# Patient Record
Sex: FEMALE | Race: BLACK OR AFRICAN AMERICAN | NOT HISPANIC OR LATINO | Employment: OTHER | URBAN - METROPOLITAN AREA
[De-identification: names, ages, dates, MRNs, and addresses within clinical notes are randomized per-mention and may not be internally consistent; named-entity substitution may affect disease eponyms.]

---

## 2017-01-03 RX ORDER — CHLORTHALIDONE 25 MG/1
TABLET ORAL
Qty: 27 TABLET | Refills: 0 | Status: SHIPPED | OUTPATIENT
Start: 2017-01-03 | End: 2017-09-12

## 2017-01-10 ENCOUNTER — TELEPHONE (OUTPATIENT)
Dept: OPHTHALMOLOGY | Facility: CLINIC | Age: 77
End: 2017-01-10

## 2017-01-10 NOTE — TELEPHONE ENCOUNTER
Spoke with pt informed Dr. Canchola will be out of the office on 1/18/17, R/S appt to 1/19/17 @ 9:00.

## 2017-02-13 RX ORDER — FLUOCINONIDE 0.5 MG/G
OINTMENT TOPICAL 2 TIMES DAILY
Qty: 30 G | Refills: 1 | Status: ON HOLD | OUTPATIENT
Start: 2017-02-13 | End: 2018-04-11 | Stop reason: HOSPADM

## 2017-02-13 RX ORDER — FLUOCINONIDE 0.5 MG/G
OINTMENT TOPICAL
Refills: 0 | Status: CANCELLED | OUTPATIENT
Start: 2017-02-13

## 2017-02-13 NOTE — TELEPHONE ENCOUNTER
----- Message from Rice Emma sent at 2017 11:36 AM CST -----  Contact: [facility]/ [phone] home  Type: Rx    Name of medication(s): fluocinonide (LIDEX) 0.05 % ointment ()    Is this a refill? New rx? Refill    Who prescribed medication? Dr. Zambrano    Pharmacy Name, Phone, & Location: [facility] on file    Comments: Pt would like to request a refill on the medications above.  Please call and advise.    Thank you

## 2017-04-18 ENCOUNTER — TELEPHONE (OUTPATIENT)
Dept: INTERNAL MEDICINE | Facility: CLINIC | Age: 77
End: 2017-04-18

## 2017-04-18 NOTE — TELEPHONE ENCOUNTER
----- Message from Norah Newell PharmD sent at 4/18/2017 11:40 AM CDT -----  Good Morning Dr. Zambrano,     I just wanted to inform you that we have been trying to contact Ms. Suero to re-schedule a PT/INR.  We have been unable to reach the patient by phone after multiple attempts and are now mailing out a letter.  If the patient does not respond or schedule an INR within 7 days, the patient will be discharged from the Coumadin Clinic.  I just wanted to make sure you were aware of the current situation.    Thanks!

## 2017-04-18 NOTE — TELEPHONE ENCOUNTER
Please call--coumadin clinic has been trying to reach her-- all her recent appointments have been canceled-- If she is not going to get iNR check, she is going to have to come off the coumadin.  Let me know if you can reach her

## 2017-04-25 ENCOUNTER — TELEPHONE (OUTPATIENT)
Dept: INTERNAL MEDICINE | Facility: CLINIC | Age: 77
End: 2017-04-25

## 2017-04-25 ENCOUNTER — ANTI-COAG VISIT (OUTPATIENT)
Dept: CARDIOLOGY | Facility: CLINIC | Age: 77
End: 2017-04-25

## 2017-04-25 NOTE — LETTER
April 25, 2017    Temitope Suero  [address] [facility] [address]             [facility]  [address]  [facility] [address]  Phone: [phone]  Fax: [phone] Dear Ms. Suero:     The Coumadin Clinic has been unsuccessful in getting in touch with you regarding your missed appointment for a PT/INR check.  Your appointment is overdue.  Despite phone calls and a previous letter sent to your address on file, you have not called our clinic to reschedule your appointment.  It is very important that you are monitored regularly while on Coumadin (warfarin).     You have been discharged from the Coumadin Clinic.  Your physician has been notified of your discharge.  If you still require monitoring for your Coumadin therapy please call your physician immediately so that you can resume regular monitoring.  If the physician recommends that our clinic continue to monitor your Coumadin therapy, a new enrollment form will be required before we can resume care.  If you are no longer on Coumadin or no longer require monitoring by our clinic, please contact our clinic so we may update our records.     It has been a pleasure providing your care.      Sincerely,  [facility] Staff

## 2017-04-25 NOTE — TELEPHONE ENCOUNTER
----- Message from Norah Newell PharmD sent at 4/25/2017  8:49 AM CDT -----  Good Morning Dr. Zambrano,    After multiple attempts to contact Ms. Suero via telephone and a mailed letter mailed, the patient still has not contacted us to re-schedule a PT/INR.  Therefore, per our previous message to you, the patient is now being discharged from the Coumadin Clinic.  In the future, if the patient remains on Coumadin and is interested in having us monitor levels, a new enrollment form will be needed.      Thanks

## 2017-04-25 NOTE — PROGRESS NOTES
The Coumadin Clinic staff has tried to reach the patient by telephone on 2/2/17 and 4/5/17.  A missed letter was sent to the patient's address on file on 4/18/17, in addition to notifying the enrolling physician.  The patient has not responded to any of the previous correspondence.  Therefore, I am now discharging the patient from the Coumadin Clinic, per our missed protocol.  This discharge information will be sent to the patients enrolling physician and a letter with this information will be sent to the patient.

## 2017-06-29 DIAGNOSIS — Z79.01 LONG-TERM (CURRENT) USE OF ANTICOAGULANTS: ICD-10-CM

## 2017-06-29 NOTE — TELEPHONE ENCOUNTER
----- Message from Samara Salas sent at 6/29/2017  2:58 PM CDT -----  Contact: Patient, phone [phone]  Refill    warfarin (COUMADIN) 5 MG tablet 30 tablet 0 2/1/2016  No  Sig: Take 1 tablet (5mg) by mouth everyday, except 1/2 tablet  (2.5mg) on Tuesday and Thursday,  Or as directed by Coumadin Clinic    [place] [address] [address] [facility] [phone] (phone) [phone] (fax)    Please call patient and let her know that it is done.     Thanks!

## 2017-07-01 RX ORDER — WARFARIN SODIUM 5 MG/1
TABLET ORAL
Qty: 30 TABLET | Refills: 4 | Status: ON HOLD | OUTPATIENT
Start: 2017-07-01 | End: 2018-04-11 | Stop reason: HOSPADM

## 2017-07-05 RX ORDER — ATORVASTATIN CALCIUM 40 MG/1
TABLET, FILM COATED ORAL
Qty: 30 TABLET | Refills: 9 | Status: SHIPPED | OUTPATIENT
Start: 2017-07-05 | End: 2017-09-12

## 2017-09-02 RX ORDER — METFORMIN HYDROCHLORIDE 1000 MG/1
TABLET ORAL
Qty: 180 TABLET | Refills: 4 | Status: SHIPPED | OUTPATIENT
Start: 2017-09-02 | End: 2018-04-17

## 2017-09-02 RX ORDER — HYDROXYZINE HYDROCHLORIDE 25 MG/1
TABLET, FILM COATED ORAL
Qty: 90 TABLET | Refills: 2 | Status: SHIPPED | OUTPATIENT
Start: 2017-09-02 | End: 2018-04-17

## 2017-09-02 RX ORDER — SPIRONOLACTONE 25 MG/1
TABLET ORAL
Qty: 90 TABLET | Refills: 3 | Status: SHIPPED | OUTPATIENT
Start: 2017-09-02 | End: 2018-04-06 | Stop reason: SDUPTHER

## 2017-09-12 RX ORDER — ATORVASTATIN CALCIUM 40 MG/1
TABLET, FILM COATED ORAL
Qty: 30 TABLET | Refills: 3 | Status: SHIPPED | OUTPATIENT
Start: 2017-09-12 | End: 2018-04-06 | Stop reason: SDUPTHER

## 2017-09-12 RX ORDER — CHLORTHALIDONE 25 MG/1
TABLET ORAL
Qty: 30 TABLET | Refills: 3 | Status: SHIPPED | OUTPATIENT
Start: 2017-09-12 | End: 2018-04-06 | Stop reason: SDUPTHER

## 2018-04-06 RX ORDER — ATORVASTATIN CALCIUM 40 MG/1
40 TABLET, FILM COATED ORAL DAILY
Qty: 30 TABLET | Refills: 0 | Status: SHIPPED | OUTPATIENT
Start: 2018-04-06 | End: 2018-04-17 | Stop reason: SDUPTHER

## 2018-04-06 RX ORDER — METHIMAZOLE 10 MG/1
10 TABLET ORAL DAILY
Qty: 30 TABLET | Refills: 0 | Status: SHIPPED | OUTPATIENT
Start: 2018-04-06 | End: 2018-04-17

## 2018-04-06 RX ORDER — CHLORTHALIDONE 25 MG/1
25 TABLET ORAL DAILY
Qty: 30 TABLET | Refills: 0 | Status: SHIPPED | OUTPATIENT
Start: 2018-04-06 | End: 2018-04-17 | Stop reason: SDUPTHER

## 2018-04-06 RX ORDER — SPIRONOLACTONE 25 MG/1
25 TABLET ORAL DAILY
Qty: 90 TABLET | Refills: 0 | Status: SHIPPED | OUTPATIENT
Start: 2018-04-06 | End: 2018-04-17

## 2018-04-06 NOTE — TELEPHONE ENCOUNTER
----- Message from Ange Preciado sent at 4/6/2018 11:28 AM CDT -----  Contact: Ms Stern   [phone]  Ms Ackermanard states need to speak with nurse.   Patient losing weight , no appetite , forgetting things.   Patient need appointment for Monday 4/9/2018 at 10am if possible.      MsJarred  States patient need refill on medication atorvastatin (LIPITOR) 40 MG tablet,     spironolactone (ALDACTONE) 25 MG tablet,  methimazole (TAPAZOLE) 10 MG Tab,     chlorthalidone (HYGROTEN) 25 MG Tab called into [facility]. Ms Stern states patient out of medication     Please call Ms Ackermanard [phone] for more info

## 2018-04-10 ENCOUNTER — OFFICE VISIT (OUTPATIENT)
Dept: INTERNAL MEDICINE | Facility: CLINIC | Age: 78
DRG: 689 | End: 2018-04-10
Payer: COMMERCIAL

## 2018-04-10 ENCOUNTER — HOSPITAL ENCOUNTER (INPATIENT)
Facility: HOSPITAL | Age: 78
LOS: 1 days | Discharge: HOME | DRG: 689 | End: 2018-04-11
Attending: EMERGENCY MEDICINE | Admitting: EMERGENCY MEDICINE
Payer: COMMERCIAL

## 2018-04-10 VITALS
OXYGEN SATURATION: 98 % | BODY MASS INDEX: 23.56 KG/M2 | SYSTOLIC BLOOD PRESSURE: 182 MMHG | HEIGHT: 67 IN | WEIGHT: 150.13 LBS | TEMPERATURE: 98 F | HEART RATE: 59 BPM | DIASTOLIC BLOOD PRESSURE: 102 MMHG

## 2018-04-10 DIAGNOSIS — I48.91 RAPID ATRIAL FIBRILLATION: Primary | ICD-10-CM

## 2018-04-10 DIAGNOSIS — R41.82 ALTERED MENTAL STATUS: ICD-10-CM

## 2018-04-10 DIAGNOSIS — I16.0 HYPERTENSIVE URGENCY: ICD-10-CM

## 2018-04-10 DIAGNOSIS — I10 ESSENTIAL HYPERTENSION: ICD-10-CM

## 2018-04-10 DIAGNOSIS — I48.0 PAROXYSMAL ATRIAL FIBRILLATION: ICD-10-CM

## 2018-04-10 DIAGNOSIS — R63.4 WEIGHT LOSS: ICD-10-CM

## 2018-04-10 DIAGNOSIS — I48.92 ATRIAL FLUTTER, UNSPECIFIED TYPE: ICD-10-CM

## 2018-04-10 DIAGNOSIS — R41.3 MEMORY LOSS: ICD-10-CM

## 2018-04-10 DIAGNOSIS — R41.82 ALTERED MENTAL STATUS, UNSPECIFIED ALTERED MENTAL STATUS TYPE: Primary | ICD-10-CM

## 2018-04-10 DIAGNOSIS — I63.431 EMBOLIC STROKE INVOLVING RIGHT POSTERIOR CEREBRAL ARTERY: ICD-10-CM

## 2018-04-10 DIAGNOSIS — Z91.148 NONCOMPLIANCE WITH MEDICATION REGIMEN: ICD-10-CM

## 2018-04-10 DIAGNOSIS — I48.91 A-FIB: ICD-10-CM

## 2018-04-10 DIAGNOSIS — I49.8 ATRIAL ARRHYTHMIA: ICD-10-CM

## 2018-04-10 PROBLEM — N30.00 ACUTE CYSTITIS WITHOUT HEMATURIA: Status: ACTIVE | Noted: 2018-04-10

## 2018-04-10 LAB
ALBUMIN SERPL BCP-MCNC: 3.4 G/DL
ALP SERPL-CCNC: 137 U/L
ALT SERPL W/O P-5'-P-CCNC: 18 U/L
ANION GAP SERPL CALC-SCNC: 10 MMOL/L
APTT BLDCRRT: 24.4 SEC
AST SERPL-CCNC: 22 U/L
BASOPHILS # BLD AUTO: 0.04 K/UL
BASOPHILS NFR BLD: 0.9 %
BILIRUB SERPL-MCNC: 0.6 MG/DL
BILIRUB UR QL STRIP: NEGATIVE
BUN SERPL-MCNC: 18 MG/DL
CALCIUM SERPL-MCNC: 9.2 MG/DL
CHLORIDE SERPL-SCNC: 107 MMOL/L
CLARITY UR REFRACT.AUTO: ABNORMAL
CO2 SERPL-SCNC: 25 MMOL/L
COLOR UR AUTO: YELLOW
CREAT SERPL-MCNC: 1.3 MG/DL
DIFFERENTIAL METHOD: ABNORMAL
EOSINOPHIL # BLD AUTO: 0.1 K/UL
EOSINOPHIL NFR BLD: 2.3 %
ERYTHROCYTE [DISTWIDTH] IN BLOOD BY AUTOMATED COUNT: 15.2 %
EST. GFR  (AFRICAN AMERICAN): 45.7 ML/MIN/1.73 M^2
EST. GFR  (NON AFRICAN AMERICAN): 39.7 ML/MIN/1.73 M^2
GLUCOSE SERPL-MCNC: 88 MG/DL
GLUCOSE UR QL STRIP: NEGATIVE
HCT VFR BLD AUTO: 39.2 %
HGB BLD-MCNC: 12 G/DL
HGB UR QL STRIP: NEGATIVE
IMM GRANULOCYTES # BLD AUTO: 0.01 K/UL
IMM GRANULOCYTES NFR BLD AUTO: 0.2 %
INR PPP: 1.1
KETONES UR QL STRIP: NEGATIVE
LACTATE SERPL-SCNC: 1.2 MMOL/L
LEUKOCYTE ESTERASE UR QL STRIP: ABNORMAL
LYMPHOCYTES # BLD AUTO: 1.4 K/UL
LYMPHOCYTES NFR BLD: 31.1 %
MAGNESIUM SERPL-MCNC: 1.7 MG/DL
MCH RBC QN AUTO: 24.9 PG
MCHC RBC AUTO-ENTMCNC: 30.6 G/DL
MCV RBC AUTO: 81 FL
MICROSCOPIC COMMENT: ABNORMAL
MONOCYTES # BLD AUTO: 0.4 K/UL
MONOCYTES NFR BLD: 8.9 %
NEUTROPHILS # BLD AUTO: 2.5 K/UL
NEUTROPHILS NFR BLD: 56.6 %
NITRITE UR QL STRIP: NEGATIVE
NRBC BLD-RTO: 0 /100 WBC
PH UR STRIP: 7 [PH] (ref 5–8)
PLATELET # BLD AUTO: 199 K/UL
PMV BLD AUTO: 12 FL
POCT GLUCOSE: 91 MG/DL (ref 70–110)
POTASSIUM SERPL-SCNC: 3.5 MMOL/L
PROT SERPL-MCNC: 6.3 G/DL
PROT UR QL STRIP: NEGATIVE
PROTHROMBIN TIME: 11.1 SEC
RBC # BLD AUTO: 4.82 M/UL
RBC #/AREA URNS AUTO: 6 /HPF (ref 0–4)
SODIUM SERPL-SCNC: 142 MMOL/L
SP GR UR STRIP: 1.01 (ref 1–1.03)
SQUAMOUS #/AREA URNS AUTO: 5 /HPF
TROPONIN I SERPL DL<=0.01 NG/ML-MCNC: 0.02 NG/ML
URN SPEC COLLECT METH UR: ABNORMAL
UROBILINOGEN UR STRIP-ACNC: NEGATIVE EU/DL
WBC # BLD AUTO: 4.37 K/UL
WBC #/AREA URNS AUTO: 56 /HPF (ref 0–5)

## 2018-04-10 PROCEDURE — 86140 C-REACTIVE PROTEIN: CPT

## 2018-04-10 PROCEDURE — 96375 TX/PRO/DX INJ NEW DRUG ADDON: CPT

## 2018-04-10 PROCEDURE — 85651 RBC SED RATE NONAUTOMATED: CPT

## 2018-04-10 PROCEDURE — 83735 ASSAY OF MAGNESIUM: CPT

## 2018-04-10 PROCEDURE — 3080F DIAST BP >= 90 MM HG: CPT | Mod: CPTII,S$GLB,, | Performed by: INTERNAL MEDICINE

## 2018-04-10 PROCEDURE — 96372 THER/PROPH/DIAG INJ SC/IM: CPT | Mod: 59

## 2018-04-10 PROCEDURE — 11000001 HC ACUTE MED/SURG PRIVATE ROOM

## 2018-04-10 PROCEDURE — 25000003 PHARM REV CODE 250: Performed by: STUDENT IN AN ORGANIZED HEALTH CARE EDUCATION/TRAINING PROGRAM

## 2018-04-10 PROCEDURE — 82962 GLUCOSE BLOOD TEST: CPT

## 2018-04-10 PROCEDURE — 99999 PR PBB SHADOW E&M-EST. PATIENT-LVL III: CPT | Mod: PBBFAC,,, | Performed by: INTERNAL MEDICINE

## 2018-04-10 PROCEDURE — 85730 THROMBOPLASTIN TIME PARTIAL: CPT

## 2018-04-10 PROCEDURE — 80053 COMPREHEN METABOLIC PANEL: CPT

## 2018-04-10 PROCEDURE — 63600175 PHARM REV CODE 636 W HCPCS: Performed by: EMERGENCY MEDICINE

## 2018-04-10 PROCEDURE — 93010 ELECTROCARDIOGRAM REPORT: CPT | Mod: S$GLB,,, | Performed by: INTERNAL MEDICINE

## 2018-04-10 PROCEDURE — 84425 ASSAY OF VITAMIN B-1: CPT

## 2018-04-10 PROCEDURE — 81001 URINALYSIS AUTO W/SCOPE: CPT

## 2018-04-10 PROCEDURE — 85025 COMPLETE CBC W/AUTO DIFF WBC: CPT

## 2018-04-10 PROCEDURE — 25000003 PHARM REV CODE 250: Performed by: EMERGENCY MEDICINE

## 2018-04-10 PROCEDURE — 85610 PROTHROMBIN TIME: CPT

## 2018-04-10 PROCEDURE — 87205 SMEAR GRAM STAIN: CPT

## 2018-04-10 PROCEDURE — 96376 TX/PRO/DX INJ SAME DRUG ADON: CPT

## 2018-04-10 PROCEDURE — 96374 THER/PROPH/DIAG INJ IV PUSH: CPT

## 2018-04-10 PROCEDURE — 3074F SYST BP LT 130 MM HG: CPT | Mod: CPTII,S$GLB,, | Performed by: INTERNAL MEDICINE

## 2018-04-10 PROCEDURE — 99285 EMERGENCY DEPT VISIT HI MDM: CPT | Mod: ,,, | Performed by: EMERGENCY MEDICINE

## 2018-04-10 PROCEDURE — 83605 ASSAY OF LACTIC ACID: CPT

## 2018-04-10 PROCEDURE — 87086 URINE CULTURE/COLONY COUNT: CPT

## 2018-04-10 PROCEDURE — 36415 COLL VENOUS BLD VENIPUNCTURE: CPT

## 2018-04-10 PROCEDURE — 84484 ASSAY OF TROPONIN QUANT: CPT

## 2018-04-10 PROCEDURE — 99214 OFFICE O/P EST MOD 30 MIN: CPT | Mod: S$GLB,,, | Performed by: INTERNAL MEDICINE

## 2018-04-10 PROCEDURE — 82652 VIT D 1 25-DIHYDROXY: CPT

## 2018-04-10 PROCEDURE — 86592 SYPHILIS TEST NON-TREP QUAL: CPT

## 2018-04-10 PROCEDURE — 93005 ELECTROCARDIOGRAM TRACING: CPT | Mod: S$GLB,,, | Performed by: INTERNAL MEDICINE

## 2018-04-10 PROCEDURE — 99285 EMERGENCY DEPT VISIT HI MDM: CPT | Mod: 25

## 2018-04-10 RX ORDER — IBUPROFEN 200 MG
24 TABLET ORAL
Status: DISCONTINUED | OUTPATIENT
Start: 2018-04-10 | End: 2018-04-11 | Stop reason: HOSPADM

## 2018-04-10 RX ORDER — ENOXAPARIN SODIUM 150 MG/ML
1 INJECTION SUBCUTANEOUS
Status: DISCONTINUED | OUTPATIENT
Start: 2018-04-11 | End: 2018-04-11 | Stop reason: ALTCHOICE

## 2018-04-10 RX ORDER — GLUCAGON 1 MG
1 KIT INJECTION
Status: DISCONTINUED | OUTPATIENT
Start: 2018-04-10 | End: 2018-04-11 | Stop reason: HOSPADM

## 2018-04-10 RX ORDER — ENOXAPARIN SODIUM 150 MG/ML
1 INJECTION SUBCUTANEOUS
Status: DISCONTINUED | OUTPATIENT
Start: 2018-04-11 | End: 2018-04-10

## 2018-04-10 RX ORDER — IBUPROFEN 200 MG
16 TABLET ORAL
Status: DISCONTINUED | OUTPATIENT
Start: 2018-04-10 | End: 2018-04-11 | Stop reason: HOSPADM

## 2018-04-10 RX ORDER — WARFARIN 2.5 MG/1
2.5 TABLET ORAL DAILY
Status: DISCONTINUED | OUTPATIENT
Start: 2018-04-10 | End: 2018-04-11

## 2018-04-10 RX ORDER — ATORVASTATIN CALCIUM 20 MG/1
40 TABLET, FILM COATED ORAL DAILY
Status: DISCONTINUED | OUTPATIENT
Start: 2018-04-11 | End: 2018-04-11 | Stop reason: HOSPADM

## 2018-04-10 RX ORDER — METOPROLOL TARTRATE 25 MG/1
25 TABLET, FILM COATED ORAL 2 TIMES DAILY
Status: DISCONTINUED | OUTPATIENT
Start: 2018-04-10 | End: 2018-04-11 | Stop reason: HOSPADM

## 2018-04-10 RX ORDER — AMOXICILLIN AND CLAVULANATE POTASSIUM 500; 125 MG/1; MG/1
1 TABLET, FILM COATED ORAL 2 TIMES DAILY
Status: DISCONTINUED | OUTPATIENT
Start: 2018-04-10 | End: 2018-04-11

## 2018-04-10 RX ORDER — BENAZEPRIL HYDROCHLORIDE 20 MG/1
40 TABLET ORAL DAILY
Status: DISCONTINUED | OUTPATIENT
Start: 2018-04-10 | End: 2018-04-11 | Stop reason: HOSPADM

## 2018-04-10 RX ORDER — WARFARIN 2.5 MG/1
2.5 TABLET ORAL DAILY
Status: DISCONTINUED | OUTPATIENT
Start: 2018-04-11 | End: 2018-04-10

## 2018-04-10 RX ORDER — HYDRALAZINE HYDROCHLORIDE 20 MG/ML
10 INJECTION INTRAMUSCULAR; INTRAVENOUS
Status: COMPLETED | OUTPATIENT
Start: 2018-04-10 | End: 2018-04-10

## 2018-04-10 RX ORDER — AMLODIPINE BESYLATE 10 MG/1
10 TABLET ORAL
Status: COMPLETED | OUTPATIENT
Start: 2018-04-10 | End: 2018-04-10

## 2018-04-10 RX ORDER — ASPIRIN 81 MG/1
81 TABLET ORAL DAILY
Status: DISCONTINUED | OUTPATIENT
Start: 2018-04-11 | End: 2018-04-11 | Stop reason: HOSPADM

## 2018-04-10 RX ORDER — ENOXAPARIN SODIUM 100 MG/ML
1 INJECTION SUBCUTANEOUS
Status: COMPLETED | OUTPATIENT
Start: 2018-04-10 | End: 2018-04-10

## 2018-04-10 RX ORDER — HYDRALAZINE HYDROCHLORIDE 20 MG/ML
10 INJECTION INTRAMUSCULAR; INTRAVENOUS EVERY 8 HOURS PRN
Status: DISCONTINUED | OUTPATIENT
Start: 2018-04-10 | End: 2018-04-10

## 2018-04-10 RX ORDER — LABETALOL HYDROCHLORIDE 5 MG/ML
20 INJECTION, SOLUTION INTRAVENOUS
Status: COMPLETED | OUTPATIENT
Start: 2018-04-10 | End: 2018-04-10

## 2018-04-10 RX ORDER — METOPROLOL TARTRATE 1 MG/ML
5 INJECTION, SOLUTION INTRAVENOUS ONCE
Status: COMPLETED | OUTPATIENT
Start: 2018-04-11 | End: 2018-04-10

## 2018-04-10 RX ORDER — SODIUM CHLORIDE 0.9 % (FLUSH) 0.9 %
5 SYRINGE (ML) INJECTION
Status: DISCONTINUED | OUTPATIENT
Start: 2018-04-10 | End: 2018-04-11 | Stop reason: HOSPADM

## 2018-04-10 RX ORDER — ASPIRIN 81 MG/1
81 TABLET ORAL DAILY
Status: DISCONTINUED | OUTPATIENT
Start: 2018-04-11 | End: 2018-04-10

## 2018-04-10 RX ADMIN — HYDRALAZINE HYDROCHLORIDE 10 MG: 20 INJECTION INTRAMUSCULAR; INTRAVENOUS at 03:04

## 2018-04-10 RX ADMIN — AMOXICILLIN AND CLAVULANATE POTASSIUM 500 MG: 500; 125 TABLET, FILM COATED ORAL at 11:04

## 2018-04-10 RX ADMIN — ENOXAPARIN SODIUM 70 MG: 100 INJECTION SUBCUTANEOUS at 08:04

## 2018-04-10 RX ADMIN — METOROPROLOL TARTRATE 5 MG: 5 INJECTION, SOLUTION INTRAVENOUS at 11:04

## 2018-04-10 RX ADMIN — LABETALOL HYDROCHLORIDE 20 MG: 5 INJECTION, SOLUTION INTRAVENOUS at 04:04

## 2018-04-10 RX ADMIN — WARFARIN SODIUM 2.5 MG: 2.5 TABLET ORAL at 11:04

## 2018-04-10 RX ADMIN — METOPROLOL TARTRATE 25 MG: 25 TABLET ORAL at 08:04

## 2018-04-10 RX ADMIN — AMLODIPINE BESYLATE 10 MG: 10 TABLET ORAL at 08:04

## 2018-04-10 RX ADMIN — BENAZEPRIL HYDROCHLORIDE 40 MG: 20 TABLET, FILM COATED ORAL at 08:04

## 2018-04-10 RX ADMIN — LABETALOL HYDROCHLORIDE 20 MG: 5 INJECTION, SOLUTION INTRAVENOUS at 06:04

## 2018-04-10 NOTE — PROGRESS NOTES
Subjective:       Patient ID: Temitope Suero is a 78 y.o. female.    Chief Complaint: Weight Loss (no appetite, lost memory)    Patient brought in by great niece and great nephew because they think she is losing too much weight and in the last 2-3 months has become very forgetful.  This includes apparently not taking any of her numerous meds.    Per her chart she has lost 30 pounds since 12/16, which is the last weight I see.  Patient has no complaint      Review of Systems   Constitutional: Positive for activity change, appetite change and unexpected weight change.   HENT: Negative.    Eyes: Negative.    Respiratory: Negative.    Cardiovascular: Negative.    Gastrointestinal:        Loss of appetite   Genitourinary: Negative.    Musculoskeletal: Negative.    Skin: Negative.    Neurological:        Forgetful   Hematological: Negative.    Psychiatric/Behavioral: Positive for confusion.       Objective:      Physical Exam   Constitutional: She appears well-developed and well-nourished.  Non-toxic appearance. No distress.       HENT:   Head: Normocephalic and atraumatic.   Right Ear: Tympanic membrane, external ear and ear canal normal.   Left Ear: Tympanic membrane, external ear and ear canal normal.   Eyes: EOM are normal. Pupils are equal, round, and reactive to light. No scleral icterus.   Neck: Normal range of motion. Neck supple. No thyromegaly present.   Cardiovascular: Normal heart sounds.  An irregularly irregular rhythm present. Tachycardia present.    Pulses:       Dorsalis pedis pulses are 1+ on the right side, and 1+ on the left side.   Pulmonary/Chest: Effort normal and breath sounds normal.   Abdominal: Soft. Bowel sounds are normal. She exhibits no mass. There is no tenderness. There is no rebound.   Musculoskeletal: Normal range of motion.   Lymphadenopathy:     She has no cervical adenopathy.   Neurological: She is alert. She has normal reflexes. She is disoriented. She displays normal reflexes. No  cranial nerve deficit or sensory deficit. She exhibits normal muscle tone. Coordination normal.   Knows her name and address and where she is now.  Disoriented to time   Skin: Skin is warm and dry.   Psychiatric: She has a normal mood and affect. Her behavior is normal.       Assessment:       1. Rapid atrial fibrillation    2. Weight loss    3. Memory loss    4. Atrial flutter, unspecified type        Plan:   Temitope was seen today for weight loss.    Diagnoses and all orders for this visit:    Rapid atrial fibrillation  -     IN OFFICE EKG 12-LEAD (to Muse)  -     CBC auto differential; Future  -     Comprehensive metabolic panel; Future  -     TSH; Future  -     T4; Future  -     POCT urine dipstick without microscope    Weight loss  -     CBC auto differential; Future  -     Comprehensive metabolic panel; Future  -     TSH; Future  -     T4; Future  -     POCT urine dipstick without microscope  -     Refer to Emergency Dept.    Memory loss  -     CBC auto differential; Future  -     Comprehensive metabolic panel; Future  -     TSH; Future  -     T4; Future  -     POCT urine dipstick without microscope  -     Refer to Emergency Dept.    Atrial flutter, unspecified type  -     Refer to Emergency Dept.

## 2018-04-10 NOTE — ED NOTES
Patient identifiers verified and correct for Temitope Suero.    LOC: The patient is awake, alert and aware of environment with an appropriate affect, the patient is oriented to person and place, having word finding difficulty  APPEARANCE: Patient resting comfortably and in no acute distress, patient is clean and well groomed, patient's clothing is properly fastened.  SKIN: The skin is warm and dry, color consistent with ethnicity, patient has normal skin turgor and moist mucus membranes, skin intact, no breakdown or bruising noted.  MUSCULOSKELETAL: Patient moving all extremities spontaneously, no obvious swelling or deformities noted.  RESPIRATORY: Airway is open and patent, respirations are spontaneous, patient has a normal effort and rate, no accessory muscle use noted, bilateral breath sounds clear  CARDIAC: Patient has abnormal rate and rhythm, no peripheral edema noted, capillary refill < 3 seconds.  ABDOMEN: Soft and non tender to palpation, no distention noted, normoactive bowel sounds present in all four quadrants.  NEUROLOGIC: PERRL, 3mm bilaterally, eyes open spontaneously, behavior appropriate to situation, follows commands, facial expression symmetrical, bilateral hand grasp equal and even, purposeful motor response noted, normal sensation in all extremities when touched with a finger.  C/o blurred vision to right eye

## 2018-04-10 NOTE — ED NOTES
Patient was sent from clinic with abnormal EKG and elevated HR. Patient denies chest pain. Reports feeling nervous since yesterday. Denies SOB. Reports having slight blurriness in right eye for 2 days. Denies tingling/numbness to any extremity. Reports also having word finding difficulty for the past 2 days. Patient's great niece reports increased confusion since January and weight loss.

## 2018-04-10 NOTE — ED PROVIDER NOTES
"Encounter Date: 4/10/2018    SCRIBE #1 NOTE: I, Zhang Kimble, am scribing for, and in the presence of,  Dr. Sherman. I have scribed the entire note.       History     Chief Complaint   Patient presents with    Fatigue     sent from im clinic, see note from dr vences    Atrial Fibrillation     79 y/o female with co-morbidities of HTN, glaucoma, DM and hx of breast cancer presents to the ED with her niece for evaluation of altered mental status.  The nieces and over the road  and over the last 6 weeks has noticed a gradual decline in her cognitive function.  Usually the patient is very well read and articulate.  The niece when she saw her 6 weeks ago thought she was a little confused but just thought "she is old lady confused".  She also noticed that she was losing some weight.  When she saw her this week the patient was confused about the smallest little details like her the present illness and she is very sharp and usually well-informed.  The niece also noticed that she had quite a bit of weight loss that her clothes were very loose on her.  The patient's neice also says that she cannot find her medicine bag which is very unusual and in fact doesn't believe that the patient has been not taking her medications for several weeks.  The patient went over to her primary care physician's office who referred her here for further evaluation.      The history is provided by the patient and medical records.     Review of patient's allergies indicates:  No Known Allergies  Past Medical History:   Diagnosis Date    *Atrial fibrillation     Cancer     breast    Diabetes mellitus type II     Glaucoma     Hearing loss, bilateral     Hypertension     Obesity 1/16/2014    Thyroid disease     Toxic multinodular goiter 10/28/2013     Past Surgical History:   Procedure Laterality Date    BREAST SURGERY      CHOLECYSTECTOMY       Family History   Problem Relation Age of Onset    Hypertension Mother     " Hypertension Father     Glaucoma Father     Heart disease Father     Cancer Sister     Asthma Son     Diabetes Paternal Aunt     Thyroid cancer Neg Hx      Social History   Substance Use Topics    Smoking status: Never Smoker    Smokeless tobacco: Never Used    Alcohol use No     Review of Systems   Constitutional: Negative for fever.   HENT: Negative for sore throat.    Respiratory: Negative for shortness of breath.    Cardiovascular: Negative for chest pain.   Gastrointestinal: Negative for nausea.   Genitourinary: Negative for dysuria.   Musculoskeletal: Negative for back pain.   Skin: Negative for rash.   Neurological: Negative for weakness.   Hematological: Does not bruise/bleed easily.   Psychiatric/Behavioral: Positive for confusion.       Physical Exam     Initial Vitals [04/10/18 1433]   BP Pulse Resp Temp SpO2   (!) 136/106 110 18 98 °F (36.7 °C) 98 %      MAP       116         Physical Exam    Vitals reviewed.  Constitutional: She appears well-developed and well-nourished. No distress.   HENT:   Head: Normocephalic and atraumatic.   Mouth/Throat: Oropharynx is clear and moist.   Eyes: Conjunctivae and EOM are normal. Pupils are equal, round, and reactive to light.   Neck: Normal range of motion. Neck supple.   Cardiovascular:   Irregular rhythm with tachycardia.    Pulmonary/Chest: Breath sounds normal. No respiratory distress.   Abdominal: Soft. Bowel sounds are normal. She exhibits no distension. There is no tenderness.   Musculoskeletal: Normal range of motion. She exhibits no tenderness.   2+ pretibial edema.   Neurological: She is alert. She has normal strength. No cranial nerve deficit. GCS eye subscore is 4. GCS verbal subscore is 5. GCS motor subscore is 6.   The patient can recall objects that I show her like eyeglasses and a ballpoint pen.  She is not oriented to month or year.  She has no focality motor deficits.   Skin: Skin is warm and dry.   Psychiatric: She has a normal mood and  affect.         ED Course   Procedures  Labs Reviewed   CBC W/ AUTO DIFFERENTIAL - Abnormal; Notable for the following:        Result Value    MCV 81 (*)     MCH 24.9 (*)     MCHC 30.6 (*)     RDW 15.2 (*)     All other components within normal limits   COMPREHENSIVE METABOLIC PANEL - Abnormal; Notable for the following:     Albumin 3.4 (*)     Alkaline Phosphatase 137 (*)     eGFR if  45.7 (*)     eGFR if non  39.7 (*)     All other components within normal limits   LACTIC ACID, PLASMA   TROPONIN I   PROTIME-INR   APTT   MAGNESIUM   URINALYSIS   POCT GLUCOSE     EKG Readings: (Independently Interpreted)   Atrial flutter. Rate is 111bpm. No ischemia.          Medical Decision Making:   History:   Old Medical Records: I decided to obtain old medical records.  Differential Diagnosis:   Differential diagnosis includes but is not limited to hypertensive encephalopathy, stroke, infection, arrhythmia  Clinical Tests:   Lab Tests: Ordered and Reviewed  Radiological Study: Ordered and Reviewed  ED Management:  The patient was seen and evaluated labwork was obtained her electrocautery and does show an irregularly irregular rhythm.  Occasionally she is an atrial flutter and then will go back into an atrial fibrillation.  The patient was given multiple doses of intravenous beta blocker as well as her oral medications.  MR angiogram of the head and neck did not show any occlusive disease that requires intervention.  Her encephalopathy continues.  She is still pleasant.  She'll be treated with her antihypertensives and have further evaluation done on her.  She was also given Lovenox for her atrial ablation flutter.            Scribe Attestation:   Scribe #1: I performed the above scribed service and the documentation accurately describes the services I performed. I attest to the accuracy of the note.               Clinical Impression:   The primary encounter diagnosis was Altered mental status,  unspecified altered mental status type. Diagnoses of Hypertensive urgency, Noncompliance with medication regimen, Atrial arrhythmia, and Altered mental status were also pertinent to this visit.    Disposition:   Disposition: Admitted  Condition: Stable                        Richard Sherman DO  04/10/18 2029

## 2018-04-11 VITALS
TEMPERATURE: 97 F | SYSTOLIC BLOOD PRESSURE: 150 MMHG | WEIGHT: 150 LBS | DIASTOLIC BLOOD PRESSURE: 89 MMHG | HEIGHT: 67 IN | RESPIRATION RATE: 20 BRPM | HEART RATE: 83 BPM | OXYGEN SATURATION: 100 % | BODY MASS INDEX: 23.54 KG/M2

## 2018-04-11 PROBLEM — I16.1 HYPERTENSIVE EMERGENCY: Status: ACTIVE | Noted: 2018-04-11

## 2018-04-11 PROBLEM — I65.1 VERTEBROBASILAR DOLICHOECTASIA: Status: ACTIVE | Noted: 2018-04-11

## 2018-04-11 PROBLEM — I63.431 EMBOLIC STROKE INVOLVING RIGHT POSTERIOR CEREBRAL ARTERY: Status: ACTIVE | Noted: 2018-04-11

## 2018-04-11 PROBLEM — I67.9 CEREBROVASCULAR SMALL VESSEL DISEASE: Status: ACTIVE | Noted: 2018-04-11

## 2018-04-11 PROBLEM — Z86.73 HISTORY OF STROKE: Status: ACTIVE | Noted: 2018-04-11

## 2018-04-11 LAB
25(OH)D3+25(OH)D2 SERPL-MCNC: 20 NG/ML
ALBUMIN SERPL BCP-MCNC: 3.1 G/DL
ALP SERPL-CCNC: 119 U/L
ALT SERPL W/O P-5'-P-CCNC: 15 U/L
ANION GAP SERPL CALC-SCNC: 9 MMOL/L
ANION GAP SERPL CALC-SCNC: 9 MMOL/L
AST SERPL-CCNC: 19 U/L
BILIRUB SERPL-MCNC: 0.6 MG/DL
BUN SERPL-MCNC: 20 MG/DL
BUN SERPL-MCNC: 20 MG/DL
CALCIUM SERPL-MCNC: 8.5 MG/DL
CALCIUM SERPL-MCNC: 8.5 MG/DL
CHLORIDE SERPL-SCNC: 108 MMOL/L
CHLORIDE SERPL-SCNC: 108 MMOL/L
CO2 SERPL-SCNC: 25 MMOL/L
CO2 SERPL-SCNC: 25 MMOL/L
CREAT SERPL-MCNC: 1.1 MG/DL
CREAT SERPL-MCNC: 1.1 MG/DL
CRP SERPL-MCNC: 7.7 MG/L
ERYTHROCYTE [SEDIMENTATION RATE] IN BLOOD BY WESTERGREN METHOD: 2 MM/HR
EST. GFR  (AFRICAN AMERICAN): 56 ML/MIN/1.73 M^2
EST. GFR  (AFRICAN AMERICAN): 56 ML/MIN/1.73 M^2
EST. GFR  (NON AFRICAN AMERICAN): 48.5 ML/MIN/1.73 M^2
EST. GFR  (NON AFRICAN AMERICAN): 48.5 ML/MIN/1.73 M^2
ESTIMATED PA SYSTOLIC PRESSURE: 34.36
FOLATE SERPL-MCNC: 12.7 NG/ML
GLOBAL PERICARDIAL EFFUSION: ABNORMAL
GLUCOSE SERPL-MCNC: 97 MG/DL
GLUCOSE SERPL-MCNC: 97 MG/DL
GRAM STN SPEC: NORMAL
HIV 1+2 AB+HIV1 P24 AG SERPL QL IA: NEGATIVE
INR PPP: 1.1
MAGNESIUM SERPL-MCNC: 1.8 MG/DL
MITRAL VALVE REGURGITATION: ABNORMAL
PHOSPHATE SERPL-MCNC: 3.4 MG/DL
POCT GLUCOSE: 117 MG/DL (ref 70–110)
POCT GLUCOSE: 94 MG/DL (ref 70–110)
POTASSIUM SERPL-SCNC: 3.7 MMOL/L
POTASSIUM SERPL-SCNC: 3.7 MMOL/L
PROT SERPL-MCNC: 5.9 G/DL
PROTHROMBIN TIME: 11.2 SEC
RETIRED EF AND QEF - SEE NOTES: 30 (ref 55–65)
RPR SER QL: NORMAL
SODIUM SERPL-SCNC: 142 MMOL/L
SODIUM SERPL-SCNC: 142 MMOL/L
T4 SERPL-MCNC: 6.9 UG/DL
TRICUSPID VALVE REGURGITATION: ABNORMAL
TSH SERPL DL<=0.005 MIU/L-ACNC: 0.93 UIU/ML
VIT B12 SERPL-MCNC: 373 PG/ML

## 2018-04-11 PROCEDURE — 93306 TTE W/DOPPLER COMPLETE: CPT | Mod: 26,,, | Performed by: INTERNAL MEDICINE

## 2018-04-11 PROCEDURE — 36415 COLL VENOUS BLD VENIPUNCTURE: CPT

## 2018-04-11 PROCEDURE — 83921 ORGANIC ACID SINGLE QUANT: CPT

## 2018-04-11 PROCEDURE — 82746 ASSAY OF FOLIC ACID SERUM: CPT

## 2018-04-11 PROCEDURE — 80053 COMPREHEN METABOLIC PANEL: CPT

## 2018-04-11 PROCEDURE — 25000003 PHARM REV CODE 250: Performed by: STUDENT IN AN ORGANIZED HEALTH CARE EDUCATION/TRAINING PROGRAM

## 2018-04-11 PROCEDURE — 82306 VITAMIN D 25 HYDROXY: CPT

## 2018-04-11 PROCEDURE — 25000003 PHARM REV CODE 250: Performed by: INTERNAL MEDICINE

## 2018-04-11 PROCEDURE — 99223 1ST HOSP IP/OBS HIGH 75: CPT | Mod: ,,, | Performed by: PSYCHIATRY & NEUROLOGY

## 2018-04-11 PROCEDURE — 99239 HOSP IP/OBS DSCHRG MGMT >30: CPT | Mod: ,,, | Performed by: INTERNAL MEDICINE

## 2018-04-11 PROCEDURE — 93306 TTE W/DOPPLER COMPLETE: CPT

## 2018-04-11 PROCEDURE — 86703 HIV-1/HIV-2 1 RESULT ANTBDY: CPT

## 2018-04-11 PROCEDURE — 84443 ASSAY THYROID STIM HORMONE: CPT

## 2018-04-11 PROCEDURE — 84100 ASSAY OF PHOSPHORUS: CPT

## 2018-04-11 PROCEDURE — 82607 VITAMIN B-12: CPT

## 2018-04-11 PROCEDURE — 84436 ASSAY OF TOTAL THYROXINE: CPT

## 2018-04-11 PROCEDURE — 85610 PROTHROMBIN TIME: CPT

## 2018-04-11 PROCEDURE — 83735 ASSAY OF MAGNESIUM: CPT

## 2018-04-11 PROCEDURE — 25000003 PHARM REV CODE 250: Performed by: EMERGENCY MEDICINE

## 2018-04-11 PROCEDURE — 63600175 PHARM REV CODE 636 W HCPCS: Performed by: STUDENT IN AN ORGANIZED HEALTH CARE EDUCATION/TRAINING PROGRAM

## 2018-04-11 RX ORDER — AMOXICILLIN AND CLAVULANATE POTASSIUM 500; 125 MG/1; MG/1
1 TABLET, FILM COATED ORAL 2 TIMES DAILY
Status: DISCONTINUED | OUTPATIENT
Start: 2018-04-11 | End: 2018-04-11 | Stop reason: HOSPADM

## 2018-04-11 RX ORDER — AMLODIPINE BESYLATE 10 MG/1
10 TABLET ORAL DAILY
Status: DISCONTINUED | OUTPATIENT
Start: 2018-04-11 | End: 2018-04-11 | Stop reason: HOSPADM

## 2018-04-11 RX ORDER — CHOLECALCIFEROL (VITAMIN D3) 25 MCG
1000 TABLET ORAL DAILY
Status: DISCONTINUED | OUTPATIENT
Start: 2018-04-11 | End: 2018-04-11 | Stop reason: HOSPADM

## 2018-04-11 RX ORDER — AMOXICILLIN AND CLAVULANATE POTASSIUM 500; 125 MG/1; MG/1
1 TABLET, FILM COATED ORAL 2 TIMES DAILY
Status: DISCONTINUED | OUTPATIENT
Start: 2018-04-11 | End: 2018-04-11

## 2018-04-11 RX ORDER — AMOXICILLIN AND CLAVULANATE POTASSIUM 500; 125 MG/1; MG/1
1 TABLET, FILM COATED ORAL 2 TIMES DAILY
Qty: 4 TABLET | Refills: 0 | Status: SHIPPED | OUTPATIENT
Start: 2018-04-11 | End: 2018-04-13

## 2018-04-11 RX ORDER — ENOXAPARIN SODIUM 100 MG/ML
1 INJECTION SUBCUTANEOUS
Status: DISCONTINUED | OUTPATIENT
Start: 2018-04-11 | End: 2018-04-11 | Stop reason: HOSPADM

## 2018-04-11 RX ADMIN — VITAMIN D, TAB 1000IU (100/BT) 1000 UNITS: 25 TAB at 11:04

## 2018-04-11 RX ADMIN — AMOXICILLIN AND CLAVULANATE POTASSIUM 500 MG: 500; 125 TABLET, FILM COATED ORAL at 09:04

## 2018-04-11 RX ADMIN — ENOXAPARIN SODIUM 70 MG: 150 INJECTION SUBCUTANEOUS at 01:04

## 2018-04-11 RX ADMIN — AMLODIPINE BESYLATE 10 MG: 10 TABLET ORAL at 11:04

## 2018-04-11 RX ADMIN — ASPIRIN 81 MG: 81 TABLET, COATED ORAL at 09:04

## 2018-04-11 RX ADMIN — METOPROLOL TARTRATE 25 MG: 25 TABLET ORAL at 09:04

## 2018-04-11 RX ADMIN — BENAZEPRIL HYDROCHLORIDE 40 MG: 20 TABLET, FILM COATED ORAL at 09:04

## 2018-04-11 NOTE — HPI
"78F w/ subacute decline over the past few months of episodes of confusion, incoherent speech, rambling, "not making sense", disorientation. The aforementioned symptoms have never happened before. There are no identified triggers or modifying factors. There have been no recurrent events. There are no other associated symptoms.    They deny any other specific neurological symptoms. They deny history of clinical stroke event. She has a history of atrial fibrillation and is noncompliant with her treatment plan.       "

## 2018-04-11 NOTE — ED NOTES
Pt is on portable tele monitor, called tele room to verify, pt is indeed on the monitor. Tele monitor number [id number]. Pt is resting comfortably with family at bedside waiting for a bed. Tired to call report told to call back in a few minutes.

## 2018-04-11 NOTE — HPI
Ms Temitope Suero is a 77 yo woman with history of Afib previously on Coumadin, uncontrolled HTN, DM2, multinodular goiter, here for altered mental status.     The family is at bedside to provide collateral.     Patient reports having memory loss worsening over the last few months. The niece reports that she discovered the patient was not taking her medications, unclear for how long. The patient also says she is less active than she has been previously, but is still able to walk around, ambulated, take care of ADLs. She occasionaly tells her family that she thinks her  is living at home, but he is . She has not had any personality changes.     The niece took her too PCP, where it was found she was having hypertension with a headache in addition to vision changes, concerning for HTN emergency, and was in Afib with RVR (HR in 100s). She was sent to ED at [facility] for that reason.     She reports today that she feels well, and her biggest concern is the memory loss. She has had no indication for infection such as fevers, chills, night sweats. She reports her activity level is decreased over the last few months, but does not have any mood symptoms or depression. She has had some weight loss that she attributes to not eating as much, probably due to lack of appetite. '    She was taking Warfarin previously for Afib, however, Coumadin clinic ntoes suggest that she has not been to clinic since 2017. She denies any recent history of slurred speech, unilateral weakness, or falls. Never had clots in her legs or lungs. No previous history of MI or CHF. She is not complaining of SOB, chest pain. She does however reports she has occasional feeling of palpitations when she exerts herself and walks her dogs. These episodes tend to resolve spontaneously however.

## 2018-04-11 NOTE — ED NOTES
Ok for patient to eat per Dr. Sherman. Patient eating meal that family brought. No complaints at this time.

## 2018-04-11 NOTE — ASSESSMENT & PLAN NOTE
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  Anticoagulation for secondary prevention

## 2018-04-11 NOTE — SUBJECTIVE & OBJECTIVE
Past Medical History:   Diagnosis Date    *Atrial fibrillation     Cancer     breast    Diabetes mellitus type II     Glaucoma     Hearing loss, bilateral     Hypertension     Obesity 1/16/2014    Thyroid disease     Toxic multinodular goiter 10/28/2013       Past Surgical History:   Procedure Laterality Date    BREAST SURGERY      CHOLECYSTECTOMY         Review of patient's allergies indicates:  No Known Allergies    No current facility-administered medications on file prior to encounter.      Current Outpatient Prescriptions on File Prior to Encounter   Medication Sig    amlodipine (NORVASC) 10 MG tablet TAKE ONE TABLET BY MOUTH ONCE DAILY    aspirin (ECOTRIN) 81 MG EC tablet Take 81 mg by mouth. 1 Tablet, Delayed Release (E.C.) Oral Every day    atorvastatin (LIPITOR) 40 MG tablet Take 1 tablet (40 mg total) by mouth once daily.    benazepril (LOTENSIN) 40 MG tablet TAKE ONE TABLET BY MOUTH TWICE DAILY    chlorthalidone (HYGROTEN) 25 MG Tab Take 1 tablet (25 mg total) by mouth once daily.    hydrALAZINE (APRESOLINE) 25 MG tablet TAKE ONE TABLET BY MOUTH THREE TIMES DAILY    hydrOXYzine HCl (ATARAX) 25 MG tablet TAKE ONE TABLET BY MOUTH THREE TIMES DAILY AS NEEDED FOR ITCHING    letrozole (FEMARA) 2.5 mg Tab 1 Tablet Oral Every day    metformin (GLUCOPHAGE) 1000 MG tablet TAKE ONE TABLET BY MOUTH TWICE DAILY    methIMAzole (TAPAZOLE) 10 MG Tab Take 1 tablet (10 mg total) by mouth once daily.    spironolactone (ALDACTONE) 25 MG tablet Take 1 tablet (25 mg total) by mouth once daily.    warfarin (COUMADIN) 5 MG tablet Take 1 tablet (5mg) by mouth everyday, except 1/2 tablet  (2.5mg) on Tuesday and Thursday,  Or as directed by Coumadin Clinic    fluocinonide (LIDEX) 0.05 % ointment Apply topically 2 (two) times daily.    TRAVATAN Z 0.004 % Drop INSTILL ONE DROP INTO EACH EYE IN THE EVENING    [DISCONTINUED] travoprost, benzalkonium, (TRAVATAN) 0.004 % ophthalmic solution Place 1 drop into  both eyes every evening.     Family History     Problem Relation (Age of Onset)    Asthma Son    Cancer Sister    Diabetes Paternal Aunt    Glaucoma Father    Heart disease Father    Hypertension Mother, Father        Social History Main Topics    Smoking status: Never Smoker    Smokeless tobacco: Never Used    Alcohol use No    Drug use: No    Sexual activity: Not Currently     Review of Systems   Constitutional: Positive for activity change, appetite change and unexpected weight change. Negative for chills, fatigue and fever.   HENT: Negative for congestion, sore throat and voice change.    Eyes: Positive for visual disturbance.   Respiratory: Negative for cough, shortness of breath and wheezing.    Cardiovascular: Positive for leg swelling. Negative for chest pain and palpitations.   Gastrointestinal: Negative for abdominal distention, abdominal pain, blood in stool, constipation, diarrhea, nausea and vomiting.   Endocrine: Negative for cold intolerance and heat intolerance.   Genitourinary: Negative for dysuria.   Musculoskeletal: Negative for arthralgias, joint swelling, neck pain and neck stiffness.   Skin: Negative for color change and pallor.   Allergic/Immunologic: Negative for immunocompromised state.   Neurological: Positive for headaches. Negative for seizures, syncope, speech difficulty, weakness, light-headedness and numbness.   Psychiatric/Behavioral: Positive for confusion. Negative for agitation and behavioral problems.     Objective:     Vital Signs (Most Recent):  Temp: 98 °F (36.7 °C) (04/10/18 1433)  Pulse: 102 (04/10/18 2044)  Resp: 20 (04/10/18 2044)  BP: (!) 161/86 (04/10/18 2027)  SpO2: 98 % (04/10/18 2044) Vital Signs (24h Range):  Temp:  [98 °F (36.7 °C)-98.1 °F (36.7 °C)] 98 °F (36.7 °C)  Pulse:  [] 102  Resp:  [14-23] 20  SpO2:  [97 %-99 %] 98 %  BP: (136-209)/() 161/86     Weight: 68 kg (150 lb)  Body mass index is 23.49 kg/m².    Physical Exam   Constitutional: She is  "oriented to person, place, and time. She appears well-developed and well-nourished. No distress.   Thin appearing elderly woman  Pleasant, cooperative with exam     HENT:   Head: Normocephalic and atraumatic.   Mouth/Throat: No oropharyngeal exudate.   Eyes: EOM are normal. Pupils are equal, round, and reactive to light. No scleral icterus.   Neck: Normal range of motion. No JVD present.   Cardiovascular: Normal rate, regular rhythm and normal heart sounds.    No murmur heard.  Pulmonary/Chest: Effort normal and breath sounds normal. No respiratory distress. She has no wheezes.   Abdominal: Soft. Bowel sounds are normal. She exhibits no distension. There is no tenderness.   Musculoskeletal: Normal range of motion. She exhibits no edema.   Neurological: She is alert and oriented to person, place, and time. A cranial nerve deficit is present. No sensory deficit. She exhibits normal muscle tone. Coordination normal.   Alert but not oriented to time. Could not correctly tell me the year or the current presidents name ("the one with the white hair")    Slightly assymetrical smile. No pronator drift, no other cranial nerve defects. No unilateral weakness. She is able to walk. No abnormal sensation.    Skin: Skin is warm and dry. No rash noted. She is not diaphoretic. No pallor.   Psychiatric: She has a normal mood and affect.   Nursing note and vitals reviewed.        CRANIAL NERVES     CN III, IV, VI   Pupils are equal, round, and reactive to light.  Extraocular motions are normal.        Significant Labs:   CBC:   Recent Labs  Lab 04/10/18  1541   WBC 4.37   HGB 12.0   HCT 39.2        CMP:   Recent Labs  Lab 04/10/18  1541      K 3.5      CO2 25   GLU 88   BUN 18   CREATININE 1.3   CALCIUM 9.2   PROT 6.3   ALBUMIN 3.4*   BILITOT 0.6   ALKPHOS 137*   AST 22   ALT 18   ANIONGAP 10   EGFRNONAA 39.7*       Significant Imaging: I have reviewed and interpreted all pertinent imaging results/findings within " the past 24 hours.

## 2018-04-11 NOTE — SUBJECTIVE & OBJECTIVE
Interval History: See hospital course    Review of Systems   Constitutional: Positive for activity change, appetite change and unexpected weight change. Negative for chills, fatigue and fever.   HENT: Negative for congestion, sore throat and voice change.    Eyes: Positive for visual disturbance.   Respiratory: Negative for cough, shortness of breath and wheezing.    Cardiovascular: Positive for leg swelling. Negative for chest pain and palpitations.   Gastrointestinal: Negative for abdominal distention, abdominal pain, blood in stool, constipation, diarrhea, nausea and vomiting.   Endocrine: Negative for cold intolerance and heat intolerance.   Genitourinary: Negative for dysuria.   Musculoskeletal: Negative for arthralgias, joint swelling, neck pain and neck stiffness.   Skin: Negative for color change and pallor.   Allergic/Immunologic: Negative for immunocompromised state.   Neurological: Positive for headaches. Negative for seizures, syncope, speech difficulty, weakness, light-headedness and numbness.   Psychiatric/Behavioral: Positive for confusion. Negative for agitation and behavioral problems.     Objective:     Vital Signs (Most Recent):  Temp: 97.3 °F (36.3 °C) (04/11/18 1603)  Pulse: 83 (04/11/18 1603)  Resp: 20 (04/11/18 1603)  BP: (!) 150/89 (04/11/18 1603)  SpO2: 100 % (04/11/18 1603) Vital Signs (24h Range):  Temp:  [96.6 °F (35.9 °C)-99.3 °F (37.4 °C)] 97.3 °F (36.3 °C)  Pulse:  [] 83  Resp:  [16-23] 20  SpO2:  [97 %-100 %] 100 %  BP: (129-161)/(86-96) 150/89     Weight: 68 kg (150 lb)  Body mass index is 23.49 kg/m².    Intake/Output Summary (Last 24 hours) at 04/11/18 1853  Last data filed at 04/11/18 1300   Gross per 24 hour   Intake              460 ml   Output                0 ml   Net              460 ml      Physical Exam   Constitutional: She is oriented to person, place, and time. She appears well-developed and well-nourished. No distress.   Thin appearing elderly woman  Pleasant,  "cooperative with exam     HENT:   Head: Normocephalic and atraumatic.   Mouth/Throat: No oropharyngeal exudate.   Eyes: EOM are normal. Pupils are equal, round, and reactive to light. No scleral icterus.   Neck: Normal range of motion. No JVD present.   Cardiovascular: Normal rate, regular rhythm and normal heart sounds.    No murmur heard.  Pulmonary/Chest: Effort normal and breath sounds normal. No respiratory distress. She has no wheezes.   Abdominal: Soft. Bowel sounds are normal. She exhibits no distension. There is no tenderness.   Musculoskeletal: Normal range of motion. She exhibits no edema.   Neurological: She is alert and oriented to person, place, and time. A cranial nerve deficit is present. No sensory deficit. She exhibits normal muscle tone. Coordination normal.   Alert but not oriented to time. Could not correctly tell me the year or the current presidents name ("the one with the white hair")    Slightly assymetrical smile. No pronator drift, no other cranial nerve defects. No unilateral weakness. She is able to walk. No abnormal sensation.    Skin: Skin is warm and dry. No rash noted. She is not diaphoretic. No pallor.   Psychiatric: She has a normal mood and affect.   Nursing note and vitals reviewed.      Significant Labs:   CBC:   Recent Labs  Lab 04/10/18  1541   WBC 4.37   HGB 12.0   HCT 39.2        CMP:   Recent Labs  Lab 04/10/18  1541 04/11/18  0511    142  142   K 3.5 3.7  3.7    108  108   CO2 25 25  25   GLU 88 97  97   BUN 18 20  20   CREATININE 1.3 1.1  1.1   CALCIUM 9.2 8.5*  8.5*   PROT 6.3 5.9*   ALBUMIN 3.4* 3.1*   BILITOT 0.6 0.6   ALKPHOS 137* 119   AST 22 19   ALT 18 15   ANIONGAP 10 9  9   EGFRNONAA 39.7* 48.5*  48.5*     Cardiac Markers: No results for input(s): CKMB, MYOGLOBIN, BNP, TROPISTAT in the last 48 hours.    Significant Imaging: I have reviewed all pertinent imaging results/findings within the past 24 hours.  "

## 2018-04-11 NOTE — ASSESSMENT & PLAN NOTE
Noted on imaging. May be contributory to small artery ischemic disease. Continue to control BP, HLD, DM and will be anticoagulated for a.fib.

## 2018-04-11 NOTE — H&P
[facility] Hospital Medicine  History & Physical    Patient Name: Temitope Suero  MRN: [id number]  Admission Date: 4/10/2018  Attending Physician: Joseline Euceda MD;SYLVIA Still*   Primary Care Provider: Gm Zambrano MD    [facility] Medicine Team: [facility] 5 Clinton Crystal MD     Patient information was obtained from patient and ER records.     Subjective:     Principal Problem:<principal problem not specified>    Chief Complaint:   Chief Complaint   Patient presents with    Fatigue     sent from im clinic, see note from dr vences    Atrial Fibrillation        HPI: Ms Temitope Suero is a 77 yo woman with history of Afib previously on Coumadin, uncontrolled HTN, DM2, multinodular goiter, here for altered mental status.     The family is at bedside to provide collateral.     Patient reports having memory loss worsening over the last few months. The niece reports that she discovered the patient was not taking her medications, unclear for how long. The patient also says she is less active than she has been previously, but is still able to walk around, ambulated, take care of ADLs. She occasionaly tells her family that she thinks her  is living at home, but he is . She has not had any personality changes.     The niece took her too PCP, where it was found she was having hypertension with a headache in addition to vision changes, concerning for HTN emergency, and was in Afib with RVR (HR in 100s). [place] [facility] [place].     She reports today that she feels well, and her biggest concern is the memory loss. She has had no indication for infection such as fevers, chills, night sweats. She reports her activity level is decreased over the last few months, but does not have any mood symptoms or depression. She has had some weight loss that she attributes to not eating as much, probably due to lack of appetite. '    She was taking Warfarin previously for Afib, however, Coumadin  clinic ntoes suggest that she has not been to clinic since 4/2017. She denies any recent history of slurred speech, unilateral weakness, or falls. Never had clots in her legs or lungs. No previous history of MI or CHF. She is not complaining of SOB, chest pain. She does however reports she has occasional feeling of palpitations when she exerts herself and walks her dogs. These episodes tend to resolve spontaneously however.     Past Medical History:   Diagnosis Date    *Atrial fibrillation     Cancer     breast    Diabetes mellitus type II     Glaucoma     Hearing loss, bilateral     Hypertension     Obesity 1/16/2014    Thyroid disease     Toxic multinodular goiter 10/28/2013       Past Surgical History:   Procedure Laterality Date    BREAST SURGERY      CHOLECYSTECTOMY         Review of patient's allergies indicates:  No Known Allergies    No current facility-administered medications on file prior to encounter.      Current Outpatient Prescriptions on File Prior to Encounter   Medication Sig    amlodipine (NORVASC) 10 MG tablet TAKE ONE TABLET BY MOUTH ONCE DAILY    aspirin (ECOTRIN) 81 MG EC tablet Take 81 mg by mouth. 1 Tablet, Delayed Release (E.C.) Oral Every day    atorvastatin (LIPITOR) 40 MG tablet Take 1 tablet (40 mg total) by mouth once daily.    benazepril (LOTENSIN) 40 MG tablet TAKE ONE TABLET BY MOUTH TWICE DAILY    chlorthalidone (HYGROTEN) 25 MG Tab Take 1 tablet (25 mg total) by mouth once daily.    hydrALAZINE (APRESOLINE) 25 MG tablet TAKE ONE TABLET BY MOUTH THREE TIMES DAILY    hydrOXYzine HCl (ATARAX) 25 MG tablet TAKE ONE TABLET BY MOUTH THREE TIMES DAILY AS NEEDED FOR ITCHING    letrozole (FEMARA) 2.5 mg Tab 1 Tablet Oral Every day    metformin (GLUCOPHAGE) 1000 MG tablet TAKE ONE TABLET BY MOUTH TWICE DAILY    methIMAzole (TAPAZOLE) 10 MG Tab Take 1 tablet (10 mg total) by mouth once daily.    spironolactone (ALDACTONE) 25 MG tablet Take 1 tablet (25 mg total) by  mouth once daily.    warfarin (COUMADIN) 5 MG tablet Take 1 tablet (5mg) by mouth everyday, except 1/2 tablet  (2.5mg) on Tuesday and Thursday,  Or as directed by Coumadin Clinic    fluocinonide (LIDEX) 0.05 % ointment Apply topically 2 (two) times daily.    TRAVATAN Z 0.004 % Drop INSTILL ONE DROP INTO EACH EYE IN THE EVENING    [DISCONTINUED] travoprost, benzalkonium, (TRAVATAN) 0.004 % ophthalmic solution Place 1 drop into both eyes every evening.     Family History     Problem Relation (Age of Onset)    Asthma Son    Cancer Sister    Diabetes Paternal Aunt    Glaucoma Father    Heart disease Father    Hypertension Mother, Father        Social History Main Topics    Smoking status: Never Smoker    Smokeless tobacco: Never Used    Alcohol use No    Drug use: No    Sexual activity: Not Currently     Review of Systems   Constitutional: Positive for activity change, appetite change and unexpected weight change. Negative for chills, fatigue and fever.   HENT: Negative for congestion, sore throat and voice change.    Eyes: Positive for visual disturbance.   Respiratory: Negative for cough, shortness of breath and wheezing.    Cardiovascular: Positive for leg swelling. Negative for chest pain and palpitations.   Gastrointestinal: Negative for abdominal distention, abdominal pain, blood in stool, constipation, diarrhea, nausea and vomiting.   Endocrine: Negative for cold intolerance and heat intolerance.   Genitourinary: Negative for dysuria.   Musculoskeletal: Negative for arthralgias, joint swelling, neck pain and neck stiffness.   Skin: Negative for color change and pallor.   Allergic/Immunologic: Negative for immunocompromised state.   Neurological: Positive for headaches. Negative for seizures, syncope, speech difficulty, weakness, light-headedness and numbness.   Psychiatric/Behavioral: Positive for confusion. Negative for agitation and behavioral problems.     Objective:     Vital Signs (Most  "Recent):  Temp: 98 °F (36.7 °C) (04/10/18 1433)  Pulse: 102 (04/10/18 2044)  Resp: 20 (04/10/18 2044)  BP: (!) 161/86 (04/10/18 2027)  SpO2: 98 % (04/10/18 2044) Vital Signs (24h Range):  Temp:  [98 °F (36.7 °C)-98.1 °F (36.7 °C)] 98 °F (36.7 °C)  Pulse:  [] 102  Resp:  [14-23] 20  SpO2:  [97 %-99 %] 98 %  BP: (136-209)/() 161/86     Weight: 68 kg (150 lb)  Body mass index is 23.49 kg/m².    Physical Exam   Constitutional: She is oriented to person, place, and time. She appears well-developed and well-nourished. No distress.   Thin appearing elderly woman  Pleasant, cooperative with exam     HENT:   Head: Normocephalic and atraumatic.   Mouth/Throat: No oropharyngeal exudate.   Eyes: EOM are normal. Pupils are equal, round, and reactive to light. No scleral icterus.   Neck: Normal range of motion. No JVD present.   Cardiovascular: Normal rate, regular rhythm and normal heart sounds.    No murmur heard.  Pulmonary/Chest: Effort normal and breath sounds normal. No respiratory distress. She has no wheezes.   Abdominal: Soft. Bowel sounds are normal. She exhibits no distension. There is no tenderness.   Musculoskeletal: Normal range of motion. She exhibits no edema.   Neurological: She is alert and oriented to person, place, and time. A cranial nerve deficit is present. No sensory deficit. She exhibits normal muscle tone. Coordination normal.   Alert but not oriented to time. Could not correctly tell me the year or the current presidents name ("the one with the white hair")    Slightly assymetrical smile. No pronator drift, no other cranial nerve defects. No unilateral weakness. She is able to walk. No abnormal sensation.    Skin: Skin is warm and dry. No rash noted. She is not diaphoretic. No pallor.   Psychiatric: She has a normal mood and affect.   Nursing note and vitals reviewed.        CRANIAL NERVES     CN III, IV, VI   Pupils are equal, round, and reactive to light.  Extraocular motions are normal. "        Significant Labs:   CBC:   Recent Labs  Lab 04/10/18  1541   WBC 4.37   HGB 12.0   HCT 39.2        CMP:   Recent Labs  Lab 04/10/18  1541      K 3.5      CO2 25   GLU 88   BUN 18   CREATININE 1.3   CALCIUM 9.2   PROT 6.3   ALBUMIN 3.4*   BILITOT 0.6   ALKPHOS 137*   AST 22   ALT 18   ANIONGAP 10   EGFRNONAA 39.7*       Significant Imaging: I have reviewed and interpreted all pertinent imaging results/findings within the past 24 hours.    Assessment/Plan:     Acute cystitis without hematuria    Concern for developing UTI with 3+ leukocytes, in the setting of AMS  Will treat with Augmentin BID          Altered mental status    Ms Suero is a 79 yo woman with history of afib on warfarin but no rate control, uncontrolled HTN (negative work up for secondary), multinodular goiter, DM2, here for altered mental status.     Most concerned about the possibility of stroke, MRI showing possibly new tiny area of infarct in occipital lobe, however, does not correlate with physical exam. Other DDx includes: posterior reversible encephalopathy, HTN emergency, thyrotoxicosis, Atrial fibrillation.  Memory loss may also be attributed to age related memory loss vs. alzheimers    - Consult neurology in am. Discussed with vascular neuro, do not think needs emergent evaluation at this time.   - Control HTN, do not lower by more than 20% of initial MAP  - Eval for thyroid with TSH, T4  - Continue rate control for Afib.   - Will assess for vitamin B and D deficiencies.   - Check RPR  - ESR, CRP        Toxic multinodular goiter    Nodular goiter on exam  No reported symptoms of thyrotoxicosis  Eval with TSH, T4          Hyperlipemia    Continue high dose statin, some concern for new small ischemic stroke  Consult neuro in AM  No need for emergent consultation with vascular neuro          HTN (hypertension)    Concerned about HTN emergency vs PRES  Will control BP while here, and will try to gradually lower MAP  Prn  hydralzine for SBP>200          Type 2 diabetes mellitus with diabetic chronic kidney disease    Cr stable  Glucose controlled  Diabetic diet  POCT glucose checks  On metformin at home, but due to minimal po intake, will observe sugars and add Sliding scale if needed.           Atrial fibrillation    CHADSVASC = 3  Loaded with lovenox  - Will bridge with heparin until INR therapeutic  - Continue rate control  - Monitor with tele  - May benefit from evaluation by EP          VTE Risk Mitigation         Ordered     Place SHEYLA hose  Until discontinued      04/10/18 6355             Clinton Crystal MD  Department of Hospital Medicine   [facility]

## 2018-04-11 NOTE — ASSESSMENT & PLAN NOTE
Incidental finding of R PCA territory infarct. Patient has no appreciable field cut or symptoms related to this infarct on examination. Likely embolic in origin given her untreated atrial fibrillation. Regarding her confusion, it is likely that this is secondary to UTI which is currently treated by primary team. Probably does have some mild baseline vascular cognitive impairment with the findings of small vessel disease involving bi thalamic regions and other findings.    Antithrombotics for secondary stroke prevention: Anticoagulants: recommend oral anticoagulation, perhaps NOAC if affordable to improve compliance    Statins for secondary stroke prevention and hyperlipidemia, if present:   Statins: Atorvastatin- 40 mg daily    Aggressive risk factor modification: HTN, DM, HLD, A-Fib     Rehab efforts: PT/OT/SLP to evaluate and treat    Diagnostics ordered/pending: HgbA1C to assess blood glucose levels, Lipid Profile to assess cholesterol levels    VTE prophylaxis: currently anticoagulated by primary service    BP parameters: long term goal of < 130/80 to impede progression of cerebral small vessel disease

## 2018-04-11 NOTE — PLAN OF CARE
Problem: Patient Care Overview  Goal: Plan of Care Review  Outcome: Ongoing (interventions implemented as appropriate)  VSS, pt. Resting comfortably with daughter at bedside.  No complaints of pain.  Will continue to monitor.

## 2018-04-11 NOTE — HOSPITAL COURSE
"Admitted 04/10 PM from clinic for hypertensive emergency with SBP >200 in the setting of subacute decrease in mental status, memory. UA revealed leukocytes without bacteria, but will treat empirically given pt's symptoms (altered mental status). Otherwise BP controlled with home regimen - pt states she did not take her medicines for several months due to "financial issues" but later says that she decided she was healthy enough to not need them. Vascular neurology consulted for pt's MRI showing acute infarct in R posterior circulation - recommended secondary prevention.    Pt previously noncompliant with coumadin - says that she did not take it because she felt well. Explained that she has chronic AF and that her sudden onset speech slurring approx 1 month ago (per niece's account) likely represents an acute infarct. Pt discharged with apixaban with referrals to Cardiology, Neurology, and priority care clinic.  "

## 2018-04-11 NOTE — ASSESSMENT & PLAN NOTE
Ms Suero is a 79 yo woman with history of afib on warfarin but no rate control, uncontrolled HTN (negative work up for secondary), multinodular goiter, DM2, here for altered mental status.     Most concerned about the possibility of stroke, MRI showing possibly new tiny area of infarct in occipital lobe, however, does not correlate with physical exam. Other DDx includes: posterior reversible encephalopathy, HTN emergency, thyrotoxicosis, Atrial fibrillation.  Memory loss may also be attributed to age related memory loss vs. alzheimers    - Consult neurology in am. Discussed with vascular neuro, do not think needs emergent evaluation at this time.   - Control HTN, do not lower by more than 20% of initial MAP  - Eval for thyroid with TSH, T4  - Continue rate control for Afib.

## 2018-04-11 NOTE — ASSESSMENT & PLAN NOTE
Concerned about HTN emergency vs PRES  Will control BP while here, and will try to gradually lower MAP  Prn hydralzine for SBP>200

## 2018-04-11 NOTE — SUBJECTIVE & OBJECTIVE
Past Medical History:   Diagnosis Date    *Atrial fibrillation     Cancer     breast    Diabetes mellitus type II     Glaucoma     Hearing loss, bilateral     Hypertension     Obesity 1/16/2014    Thyroid disease     Toxic multinodular goiter 10/28/2013     Past Surgical History:   Procedure Laterality Date    BREAST SURGERY      CHOLECYSTECTOMY       Family History   Problem Relation Age of Onset    Hypertension Mother     Hypertension Father     Glaucoma Father     Heart disease Father     Cancer Sister     Asthma Son     Diabetes Paternal Aunt     Thyroid cancer Neg Hx      Social History   Substance Use Topics    Smoking status: Never Smoker    Smokeless tobacco: Never Used    Alcohol use No     Review of patient's allergies indicates:  No Known Allergies    Medications: I have reviewed the current medication administration record.    Prescriptions Prior to Admission   Medication Sig Dispense Refill Last Dose    amlodipine (NORVASC) 10 MG tablet TAKE ONE TABLET BY MOUTH ONCE DAILY 90 tablet 4 Past Month    aspirin (ECOTRIN) 81 MG EC tablet Take 81 mg by mouth. 1 Tablet, Delayed Release (E.C.) Oral Every day   Past Month    atorvastatin (LIPITOR) 40 MG tablet Take 1 tablet (40 mg total) by mouth once daily. 30 tablet 0 Past Month    benazepril (LOTENSIN) 40 MG tablet TAKE ONE TABLET BY MOUTH TWICE DAILY 180 tablet 6 Past Month    chlorthalidone (HYGROTEN) 25 MG Tab Take 1 tablet (25 mg total) by mouth once daily. 30 tablet 0 Past Month    hydrALAZINE (APRESOLINE) 25 MG tablet TAKE ONE TABLET BY MOUTH THREE TIMES DAILY 90 tablet 4 Past Month    hydrOXYzine HCl (ATARAX) 25 MG tablet TAKE ONE TABLET BY MOUTH THREE TIMES DAILY AS NEEDED FOR ITCHING 90 tablet 2 Past Month    letrozole (FEMARA) 2.5 mg Tab 1 Tablet Oral Every day 90 tablet 3 Past Month    metformin (GLUCOPHAGE) 1000 MG tablet TAKE ONE TABLET BY MOUTH TWICE DAILY 180 tablet 4 Past Month    methIMAzole (TAPAZOLE) 10 MG  Tab Take 1 tablet (10 mg total) by mouth once daily. 30 tablet 0 Past Month    spironolactone (ALDACTONE) 25 MG tablet Take 1 tablet (25 mg total) by mouth once daily. 90 tablet 0 Past Month    warfarin (COUMADIN) 5 MG tablet Take 1 tablet (5mg) by mouth everyday, except 1/2 tablet  (2.5mg) on Tuesday and Thursday,  Or as directed by [facility] Clinic 30 tablet 4 Past Month    fluocinonide (LIDEX) 0.05 % ointment Apply topically 2 (two) times daily. 30 g 1     TRAVATAN Z 0.004 % Drop INSTILL ONE DROP INTO EACH EYE IN THE EVENING 2.5 mL 4 Unknown       Review of Systems   Constitutional: Negative for appetite change.   HENT: Negative for congestion.    Eyes: Negative for discharge.   Respiratory: Negative for shortness of breath.    Cardiovascular: Negative for chest pain.   Gastrointestinal: Negative for abdominal pain.   Endocrine: Negative for cold intolerance.   Musculoskeletal: Negative for joint swelling.   Skin: Negative for color change.   Neurological: Negative for dizziness and headaches.     Objective:     Vital Signs (Most Recent):  Temp: 96.6 °F (35.9 °C) (04/11/18 1137)  Pulse: 89 (04/11/18 1137)  Resp: 16 (04/11/18 1137)  BP: (!) 134/90 (04/11/18 1137)  SpO2: 98 % (04/11/18 1137)    Vital Signs Range (Last 24H):  Temp:  [96.6 °F (35.9 °C)-99.3 °F (37.4 °C)]   Pulse:  []   Resp:  [14-23]   BP: (129-209)/()   SpO2:  [97 %-99 %]     Physical Exam   Constitutional: No distress.   HENT:   Head: Normocephalic.   Right Ear: External ear normal.   Left Ear: External ear normal.   Eyes: Conjunctivae are normal.   Neck: Normal range of motion.   Pulmonary/Chest: Effort normal. No stridor.   Abdominal: There is no tenderness.   Skin: Skin is dry. No rash noted.       Neurological Exam:   LOC: alert  Attention Span: - difficulty counting backwards serial 3's, difficulty focusing on other tasks, unable to spell world backwards, etc  Language: No aphasia  Articulation: No dysarthria  Orientation:  oriented to person, not oriented to month or year  Visual Fields: Full  EOM (CN III, IV, VI): Full/intact  Facial Movement (CN VII): Symmetric facial expression    Motor: Arm left  Normal 5/5  Leg left  Normal 5/5  Arm right  Normal 5/5  Leg right Normal 5/5  Cebellar: No evidence of appendicular or axial ataxia  Sensation: intact to light touch bilaterally      Laboratory:  CMP:   Recent Labs  Lab 04/11/18  0511   CALCIUM 8.5*  8.5*   ALBUMIN 3.1*   PROT 5.9*     142   K 3.7  3.7   CO2 25  25     108   BUN 20  20   CREATININE 1.1  1.1   ALKPHOS 119   ALT 15   AST 19   BILITOT 0.6     CBC:   Recent Labs  Lab 04/10/18  1541   WBC 4.37   RBC 4.82   HGB 12.0   HCT 39.2      MCV 81*   MCH 24.9*   MCHC 30.6*     Lipid Panel: No results for input(s): CHOL, LDLCALC, HDL, TRIG in the last 168 hours.  Coagulation:   Recent Labs  Lab 04/10/18  1541   INR 1.1   APTT 24.4     Hgb A1C: No results for input(s): HGBA1C in the last 168 hours.  TSH:   Recent Labs  Lab 04/11/18  0511   TSH 0.931       Diagnostic Results:      Brain imaging:  MRI Brain - Acute/subacute infarct noted within R medial occipital lobe within R PCA territory. Remote infarcts noted in R SCA territory and left MCA post-central gyrus. The patient has imaging findings consistent with cerebrovascular small vessel disease including lacune(s) of presumed vascular origin, cerebral microbleeds mixed subcortical and cortical, periventricular white matter hyperintensities of presumed vascular origin (Fazekas 3) and subcortical white matter hyperintensities of presumed vascular origin (Fazekas 1)      Vessel Imaging:  MRA H&N - no significant LVO, extracranial atherosclerotic disease or intracranial atherosclerotic disease; vertebrobasilar dolichoectasia noted    Cardiac Evaluation:   CONCLUSIONS TTE 4/11/18    1 - Moderately depressed left ventricular systolic function (EF 30-35%).     2 - Biatrial enlargement.     3 - Normal right  ventricular systolic function .     4 - Mild mitral regurgitation.     5 - Mild tricuspid regurgitation.     6 - The estimated PA systolic pressure is 34 mmHg.     7 - Increased central venous pressure.     8 - Left pleural effusion.

## 2018-04-11 NOTE — CONSULTS
[facility]  Vascular Neurology  Comprehensive Stroke Center  Consult Note    Inpatient consult to Vascular (Stroke) Neurology  Consult performed by: ANNETTE NOLAN  Consult ordered by: AILEEN AVERY        Assessment/Plan:     Patient is a 78 y.o. year old female with:    Embolic stroke involving right posterior cerebral artery    Incidental finding of R PCA territory infarct. Patient has no appreciable field cut or symptoms related to this infarct on examination. Likely embolic in origin given her untreated atrial fibrillation. Regarding her confusion, it is likely that this is secondary to UTI which is currently treated by primary team. Probably does have some mild baseline vascular cognitive impairment with the findings of small vessel disease involving bi thalamic regions and other findings.    Antithrombotics for secondary stroke prevention: Anticoagulants: recommend oral anticoagulation, perhaps NOAC if affordable to improve compliance    Statins for secondary stroke prevention and hyperlipidemia, if present:   Statins: Atorvastatin- 40 mg daily    Aggressive risk factor modification: HTN, DM, HLD, A-Fib     Rehab efforts: PT/OT/SLP to evaluate and treat    Diagnostics ordered/pending: HgbA1C to assess blood glucose levels, Lipid Profile to assess cholesterol levels    VTE prophylaxis: currently anticoagulated by primary service    BP parameters: long term goal of < 130/80 to impede progression of cerebral small vessel disease            History of stroke    Incidental finding of R SCA and left MCA territory infarcts, appearing embolic, likely subclinical/silent embolic infarcts from atrial fibrillation.  Anticoagulation for secondary prevention        Vertebrobasilar dolichoectasia    Noted on imaging. May be contributory to small artery ischemic disease. Continue to control BP, HLD, DM and will be anticoagulated for a.fib.        Cerebrovascular small vessel disease    The patient has  imaging findings consistent with cerebrovascular small vessel disease including lacune(s) of presumed vascular origin, clustered predominantly within bilateral thalami, cerebral microbleeds mixed subcortical and cortical centered in bi-thalamic and occipital and parietal , periventricular white matter hyperintensities of presumed vascular origin (Fazekas 3) and subcortical white matter hyperintensities of presumed vascular origin (Fazekas 1).  Cerebral microbleed burden is low, however technique is via GRE. Mixed pattern suggests pure HTN disease vs. Combination of HTN and cerebral amyloid angiopathy. Continue plan for anticoagulation as benefit of anticoagulation outweighs risk of hemorrhage in this population.          Hyperlipemia    Goal LDL < 100 to reduce risk of future stroke in setting of small vessel disease        HTN (hypertension)    Long term BP goal < 130/80 to impede progression of small vessel disease        Paroxysmal atrial fibrillation    Secondary prevention with oral anticoagulation, prefer NOAC if affordable to decrease intracerebral bleeding risk and may improve compliance/tolerability            STROKE DOCUMENTATION          NIH Scale:  1a. Level Of Consciousness: 0-->Alert: keenly responsive  1b. LOC Questions: 2-->Answers neither question correctly  1c. LOC Commands: 0-->Performs both tasks correctly  2. Best Gaze: 0-->Normal  3. Visual: 0-->No visual loss  4. Facial Palsy: 0-->Normal symmetrical movements  5a. Motor Arm, Left: 0-->No drift: limb holds 90 (or 45) degrees for full 10 secs  5b. Motor Arm, Right: 0-->No drift: limb holds 90 (or 45) degrees for full 10 secs  6a. Motor Leg, Left: 0-->No drift: leg holds 30 degree position for full 5 secs  6b. Motor Leg, Right: 0-->No drift: leg holds 30 degree position for full 5 secs  7. Limb Ataxia: 0-->Absent  8. Sensory: 0-->Normal: no sensory loss  9. Best Language: 0-->No aphasia: normal  10. Dysarthria: 0-->Normal  11. Extinction and  "Inattention (formerly Neglect): 0-->No abnormality  Total (NIH Stroke Scale): 2    Modified [place]    Hodan Coma Scale:    ABCD2 Score:    [id number] Score:9  HAS -BLED Score:   ICH Score:   Hunt & Lopez Classification:       Thrombolysis Candidate? No, Out of window       Interventional Revascularization Candidate?   Is the patient eligible for mechanical endovascular reperfusion (CHIKI)?  No; No significant neurological deficit      Hemorrhagic change of an Ischemic Stroke: Does this patient have an ischemic stroke with hemorrhagic changes? No     Subjective:     History of Present Illness:  78F w/ subacute decline over the past few months of episodes of confusion, incoherent speech, rambling, "not making sense", disorientation. The aforementioned symptoms have never happened before. There are no identified triggers or modifying factors. There have been no recurrent events. There are no other associated symptoms.    They deny any other specific neurological symptoms. They deny history of clinical stroke event. She has a history of atrial fibrillation and is noncompliant with her treatment plan.             Past Medical History:   Diagnosis Date    *Atrial fibrillation     Cancer     breast    Diabetes mellitus type II     Glaucoma     Hearing loss, bilateral     Hypertension     Obesity 1/16/2014    Thyroid disease     Toxic multinodular goiter 10/28/2013     Past Surgical History:   Procedure Laterality Date    BREAST SURGERY      CHOLECYSTECTOMY       Family History   Problem Relation Age of Onset    Hypertension Mother     Hypertension Father     Glaucoma Father     Heart disease Father     Cancer Sister     Asthma Son     Diabetes Paternal Aunt     Thyroid cancer Neg Hx      Social History   Substance Use Topics    Smoking status: Never Smoker    Smokeless tobacco: Never Used    Alcohol use No     Review of patient's allergies indicates:  No Known Allergies    Medications: I have " reviewed the current medication administration record.    Prescriptions Prior to Admission   Medication Sig Dispense Refill Last Dose    amlodipine (NORVASC) 10 MG tablet TAKE ONE TABLET BY MOUTH ONCE DAILY 90 tablet 4 Past Month    aspirin (ECOTRIN) 81 MG EC tablet Take 81 mg by mouth. 1 Tablet, Delayed Release (E.C.) Oral Every day   Past Month    atorvastatin (LIPITOR) 40 MG tablet Take 1 tablet (40 mg total) by mouth once daily. 30 tablet 0 Past Month    benazepril (LOTENSIN) 40 MG tablet TAKE ONE TABLET BY MOUTH TWICE DAILY 180 tablet 6 Past Month    chlorthalidone (HYGROTEN) 25 MG Tab Take 1 tablet (25 mg total) by mouth once daily. 30 tablet 0 Past Month    hydrALAZINE (APRESOLINE) 25 MG tablet TAKE ONE TABLET BY MOUTH THREE TIMES DAILY 90 tablet 4 Past Month    hydrOXYzine HCl (ATARAX) 25 MG tablet TAKE ONE TABLET BY MOUTH THREE TIMES DAILY AS NEEDED FOR ITCHING 90 tablet 2 Past Month    letrozole (FEMARA) 2.5 mg Tab 1 Tablet Oral Every day 90 tablet 3 Past Month    metformin (GLUCOPHAGE) 1000 MG tablet TAKE ONE TABLET BY MOUTH TWICE DAILY 180 tablet 4 Past Month    methIMAzole (TAPAZOLE) 10 MG Tab Take 1 tablet (10 mg total) by mouth once daily. 30 tablet 0 Past Month    spironolactone (ALDACTONE) 25 MG tablet Take 1 tablet (25 mg total) by mouth once daily. 90 tablet 0 Past Month    warfarin (COUMADIN) 5 MG tablet Take 1 tablet (5mg) by mouth everyday, except 1/2 tablet  (2.5mg) on Tuesday and Thursday,  Or as directed by Coumadin Clinic 30 tablet 4 Past Month    fluocinonide (LIDEX) 0.05 % ointment Apply topically 2 (two) times daily. 30 g 1     TRAVATAN Z 0.004 % Drop INSTILL ONE DROP INTO EACH EYE IN THE EVENING 2.5 mL 4 Unknown       Review of Systems   Constitutional: Negative for appetite change.   HENT: Negative for congestion.    Eyes: Negative for discharge.   Respiratory: Negative for shortness of breath.    Cardiovascular: Negative for chest pain.   Gastrointestinal: Negative for  abdominal pain.   Endocrine: Negative for cold intolerance.   Musculoskeletal: Negative for joint swelling.   Skin: Negative for color change.   Neurological: Negative for dizziness and headaches.     Objective:     Vital Signs (Most Recent):  Temp: 96.6 °F (35.9 °C) (04/11/18 1137)  Pulse: 89 (04/11/18 1137)  Resp: 16 (04/11/18 1137)  BP: (!) 134/90 (04/11/18 1137)  SpO2: 98 % (04/11/18 1137)    Vital Signs Range (Last 24H):  Temp:  [96.6 °F (35.9 °C)-99.3 °F (37.4 °C)]   Pulse:  []   Resp:  [14-23]   BP: (129-209)/()   SpO2:  [97 %-99 %]     Physical Exam   Constitutional: No distress.   HENT:   Head: Normocephalic.   Right Ear: External ear normal.   Left Ear: External ear normal.   Eyes: Conjunctivae are normal.   Neck: Normal range of motion.   Pulmonary/Chest: Effort normal. No stridor.   Abdominal: There is no tenderness.   Skin: Skin is dry. No rash noted.       Neurological Exam:   LOC: alert  Attention Span: - difficulty counting backwards serial 3's, difficulty focusing on other tasks, unable to spell world backwards, etc  Language: No aphasia  Articulation: No dysarthria  Orientation: oriented to person, not oriented to month or year  Visual Fields: Full  EOM (CN III, IV, VI): Full/intact  Facial Movement (CN VII): Symmetric facial expression    Motor: Arm left  Normal 5/5  Leg left  Normal 5/5  Arm right  Normal 5/5  Leg right Normal 5/5  Cebellar: No evidence of appendicular or axial ataxia  Sensation: intact to light touch bilaterally      Laboratory:  CMP:   Recent Labs  Lab 04/11/18  0511   CALCIUM 8.5*  8.5*   ALBUMIN 3.1*   PROT 5.9*     142   K 3.7  3.7   CO2 25  25     108   BUN 20  20   CREATININE 1.1  1.1   ALKPHOS 119   ALT 15   AST 19   BILITOT 0.6     CBC:   Recent Labs  Lab 04/10/18  1541   WBC 4.37   RBC 4.82   HGB 12.0   HCT 39.2      MCV 81*   MCH 24.9*   MCHC 30.6*     Lipid Panel: No results for input(s): CHOL, LDLCALC, HDL, TRIG in the last 168  hours.  Coagulation:   Recent Labs  Lab 04/10/18  1541   INR 1.1   APTT 24.4     Hgb A1C: No results for input(s): HGBA1C in the last 168 hours.  TSH:   Recent Labs  Lab 04/11/18  0511   TSH 0.931       Diagnostic Results:      Brain imaging:  MRI Brain - Acute/subacute infarct noted within R medial occipital lobe within R PCA territory. Remote infarcts noted in R SCA territory and left MCA post-central gyrus. The patient has imaging findings consistent with cerebrovascular small vessel disease including lacune(s) of presumed vascular origin, cerebral microbleeds mixed subcortical and cortical, periventricular white matter hyperintensities of presumed vascular origin (Fazekas 3) and subcortical white matter hyperintensities of presumed vascular origin (Fazekas 1)      Vessel Imaging:  MRA H&N - no significant LVO, extracranial atherosclerotic disease or intracranial atherosclerotic disease; vertebrobasilar dolichoectasia noted    Cardiac Evaluation:   CONCLUSIONS TTE 4/11/18    1 - Moderately depressed left ventricular systolic function (EF 30-35%).     2 - Biatrial enlargement.     3 - Normal right ventricular systolic function .     4 - Mild mitral regurgitation.     5 - Mild tricuspid regurgitation.     6 - The estimated PA systolic pressure is 34 mmHg.     7 - Increased central venous pressure.     8 - Left pleural effusion.       Greyson Leary MD  Comprehensive Stroke Center  Department of Vascular Neurology   [facility]

## 2018-04-11 NOTE — ASSESSMENT & PLAN NOTE
Likely representative of subacute encephalopathy given niece's account of pt having sudden change in mental status accompanied by change in speech as well as coherence. Niece also mentions some waxing/waning quality which may suggest superimposed delirium given pt's UA showing leukocytosis.    Will treat UTI with augmentin x3 days total and refer to neurology, cardiology, and priority care clinic

## 2018-04-11 NOTE — PLAN OF CARE
04/11/18 1030   Discharge Assessment   Assessment Type Discharge Planning Assessment   Confirmed/corrected address and phone number on facesheet? Yes   Assessment information obtained from? Patient   Expected Length of Stay (days) 3   Communicated expected length of stay with patient/caregiver yes   Prior to hospitilization cognitive status: Alert/Oriented   Prior to hospitalization functional status: Independent   Current cognitive status: Alert/Oriented   Current Functional Status: Independent   Facility Arrived From: (home/selfcare)   Lives With other relative(s)  (bryon, Shelby Alfaro)   Able to Return to Prior Arrangements unable to determine at this time (comments)   Is patient able to care for self after discharge? Unable to determine at this time (comments)   Who are your caregiver(s) and their phone number(s)? (bryon Argueta, [phone]; Yovana Sternbryon, [phone])   Patient's perception of discharge disposition home or selfcare   Readmission Within The Last 30 Days no previous admission in last 30 days   Patient currently being followed by outpatient case management? No   Patient currently receives any other outside agency services? No   Equipment Currently Used at Home none   Do you have any problems affording any of your prescribed medications? No   Is the patient taking medications as prescribed? yes   Does the patient have transportation home? Yes   Transportation Available family or friend will provide   Does the patient receive services at the [facility]madin Clinic? Yes   Discharge Plan A Home;Home with family   Discharge Plan B Home;Home with family;Home Health   Patient/Family In Agreement With Plan yes     Gm Zambrano MD  [id number] / [place] [address]      [id number] - [place] - [address] [facility] [address] [facility]  [address]  [facility] [place] [address]  Phone: [phone] Fax: [phone]    [facility]/pharmacy #[id number] - [place], [place] - [address]  [address]  [place] [address]  Phone: [phone] Fax: [phone]      Payor: BLUE CROSS BLUE SHIELD / Plan: BCBS OF [place] [facility] PLUS / Product Type: Commercial /

## 2018-04-11 NOTE — ASSESSMENT & PLAN NOTE
CHADSVASC = 3  Loaded with lovenox  - Will bridge with heparin until INR therapeutic  - Continue rate control  - Monitor with tele  - May benefit from evaluation by EP

## 2018-04-11 NOTE — ASSESSMENT & PLAN NOTE
Initial BP >200 systolic, reduced with resumption of home medicine regimen. Priority care clinic f/u

## 2018-04-11 NOTE — DISCHARGE SUMMARY
[facility] Hospital Medicine  Discharge Summary      Patient Name: Temitope Suero  MRN: [id number]  Admission Date: 4/10/2018  Hospital Length of Stay: 1 days  Discharge Date and Time:  2018 6:57 PM  Attending Physician: Joseline Euceda MD   Discharging Provider: Torey Sadler MD  Primary Care Provider: Gm Zambrano MD  [facility] Medicine Team: [facility] HOSP MED 5 Torey Sadler MD    HPI:   Ms Temitope Suero is a 79 yo woman with history of Afib previously on Coumadin, uncontrolled HTN, DM2, multinodular goiter, here for altered mental status.     The family is at bedside to provide collateral.     Patient reports having memory loss worsening over the last few months. The niece reports that she discovered the patient was not taking her medications, unclear for how long. The patient also says she is less active than she has been previously, but is still able to walk around, ambulated, take care of ADLs. She occasionaly tells her family that she thinks her  is living at home, but he is . She has not had any personality changes.     The niece took her too PCP, where it was found she was having hypertension with a headache in addition to vision changes, concerning for HTN emergency, and was in Afib with RVR (HR in 100s). She was sent to ED at [facility] for that reason.     She reports today that she feels well, and her biggest concern is the memory loss. She has had no indication for infection such as fevers, chills, night sweats. She reports her activity level is decreased over the last few months, but does not have any mood symptoms or depression. She has had some weight loss that she attributes to not eating as much, probably due to lack of appetite. '    She was taking Warfarin previously for Afib, however, Coumadin clinic ntoes suggest that she has not been to clinic since 2017. She denies any recent history of slurred speech, unilateral weakness, or falls. Never had clots in her legs or  "lungs. No previous history of MI or CHF. She is not complaining of SOB, chest pain. She does however reports she has occasional feeling of palpitations when she exerts herself and walks her dogs. These episodes tend to resolve spontaneously however.     * No surgery found *      Hospital Course:   Admitted 04/10 PM from clinic for hypertensive emergency with SBP >200 in the setting of subacute decrease in mental status, memory. UA revealed leukocytes without bacteria, but will treat empirically given pt's symptoms (altered mental status). Otherwise BP controlled with home regimen - pt states she did not take her medicines for several months due to "financial issues" but later says that she decided she was healthy enough to not need them. Vascular neurology consulted for pt's MRI showing acute infarct in R posterior circulation - recommended secondary prevention.    Pt previously noncompliant with coumadin - says that she did not take it because she felt well. Explained that she has chronic AF and that her sudden onset speech slurring approx 1 month ago (per niece's account) likely represents an acute infarct. Pt discharged with apixaban with referrals to Cardiology, Neurology, and priority care clinic.     Consults:   Consults         Status Ordering Provider     Inpatient consult to Vascular (Stroke) Neurology  Once     Provider:  (Not yet assigned)    AILEEN Galvez          * Hypertensive emergency    Initial BP >200 systolic, reduced with resumption of home medicine regimen. Priority care clinic f/u          Embolic stroke involving right posterior cerebral artery    Secondary prevention via apixaban 5 BID + statin + ASA          Acute cystitis without hematuria    Concern for developing UTI with 3+ leukocytes, in the setting of AMS  Will treat with Augmentin BID          Altered mental status    Likely representative of subacute encephalopathy given niece's account of pt having sudden change in mental " status accompanied by change in speech as well as coherence. Niece also mentions some waxing/waning quality which may suggest superimposed delirium given pt's UA showing leukocytosis.    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] 04/11/2018 Yes    Embolic stroke involving right posterior cerebral artery [I63.431] 04/11/2018 Yes    History of stroke [Z86.73] 04/11/2018 Not Applicable    Altered mental status [R41.82] 04/10/2018 Yes    Acute cystitis without hematuria [N30.00] 04/10/2018 Unknown    HTN (hypertension) [I10] 10/28/2013 Yes    Toxic multinodular goiter [E05.20] 10/28/2013 Yes    Hyperlipemia [E78.5] 10/28/2013 Yes    Paroxysmal atrial fibrillation [I48.0] 07/10/2012 Yes    Type 2 diabetes mellitus with diabetic chronic kidney disease [E11.22] 07/10/2012 Yes      Problems Resolved During this Admission:    Diagnosis Date Noted Date Resolved POA       Discharged Condition: good    Disposition: Home or Self Care    Follow Up:    Patient Instructions:     Ambulatory referral to Neurology   Referral Priority: Routine Referral Type: Consultation   Referral Reason: Specialty Services Required    Requested Specialty: Neurology    Number of Visits Requested: 1      Ambulatory Referral to Priority Clinic   Referral Priority: Routine Referral Type: Consultation   Referral Reason: Specialty Services Required    Number of Visits Requested: 1      Ambulatory referral to Cardiology   Referral Priority: Routine Referral Type: Consultation   Referral Reason: Specialty Services Required    Requested Specialty: Cardiology    Number of Visits Requested: 1          Significant Diagnostic Studies: Labs: All labs within the  past 24 hours have been reviewed    Pending Diagnostic Studies:     Procedure Component Value Units Date/Time    Calcitriol [[id number]] Collected:  04/10/18 2353    Order Status:  Sent Lab Status:  In process Updated:  04/11/18 0035    Specimen:  Blood from Blood     Methylmalonic acid, serum [[id number]] Collected:  04/11/18 1034    Order Status:  Sent Lab Status:  In process Updated:  04/11/18 1042    Specimen:  Blood from Blood     Vitamin B1 [[id number]] Collected:  04/10/18 2354    Order Status:  Sent Lab Status:  In process Updated:  04/11/18 0035    Specimen:  Blood from Blood          Medications:  Reconciled Home Medications:      Medication List      START taking these medications    amoxicillin-clavulanate 500-125mg 500-125 mg Tab  Commonly known as:  AUGMENTIN  Take 1 tablet (500 mg total) by mouth 2 (two) times daily.     apixaban 5 mg Tab  Take 1 tablet (5 mg total) by mouth 2 (two) times daily.        CONTINUE taking these medications    amLODIPine 10 MG tablet  Commonly known as:  NORVASC  TAKE ONE TABLET BY MOUTH ONCE DAILY     aspirin 81 MG EC tablet  Commonly known as:  ECOTRIN  Take 81 mg by mouth. 1 Tablet, Delayed Release (E.C.) Oral Every day     atorvastatin 40 MG tablet  Commonly known as:  LIPITOR  Take 1 tablet (40 mg total) by mouth once daily.     benazepril 40 MG tablet  Commonly known as:  LOTENSIN  TAKE ONE TABLET BY MOUTH TWICE DAILY     chlorthalidone 25 MG Tab  Commonly known as:  HYGROTEN  Take 1 tablet (25 mg total) by mouth once daily.     hydrALAZINE 25 MG tablet  Commonly known as:  APRESOLINE  TAKE ONE TABLET BY MOUTH THREE TIMES DAILY     hydrOXYzine HCl 25 MG tablet  Commonly known as:  ATARAX  TAKE ONE TABLET BY MOUTH THREE TIMES DAILY AS NEEDED FOR ITCHING     letrozole 2.5 mg Tab  Commonly known as:  FEMARA  1 Tablet Oral Every day     metFORMIN 1000 MG tablet  Commonly known as:  GLUCOPHAGE  TAKE ONE TABLET BY MOUTH TWICE DAILY     methIMAzole 10 MG Tab  Commonly known  as:  TAPAZOLE  Take 1 tablet (10 mg total) by mouth once daily.     spironolactone 25 MG tablet  Commonly known as:  ALDACTONE  Take 1 tablet (25 mg total) by mouth once daily.     TRAVATAN Z 0.004 % Drop  Generic drug:  travoprost  INSTILL ONE DROP INTO EACH EYE IN THE EVENING        STOP taking these medications    fluocinonide 0.05 % ointment  Commonly known as:  LIDEX     warfarin 5 MG tablet  Commonly known as:  COUMADIN            Indwelling Lines/Drains at time of discharge:   Lines/Drains/Airways          No matching active lines, drains, or airways          Time spent on the discharge of patient: 120 minutes  Patient was seen and examined on the date of discharge and determined to be suitable for discharge.         Torey Sadler MD  Department of Hospital Medicine  [facility]

## 2018-04-11 NOTE — ASSESSMENT & PLAN NOTE
Continue high dose statin, some concern for new small ischemic stroke  Consult neuro in AM  No need for emergent consultation with vascular neuro

## 2018-04-11 NOTE — ASSESSMENT & PLAN NOTE
Secondary prevention with oral anticoagulation, prefer NOAC if affordable to decrease intracerebral bleeding risk and may improve compliance/tolerability

## 2018-04-11 NOTE — ASSESSMENT & PLAN NOTE
Cr stable  Glucose controlled  Diabetic diet  POCT glucose checks  On metformin at home, but due to minimal po intake, will observe sugars and add Sliding scale if needed.

## 2018-04-11 NOTE — ASSESSMENT & PLAN NOTE
The patient has imaging findings consistent with cerebrovascular small vessel disease including lacune(s) of presumed vascular origin, clustered predominantly within bilateral thalami, cerebral microbleeds mixed subcortical and cortical centered in bi-thalamic and occipital and parietal , periventricular white matter hyperintensities of presumed vascular origin (Fazekas 3) and subcortical white matter hyperintensities of presumed vascular origin (Fazekas 1).  Cerebral microbleed burden is low, however technique is via GRE. Mixed pattern suggests pure HTN disease vs. Combination of HTN and cerebral amyloid angiopathy. Continue plan for anticoagulation as benefit of anticoagulation outweighs risk of hemorrhage in this population.

## 2018-04-11 NOTE — PLAN OF CARE
Problem: Fall Risk (Adult)  Goal: Identify Related Risk Factors and Signs and Symptoms  Related risk factors and signs and symptoms are identified upon initiation of Human Response Clinical Practice Guideline (CPG)   Pt plan of care reviewed and updated. Pt frequently assessed for pain and safety issues. Pt receives prn pain meds when needed. Vs as charted. Will continue to monitor.

## 2018-04-11 NOTE — ASSESSMENT & PLAN NOTE
Concern for developing UTI with 3+ leukocytes, in the setting of AMS  Will treat with Augmentin BID

## 2018-04-12 LAB
1,25(OH)2D3 SERPL-MCNC: 43 PG/ML
BACTERIA UR CULT: NORMAL
BACTERIA UR CULT: NORMAL

## 2018-04-12 NOTE — PLAN OF CARE
4/12/18 09:20am:  JASMIN informed per IM5 team pt needs PCC appt scheduled for hospital followup.  JASMIN scheduled PCC appt for Tuesday, 4/17/18 2:00pm with Dr. Smith.  JASMIN called pt and informed of appt scheduled as well as informed IM5.

## 2018-04-13 LAB — METHYLMALONATE SERPL-SCNC: 0.18 UMOL/L

## 2018-04-16 LAB — VIT B1 SERPL-MCNC: 48 UG/L (ref 38–122)

## 2018-04-17 ENCOUNTER — OFFICE VISIT (OUTPATIENT)
Dept: PRIMARY CARE CLINIC | Facility: CLINIC | Age: 78
End: 2018-04-17
Payer: COMMERCIAL

## 2018-04-17 VITALS
SYSTOLIC BLOOD PRESSURE: 140 MMHG | WEIGHT: 144.38 LBS | OXYGEN SATURATION: 99 % | HEIGHT: 67 IN | HEART RATE: 93 BPM | DIASTOLIC BLOOD PRESSURE: 96 MMHG | BODY MASS INDEX: 22.66 KG/M2

## 2018-04-17 DIAGNOSIS — I63.431 EMBOLIC STROKE INVOLVING RIGHT POSTERIOR CEREBRAL ARTERY: Primary | ICD-10-CM

## 2018-04-17 DIAGNOSIS — I50.22 CHRONIC SYSTOLIC HEART FAILURE: ICD-10-CM

## 2018-04-17 DIAGNOSIS — N18.30 TYPE 2 DIABETES MELLITUS WITH STAGE 3 CHRONIC KIDNEY DISEASE, WITHOUT LONG-TERM CURRENT USE OF INSULIN: ICD-10-CM

## 2018-04-17 DIAGNOSIS — E55.9 VITAMIN D DEFICIENCY DISEASE: ICD-10-CM

## 2018-04-17 DIAGNOSIS — H40.2294: ICD-10-CM

## 2018-04-17 DIAGNOSIS — Z79.01 CHRONIC ANTICOAGULATION: Chronic | ICD-10-CM

## 2018-04-17 DIAGNOSIS — Z85.3 PERSONAL HISTORY OF BREAST CANCER: ICD-10-CM

## 2018-04-17 DIAGNOSIS — E11.22 CKD STAGE 3 DUE TO TYPE 2 DIABETES MELLITUS: Chronic | ICD-10-CM

## 2018-04-17 DIAGNOSIS — I10 ESSENTIAL HYPERTENSION: ICD-10-CM

## 2018-04-17 DIAGNOSIS — I67.9 CEREBROVASCULAR SMALL VESSEL DISEASE: ICD-10-CM

## 2018-04-17 DIAGNOSIS — N18.30 CKD STAGE 3 DUE TO TYPE 2 DIABETES MELLITUS: Chronic | ICD-10-CM

## 2018-04-17 DIAGNOSIS — I48.0 PAROXYSMAL ATRIAL FIBRILLATION: ICD-10-CM

## 2018-04-17 DIAGNOSIS — Z86.73 HISTORY OF STROKE: ICD-10-CM

## 2018-04-17 DIAGNOSIS — E78.2 MIXED HYPERLIPIDEMIA: Chronic | ICD-10-CM

## 2018-04-17 DIAGNOSIS — E11.22 TYPE 2 DIABETES MELLITUS WITH STAGE 3 CHRONIC KIDNEY DISEASE, WITHOUT LONG-TERM CURRENT USE OF INSULIN: ICD-10-CM

## 2018-04-17 DIAGNOSIS — M54.2 CERVICALGIA: ICD-10-CM

## 2018-04-17 DIAGNOSIS — E05.20 TOXIC MULTINODULAR GOITER: ICD-10-CM

## 2018-04-17 PROBLEM — N30.00 ACUTE CYSTITIS WITHOUT HEMATURIA: Status: RESOLVED | Noted: 2018-04-10 | Resolved: 2018-04-17

## 2018-04-17 PROBLEM — R41.82 ALTERED MENTAL STATUS: Status: RESOLVED | Noted: 2018-04-10 | Resolved: 2018-04-17

## 2018-04-17 PROBLEM — I16.1 HYPERTENSIVE EMERGENCY: Status: RESOLVED | Noted: 2018-04-11 | Resolved: 2018-04-17

## 2018-04-17 PROBLEM — I65.1 VERTEBROBASILAR DOLICHOECTASIA: Status: RESOLVED | Noted: 2018-04-11 | Resolved: 2018-04-17

## 2018-04-17 PROCEDURE — 3077F SYST BP >= 140 MM HG: CPT | Mod: CPTII,S$GLB,, | Performed by: INTERNAL MEDICINE

## 2018-04-17 PROCEDURE — 99215 OFFICE O/P EST HI 40 MIN: CPT | Mod: S$GLB,,, | Performed by: INTERNAL MEDICINE

## 2018-04-17 PROCEDURE — 99999 PR PBB SHADOW E&M-EST. PATIENT-LVL III: CPT | Mod: PBBFAC,,, | Performed by: INTERNAL MEDICINE

## 2018-04-17 PROCEDURE — 3080F DIAST BP >= 90 MM HG: CPT | Mod: CPTII,S$GLB,, | Performed by: INTERNAL MEDICINE

## 2018-04-17 RX ORDER — BENAZEPRIL HYDROCHLORIDE 40 MG/1
40 TABLET ORAL DAILY
Qty: 90 TABLET | Refills: 4 | Status: ON HOLD | OUTPATIENT
Start: 2018-04-17 | End: 2018-08-15 | Stop reason: SDUPTHER

## 2018-04-17 RX ORDER — TRAVOPROST OPHTHALMIC SOLUTION 0.04 MG/ML
1 SOLUTION OPHTHALMIC NIGHTLY
Qty: 2.5 ML | Refills: 4 | Status: SHIPPED | OUTPATIENT
Start: 2018-04-17 | End: 2018-10-08 | Stop reason: SDUPTHER

## 2018-04-17 RX ORDER — LETROZOLE 2.5 MG/1
TABLET, FILM COATED ORAL
Qty: 90 TABLET | Refills: 4 | Status: SHIPPED | OUTPATIENT
Start: 2018-04-17 | End: 2019-05-02 | Stop reason: SDUPTHER

## 2018-04-17 RX ORDER — DICLOFENAC SODIUM 10 MG/G
2 GEL TOPICAL 3 TIMES DAILY
Qty: 100 G | Refills: 6 | Status: ON HOLD | OUTPATIENT
Start: 2018-04-17 | End: 2018-08-15 | Stop reason: HOSPADM

## 2018-04-17 RX ORDER — METOPROLOL SUCCINATE 100 MG/1
100 TABLET, EXTENDED RELEASE ORAL DAILY
Qty: 90 TABLET | Refills: 4 | Status: SHIPPED | OUTPATIENT
Start: 2018-04-17 | End: 2018-04-23 | Stop reason: SDUPTHER

## 2018-04-17 RX ORDER — AMLODIPINE BESYLATE 10 MG/1
10 TABLET ORAL DAILY
Qty: 90 TABLET | Refills: 4 | Status: SHIPPED | OUTPATIENT
Start: 2018-04-17 | End: 2018-04-17

## 2018-04-17 RX ORDER — CHLORTHALIDONE 25 MG/1
25 TABLET ORAL DAILY
Qty: 30 TABLET | Refills: 0 | Status: SHIPPED | OUTPATIENT
Start: 2018-04-17 | End: 2018-05-15 | Stop reason: SDUPTHER

## 2018-04-17 RX ORDER — ATORVASTATIN CALCIUM 40 MG/1
40 TABLET, FILM COATED ORAL DAILY
Qty: 90 TABLET | Refills: 4 | Status: SHIPPED | OUTPATIENT
Start: 2018-04-17 | End: 2019-07-08 | Stop reason: SDUPTHER

## 2018-04-17 RX ORDER — ACETAMINOPHEN 500 MG
1 TABLET ORAL DAILY
Qty: 90 CAPSULE | Refills: 5 | Status: SHIPPED | OUTPATIENT
Start: 2018-04-17 | End: 2019-05-02 | Stop reason: SDUPTHER

## 2018-04-17 NOTE — Clinical Note
Priority Clinic Visit (Post Discharge Follow-up) Today:  - Our clinic physicians and nurses plan to follow the patient up for any medical issues in the Priority Clinic for 30 days post discharge.  Future Appointments: 4/23/2018  2:00 PM    Fredy Smith MD           [facility] IMPRICL   Red MASSEY  4/23/2018  3:30 PM    oBris Baig* [facility] CARDIO    Red Carvajal 5/1/2018   2:00 PM    Alfredo Esquivel Jr., MD    [facility] HEM ONC   Krzysztof Estrada  5/21/2018  9:00 AM    Gm Zambrano Jr., MD     [facility] IM        Red MASSEY

## 2018-04-17 NOTE — PROGRESS NOTES
[facility]  New Visit Progress Note   Recent Hospital Discharge     PRESENTING HISTORY     Chief Complaint/Reason for Visit:  Follow up Hospital Discharge   Chief Complaint   Patient presents with    Hospital Follow Up     PCP: Gm Zambrano MD    History of Present Illness: Ms. Temitope Suero is a 78 y.o. female who was recently admitted to the hospital.    Admission Date: 4/10/2018  Hospital Length of Stay: 1 days  Discharge Date and Time:  2018 6:57 PM  Attending Physician: Joseline Euceda MD   Discharging Provider: Torey Sadler MD  Primary Care Provider: Gm Zambrano MD  Hospital Medicine Team: [facility] [place], [place] HOSP MED 5 Torey Sadler MD     HPI:   Ms Temitope Suero is a 79 yo woman with history of Afib previously on Coumadin, uncontrolled HTN, DM2, multinodular goiter, here for altered mental status.      The family is at bedside to provide collateral.      Patient reports having memory loss worsening over the last few months. The niece reports that she discovered the patient was not taking her medications, unclear for how long. The patient also says she is less active than she has been previously, but is still able to walk around, ambulated, take care of ADLs. She occasionaly tells her family that she thinks her  is living at home, but he is . She has not had any personality changes.      The niece took her too PCP, where it was found she was having hypertension with a headache in addition to vision changes, concerning for HTN emergency, and was in Afib with RVR (HR in 100s). She was sent to ED at [facility] [place], [place] for that reason.      She reports today that she feels well, and her biggest concern is the memory loss. She has had no indication for infection such as fevers, chills, night sweats. She reports her activity level is decreased over the last few months, but does not have any mood symptoms or depression. She has had some weight loss that she attributes to not eating as much, probably due to lack of  "appetite. '     She was taking Warfarin previously for Afib, however, Coumadin clinic ntoes suggest that she has not been to clinic since 4/2017. She denies any recent history of slurred speech, unilateral weakness, or falls. Never had clots in her legs or lungs. No previous history of MI or CHF. She is not complaining of SOB, chest pain. She does however reports she has occasional feeling of palpitations when she exerts herself and walks her dogs. These episodes tend to resolve spontaneously however.      Hospital Course:   Admitted 04/10 PM from clinic for hypertensive emergency with SBP >200 in the setting of subacute decrease in mental status, memory. UA revealed leukocytes without bacteria, but will treat empirically given pt's symptoms (altered mental status). Otherwise BP controlled with home regimen - pt states she did not take her medicines for several months due to "financial issues" but later says that she decided she was healthy enough to not need them. Vascular neurology consulted for pt's MRI showing acute infarct in R posterior circulation - recommended secondary prevention.    Pt previously noncompliant with coumadin - says that she did not take it because she felt well. Explained that she has chronic AF and that her sudden onset speech slurring approx 1 month ago (per niece's account) likely represents an acute infarct. Pt discharged with apixaban with referrals to Cardiology, Neurology, and priority care clinic.          * Hypertensive emergency     Initial BP >200 systolic, reduced with resumption of home medicine regimen. Priority care clinic f/u       Embolic stroke involving right posterior cerebral artery     Secondary prevention via apixaban 5 BID + statin + ASA         Acute cystitis without hematuria     Concern for developing UTI with 3+ leukocytes, in the setting of AMS  Will treat with Augmentin BID          Altered mental status     Likely representative of subacute encephalopathy given " niece's account of pt having sudden change in mental status accompanied by change in speech as well as coherence. Niece also mentions some waxing/waning quality which may suggest superimposed delirium given pt's UA showing leukocytosis.     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] 04/11/2018 Yes    Embolic stroke involving right posterior cerebral artery [I63.431] 04/11/2018 Yes    History of stroke [Z86.73] 04/11/2018 Not Applicable    Altered mental status [R41.82] 04/10/2018 Yes    Acute cystitis without hematuria [N30.00] 04/10/2018 Unknown    HTN (hypertension) [I10] 10/28/2013 Yes    Toxic multinodular goiter [E05.20] 10/28/2013 Yes    Hyperlipemia [E78.5] 10/28/2013 Yes    Paroxysmal atrial fibrillation [I48.0] 07/10/2012 Yes    Type 2 diabetes mellitus with diabetic chronic kidney disease [E11.22] 07/10/2012 Yes         _________________________________________________    Today:  Only taking apixaban per family.  Not on other meds.  Apparently meds were sent to other pharmacy.  She reports neck pain with movement.  She does a lot of house work.  No chest pain or SOB.    She has missed follow up for many years.  Admitted to hospital (was not taking any meds).    PAST HISTORY:     Past Medical History:   Diagnosis Date    Cancer of left breast, stage 2 11/24/2015    Cerebrovascular small vessel disease 4/11/2018    Bi-thalamic lacunar disease, mod/sev periventricular white matter disease, mixed pattern cerebral microbleeds    Chronic anticoagulation - rivaroxaban 4/17/2018    Chronic systolic heart failure 4/17/2018    Echo 4-2018   1 - Moderately depressed left ventricular systolic function  (EF 30-35%).    2 - Biatrial enlargement.    3 - Normal right ventricular systolic function .    4 - Mild mitral regurgitation.    5 - Mild tricuspid regurgitation.    6 - The estimated PA systolic pressure is 34 mmHg.    7 - Increased central venous pressure.    8 - Left pleural effusion.     CKD stage 3 due to type 2 diabetes mellitus 4/17/2018    Embolic stroke involving right posterior cerebral artery 4/11/2018    Essential hypertension 10/28/2013    Glaucoma suspect of both eyes 12/9/2013    Hearing loss, sensorineural 12/16/2013    History of stroke 4/11/2018    R SCA remote infarct on imaging (unknown timing)    Hypertensive emergency 4/11/2018    Mixed hyperlipidemia 10/28/2013    Obesity 1/16/2014    Paroxysmal atrial fibrillation 7/10/2012    Toxic multinodular goiter 10/28/2013    Type 2 diabetes mellitus with diabetic chronic kidney disease 7/10/2012   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    Vitamin D deficiency disease 10/28/2013       Past Surgical History:   Procedure Laterality Date    BREAST SURGERY      CHOLECYSTECTOMY         Family History   Problem Relation Age of Onset    Hypertension Mother     Hypertension Father     Glaucoma Father     Heart disease Father     Cancer Sister     Asthma Son     Diabetes Paternal Aunt     Thyroid cancer Neg Hx        Social History     Social History    Marital status: Single     Spouse name: N/A    Number of children: 1    Years of education: N/A     Occupational History     [employer]     Social History Main Topics    Smoking status: Never Smoker    Smokeless tobacco: Never Used    Alcohol use No    Drug use: No    Sexual activity: Not Currently     Other Topics Concern    None     Social History Narrative    None       MEDICATIONS & ALLERGIES:     Current Outpatient Prescriptions on File Prior to Visit   Medication Sig Dispense Refill    amlodipine (NORVASC) 10 MG tablet TAKE ONE TABLET BY MOUTH ONCE DAILY 90  tablet 4    apixaban 5 mg Tab Take 1 tablet (5 mg total) by mouth 2 (two) times daily. 60 tablet 3    aspirin (ECOTRIN) 81 MG EC tablet Take 81 mg by mouth. 1 Tablet, Delayed Release (E.C.) Oral Every day      atorvastatin (LIPITOR) 40 MG tablet Take 1 tablet (40 mg total) by mouth once daily. 30 tablet 0    benazepril (LOTENSIN) 40 MG tablet TAKE ONE TABLET BY MOUTH TWICE DAILY 180 tablet 6    chlorthalidone (HYGROTEN) 25 MG Tab Take 1 tablet (25 mg total) by mouth once daily. 30 tablet 0    hydrALAZINE (APRESOLINE) 25 MG tablet TAKE ONE TABLET BY MOUTH THREE TIMES DAILY 90 tablet 4    hydrOXYzine HCl (ATARAX) 25 MG tablet TAKE ONE TABLET BY MOUTH THREE TIMES DAILY AS NEEDED FOR ITCHING 90 tablet 2    letrozole (FEMARA) 2.5 mg Tab 1 Tablet Oral Every day 90 tablet 3    metformin (GLUCOPHAGE) 1000 MG tablet TAKE ONE TABLET BY MOUTH TWICE DAILY 180 tablet 4    methIMAzole (TAPAZOLE) 10 MG Tab Take 1 tablet (10 mg total) by mouth once daily. 30 tablet 0    spironolactone (ALDACTONE) 25 MG tablet Take 1 tablet (25 mg total) by mouth once daily. 90 tablet 0    TRAVATAN Z 0.004 % Drop INSTILL ONE DROP INTO EACH EYE IN THE EVENING 2.5 mL 4       Review of patient's allergies indicates:  No Known Allergies    OBJECTIVE:     Vital Signs:  Vitals:    04/17/18 1418   BP: (!) 140/96   Pulse: 93     Wt Readings from Last 1 Encounters:   04/17/18 1418 65.5 kg (144 lb 6.4 oz)     Body mass index is 22.62 kg/m².     Physical Exam:  General: Well developed, well nourished. No distress.  HEENT: Head is normocephalic, atraumatic; ears are normal.    Eyes: Clear conjunctiva.  Neck: Supple, symmetrical neck; trachea midline. Stiff muscles.  Lungs: Clear to auscultation bilaterally and normal respiratory effort.  Cardiovascular: Irreg Irreg rhythm at 100 during exam.  Extremities: trace pedal edema.   Abdomen: Abdomen is soft, non-tender non-distended with normal bowel sounds.  Skin: Skin color, texture, turgor normal. No  rashes.  Musculoskeletal: Normal gait.   Neurologic: Normal strength and tone. No focal numbness or weakness.   Psychiatric: Normal affect. Alert.    Laboratory  Lab Results   Component Value Date    WBC 4.37 04/10/2018    HGB 12.0 04/10/2018    HCT 39.2 04/10/2018    MCV 81 (L) 04/10/2018     04/10/2018     BMP  Lab Results   Component Value Date     04/11/2018     04/11/2018    K 3.7 04/11/2018    K 3.7 04/11/2018     04/11/2018     04/11/2018    CO2 25 04/11/2018    CO2 25 04/11/2018    BUN 20 04/11/2018    BUN 20 04/11/2018    CREATININE 1.1 04/11/2018    CREATININE 1.1 04/11/2018    CALCIUM 8.5 (L) 04/11/2018    CALCIUM 8.5 (L) 04/11/2018    ANIONGAP 9 04/11/2018    ANIONGAP 9 04/11/2018    ESTGFRAFRICA 56.0 (A) 04/11/2018    ESTGFRAFRICA 56.0 (A) 04/11/2018    EGFRNONAA 48.5 (A) 04/11/2018    EGFRNONAA 48.5 (A) 04/11/2018     Lab Results   Component Value Date    ALT 15 04/11/2018    AST 19 04/11/2018    ALKPHOS 119 04/11/2018    BILITOT 0.6 04/11/2018     Lab Results   Component Value Date    INR 1.1 04/11/2018    INR 1.1 04/10/2018    INR 1.6 (A) 12/21/2016     Lab Results   Component Value Date    HGBA1C 6.2 12/01/2016     No results for input(s): POCTGLUCOSE in the last 72 hours.      TRANSITION OF CARE:     Transition of Care Visit:     I have reviewed and updated the history and problem list.  I have reconciled the medication list.  I have discussed the hospitalization and current medical issues, prognosis and plans with the patient/family.  I  spent more than 50% of time discussing the care with the patient/family.  Total Encounter in the [facility]: 60 minutes.    Medications Reconciliation:   I have reconciled the patient's home medications and discharge medications with the patient/family. I have updated all changes.  Refer to After-Visit Medication List.    ASSESSMENT & PLAN:     Due to compliance issue, will change all meds to once daily so family member can give  to her daily.    Embolic stroke involving right posterior cerebral artery  Cerebrovascular small vessel disease  History of stroke  - On aspirin and anticoagulation. No weakness or deficit.  Decreased memory.  - See BP management below.    Mixed hyperlipidemia  Rx:  -     atorvastatin (LIPITOR) 40 MG tablet; Take 1 tablet (40 mg total) by mouth once daily.  Dispense: 90 tablet; Refill: 4    Paroxysmal atrial fibrillation  Chronic anticoagulation - Apixaban for A Fib  Chronic systolic heart failure       - Change from apixaban to rivaroxaban for anticoagulation due to compliance issue.          Beta blocker for rate control (see HTN below)  -     rivaroxaban (XARELTO) 15 mg Tab; Take 1 tablet (15 mg total) by mouth once daily.  Dispense: 30 tablet; Refill: 11  -     Ambulatory consult to Cardiology    Essential hypertension  - Rx:  -     metoprolol succinate (TOPROL-XL) 100 MG 24 hr tablet; Take 1 tablet (100 mg total) by mouth once daily.  Dispense: 90 tablet; Refill: 4  -     benazepril (LOTENSIN) 40 MG tablet; Take 1 tablet (40 mg total) by mouth once daily.  Dispense: 90 tablet; Refill: 4  -     chlorthalidone (HYGROTEN) 25 MG Tab; Take 1 tablet (25 mg total) by mouth once daily.  Dispense: 30 tablet; Refill: 0  -     Ambulatory consult to Cardiology    CKD stage 3 due to type 2 diabetes mellitus  Type 2 diabetes mellitus with stage 3 chronic kidney disease, without long-term current use of insulin  - Not on med. Diet control. She had lost a lot of weight.    Toxic multinodular goiter  - Normal thyroid function. Off anti-thyroid med.    Need future follow up.    Vitamin D deficiency disease  -     cholecalciferol, vitamin D3, (VITAMIN D3) 2,000 unit Cap; Take 1 capsule (2,000 Units total) by mouth once daily.  Dispense: 90 capsule; Refill: 5    Personal history of breast cancer  -     letrozole (FEMARA) 2.5 mg Tab; 1 Tablet Oral Every day  Dispense: 90 tablet; Refill: 4  -     Ambulatory Referral to Oncology for  follow up.    Chronic primary angle-closure glaucoma, indeterminate stage, unspecified laterality  -     travoprost (TRAVATAN Z) 0.004 % Drop; Place 1 drop into both eyes every evening.  Dispense: 2.5 mL; Refill: 4    Cervicalgia  -     diclofenac sodium 1 % Gel; Apply 2 g topically 3 (three) times daily.  Dispense: 100 g; Refill: 6      Scheduled Follow-up :  Future Appointments  Date Time Provider Department Center   4/23/2018 2:00 PM Fredy Smith MD [facility] IMPRICL [facility]   4/23/2018 3:30 PM Boris Calero MD [facility]   5/1/2018 2:00 PM Alfredo Esquivel Jr., MD [facility] HEM ONC [facility]   5/21/2018 9:00 AM Gm Zambrano Jr., MD [facility] IM [facility]       After Visit Medication List :     Medication List          Accurate as of 4/17/18  3:10 PM. If you have any questions, ask your nurse or doctor.               START taking these medications    cholecalciferol (vitamin D3) 2,000 unit Cap  Commonly known as:  VITAMIN D3  Take 1 capsule (2,000 Units total) by mouth once daily.  Started by:  Fredy Smith MD     diclofenac sodium 1 % Gel  Apply 2 g topically 3 (three) times daily.  Started by:  Fredy Smith MD     metoprolol succinate 100 MG 24 hr tablet  Commonly known as:  TOPROL-XL  Take 1 tablet (100 mg total) by mouth once daily.  Started by:  Fredy Smith MD     rivaroxaban 15 mg Tab  Commonly known as:  XARELTO  Take 1 tablet (15 mg total) by mouth once daily.  Started by:  Fredy Smith MD        CHANGE how you take these medications    benazepril 40 MG tablet  Commonly known as:  LOTENSIN  Take 1 tablet (40 mg total) by mouth once daily.  What changed:  See the new instructions.  Changed by:  Fredy Smith MD     travoprost 0.004 % Drop  Commonly known as:  TRAVATAN Z  Place 1 drop into both eyes every evening.  What changed:  See the new instructions.  Changed by:  Fredy Smith MD        CONTINUE taking these medications    aspirin 81 MG EC tablet  Commonly known as:   ECOTRIN     atorvastatin 40 MG tablet  Commonly known as:  LIPITOR  Take 1 tablet (40 mg total) by mouth once daily.     chlorthalidone 25 MG Tab  Commonly known as:  HYGROTEN  Take 1 tablet (25 mg total) by mouth once daily.     letrozole 2.5 mg Tab  Commonly known as:  FEMARA  1 Tablet Oral Every day        STOP taking these medications    amLODIPine 10 MG tablet  Commonly known as:  NORVASC  Stopped by:  Fredy Smith MD     apixaban 5 mg Tab  Stopped by:  Fredy Smith MD     hydrALAZINE 25 MG tablet  Commonly known as:  APRESOLINE  Stopped by:  Fredy Smith MD     hydrOXYzine HCl 25 MG tablet  Commonly known as:  ATARAX  Stopped by:  Fredy Smith MD     metFORMIN 1000 MG tablet  Commonly known as:  GLUCOPHAGE  Stopped by:  Fredy Smith MD     methIMAzole 10 MG Tab  Commonly known as:  TAPAZOLE  Stopped by:  Fredy Smith MD     spironolactone 25 MG tablet  Commonly known as:  ALDACTONE  Stopped by:  Fredy Smith MD           Where to Get Your Medications      These medications [id number] [place] [place] [facility] [address] [facility]  [address] [address]    Phone:  [phone]   · atorvastatin 40 MG tablet  · benazepril 40 MG tablet  · chlorthalidone 25 MG Tab  · cholecalciferol (vitamin D3) 2,000 unit Cap  · diclofenac sodium 1 % Gel  · letrozole 2.5 mg Tab  · metoprolol succinate 100 MG 24 hr tablet  · rivaroxaban 15 mg Tab  · travoprost 0.004 % Drop           Signing Physician:  Fredy Smith MD

## 2018-04-18 ENCOUNTER — TELEPHONE (OUTPATIENT)
Dept: INTERNAL MEDICINE | Facility: CLINIC | Age: 78
End: 2018-04-18

## 2018-04-18 NOTE — TELEPHONE ENCOUNTER
----- Message from Rachel Clarawendy sent at 4/18/2018 11:42 AM CDT -----  Contact: Patient [phone]  Patient wants to know if she be taken off of her warfin or if she was taken off it completely.    Please call and advise.    Thank You

## 2018-04-20 NOTE — PHYSICIAN QUERY
PT Name: Temitope Suero  MR #: [id number]    Physician Query Form - Neurological Condition Clarification       CDS/: Sara Lopez RN, CDI             Contact information: [phone]    This form is a permanent document in the medical record.     Query Date: April 20, 2018    By submitting this query, we are merely seeking further clarification of documentation. Please utilize your independent clinical judgment when addressing the question(s) below.    The Medical record contains the following:   Indicators   Supporting Clinical Findings Location in Medical Record   x AMS, Confusion, LOC, etc. Concern for developing UTI with 3+ leukocytes, in the setting of AMS     Regarding her confusion, it is likely that this is secondary to UTI which is currently treated by primary team.       Altered mental status Likely representative of subacute encephalopathy given niece's account of pt having sudden change in mental status accompanied by change in speech as well as coherence. Niece also mentions some waxing/waning quality which may suggest superimposed delirium given pt's UA showing leukocytosis.     Will treat UTI with augmentin x3 days total and refer to neurology, cardiology, and priority care clinic    H&P        Consult 4/11        Discharge Summary 4/11   x Acute / Chronic Illness Hypertensive emergency   Acute cystitis without hematuria   Embolic stroke involving right posterior cerebral artery   Altered mental status        Discharge Summary 4/11    Radiology Findings      Electrolyte Imbalance     x Medication Will treat UTI with augmentin x3 days total and refer to neurology, cardiology, and priority care clinic   Discharge Summary 4/11    Treatment      Other       Provider, please specify the diagnosis or diagnoses associated with above clinical findings.      Please specify the type of encephalopathy.        [  ] Metabolic Encephalopathy    [  ] Hypertensive Encephalopathy    [  ] Other Encephalopathy    [   ] Unspecified Encephalopathy    [ x ] Other (please specify): ___Mixed encephalopathy with hypertensive and metabolic contributing to possible underlying dementia/pseudodementia__________________________________    [  ] Clinically Undetermined      Please document in your progress notes daily for the duration of treatment until resolved, and  include in your discharge summary.

## 2018-04-23 ENCOUNTER — OFFICE VISIT (OUTPATIENT)
Dept: PRIMARY CARE CLINIC | Facility: CLINIC | Age: 78
End: 2018-04-23
Payer: COMMERCIAL

## 2018-04-23 ENCOUNTER — OFFICE VISIT (OUTPATIENT)
Dept: CARDIOLOGY | Facility: CLINIC | Age: 78
End: 2018-04-23
Payer: COMMERCIAL

## 2018-04-23 VITALS
OXYGEN SATURATION: 99 % | BODY MASS INDEX: 22.98 KG/M2 | HEIGHT: 67 IN | HEART RATE: 91 BPM | WEIGHT: 146.38 LBS | DIASTOLIC BLOOD PRESSURE: 92 MMHG | SYSTOLIC BLOOD PRESSURE: 140 MMHG

## 2018-04-23 VITALS
WEIGHT: 146.19 LBS | DIASTOLIC BLOOD PRESSURE: 97 MMHG | SYSTOLIC BLOOD PRESSURE: 159 MMHG | HEART RATE: 92 BPM | HEIGHT: 64 IN | BODY MASS INDEX: 24.96 KG/M2

## 2018-04-23 DIAGNOSIS — I50.22 CHRONIC SYSTOLIC HEART FAILURE: ICD-10-CM

## 2018-04-23 DIAGNOSIS — E05.20 TOXIC MULTINODULAR GOITER: ICD-10-CM

## 2018-04-23 DIAGNOSIS — E11.22 TYPE 2 DIABETES MELLITUS WITH STAGE 3 CHRONIC KIDNEY DISEASE, WITHOUT LONG-TERM CURRENT USE OF INSULIN: ICD-10-CM

## 2018-04-23 DIAGNOSIS — E78.2 MIXED HYPERLIPIDEMIA: Chronic | ICD-10-CM

## 2018-04-23 DIAGNOSIS — M54.2 CERVICALGIA: ICD-10-CM

## 2018-04-23 DIAGNOSIS — I48.0 PAROXYSMAL ATRIAL FIBRILLATION: ICD-10-CM

## 2018-04-23 DIAGNOSIS — I67.9 CEREBROVASCULAR SMALL VESSEL DISEASE: ICD-10-CM

## 2018-04-23 DIAGNOSIS — N18.30 TYPE 2 DIABETES MELLITUS WITH STAGE 3 CHRONIC KIDNEY DISEASE, WITHOUT LONG-TERM CURRENT USE OF INSULIN: ICD-10-CM

## 2018-04-23 DIAGNOSIS — E11.22 CKD STAGE 3 DUE TO TYPE 2 DIABETES MELLITUS: Chronic | ICD-10-CM

## 2018-04-23 DIAGNOSIS — I10 ESSENTIAL HYPERTENSION: ICD-10-CM

## 2018-04-23 DIAGNOSIS — Z86.73 HISTORY OF STROKE: ICD-10-CM

## 2018-04-23 DIAGNOSIS — Z79.01 CHRONIC ANTICOAGULATION: Chronic | ICD-10-CM

## 2018-04-23 DIAGNOSIS — N18.30 CKD STAGE 3 DUE TO TYPE 2 DIABETES MELLITUS: Chronic | ICD-10-CM

## 2018-04-23 DIAGNOSIS — I63.431 EMBOLIC STROKE INVOLVING RIGHT POSTERIOR CEREBRAL ARTERY: Primary | ICD-10-CM

## 2018-04-23 DIAGNOSIS — I10 ESSENTIAL HYPERTENSION: Primary | ICD-10-CM

## 2018-04-23 DIAGNOSIS — E55.9 VITAMIN D DEFICIENCY DISEASE: ICD-10-CM

## 2018-04-23 PROCEDURE — 3077F SYST BP >= 140 MM HG: CPT | Mod: CPTII,S$GLB,, | Performed by: INTERNAL MEDICINE

## 2018-04-23 PROCEDURE — 3080F DIAST BP >= 90 MM HG: CPT | Mod: CPTII,S$GLB,, | Performed by: INTERNAL MEDICINE

## 2018-04-23 PROCEDURE — 99214 OFFICE O/P EST MOD 30 MIN: CPT | Mod: S$GLB,,, | Performed by: INTERNAL MEDICINE

## 2018-04-23 PROCEDURE — 99999 PR PBB SHADOW E&M-EST. PATIENT-LVL IV: CPT | Mod: PBBFAC,,, | Performed by: INTERNAL MEDICINE

## 2018-04-23 PROCEDURE — 99999 PR PBB SHADOW E&M-EST. PATIENT-LVL III: CPT | Mod: PBBFAC,,, | Performed by: INTERNAL MEDICINE

## 2018-04-23 RX ORDER — METOPROLOL SUCCINATE 200 MG/1
200 TABLET, EXTENDED RELEASE ORAL DAILY
Qty: 90 TABLET | Refills: 4 | Status: SHIPPED | OUTPATIENT
Start: 2018-04-23 | End: 2018-04-23 | Stop reason: ALTCHOICE

## 2018-04-23 RX ORDER — CARVEDILOL 25 MG/1
25 TABLET ORAL 2 TIMES DAILY WITH MEALS
Qty: 60 TABLET | Refills: 11 | Status: SHIPPED | OUTPATIENT
Start: 2018-04-23 | End: 2019-07-31 | Stop reason: SDUPTHER

## 2018-04-23 RX ORDER — METOPROLOL SUCCINATE 200 MG/1
200 TABLET, EXTENDED RELEASE ORAL DAILY
Qty: 90 TABLET | Refills: 4 | Status: SHIPPED | OUTPATIENT
Start: 2018-04-23 | End: 2018-04-23 | Stop reason: SDUPTHER

## 2018-04-23 NOTE — PROGRESS NOTES
[facility]  Follow-up Visit Progress Note     PRESENTING HISTORY     PCP: Gm Zambrano MD  Chief Complaint/Reason for Visit:  Follow up from recent visit.  Last [facility] Clinic Visit: 4-17-18    Chief Complaint   Patient presents with    Hospital Follow Up     History of Present Illness & ROS: Ms. Temitope Suero is a 78 y.o. female.    Doing well. No chest pain or SOB.  Tolerating rivaroxaban.  Good appetite.     PAST HISTORY:     Past Medical History:   Diagnosis Date    Cancer of left breast, stage 2 11/24/2015    Cerebrovascular small vessel disease 4/11/2018    Bi-thalamic lacunar disease, mod/sev periventricular white matter disease, mixed pattern cerebral microbleeds    Chronic anticoagulation - rivaroxaban 4/17/2018    Chronic systolic heart failure 4/17/2018    Echo 4-2018   1 - Moderately depressed left ventricular systolic function (EF 30-35%).    2 - Biatrial enlargement.    3 - Normal right ventricular systolic function .    4 - Mild mitral regurgitation.    5 - Mild tricuspid regurgitation.    6 - The estimated PA systolic pressure is 34 mmHg.    7 - Increased central venous pressure.    8 - Left pleural effusion.     CKD stage 3 due to type 2 diabetes mellitus 4/17/2018    Embolic stroke involving right posterior cerebral artery 4/11/2018    Essential hypertension 10/28/2013    Glaucoma suspect of both eyes 12/9/2013    Hearing loss, sensorineural 12/16/2013    History of stroke 4/11/2018    R SCA remote infarct on imaging (unknown timing)    Hypertensive emergency 4/11/2018    Mixed hyperlipidemia 10/28/2013    Obesity 1/16/2014    Paroxysmal atrial fibrillation 7/10/2012    Toxic multinodular goiter 10/28/2013    Type 2 diabetes mellitus with diabetic chronic kidney disease 7/10/2012   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    Vitamin D deficiency disease 10/28/2013       Past Surgical History:   Procedure Laterality Date    BREAST SURGERY      CHOLECYSTECTOMY          Family History   Problem Relation Age of Onset    Hypertension Mother     Hypertension Father     Glaucoma Father     Heart disease Father     Cancer Sister     Asthma Son     Diabetes Paternal Aunt     Thyroid cancer Neg Hx        Social History     Social History    Marital status: Single     Spouse name: N/A    Number of children: 1    Years of education: N/A     Occupational History     [employer]     Social History Main Topics    Smoking status: Never Smoker    Smokeless tobacco: Never Used    Alcohol use No    Drug use: No    Sexual activity: Not Currently     Other Topics Concern    Not on file     Social History Narrative    No narrative on file       MEDICATIONS & ALLERGIES:     Current Outpatient Prescriptions on File Prior to Visit   Medication Sig Dispense Refill    aspirin (ECOTRIN) 81 MG EC tablet Take 81 mg by mouth. 1 Tablet, Delayed Release (E.C.) Oral Every day      atorvastatin (LIPITOR) 40 MG tablet Take 1 tablet (40 mg total) by mouth once daily. 90 tablet 4    benazepril (LOTENSIN) 40 MG tablet Take 1 tablet (40 mg total) by mouth once daily. 90 tablet 4    chlorthalidone (HYGROTEN) 25 MG Tab Take 1 tablet (25 mg total) by mouth once daily. 30 tablet 0    cholecalciferol, vitamin D3, (VITAMIN D3) 2,000 unit Cap Take 1 capsule (2,000 Units total) by mouth once daily. 90 capsule 5    diclofenac sodium 1 % Gel Apply 2 g topically 3 (three) times daily. 100 g 6    letrozole (FEMARA) 2.5 mg Tab 1 Tablet Oral Every day 90 tablet 4    metoprolol succinate (TOPROL-XL) 100 MG 24 hr tablet Take 1 tablet (100 mg total) by mouth once daily. 90 tablet 4    rivaroxaban (XARELTO) 15 mg Tab Take 1 tablet (15 mg total) by mouth once daily. 30 tablet 11    travoprost (TRAVATAN Z) 0.004 % Drop Place 1 drop into both eyes every evening. 2.5 mL 4    [DISCONTINUED] travoprost, benzalkonium, (TRAVATAN) 0.004 % ophthalmic solution Place 1 drop into both eyes every evening. 5 mL  "12     No current facility-administered medications on file prior to visit.         Review of patient's allergies indicates:  No Known Allergies    Medications Reconciliation:   I have reconciled the patient's home medications and discharge medications with the patient/family. I have updated all changes.  Refer to After-Visit Medication List.    OBJECTIVE:     Vital Signs:  Vitals:    04/23/18 1439   BP: (!) 140/92   Pulse: 91     Wt Readings from Last 1 Encounters:   04/23/18 1439 66.4 kg (146 lb 6.2 oz)     Body mass index is 22.93 kg/m².     04/17/2018 Vitals  Vitals:    BP  140/96                   Pulse 93    Ht 5' 7" (1.702 m)    Wt 65.5 kg (144 lb 6.4 oz)    SpO2 99%    BMI 22.62 kg/m²    BSA 1.76 m²          Physical Exam:  General: Well developed, well nourished. No distress.  HEENT: Head is normocephalic, atraumatic   Eyes: Clear conjunctiva.  Neck: Supple, symmetrical neck; trachea midline.  Lungs: Clear to auscultation bilaterally and normal respiratory effort.  Cardiovascular: Irreg Irreg at 90 bpm.  Extremities: No LE edema.   Abdomen: Abdomen is soft, non-tender non-distended with normal bowel sounds.  Skin: Skin color, texture, turgor normal. No rashes.  Musculoskeletal: Normal gait.   Psychiatric: Normal affect. Alert.    Laboratory  Lab Results   Component Value Date    WBC 4.37 04/10/2018    HGB 12.0 04/10/2018    HCT 39.2 04/10/2018     04/10/2018    CHOL 159 07/19/2016    TRIG 82 07/19/2016    HDL 52 07/19/2016    ALT 15 04/11/2018    AST 19 04/11/2018     04/11/2018     04/11/2018    K 3.7 04/11/2018    K 3.7 04/11/2018     04/11/2018     04/11/2018    CREATININE 1.1 04/11/2018    CREATININE 1.1 04/11/2018    BUN 20 04/11/2018    BUN 20 04/11/2018    CO2 25 04/11/2018    CO2 25 04/11/2018    TSH 0.931 04/11/2018    INR 1.1 04/11/2018    HGBA1C 6.2 12/01/2016       ASSESSMENT & PLAN:     Due to compliance issue,  all meds to once daily so family member can give to " her daily.     Embolic stroke involving right posterior cerebral artery  Cerebrovascular small vessel disease  History of stroke  - On aspirin and anticoagulation. No weakness or deficit.  Decreased memory.  - See BP management below.     Mixed hyperlipidemia  - On atorvastatin (LIPITOR) 40 MG tablet; Take 1 tablet (40 mg total) by mouth once daily.        Paroxysmal atrial fibrillation  Chronic anticoagulation - Apixaban for A Fib  Chronic systolic heart failure       - On rivaroxaban 15 mg daily.          Beta blocker for rate control (see HTN below)     Essential hypertension  - BP still high 140/90 plus HR 90.    Will increase Toprol XL to 200 mg daily.  - Rx:    -     metoprolol succinate (TOPROL-XL) 200 MG 24 hr tablet; Take 1 tablet (200 mg total) by mouth once daily.  Dispense: 90 tablet; Refill: 4     CKD stage 3 due to type 2 diabetes mellitus  Type 2 diabetes mellitus with stage 3 chronic kidney disease, without long-term current use of insulin  - Not on med. Diet control. She had lost a lot of weight.     Toxic multinodular goiter  - Normal thyroid function. Off anti-thyroid med.     Vitamin D deficiency disease  -    On cholecalciferol, vitamin D3, (VITAMIN D3) 2,000 unit Cap; Take 1 capsule (2,000 Units total) by mouth once daily.     Personal history of breast cancer  -   On  letrozole (FEMARA) 2.5 mg Tab; 1 Tablet Oral Every day.  -   Oncology for follow up in May.     Chronic primary angle-closure glaucoma, indeterminate stage, unspecified laterality  - On  travoprost (TRAVATAN Z) 0.004 % Drop; Place 1 drop into both eyes every evening.        Scheduled Follow-up :  Future Appointments  Date Time Provider Department Center   4/23/2018 3:30 PM Boris Calero MD [facility] CARDIO [facility]   5/1/2018 2:00 PM Alfredo Esquivel Jr., MD [facility] HEM ONC [facility]   5/21/2018 9:00 AM Gm Zambrano Jr., MD [facility] IM [facility]       After Visit Medication List :     Medication List          Accurate  as of 4/23/18  2:55 PM. If you have any questions, ask your nurse or doctor.               CHANGE how you take these medications    metoprolol succinate 200 MG 24 hr tablet  Commonly known as:  TOPROL-XL  Take 1 tablet (200 mg total) by mouth once daily.  What changed:  · medication strength  · how much to take  Changed by:  Fredy Smith MD        CONTINUE taking these medications    aspirin 81 MG EC tablet  Commonly known as:  ECOTRIN     atorvastatin 40 MG tablet  Commonly known as:  LIPITOR  Take 1 tablet (40 mg total) by mouth once daily.     benazepril 40 MG tablet  Commonly known as:  LOTENSIN  Take 1 tablet (40 mg total) by mouth once daily.     chlorthalidone 25 MG Tab  Commonly known as:  HYGROTEN  Take 1 tablet (25 mg total) by mouth once daily.     cholecalciferol (vitamin D3) 2,000 unit Cap  Commonly known as:  VITAMIN D3  Take 1 capsule (2,000 Units total) by mouth once daily.     diclofenac sodium 1 % Gel  Apply 2 g topically 3 (three) times daily.     letrozole 2.5 mg Tab  Commonly known as:  FEMARA  1 Tablet Oral Every day     rivaroxaban 15 mg Tab  Commonly known as:  XARELTO  Take 1 tablet (15 mg total) by mouth once daily.     travoprost 0.004 % Drop  Commonly known as:  TRAVATAN Z  Place 1 drop into both eyes every evening.           Where to Get Your Medications      These medications were sent to [facility] #[id number] - [place] - [address]  [address] [facility] [address]    Phone:  [phone]   · metoprolol succinate 200 MG 24 hr tablet         Signing Physician:  Fredy Smith MD

## 2018-04-23 NOTE — ASSESSMENT & PLAN NOTE
Poorly controlled; today in clinic BP was 159/97.  I have explained to her and her nephew the importance of home BP monitoring    Home BP moniotring and bring me the log at the time of next appointment  D/C metoprolol  Start carvedilol 25 mg bid     Continue the following drugs:  Benazepril 40 mg qd  Chlorthalidone 25 mg qd

## 2018-04-23 NOTE — PROGRESS NOTES
Cardiology Progress Note:    Patient ID:  Temitope Suero is a 78 y.o. female who presents for evaluation of HFrEF, hypertension, paroxysmal atrial fibrillation and hyperlipidemia     History of Present Illness:  Last visit in Cardiology Clinic was on 11/14/2014. Today she is accompanied by her nephew who lives with her.    Patient has no symptoms suggestive of myocardial ischemia, heart failure and arrhythmia.     She refers that her BP is poorly controlled and that she does not monitor her BP at home.    Past Medical History Includes: HFrEF;  Paroxysmal atrial fibrillation on rivaroxaban; hypertension; hyperlipidemia; type 2 diabetes; multinodular goiter; s/p embolic stroke involving the right posterior cerebral artery; hx of breast cancer    Social History Includes:  She lives with her nephew    Present Medical Therapy Includes:    ASA 81 mg qd  Atorvastatin 40 mg qd  Benazepril 40 mg qd  Chlorthalidone 25 mg qd  Metoprolol succinate 200 mg qd  rivaroxaban 15 mg qd    Full Medication List Includes:  Outpatient Encounter Prescriptions as of 4/23/2018   Medication Sig Dispense Refill    aspirin (ECOTRIN) 81 MG EC tablet Take 81 mg by mouth. 1 Tablet, Delayed Release (E.C.) Oral Every day      atorvastatin (LIPITOR) 40 MG tablet Take 1 tablet (40 mg total) by mouth once daily. 90 tablet 4    benazepril (LOTENSIN) 40 MG tablet Take 1 tablet (40 mg total) by mouth once daily. 90 tablet 4    chlorthalidone (HYGROTEN) 25 MG Tab Take 1 tablet (25 mg total) by mouth once daily. 30 tablet 0    cholecalciferol, vitamin D3, (VITAMIN D3) 2,000 unit Cap Take 1 capsule (2,000 Units total) by mouth once daily. 90 capsule 5    diclofenac sodium 1 % Gel Apply 2 g topically 3 (three) times daily. 100 g 6    letrozole (FEMARA) 2.5 mg Tab 1 Tablet Oral Every day 90 tablet 4    rivaroxaban (XARELTO) 15 mg Tab Take 1 tablet (15 mg total) by mouth once daily. 30 tablet 11    travoprost (TRAVATAN Z) 0.004 % Drop Place 1 drop into  both eyes every evening. 2.5 mL 4    [DISCONTINUED] metoprolol succinate (TOPROL-XL) 200 MG 24 hr tablet Take 1 tablet (200 mg total) by mouth once daily. 90 tablet 4    carvedilol (COREG) 25 MG tablet Take 1 tablet (25 mg total) by mouth 2 (two) times daily with meals. 60 tablet 11    [DISCONTINUED] metoprolol succinate (TOPROL-XL) 100 MG 24 hr tablet Take 1 tablet (100 mg total) by mouth once daily. 90 tablet 4    [DISCONTINUED] metoprolol succinate (TOPROL-XL) 200 MG 24 hr tablet Take 1 tablet (200 mg total) by mouth once daily. 90 tablet 4    [DISCONTINUED] travoprost, benzalkonium, (TRAVATAN) 0.004 % ophthalmic solution Place 1 drop into both eyes every evening. 5 mL 12     No facility-administered encounter medications on file as of 4/23/2018.        Review of Systems:  Review of Systems   Constitution: Negative for decreased appetite, diaphoresis, fever, weakness, malaise/fatigue, weight gain and weight loss.   HENT: Negative for congestion, ear discharge, ear pain and nosebleeds.    Eyes: Negative for blurred vision, double vision and visual disturbance.   Cardiovascular: Negative for chest pain, claudication, cyanosis, dyspnea on exertion, irregular heartbeat, leg swelling, near-syncope, orthopnea, palpitations, paroxysmal nocturnal dyspnea and syncope.   Respiratory: Negative for cough, hemoptysis, shortness of breath, sleep disturbances due to breathing, snoring, sputum production and wheezing.    Endocrine: Negative for polydipsia, polyphagia and polyuria.   Hematologic/Lymphatic: Negative for adenopathy and bleeding problem. Does not bruise/bleed easily.   Skin: Negative for color change, nail changes, poor wound healing and rash.   Musculoskeletal: Negative for muscle cramps and muscle weakness.   Gastrointestinal: Negative for abdominal pain, anorexia, change in bowel habit, hematochezia, nausea and vomiting.   Genitourinary: Negative for dysuria, frequency, hematuria, menorrhagia and missed  "menses.   Neurological: Negative for brief paralysis, difficulty with concentration, excessive daytime sleepiness, dizziness, focal weakness, headaches, light-headedness, loss of balance, seizures and vertigo.   Psychiatric/Behavioral: Negative for altered mental status and depression.   Allergic/Immunologic: Negative for persistent infections.       Physical Exam:  BP (!) 159/97 (BP Location: Left arm, Patient Position: Sitting, BP Method: Large (Automatic))   Pulse 92   Ht 5' 4" (1.626 m)   Wt 66.3 kg (146 lb 2.6 oz)   BMI 25.09 kg/m²   Physical Exam   Constitutional: She is oriented to person, place, and time. She appears well-developed and well-nourished.   HENT:   Head: Normocephalic.   Right Ear: External ear normal.   Left Ear: External ear normal.   Nose: Nose normal.   Inspection of lips, teeth and gums normal   Eyes: Conjunctivae and EOM are normal. Pupils are equal, round, and reactive to light. No scleral icterus.   Neck: Normal range of motion. No JVD present. No tracheal deviation present. No thyromegaly present.   Cardiovascular: Normal rate, S1 normal, S2 normal and intact distal pulses.  An irregularly irregular rhythm present. Exam reveals no gallop and no friction rub.    No murmur heard.  Pulses:       Carotid pulses are 2+ on the right side, and 2+ on the left side.       Radial pulses are 2+ on the right side, and 2+ on the left side.        Dorsalis pedis pulses are 2+ on the right side, and 2+ on the left side.   Hear rate measured by auscultation is 84/min   Pulmonary/Chest: Effort normal and breath sounds normal. No respiratory distress. She has no wheezes. She has no rales. She exhibits no tenderness.   Abdominal: Soft. Bowel sounds are normal. She exhibits no distension. There is no hepatosplenomegaly. There is no tenderness. There is no guarding.   Musculoskeletal: Normal range of motion. She exhibits no edema or tenderness.   Lymphadenopathy:   Palpation of lymph nodes of neck and " groin normal   Neurological: She is oriented to person, place, and time. No cranial nerve deficit. She exhibits normal muscle tone. Coordination normal.   Skin: Skin is warm and dry. No rash noted. No erythema. No pallor.   She has trace to 1+ ankle and pretibial edema    Psychiatric: She has a normal mood and affect. Her behavior is normal. Judgment and thought content normal.        Blood Tests:  Lab Results   Component Value Date     (H) 02/20/2011     04/11/2018     04/11/2018    K 3.7 04/11/2018    K 3.7 04/11/2018     04/11/2018     04/11/2018    CO2 25 04/11/2018    CO2 25 04/11/2018    BUN 20 04/11/2018    BUN 20 04/11/2018    CREATININE 1.1 04/11/2018    CREATININE 1.1 04/11/2018    GLU 97 04/11/2018    GLU 97 04/11/2018    HGBA1C 6.2 12/01/2016    MG 1.8 04/11/2018    AST 19 04/11/2018    ALT 15 04/11/2018    ALBUMIN 3.1 (L) 04/11/2018    PROT 5.9 (L) 04/11/2018    BILITOT 0.6 04/11/2018    WBC 4.37 04/10/2018    HGB 12.0 04/10/2018    HCT 39.2 04/10/2018    MCV 81 (L) 04/10/2018     04/10/2018    INR 1.1 04/11/2018    INR 5.1 12/23/2014    INR 1.7 (H) 07/31/2012    TSH 0.931 04/11/2018       Lab Results   Component Value Date    CHOL 159 07/19/2016    HDL 52 07/19/2016    TRIG 82 07/19/2016       Lab Results   Component Value Date    LDLCALC 90.6 07/19/2016       Urine Tests:  Lab Results   Component Value Date    COLORU Yellow 04/10/2018    APPEARANCEUA Hazy (A) 04/10/2018    PHUR 7.0 04/10/2018    SPECGRAV 1.010 04/10/2018    PROTEINUA Negative 04/10/2018    GLUCUA Negative 04/10/2018    KETONESU Negative 04/10/2018    BILIRUBINUA Negative 04/10/2018    OCCULTUA Negative 04/10/2018    NITRITE Negative 04/10/2018    UROBILINOGEN Negative 04/10/2018    LEUKOCYTESUR 3+ (A) 04/10/2018    CREATRANDUR 110.0 08/08/2016         ECG (10-APR-2018)  Atrial flutter with variable A-V block  Right axis deviation  Abnormal ECG  When compared with ECG of 13-NOV-2014 14:02,  Atrial  flutter has replaced Sinus rhythm  Vent. rate has increased BY 52 BPM  The axis Shifted right        Echocardiogram (04/11/2018)    TEST DESCRIPTION   Technical Quality: This is a technically adequate study.     General: The patient was in an irregularly irregular rhythm throughout the study.     Aorta: The aortic root is normal in size. Aortic root measures 2.8 cm at Sinuses of Valsalva.     Left Atrium: The left atrial volume index is severely enlarged, measuring 49.13 cc/m2.     Left Ventricle: The left ventricle is mildly enlarged, with an end-diastolic diameter of 5.0 cm, and an end-systolic diameter of 4.1 cm. LV wall thickness is normal, with the septum and the posterior wall each measuring 0.9 cm across. Relative wall thickness was normal at 0.36, and the LV mass index was 101.3 g/m2 consistent with normal left ventricular mass. There are no regional wall motion abnormalities. Left ventricular systolic function appears moderately depressed. Visually estimated ejection fraction is 30-35%. The LV Doppler derived stroke volume equals 44.0 ccs.     Diastolic indices: E wave velocity 1.0 m/s, E/A ratio 4.4,  msec., E/e' ratio(avg) 17. Diastolic function is indeterminate.     Right Atrium: The right atrium is enlarged, measuring 5.6 cm in length and 3.6 cm in width in the apical view.     Right Ventricle: The right ventricle is normal in size measuring 3.2 cm at the base in the apical right ventricle-focused view. Global right ventricular systolic function appears normal. Tricuspid annular plane systolic excursion (TAPSE) is 2.0 cm. The estimated PA systolic pressure is 34 mmHg.     Aortic Valve:  The aortic valve is normal in structure.     Mitral Valve:  The mitral valve is normal in structure. There is mild mitral regurgitation.     Tricuspid Valve:  The tricuspid valve is normal in structure. There is mild tricuspid regurgitation.     Pulmonary Valve:  The pulmonic valve is normal in structure.      Pericardium: There is evidence of a trivial anterior pericardial effusion.     IVC: IVC is enlarged and collapses < 50% with a sniff, suggesting high right atrial pressure of 15 mmHg.     Intracavitary: There is no evidence of intracavity mass, thrombi, or vegetation.     Other: There is a left pleural effusion present.     CONCLUSIONS     1 - Moderately depressed left ventricular systolic function (EF 30-35%).     2 - Biatrial enlargement.     3 - Normal right ventricular systolic function .     4 - Mild mitral regurgitation.     5 - Mild tricuspid regurgitation.     6 - The estimated PA systolic pressure is 34 mmHg.     7 - Increased central venous pressure.     8 - Left pleural effusion.     This document has been electronically    SIGNED BY: Babs Kimble MD         I have reviewed the following:     Details / Date    []   Labs     []   Imaging     []   Cardiology Procedures     []   Other      Assessment and Plan:     1. Essential hypertension    2. Chronic systolic heart failure    3. Paroxysmal atrial fibrillation         Essential hypertension  Poorly controlled; today in clinic BP was 159/97.  I have explained to her and her nephew the importance of home BP monitoring    Home BP moniotring and bring me the log at the time of next appointment  D/C metoprolol  Start carvedilol 25 mg bid     Continue the following drugs:  Benazepril 40 mg qd  Chlorthalidone 25 mg qd      Paroxysmal atrial fibrillation  Heart rate today was irregularly irregular ay 84/min    Start carvedilol 25 mg bid  If heart rate remains elevated consider adding diltiazem (also for better BP control)  Continue rivaroxaban 15 mg qd      Chronic systolic heart failure  On today's exam she had no jvd but trace to 1+ leg edema    BNP        Follow-up in about 2 weeks (around 5/7/2018).        Boris Calero MD

## 2018-04-23 NOTE — ASSESSMENT & PLAN NOTE
Heart rate today was irregularly irregular ay 84/min    Start carvedilol 25 mg bid  If heart rate remains elevated consider adding diltiazem (also for better BP control)  Continue rivaroxaban 15 mg qd

## 2018-04-23 NOTE — LETTER
April 23, 2018      Torey Sadler MD  [address]  [place] [address]           [facility]  [address]  [place] [address]  Phone: [phone]          Patient: Temitope Suero   MR Number: [id number]   YOB: 1940   Date of Visit: 4/23/2018       Dear Dr. Torey Sadler:    Thank you for referring Temitope Suero to me for evaluation. Attached you will find relevant portions of my assessment and plan of care.    If you have questions, please do not hesitate to call me. I look forward to following Temitope Suero along with you.    Sincerely,    Boris Calero MD    Enclosure  CC:  No Recipients    If you would like to receive this communication electronically, please contact [email or web address] or [phone] to request more information on [employer] access.    For providers and/or their staff who would like to refer a patient to [facility], please contact us through our one-stop-shop provider referral line, [facility], at [phone].    If you feel you have received this communication in error or would no longer like to receive these types of communications, please e-mail [email or web address]

## 2018-04-25 ENCOUNTER — TELEPHONE (OUTPATIENT)
Dept: HEMATOLOGY/ONCOLOGY | Facility: CLINIC | Age: 78
End: 2018-04-25

## 2018-04-25 NOTE — TELEPHONE ENCOUNTER
Message fwd to .         ----- Message from Rachel Casas RN sent at 4/25/2018  3:35 PM CDT -----  Good afternoon,    The above pt is another lost to f/u that needs to be scheduled.    Thanks,  Rachel JOE RN

## 2018-04-25 NOTE — TELEPHONE ENCOUNTER
----- Message from Landy Galicia RN sent at 4/25/2018  3:28 PM CDT -----  Josette    Pt last seen by Beatriz MOON June 2016.  Pt suppose to follow up in 6 months.  Can she still follow up with Beatriz/ Dr Lazaro?    - Landy  ----- Message -----  From: Lolita Sweeney RN  Sent: 4/25/2018   3:15 PM  To: [facility] Navigation    Per Dr Esquivel, this patient is a breast Ca patient that has seen Dr Lazaro in the past. Please cancel appointment with Dr Esquivel.

## 2018-04-26 NOTE — PHYSICIAN QUERY
PT Name: Temitope Suero  MR #: [id number]  Physician Query Form - CKD Clarification     CDS/: Sara Lopez RN, CDI              Contact information: [phone]  This form is a permanent document in the medical record.     Query Date: April 26, 2018    By submitting this query, we are merely seeking further clarification of documentation. Please utilize your independent clinical judgment when addressing the question(s) below.    The Medical record contains the following:     Indicators   Supporting Clinical Findings   Location in Medical Record   x CKD or Chronic Kidney (Renal) Failure / Disease Type 2 diabetes mellitus with diabetic chronic kidney disease    H&P 4/10   x BUN/Creatinine                          GFR BUN = 18-> 20  Cr = 1.3-> 1.1  GFR= 45.7-> 56   Lab 4/10, 4/11    Dehydration      Nausea / Vomiting      Dialysis / CRRT      Medication      Treatment     x Other Chronic Conditions Type 2 diabetes mellitus with diabetic chronic kidney disease , HTN (hypertension)    H&P 4/10x    Other       Provider, please further specify the stage of CKD.      [  ] Chronic Kidney Disease (CKD) (please specify stage* below)       [employer] Definitions  Stage Description  eGFR (mL/min)   [  ]     I Slight kidney damage with normal or increased filtration 90+   [2  ]     II Mildly reduced kidney function 60-89   [  ]     III Moderately reduced kidney function 30-59         [  ] Other (please specify): ________________________    [  ] Clinically Undetermined    Please document in your progress notes daily for the duration of treatment until resolved and include in your discharge summary.

## 2018-04-26 NOTE — PHYSICIAN QUERY
PT Name: Temitope Suero  MR #: [id number]     Physician Query Form - Documentation Clarification      CDS/: Sara Lopez RN, CDI             Contact information: [phone]    This form is a permanent document in the medical record.     Query Date: April 26, 2018    By submitting this query, we are merely seeking further clarification of documentation. Please utilize your independent clinical judgment when addressing the question(s) below.    The Medical record reflects the following:    Supporting Clinical Findings Location in Medical Record        CONCLUSIONS :     1 - Moderately depressed left ventricular systolic function (EF 30-35%).   2 - Biatrial enlargement.   3 - Normal right ventricular systolic function .   4 - Mild mitral regurgitation.   5 - Mild tricuspid regurgitation.   6 - The estimated PA systolic pressure is 34 mmHg.   7 - Increased central venous pressure.   8 - Left pleural effusion.            2D Echo 4/11     No previous history of MI or CHF.       Admitted 04/10 PM from clinic for hypertensive emergency with SBP >200 in the setting of subacute decrease in mental status, memory. UA revealed leukocytes without bacteria, but will treat empirically given pt's symptoms (altered mental status).    Explained that she has chronic AF and that her sudden onset speech slurring approx 1 month ago (per niece's account) likely represents an acute infarct.  Pt discharged with apixaban with referrals to Cardiology,   Neurology, and priority care clinic               H&P 4/10      Discharge Summary 4/11                                                                            Doctor, Please specify diagnosis or diagnoses associated with above clinical findings.    Please specify the known or suspected diagnosis associated the the findings of the   30-35% EF on the 2D Echo on 4/11/18.      Provider Use Only      Heart failure with reduced EF / chronic systolic heart failure    Persistent atrial  fibrillation                                                                                                                          [  ] Clinically undetermined

## 2018-05-15 DIAGNOSIS — I10 ESSENTIAL HYPERTENSION: ICD-10-CM

## 2018-05-15 RX ORDER — CHLORTHALIDONE 25 MG/1
25 TABLET ORAL DAILY
Qty: 90 TABLET | Refills: 3 | Status: ON HOLD | OUTPATIENT
Start: 2018-05-15 | End: 2018-08-15 | Stop reason: HOSPADM

## 2018-08-12 ENCOUNTER — HOSPITAL ENCOUNTER (INPATIENT)
Facility: HOSPITAL | Age: 78
LOS: 3 days | Discharge: HOME HEALTH | DRG: 064 | End: 2018-08-15
Attending: EMERGENCY MEDICINE | Admitting: HOSPITALIST
Payer: COMMERCIAL

## 2018-08-12 DIAGNOSIS — I48.92 ATRIAL FLUTTER: ICD-10-CM

## 2018-08-12 DIAGNOSIS — I48.92 ATRIAL FLUTTER WITH RAPID VENTRICULAR RESPONSE: Primary | ICD-10-CM

## 2018-08-12 DIAGNOSIS — G93.40 ENCEPHALOPATHY: ICD-10-CM

## 2018-08-12 DIAGNOSIS — I48.91 A-FIB: ICD-10-CM

## 2018-08-12 DIAGNOSIS — E78.2 MIXED HYPERLIPIDEMIA: Chronic | ICD-10-CM

## 2018-08-12 DIAGNOSIS — I10 HYPERTENSION, UNSPECIFIED TYPE: ICD-10-CM

## 2018-08-12 DIAGNOSIS — R53.83 FATIGUE: ICD-10-CM

## 2018-08-12 DIAGNOSIS — I10 ESSENTIAL HYPERTENSION: ICD-10-CM

## 2018-08-12 DIAGNOSIS — I63.442 EMBOLIC STROKE INVOLVING LEFT CEREBELLAR ARTERY: ICD-10-CM

## 2018-08-12 DIAGNOSIS — I16.1 HYPERTENSIVE EMERGENCY: ICD-10-CM

## 2018-08-12 DIAGNOSIS — R00.0 TACHYCARDIA: ICD-10-CM

## 2018-08-12 DIAGNOSIS — Z86.73 HISTORY OF STROKE: ICD-10-CM

## 2018-08-12 DIAGNOSIS — I16.0 MALIGNANT HYPERTENSIVE URGENCY: ICD-10-CM

## 2018-08-12 PROBLEM — R41.0 DISORIENTATION: Status: ACTIVE | Noted: 2018-08-12

## 2018-08-12 PROBLEM — N18.30 ACUTE RENAL FAILURE SUPERIMPOSED ON STAGE 3 CHRONIC KIDNEY DISEASE: Status: ACTIVE | Noted: 2018-04-17

## 2018-08-12 PROBLEM — N17.9 ACUTE RENAL FAILURE SUPERIMPOSED ON STAGE 3 CHRONIC KIDNEY DISEASE: Status: ACTIVE | Noted: 2018-04-17

## 2018-08-12 PROBLEM — R93.0 ABNORMAL CT SCAN OF HEAD: Status: ACTIVE | Noted: 2018-08-12

## 2018-08-12 LAB
ALBUMIN SERPL BCP-MCNC: 3.6 G/DL
ALP SERPL-CCNC: 158 U/L
ALT SERPL W/O P-5'-P-CCNC: 29 U/L
ANION GAP SERPL CALC-SCNC: 14 MMOL/L
AST SERPL-CCNC: 31 U/L
BASOPHILS # BLD AUTO: 0.06 K/UL
BASOPHILS NFR BLD: 1.2 %
BILIRUB DIRECT SERPL-MCNC: 0.4 MG/DL
BILIRUB SERPL-MCNC: 1.1 MG/DL
BILIRUB UR QL STRIP: NEGATIVE
BNP SERPL-MCNC: 1030 PG/ML
BUN SERPL-MCNC: 24 MG/DL
CALCIUM SERPL-MCNC: 9.6 MG/DL
CHLORIDE SERPL-SCNC: 108 MMOL/L
CLARITY UR REFRACT.AUTO: CLEAR
CO2 SERPL-SCNC: 23 MMOL/L
COLOR UR AUTO: ABNORMAL
CREAT SERPL-MCNC: 1.5 MG/DL
DIFFERENTIAL METHOD: ABNORMAL
EOSINOPHIL # BLD AUTO: 0.1 K/UL
EOSINOPHIL NFR BLD: 1.2 %
ERYTHROCYTE [DISTWIDTH] IN BLOOD BY AUTOMATED COUNT: 17.6 %
EST. GFR  (AFRICAN AMERICAN): 38.5 ML/MIN/1.73 M^2
EST. GFR  (NON AFRICAN AMERICAN): 33.4 ML/MIN/1.73 M^2
ESTIMATED AVG GLUCOSE: 126 MG/DL
GLUCOSE SERPL-MCNC: 113 MG/DL
GLUCOSE UR QL STRIP: NEGATIVE
HBA1C MFR BLD HPLC: 6 %
HCT VFR BLD AUTO: 44.1 %
HGB BLD-MCNC: 13.6 G/DL
HGB UR QL STRIP: ABNORMAL
HYALINE CASTS UR QL AUTO: 6 /LPF
IMM GRANULOCYTES # BLD AUTO: 0.01 K/UL
IMM GRANULOCYTES NFR BLD AUTO: 0.2 %
INR PPP: 1.2
KETONES UR QL STRIP: NEGATIVE
LACTATE SERPL-SCNC: 1.4 MMOL/L
LEUKOCYTE ESTERASE UR QL STRIP: ABNORMAL
LIPASE SERPL-CCNC: 25 U/L
LYMPHOCYTES # BLD AUTO: 1.1 K/UL
LYMPHOCYTES NFR BLD: 22.6 %
MAGNESIUM SERPL-MCNC: 1.8 MG/DL
MCH RBC QN AUTO: 25 PG
MCHC RBC AUTO-ENTMCNC: 30.8 G/DL
MCV RBC AUTO: 81 FL
MICROSCOPIC COMMENT: ABNORMAL
MONOCYTES # BLD AUTO: 0.5 K/UL
MONOCYTES NFR BLD: 9.1 %
NEUTROPHILS # BLD AUTO: 3.3 K/UL
NEUTROPHILS NFR BLD: 65.7 %
NITRITE UR QL STRIP: NEGATIVE
NRBC BLD-RTO: 0 /100 WBC
PH UR STRIP: 6 [PH] (ref 5–8)
PHOSPHATE SERPL-MCNC: 3.4 MG/DL
PLATELET # BLD AUTO: 188 K/UL
PMV BLD AUTO: 12 FL
POCT GLUCOSE: 124 MG/DL (ref 70–110)
POTASSIUM SERPL-SCNC: 3.7 MMOL/L
PROCALCITONIN SERPL IA-MCNC: 0.07 NG/ML
PROT SERPL-MCNC: 7.2 G/DL
PROT UR QL STRIP: NEGATIVE
PROTHROMBIN TIME: 12.2 SEC
RBC # BLD AUTO: 5.43 M/UL
RBC #/AREA URNS AUTO: 2 /HPF (ref 0–4)
SODIUM SERPL-SCNC: 145 MMOL/L
SP GR UR STRIP: 1 (ref 1–1.03)
SQUAMOUS #/AREA URNS AUTO: 1 /HPF
TROPONIN I SERPL DL<=0.01 NG/ML-MCNC: 0.32 NG/ML
TROPONIN I SERPL DL<=0.01 NG/ML-MCNC: 0.35 NG/ML
TSH SERPL DL<=0.005 MIU/L-ACNC: 1.37 UIU/ML
URN SPEC COLLECT METH UR: ABNORMAL
UROBILINOGEN UR STRIP-ACNC: NEGATIVE EU/DL
WBC # BLD AUTO: 4.95 K/UL
WBC #/AREA URNS AUTO: 3 /HPF (ref 0–5)

## 2018-08-12 PROCEDURE — 20600001 HC STEP DOWN PRIVATE ROOM

## 2018-08-12 PROCEDURE — 36415 COLL VENOUS BLD VENIPUNCTURE: CPT

## 2018-08-12 PROCEDURE — 96376 TX/PRO/DX INJ SAME DRUG ADON: CPT

## 2018-08-12 PROCEDURE — 84484 ASSAY OF TROPONIN QUANT: CPT | Mod: 91

## 2018-08-12 PROCEDURE — 82962 GLUCOSE BLOOD TEST: CPT

## 2018-08-12 PROCEDURE — 99285 EMERGENCY DEPT VISIT HI MDM: CPT | Mod: ,,, | Performed by: EMERGENCY MEDICINE

## 2018-08-12 PROCEDURE — 25000003 PHARM REV CODE 250: Performed by: EMERGENCY MEDICINE

## 2018-08-12 PROCEDURE — 99285 EMERGENCY DEPT VISIT HI MDM: CPT | Mod: 25

## 2018-08-12 PROCEDURE — 93005 ELECTROCARDIOGRAM TRACING: CPT

## 2018-08-12 PROCEDURE — 84484 ASSAY OF TROPONIN QUANT: CPT

## 2018-08-12 PROCEDURE — 96375 TX/PRO/DX INJ NEW DRUG ADDON: CPT

## 2018-08-12 PROCEDURE — 80053 COMPREHEN METABOLIC PANEL: CPT

## 2018-08-12 PROCEDURE — 83605 ASSAY OF LACTIC ACID: CPT

## 2018-08-12 PROCEDURE — 84145 PROCALCITONIN (PCT): CPT

## 2018-08-12 PROCEDURE — 63600175 PHARM REV CODE 636 W HCPCS: Performed by: EMERGENCY MEDICINE

## 2018-08-12 PROCEDURE — 82248 BILIRUBIN DIRECT: CPT

## 2018-08-12 PROCEDURE — 93010 ELECTROCARDIOGRAM REPORT: CPT | Mod: ,,, | Performed by: INTERNAL MEDICINE

## 2018-08-12 PROCEDURE — 25000003 PHARM REV CODE 250: Performed by: NURSE PRACTITIONER

## 2018-08-12 PROCEDURE — 99223 1ST HOSP IP/OBS HIGH 75: CPT | Mod: ,,, | Performed by: NURSE PRACTITIONER

## 2018-08-12 PROCEDURE — 85610 PROTHROMBIN TIME: CPT

## 2018-08-12 PROCEDURE — 81001 URINALYSIS AUTO W/SCOPE: CPT

## 2018-08-12 PROCEDURE — 85025 COMPLETE CBC W/AUTO DIFF WBC: CPT

## 2018-08-12 PROCEDURE — 94761 N-INVAS EAR/PLS OXIMETRY MLT: CPT

## 2018-08-12 PROCEDURE — 84100 ASSAY OF PHOSPHORUS: CPT

## 2018-08-12 PROCEDURE — 96374 THER/PROPH/DIAG INJ IV PUSH: CPT

## 2018-08-12 PROCEDURE — 87040 BLOOD CULTURE FOR BACTERIA: CPT | Mod: 59

## 2018-08-12 PROCEDURE — 83735 ASSAY OF MAGNESIUM: CPT

## 2018-08-12 PROCEDURE — 83880 ASSAY OF NATRIURETIC PEPTIDE: CPT

## 2018-08-12 PROCEDURE — 83036 HEMOGLOBIN GLYCOSYLATED A1C: CPT

## 2018-08-12 PROCEDURE — 83690 ASSAY OF LIPASE: CPT

## 2018-08-12 PROCEDURE — 84443 ASSAY THYROID STIM HORMONE: CPT

## 2018-08-12 PROCEDURE — 63600175 PHARM REV CODE 636 W HCPCS: Performed by: NURSE PRACTITIONER

## 2018-08-12 RX ORDER — LETROZOLE 2.5 MG/1
2.5 TABLET, FILM COATED ORAL DAILY
Status: DISCONTINUED | OUTPATIENT
Start: 2018-08-12 | End: 2018-08-15 | Stop reason: HOSPADM

## 2018-08-12 RX ORDER — CARVEDILOL 25 MG/1
25 TABLET ORAL 2 TIMES DAILY WITH MEALS
Status: DISCONTINUED | OUTPATIENT
Start: 2018-08-12 | End: 2018-08-15 | Stop reason: HOSPADM

## 2018-08-12 RX ORDER — METOPROLOL TARTRATE 1 MG/ML
5 INJECTION, SOLUTION INTRAVENOUS
Status: COMPLETED | OUTPATIENT
Start: 2018-08-12 | End: 2018-08-12

## 2018-08-12 RX ORDER — METOPROLOL TARTRATE 1 MG/ML
10 INJECTION, SOLUTION INTRAVENOUS ONCE
Status: COMPLETED | OUTPATIENT
Start: 2018-08-12 | End: 2018-08-12

## 2018-08-12 RX ORDER — GLUCAGON 1 MG
1 KIT INJECTION
Status: DISCONTINUED | OUTPATIENT
Start: 2018-08-12 | End: 2018-08-15 | Stop reason: HOSPADM

## 2018-08-12 RX ORDER — ASPIRIN 81 MG/1
81 TABLET ORAL DAILY
Status: DISCONTINUED | OUTPATIENT
Start: 2018-08-12 | End: 2018-08-12

## 2018-08-12 RX ORDER — METOPROLOL SUCCINATE 100 MG/1
100 TABLET, EXTENDED RELEASE ORAL DAILY
Status: ON HOLD | COMMUNITY
End: 2018-08-13 | Stop reason: ALTCHOICE

## 2018-08-12 RX ORDER — CHOLECALCIFEROL (VITAMIN D3) 25 MCG
2000 TABLET ORAL DAILY
Status: DISCONTINUED | OUTPATIENT
Start: 2018-08-13 | End: 2018-08-15 | Stop reason: HOSPADM

## 2018-08-12 RX ORDER — BENAZEPRIL HYDROCHLORIDE 20 MG/1
40 TABLET ORAL DAILY
Status: DISCONTINUED | OUTPATIENT
Start: 2018-08-13 | End: 2018-08-12

## 2018-08-12 RX ORDER — NICARDIPINE HYDROCHLORIDE 0.2 MG/ML
1 INJECTION INTRAVENOUS CONTINUOUS
Status: DISCONTINUED | OUTPATIENT
Start: 2018-08-12 | End: 2018-08-12

## 2018-08-12 RX ORDER — IBUPROFEN 200 MG
24 TABLET ORAL
Status: DISCONTINUED | OUTPATIENT
Start: 2018-08-12 | End: 2018-08-15 | Stop reason: HOSPADM

## 2018-08-12 RX ORDER — CARVEDILOL 12.5 MG/1
25 TABLET ORAL 2 TIMES DAILY WITH MEALS
Status: DISCONTINUED | OUTPATIENT
Start: 2018-08-12 | End: 2018-08-12

## 2018-08-12 RX ORDER — HYDRALAZINE HYDROCHLORIDE 20 MG/ML
10 INJECTION INTRAMUSCULAR; INTRAVENOUS EVERY 6 HOURS PRN
Status: DISCONTINUED | OUTPATIENT
Start: 2018-08-12 | End: 2018-08-15 | Stop reason: HOSPADM

## 2018-08-12 RX ORDER — CHLORTHALIDONE 25 MG/1
25 TABLET ORAL DAILY
Status: DISCONTINUED | OUTPATIENT
Start: 2018-08-13 | End: 2018-08-12

## 2018-08-12 RX ORDER — ATORVASTATIN CALCIUM 20 MG/1
40 TABLET, FILM COATED ORAL DAILY
Status: DISCONTINUED | OUTPATIENT
Start: 2018-08-13 | End: 2018-08-13

## 2018-08-12 RX ORDER — FUROSEMIDE 10 MG/ML
80 INJECTION INTRAMUSCULAR; INTRAVENOUS
Status: COMPLETED | OUTPATIENT
Start: 2018-08-12 | End: 2018-08-12

## 2018-08-12 RX ORDER — HYDRALAZINE HYDROCHLORIDE 25 MG/1
25 TABLET, FILM COATED ORAL EVERY 8 HOURS PRN
Status: DISCONTINUED | OUTPATIENT
Start: 2018-08-12 | End: 2018-08-13

## 2018-08-12 RX ORDER — SODIUM CHLORIDE 0.9 % (FLUSH) 0.9 %
5 SYRINGE (ML) INJECTION
Status: DISCONTINUED | OUTPATIENT
Start: 2018-08-12 | End: 2018-08-15 | Stop reason: HOSPADM

## 2018-08-12 RX ORDER — ACETAMINOPHEN 325 MG/1
650 TABLET ORAL EVERY 4 HOURS PRN
Status: DISCONTINUED | OUTPATIENT
Start: 2018-08-12 | End: 2018-08-15 | Stop reason: HOSPADM

## 2018-08-12 RX ORDER — FUROSEMIDE 10 MG/ML
40 INJECTION INTRAMUSCULAR; INTRAVENOUS 2 TIMES DAILY
Status: DISCONTINUED | OUTPATIENT
Start: 2018-08-13 | End: 2018-08-15 | Stop reason: HOSPADM

## 2018-08-12 RX ORDER — IBUPROFEN 200 MG
16 TABLET ORAL
Status: DISCONTINUED | OUTPATIENT
Start: 2018-08-12 | End: 2018-08-15 | Stop reason: HOSPADM

## 2018-08-12 RX ADMIN — METOPROLOL TARTRATE 5 MG: 1 INJECTION, SOLUTION INTRAVENOUS at 12:08

## 2018-08-12 RX ADMIN — ASPIRIN 81 MG: 81 TABLET, COATED ORAL at 05:08

## 2018-08-12 RX ADMIN — RIVAROXABAN 15 MG: 15 TABLET, FILM COATED ORAL at 06:08

## 2018-08-12 RX ADMIN — HYDRALAZINE HYDROCHLORIDE 25 MG: 25 TABLET ORAL at 06:08

## 2018-08-12 RX ADMIN — METOPROLOL TARTRATE 5 MG: 5 INJECTION, SOLUTION INTRAVENOUS at 12:08

## 2018-08-12 RX ADMIN — HYDRALAZINE HYDROCHLORIDE 25 MG: 25 TABLET ORAL at 09:08

## 2018-08-12 RX ADMIN — FUROSEMIDE 80 MG: 10 INJECTION, SOLUTION INTRAMUSCULAR; INTRAVENOUS at 01:08

## 2018-08-12 RX ADMIN — CARVEDILOL 25 MG: 25 TABLET, FILM COATED ORAL at 06:08

## 2018-08-12 RX ADMIN — HYDRALAZINE HYDROCHLORIDE 10 MG: 20 INJECTION INTRAMUSCULAR; INTRAVENOUS at 05:08

## 2018-08-12 RX ADMIN — METOPROLOL TARTRATE 10 MG: 5 INJECTION, SOLUTION INTRAVENOUS at 05:08

## 2018-08-12 NOTE — ED TRIAGE NOTES
Patient arrives to ED with CC of forgetfulness, onset 2 weeks ago. Patient denies chest pain, SOB, fever, nausea and vomiting. No other complaints at this time.     Patient identifiers verified and correct for Temitope W Pio.    LOC: The patient is awake, alert and oriented x 4. Pt is speaking appropriately, no slurred speech.  APPEARANCE: Patient resting comfortably and in no acute distress. Pt is clean and well groomed. No JVD visible. Pt reports pain level of 0.  SKIN: Skin is warm, dry, and color is consistent with ethnicity. No tenting observed and capillary refill <3 seconds. No clubbing noted to nail beds. No breakdown or brusing visible and mucus membranes moist and acyanotic.  MUSCULOSKELETAL: Full range of motion present in all extremities. Hand  equal and leg strength strong +5 bilaterally.  RESPIRATORY: Airway is open and patent. Respirations-unlabored, regular rate, equal bilaterally on inspiration and expiration. No accessory muscle use noted.  CARDIAC: BLE edema noted. Patient has no c/o chest pain.  ABDOMEN: Soft and non-tender to palpation with no distention noted. Pt has no complaints of abnormal bowel movements. Pt reports normal appetite.   NEUROLOGIC: SEE neuro flowsheet.  : No complaints of frequency, burning, urgency or blood in the urine.

## 2018-08-12 NOTE — ASSESSMENT & PLAN NOTE
- new onset flutter from previous Fib  - continue rivaraxiban  - once euvolemic and normotensive if still in flutter ? Ablation, Large LAE

## 2018-08-12 NOTE — H&P
[facility] Hospital Medicine  History & Physical    Patient Name: Temitope Suero  MRN: [id number]  Admission Date: 8/12/2018  Attending Physician: Dr. Fredy Penn MD   Primary Care Provider: Gm Zambrano MD    [facility] Medicine Team: Networked reference to record PCT  Sandrine Melendrez NP     Patient information was obtained from patient, relative(s), past medical records and ER records.     Subjective:     Principal Problem:Hypertensive emergency    Chief Complaint:   Chief Complaint   Patient presents with    Hypertension     Pt presented to the ED via pov. Pt c/o sob and HTN. Pt states she has not taking her medications in 2 weeks.         HPI: Temitope Suero is a 78 y.o. f who presents to ED with daughter with c/o increasing sob, increasing fatigue, chest wall heaves, altered mental status for past several days.  She has PMH o f HFrEF 30-35% 4/18, HTN, with recent bout of malignant hypertension back in April precipitating CVA, h/o embolic CVA, pAfib on rivaraxiban, CKD III, HLD, Goiter multinodular, previously obesity induced DM, now no longer diabetic and Breast CA.  She was found in the ED to have A flutter with 2-3:1 flutter with RVR, BNP 1030, troponin 0.350 in setting of elevated CR 1.5/ BUN 24.  ECG with variable block flutter rapid vent response.  She has new onset AMS, no recollection of date nor president, can perform simple tasks, short term memory very poor.  Asked to get urine sample when in bathroom and she poured hat into toilet.  She has been a no show or has cancelled all her appt.s since May.  She usually lives with her daugher and her nephew, however the daughter has been out of town recently and the nephew has been monitoring her and her medications.  As far the daughter knows she is up to date with her meds. Though two pills were found on the floor the other day.  We called the pharmacy to see if she's picked up her meds.  She was prescribed coreg with last visit in  April with Dr. Calero and this was never picked up from the pharmacy, so we can only presume she might still be on her toprol 100mg daily.  She had a new diagnosis of heart failure during her last admission which was also accompanied by new onset CVA d/t severe HTN.  Her last renal artery stenosis eval was 2014 which was negative at that time.      She has not been able to be managed properly with IV /PO meds in ED so she will be transferred to the ICU with a cardene gtt for aggressive b/p control while her meds have time to kick in.     ms suggestive of myocardial ischemia, heart failure and arrhythmia.      She refers that her BP is poorly controlled and that she does not monitor her BP at home.     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    2 - Biatrial enlargement.    3 - Normal right ventricular systolic function .    4 - Mild mitral regurgitation.    5 - Mild tricuspid regurgitation.    6 - The estimated PA systolic pressure is 34 mmHg.    7 - Increased central venous pressure.    8 - Left pleural effusion.     CKD stage 3 due to type 2 diabetes mellitus 4/17/2018    Embolic stroke involving right posterior cerebral artery 4/11/2018    Essential hypertension 10/28/2013    Glaucoma suspect of both eyes 12/9/2013     Hearing loss, sensorineural 12/16/2013    History of stroke 4/11/2018    R SCA remote infarct on imaging (unknown timing)    Hypertensive emergency 4/11/2018    Mixed hyperlipidemia 10/28/2013    Obesity 1/16/2014    Paroxysmal atrial fibrillation 7/10/2012    Toxic multinodular goiter 10/28/2013    Type 2 diabetes mellitus with diabetic chronic kidney disease 7/10/2012   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      Current Outpatient Medications on File Prior to Encounter   Medication Sig    atorvastatin (LIPITOR) 40 MG tablet Take 1 tablet (40 mg total) by mouth once daily.    benazepril (LOTENSIN) 40 MG tablet Take 1 tablet (40 mg total) by mouth once daily.    chlorthalidone (HYGROTEN) 25 MG Tab Take 1 tablet (25 mg total) by mouth once daily.    letrozole (FEMARA) 2.5 mg Tab 1 Tablet Oral Every day    metoprolol succinate (TOPROL-XL) 100 MG 24 hr tablet Take 100 mg by mouth once daily.    rivaroxaban (XARELTO) 15 mg Tab Take 1 tablet (15 mg total) by mouth once daily.    travoprost (TRAVATAN Z) 0.004 % Drop Place 1 drop into both eyes every evening.    aspirin (ECOTRIN) 81 MG EC tablet Take 81 mg by mouth. 1 Tablet, Delayed Release (E.C.) Oral Every day    carvedilol (COREG) 25 MG tablet Take 1 tablet (25 mg total) by mouth 2 (two) times daily with meals.    cholecalciferol, vitamin D3, (VITAMIN D3) 2,000 unit Cap Take 1 capsule (2,000 Units total) by mouth once daily.    diclofenac sodium 1 % Gel Apply 2 g topically 3 (three) times daily.    [DISCONTINUED] travoprost, benzalkonium, (TRAVATAN) 0.004 % ophthalmic solution Place 1 drop into both eyes every evening.     Family History     Problem Relation  (Age of Onset)    Asthma Son    Cancer Sister    Diabetes Paternal Aunt    Glaucoma Father    Heart disease Father    Hypertension Mother, Father        Tobacco Use    Smoking status: Never Smoker    Smokeless tobacco: Never Used   Substance and Sexual Activity    Alcohol use: No    Drug use: No    Sexual activity: Not Currently     Review of Systems   Reason unable to perform ROS: mostly from daughter.   Constitutional: Positive for activity change and fatigue. Negative for appetite change, chills and fever.   HENT: Negative for congestion, rhinorrhea, sinus pressure, sore throat and trouble swallowing.    Eyes: Negative for pain, redness and visual disturbance.   Respiratory: Negative for cough, chest tightness, shortness of breath, wheezing and stridor.    Cardiovascular: Positive for palpitations. Negative for chest pain and leg swelling.   Gastrointestinal: Negative for abdominal distention, abdominal pain, blood in stool, constipation, diarrhea, nausea and vomiting.   Endocrine: Negative for cold intolerance and heat intolerance.   Genitourinary: Negative for dysuria, frequency, hematuria and urgency.        Nocturia     Musculoskeletal: Negative for arthralgias, back pain, myalgias and neck pain.   Skin: Negative for color change, pallor and rash.   Allergic/Immunologic: Negative for immunocompromised state.   Neurological: Negative for dizziness, tremors, syncope, weakness, light-headedness, numbness and headaches.   Hematological: Does not bruise/bleed easily.   Psychiatric/Behavioral: Positive for confusion and decreased concentration. Negative for agitation. The patient is not nervous/anxious.      Objective:     Vital Signs (Most Recent):  Temp: 97.5 °F (36.4 °C) (08/12/18 1713)  Pulse: 106 (08/12/18 1753)  Resp: 20 (08/12/18 1753)  BP: (!) 168/115 (08/12/18 1753)  SpO2: 96 % (08/12/18 1713) Vital Signs (24h Range):  Temp:  [97.5 °F (36.4 °C)-98 °F (36.7 °C)] 97.5 °F (36.4 °C)  Pulse:  [102-133]  106  Resp:  [17-23] 20  SpO2:  [95 %-98 %] 96 %  BP: (154-192)/(115-132) 168/115     Weight: 61.2 kg (135 lb)  Body mass index is 20.53 kg/m².    Physical Exam   Constitutional: She appears well-developed and well-nourished. No distress.   HENT:   Head: Normocephalic and atraumatic.   Right Ear: External ear normal.   Left Ear: External ear normal.   Nose: Nose normal.   Mouth/Throat: Oropharynx is clear and moist.   Eyes: Conjunctivae and EOM are normal. No scleral icterus.   Neck: Normal range of motion. Neck supple. No JVD present. No tracheal deviation present. No thyromegaly present.   Cardiovascular: Normal rate, regular rhythm, normal heart sounds and intact distal pulses. Exam reveals no gallop and no friction rub.   No murmur heard.  Pulmonary/Chest: Effort normal and breath sounds normal. No respiratory distress. She has no wheezes. She has no rales.   Abdominal: Soft. Bowel sounds are normal. She exhibits no distension and no mass. There is no tenderness. There is no rebound and no guarding.   Musculoskeletal: Normal range of motion. She exhibits no edema or tenderness.   Neurological: She is alert. She is disoriented. No cranial nerve deficit or sensory deficit. She exhibits normal muscle tone. Coordination normal.   Confused, very poor STM   Skin: Skin is warm and dry. No rash noted. No erythema.   Psychiatric: She has a normal mood and affect. Her behavior is normal. Judgment and thought content normal.   Nursing note and vitals reviewed.        CRANIAL NERVES     CN III, IV, VI   Extraocular motions are normal.        Significant Labs:   CBC:   Recent Labs   Lab  08/12/18   1227   WBC  4.95   HGB  13.6   HCT  44.1   PLT  188     CMP:   Recent Labs   Lab  08/12/18   1227   NA  145   K  3.7   CL  108   CO2  23   GLU  113*   BUN  24*   CREATININE  1.5*   CALCIUM  9.6   PROT  7.2   ALBUMIN  3.6   BILITOT  1.1*   ALKPHOS  158*   AST  31   ALT  29   ANIONGAP  14   EGFRNONAA  33.4*     Cardiac Markers:    Recent Labs   Lab  08/12/18   1227   BNP  1,030*     Magnesium:   Recent Labs   Lab  08/12/18   1227   MG  1.8     Troponin:   Recent Labs   Lab  08/12/18   1227  08/12/18   1657   TROPONINI  0.350*  0.324*       Significant Imaging: EKG: I have reviewed all pertinent results/findings within the past 24 hours and my personal findings are: variable flutter 2-3;1    Assessment/Plan:     * Hypertensive emergency    - admit b/p 169/120 progressed to 192/126, has not been controlled with IV or po regimen  - s/p lasix, IV metop, IV hydralazine, po coreg,   - holding benazapril and chlorthalidone for now since CR up from baseline  - discussed case with CCU team to start cardene gtt  - EF 30-35%, no chest pain, + sob  - h/o negative MARVA, may need renal denervation for b/p control, she's lost weight she's had multiple admissions for htn emergency          Atrial flutter    - new onset flutter from previous Fib  - continue rivaraxiban  - once euvolemic and normotensive if still in flutter ? Ablation, Large LAE          Disorientation    - ? Cause, elevated b/p, UTI? Attempted to get urine, voided in bathroom then poured hat into toilet when she had just been told we needed a sample.    - head CT  - aggressive b/p control, b/p uncomfortably high and within stroke range.-190's/  's, not budging with IV / PO MEDS          Chronic systolic heart failure    - bnp >1k, sob, CXR: findings suggesting small bilateral effusions, left greater than right.  There may be compressive atelectasis versus developing consolidation projected over the left lower lung zone  - lasix 80mg iv x1  - hold ace/ chlorthalidone for now till b/p improve and CR back to baseline, may need another dose of lasix  - ? Renal artery denervation may be in store, no h/o renal artery stenosis as cause for her heart failure          Chronic anticoagulation - rivaroxaban    - p afib now flutter           Acute renal failure superimposed on stage 3  chronic kidney disease    - CR 1.5 up from 1.1.  - hold chlorthalidone and benazapril for now          History of stroke    - was not taking baby aspirin for prevention prior to admit, start 81 mg  - continue rivaroxiban   -ams, check head ct          Toxic multinodular goiter    Normal TSH           Mixed hyperlipidemia    - atorvastatin          Paroxysmal atrial fibrillation    - not rate controlled on metoprolol, was supposed to be on coreg, not sure she's even taking her meds            VTE Risk Mitigation (From admission, onward)        Ordered     rivaroxaban tablet 15 mg  with dinner      08/12/18 5221             Sandrine Melendrez NP  Department of Hospital Medicine   [facility]

## 2018-08-12 NOTE — ASSESSMENT & PLAN NOTE
- was not taking baby aspirin for prevention prior to admit, start 81 mg  - continue rivaroxiban   -ams, check head ct

## 2018-08-12 NOTE — ED NOTES
Hourly rounding complete. Patient resting comfortably in stretcher. Patient awake and alert, respirations even and unlabored. No acute distress noted. Updated on plan of care. Denies pain. Bed in lowest position, locked, side rails up x 2, and call bell in reach.

## 2018-08-12 NOTE — ASSESSMENT & PLAN NOTE
- ? Cause, elevated b/p, UTI? Attempted to get urine, voided in bathroom then poured hat into toilet when she had just been told we needed a sample.    - head CT  - aggressive b/p control, b/p uncomfortably high and within stroke range.-190's/  's, not budging with IV / PO MEDS

## 2018-08-12 NOTE — HPI
Temitope Suero is a 78 y.o. f who presents to ED with niece with c/o increasing sob, increasing fatigue, chest wall heaves, altered mental status for past several days and malignant hypertension.  She has PMH of HFrEF 30-35% 4/18, HTN, with recent bout of malignant hypertension back in April precipitating CVA, h/o embolic CVA, pAfib on rivaroxiban, CKD III, HLD, Goiter multinodular, previously obesity induced DM, now no longer diabetic and Breast CA (which the niece says she's no longer taking for over a year but pharmacy has it being filled recently).  She was found in the ED to have A flutter with 2-3:1 variable block with RVR, BNP 1030, troponin 0.350 in setting of elevated CR 1.5/ BUN 24.  ECG with variable block flutter rapid vent response.  She has new onset AMS, no recollection of date nor president, can perform simple tasks, short term memory very poor.  Asked to get urine sample when in bathroom and she poured hat into toilet.  She has been a no show or has cancelled all her appt.s since May.  She usually lives with her niece and her nephew, however the daughter has been out of town recently and the nephew has been monitoring her and her medications.  As far as the niece knows she is up to date with her meds. Though two pills were found on the floor the other day.  We called the pharmacy to see if she's picked up her meds.  She was prescribed coreg with last visit in April with Dr. Calero and this was never picked up from the pharmacy, so we can only presume she might still be on her toprol 100mg daily.  She had a new diagnosis of heart failure during her last admission which was also accompanied by new onset CVA d/t severe HTN.  Her last renal artery stenosis eval was 2014 which was negative at that time.  Pharm stress test noted 2011 negative for ischemia despite ecg changes.      She has not been able to be managed properly with IV /PO meds in ED so she will be transferred to the ICU with a cardene gtt  for aggressive b/p control while her meds have time to kick in.        Social History Includes:  She lives with her niece and nephew who are her primary care takers.      Cardiologist: Dr. Calero

## 2018-08-12 NOTE — ASSESSMENT & PLAN NOTE
- not rate controlled on metoprolol, was supposed to be on coreg, not sure she's even taking her meds

## 2018-08-12 NOTE — SUBJECTIVE & OBJECTIVE
Past Medical History:   Diagnosis Date    Cancer of left breast, stage 2 11/24/2015    Cerebrovascular small vessel disease 4/11/2018    Bi-thalamic lacunar disease, mod/sev periventricular white matter disease, mixed pattern cerebral microbleeds    Chronic anticoagulation - rivaroxaban 4/17/2018    Chronic systolic heart failure 4/17/2018    Echo 4-2018   1 - Moderately depressed left ventricular systolic function (EF 30-35%).    2 - Biatrial enlargement.    3 - Normal right ventricular systolic function .    4 - Mild mitral regurgitation.    5 - Mild tricuspid regurgitation.    6 - The estimated PA systolic pressure is 34 mmHg.    7 - Increased central venous pressure.    8 - Left pleural effusion.     CKD stage 3 due to type 2 diabetes mellitus 4/17/2018    Embolic stroke involving right posterior cerebral artery 4/11/2018    Essential hypertension 10/28/2013    Glaucoma suspect of both eyes 12/9/2013    Hearing loss, sensorineural 12/16/2013    History of stroke 4/11/2018    R SCA remote infarct on imaging (unknown timing)    Hypertensive emergency 4/11/2018    Mixed hyperlipidemia 10/28/2013    Obesity 1/16/2014    Paroxysmal atrial fibrillation 7/10/2012    Toxic multinodular goiter 10/28/2013    Type 2 diabetes mellitus with diabetic chronic kidney disease 7/10/2012   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      Current Outpatient Medications on File Prior to Encounter   Medication Sig    atorvastatin (LIPITOR) 40 MG tablet Take 1 tablet (40 mg total) by mouth once daily.    benazepril (LOTENSIN) 40 MG tablet Take 1 tablet (40 mg total) by mouth once daily.    chlorthalidone (HYGROTEN) 25 MG Tab Take 1  tablet (25 mg total) by mouth once daily.    letrozole (FEMARA) 2.5 mg Tab 1 Tablet Oral Every day    metoprolol succinate (TOPROL-XL) 100 MG 24 hr tablet Take 100 mg by mouth once daily.    rivaroxaban (XARELTO) 15 mg Tab Take 1 tablet (15 mg total) by mouth once daily.    travoprost (TRAVATAN Z) 0.004 % Drop Place 1 drop into both eyes every evening.    aspirin (ECOTRIN) 81 MG EC tablet Take 81 mg by mouth. 1 Tablet, Delayed Release (E.C.) Oral Every day    carvedilol (COREG) 25 MG tablet Take 1 tablet (25 mg total) by mouth 2 (two) times daily with meals.    cholecalciferol, vitamin D3, (VITAMIN D3) 2,000 unit Cap Take 1 capsule (2,000 Units total) by mouth once daily.    diclofenac sodium 1 % Gel Apply 2 g topically 3 (three) times daily.    [DISCONTINUED] travoprost, benzalkonium, (TRAVATAN) 0.004 % ophthalmic solution Place 1 drop into both eyes every evening.     Family History     Problem Relation (Age of Onset)    Asthma Son    Cancer Sister    Diabetes Paternal Aunt    Glaucoma Father    Heart disease Father    Hypertension Mother, Father        Tobacco Use    Smoking status: Never Smoker    Smokeless tobacco: Never Used   Substance and Sexual Activity    Alcohol use: No    Drug use: No    Sexual activity: Not Currently     Review of Systems   Reason unable to perform ROS: mostly from daughter.   Constitutional: Positive for activity change and fatigue. Negative for appetite change, chills and fever.   HENT: Negative for congestion, rhinorrhea, sinus pressure, sore throat and trouble swallowing.    Eyes: Negative for pain, redness and visual disturbance.   Respiratory: Negative for cough, chest tightness, shortness of breath, wheezing and stridor.    Cardiovascular: Positive for palpitations. Negative for chest pain and leg swelling.   Gastrointestinal: Negative for abdominal distention, abdominal pain, blood in stool, constipation, diarrhea, nausea and vomiting.   Endocrine: Negative for  cold intolerance and heat intolerance.   Genitourinary: Negative for dysuria, frequency, hematuria and urgency.        Nocturia     Musculoskeletal: Negative for arthralgias, back pain, myalgias and neck pain.   Skin: Negative for color change, pallor and rash.   Allergic/Immunologic: Negative for immunocompromised state.   Neurological: Negative for dizziness, tremors, syncope, weakness, light-headedness, numbness and headaches.   Hematological: Does not bruise/bleed easily.   Psychiatric/Behavioral: Positive for confusion and decreased concentration. Negative for agitation. The patient is not nervous/anxious.      Objective:     Vital Signs (Most Recent):  Temp: 97.5 °F (36.4 °C) (08/12/18 1713)  Pulse: 106 (08/12/18 1753)  Resp: 20 (08/12/18 1753)  BP: (!) 168/115 (08/12/18 1753)  SpO2: 96 % (08/12/18 1713) Vital Signs (24h Range):  Temp:  [97.5 °F (36.4 °C)-98 °F (36.7 °C)] 97.5 °F (36.4 °C)  Pulse:  [102-133] 106  Resp:  [17-23] 20  SpO2:  [95 %-98 %] 96 %  BP: (154-192)/(115-132) 168/115     Weight: 61.2 kg (135 lb)  Body mass index is 20.53 kg/m².    Physical Exam   Constitutional: She appears well-developed and well-nourished. No distress.   HENT:   Head: Normocephalic and atraumatic.   Right Ear: External ear normal.   Left Ear: External ear normal.   Nose: Nose normal.   Mouth/Throat: Oropharynx is clear and moist.   Eyes: Conjunctivae and EOM are normal. No scleral icterus.   Neck: Normal range of motion. Neck supple. No JVD present. No tracheal deviation present. No thyromegaly present.   Cardiovascular: Normal rate, regular rhythm, normal heart sounds and intact distal pulses. Exam reveals no gallop and no friction rub.   No murmur heard.  Pulmonary/Chest: Effort normal and breath sounds normal. No respiratory distress. She has no wheezes. She has no rales.   Abdominal: Soft. Bowel sounds are normal. She exhibits no distension and no mass. There is no tenderness. There is no rebound and no guarding.    Musculoskeletal: Normal range of motion. She exhibits no edema or tenderness.   Neurological: She is alert. She is disoriented. No cranial nerve deficit or sensory deficit. She exhibits normal muscle tone. Coordination normal.   Confused, very poor STM   Skin: Skin is warm and dry. No rash noted. No erythema.   Psychiatric: She has a normal mood and affect. Her behavior is normal. Judgment and thought content normal.   Nursing note and vitals reviewed.        CRANIAL NERVES     CN III, IV, VI   Extraocular motions are normal.        Significant Labs:   CBC:   Recent Labs   Lab  08/12/18   1227   WBC  4.95   HGB  13.6   HCT  44.1   PLT  188     CMP:   Recent Labs   Lab  08/12/18   1227   NA  145   K  3.7   CL  108   CO2  23   GLU  113*   BUN  24*   CREATININE  1.5*   CALCIUM  9.6   PROT  7.2   ALBUMIN  3.6   BILITOT  1.1*   ALKPHOS  158*   AST  31   ALT  29   ANIONGAP  14   EGFRNONAA  33.4*     Cardiac Markers:   Recent Labs   Lab  08/12/18   1227   BNP  1,030*     Magnesium:   Recent Labs   Lab  08/12/18   1227   MG  1.8     Troponin:   Recent Labs   Lab  08/12/18   1227  08/12/18   1657   TROPONINI  0.350*  0.324*       Significant Imaging: EKG: I have reviewed all pertinent results/findings within the past 24 hours and my personal findings are: variable flutter 2-3;1

## 2018-08-12 NOTE — ED PROVIDER NOTES
Encounter Date: 8/12/2018    SCRIBE #1 NOTE: I, Ellie Zambrano, am scribing for, and in the presence of,  Dr. Nguyen. I have scribed the entire note.       History     Chief Complaint   Patient presents with    Hypertension     Pt presented to the ED via pov. Pt c/o sob and HTN. Pt states she has not taking her medications in 2 weeks.      The patient is a 78 y.o. female with co-morbidities including: breast cancer, CKD, DM, paroxsymal Afib, who presents to the ED with a complaint of fatigue and SOB for a few days. She denies chest pain, headaches, or vision changes. Patient notes chronic use of anticoagulants. Daughter notes a moderate mental status change.         The history is provided by the patient, a relative and medical records.     Review of patient's allergies indicates:  No Known Allergies  Past Medical History:   Diagnosis Date    Cancer of left breast, stage 2 11/24/2015    Cerebrovascular small vessel disease 4/11/2018    Bi-thalamic lacunar disease, mod/sev periventricular white matter disease, mixed pattern cerebral microbleeds    Chronic anticoagulation - rivaroxaban 4/17/2018    Chronic systolic heart failure 4/17/2018    Echo 4-2018   1 - Moderately depressed left ventricular systolic function (EF 30-35%).    2 - Biatrial enlargement.    3 - Normal right ventricular systolic function .    4 - Mild mitral regurgitation.    5 - Mild tricuspid regurgitation.    6 - The estimated PA systolic pressure is 34 mmHg.    7 - Increased central venous pressure.    8 - Left pleural effusion.     CKD stage 3 due to type 2 diabetes mellitus 4/17/2018    Embolic stroke involving right posterior cerebral artery 4/11/2018    Essential hypertension 10/28/2013    Glaucoma suspect of both eyes 12/9/2013    Hearing loss, sensorineural 12/16/2013    History of stroke 4/11/2018    R SCA remote infarct on imaging (unknown timing)    Hypertensive emergency 4/11/2018    Mixed hyperlipidemia 10/28/2013    Obesity  1/16/2014    Paroxysmal atrial fibrillation 7/10/2012    Toxic multinodular goiter 10/28/2013    Type 2 diabetes mellitus with diabetic chronic kidney disease 7/10/2012   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    Vitamin D deficiency disease 10/28/2013     Past Surgical History:   Procedure Laterality Date    BREAST SURGERY      CHOLECYSTECTOMY       Family History   Problem Relation Age of Onset    Hypertension Mother     Hypertension Father     Glaucoma Father     Heart disease Father     Cancer Sister     Asthma Son     Diabetes Paternal Aunt     Thyroid cancer Neg Hx      Social History     Tobacco Use    Smoking status: Never Smoker    Smokeless tobacco: Never Used   Substance Use Topics    Alcohol use: No    Drug use: No     Review of Systems   Constitutional: Positive for fatigue.   HENT: Negative for nosebleeds.    Eyes: Negative for visual disturbance.   Respiratory: Positive for shortness of breath.    Cardiovascular: Negative for chest pain.   Gastrointestinal: Negative for abdominal pain.   Musculoskeletal: Negative for gait problem.   Skin: Negative for wound.   Neurological: Negative for headaches.   Psychiatric/Behavioral: Negative for confusion.       Physical Exam     Initial Vitals [08/12/18 1128]   BP Pulse Resp Temp SpO2   (!) 169/120 (!) 133 17 98 °F (36.7 °C) 95 %      MAP       --         Physical Exam    Nursing note and vitals reviewed.  Constitutional: She appears well-developed and well-nourished. No distress.   HENT:   Head: Normocephalic and atraumatic.   Eyes: EOM are normal. Pupils are equal, round, and reactive to light.   Neck: Normal range of motion. Neck supple.   Cardiovascular: Intact distal pulses. An irregularly irregular rhythm present.  Tachycardia present.    Pulmonary/Chest: Breath sounds normal. No respiratory distress. She has no wheezes. She has no rhonchi. She has no rales.   Abdominal: Soft. She exhibits no distension. There is no tenderness.    Musculoskeletal: Normal range of motion. She exhibits no edema.   Neurological: She is alert and oriented to person, place, and time. She has normal strength. No cranial nerve deficit or sensory deficit.   Skin: Skin is warm and dry. Capillary refill takes less than 2 seconds. No rash noted.   Psychiatric: She has a normal mood and affect.         ED Course   Procedures  Labs Reviewed   CBC W/ AUTO DIFFERENTIAL - Abnormal; Notable for the following components:       Result Value    RBC 5.43 (*)     MCV 81 (*)     MCH 25.0 (*)     MCHC 30.8 (*)     RDW 17.6 (*)     All other components within normal limits   COMPREHENSIVE METABOLIC PANEL - Abnormal; Notable for the following components:    Glucose 113 (*)     BUN, Bld 24 (*)     Creatinine 1.5 (*)     Total Bilirubin 1.1 (*)     Alkaline Phosphatase 158 (*)     eGFR if  38.5 (*)     eGFR if non  33.4 (*)     All other components within normal limits   B-TYPE NATRIURETIC PEPTIDE - Abnormal; Notable for the following components:    BNP 1,030 (*)     All other components within normal limits   TROPONIN I - Abnormal; Notable for the following components:    Troponin I 0.350 (*)     All other components within normal limits   LIPASE   MAGNESIUM   PROTIME-INR   TSH   PHOSPHORUS   URINALYSIS, REFLEX TO URINE CULTURE     EKG Readings: (Independently Interpreted)   Rhythm: Atrial Flutter. Heart Rate: 130.   Left axis non-specific ST depressions.        Imaging Results          X-Ray Chest PA And Lateral (Final result)  Result time 08/12/18 12:43:08    Final result by Rashaun Ferraro MD (08/12/18 12:43:08)                 Impression:      Findings suggesting small bilateral effusions, left greater than right.  There may be compressive atelectasis versus developing consolidation projected over the left lower lung zone, correlation is advised.      Electronically signed by: Rashaun Ferraro MD  Date:    08/12/2018  Time:    12:43              Narrative:    EXAMINATION:  XR CHEST PA AND LATERAL    CLINICAL HISTORY:  Other fatigue    TECHNIQUE:  PA and lateral views of the chest were performed.    COMPARISON:  04/10/2018    FINDINGS:  The cardiomediastinal silhouette is prominent, similar to the previous exam noting calcification of the aorta..  There is stable mild prominence of the right hilar region.  There there is obscuration of the costophrenic angles suggesting effusions, left greater than right..  The trachea is midline.  The lungs are symmetrically expanded bilaterally with coarse interstitial attenuation.  There is increased parenchymal attenuation projected over the left lower lung zone, may reflect atelectasis, developing consolidation however is not excluded..  There is bilateral basilar subsegmental atelectasis.  No large focal consolidation seen.  There is no pneumothorax.  The osseous structures are remarkable for degenerative changes.  Postsurgical changes are noted of the axilla on the left..                              X-Rays:   Independently Interpreted Readings:   Chest X-Ray: No infiltrates. Cardiomegaly present. Bilateral pleural effusion present.     Medical Decision Making:   History:   Old Medical Records: I decided to obtain old medical records.  Initial Assessment:   Emergent evaluation of fatigue and palpitations. Physical exam concerning for atrial flutter. Patient denies history or this. She has tachycardia at this time. Will require recontrol. Initial concerns for cardiac dysrhythmia, CHF, electrolyte abnormality. Patient also has poorly controlled blood pressure at this time as well. Anticipate admission.    Differential Diagnosis:   12:32 PM  EKG consistent with atrial flutter RVR. Will attempt rate control     1:46 PM  Blood pressure and heart rate improved. 2 dose of IV metoprolol given. On further chart review, patient does have a history of atrial flutter which was noticed in April during admission. She was not  compliant with coumadin at that time. EF is not significantly elevated. She does seem to have worsening CHF. IV lasix was given. She does have Xarelto. Will readmit to hospital for medical evaluation.   Independently Interpreted Test(s):   I have ordered and independently interpreted EKG Reading(s) - see prior notes  Clinical Tests:   Lab Tests: Ordered and Reviewed  Radiological Study: Ordered and Reviewed  Medical Tests: Ordered and Reviewed            Scribe Attestation:   Scribe #1: I performed the above scribed service and the documentation accurately describes the services I performed. I attest to the accuracy of the note.               Clinical Impression:   The primary encounter diagnosis was Atrial flutter with rapid ventricular response. Diagnoses of Tachycardia, Fatigue, and Hypertension, unspecified type were also pertinent to this visit.      Disposition:   Disposition: Admitted                        Maxwell Nguyen MD  08/12/18 9010

## 2018-08-12 NOTE — ASSESSMENT & PLAN NOTE
- bnp >1k, sob, CXR: findings suggesting small bilateral effusions, left greater than right.  There may be compressive atelectasis versus developing consolidation projected over the left lower lung zone  - lasix 80mg iv x1  - hold ace/ chlorthalidone for now till b/p improve and CR back to baseline, may need another dose of lasix  - ? Renal artery denervation may be in store, no h/o renal artery stenosis as cause for her heart failure

## 2018-08-12 NOTE — ASSESSMENT & PLAN NOTE
- admit b/p 169/120 progressed to 192/126, has not been controlled with IV or po regimen  - s/p lasix, IV metop, IV hydralazine, po coreg,   - holding benazapril and chlorthalidone for now since CR up from baseline  - discussed case with CCU team to start cardene gtt  - EF 30-35%, no chest pain, + sob  - h/o negative MARVA, may need renal denervation for b/p control, she's lost weight she's had multiple admissions for htn emergency

## 2018-08-13 PROBLEM — I50.23 ACUTE ON CHRONIC SYSTOLIC HEART FAILURE: Status: ACTIVE | Noted: 2018-04-17

## 2018-08-13 PROBLEM — I16.0 MALIGNANT HYPERTENSIVE URGENCY: Status: ACTIVE | Noted: 2018-08-13

## 2018-08-13 PROBLEM — G93.40 ENCEPHALOPATHY: Status: ACTIVE | Noted: 2018-08-13

## 2018-08-13 PROBLEM — I63.442 EMBOLIC STROKE INVOLVING LEFT CEREBELLAR ARTERY: Status: ACTIVE | Noted: 2018-08-13

## 2018-08-13 PROBLEM — R41.3: Status: ACTIVE | Noted: 2018-08-13

## 2018-08-13 LAB
ALBUMIN SERPL BCP-MCNC: 3.3 G/DL
ALP SERPL-CCNC: 158 U/L
ALT SERPL W/O P-5'-P-CCNC: 30 U/L
ANION GAP SERPL CALC-SCNC: 12 MMOL/L
AST SERPL-CCNC: 29 U/L
BILIRUB SERPL-MCNC: 1.1 MG/DL
BUN SERPL-MCNC: 24 MG/DL
CALCIUM SERPL-MCNC: 9.3 MG/DL
CHLORIDE SERPL-SCNC: 104 MMOL/L
CHOLEST SERPL-MCNC: 177 MG/DL
CHOLEST/HDLC SERPL: 3.9 {RATIO}
CO2 SERPL-SCNC: 27 MMOL/L
CREAT SERPL-MCNC: 1.3 MG/DL
EST. GFR  (AFRICAN AMERICAN): 45.7 ML/MIN/1.73 M^2
EST. GFR  (NON AFRICAN AMERICAN): 39.7 ML/MIN/1.73 M^2
GLOBAL PERICARDIAL EFFUSION: ABNORMAL
GLUCOSE SERPL-MCNC: 115 MG/DL
HDLC SERPL-MCNC: 45 MG/DL
HDLC SERPL: 25.4 %
LDLC SERPL CALC-MCNC: 110.2 MG/DL
MAGNESIUM SERPL-MCNC: 1.7 MG/DL
MITRAL VALVE REGURGITATION: ABNORMAL
NONHDLC SERPL-MCNC: 132 MG/DL
PHOSPHATE SERPL-MCNC: 3.6 MG/DL
POCT GLUCOSE: 120 MG/DL (ref 70–110)
POCT GLUCOSE: 122 MG/DL (ref 70–110)
POCT GLUCOSE: 147 MG/DL (ref 70–110)
POTASSIUM SERPL-SCNC: 3.7 MMOL/L
PROT SERPL-MCNC: 6.4 G/DL
RETIRED EF AND QEF - SEE NOTES: 25 (ref 55–65)
SODIUM SERPL-SCNC: 143 MMOL/L
TRIGL SERPL-MCNC: 109 MG/DL
TROPONIN I SERPL DL<=0.01 NG/ML-MCNC: 0.21 NG/ML
TROPONIN I SERPL DL<=0.01 NG/ML-MCNC: 0.26 NG/ML

## 2018-08-13 PROCEDURE — 80061 LIPID PANEL: CPT

## 2018-08-13 PROCEDURE — 63600175 PHARM REV CODE 636 W HCPCS: Performed by: NURSE PRACTITIONER

## 2018-08-13 PROCEDURE — 83735 ASSAY OF MAGNESIUM: CPT

## 2018-08-13 PROCEDURE — 63600175 PHARM REV CODE 636 W HCPCS: Performed by: STUDENT IN AN ORGANIZED HEALTH CARE EDUCATION/TRAINING PROGRAM

## 2018-08-13 PROCEDURE — 99233 SBSQ HOSP IP/OBS HIGH 50: CPT | Mod: ,,, | Performed by: NURSE PRACTITIONER

## 2018-08-13 PROCEDURE — 25000003 PHARM REV CODE 250: Performed by: NURSE PRACTITIONER

## 2018-08-13 PROCEDURE — 93010 ELECTROCARDIOGRAM REPORT: CPT | Mod: ,,, | Performed by: INTERNAL MEDICINE

## 2018-08-13 PROCEDURE — 99233 SBSQ HOSP IP/OBS HIGH 50: CPT | Mod: ,,, | Performed by: PSYCHIATRY & NEUROLOGY

## 2018-08-13 PROCEDURE — 93005 ELECTROCARDIOGRAM TRACING: CPT

## 2018-08-13 PROCEDURE — 84100 ASSAY OF PHOSPHORUS: CPT

## 2018-08-13 PROCEDURE — 25000003 PHARM REV CODE 250: Performed by: STUDENT IN AN ORGANIZED HEALTH CARE EDUCATION/TRAINING PROGRAM

## 2018-08-13 PROCEDURE — 84484 ASSAY OF TROPONIN QUANT: CPT

## 2018-08-13 PROCEDURE — 93306 TTE W/DOPPLER COMPLETE: CPT | Mod: 26,,, | Performed by: INTERNAL MEDICINE

## 2018-08-13 PROCEDURE — 93306 TTE W/DOPPLER COMPLETE: CPT

## 2018-08-13 PROCEDURE — 36415 COLL VENOUS BLD VENIPUNCTURE: CPT

## 2018-08-13 PROCEDURE — 99223 1ST HOSP IP/OBS HIGH 75: CPT | Mod: ,,, | Performed by: PSYCHIATRY & NEUROLOGY

## 2018-08-13 PROCEDURE — 99222 1ST HOSP IP/OBS MODERATE 55: CPT | Mod: ,,, | Performed by: INTERNAL MEDICINE

## 2018-08-13 PROCEDURE — 80053 COMPREHEN METABOLIC PANEL: CPT

## 2018-08-13 PROCEDURE — 20600001 HC STEP DOWN PRIVATE ROOM

## 2018-08-13 RX ORDER — HYDRALAZINE HYDROCHLORIDE 25 MG/1
25 TABLET, FILM COATED ORAL ONCE
Status: COMPLETED | OUTPATIENT
Start: 2018-08-13 | End: 2018-08-13

## 2018-08-13 RX ORDER — POTASSIUM CHLORIDE 750 MG/1
40 CAPSULE, EXTENDED RELEASE ORAL ONCE
Status: COMPLETED | OUTPATIENT
Start: 2018-08-13 | End: 2018-08-13

## 2018-08-13 RX ORDER — SODIUM CHLORIDE 0.9 % (FLUSH) 0.9 %
3 SYRINGE (ML) INJECTION EVERY 8 HOURS
Status: DISCONTINUED | OUTPATIENT
Start: 2018-08-13 | End: 2018-08-15 | Stop reason: HOSPADM

## 2018-08-13 RX ORDER — BENAZEPRIL HYDROCHLORIDE 20 MG/1
20 TABLET ORAL DAILY
Status: DISCONTINUED | OUTPATIENT
Start: 2018-08-14 | End: 2018-08-15 | Stop reason: HOSPADM

## 2018-08-13 RX ORDER — HYDRALAZINE HYDROCHLORIDE 25 MG/1
25 TABLET, FILM COATED ORAL EVERY 8 HOURS
Status: DISCONTINUED | OUTPATIENT
Start: 2018-08-13 | End: 2018-08-13

## 2018-08-13 RX ORDER — ATORVASTATIN CALCIUM 20 MG/1
80 TABLET, FILM COATED ORAL DAILY
Status: DISCONTINUED | OUTPATIENT
Start: 2018-08-14 | End: 2018-08-15 | Stop reason: HOSPADM

## 2018-08-13 RX ORDER — DIAZEPAM 5 MG/1
5 TABLET ORAL ONCE
Status: COMPLETED | OUTPATIENT
Start: 2018-08-13 | End: 2018-08-13

## 2018-08-13 RX ORDER — LABETALOL HYDROCHLORIDE 5 MG/ML
10 INJECTION, SOLUTION INTRAVENOUS
Status: DISCONTINUED | OUTPATIENT
Start: 2018-08-13 | End: 2018-08-13

## 2018-08-13 RX ORDER — HYDRALAZINE HYDROCHLORIDE 50 MG/1
50 TABLET, FILM COATED ORAL EVERY 8 HOURS
Status: DISCONTINUED | OUTPATIENT
Start: 2018-08-13 | End: 2018-08-13

## 2018-08-13 RX ORDER — MAGNESIUM GLUCONATE 27 MG(500)
54 TABLET ORAL 2 TIMES DAILY
Status: DISCONTINUED | OUTPATIENT
Start: 2018-08-13 | End: 2018-08-15 | Stop reason: HOSPADM

## 2018-08-13 RX ORDER — MAGNESIUM SULFATE HEPTAHYDRATE 40 MG/ML
2 INJECTION, SOLUTION INTRAVENOUS ONCE
Status: DISCONTINUED | OUTPATIENT
Start: 2018-08-13 | End: 2018-08-13

## 2018-08-13 RX ORDER — BENAZEPRIL HYDROCHLORIDE 10 MG/1
40 TABLET ORAL DAILY
Status: DISCONTINUED | OUTPATIENT
Start: 2018-08-13 | End: 2018-08-13

## 2018-08-13 RX ORDER — HYDRALAZINE HYDROCHLORIDE 25 MG/1
25 TABLET, FILM COATED ORAL ONCE
Status: DISCONTINUED | OUTPATIENT
Start: 2018-08-13 | End: 2018-08-13

## 2018-08-13 RX ORDER — ASPIRIN 81 MG/1
81 TABLET ORAL DAILY
Status: DISCONTINUED | OUTPATIENT
Start: 2018-08-14 | End: 2018-08-13

## 2018-08-13 RX ORDER — HALOPERIDOL 5 MG/ML
5 INJECTION INTRAMUSCULAR ONCE
Status: COMPLETED | OUTPATIENT
Start: 2018-08-13 | End: 2018-08-13

## 2018-08-13 RX ADMIN — ATORVASTATIN CALCIUM 40 MG: 20 TABLET, FILM COATED ORAL at 08:08

## 2018-08-13 RX ADMIN — POTASSIUM CHLORIDE 40 MEQ: 750 CAPSULE, EXTENDED RELEASE ORAL at 09:08

## 2018-08-13 RX ADMIN — ACETAMINOPHEN 650 MG: 325 TABLET ORAL at 07:08

## 2018-08-13 RX ADMIN — HYDRALAZINE HYDROCHLORIDE 25 MG: 25 TABLET ORAL at 08:08

## 2018-08-13 RX ADMIN — VITAMIN D, TAB 1000IU (100/BT) 2000 UNITS: 25 TAB at 08:08

## 2018-08-13 RX ADMIN — HALOPERIDOL LACTATE 5 MG: 5 INJECTION, SOLUTION INTRAMUSCULAR at 07:08

## 2018-08-13 RX ADMIN — DIAZEPAM 5 MG: 5 TABLET ORAL at 10:08

## 2018-08-13 RX ADMIN — BENAZEPRIL HYDROCHLORIDE 40 MG: 10 TABLET, FILM COATED ORAL at 12:08

## 2018-08-13 RX ADMIN — CARVEDILOL 25 MG: 25 TABLET, FILM COATED ORAL at 08:08

## 2018-08-13 RX ADMIN — Medication 54 MG: at 09:08

## 2018-08-13 RX ADMIN — HYDRALAZINE HYDROCHLORIDE 10 MG: 20 INJECTION INTRAMUSCULAR; INTRAVENOUS at 08:08

## 2018-08-13 RX ADMIN — HYDRALAZINE HYDROCHLORIDE 25 MG: 25 TABLET ORAL at 05:08

## 2018-08-13 RX ADMIN — FUROSEMIDE 40 MG: 10 INJECTION, SOLUTION INTRAMUSCULAR; INTRAVENOUS at 08:08

## 2018-08-13 NOTE — ASSESSMENT & PLAN NOTE
- ? Cause, elevated b/p, no UTI per urinalysis     - head CT / noncontrast Head MRI c/w R MCA territory infarct with possible hemorrhagic conversion  - aggressive b/p control, b/p uncomfortably high and within stroke range.-190's/  's, initially didn't budge with IV / PO MEDS, then all preciptiously worked all at once, which makes for just as dangerous situation with a precipitious drop  - awaiting stroke team to round

## 2018-08-13 NOTE — PROGRESS NOTES
Patient admitted to CSU. Patient arrived to the floor from the ED via stretcher per escort. MD Layton notified of arrival and pt BP. No complaints at this time. Patient oriented to room and unit. Plan of care initiated. Bed in lowest lock position. Side rails up X 2. Call bed in reach. Will Continue to monitor patient.

## 2018-08-13 NOTE — HPI
"79 y/o female who lives with her daughter and nephew but her daughter has been out of town. She presents to the ED C/O fatigue and SOB. She has history of AFIB, CHF, HTN, stroke in 4-10-18. In ED she was found to be in afib with RVR and hypertensive urgency with /126.   Family reports that patient has been having episodes of forgetfulness for the past month. She can remember date, year etc one day and the next she does not remember.   We are called to consult due to CT head read of possible "Vague hyperattenuation within the right frontal lobe laterally, in a region of artifact, may reflect sequela of the same however hemorrhage is not entirely excluded.  Given motion in the region, consideration should be given to repeat examination for exclusion of hemorrhage as warranted."  Patient with no real focal neuro deficit beside memory loss  Risk factor HTN, afib, CHF, prior stroke    Would recommend repeat CT scan head or MRI to evaluate for possible stroke both do not feel the 1 month off and on memory loss is related to stroke but could be dementia/vascular dementia. Recommend consult to General neurology.   Discussed with Vascular Fellow Dr Woods  "

## 2018-08-13 NOTE — HPI
Temitope Suero is a 78 y.o. f who presents to ED with daughter with c/o increasing sob, increasing fatigue, chest wall heaves, altered mental status for past several days.  She has PMH o f HFrEF 30-35% 4/18, HTN, with recent bout of malignant hypertension back in April precipitating CVA, h/o embolic CVA, pAfib on rivaraxiban, CKD III, HLD, Goiter multinodular, previously obesity induced DM, now no longer diabetic and Breast CA.  She was found in the ED to have A flutter with 2-3:1 flutter with RVR, BNP 1030, troponin 0.350 in setting of elevated CR 1.5/ BUN 24.  ECG with variable block flutter rapid vent response.  She has new onset AMS, no recollection of date nor president, can perform simple tasks, short term memory very poor.  Asked to get urine sample when in bathroom and she poured hat into toilet.  She has been a no show or has cancelled all her appt.s since May.  She usually lives with her daugher and her nephew, however the daughter has been out of town recently and the nephew has been monitoring her and her medications.  As far the daughter knows she is up to date with her meds. Though two pills were found on the floor the other day.

## 2018-08-13 NOTE — ASSESSMENT & PLAN NOTE
- bnp >1k, sob, CXR: findings suggesting small bilateral effusions, left greater than right.  There may be compressive atelectasis versus developing consolidation projected over the left lower lung zone  - lasix 80mg iv x1 ED, very effective, cards co managed resumed lasix 40mg bid IV   - Initially held acei/ chlorthalidone, now that CR is trending back to baseline, ACEI was resumed along with lasix per card co managed.  - no h/o renal artery stenosis as cause for her htn emergency/ HF exacerbation,  - presume she's not been taking her pills appropriately ( ie pocketing and spitting them out later)  - EF was normal in 2011 with normal pharm stress echo

## 2018-08-13 NOTE — ASSESSMENT & PLAN NOTE
- new onset flutter from previous Fib  - gave dose of rivaroxiban in ED as she had no s/s of focal deficits, now concern for multiple infarcts in R MCA distribution which lends itself to embolic phenomena per neurology and that she may not have been taking her AC properly.  She was taking in am with all her other meds per niece.  Expressed it's with the largest meal, though she may tolerate eliquis better, lower chance of bleeding profile.   - Large LAE, will be hard to keep her rate controlled and or back in SR.

## 2018-08-13 NOTE — HOSPITAL COURSE
She was admitted to cardiology step down. It was determined she did not need the cardene ggt as her pressure was improving markedly with PO Coreg. Vascular Neuro and Nurology was consulted for AMS and questionable findings on CT Head bleeding vs artifact, CT also repeated.    On 8/13, pt systolic BP rome from 140 to 190. She was started on home benzapril. MRI of the Brain has been ordered.

## 2018-08-13 NOTE — ASSESSMENT & PLAN NOTE
-Initially blood pressure with sysolic >190 and diastolic >120 with MAC and slight troponin elevation  -Pt is asymptomatic   -Continue with current antihypertensive regimen (Benzapril 40 mg QD, Coreg 25 mg BID and Hydralazine 25 mg TID)   -Continue with diuresis (Lasix 40 mg IV BID)

## 2018-08-13 NOTE — ASSESSMENT & PLAN NOTE
- Management per primary team   - Hypertensive Encephalopathy likely a contributing factor to patient's AMS

## 2018-08-13 NOTE — ASSESSMENT & PLAN NOTE
- timeline of 1 month of waxing and waning forgetfullness and disorientation to time, place and person. diffrential vascular dementia vs delirium from htn emergency.. CT head with possible artifact in frontal lobe vs hemorrage? Repeat scan, call stroke team and neurology.

## 2018-08-13 NOTE — HPI
Patient is a 78 y.o. female w/ past medical history significant for HFrEF ( LVEF 30-35%), HTN w/ Hx of malignant hypertension, Hx of CVA ( R cerebellar and L MCA), pAfib on rivaraxiban ( previously on Coumadin), CKD III, HLD, multinodular goiter, Hx of obesity induced DM, Hx of Breast CA  who presents to ED with daughter with c/o increasing sob, increasing fatigue, chest wall heaves, AMS. Per niece, who is the primary care giver to the patient, she noted some decline in her memory and mentation in general starting around February. Initially she thought it was secondary to old age but she was evaluated by her PCP and directed to be admitted in April and was noted to have an acute R cerebellar stroke and a UTI which at that time thought to be superimposed on already some likely decline in memory function due to noted small vessel disease on imaging. At that time other etiologies were r/o w/ negative lab results ( HIV, MMA, Folate, TSH).  Per niece, within the last month, she did note a further decline in her aunt's function, especially as she was away for some time and the changes were more drastic on her return. She states that the mental function is waxing and waning, at times worse than at other times. The patient is no longer able to take care of herself as she was in the beginning of the year and there is also a questionable component of medication mismanagement.She was found to have A flutter with 2-3:1 flutter with RVR, BNP 1030, troponin 0.350 in setting of elevated CR 1.5/ BUN 24 in the ED.   Neurology was consulted to evaluate the patient's functional and memory decline.

## 2018-08-13 NOTE — ASSESSMENT & PLAN NOTE
- Initially blood pressure with sysolic >190 and diastolic >120 with MAC, Slight troponin elevation, likely sent patient winto pulmonary edema.  - Denies chest pain.  - Cont Coreg + Hydralazine, once MAC resolve restart ACE, ok for stepdown no need for drip

## 2018-08-13 NOTE — ASSESSMENT & PLAN NOTE
- Currently in Afib was in 2:1 Aflutter on admission today  -CHADSVASC 8 High risk for another stroke  - Rate control with Coreg  - Once brain hemorrhage on repeat CT ruled out please restart AC

## 2018-08-13 NOTE — PLAN OF CARE
Problem: Patient Care Overview  Goal: Plan of Care Review  Patient remains free of falls injures and traumas. Elevated BP noted at handoff. IV hydralazine and PO hydralazine given as ordered. Benazepril 40 mg given as ordered. BP currently 125/89. Patient denies SOB, dizziness and chest pain. CBG monitoring maintained AC/HS. No supplemental insulin needed. Neuro Checks q4. No significant changes noted. Patient still disoriented to time, place and situation. Plan of care reviewed with patient and niece. All questions and concerns addressed. Will continue to monitor.

## 2018-08-13 NOTE — SUBJECTIVE & OBJECTIVE
Interval History: This AM, pt denies acute complaints.     Review of Systems   Cardiovascular: Negative for chest pain, dyspnea on exertion, irregular heartbeat and near-syncope.   Respiratory: Negative for cough and shortness of breath.    Gastrointestinal: Negative for nausea and vomiting.   Neurological: Negative for headaches.     Objective:     Vital Signs (Most Recent):  Temp: 97.7 °F (36.5 °C) (08/13/18 1243)  Pulse: 86 (08/13/18 1116)  Resp: 16 (08/13/18 0731)  BP: 122/87 (08/13/18 1243)  SpO2: 98 % (08/13/18 1243) Vital Signs (24h Range):  Temp:  [96.7 °F (35.9 °C)-98.1 °F (36.7 °C)] 97.7 °F (36.5 °C)  Pulse:  [] 86  Resp:  [16-23] 16  SpO2:  [93 %-99 %] 98 %  BP: (122-192)/() 122/87     Weight: 60.9 kg (134 lb 4.2 oz)  Body mass index is 21.67 kg/m².     SpO2: 98 %  O2 Device (Oxygen Therapy): room air      Intake/Output Summary (Last 24 hours) at 8/13/2018 1300  Last data filed at 8/13/2018 0600  Gross per 24 hour   Intake 210 ml   Output 1200 ml   Net -990 ml       Lines/Drains/Airways     Peripheral Intravenous Line                 Peripheral IV - Single Lumen 08/12/18 1204 Right Antecubital 1 day                Physical Exam   Constitutional: She is oriented to person, place, and time. She appears well-developed and well-nourished.   Eyes: Pupils are equal, round, and reactive to light.   Neck: Neck supple. No JVD present.   Cardiovascular: Normal rate, regular rhythm, normal heart sounds and intact distal pulses.   Pulmonary/Chest: Effort normal and breath sounds normal.   Abdominal: Soft. Bowel sounds are normal.   Musculoskeletal: She exhibits no edema.   Neurological: She is alert and oriented to person, place, and time.   Skin: Skin is warm and dry.       Significant Labs:   BMP:   Recent Labs   Lab  08/12/18   1227  08/13/18   0431   GLU  113*  115*   NA  145  143   K  3.7  3.7   CL  108  104   CO2  23  27   BUN  24*  24*   CREATININE  1.5*  1.3   CALCIUM  9.6  9.3   MG  1.8  1.7        Significant Imaging: Echocardiogram:   2D echo with color flow doppler:   Results for orders placed or performed during the hospital encounter of 08/12/18   2D echo with color flow doppler   Result Value Ref Range    EF 25 (A) 55 - 65    Mitral Valve Regurgitation TRIVIAL     Pericardial Effusion TRIVIAL      Brain MRI Pending

## 2018-08-13 NOTE — SUBJECTIVE & OBJECTIVE
Past Medical History:   Diagnosis Date    Cancer of left breast, stage 2 11/24/2015    Cerebrovascular small vessel disease 4/11/2018    Bi-thalamic lacunar disease, mod/sev periventricular white matter disease, mixed pattern cerebral microbleeds    Chronic anticoagulation - rivaroxaban 4/17/2018    Chronic systolic heart failure 4/17/2018    Echo 4-2018   1 - Moderately depressed left ventricular systolic function (EF 30-35%).    2 - Biatrial enlargement.    3 - Normal right ventricular systolic function .    4 - Mild mitral regurgitation.    5 - Mild tricuspid regurgitation.    6 - The estimated PA systolic pressure is 34 mmHg.    7 - Increased central venous pressure.    8 - Left pleural effusion.    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    Vitamin D deficiency disease 10/28/2013     Past Surgical History:   Procedure Laterality Date    BREAST SURGERY      CHOLECYSTECTOMY       Family History   Problem Relation Age of Onset    Hypertension Mother     Hypertension Father     Glaucoma Father     Heart disease Father     Cancer Sister     Asthma Son     Diabetes Paternal Aunt     Thyroid cancer Neg Hx      Social History     Tobacco Use    Smoking status: Never Smoker    Smokeless tobacco: Never Used   Substance Use Topics    Alcohol use: No    Drug use: No     Review of patient's allergies indicates:  No Known Allergies    Medications: I have reviewed the current medication  administration record.    Medications Prior to Admission   Medication Sig Dispense Refill Last Dose    atorvastatin (LIPITOR) 40 MG tablet Take 1 tablet (40 mg total) by mouth once daily. 90 tablet 4 8/11/2018    benazepril (LOTENSIN) 40 MG tablet Take 1 tablet (40 mg total) by mouth once daily. 90 tablet 4 8/11/2018    chlorthalidone (HYGROTEN) 25 MG Tab Take 1 tablet (25 mg total) by mouth once daily. 90 tablet 3 8/11/2018    letrozole (FEMARA) 2.5 mg Tab 1 Tablet Oral Every day 90 tablet 4 8/11/2018 at Unknown time    metoprolol succinate (TOPROL-XL) 100 MG 24 hr tablet Take 100 mg by mouth once daily.   8/11/2018 at Unknown time    rivaroxaban (XARELTO) 15 mg Tab Take 1 tablet (15 mg total) by mouth once daily. 30 tablet 11 8/11/2018    travoprost (TRAVATAN Z) 0.004 % Drop Place 1 drop into both eyes every evening. 2.5 mL 4 8/11/2018    aspirin (ECOTRIN) 81 MG EC tablet Take 81 mg by mouth. 1 Tablet, Delayed Release (E.C.) Oral Every day   Unknown at Unknown time    carvedilol (COREG) 25 MG tablet Take 1 tablet (25 mg total) by mouth 2 (two) times daily with meals. 60 tablet 11 Unknown at Unknown time    cholecalciferol, vitamin D3, (VITAMIN D3) 2,000 unit Cap Take 1 capsule (2,000 Units total) by mouth once daily. 90 capsule 5 Unknown at Unknown time    diclofenac sodium 1 % Gel Apply 2 g topically 3 (three) times daily. 100 g 6 Unknown at Unknown time       Review of Systems   Constitutional: Positive for activity change and fatigue. Negative for chills and fever.   HENT: Negative for ear discharge and ear pain.    Eyes: Negative for pain and itching.   Respiratory: Positive for shortness of breath.    Cardiovascular: Negative for palpitations and leg swelling.   Gastrointestinal: Negative for abdominal distention and abdominal pain.   Genitourinary: Negative for difficulty urinating and dysuria.   Musculoskeletal: Negative for arthralgias and back pain.   Skin: Negative for rash and wound.    Neurological: Positive for speech difficulty.   Psychiatric/Behavioral: Positive for confusion.     Objective:     Vital Signs (Most Recent):  Temp: 97.9 °F (36.6 °C) (08/12/18 2101)  Pulse: 87 (08/12/18 2101)  Resp: 20 (08/12/18 2101)  BP: (!) 156/111 (08/12/18 2101)  SpO2: 96 % (08/12/18 2101)    Vital Signs Range (Last 24H):  Temp:  [97.5 °F (36.4 °C)-98 °F (36.7 °C)]   Pulse:  []   Resp:  [17-23]   BP: (126-192)/()   SpO2:  [95 %-99 %]     Physical Exam   Constitutional: She appears well-developed and well-nourished.   HENT:   Head: Normocephalic and atraumatic.   Eyes: EOM are normal. Pupils are equal, round, and reactive to light.   Neck: Normal range of motion.   Cardiovascular:   irregular rate and rhthym   Pulmonary/Chest: Effort normal.   Abdominal: Soft.   Neurological: She is alert.   Skin: Skin is warm and dry.   Nursing note and vitals reviewed.      Neurological Exam:   LOC: alert  Attention Span: Good   Language: No aphasia, memory issues  Articulation: No dysarthria  Orientation: Person, Place, Time   Visual Fields: Full  EOM (CN III, IV, VI): Full/intact  Pupils (CN II, III): PERRL  Facial Sensation (CN V): Normal  Facial Movement (CN VII): Symmetric facial expression    Gag Reflex: present  Reflexes: flexor plantar responses bilaterally  Motor: Arm left  Normal 5/5  Leg left  Normal 5/5  Arm right  Normal 5/5  Leg right Normal 5/5  Cebellar: No evidence of appendicular or axial ataxia  Sensation: Intact to light touch, temperature and vibration  Tone: Normal tone throughout      Laboratory:  CMP:   Recent Labs   Lab  08/12/18   1227   CALCIUM  9.6   ALBUMIN  3.6   PROT  7.2   NA  145   K  3.7   CO2  23   CL  108   BUN  24*   CREATININE  1.5*   ALKPHOS  158*   ALT  29   AST  31   BILITOT  1.1*     CBC:   Recent Labs   Lab  08/12/18   1227   WBC  4.95   RBC  5.43*   HGB  13.6   HCT  44.1   PLT  188   MCV  81*   MCH  25.0*   MCHC  30.8*     Lipid Panel: No results for input(s): CHOL,  LDLCALC, HDL, TRIG in the last 168 hours.  Coagulation:   Recent Labs   Lab  08/12/18   1227   INR  1.2     Hgb A1C:   Recent Labs   Lab  08/12/18   1227   HGBA1C  6.0*     TSH:   Recent Labs   Lab  08/12/18   1227   TSH  1.367       Diagnostic Results:      Brain imaging:  CT Head w/o contrast 8-12-18 results:  Vague hyperattenuation within the right frontal lobe laterally, in a region of artifact, may reflect sequela of the same however hemorrhage is not entirely excluded.  Given motion in the region, consideration should be given to repeat examination for exclusion of hemorrhage as warranted.  Correlation with history is advised..    Stable left parietal and right cerebellar encephalomalacia.    Sequela of chronic microvascular ischemic change and senescent change.    Vessel Imaging:      Cardiac Evaluation:   EKG 8-12-18 results:  Atrial flutter with variable A-V block  Nonspecific T wave abnormality  Abnormal ECG  When compared with ECG of 10-APR-2018 13:56,  The axis Shifted left  Nonspecific T wave abnormality no longer evident in Inferior leads  Nonspecific T wave abnormality, worse in Lateral leads

## 2018-08-13 NOTE — ASSESSMENT & PLAN NOTE
Patient is a 78 y.o. female w/ past medical history significant for HFrEF ( LVEF 30-35%), HTN w/ Hx of malignant hypertension, Hx of CVA ( R cerebellar and L MCA), pAfib on rivaraxiban ( previously on Coumadin), CKD III, HLD, multinodular goiter, Hx of obesity induced DM, Hx of Breast CA  who presents to ED with daughter with c/o increasing sob, increasing fatigue, chest wall heaves, AMS. Per niece, memory issues have been progressive but more notable in the past month. During the same time patient's niece, who is the primary caretaker has been away and the patient might have had some medication mismanagement and worsening CHF as noted in the ED labs.     - Patient w/ several previous CVA's noted on the CTH that could have already contributed to a decline in her baseline mental function  - Patient also w/ CHF exacerbation at this time that can also worsen her mentation  - Agree w/ MRI to evaluate the brain parenchyma in more detail than the available CTH; noted new area of infarct in the R MCA distribution.  - Will follow up with final imaging read  - Previous eval of memory decline in April was unrevealing - TSH 1.367, Folate 12.7, MMA 0.18 and HIV negative  - Will defer management to primary team and Vascular Neurology team; patient will likely require NOAC review/change as patient does seem to not be well anticoagulated in the setting of new infarcts w/ pAfib history  - Medical management per primary team  - Appreciate consult, will sign off. Patient should follow up with Vascular Neurology outpatient

## 2018-08-13 NOTE — CONSULTS
[facility]  Vascular Neurology  Comprehensive Stroke Center  Consult Note    Inpatient consult to Vascular (Stroke) Neurology  Consult performed by: Jaye Gavin NP  Consult ordered by: Gianni Zayas MD        Assessment/Plan:     Patient is a 78 y.o. year old female with:    * Hypertensive emergency    Do Not drop BP too fast or below 120 Systolic to prevent hyoperfusion         Abnormal CT scan of head    Recommend repeating CT scan of Head  MRI Brain in needed  Consult to General Neurology re memory loss issues        Toxic multinodular goiter    Stroke risk factor  Rate control  anticoagulation            STROKE DOCUMENTATION          NIH Scale:  1a. Level Of Consciousness: 0-->Alert: keenly responsive  1b. LOC Questions: 0-->Answers both questions correctly  1c. LOC Commands: 0-->Performs both tasks correctly  2. Best Gaze: 0-->Normal  3. Visual: 0-->No visual loss  4. Facial Palsy: 0-->Normal symmetrical movements  5a. Motor Arm, Left: 0-->No drift: limb holds 90 (or 45) degrees for full 10 secs  5b. Motor Arm, Right: 0-->No drift: limb holds 90 (or 45) degrees for full 10 secs  6a. Motor Leg, Left: 0-->No drift: leg holds 30 degree position for full 5 secs  6b. Motor Leg, Right: 0-->No drift: leg holds 30 degree position for full 5 secs  7. Limb Ataxia: 0-->Absent  8. Sensory: 0-->Normal: no sensory loss  9. Best Language: 1-->Mild-to-moderate aphasia: some obvious loss of fluency or facility of comprehension, without significant limitation on ideas expressed or form of expression. Reduction of speech and/or comprehension, however, makes conversation. . . (see row details)(memory loss)  10. Dysarthria: 0-->Normal  11. Extinction and Inattention (formerly Neglect): 0-->No abnormality  Total (NIH Stroke Scale): 1    Modified Niles Score: 1  Hodan Coma Scale:14   ABCD2 Score:    [id number] Score:7  HAS -BLED Score:4  ICH Score:   Hunt & Lopez Classification:  "      Thrombolysis Candidate? No, Strong suspicion for stroke mimic or alternative diagnosis       Interventional Revascularization Candidate?   Is the patient eligible for mechanical endovascular reperfusion (CHIKI)?  No; No significant neurological deficit      Hemorrhagic change of an Ischemic Stroke: Does this patient have an ischemic stroke with hemorrhagic changes? No     Subjective:     History of Present Illness:  79 y/o female who lives with her daughter and nephew but her daughter has been out of town. She presents to the ED C/O fatigue and SOB. She has history of AFIB, CHF, HTN, stroke in 4-10-18. In ED she was found to be in afib with RVR and hypertensive urgency with /126.   Family reports that patient has been having episodes of forgetfulness for the past month. She can remember date, year etc one day and the next she does not remember.   We are called to consult due to CT head read of possible "Vague hyperattenuation within the right frontal lobe laterally, in a region of artifact, may reflect sequela of the same however hemorrhage is not entirely excluded.  Given motion in the region, consideration should be given to repeat examination for exclusion of hemorrhage as warranted."  Patient with no real focal neuro deficit beside memory loss  Risk factor HTN, afib, CHF, prior stroke    Would recommend repeat CT scan head or MRI to evaluate for possible stroke both do not feel the 1 month off and on memory loss is related to stroke but could be dementia/vascular dementia. Recommend consult to General neurology.   Discussed with Vascular Fellow Dr Woods        Past Medical History:   Diagnosis Date    Cancer of left breast, stage 2 11/24/2015    Cerebrovascular small vessel disease 4/11/2018    Bi-thalamic lacunar disease, mod/sev periventricular white matter disease, mixed pattern cerebral microbleeds    Chronic anticoagulation - rivaroxaban 4/17/2018    Chronic systolic heart failure " 4/17/2018    Echo 4-2018   1 - Moderately depressed left ventricular systolic function (EF 30-35%).    2 - Biatrial enlargement.    3 - Normal right ventricular systolic function .    4 - Mild mitral regurgitation.    5 - Mild tricuspid regurgitation.    6 - The estimated PA systolic pressure is 34 mmHg.    7 - Increased central venous pressure.    8 - Left pleural effusion.    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    Vitamin D deficiency disease 10/28/2013     Past Surgical History:   Procedure Laterality Date    BREAST SURGERY      CHOLECYSTECTOMY       Family History   Problem Relation Age of Onset    Hypertension Mother     Hypertension Father     Glaucoma Father     Heart disease Father     Cancer Sister     Asthma Son     Diabetes Paternal Aunt     Thyroid cancer Neg Hx      Social History     Tobacco Use    Smoking status: Never Smoker    Smokeless tobacco: Never Used   Substance Use Topics    Alcohol use: No    Drug use: No     Review of patient's allergies indicates:  No Known Allergies    Medications: I have reviewed the current medication administration record.    Medications Prior to Admission   Medication Sig Dispense Refill Last Dose    atorvastatin (LIPITOR) 40 MG tablet Take 1 tablet (40 mg total) by mouth once daily. 90 tablet 4 8/11/2018    benazepril (LOTENSIN) 40 MG tablet Take 1 tablet (40 mg total) by mouth once daily. 90 tablet 4 8/11/2018    chlorthalidone (HYGROTEN)  25 MG Tab Take 1 tablet (25 mg total) by mouth once daily. 90 tablet 3 8/11/2018    letrozole (FEMARA) 2.5 mg Tab 1 Tablet Oral Every day 90 tablet 4 8/11/2018 at Unknown time    metoprolol succinate (TOPROL-XL) 100 MG 24 hr tablet Take 100 mg by mouth once daily.   8/11/2018 at Unknown time    rivaroxaban (XARELTO) 15 mg Tab Take 1 tablet (15 mg total) by mouth once daily. 30 tablet 11 8/11/2018    travoprost (TRAVATAN Z) 0.004 % Drop Place 1 drop into both eyes every evening. 2.5 mL 4 8/11/2018    aspirin (ECOTRIN) 81 MG EC tablet Take 81 mg by mouth. 1 Tablet, Delayed Release (E.C.) Oral Every day   Unknown at Unknown time    carvedilol (COREG) 25 MG tablet Take 1 tablet (25 mg total) by mouth 2 (two) times daily with meals. 60 tablet 11 Unknown at Unknown time    cholecalciferol, vitamin D3, (VITAMIN D3) 2,000 unit Cap Take 1 capsule (2,000 Units total) by mouth once daily. 90 capsule 5 Unknown at Unknown time    diclofenac sodium 1 % Gel Apply 2 g topically 3 (three) times daily. 100 g 6 Unknown at Unknown time       Review of Systems   Constitutional: Positive for activity change and fatigue. Negative for chills and fever.   HENT: Negative for ear discharge and ear pain.    Eyes: Negative for pain and itching.   Respiratory: Positive for shortness of breath.    Cardiovascular: Negative for palpitations and leg swelling.   Gastrointestinal: Negative for abdominal distention and abdominal pain.   Genitourinary: Negative for difficulty urinating and dysuria.   Musculoskeletal: Negative for arthralgias and back pain.   Skin: Negative for rash and wound.   Neurological: Positive for speech difficulty.   Psychiatric/Behavioral: Positive for confusion.     Objective:     Vital Signs (Most Recent):  Temp: 97.9 °F (36.6 °C) (08/12/18 2101)  Pulse: 87 (08/12/18 2101)  Resp: 20 (08/12/18 2101)  BP: (!) 156/111 (08/12/18 2101)  SpO2: 96 % (08/12/18 2101)    Vital Signs Range (Last 24H):  Temp:  [97.5 °F (36.4  °C)-98 °F (36.7 °C)]   Pulse:  []   Resp:  [17-23]   BP: (126-192)/()   SpO2:  [95 %-99 %]     Physical Exam   Constitutional: She appears well-developed and well-nourished.   HENT:   Head: Normocephalic and atraumatic.   Eyes: EOM are normal. Pupils are equal, round, and reactive to light.   Neck: Normal range of motion.   Cardiovascular:   irregular rate and rhthym   Pulmonary/Chest: Effort normal.   Abdominal: Soft.   Neurological: She is alert.   Skin: Skin is warm and dry.   Nursing note and vitals reviewed.      Neurological Exam:   LOC: alert  Attention Span: Good   Language: No aphasia, memory issues  Articulation: No dysarthria  Orientation: Person, Place, Time   Visual Fields: Full  EOM (CN III, IV, VI): Full/intact  Pupils (CN II, III): PERRL  Facial Sensation (CN V): Normal  Facial Movement (CN VII): Symmetric facial expression    Gag Reflex: present  Reflexes: flexor plantar responses bilaterally  Motor: Arm left  Normal 5/5  Leg left  Normal 5/5  Arm right  Normal 5/5  Leg right Normal 5/5  Cebellar: No evidence of appendicular or axial ataxia  Sensation: Intact to light touch, temperature and vibration  Tone: Normal tone throughout      Laboratory:  CMP:   Recent Labs   Lab  08/12/18   1227   CALCIUM  9.6   ALBUMIN  3.6   PROT  7.2   NA  145   K  3.7   CO2  23   CL  108   BUN  24*   CREATININE  1.5*   ALKPHOS  158*   ALT  29   AST  31   BILITOT  1.1*     CBC:   Recent Labs   Lab  08/12/18   1227   WBC  4.95   RBC  5.43*   HGB  13.6   HCT  44.1   PLT  188   MCV  81*   MCH  25.0*   MCHC  30.8*     Lipid Panel: No results for input(s): CHOL, LDLCALC, HDL, TRIG in the last 168 hours.  Coagulation:   Recent Labs   Lab  08/12/18   1227   INR  1.2     Hgb A1C:   Recent Labs   Lab  08/12/18   1227   HGBA1C  6.0*     TSH:   Recent Labs   Lab  08/12/18   1227   TSH  1.367       Diagnostic Results:      Brain imaging:  CT Head w/o contrast 8-12-18 results:  Vague hyperattenuation within the right  frontal lobe laterally, in a region of artifact, may reflect sequela of the same however hemorrhage is not entirely excluded.  Given motion in the region, consideration should be given to repeat examination for exclusion of hemorrhage as warranted.  Correlation with history is advised..    Stable left parietal and right cerebellar encephalomalacia.    Sequela of chronic microvascular ischemic change and senescent change.    Vessel Imaging:      Cardiac Evaluation:   EKG 8-12-18 results:  Atrial flutter with variable A-V block  Nonspecific T wave abnormality  Abnormal ECG  When compared with ECG of 10-APR-2018 13:56,  The axis Shifted left  Nonspecific T wave abnormality no longer evident in Inferior leads  Nonspecific T wave abnormality, worse in Lateral leads      Jaye Gavin, NP  [facility]  Department of Vascular Neurology   [facility]

## 2018-08-13 NOTE — ASSESSMENT & PLAN NOTE
- p afib now flutter   - R MCA distribution of multiple infarcts which looks like embolic phenomena vs hypertensive emergency, which lends itself to possibly she has not been taking her rivaroxiban properly, niece says her son has been giving her meds in am and only recently found two on the floor.  ? Pocketing and spitting out later due to her progressive confusion.

## 2018-08-13 NOTE — ASSESSMENT & PLAN NOTE
- not rate controlled on metoprolol, was supposed to be on coreg, not sure she's even taking her meds  - currently rate controlled with coreg, HR < 100  - awaiting stroke team to give recommendations regarding AC resumption and if need to change to alternative

## 2018-08-13 NOTE — ASSESSMENT & PLAN NOTE
- patient's hgb a1c levels have been in low 6 or 6 range for years since she's lost weight.  Patient does not appear to be diabetic nor is she on any diabetes medicines.

## 2018-08-13 NOTE — CONSULTS
[facility]  Neurology  Consult Note    Patient Name: Temitope Suero  MRN: [id number]  Admission Date: 8/12/2018  Hospital Length of Stay: 1 days  Code Status: Full Code   Attending Provider: Fredy Penn MD   Consulting Provider: Dora Knutson MD  Primary Care Physician: Gm Zambrano MD  Principal Problem:Hypertensive emergency    Inpatient consult to Neurology  Consult performed by: Dora Knutson MD  Consult ordered by: Gianni Zayas MD         Subjective:     Chief Complaint:  AMS     HPI:   Patient is a 78 y.o. female w/ past medical history significant for HFrEF ( LVEF 30-35%), HTN w/ Hx of malignant hypertension, Hx of CVA ( R cerebellar and L MCA), pAfib on rivaraxiban ( previously on Coumadin), CKD III, HLD, multinodular goiter, Hx of obesity induced DM, Hx of Breast CA  who presents to ED with daughter with c/o increasing sob, increasing fatigue, chest wall heaves, AMS. Per niece, who is the primary care giver to the patient, she noted some decline in her memory and mentation in general starting around February. Initially she thought it was secondary to old age but she was evaluated by her PCP and directed to be admitted in April and was noted to have an acute R cerebellar stroke and a UTI which at that time thought to be superimposed on already some likely decline in memory function due to noted small vessel disease on imaging. At that time other etiologies were r/o w/ negative lab results ( HIV, MMA, Folate, TSH).  Per niece, within the last month, she did note a further decline in her aunt's function, especially as she was away for some time and the changes were more drastic on her return. She states that the mental function is waxing and waning, at times worse than at other times. The patient is no longer able to take care of herself as she was in the beginning of the year and there is also a questionable component of medication mismanagement.She was found to have A  flutter with 2-3:1 flutter with RVR, BNP 1030, troponin 0.350 in setting of elevated CR 1.5/ BUN 24 in the ED.   Neurology was consulted to evaluate the patient's functional and memory decline.       Past Medical History:   Diagnosis Date    Cancer of left breast, stage 2 11/24/2015    Cerebrovascular small vessel disease 4/11/2018    Bi-thalamic lacunar disease, mod/sev periventricular white matter disease, mixed pattern cerebral microbleeds    Chronic anticoagulation - rivaroxaban 4/17/2018    Chronic systolic heart failure 4/17/2018    Echo 4-2018   1 - Moderately depressed left ventricular systolic function (EF 30-35%).    2 - Biatrial enlargement.    3 - Normal right ventricular systolic function .    4 - Mild mitral regurgitation.    5 - Mild tricuspid regurgitation.    6 - The estimated PA systolic pressure is 34 mmHg.    7 - Increased central venous pressure.    8 - Left pleural effusion.    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      Current Outpatient Medications on File Prior to Encounter   Medication Sig    atorvastatin (LIPITOR) 40 MG tablet Take 1 tablet (40 mg total)  by mouth once daily.    benazepril (LOTENSIN) 40 MG tablet Take 1 tablet (40 mg total) by mouth once daily.    chlorthalidone (HYGROTEN) 25 MG Tab Take 1 tablet (25 mg total) by mouth once daily.    letrozole (FEMARA) 2.5 mg Tab 1 Tablet Oral Every day    metoprolol succinate (TOPROL-XL) 100 MG 24 hr tablet Take 100 mg by mouth once daily.    rivaroxaban (XARELTO) 15 mg Tab Take 1 tablet (15 mg total) by mouth once daily.    travoprost (TRAVATAN Z) 0.004 % Drop Place 1 drop into both eyes every evening.    aspirin (ECOTRIN) 81 MG EC tablet Take 81 mg by mouth. 1 Tablet, Delayed Release (E.C.) Oral Every day    carvedilol (COREG) 25 MG tablet Take 1 tablet (25 mg total) by mouth 2 (two) times daily with meals.    cholecalciferol, vitamin D3, (VITAMIN D3) 2,000 unit Cap Take 1 capsule (2,000 Units total) by mouth once daily.    diclofenac sodium 1 % Gel Apply 2 g topically 3 (three) times daily.    [DISCONTINUED] travoprost, benzalkonium, (TRAVATAN) 0.004 % ophthalmic solution Place 1 drop into both eyes every evening.     Family History     Problem Relation (Age of Onset)    Asthma Son    Cancer Sister    Diabetes Paternal Aunt    Glaucoma Father    Heart disease Father    Hypertension Mother, Father        Tobacco Use    Smoking status: Never Smoker    Smokeless tobacco: Never Used   Substance and Sexual Activity    Alcohol use: No    Drug use: No    Sexual activity: Not Currently     Review of Systems   Constitutional: Positive for activity change. Negative for appetite change, chills, diaphoresis, fatigue, fever and unexpected weight change.   HENT: Negative.    Eyes: Negative.    Respiratory: Positive for cough, chest tightness and shortness of breath.    Cardiovascular: Positive for leg swelling.   Gastrointestinal: Negative.  Negative for nausea and vomiting.   Endocrine: Negative.    Genitourinary: Negative.    Musculoskeletal: Negative.    Skin: Negative.    Neurological: Negative for  "headaches.   Hematological: Negative.    Psychiatric/Behavioral: Positive for confusion and decreased concentration.     Objective:     Vital Signs (Most Recent):  Temp: 97.7 °F (36.5 °C) (08/13/18 1243)  Pulse: 86 (08/13/18 1116)  Resp: 16 (08/13/18 0731)  BP: 122/87 (08/13/18 1243)  SpO2: 98 % (08/13/18 1243) Vital Signs (24h Range):  Temp:  [96.7 °F (35.9 °C)-98.1 °F (36.7 °C)] 97.7 °F (36.5 °C)  Pulse:  [] 86  Resp:  [16-23] 16  SpO2:  [93 %-99 %] 98 %  BP: (122-192)/() 122/87     Weight: 60.9 kg (134 lb 4.2 oz)  Body mass index is 21.67 kg/m².    Physical Exam   Constitutional: She appears well-developed.   Thin, pleasant, disoriented female   HENT:   Head: Normocephalic and atraumatic.   Eyes: EOM are normal. Pupils are equal, round, and reactive to light.   Neck: Normal range of motion.   Cardiovascular:   Murmur heard.  Irregularly irregular rhythm    Pulmonary/Chest: Effort normal.   Abdominal: Soft. Bowel sounds are normal.   Musculoskeletal: She exhibits edema.   Neurological: She is alert.   Skin: Skin is warm and dry.   Psychiatric: She has a normal mood and affect.       NEUROLOGICAL EXAMINATION:     CRANIAL NERVES     CN III, IV, VI   Pupils are equal, round, and reactive to light.  Extraocular motions are normal.     Neurologic Exam:  Mental Status:  AAOx1.  Speech, thought content tangential and at times inappropriate. Recent, remote recall 0/3. Difficulty following complex commands.  States she is in [facility], that the year is 2100 and the month is February. Does know she is at [facility]. States that Maureen is president, Difficulty recalling her dog's name, despite stating that that's her favorite pet and that she spends lots of time with the animal. 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   Cranial Nerves:  VFs intact on counting fingers in all " quadrants bilaterally.  PERRLA, EOMI.  Facial movement, sensation intact and symmetric.  Palate raises symmetrically, tongue protrudes midline.  Trapezius, SCM strength 5/5 bilaterally.  Motor:  Normal bulk and tone.  BUE shoulder abduction, biceps/triceps, wrist flexion/extension,  strength 5/5.  BLE hip flexors, knee flexion/extension, plantarflexion/dorsiflexion 5/5.  Sensory:  Intact to light touch at all extremities and face without inattention.   Reflexes:  Biceps, brachioradialis, patellar, Achilles 2+ and symmetric.  No ankle clonus.  Downgoing toe bilaterally.  Coordination:  FNF, DEYANIRA, HTS intact with no dysmetria/ataxia/dysdiadochokinesia.  No resting tremor.  Gait:  Deferred    Significant Labs:   Hemoglobin A1c:   Recent Labs   Lab  08/12/18   1227   HGBA1C  6.0*     Blood Culture:   Recent Labs   Lab  08/12/18   1642  08/12/18   1720   LABBLOO  No Growth to date  No Growth to date     BMP:   Recent Labs   Lab  08/12/18   1227  08/13/18   0431   GLU  113*  115*   NA  145  143   K  3.7  3.7   CL  108  104   CO2  23  27   BUN  24*  24*   CREATININE  1.5*  1.3   CALCIUM  9.6  9.3   MG  1.8  1.7     CBC:   Recent Labs   Lab  08/12/18   1227   WBC  4.95   HGB  13.6   HCT  44.1   PLT  188     CMP:   Recent Labs   Lab  08/12/18   1227  08/13/18   0431   GLU  113*  115*   NA  145  143   K  3.7  3.7   CL  108  104   CO2  23  27   BUN  24*  24*   CREATININE  1.5*  1.3   CALCIUM  9.6  9.3   MG  1.8  1.7   PROT  7.2  6.4   ALBUMIN  3.6  3.3*   BILITOT  1.1*  1.1*   ALKPHOS  158*  158*   AST  31  29   ALT  29  30   ANIONGAP  14  12   EGFRNONAA  33.4*  39.7*     CSF Culture: No results for input(s): CSFCULTURE in the last 48 hours.  CSF Studies: No results for input(s): ALIQUT, APPEARCSF, COLORCSF, CSFWBC, CSFRBC, GLUCCSF, LDHCSF, PROTEINCSF, VDRLCSF in the last 48 hours.  Inflammatory Markers:   Recent Labs   Lab  08/12/18   1657   PROCAL  0.07     POCT Glucose:   Recent Labs   Lab  08/12/18   1952   POCTGLUCOSE   124*     Prealbumin: No results for input(s): PREALBUMIN in the last 48 hours.  Respiratory Culture: No results for input(s): GSRESP, RESPIRATORYC in the last 48 hours.  Urine Culture: No results for input(s): LABURIN in the last 48 hours.  Urine Studies:   Recent Labs   Lab  08/12/18   1641   COLORU  Straw   APPEARANCEUA  Clear   PHUR  6.0   SPECGRAV  1.005   PROTEINUA  Negative   GLUCUA  Negative   KETONESU  Negative   BILIRUBINUA  Negative   OCCULTUA  1+*   NITRITE  Negative   UROBILINOGEN  Negative   LEUKOCYTESUR  1+*   RBCUA  2   WBCUA  3   SQUAMEPITHEL  1   HYALINECASTS  6*     All pertinent lab results from the past 24 hours have been reviewed.    Significant Imaging: I have reviewed all pertinent imaging results/findings within the past 24 hours.\  CTH  Stable vague area of hyperattenuation within a small area of encephalomalacia in the lateral right frontal lobe, possibly a stable area of hemorrhagic infarct.  Consider evaluation with MRI as indicated.  Generalized cerebral volume loss and chronic microvascular ischemic change.  Left parietal and right cerebellar encephalomalacia from remote infarcts.    MRI Brain  Pending     Assessment and Plan:     Deterioration of memory    Patient is a 78 y.o. female w/ past medical history significant for HFrEF ( LVEF 30-35%), HTN w/ Hx of malignant hypertension, Hx of CVA ( R cerebellar and L MCA), pAfib on rivaraxiban ( previously on Coumadin), CKD III, HLD, multinodular goiter, Hx of obesity induced DM, Hx of Breast CA  who presents to ED with daughter with c/o increasing sob, increasing fatigue, chest wall heaves, AMS. Per niece, memory issues have been progressive but more notable in the past month. During the same time patient's niece, who is the primary caretaker has been away and the patient might have had some medication mismanagement and worsening CHF as noted in the ED labs.     - Patient w/ several previous CVA's noted on the CTH that could have already  contributed to a decline in her baseline mental function  - Patient also w/ CHF exacerbation at this time that can also worsen her mentation  - Agree w/ MRI to evaluate the brain parenchyma in more detail than the available CTH; noted new area of infarct in the R MCA distribution, not visible on previous MRI from April.  - Will follow up with final imaging read  - Previous eval of memory decline in April was unrevealing - TSH 1.367, Folate 12.7, MMA 0.18 and HIV negative  - Will defer management to primary team and Vascular Neurology team, which includes BP control; patient will likely require NOAC review/change as patient does seem to not be well anticoagulated in the setting of new infarcts w/ pAfib history, unclear if this is secondary to non-compliance.  - Medical management per primary team  - Appreciate consult, will sign off. Patient should follow up with Vascular Neurology outpatient         Malignant hypertensive urgency    - Management per primary team   - Hypertensive Encephalopathy likely a contributing factor to patient's AMS        Abnormal CT scan of head    - Reviewed, noted multiple areas of encephalomalacia from previous infarcts            VTE Risk Mitigation (From admission, onward)    None          Thank you for your consult. I will sign off. Please contact us if you have any additional questions.    Dora Knutson MD  Neurology  [facility]

## 2018-08-13 NOTE — SUBJECTIVE & OBJECTIVE
Past Medical History:   Diagnosis Date    Cancer of left breast, stage 2 11/24/2015    Cerebrovascular small vessel disease 4/11/2018    Bi-thalamic lacunar disease, mod/sev periventricular white matter disease, mixed pattern cerebral microbleeds    Chronic anticoagulation - rivaroxaban 4/17/2018    Chronic systolic heart failure 4/17/2018    Echo 4-2018   1 - Moderately depressed left ventricular systolic function (EF 30-35%).    2 - Biatrial enlargement.    3 - Normal right ventricular systolic function .    4 - Mild mitral regurgitation.    5 - Mild tricuspid regurgitation.    6 - The estimated PA systolic pressure is 34 mmHg.    7 - Increased central venous pressure.    8 - Left pleural effusion.    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      Current Outpatient Medications on File Prior to Encounter   Medication Sig    atorvastatin (LIPITOR) 40 MG tablet Take 1 tablet (40 mg total) by mouth once daily.    benazepril (LOTENSIN) 40 MG tablet Take 1 tablet (40 mg total) by mouth once daily.    chlorthalidone (HYGROTEN) 25 MG Tab Take 1 tablet (25 mg total) by mouth once daily.     letrozole (FEMARA) 2.5 mg Tab 1 Tablet Oral Every day    metoprolol succinate (TOPROL-XL) 100 MG 24 hr tablet Take 100 mg by mouth once daily.    rivaroxaban (XARELTO) 15 mg Tab Take 1 tablet (15 mg total) by mouth once daily.    travoprost (TRAVATAN Z) 0.004 % Drop Place 1 drop into both eyes every evening.    aspirin (ECOTRIN) 81 MG EC tablet Take 81 mg by mouth. 1 Tablet, Delayed Release (E.C.) Oral Every day    carvedilol (COREG) 25 MG tablet Take 1 tablet (25 mg total) by mouth 2 (two) times daily with meals.    cholecalciferol, vitamin D3, (VITAMIN D3) 2,000 unit Cap Take 1 capsule (2,000 Units total) by mouth once daily.    diclofenac sodium 1 % Gel Apply 2 g topically 3 (three) times daily.    [DISCONTINUED] travoprost, benzalkonium, (TRAVATAN) 0.004 % ophthalmic solution Place 1 drop into both eyes every evening.     Family History     Problem Relation (Age of Onset)    Asthma Son    Cancer Sister    Diabetes Paternal Aunt    Glaucoma Father    Heart disease Father    Hypertension Mother, Father        Tobacco Use    Smoking status: Never Smoker    Smokeless tobacco: Never Used   Substance and Sexual Activity    Alcohol use: No    Drug use: No    Sexual activity: Not Currently     Review of Systems   Constitutional: Positive for activity change. Negative for appetite change, chills, diaphoresis, fatigue, fever and unexpected weight change.   HENT: Negative.    Eyes: Negative.    Respiratory: Positive for cough, chest tightness and shortness of breath.    Cardiovascular: Positive for leg swelling.   Gastrointestinal: Negative.  Negative for nausea and vomiting.   Endocrine: Negative.    Genitourinary: Negative.    Musculoskeletal: Negative.    Skin: Negative.    Neurological: Negative for headaches.   Hematological: Negative.    Psychiatric/Behavioral: Positive for confusion and decreased concentration.     Objective:     Vital Signs (Most Recent):  Temp: 97.7 °F (36.5 °C) (08/13/18  "1243)  Pulse: 86 (08/13/18 1116)  Resp: 16 (08/13/18 0731)  BP: 122/87 (08/13/18 1243)  SpO2: 98 % (08/13/18 1243) Vital Signs (24h Range):  Temp:  [96.7 °F (35.9 °C)-98.1 °F (36.7 °C)] 97.7 °F (36.5 °C)  Pulse:  [] 86  Resp:  [16-23] 16  SpO2:  [93 %-99 %] 98 %  BP: (122-192)/() 122/87     Weight: 60.9 kg (134 lb 4.2 oz)  Body mass index is 21.67 kg/m².    Physical Exam   Constitutional: She appears well-developed.   Thin, pleasant, disoriented female   HENT:   Head: Normocephalic and atraumatic.   Eyes: EOM are normal. Pupils are equal, round, and reactive to light.   Neck: Normal range of motion.   Cardiovascular:   Murmur heard.  Irregularly irregular rhythm    Pulmonary/Chest: Effort normal.   Abdominal: Soft. Bowel sounds are normal.   Musculoskeletal: She exhibits edema.   Neurological: She is alert.   Skin: Skin is warm and dry.   Psychiatric: She has a normal mood and affect.       NEUROLOGICAL EXAMINATION:     CRANIAL NERVES     CN III, IV, VI   Pupils are equal, round, and reactive to light.  Extraocular motions are normal.     Neurologic Exam:  Mental Status:  AAOx1.  Speech, thought content tangential and at times inappropriate. Recent, remote recall 0/3. Difficulty following complex commands.  States she is in [facility], that the year is 2100 and the month is February. Does know she is at [facility]. States that Maureen is president, Difficulty recalling her dog's name, despite stating that that's her favorite pet and that she spends lots of time with the animal. 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   Cranial Nerves:  VFs intact on counting fingers in all quadrants bilaterally.  PERRLA, EOMI.  Facial movement, sensation intact and symmetric.  Palate raises symmetrically, tongue protrudes midline.  Trapezius, SCM strength 5/5 bilaterally.  Motor:  Normal " bulk and tone.  BUE shoulder abduction, biceps/triceps, wrist flexion/extension,  strength 5/5.  BLE hip flexors, knee flexion/extension, plantarflexion/dorsiflexion 5/5.  Sensory:  Intact to light touch at all extremities and face without inattention.   Reflexes:  Biceps, brachioradialis, patellar, Achilles 2+ and symmetric.  No ankle clonus.  Downgoing toe bilaterally.  Coordination:  FNF, DEYANIRA, HTS intact with no dysmetria/ataxia/dysdiadochokinesia.  No resting tremor.  Gait:  Deferred    Significant Labs:   Hemoglobin A1c:   Recent Labs   Lab  08/12/18   1227   HGBA1C  6.0*     Blood Culture:   Recent Labs   Lab  08/12/18   1642  08/12/18   1720   LABBLOO  No Growth to date  No Growth to date     BMP:   Recent Labs   Lab  08/12/18   1227  08/13/18   0431   GLU  113*  115*   NA  145  143   K  3.7  3.7   CL  108  104   CO2  23  27   BUN  24*  24*   CREATININE  1.5*  1.3   CALCIUM  9.6  9.3   MG  1.8  1.7     CBC:   Recent Labs   Lab  08/12/18   1227   WBC  4.95   HGB  13.6   HCT  44.1   PLT  188     CMP:   Recent Labs   Lab  08/12/18   1227  08/13/18   0431   GLU  113*  115*   NA  145  143   K  3.7  3.7   CL  108  104   CO2  23  27   BUN  24*  24*   CREATININE  1.5*  1.3   CALCIUM  9.6  9.3   MG  1.8  1.7   PROT  7.2  6.4   ALBUMIN  3.6  3.3*   BILITOT  1.1*  1.1*   ALKPHOS  158*  158*   AST  31  29   ALT  29  30   ANIONGAP  14  12   EGFRNONAA  33.4*  39.7*     CSF Culture: No results for input(s): CSFCULTURE in the last 48 hours.  CSF Studies: No results for input(s): ALIQUT, APPEARCSF, COLORCSF, CSFWBC, CSFRBC, GLUCCSF, LDHCSF, PROTEINCSF, VDRLCSF in the last 48 hours.  Inflammatory Markers:   Recent Labs   Lab  08/12/18   1657   PROCAL  0.07     POCT Glucose:   Recent Labs   Lab  08/12/18   1952   POCTGLUCOSE  124*     Prealbumin: No results for input(s): PREALBUMIN in the last 48 hours.  Respiratory Culture: No results for input(s): GSRESP, RESPIRATORYC in the last 48 hours.  Urine Culture: No results for  input(s): LABURIN in the last 48 hours.  Urine Studies:   Recent Labs   Lab  08/12/18   1641   COLORU  Straw   APPEARANCEUA  Clear   PHUR  6.0   SPECGRAV  1.005   PROTEINUA  Negative   GLUCUA  Negative   KETONESU  Negative   BILIRUBINUA  Negative   OCCULTUA  1+*   NITRITE  Negative   UROBILINOGEN  Negative   LEUKOCYTESUR  1+*   RBCUA  2   WBCUA  3   SQUAMEPITHEL  1   HYALINECASTS  6*     All pertinent lab results from the past 24 hours have been reviewed.    Significant Imaging: I have reviewed all pertinent imaging results/findings within the past 24 hours.\  CTH  Stable vague area of hyperattenuation within a small area of encephalomalacia in the lateral right frontal lobe, possibly a stable area of hemorrhagic infarct.  Consider evaluation with MRI as indicated.  Generalized cerebral volume loss and chronic microvascular ischemic change.  Left parietal and right cerebellar encephalomalacia from remote infarcts.    MRI Brain  Pending

## 2018-08-13 NOTE — ASSESSMENT & PLAN NOTE
- admit b/p 169/120 progressed to 192/126   - s/p lasix, IV metop for rate control, IV hydralazine, po coreg   - initially held benazapril and chlorthalidone as CR was up from baseline, benazepril resumed by cards co managed, in addition to lasix 40mg iv bid for acute heart failure symptoms  - initially discussed case with CCU team to start cardene gtt, however since b/p became responsive in ED it was opted to transfer to floor.  Head Ct was noted to have possible infarct with hemorraghic conversion at this time, so it was decided to be very cautious about lowering her b/p at this time below 160, until stroke neurology was able to give full consultation.   - EF 30-35%, no chest pain, + sob, + troponin's flat, h/o negative pharm stress echo in 2011 with normal EF at that time.   - h/o negative MARVA  - since she's responsive to b/p meds, it seems it's a matter of her taking her meds appropriately

## 2018-08-13 NOTE — PROGRESS NOTES
Pt returned to MRI for additional testing / Alert and confused / Tele called and monitor removed / placed to Table and  Monitor

## 2018-08-13 NOTE — SUBJECTIVE & OBJECTIVE
Interval History: Noted bleed on CT lateral right frontal lobe, also R MCA distribution infarct, family states she's been taking her rivaroxiban, though hypertensive emergency and poorly controlled in house, so unsure if it's more hypertensive related.  Cards co managed would like her in the neuro ICU for close observation for b/p titration.  Otherwise, patient with continued STM loss, hard to follow commands / comprehension vs just hard of hearing.      Review of Systems   Reason unable to perform ROS: mostly from niece.   Constitutional: Negative for activity change, appetite change, chills, fatigue and fever.   HENT: Negative for congestion, rhinorrhea, sinus pressure, sore throat and trouble swallowing.         Very hard of hearing    Eyes: Negative for pain, redness and visual disturbance.   Respiratory: Negative for cough, chest tightness, shortness of breath, wheezing and stridor.    Cardiovascular: Negative for chest pain, palpitations and leg swelling.   Gastrointestinal: Negative for abdominal distention, abdominal pain, blood in stool, constipation, diarrhea, nausea and vomiting.   Endocrine: Negative for cold intolerance and heat intolerance.   Genitourinary: Negative for difficulty urinating, dysuria, frequency and urgency.        Nocturia     Musculoskeletal: Negative for arthralgias, back pain, myalgias and neck pain.   Skin: Negative for color change, pallor, rash and wound.   Allergic/Immunologic: Negative for immunocompromised state.   Neurological: Negative for dizziness, tremors, syncope, weakness, light-headedness, numbness and headaches.        STM LOSS, forgetful, does not follow commands well.    Hematological: Does not bruise/bleed easily.   Psychiatric/Behavioral: Positive for confusion. Negative for agitation, decreased concentration and sleep disturbance. The patient is not nervous/anxious.      Objective:     Vital Signs (Most Recent):  Temp: 97.4 °F (36.3 °C) (08/13/18 1459)  Pulse: 87  (08/13/18 1459)  Resp: 17 (08/13/18 1459)  BP: 115/69 (08/13/18 1459)  SpO2: 97 % (08/13/18 1459) Vital Signs (24h Range):  Temp:  [96.7 °F (35.9 °C)-98.1 °F (36.7 °C)] 97.4 °F (36.3 °C)  Pulse:  [] 87  Resp:  [16-20] 17  SpO2:  [93 %-99 %] 97 %  BP: (115-192)/() 115/69     Weight: 60.9 kg (134 lb 4.2 oz)  Body mass index is 21.67 kg/m².    Intake/Output Summary (Last 24 hours) at 8/13/2018 1512  Last data filed at 8/13/2018 1400  Gross per 24 hour   Intake 740 ml   Output 1200 ml   Net -460 ml      Physical Exam   Constitutional: She appears well-developed and well-nourished. No distress.   HENT:   Head: Normocephalic and atraumatic.   Right Ear: External ear normal.   Left Ear: External ear normal.   Nose: Nose normal.   Mouth/Throat: Oropharynx is clear and moist.   Eyes: Conjunctivae and EOM are normal. No scleral icterus.   Neck: Normal range of motion. Neck supple. No JVD present.   Cardiovascular: Normal rate, regular rhythm, normal heart sounds and intact distal pulses. Exam reveals no gallop and no friction rub.   No murmur heard.  Pulmonary/Chest: Effort normal and breath sounds normal. No respiratory distress. She has no wheezes. She has no rales.   Abdominal: Soft. Bowel sounds are normal. She exhibits no distension and no mass. There is no tenderness. There is no rebound and no guarding.   Musculoskeletal: Normal range of motion. She exhibits no edema or tenderness.   Neurological: She is alert. No cranial nerve deficit or sensory deficit. She exhibits normal muscle tone. Coordination normal.   Confused, very poor STM, does not follow commands well, needs lots of prompting.    Skin: Skin is warm and dry. No rash noted. She is not diaphoretic. No erythema.   Psychiatric: She has a normal mood and affect. Her behavior is normal. Judgment and thought content normal.   Nursing note and vitals reviewed.      Significant Labs:   CMP:   Recent Labs   Lab  08/12/18   1227  08/13/18   0431   NA  145   143   K  3.7  3.7   CL  108  104   CO2  23  27   GLU  113*  115*   BUN  24*  24*   CREATININE  1.5*  1.3   CALCIUM  9.6  9.3   PROT  7.2  6.4   ALBUMIN  3.6  3.3*   BILITOT  1.1*  1.1*   ALKPHOS  158*  158*   AST  31  29   ALT  29  30   ANIONGAP  14  12   EGFRNONAA  33.4*  39.7*       Significant Imaging: Head CT noncontrast: I have reviewed all pertinent results/findings within the past 24 hours and my personal findings are:  Stable vague area of hyperattenuation within a small area of encephalomalacia in the lateral right frontal lobe, possibly a stable area of hemorrhagic infarct.  Consider evaluation with MRI as indicated.  Generalized cerebral volume loss and chronic microvascular ischemic change.  Left parietal and right cerebellar encephalomalacia from remote infarcts.    MRI noncontrast: Small areas of recent infarction within the right frontal lobe in the left cerebellar hemisphere.  Right frontal infarct as small associated hemorrhage. Multiple several scattered remote infarcts.  Findings concerning for possible embolic disease.  Moderate chronic microvascular ischemic change with remote thalamic lacunar type infarcts and scattered microhemorrhages.  Constellation of findings suggesting chronic hypertensive encephalopathy.

## 2018-08-13 NOTE — PROGRESS NOTES
[facility]  Cardiology  Progress Note    Patient Name: Temitope Suero  MRN: [id number]  Admission Date: 8/12/2018  Hospital Length of Stay: 1 days  Code Status: Full Code   Attending Physician: Fredy Penn MD   Primary Care Physician: Gm Zambrano MD  Expected Discharge Date: 8/16/2018  Principal Problem:Hypertensive emergency    Subjective:     HPI:   Temitope Suero is a 78 y.o. f who presents to ED with daughter with c/o increasing sob, increasing fatigue, chest wall heaves, altered mental status for past several days.  She has PMH o f HFrEF 30-35% 4/18, HTN, with recent bout of malignant hypertension back in April precipitating CVA, h/o embolic CVA, pAfib on rivaraxiban, CKD III, HLD, Goiter multinodular, previously obesity induced DM, now no longer diabetic and Breast CA.  She was found in the ED to have A flutter with 2-3:1 flutter with RVR, BNP 1030, troponin 0.350 in setting of elevated CR 1.5/ BUN 24.  ECG with variable block flutter rapid vent response.  She has new onset AMS, no recollection of date nor president, can perform simple tasks, short term memory very poor.  Asked to get urine sample when in bathroom and she poured hat into toilet.  She has been a no show or has cancelled all her appt.s since May.  She usually lives with her daugher and her nephew, however the daughter has been out of town recently and the nephew has been monitoring her and her medications.  As far the daughter knows she is up to date with her meds. Though two pills were found on the floor the other day.    Hospital Course:   She was admitted to cardiology step down. It was determined she did not need the cardene ggt as her pressure was improving markedly with PO Coreg. Vascular Neuro and Nurology was consulted for AMS and questionable findings on CT Head bleeding vs artifact, CT also repeated.    On 8/13, pt systolic BP rome from 140 to 190. She was started on home benzapril. MRI of the Brain has been  ordered.     Interval History: This AM, pt denies acute complaints.     Review of Systems   Cardiovascular: Negative for chest pain, dyspnea on exertion, irregular heartbeat and near-syncope.   Respiratory: Negative for cough and shortness of breath.    Gastrointestinal: Negative for nausea and vomiting.   Neurological: Negative for headaches.     Objective:     Vital Signs (Most Recent):  Temp: 97.7 °F (36.5 °C) (08/13/18 1243)  Pulse: 86 (08/13/18 1116)  Resp: 16 (08/13/18 0731)  BP: 122/87 (08/13/18 1243)  SpO2: 98 % (08/13/18 1243) Vital Signs (24h Range):  Temp:  [96.7 °F (35.9 °C)-98.1 °F (36.7 °C)] 97.7 °F (36.5 °C)  Pulse:  [] 86  Resp:  [16-23] 16  SpO2:  [93 %-99 %] 98 %  BP: (122-192)/() 122/87     Weight: 60.9 kg (134 lb 4.2 oz)  Body mass index is 21.67 kg/m².     SpO2: 98 %  O2 Device (Oxygen Therapy): room air      Intake/Output Summary (Last 24 hours) at 8/13/2018 1300  Last data filed at 8/13/2018 0600  Gross per 24 hour   Intake 210 ml   Output 1200 ml   Net -990 ml       Lines/Drains/Airways     Peripheral Intravenous Line                 Peripheral IV - Single Lumen 08/12/18 1204 Right Antecubital 1 day                Physical Exam   Constitutional: She is oriented to person, place, and time. She appears well-developed and well-nourished.   Eyes: Pupils are equal, round, and reactive to light.   Neck: Neck supple. No JVD present.   Cardiovascular: Normal rate, regular rhythm, normal heart sounds and intact distal pulses.   Pulmonary/Chest: Effort normal and breath sounds normal.   Abdominal: Soft. Bowel sounds are normal.   Musculoskeletal: She exhibits no edema.   Neurological: She is alert and oriented to person, place, and time.   Skin: Skin is warm and dry.       Significant Labs:   BMP:   Recent Labs   Lab  08/12/18   1227  08/13/18   0431   GLU  113*  115*   NA  145  143   K  3.7  3.7   CL  108  104   CO2  23  27   BUN  24*  24*   CREATININE  1.5*  1.3   CALCIUM  9.6  9.3   MG   1.8  1.7       Significant Imaging: Echocardiogram:   2D echo with color flow doppler:   Results for orders placed or performed during the hospital encounter of 08/12/18   2D echo with color flow doppler   Result Value Ref Range    EF 25 (A) 55 - 65    Mitral Valve Regurgitation TRIVIAL     Pericardial Effusion TRIVIAL      Brain MRI Pending    Assessment and Plan:     Malignant hypertensive urgency    -Initially blood pressure with sysolic >190 and diastolic >120 with MAC and slight troponin elevation  -Pt is asymptomatic   -Continue with current antihypertensive regimen (Benzapril 40 mg QD, Coreg 25 mg BID and Hydralazine 25 mg TID)   -Continue with diuresis (Lasix 40 mg IV BID)  -recommend discussing with Neurology, BP parameter goals and transfer to Neuro ICU in light of hemorrhagic infarct on head CT Scan           VTE Risk Mitigation (From admission, onward)    None          Lily Anna MD  Cardiology Fellow   [facility]

## 2018-08-13 NOTE — HOSPITAL COURSE
Admitted to hospital medicine for hypertensive emergency, altered mental status, sob/ HF sx's, acute renal insufficiency Cr 1.5 baseline CR 1.1.  She was supposed to be taking coreg 25mg since April never picked up rx.  Upon admit, her metoprolol was changed to Coreg 25mg bid, initially her benazepril was held d/t acute renal insufficiency, she was given oral meds in addition to IV meds in ED which precipitiously plummeted her b/p from the 190's/110 to 120's systolic, which is too great a shift since she now has concerns for possible lateral R frontal lobe hemorrhage prior to drop in b/p. She was supposed to go to the ICU for controlled b/p reduction with IV cardene, however b/p dropped precipitously in the ED so it was decided by cardiology to allow her to come to the floor.  She is followed by Cards co managed who gave her both lasix and benazepril unbeknownst to the primary team who was also making b/p med adjustments, so her b/p preciptiously dropped below the 120's again.  She appears to have a R SCL stenosis so her b/p's are much lower by 20 points on the Right side.  B/p's can only be taken in the L arm from here forward.  Cards comanaged would like her to be in the neuro ICU for closer b/p monitoring and titrating with IV meds/ gtt as needed in a more controlled environment.     Stroke and Neurology were consulted for her new ? bleed, progressive and acute AMS/ new profound STM loss, long term is intact (she did not show any focal deficits on exam upon presentation, only extreme forgetfulness, severe short term memory loss, both issues are not affected by the area of her brain affected by the area of the bleed).  MRI noncontrast obtained per stroke request, pending read.  Also per stroke team DO NOT let her b/p's fall precipitiously and/or below the 120's, which is why we are requesting ICU stay with gtt to slowly wean down her b/p with addition of meds one at time to see how she responded and to only have  one team manage her medication regimen at a given time.     Afib/ flutter: HR is better controlled <100, she is still in variable block flutter.  AC on hold till neurological issues resolved and awaiting decision from stroke team for resumption and type of AC preferred, previously on xarelto which she was given a dose on day of admit in ED prior to the CT scan results. No focal deficits noted.       Dispo; most likely home with home health.  She needs f/u with neurology for chronic vascular dementia, htn and stroke

## 2018-08-13 NOTE — ASSESSMENT & PLAN NOTE
- still without concrete goals of b/p. Appears to have R MCA distribution infarcts, c/w embolization.  - awaiting recs from stroke team  - currently practicing goals of sbp > 120, with goal around 160's as she has recent infarcts, avoiding severe pressure reductions, which has been an issue when multiple providers attesting medication record.  Cards co managed would like patient to be treated in Neuro ICU with closer monitoring to avoid significant shifts in b/p's as she's experienced in past 24 hrs.   - will need HH to monitor b/p and medication administration, niece is home and is taking over her care from her son

## 2018-08-13 NOTE — CONSULTS
[facility]  Cardiology  Consult Note    Patient Name: Temitope Suero  MRN: [id number]  Admission Date: 8/12/2018  Hospital Length of Stay: 1 days  Code Status: Full Code   Attending Provider: Dominick Bone MD   Consulting Provider: Gianni Zayas MD  Primary Care Physician: Gm Zambrano MD  Principal Problem:Hypertensive emergency    Patient information was obtained from relative(s) and ER records.     Inpatient consult to Cardiology  Consult performed by: Gianni Zayas MD  Consult ordered by: Sandrine Melendrez NP        Subjective:     Chief Complaint:  Shortness of breath     HPI:   Temitope Suero is a 78 y.o. f who presents to ED with daughter with c/o increasing sob, increasing fatigue, chest wall heaves, altered mental status for past several days.  She has PMH o f HFrEF 30-35% 4/18, HTN, with recent bout of malignant hypertension back in April precipitating CVA, h/o embolic CVA, pAfib on rivaraxiban, CKD III, HLD, Goiter multinodular, previously obesity induced DM, now no longer diabetic and Breast CA.  She was found in the ED to have A flutter with 2-3:1 flutter with RVR, BNP 1030, troponin 0.350 in setting of elevated CR 1.5/ BUN 24.  ECG with variable block flutter rapid vent response.  She has new onset AMS, no recollection of date nor president, can perform simple tasks, short term memory very poor.  Asked to get urine sample when in bathroom and she poured hat into toilet.  She has been a no show or has cancelled all her appt.s since May.  She usually lives with her daugher and her nephew, however the daughter has been out of town recently and the nephew has been monitoring her and her medications.  As far the daughter knows she is up to date with her meds. Though two pills were found on the floor the other day.        Past Medical History:   Diagnosis Date    Cancer of left breast, stage 2 11/24/2015    Cerebrovascular small vessel disease 4/11/2018     Bi-thalamic lacunar disease, mod/sev periventricular white matter disease, mixed pattern cerebral microbleeds    Chronic anticoagulation - rivaroxaban 4/17/2018    Chronic systolic heart failure 4/17/2018    Echo 4-2018   1 - Moderately depressed left ventricular systolic function (EF 30-35%).    2 - Biatrial enlargement.    3 - Normal right ventricular systolic function .    4 - Mild mitral regurgitation.    5 - Mild tricuspid regurgitation.    6 - The estimated PA systolic pressure is 34 mmHg.    7 - Increased central venous pressure.    8 - Left pleural effusion.    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      Current Outpatient Medications on File Prior to Encounter   Medication Sig    atorvastatin (LIPITOR) 40 MG tablet Take 1 tablet (40 mg total) by mouth once daily.    benazepril (LOTENSIN) 40 MG tablet Take 1 tablet (40 mg total) by mouth once daily.    chlorthalidone (HYGROTEN) 25 MG Tab Take 1 tablet (25 mg total) by mouth once daily.    letrozole (FEMARA) 2.5 mg Tab 1 Tablet Oral Every day    metoprolol succinate (TOPROL-XL) 100 MG 24 hr tablet Take 100 mg by mouth once daily.    rivaroxaban (XARELTO) 15 mg Tab  Take 1 tablet (15 mg total) by mouth once daily.    travoprost (TRAVATAN Z) 0.004 % Drop Place 1 drop into both eyes every evening.    aspirin (ECOTRIN) 81 MG EC tablet Take 81 mg by mouth. 1 Tablet, Delayed Release (E.C.) Oral Every day    carvedilol (COREG) 25 MG tablet Take 1 tablet (25 mg total) by mouth 2 (two) times daily with meals.    cholecalciferol, vitamin D3, (VITAMIN D3) 2,000 unit Cap Take 1 capsule (2,000 Units total) by mouth once daily.    diclofenac sodium 1 % Gel Apply 2 g topically 3 (three) times daily.    [DISCONTINUED] travoprost, benzalkonium, (TRAVATAN) 0.004 % ophthalmic solution Place 1 drop into both eyes every evening.     Family History     Problem Relation (Age of Onset)    Asthma Son    Cancer Sister    Diabetes Paternal Aunt    Glaucoma Father    Heart disease Father    Hypertension Mother, Father        Tobacco Use    Smoking status: Never Smoker    Smokeless tobacco: Never Used   Substance and Sexual Activity    Alcohol use: No    Drug use: No    Sexual activity: Not Currently     Review of Systems   Constitution: Negative for chills, decreased appetite and diaphoresis.   HENT: Negative for congestion and ear discharge.    Eyes: Negative for blurred vision and discharge.   Cardiovascular: Positive for irregular heartbeat. Negative for chest pain, dyspnea on exertion, leg swelling and paroxysmal nocturnal dyspnea.   Respiratory: Positive for shortness of breath. Negative for cough and hemoptysis.    Gastrointestinal: Negative for abdominal pain.   Neurological: Negative for loss of balance.   Psychiatric/Behavioral: Positive for altered mental status.        Objective:     Vital Signs (Most Recent):  Temp: 98.1 °F (36.7 °C) (08/12/18 2351)  Pulse: 95 (08/12/18 2351)  Resp: 20 (08/12/18 2101)  BP: (!) 156/104(Calin CHONG notified; No new orders at this time) (08/12/18 2351)  SpO2: (!) 93 % (08/12/18 2351) Vital Signs (24h Range):  Temp:  [97.5 °F (36.4 °C)-98.1 °F (36.7 °C)]  98.1 °F (36.7 °C)  Pulse:  [] 95  Resp:  [17-23] 20  SpO2:  [93 %-99 %] 93 %  BP: (126-192)/() 156/104     Weight: 62.6 kg (138 lb)  Body mass index is 22.27 kg/m².    SpO2: (!) 93 %  O2 Device (Oxygen Therapy): room air      Intake/Output Summary (Last 24 hours) at 8/13/2018 0137  Last data filed at 8/12/2018 2200  Gross per 24 hour   Intake 90 ml   Output 0 ml   Net 90 ml       Lines/Drains/Airways     Peripheral Intravenous Line                 Peripheral IV - Single Lumen 08/12/18 1204 Right Antecubital less than 1 day                Physical Exam   Constitutional: She appears well-developed and well-nourished. No distress.   Eyes: Conjunctivae are normal. Pupils are equal, round, and reactive to light.   Neck: No tracheal deviation present. No thyromegaly present.   Cardiovascular: Intact distal pulses. An irregular rhythm present. Tachycardia present. Exam reveals no gallop and no friction rub.   Murmur heard.  Pulses:       Radial pulses are 2+ on the right side, and 2+ on the left side.        Femoral pulses are 2+ on the right side, and 2+ on the left side.  Pulmonary/Chest: Effort normal and breath sounds normal. No respiratory distress. She has no wheezes. She has no rales.   Abdominal: Soft. Bowel sounds are normal. She exhibits no distension. There is no tenderness.   Musculoskeletal: She exhibits no edema or deformity.   Neurological: She is alert. She is disoriented. No cranial nerve deficit. Coordination normal.   Skin: Skin is warm and dry. She is not diaphoretic.   Psychiatric: She has a normal mood and affect. Her behavior is normal.          Significant Labs:   BMP:   Recent Labs   Lab  08/12/18   1227   GLU  113*   NA  145   K  3.7   CL  108   CO2  23   BUN  24*   CREATININE  1.5*   CALCIUM  9.6   MG  1.8   , CMP   Recent Labs   Lab  08/12/18   1227   NA  145   K  3.7   CL  108   CO2  23   GLU  113*   BUN  24*   CREATININE  1.5*   CALCIUM  9.6   PROT  7.2   ALBUMIN  3.6   BILITOT   1.1*   ALKPHOS  158*   AST  31   ALT  29   ANIONGAP  14   ESTGFRAFRICA  38.5*   EGFRNONAA  33.4*   , CBC   Recent Labs   Lab  08/12/18   1227   WBC  4.95   HGB  13.6   HCT  44.1   PLT  188    and Troponin   Recent Labs   Lab  08/12/18   1227  08/12/18   1657  08/12/18   2331   TROPONINI  0.350*  0.324*  0.257*       Significant Imaging: Echocardiogram:   2D echo with color flow doppler:   Results for orders placed or performed during the hospital encounter of 04/10/18   2D echo with color flow doppler   Result Value Ref Range    EF 30 (A) 55 - 65    Mitral Valve Regurgitation MILD     Est. PA Systolic Pressure 34.36     Pericardial Effusion TRIVIAL     Tricuspid Valve Regurgitation MILD      Assessment and Plan:     Malignant hypertensive urgency    - Initially blood pressure with sysolic >190 and diastolic >120 with MAC, Slight troponin elevation, likely sent patient winto pulmonary edema.  - Denies chest pain.  - Cont Coreg + Hydralazine, once MAC resolve restart ACE, ok for stepdown no need for drip        Abnormal CT scan of head    - timeline of 1 month of waxing and waning forgetfullness and disorientation to time, place and person. diffrential vascular dementia vs delirium from htn emergency.. CT head with possible artifact in frontal lobe vs hemorrage? Repeat scan, call stroke team and neurology.        Paroxysmal atrial fibrillation    - Currently in Afib was in 2:1 Aflutter on admission today  -CHADSVASC 8 High risk for another stroke  - Rate control with Coreg  - Once brain hemorrhage on repeat CT ruled out please restart AC              VTE Risk Mitigation (From admission, onward)    None          Thank you for your consult. I will follow-up with patient. Please contact us if you have any additional questions.    Gianni Zayas MD  Cardiology   [facility]

## 2018-08-13 NOTE — SUBJECTIVE & OBJECTIVE
Past Medical History:   Diagnosis Date    Cancer of left breast, stage 2 11/24/2015    Cerebrovascular small vessel disease 4/11/2018    Bi-thalamic lacunar disease, mod/sev periventricular white matter disease, mixed pattern cerebral microbleeds    Chronic anticoagulation - rivaroxaban 4/17/2018    Chronic systolic heart failure 4/17/2018    Echo 4-2018   1 - Moderately depressed left ventricular systolic function (EF 30-35%).    2 - Biatrial enlargement.    3 - Normal right ventricular systolic function .    4 - Mild mitral regurgitation.    5 - Mild tricuspid regurgitation.    6 - The estimated PA systolic pressure is 34 mmHg.    7 - Increased central venous pressure.    8 - Left pleural effusion.    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      Current Outpatient Medications on File Prior to Encounter   Medication Sig    atorvastatin (LIPITOR) 40 MG tablet Take 1 tablet (40 mg total) by mouth once daily.    benazepril (LOTENSIN) 40 MG tablet Take 1 tablet (40 mg total) by mouth once daily.    chlorthalidone (HYGROTEN) 25 MG Tab Take 1 tablet (25 mg total) by mouth once daily.    letrozole (FEMARA) 2.5 mg Tab 1 Tablet  Oral Every day    metoprolol succinate (TOPROL-XL) 100 MG 24 hr tablet Take 100 mg by mouth once daily.    rivaroxaban (XARELTO) 15 mg Tab Take 1 tablet (15 mg total) by mouth once daily.    travoprost (TRAVATAN Z) 0.004 % Drop Place 1 drop into both eyes every evening.    aspirin (ECOTRIN) 81 MG EC tablet Take 81 mg by mouth. 1 Tablet, Delayed Release (E.C.) Oral Every day    carvedilol (COREG) 25 MG tablet Take 1 tablet (25 mg total) by mouth 2 (two) times daily with meals.    cholecalciferol, vitamin D3, (VITAMIN D3) 2,000 unit Cap Take 1 capsule (2,000 Units total) by mouth once daily.    diclofenac sodium 1 % Gel Apply 2 g topically 3 (three) times daily.    [DISCONTINUED] travoprost, benzalkonium, (TRAVATAN) 0.004 % ophthalmic solution Place 1 drop into both eyes every evening.     Family History     Problem Relation (Age of Onset)    Asthma Son    Cancer Sister    Diabetes Paternal Aunt    Glaucoma Father    Heart disease Father    Hypertension Mother, Father        Tobacco Use    Smoking status: Never Smoker    Smokeless tobacco: Never Used   Substance and Sexual Activity    Alcohol use: No    Drug use: No    Sexual activity: Not Currently     Review of Systems   Constitution: Negative for chills, decreased appetite and diaphoresis.   HENT: Negative for congestion and ear discharge.    Eyes: Negative for blurred vision and discharge.   Cardiovascular: Positive for irregular heartbeat. Negative for chest pain, dyspnea on exertion, leg swelling and paroxysmal nocturnal dyspnea.   Respiratory: Positive for shortness of breath. Negative for cough and hemoptysis.    Gastrointestinal: Negative for abdominal pain.   Neurological: Negative for loss of balance.   Psychiatric/Behavioral: Positive for altered mental status.        Objective:     Vital Signs (Most Recent):  Temp: 98.1 °F (36.7 °C) (08/12/18 2351)  Pulse: 95 (08/12/18 2351)  Resp: 20 (08/12/18 2101)  BP: (!) 156/104(Calin CHONG notified; No new  orders at this time) (08/12/18 2351)  SpO2: (!) 93 % (08/12/18 2351) Vital Signs (24h Range):  Temp:  [97.5 °F (36.4 °C)-98.1 °F (36.7 °C)] 98.1 °F (36.7 °C)  Pulse:  [] 95  Resp:  [17-23] 20  SpO2:  [93 %-99 %] 93 %  BP: (126-192)/() 156/104     Weight: 62.6 kg (138 lb)  Body mass index is 22.27 kg/m².    SpO2: (!) 93 %  O2 Device (Oxygen Therapy): room air      Intake/Output Summary (Last 24 hours) at 8/13/2018 0137  Last data filed at 8/12/2018 2200  Gross per 24 hour   Intake 90 ml   Output 0 ml   Net 90 ml       Lines/Drains/Airways     Peripheral Intravenous Line                 Peripheral IV - Single Lumen 08/12/18 1204 Right Antecubital less than 1 day                Physical Exam   Constitutional: She appears well-developed and well-nourished. No distress.   Eyes: Conjunctivae are normal. Pupils are equal, round, and reactive to light.   Neck: No tracheal deviation present. No thyromegaly present.   Cardiovascular: Intact distal pulses. An irregular rhythm present. Tachycardia present. Exam reveals no gallop and no friction rub.   Murmur heard.  Pulses:       Radial pulses are 2+ on the right side, and 2+ on the left side.        Femoral pulses are 2+ on the right side, and 2+ on the left side.  Pulmonary/Chest: Effort normal and breath sounds normal. No respiratory distress. She has no wheezes. She has no rales.   Abdominal: Soft. Bowel sounds are normal. She exhibits no distension. There is no tenderness.   Musculoskeletal: She exhibits no edema or deformity.   Neurological: She is alert. She is disoriented. No cranial nerve deficit. Coordination normal.   Skin: Skin is warm and dry. She is not diaphoretic.   Psychiatric: She has a normal mood and affect. Her behavior is normal.          Significant Labs:   BMP:   Recent Labs   Lab  08/12/18   1227   GLU  113*   NA  145   K  3.7   CL  108   CO2  23   BUN  24*   CREATININE  1.5*   CALCIUM  9.6   MG  1.8   , CMP   Recent Labs   Lab  08/12/18    1227   NA  145   K  3.7   CL  108   CO2  23   GLU  113*   BUN  24*   CREATININE  1.5*   CALCIUM  9.6   PROT  7.2   ALBUMIN  3.6   BILITOT  1.1*   ALKPHOS  158*   AST  31   ALT  29   ANIONGAP  14   ESTGFRAFRICA  38.5*   EGFRNONAA  33.4*   , CBC   Recent Labs   Lab  08/12/18   1227   WBC  4.95   HGB  13.6   HCT  44.1   PLT  188    and Troponin   Recent Labs   Lab  08/12/18   1227  08/12/18   1657  08/12/18   2331   TROPONINI  0.350*  0.324*  0.257*       Significant Imaging: Echocardiogram:   2D echo with color flow doppler:   Results for orders placed or performed during the hospital encounter of 04/10/18   2D echo with color flow doppler   Result Value Ref Range    EF 30 (A) 55 - 65    Mitral Valve Regurgitation MILD     Est. PA Systolic Pressure 34.36     Pericardial Effusion TRIVIAL     Tricuspid Valve Regurgitation MILD

## 2018-08-13 NOTE — ASSESSMENT & PLAN NOTE
Recommend repeating CT scan of Head  MRI Brain in needed  Consult to General Neurology re memory loss issues

## 2018-08-13 NOTE — PLAN OF CARE
08/13/18 1043   Discharge Assessment   Assessment Type Discharge Planning Assessment   Confirmed/corrected address and phone number on facesheet? Yes   Assessment information obtained from? Patient;Medical Record   Expected Length of Stay (days) 2   Communicated expected length of stay with patient/caregiver yes   Prior to hospitilization cognitive status: Alert/Oriented   Prior to hospitalization functional status: Independent   Current cognitive status: Alert/Oriented   Current Functional Status: Independent   Lives With child(rubina), adult   Able to Return to Prior Arrangements yes   Is patient able to care for self after discharge? Yes   Patient's perception of discharge disposition home or selfcare   Readmission Within The Last 30 Days no previous admission in last 30 days   Patient currently being followed by outpatient case management? No   Patient currently receives any other outside agency services? No   Equipment Currently Used at Home none   Do you have any problems affording any of your prescribed medications? TBD   Is the patient taking medications as prescribed? yes   Does the patient have transportation home? Yes   Transportation Available family or friend will provide   Does the patient receive services at the [facility]? No   Discharge Plan A Home with family   Patient/Family In Agreement With Plan yes   Admitted with HTN. Lives with her daughter and is independent in her ADLs. Plan is to DC home. No DC needs identified.

## 2018-08-13 NOTE — ASSESSMENT & PLAN NOTE
- CR 1.5 up from 1.1. On admit, held chlorthalidone and benazepril on admit  - Cr back down to 1.3, cards co managed resumed benazepril and lasix   - monitor closely,   - AMS most likely has decreased po intake than typical

## 2018-08-13 NOTE — PROGRESS NOTES
[facility] Hospital Medicine  Progress Note    Patient Name: Temitope Suero  MRN: [id number]  Patient Class: IP- Inpatient   Admission Date: 8/12/2018  Length of Stay: 1 days  Attending Physician: Fredy Penn MD  Primary Care Provider: Gm Zambrano MD    [facility] Medicine Team: [facility] [place] [facility]OSP MED ELAN Melendrez NP    Subjective:     Principal Problem:Hypertensive emergency    HPI:  Temitope Suero is a 78 y.o. f who presents to ED with niece with c/o increasing sob, increasing fatigue, chest wall heaves, altered mental status for past several days and malignant hypertension.  She has PMH of HFrEF 30-35% 4/18, HTN, with recent bout of malignant hypertension back in April precipitating CVA, h/o embolic CVA, pAfib on rivaroxiban, CKD III, HLD, Goiter multinodular, previously obesity induced DM, now no longer diabetic and Breast CA (which the niece says she's no longer taking for over a year but pharmacy has it being filled recently).  She was found in the ED to have A flutter with 2-3:1 variable block with RVR, BNP 1030, troponin 0.350 in setting of elevated CR 1.5/ BUN 24.  ECG with variable block flutter rapid vent response.  She has new onset AMS, no recollection of date nor president, can perform simple tasks, short term memory very poor.  Asked to get urine sample when in bathroom and she poured hat into toilet.  She has been a no show or has cancelled all her appt.s since May.  She usually lives with her niece and her nephew, however the daughter has been out of town recently and the nephew has been monitoring her and her medications.  As far as the niece knows she is up to date with her meds. Though two pills were found on the floor the other day.  We called the pharmacy to see if she's picked up her meds.  She was prescribed coreg with last visit in April with Dr. Calero and this was never picked up from the pharmacy, so we can only presume she might still be on her toprol 100mg  daily.  She had a new diagnosis of heart failure during her last admission which was also accompanied by new onset CVA d/t severe HTN.  Her last renal artery stenosis eval was 2014 which was negative at that time.  Pharm stress test noted 2011 negative for ischemia despite ecg changes.      She has not been able to be managed properly with IV /PO meds in ED so she will be transferred to the ICU with a cardene gtt for aggressive b/p control while her meds have time to kick in.        Social History Includes:  She lives with her niece and nephew who are her primary care takers.      Cardiologist: Dr. Calero    [facility] Course:  Admitted to hospital medicine for hypertensive emergency, altered mental status, sob/ HF sx's, acute renal insufficiency Cr 1.5 baseline CR 1.1.  She was supposed to be taking coreg 25mg since April never picked up rx.  Upon admit, her metoprolol was changed to Coreg 25mg bid, initially her benazepril was held d/t acute renal insufficiency, she was given oral meds in addition to IV meds in ED which precipitiously plummeted her b/p from the 190's/110 to 120's systolic, which is too great a shift since she now has concerns for possible lateral R frontal lobe hemorrhage prior to drop in b/p. She was supposed to go to the ICU for controlled b/p reduction with IV cardene, however b/p dropped precipitously in the ED so it was decided by cardiology to allow her to come to the floor.  She is followed by Elayne co managed who gave her both lasix and benazepril unbeknownst to the primary team who was also making b/p med adjustments, so her b/p preciptiously dropped below the 120's again.  She appears to have a R SCL stenosis so her b/p's are much lower by 20 points on the Right side.  B/p's can only be taken in the L arm from here forward.  Elayne lackey would like her to be in the neuro ICU for closer b/p monitoring and titrating with IV meds/ gtt as needed in a more controlled environment.      Stroke and Neurology were consulted for her new ? bleed, progressive and acute AMS/ new profound STM loss, long term is intact (she did not show any focal deficits on exam upon presentation, only extreme forgetfulness, severe short term memory loss, both issues are not affected by the area of her brain affected by the area of the bleed).  MRI noncontrast obtained per stroke request, pending read.  Also per stroke team DO NOT let her b/p's fall precipitiously and/or below the 120's, which is why we are requesting ICU stay with gtt to slowly wean down her b/p with addition of meds one at time to see how she responded and to only have one team manage her medication regimen at a given time.     Afib/ flutter: HR is better controlled <100, she is still in variable block flutter.  AC on hold till neurological issues resolved and awaiting decision from stroke team for resumption and type of AC preferred, previously on xarelto which she was given a dose on day of admit in ED prior to the CT scan results. No focal deficits noted.       Dispo; most likely home with home health.  She needs f/u with neurology for chronic vascular dementia, htn and stroke     Interval History: Noted bleed on CT lateral right frontal lobe, also R MCA distribution infarct, family states she's been taking her rivaroxiban, though hypertensive emergency and poorly controlled in house, so unsure if it's more hypertensive related.  Cards co managed would like her in the neuro ICU for close observation for b/p titration.  Otherwise, patient with continued STM loss, hard to follow commands / comprehension vs just hard of hearing.      Review of Systems   Reason unable to perform ROS: mostly from niece.   Constitutional: Negative for activity change, appetite change, chills, fatigue and fever.   HENT: Negative for congestion, rhinorrhea, sinus pressure, sore throat and trouble swallowing.         Very hard of hearing    Eyes: Negative for pain,  redness and visual disturbance.   Respiratory: Negative for cough, chest tightness, shortness of breath, wheezing and stridor.    Cardiovascular: Negative for chest pain, palpitations and leg swelling.   Gastrointestinal: Negative for abdominal distention, abdominal pain, blood in stool, constipation, diarrhea, nausea and vomiting.   Endocrine: Negative for cold intolerance and heat intolerance.   Genitourinary: Negative for difficulty urinating, dysuria, frequency and urgency.        Nocturia     Musculoskeletal: Negative for arthralgias, back pain, myalgias and neck pain.   Skin: Negative for color change, pallor, rash and wound.   Allergic/Immunologic: Negative for immunocompromised state.   Neurological: Negative for dizziness, tremors, syncope, weakness, light-headedness, numbness and headaches.        STM LOSS, forgetful, does not follow commands well.    Hematological: Does not bruise/bleed easily.   Psychiatric/Behavioral: Positive for confusion. Negative for agitation, decreased concentration and sleep disturbance. The patient is not nervous/anxious.      Objective:     Vital Signs (Most Recent):  Temp: 97.4 °F (36.3 °C) (08/13/18 1459)  Pulse: 87 (08/13/18 1459)  Resp: 17 (08/13/18 1459)  BP: 115/69 (08/13/18 1459)  SpO2: 97 % (08/13/18 1459) Vital Signs (24h Range):  Temp:  [96.7 °F (35.9 °C)-98.1 °F (36.7 °C)] 97.4 °F (36.3 °C)  Pulse:  [] 87  Resp:  [16-20] 17  SpO2:  [93 %-99 %] 97 %  BP: (115-192)/() 115/69     Weight: 60.9 kg (134 lb 4.2 oz)  Body mass index is 21.67 kg/m².    Intake/Output Summary (Last 24 hours) at 8/13/2018 1512  Last data filed at 8/13/2018 1400  Gross per 24 hour   Intake 740 ml   Output 1200 ml   Net -460 ml      Physical Exam   Constitutional: She appears well-developed and well-nourished. No distress.   HENT:   Head: Normocephalic and atraumatic.   Right Ear: External ear normal.   Left Ear: External ear normal.   Nose: Nose normal.   Mouth/Throat: Oropharynx is  clear and moist.   Eyes: Conjunctivae and EOM are normal. No scleral icterus.   Neck: Normal range of motion. Neck supple. No JVD present.   Cardiovascular: Normal rate, regular rhythm, normal heart sounds and intact distal pulses. Exam reveals no gallop and no friction rub.   No murmur heard.  Pulmonary/Chest: Effort normal and breath sounds normal. No respiratory distress. She has no wheezes. She has no rales.   Abdominal: Soft. Bowel sounds are normal. She exhibits no distension and no mass. There is no tenderness. There is no rebound and no guarding.   Musculoskeletal: Normal range of motion. She exhibits no edema or tenderness.   Neurological: She is alert. No cranial nerve deficit or sensory deficit. She exhibits normal muscle tone. Coordination normal.   Confused, very poor STM, does not follow commands well, needs lots of prompting.    Skin: Skin is warm and dry. No rash noted. She is not diaphoretic. No erythema.   Psychiatric: She has a normal mood and affect. Her behavior is normal. Judgment and thought content normal.   Nursing note and vitals reviewed.      Significant Labs:   CMP:   Recent Labs   Lab  08/12/18   1227  08/13/18   0431   NA  145  143   K  3.7  3.7   CL  108  104   CO2  23  27   GLU  113*  115*   BUN  24*  24*   CREATININE  1.5*  1.3   CALCIUM  9.6  9.3   PROT  7.2  6.4   ALBUMIN  3.6  3.3*   BILITOT  1.1*  1.1*   ALKPHOS  158*  158*   AST  31  29   ALT  29  30   ANIONGAP  14  12   EGFRNONAA  33.4*  39.7*       Significant Imaging: Head CT noncontrast: I have reviewed all pertinent results/findings within the past 24 hours and my personal findings are:  Stable vague area of hyperattenuation within a small area of encephalomalacia in the lateral right frontal lobe, possibly a stable area of hemorrhagic infarct.  Consider evaluation with MRI as indicated.  Generalized cerebral volume loss and chronic microvascular ischemic change.  Left parietal and right cerebellar encephalomalacia from  remote infarcts.    MRI noncontrast: Small areas of recent infarction within the right frontal lobe in the left cerebellar hemisphere.  Right frontal infarct as small associated hemorrhage. Multiple several scattered remote infarcts.  Findings concerning for possible embolic disease.  Moderate chronic microvascular ischemic change with remote thalamic lacunar type infarcts and scattered microhemorrhages.  Constellation of findings suggesting chronic hypertensive encephalopathy.    Assessment/Plan:      * Hypertensive emergency    - admit b/p 169/120 progressed to 192/126   - s/p lasix, IV metop for rate control, IV hydralazine, po coreg   - initially held benazapril and chlorthalidone as CR was up from baseline, benazepril resumed by cards co managed, in addition to lasix 40mg iv bid for acute heart failure symptoms  - initially discussed case with CCU team to start cardene gtt, however since b/p became responsive in ED it was opted to transfer to floor.  Head Ct was noted to have possible infarct with hemorraghic conversion at this time, so it was decided to be very cautious about lowering her b/p at this time below 160, until stroke neurology was able to give full consultation.   - EF 30-35%, no chest pain, + sob, + troponin's flat, h/o negative pharm stress echo in 2011 with normal EF at that time.   - h/o negative MARVA  - since she's responsive to b/p meds, it seems it's a matter of her taking her meds appropriately        Atrial flutter    - new onset flutter from previous Fib  - gave dose of rivaroxiban in ED as she had no s/s of focal deficits, now concern for multiple infarcts in R MCA distribution which lends itself to embolic phenomena per neurology and that she may not have been taking her AC properly.  She was taking in am with all her other meds per niece.  Expressed it's with the largest meal, though she may tolerate eliquis better, lower chance of bleeding profile.   - Large LAE, will be hard to keep  her rate controlled and or back in SR.            Deterioration of memory    - this has been ongoing since April per Neurology and is more progressive and possibly more acute d/t recent infarcts   -they will f/u as an outpatient           Malignant hypertensive urgency    - still without concrete goals of b/p. Appears to have R MCA distribution infarcts, c/w embolization.  - awaiting recs from stroke team  - currently practicing goals of sbp > 120, with goal around 160's as she has recent infarcts, avoiding severe pressure reductions, which has been an issue when multiple providers attesting medication record.  Cards co managed would like patient to be treated in Neuro ICU with closer monitoring to avoid significant shifts in b/p's as she's experienced in past 24 hrs.   - will need HH to monitor b/p and medication administration, niece is home and is taking over her care from her son          Abnormal CT scan of head    -see above           Disorientation    - ? Cause, elevated b/p, no UTI per urinalysis     - head CT / noncontrast Head MRI c/w R MCA territory infarct with possible hemorrhagic conversion  - aggressive b/p control, b/p uncomfortably high and within stroke range.-190's/  's, initially didn't budge with IV / PO MEDS, then all preciptiously worked all at once, which makes for just as dangerous situation with a precipitious drop  - awaiting stroke team to round         Acute on chronic systolic heart failure exacerbated htn emergency    - bnp >1k, sob, CXR: findings suggesting small bilateral effusions, left greater than right.  There may be compressive atelectasis versus developing consolidation projected over the left lower lung zone  - lasix 80mg iv x1 ED, very effective, cards co managed resumed lasix 40mg bid IV   - Initially held acei/ chlorthalidone, now that CR is trending back to baseline, ACEI was resumed along with lasix per card co managed.  - no h/o renal artery stenosis as  cause for her htn emergency/ HF exacerbation,  - presume she's not been taking her pills appropriately ( ie pocketing and spitting them out later)  - EF was normal in 2011 with normal pharm stress echo          Chronic anticoagulation - rivaroxaban    - p afib now flutter   - R MCA distribution of multiple infarcts which looks like embolic phenomena vs hypertensive emergency, which lends itself to possibly she has not been taking her rivaroxiban properly, niece says her son has been giving her meds in am and only recently found two on the floor.  ? Pocketing and spitting out later due to her progressive confusion.           Acute renal failure superimposed on stage 3 chronic kidney disease    - CR 1.5 up from 1.1. On admit, held chlorthalidone and benazepril on admit  - Cr back down to 1.3, cards co managed resumed benazepril and lasix   - monitor closely,   - AMS most likely has decreased po intake than typical          History of stroke    - was not taking baby aspirin for prevention prior to admit, start 81 mg  - given dose of rivaroxiban in ED, held subsequent doses pending stroke team recommendations   -ams, head ct with R lateral frontal lobe small hemorrhagic area, pending MRI noncontrast and stroke team follow up          Toxic multinodular goiter    Normal TSH           Mixed hyperlipidemia    - atorvastatin increase to 80 mg           Type 2 diabetes mellitus with diabetic chronic kidney disease    - patient's hgb a1c levels have been in low 6 or 6 range for years since she's lost weight.  Patient does not appear to be diabetic nor is she on any diabetes medicines.           Paroxysmal atrial fibrillation    - not rate controlled on metoprolol, was supposed to be on coreg, not sure she's even taking her meds  - currently rate controlled with coreg, HR < 100  - awaiting stroke team to give recommendations regarding AC resumption and if need to change to alternative            VTE Risk Mitigation (From  admission, onward)    None              Sandrine Melendrez NP  Department of Hospital Medicine   [facility]  Spectra:  [phone]  Pager: [phone]

## 2018-08-14 PROBLEM — I16.1 HYPERTENSIVE EMERGENCY: Status: RESOLVED | Noted: 2018-08-12 | Resolved: 2018-08-14

## 2018-08-14 PROBLEM — I16.0 MALIGNANT HYPERTENSIVE URGENCY: Status: RESOLVED | Noted: 2018-08-13 | Resolved: 2018-08-14

## 2018-08-14 LAB
ALBUMIN SERPL BCP-MCNC: 2.8 G/DL
ALP SERPL-CCNC: 133 U/L
ALT SERPL W/O P-5'-P-CCNC: 21 U/L
ANION GAP SERPL CALC-SCNC: 10 MMOL/L
APTT BLDCRRT: 23.9 SEC
AST SERPL-CCNC: 20 U/L
BASOPHILS # BLD AUTO: 0.04 K/UL
BASOPHILS NFR BLD: 0.9 %
BILIRUB SERPL-MCNC: 0.7 MG/DL
BUN SERPL-MCNC: 35 MG/DL
CALCIUM SERPL-MCNC: 8.9 MG/DL
CHLORIDE SERPL-SCNC: 107 MMOL/L
CK MB SERPL-MCNC: 2.1 NG/ML
CK MB SERPL-RTO: 2.9 %
CK SERPL-CCNC: 72 U/L
CO2 SERPL-SCNC: 25 MMOL/L
CREAT SERPL-MCNC: 1.5 MG/DL
DIFFERENTIAL METHOD: ABNORMAL
EOSINOPHIL # BLD AUTO: 0.1 K/UL
EOSINOPHIL NFR BLD: 2.5 %
ERYTHROCYTE [DISTWIDTH] IN BLOOD BY AUTOMATED COUNT: 16.9 %
EST. GFR  (AFRICAN AMERICAN): 38.5 ML/MIN/1.73 M^2
EST. GFR  (NON AFRICAN AMERICAN): 33.4 ML/MIN/1.73 M^2
GLUCOSE SERPL-MCNC: 101 MG/DL
HCT VFR BLD AUTO: 40 %
HGB BLD-MCNC: 12.9 G/DL
IMM GRANULOCYTES # BLD AUTO: 0.01 K/UL
IMM GRANULOCYTES NFR BLD AUTO: 0.2 %
INR PPP: 1.1
LYMPHOCYTES # BLD AUTO: 1.7 K/UL
LYMPHOCYTES NFR BLD: 37.5 %
MAGNESIUM SERPL-MCNC: 1.7 MG/DL
MCH RBC QN AUTO: 25.4 PG
MCHC RBC AUTO-ENTMCNC: 32.3 G/DL
MCV RBC AUTO: 79 FL
MONOCYTES # BLD AUTO: 0.4 K/UL
MONOCYTES NFR BLD: 9.5 %
NEUTROPHILS # BLD AUTO: 2.2 K/UL
NEUTROPHILS NFR BLD: 49.4 %
NRBC BLD-RTO: 0 /100 WBC
PHOSPHATE SERPL-MCNC: 4 MG/DL
PLATELET # BLD AUTO: 203 K/UL
PMV BLD AUTO: 12.5 FL
POTASSIUM SERPL-SCNC: 3.6 MMOL/L
PROT SERPL-MCNC: 5.5 G/DL
PROTHROMBIN TIME: 11.6 SEC
RBC # BLD AUTO: 5.07 M/UL
SODIUM SERPL-SCNC: 142 MMOL/L
TROPONIN I SERPL DL<=0.01 NG/ML-MCNC: 0.15 NG/ML
WBC # BLD AUTO: 4.43 K/UL

## 2018-08-14 PROCEDURE — 95816 EEG AWAKE AND DROWSY: CPT

## 2018-08-14 PROCEDURE — 92523 SPEECH SOUND LANG COMPREHEN: CPT

## 2018-08-14 PROCEDURE — 93010 ELECTROCARDIOGRAM REPORT: CPT | Mod: ,,, | Performed by: INTERNAL MEDICINE

## 2018-08-14 PROCEDURE — 99233 SBSQ HOSP IP/OBS HIGH 50: CPT | Mod: ,,, | Performed by: PSYCHIATRY & NEUROLOGY

## 2018-08-14 PROCEDURE — G8987 SELF CARE CURRENT STATUS: HCPCS | Mod: CK

## 2018-08-14 PROCEDURE — G8997 SWALLOW GOAL STATUS: HCPCS | Mod: CH

## 2018-08-14 PROCEDURE — 97162 PT EVAL MOD COMPLEX 30 MIN: CPT

## 2018-08-14 PROCEDURE — 95816 EEG AWAKE AND DROWSY: CPT | Mod: 26,,, | Performed by: PSYCHIATRY & NEUROLOGY

## 2018-08-14 PROCEDURE — G8988 SELF CARE GOAL STATUS: HCPCS | Mod: CI

## 2018-08-14 PROCEDURE — G8996 SWALLOW CURRENT STATUS: HCPCS | Mod: CH

## 2018-08-14 PROCEDURE — G8998 SWALLOW D/C STATUS: HCPCS | Mod: CH

## 2018-08-14 PROCEDURE — 92610 EVALUATE SWALLOWING FUNCTION: CPT

## 2018-08-14 PROCEDURE — 20600001 HC STEP DOWN PRIVATE ROOM

## 2018-08-14 PROCEDURE — 80053 COMPREHEN METABOLIC PANEL: CPT

## 2018-08-14 PROCEDURE — 85610 PROTHROMBIN TIME: CPT

## 2018-08-14 PROCEDURE — 97116 GAIT TRAINING THERAPY: CPT

## 2018-08-14 PROCEDURE — 25000003 PHARM REV CODE 250: Performed by: NURSE PRACTITIONER

## 2018-08-14 PROCEDURE — 84484 ASSAY OF TROPONIN QUANT: CPT

## 2018-08-14 PROCEDURE — 25000003 PHARM REV CODE 250: Performed by: PHYSICIAN ASSISTANT

## 2018-08-14 PROCEDURE — 97165 OT EVAL LOW COMPLEX 30 MIN: CPT

## 2018-08-14 PROCEDURE — 85730 THROMBOPLASTIN TIME PARTIAL: CPT

## 2018-08-14 PROCEDURE — 82553 CREATINE MB FRACTION: CPT

## 2018-08-14 PROCEDURE — 84100 ASSAY OF PHOSPHORUS: CPT

## 2018-08-14 PROCEDURE — 85025 COMPLETE CBC W/AUTO DIFF WBC: CPT

## 2018-08-14 PROCEDURE — 63600175 PHARM REV CODE 636 W HCPCS: Performed by: STUDENT IN AN ORGANIZED HEALTH CARE EDUCATION/TRAINING PROGRAM

## 2018-08-14 PROCEDURE — 82550 ASSAY OF CK (CPK): CPT

## 2018-08-14 PROCEDURE — 99222 1ST HOSP IP/OBS MODERATE 55: CPT | Mod: ,,, | Performed by: NURSE PRACTITIONER

## 2018-08-14 PROCEDURE — 83735 ASSAY OF MAGNESIUM: CPT

## 2018-08-14 PROCEDURE — 97530 THERAPEUTIC ACTIVITIES: CPT

## 2018-08-14 PROCEDURE — 93005 ELECTROCARDIOGRAM TRACING: CPT

## 2018-08-14 PROCEDURE — G8978 MOBILITY CURRENT STATUS: HCPCS | Mod: CJ

## 2018-08-14 PROCEDURE — A4216 STERILE WATER/SALINE, 10 ML: HCPCS | Performed by: PHYSICIAN ASSISTANT

## 2018-08-14 PROCEDURE — 36415 COLL VENOUS BLD VENIPUNCTURE: CPT

## 2018-08-14 PROCEDURE — G8979 MOBILITY GOAL STATUS: HCPCS | Mod: CI

## 2018-08-14 RX ADMIN — Medication 3 ML: at 06:08

## 2018-08-14 RX ADMIN — FUROSEMIDE 40 MG: 10 INJECTION, SOLUTION INTRAMUSCULAR; INTRAVENOUS at 05:08

## 2018-08-14 RX ADMIN — LETROZOLE 2.5 MG: 2.5 TABLET ORAL at 10:08

## 2018-08-14 RX ADMIN — Medication 54 MG: at 08:08

## 2018-08-14 RX ADMIN — ATORVASTATIN CALCIUM 80 MG: 20 TABLET, FILM COATED ORAL at 09:08

## 2018-08-14 RX ADMIN — VITAMIN D, TAB 1000IU (100/BT) 2000 UNITS: 25 TAB at 09:08

## 2018-08-14 RX ADMIN — BENAZEPRIL HYDROCHLORIDE 20 MG: 20 TABLET, FILM COATED ORAL at 09:08

## 2018-08-14 RX ADMIN — FUROSEMIDE 40 MG: 10 INJECTION, SOLUTION INTRAMUSCULAR; INTRAVENOUS at 09:08

## 2018-08-14 RX ADMIN — CARVEDILOL 25 MG: 25 TABLET, FILM COATED ORAL at 05:08

## 2018-08-14 RX ADMIN — CARVEDILOL 25 MG: 25 TABLET, FILM COATED ORAL at 06:08

## 2018-08-14 RX ADMIN — Medication 54 MG: at 09:08

## 2018-08-14 RX ADMIN — APIXABAN 5 MG: 5 TABLET, FILM COATED ORAL at 08:08

## 2018-08-14 RX ADMIN — APIXABAN 5 MG: 5 TABLET, FILM COATED ORAL at 09:08

## 2018-08-14 RX ADMIN — Medication 3 ML: at 10:08

## 2018-08-14 RX ADMIN — Medication 3 ML: at 02:08

## 2018-08-14 NOTE — SUBJECTIVE & OBJECTIVE
Past Medical History:   Diagnosis Date    Cancer of left breast, stage 2 11/24/2015    Cerebrovascular small vessel disease 4/11/2018    Bi-thalamic lacunar disease, mod/sev periventricular white matter disease, mixed pattern cerebral microbleeds    Chronic anticoagulation - rivaroxaban 4/17/2018    Chronic systolic heart failure 4/17/2018    Echo 4-2018   1 - Moderately depressed left ventricular systolic function (EF 30-35%).    2 - Biatrial enlargement.    3 - Normal right ventricular systolic function .    4 - Mild mitral regurgitation.    5 - Mild tricuspid regurgitation.    6 - The estimated PA systolic pressure is 34 mmHg.    7 - Increased central venous pressure.    8 - Left pleural effusion.    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    Vitamin D deficiency disease 10/28/2013     Past Surgical History:   Procedure Laterality Date    BREAST SURGERY      CHOLECYSTECTOMY       Review of patient's allergies indicates:  No Known Allergies    Scheduled Medications:    apixaban  5 mg Oral BID    atorvastatin  80 mg Oral Daily    benazepril  20 mg Oral Daily    carvedilol  25 mg Oral BID WM    furosemide  40 mg Intravenous BID    letrozole  2.5 mg Oral Daily    magnesium gluconate  54 mg Oral BID    sodium chloride 0.9%  3 mL Intravenous Q8H    vitamin D  2,000 Units Oral Daily       PRN Medications: acetaminophen, dextrose 50%, dextrose 50%, glucagon (human recombinant), glucose, glucose,  hydrALAZINE, sodium chloride 0.9%, sodium chloride 0.9%    Family History     Problem Relation (Age of Onset)    Asthma Son    Cancer Sister    Diabetes Paternal Aunt    Glaucoma Father    Heart disease Father    Hypertension Mother, Father        Tobacco Use    Smoking status: Never Smoker    Smokeless tobacco: Never Used   Substance and Sexual Activity    Alcohol use: No    Drug use: No    Sexual activity: Not Currently     Review of Systems   Constitutional: Negative for fatigue and fever.   HENT: Positive for hearing loss. Negative for drooling and sore throat.    Eyes: Positive for visual disturbance.   Respiratory: Negative for cough and shortness of breath.    Cardiovascular: Negative for chest pain and leg swelling.   Gastrointestinal: Negative for abdominal distention, abdominal pain, nausea and vomiting.   Genitourinary: Negative for difficulty urinating.   Musculoskeletal: Negative for back pain and joint swelling.   Skin: Negative for color change.   Neurological: Negative for dizziness, light-headedness and headaches.   Psychiatric/Behavioral: Positive for confusion. Negative for agitation.     Objective:     Vital Signs (Most Recent):  Temp: 97.7 °F (36.5 °C) (08/14/18 0531)  Pulse: 80 (08/14/18 0700)  Resp: 18 (08/14/18 0531)  BP: (!) 135/92 (08/14/18 0531)  SpO2: 97 % (08/14/18 0531)    Vital Signs (24h Range):  Temp:  [97.4 °F (36.3 °C)-97.9 °F (36.6 °C)] 97.7 °F (36.5 °C)  Pulse:  [] 80  Resp:  [16-18] 18  SpO2:  [95 %-98 %] 97 %  BP: (115-145)/(69-92) 135/92     Body mass index is 21.67 kg/m².    Physical Exam   Constitutional: She appears well-developed and well-nourished.   HENT:   Head: Normocephalic and atraumatic.   Eyes: EOM are normal. Pupils are equal, round, and reactive to light.   Neck: Normal range of motion. Neck supple.   Pulmonary/Chest: Effort normal and breath sounds normal.   Abdominal: Soft. Normal appearance and bowel sounds are normal. She exhibits no distension and  no abdominal bruit. There is no tenderness. There is no rigidity.   Musculoskeletal: Normal range of motion.   Neurological:   -  Mental Status: Awake and alert, sitting up in bed. Follows commands with additional prompting. Able to answer correct . Unable to answer year or month. Recalls 3/3 objects and 3/4 seasons of the year.   -  Speech and language:  no dysarthria.    -  Vision: no ptosis, no diplopia   -  Facial movement (CN VII): symmetrical  -  Coordination:  Finger to nose exam: normal  -  Motor: RUE: 5/5, 5/5 .  LUE: 5/5, 5/5 . RLE: 5/5, DF 5/5, PF 5/5.  LLE: 5/5, DF 5/5, PF 5/5.  -  Tone: normal  -  Sensory:  Intact to light touch.    Skin: Skin is warm and dry.   Psychiatric: She has a normal mood and affect.     NEUROLOGICAL EXAMINATION:     CRANIAL NERVES     CN III, IV, VI   Pupils are equal, round, and reactive to light.  Extraocular motions are normal.     Diagnostic Results:   Labs: Reviewed  ECG: Reviewed  CT: Reviewed  MRI: Reviewed

## 2018-08-14 NOTE — PLAN OF CARE
Problem: Patient Care Overview  Goal: Plan of Care Review     Recommendations  Recommendation/Intervention: Continue diabetic diet.   Goals: 1. Pt to consume % meals

## 2018-08-14 NOTE — SUBJECTIVE & OBJECTIVE
Subjective:      Interval History:  more alert this morning. Oriented to self and place but not time. EEG, PT/OT pending. Passed SLP evaluation, starting on a diet.      Medications Prior to Admission   Medication Sig Dispense Refill Last Dose    atorvastatin (LIPITOR) 40 MG tablet Take 1 tablet (40 mg total) by mouth once daily. 90 tablet 4 8/11/2018    benazepril (LOTENSIN) 40 MG tablet Take 1 tablet (40 mg total) by mouth once daily. 90 tablet 4 8/11/2018    chlorthalidone (HYGROTEN) 25 MG Tab Take 1 tablet (25 mg total) by mouth once daily. 90 tablet 3 8/11/2018    letrozole (FEMARA) 2.5 mg Tab 1 Tablet Oral Every day 90 tablet 4 8/11/2018 at Unknown time    rivaroxaban (XARELTO) 15 mg Tab Take 1 tablet (15 mg total) by mouth once daily. 30 tablet 11 8/11/2018    travoprost (TRAVATAN Z) 0.004 % Drop Place 1 drop into both eyes every evening. 2.5 mL 4 8/11/2018    aspirin (ECOTRIN) 81 MG EC tablet Take 81 mg by mouth. 1 Tablet, Delayed Release (E.C.) Oral Every day   Unknown at Unknown time    carvedilol (COREG) 25 MG tablet Take 1 tablet (25 mg total) by mouth 2 (two) times daily with meals. 60 tablet 11 Unknown at Unknown time    cholecalciferol, vitamin D3, (VITAMIN D3) 2,000 unit Cap Take 1 capsule (2,000 Units total) by mouth once daily. 90 capsule 5 Unknown at Unknown time    diclofenac sodium 1 % Gel Apply 2 g topically 3 (three) times daily. 100 g 6 Unknown at Unknown time       Review of Systems   Constitutional: Positive for activity change. Negative for chills and fever.   HENT: Negative for drooling and voice change.    Eyes: Negative for visual disturbance.   Respiratory: Negative for choking and shortness of breath.    Cardiovascular: Negative for palpitations and leg swelling.   Gastrointestinal: Negative for abdominal pain, nausea and vomiting.   Genitourinary: Negative for dysuria and hematuria.   Musculoskeletal: Negative for gait problem.   Skin: Negative for pallor and wound.    Allergic/Immunologic: Negative for immunocompromised state.   Neurological: Negative for seizures, facial asymmetry, speech difficulty, weakness and headaches.   Psychiatric/Behavioral: Positive for confusion. Negative for agitation. The patient is not nervous/anxious.      Objective:     Vital Signs (Most Recent):  Temp: 97.5 °F (36.4 °C) (08/14/18 1123)  Pulse: 101 (08/14/18 1123)  Resp: 18 (08/14/18 1123)  BP: (!) 131/90 (08/14/18 1123)  SpO2: (!) 94 % (08/14/18 1123)    Vital Signs Range (Last 24H):  Temp:  [97.4 °F (36.3 °C)-97.9 °F (36.6 °C)]   Pulse:  []   Resp:  [16-18]   BP: (115-145)/(69-92)   SpO2:  [94 %-98 %]     Physical Exam   Constitutional: She appears well-developed and well-nourished. She is cooperative. She does not appear ill. No distress.   HENT:   Head: Normocephalic and atraumatic.   Mouth/Throat: Mucous membranes are dry.   Eyes: EOM are normal. Pupils are equal, round, and reactive to light.   Neck: Normal range of motion.   Cardiovascular: Normal rate. An irregularly irregular rhythm present.   Pulses:       Radial pulses are 2+ on the right side, and 2+ on the left side.   Pulmonary/Chest: Effort normal. No tachypnea. No respiratory distress.   Abdominal: Soft.   Neurological: She is alert. She displays no seizure activity.   Skin: Skin is warm.   Psychiatric: She has a normal mood and affect. Her speech is normal and behavior is normal.   Nursing note and vitals reviewed.      Neurological Exam:   LOC: alert  Attention Span: Good   Language: No aphasia  Articulation: No dysarthria  Orientation: Not oriented to time  Visual Fields: Full  EOM (CN III, IV, VI): Full/intact  Pupils (CN II, III): PERRL  Facial Sensation (CN V): Normal  Facial Movement (CN VII): Symmetric facial expression    Reflexes: flexor plantar responses bilaterally  Motor: Arm left  Normal 5/5  Leg left  Normal 5/5  Arm right  Normal 5/5  Leg right Normal 5/5  Cebellar: No evidence of appendicular or axial  ataxia  Sensation: Intact to light touch, temperature and vibration  Tone: Normal tone throughout      Laboratory:  CMP:   Recent Labs   Lab  08/14/18   0403   CALCIUM  8.9   ALBUMIN  2.8*   PROT  5.5*   NA  142   K  3.6   CO2  25   CL  107   BUN  35*   CREATININE  1.5*   ALKPHOS  133   ALT  21   AST  20   BILITOT  0.7     CBC:   Recent Labs   Lab  08/14/18   0403   WBC  4.43   RBC  5.07   HGB  12.9   HCT  40.0   PLT  203   MCV  79*   MCH  25.4*   MCHC  32.3     Lipid Panel:   Recent Labs   Lab  08/13/18 2005   CHOL  177   LDLCALC  110.2   HDL  45   TRIG  109     Coagulation:   Recent Labs   Lab  08/14/18   0403   INR  1.1   APTT  23.9     Hgb A1C:   Recent Labs   Lab  08/12/18   1227   HGBA1C  6.0*     TSH:   Recent Labs   Lab  08/12/18   1227   TSH  1.367       Diagnostic Results:      Brain imaging:  CT Head w/o contrast 8-12-18 results:  Vague hyperattenuation within the right frontal lobe laterally, in a region of artifact, may reflect sequela of the same however hemorrhage is not entirely excluded.  Given motion in the region, consideration should be given to repeat examination for exclusion of hemorrhage as warranted.  Correlation with history is advised..    Stable left parietal and right cerebellar encephalomalacia.    Sequela of chronic microvascular ischemic change and senescent change.    brain MRI:     acute L cerebellar punctate infarct      Subacute R frontoparietal infarct with hemorrhagic conversion      Vascular imaging:  - Brain + Neck MRA on 8/13: no evidence of LVO or high-grade stenosis  - Brain MRV on 8/13: no evidence of venous sinus thrombosis    Cardiac Evaluation:   EKG 8-12-18 results:  Atrial flutter with variable A-V block + LAD

## 2018-08-14 NOTE — ASSESSMENT & PLAN NOTE
Cr continues to trend up, possibly due to ACEi that was started yesterday  Will continue to monitor and will switch to another category if necessary

## 2018-08-14 NOTE — HOSPITAL COURSE
8/13 pt continue to have AMS, transferred to vascular neurology care.  8/14: more alert this morning. Oriented to self and place but not time. EEG, PT/OT pending. Has failed ANAND overnight, SLP evaluation pending  8/15: no major events overnight, she is alert this morning, however not oriented time or place. EEG showed generalized encephalopathy without any epileptiform activity.

## 2018-08-14 NOTE — CONSULTS
[facility]  Physical Medicine & Rehab  Consult Note    Patient Name: Temitope Suero  MRN: [id number]  Admission Date: 8/12/2018  Hospital Length of Stay: 2 days  Attending Physician: Gordon Moss MD     Inpatient consult to Physical Medicine & Rehabilitation  Consult performed by: Lolita Valentino NP  Consult requested by:  Gordon Moss MD    Collaborating Physician: Cy Bryant MD  Reason for Consult:  Assess rehabilitation needs     Consults  Subjective:     Principal Problem: Embolic stroke involving left cerebellar artery    HPI: Temitope Suero is a 78-year-old female with PMHx of breast cancer, CKD, DM, paroxsymal Afib, HTN, HLD, and embolic stroke involving the right posterior cerebral artery. Patient presented to [facility] on 8/12/18 with complaint of fatigue, SOB, and mental status change. Upon arrival was found in Aflutter, HTN emergency (169/126), and elevated Creatinine and BUN. CTH stable vague area of hyperattenuation within a small area of encephalomalacia in the lateral right frontal lobe, possibly a stable area of hemorrhagic infarct. MRI brain revealed small areas of recent infarction within the right frontal lobe in the left cerebellar hemisphere. Right frontal infarct as small associated hemorrhage. Multiple several scattered remote infarcts. Findings concerning for possible embolic disease. Hospital course further complicated by HTN (Cardiology consulted, continuing Benzapril, Coreg, Hydralazine) and MAC (8/14/18 Creatine 1.5 and BUN 35).     Functional History: Patient lives in [place] with niece and her 3 children in a 2 story home with 0 steps to enter.  Prior to admission, (I) with ADLs and mobility. DME: none.     Hospital Course: 8/14/18: Therapy pending.     Past Medical History:   Diagnosis Date    Cancer of left breast, stage 2 11/24/2015    Cerebrovascular small vessel disease 4/11/2018    Bi-thalamic lacunar disease, mod/sev periventricular white matter  disease, mixed pattern cerebral microbleeds    Chronic anticoagulation - rivaroxaban 4/17/2018    Chronic systolic heart failure 4/17/2018    Echo 4-2018   1 - Moderately depressed left ventricular systolic function (EF 30-35%).    2 - Biatrial enlargement.    3 - Normal right ventricular systolic function .    4 - Mild mitral regurgitation.    5 - Mild tricuspid regurgitation.    6 - The estimated PA systolic pressure is 34 mmHg.    7 - Increased central venous pressure.    8 - Left pleural effusion.    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    Vitamin D deficiency disease 10/28/2013     Past Surgical History:   Procedure Laterality Date    BREAST SURGERY      CHOLECYSTECTOMY       Review of patient's allergies indicates:  No Known Allergies    Scheduled Medications:    apixaban  5 mg Oral BID    atorvastatin  80 mg Oral Daily    benazepril  20 mg Oral Daily    carvedilol  25 mg Oral BID WM    furosemide  40 mg Intravenous BID    letrozole  2.5 mg Oral Daily    magnesium gluconate  54 mg Oral BID    sodium chloride 0.9%  3 mL Intravenous Q8H    vitamin D  2,000 Units Oral Daily       PRN Medications: acetaminophen, dextrose 50%, dextrose 50%, glucagon (human recombinant), glucose, glucose, hydrALAZINE, sodium chloride 0.9%, sodium chloride 0.9%    Family History     Problem Relation (Age of Onset)    Asthma Son    Cancer Sister    Diabetes Paternal Aunt    Glaucoma Father    Heart disease Father     Hypertension Mother, Father        Tobacco Use    Smoking status: Never Smoker    Smokeless tobacco: Never Used   Substance and Sexual Activity    Alcohol use: No    Drug use: No    Sexual activity: Not Currently     Review of Systems   Constitutional: Negative for fatigue and fever.   HENT: Positive for hearing loss. Negative for drooling and sore throat.    Eyes: Positive for visual disturbance.   Respiratory: Negative for cough and shortness of breath.    Cardiovascular: Negative for chest pain and leg swelling.   Gastrointestinal: Negative for abdominal distention, abdominal pain, nausea and vomiting.   Genitourinary: Negative for difficulty urinating.   Musculoskeletal: Negative for back pain and joint swelling.   Skin: Negative for color change.   Neurological: Negative for dizziness, light-headedness and headaches.   Psychiatric/Behavioral: Positive for confusion. Negative for agitation.     Objective:     Vital Signs (Most Recent):  Temp: 97.7 °F (36.5 °C) (08/14/18 0531)  Pulse: 80 (08/14/18 0700)  Resp: 18 (08/14/18 0531)  BP: (!) 135/92 (08/14/18 0531)  SpO2: 97 % (08/14/18 0531)    Vital Signs (24h Range):  Temp:  [97.4 °F (36.3 °C)-97.9 °F (36.6 °C)] 97.7 °F (36.5 °C)  Pulse:  [] 80  Resp:  [16-18] 18  SpO2:  [95 %-98 %] 97 %  BP: (115-145)/(69-92) 135/92     Body mass index is 21.67 kg/m².    Physical Exam   Constitutional: She appears well-developed and well-nourished.   HENT:   Head: Normocephalic and atraumatic.   Eyes: EOM are normal. Pupils are equal, round, and reactive to light.   Neck: Normal range of motion. Neck supple.   Pulmonary/Chest: Effort normal and breath sounds normal.   Abdominal: Soft. Normal appearance and bowel sounds are normal. She exhibits no distension and no abdominal bruit. There is no tenderness. There is no rigidity.   Musculoskeletal: Normal range of motion.   Neurological:   - Slowing present   -  Mental Status: Awake and alert, sitting up in bed. Follows  commands with additional prompting. Able to answer correct . Unable to answer year or month. Recalls 3/3 objects and 3/4 seasons of the year.   -  Speech and language:  no dysarthria.    -  Vision: no ptosis, no diplopia   -  Facial movement (CN VII): symmetrical  -  Coordination:  Finger to nose exam: normal  -  Motor: RUE: 5/5, 5/5 .  LUE: 5/5, 5/5 . RLE: 5/5, DF 5/5, PF 5/5.  LLE: 5/5, DF 5/5, PF 5/5.  -  Tone: normal  -  Sensory:  Intact to light touch.    Skin: Skin is warm and dry.   Psychiatric: She has a normal mood and affect.     Diagnostic Results:   Labs: Reviewed  ECG: Reviewed  CT: Reviewed  MRI: Reviewed    Assessment/Plan:     * Embolic stroke involving left cerebellar artery    See hospital course for functional, cognitive/speech/language, and nutrition/swallow status.      Recommendations  -  Encourage mobility, OOB in chair at least 3 hours per day, and early ambulation as appropriate   -  PT/OT evaluate and treat  -  SLP speech and cognitive evaluate and treat  -  Monitor sleep disturbances and establish consistent sleep-wake cycle  -  Monitor for bowel and bladder dysfunction  -  Monitor for shoulder pain and subluxation  -  Monitor for spasticity  -  Monitor for and prevent skin breakdown and pressure ulcers  · Early mobility, repositioning/weight shifting every 20-30 minutes when sitting, turn patient every 2 hours, proper mattress/overlay and chair cushioning, pressure relief/heel protector boots  -  DVT prophylaxis  -  Reviewed discharge options (IP rehab, SNF, HH therapy, and OP therapy)        Encephalopathy    - EEG pending         Malignant hypertensive urgency    - Cardiology consulted, continuing PO Benzapril, Coreg, Hydralazine  - Target < 180        History of stroke    - see embolic stroke involving left cerebellar artery         Mixed hyperlipidemia    - stroke risk factor          Therapy pending and assigned to PT, OT, and SLP today. Will follow progress and discuss  with rehab team for post acute care/rehab recommendation.    Thank you for your consult.     Lolita Valentino NP  Department of Physical Medicine & Rehab  [facility]

## 2018-08-14 NOTE — PROGRESS NOTES
[facility]  Vascular Neurology  Comprehensive Stroke Center  Progress Note    Assessment/Plan:     * Embolic stroke involving left cerebellar artery    Subacute R frontal infarct with hemorrhagic transformation, acute L cerebellar punctate infarct. However encephalopathy unexplained by infarcts    Antithrombotics for secondary stroke prevention: Anticoagulants: Apixaban 5 mg BID     Statins for secondary stroke prevention and hyperlipidemia, if present:   Statins: Atorvastatin- 80 mg daily    Aggressive risk factor modification: HTN, HLD     Rehab efforts: Occupational Therapy, PT/OT/SLP to evaluate and treat    Diagnostics ordered/pending: None    VTE prophylaxis: None: Reason for No Pharmacological VTE Prophylaxis: Currently on anticoagulation    BP parameters: Infarct: No intervention, SBP <220          Encephalopathy    Continues to be disoriented to time  Likely due to vascular dementia but will rule out other possibilities such as non-convulsive seizures  EEG pending        Atrial flutter    Currently rate-controlled with carvedilol   Also resumed AC with apixaban        Acute on chronic systolic heart failure exacerbated htn emergency    EF 25-30%  Will continue on diuretics, beta blocker and ACEi        Acute renal failure superimposed on stage 3 chronic kidney disease    Cr continues to trend up, possibly due to ACEi that was started yesterday  Will continue to monitor and will switch to another category if necessary        Mixed hyperlipidemia    , not at goal  Will continue high dose statins (Atorvastatin 80 mg qd)        Type 2 diabetes mellitus with diabetic chronic kidney disease    A1C 6  Currently glucose running WNL  Not on any medications for glycemic control, will continue to monitor        Paroxysmal atrial fibrillation    Afib with RVR on admission  rate controlled with carvedilol 25 mg q12  Started AC with apixaban on 08/13 8/13 pt continue to have AMS,  transferred to vascular neurology care.  8/14: more alert this morning. Oriented to self and place but not time. EEG, PT/OT pending. Has failed ANAND overnight, SLP evaluation pending    STROKE DOCUMENTATION        NIH Scale:  1a. Level Of Consciousness: 0-->Alert: keenly responsive  1b. LOC Questions: 1-->Answers one question correctly  1c. LOC Commands: 0-->Performs both tasks correctly  2. Best Gaze: 0-->Normal  3. Visual: 0-->No visual loss  4. Facial Palsy: 0-->Normal symmetrical movements  5a. Motor Arm, Left: 0-->No drift: limb holds 90 (or 45) degrees for full 10 secs  5b. Motor Arm, Right: 0-->No drift: limb holds 90 (or 45) degrees for full 10 secs  6a. Motor Leg, Left: 0-->No drift: leg holds 30 degree position for full 5 secs  6b. Motor Leg, Right: 0-->No drift: leg holds 30 degree position for full 5 secs  7. Limb Ataxia: 0-->Absent  8. Sensory: 0-->Normal: no sensory loss  9. Best Language: 0-->No aphasia: normal  10. Dysarthria: 0-->Normal  11. Extinction and Inattention (formerly Neglect): 0-->No abnormality  Total (NIH Stroke Scale): 1       Modified [place] Score: 1  [place] Coma Scale:    ABCD2 Score:    [id number] Score:8  HAS -BLED Score:4  ICH Score:   Hunt & Lopez Classification:      Hemorrhagic change of an Ischemic Stroke: Does this patient have an ischemic stroke with hemorrhagic changes? No        Subjective:      Interval History:  more alert this morning. Oriented to self and place but not time. EEG, PT/OT pending. Passed SLP evaluation, starting on a diet.      Medications Prior to Admission   Medication Sig Dispense Refill Last Dose    atorvastatin (LIPITOR) 40 MG tablet Take 1 tablet (40 mg total) by mouth once daily. 90 tablet 4 8/11/2018    benazepril (LOTENSIN) 40 MG tablet Take 1 tablet (40 mg total) by mouth once daily. 90 tablet 4 8/11/2018    chlorthalidone (HYGROTEN) 25 MG Tab Take 1 tablet (25 mg total) by mouth once daily. 90 tablet 3 8/11/2018    letrozole (FEMARA) 2.5  mg Tab 1 Tablet Oral Every day 90 tablet 4 8/11/2018 at Unknown time    rivaroxaban (XARELTO) 15 mg Tab Take 1 tablet (15 mg total) by mouth once daily. 30 tablet 11 8/11/2018    travoprost (TRAVATAN Z) 0.004 % Drop Place 1 drop into both eyes every evening. 2.5 mL 4 8/11/2018    aspirin (ECOTRIN) 81 MG EC tablet Take 81 mg by mouth. 1 Tablet, Delayed Release (E.C.) Oral Every day   Unknown at Unknown time    carvedilol (COREG) 25 MG tablet Take 1 tablet (25 mg total) by mouth 2 (two) times daily with meals. 60 tablet 11 Unknown at Unknown time    cholecalciferol, vitamin D3, (VITAMIN D3) 2,000 unit Cap Take 1 capsule (2,000 Units total) by mouth once daily. 90 capsule 5 Unknown at Unknown time    diclofenac sodium 1 % Gel Apply 2 g topically 3 (three) times daily. 100 g 6 Unknown at Unknown time       Review of Systems   Constitutional: Positive for activity change. Negative for chills and fever.   HENT: Negative for drooling and voice change.    Eyes: Negative for visual disturbance.   Respiratory: Negative for choking and shortness of breath.    Cardiovascular: Negative for palpitations and leg swelling.   Gastrointestinal: Negative for abdominal pain, nausea and vomiting.   Genitourinary: Negative for dysuria and hematuria.   Musculoskeletal: Negative for gait problem.   Skin: Negative for pallor and wound.   Allergic/Immunologic: Negative for immunocompromised state.   Neurological: Negative for seizures, facial asymmetry, speech difficulty, weakness and headaches.   Psychiatric/Behavioral: Positive for confusion. Negative for agitation. The patient is not nervous/anxious.      Objective:     Vital Signs (Most Recent):  Temp: 97.5 °F (36.4 °C) (08/14/18 1123)  Pulse: 101 (08/14/18 1123)  Resp: 18 (08/14/18 1123)  BP: (!) 131/90 (08/14/18 1123)  SpO2: (!) 94 % (08/14/18 1123)    Vital Signs Range (Last 24H):  Temp:  [97.4 °F (36.3 °C)-97.9 °F (36.6 °C)]   Pulse:  []   Resp:  [16-18]   BP:  (115-145)/(69-92)   SpO2:  [94 %-98 %]     Physical Exam   Constitutional: She appears well-developed and well-nourished. She is cooperative. She does not appear ill. No distress.   HENT:   Head: Normocephalic and atraumatic.   Mouth/Throat: Mucous membranes are dry.   Eyes: EOM are normal. Pupils are equal, round, and reactive to light.   Neck: Normal range of motion.   Cardiovascular: Normal rate. An irregularly irregular rhythm present.   Pulses:       Radial pulses are 2+ on the right side, and 2+ on the left side.   Pulmonary/Chest: Effort normal. No tachypnea. No respiratory distress.   Abdominal: Soft.   Neurological: She is alert. She displays no seizure activity.   Skin: Skin is warm.   Psychiatric: She has a normal mood and affect. Her speech is normal and behavior is normal.   Nursing note and vitals reviewed.      Neurological Exam:   LOC: alert  Attention Span: Good   Language: No aphasia  Articulation: No dysarthria  Orientation: Not oriented to time  Visual Fields: Full  EOM (CN III, IV, VI): Full/intact  Pupils (CN II, III): PERRL  Facial Sensation (CN V): Normal  Facial Movement (CN VII): Symmetric facial expression    Reflexes: flexor plantar responses bilaterally  Motor: Arm left  Normal 5/5  Leg left  Normal 5/5  Arm right  Normal 5/5  Leg right Normal 5/5  Cebellar: No evidence of appendicular or axial ataxia  Sensation: Intact to light touch, temperature and vibration  Tone: Normal tone throughout      Laboratory:  CMP:   Recent Labs   Lab  08/14/18   0403   CALCIUM  8.9   ALBUMIN  2.8*   PROT  5.5*   NA  142   K  3.6   CO2  25   CL  107   BUN  35*   CREATININE  1.5*   ALKPHOS  133   ALT  21   AST  20   BILITOT  0.7     CBC:   Recent Labs   Lab  08/14/18   0403   WBC  4.43   RBC  5.07   HGB  12.9   HCT  40.0   PLT  203   MCV  79*   MCH  25.4*   MCHC  32.3     Lipid Panel:   Recent Labs   Lab  08/13/18 2005   CHOL  177   LDLCALC  110.2   HDL  45   TRIG  109     Coagulation:   Recent Labs   Lab   08/14/18   0403   INR  1.1   APTT  23.9     Hgb A1C:   Recent Labs   Lab  08/12/18   1227   HGBA1C  6.0*     TSH:   Recent Labs   Lab  08/12/18   1227   TSH  1.367       Diagnostic Results:      Brain imaging:  CT Head w/o contrast 8-12-18 results:  Vague hyperattenuation within the right frontal lobe laterally, in a region of artifact, may reflect sequela of the same however hemorrhage is not entirely excluded.  Given motion in the region, consideration should be given to repeat examination for exclusion of hemorrhage as warranted.  Correlation with history is advised..    Stable left parietal and right cerebellar encephalomalacia.    Sequela of chronic microvascular ischemic change and senescent change.    brain MRI:     acute L cerebellar punctate infarct      Subacute R frontoparietal infarct with hemorrhagic conversion      Vascular imaging:  - Brain + Neck MRA on 8/13: no evidence of LVO or high-grade stenosis  - Brain MRV on 8/13: no evidence of venous sinus thrombosis    Cardiac Evaluation:   EKG 8-12-18 results:  Atrial flutter with variable A-V block + LAD          Jeannine Haro MD  Comprehensive Stroke Center  Department of Vascular Neurology   [facility]

## 2018-08-14 NOTE — CONSULTS
Reviewed patient history and current admission.  Rehab team following.  Full consult to follow.    MARKY Weston, FNP-C  Physical Medicine & Rehabilitation   08/14/2018

## 2018-08-14 NOTE — PROGRESS NOTES
[facility]  Vascular Neurology  Comprehensive Stroke Center  Progress Note    Assessment/Plan:     * Embolic stroke involving left cerebellar artery      Antithrombotics for secondary stroke prevention: Anticoagulants: Apixaban 5 mg BID     Statins for secondary stroke prevention and hyperlipidemia, if present:   Statins: Atorvastatin- 80 mg daily    Aggressive risk factor modification: HTN, HLD     Rehab efforts: Occupational Therapy, PT/OT/SLP to evaluate and treat    Diagnostics ordered/pending: CT scan of head without contrast to asses brain parenchyma, HgbA1C to assess blood glucose levels, Lipid Profile to assess cholesterol levels    VTE prophylaxis: None: Reason for No Pharmacological VTE Prophylaxis: Currently on anticoagulation    BP parameters: Infarct: No intervention, SBP <220            Abnormal CT scan of head    Recommend repeating CT scan of Head  MRI Brain in done  Will order MRV        Hypertensive emergency    Do Not drop BP too fast or below 120 Systolic to prevent hyoperfusion   Target <180        Toxic multinodular goiter    Stroke risk factor  Rate control  anticoagulation        Paroxysmal atrial fibrillation    Afib with RVR on admission  08/13 rate controlled  Started AC 08/13 8/13 pt continue to have AMS, transferred to vascular neurology care.    STROKE DOCUMENTATION        NIH Scale:          Modified [place] Score: 1  Hodan Coma Scale:    ABCD2 Score:    [id number] Score:7  HAS -BLED Score:4  ICH Score:   Hunt & Lopez Classification:      Hemorrhagic change of an Ischemic Stroke: Does this patient have an ischemic stroke with hemorrhagic changes? No         Past Medical History:   Diagnosis Date    Cancer of left breast, stage 2 11/24/2015    Cerebrovascular small vessel disease 4/11/2018    Bi-thalamic lacunar disease, mod/sev periventricular white matter disease, mixed pattern cerebral microbleeds    Chronic anticoagulation - rivaroxaban 4/17/2018     Chronic systolic heart failure 4/17/2018    Echo 4-2018   1 - Moderately depressed left ventricular systolic function (EF 30-35%).    2 - Biatrial enlargement.    3 - Normal right ventricular systolic function .    4 - Mild mitral regurgitation.    5 - Mild tricuspid regurgitation.    6 - The estimated PA systolic pressure is 34 mmHg.    7 - Increased central venous pressure.    8 - Left pleural effusion.    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    Vitamin D deficiency disease 10/28/2013     Past Surgical History:   Procedure Laterality Date    BREAST SURGERY      CHOLECYSTECTOMY       Family History   Problem Relation Age of Onset    Hypertension Mother     Hypertension Father     Glaucoma Father     Heart disease Father     Cancer Sister     Asthma Son     Diabetes Paternal Aunt     Thyroid cancer Neg Hx      Social History     Tobacco Use    Smoking status: Never Smoker    Smokeless tobacco: Never Used   Substance Use Topics    Alcohol use: No    Drug use: No     Review of patient's allergies indicates:  No Known Allergies    Medications: I have reviewed the current medication administration record.    Medications Prior to Admission   Medication Sig Dispense Refill Last Dose    atorvastatin (LIPITOR) 40 MG tablet Take 1 tablet (40 mg total) by mouth once daily. 90 tablet 4 8/11/2018    benazepril (LOTENSIN) 40 MG tablet Take 1 tablet (40 mg total) by mouth once daily. 90 tablet 4  8/11/2018    chlorthalidone (HYGROTEN) 25 MG Tab Take 1 tablet (25 mg total) by mouth once daily. 90 tablet 3 8/11/2018    letrozole (FEMARA) 2.5 mg Tab 1 Tablet Oral Every day 90 tablet 4 8/11/2018 at Unknown time    rivaroxaban (XARELTO) 15 mg Tab Take 1 tablet (15 mg total) by mouth once daily. 30 tablet 11 8/11/2018    travoprost (TRAVATAN Z) 0.004 % Drop Place 1 drop into both eyes every evening. 2.5 mL 4 8/11/2018    aspirin (ECOTRIN) 81 MG EC tablet Take 81 mg by mouth. 1 Tablet, Delayed Release (E.C.) Oral Every day   Unknown at Unknown time    carvedilol (COREG) 25 MG tablet Take 1 tablet (25 mg total) by mouth 2 (two) times daily with meals. 60 tablet 11 Unknown at Unknown time    cholecalciferol, vitamin D3, (VITAMIN D3) 2,000 unit Cap Take 1 capsule (2,000 Units total) by mouth once daily. 90 capsule 5 Unknown at Unknown time    diclofenac sodium 1 % Gel Apply 2 g topically 3 (three) times daily. 100 g 6 Unknown at Unknown time       Review of Systems   Constitutional: Positive for activity change and fatigue. Negative for chills and fever.   HENT: Negative for ear discharge and ear pain.    Eyes: Negative for pain and itching.   Respiratory: Positive for shortness of breath.    Cardiovascular: Negative for palpitations and leg swelling.   Gastrointestinal: Negative for abdominal distention and abdominal pain.   Genitourinary: Negative for difficulty urinating and dysuria.   Musculoskeletal: Negative for arthralgias and back pain.   Skin: Negative for rash and wound.   Neurological: Positive for speech difficulty.   Psychiatric/Behavioral: Positive for confusion.     Objective:     Vital Signs (Most Recent):  Temp: 97.4 °F (36.3 °C) (08/13/18 1459)  Pulse: 100 (08/13/18 1919)  Resp: 17 (08/13/18 1459)  BP: 126/89 (08/13/18 1655)  SpO2: 95 % (08/13/18 1655)    Vital Signs Range (Last 24H):  Temp:  [96.7 °F (35.9 °C)-98.1 °F (36.7 °C)]   Pulse:  []   Resp:  [16-20]   BP: (115189)/()    SpO2:  [93 %-98 %]     Physical Exam   Constitutional: She appears well-developed and well-nourished.   HENT:   Head: Normocephalic and atraumatic.   Eyes: EOM are normal. Pupils are equal, round, and reactive to light.   Neck: Normal range of motion.   Cardiovascular:   irregular rate and rhthym   Pulmonary/Chest: Effort normal.   Abdominal: Soft.   Neurological: She is alert.   Skin: Skin is warm and dry.   Nursing note and vitals reviewed.      Neurological Exam:   LOC: alert  Attention Span: Good   Language: No aphasia, memory issues  Articulation: No dysarthria  Orientation: Person, Place, Time   Visual Fields: Full  EOM (CN III, IV, VI): Full/intact  Pupils (CN II, III): PERRL  Facial Sensation (CN V): Normal  Facial Movement (CN VII): Symmetric facial expression    Gag Reflex: present  Reflexes: flexor plantar responses bilaterally  Motor: Arm left  Normal 5/5  Leg left  Normal 5/5  Arm right  Normal 5/5  Leg right Normal 5/5  Cebellar: No evidence of appendicular or axial ataxia  Sensation: Intact to light touch, temperature and vibration  Tone: Normal tone throughout      Laboratory:  CMP:   Recent Labs   Lab  08/13/18   0431   CALCIUM  9.3   ALBUMIN  3.3*   PROT  6.4   NA  143   K  3.7   CO2  27   CL  104   BUN  24*   CREATININE  1.3   ALKPHOS  158*   ALT  30   AST  29   BILITOT  1.1*     CBC:   Recent Labs   Lab  08/12/18   1227   WBC  4.95   RBC  5.43*   HGB  13.6   HCT  44.1   PLT  188   MCV  81*   MCH  25.0*   MCHC  30.8*     Lipid Panel: No results for input(s): CHOL, LDLCALC, HDL, TRIG in the last 168 hours.  Coagulation:   Recent Labs   Lab  08/12/18   1227   INR  1.2     Hgb A1C:   Recent Labs   Lab  08/12/18   1227   HGBA1C  6.0*     TSH:   Recent Labs   Lab  08/12/18   1227   TSH  1.367       Diagnostic Results:      Brain imaging:  CT Head w/o contrast 8-12-18 results:  Vague hyperattenuation within the right frontal lobe laterally, in a region of artifact, may reflect sequela of the same  however hemorrhage is not entirely excluded.  Given motion in the region, consideration should be given to repeat examination for exclusion of hemorrhage as warranted.  Correlation with history is advised..    Stable left parietal and right cerebellar encephalomalacia.    Sequela of chronic microvascular ischemic change and senescent change.    brain MRI:  Small areas of recent infarction within the right frontal lobe in the left cerebellar hemisphere.  Right frontal infarct as small associated hemorrhage.    Multiple several scattered remote infarcts.  Findings concerning for possible embolic disease.    Moderate chronic microvascular ischemic change with remote thalamic lacunar type infarcts and scattered microhemorrhages.  Constellation of findings suggesting chronic hypertensive encephalopathy.    Cardiac Evaluation:   EKG 8-12-18 results:  Atrial flutter with variable A-V block  Nonspecific T wave abnormality  Abnormal ECG  When compared with ECG of 10-APR-2018 13:56,  The axis Shifted left  Nonspecific T wave abnormality no longer evident in Inferior leads  Nonspecific T wave abnormality, worse in Lateral leads      Hind Sean Hernández MD  Comprehensive Stroke Center  Department of Vascular Neurology   [facility]

## 2018-08-14 NOTE — CONSULTS
"  [facility]  Adult Nutrition  Consult Note    SUMMARY     Recommendations  Recommendation/Intervention: Continue diabetic diet.   Goals: 1. Pt to consume % meals.  Nutrition Goal Status: new  Communication of RD Recs: other (comment)(POC)    Reason for Assessment  Reason for Assessment: consult  Diagnosis: stroke/CVA  Relevant Medical History: HTN, stroke (4/2018), Afib, HLD, vitamin D deficiency, hearing loss, left breast Ca, CKD3, DM2, CHF  Interdisciplinary Rounds: attended  General Information Comments: Pt advanced to diabetic diet with thin liquids per SLP. Pt reports 40-50 lb intentional wt loss x 3 years. Pt appears thin with some wasting of temporalis noted. Per pt, good po intake PTA.    Nutrition Risk Screen  Nutrition Risk Screen: no indicators present    Nutrition/Diet History  Patient Reported Diet/Restrictions/Preferences: heart healthy  Do you have any cultural, spiritual, Anabaptist conflicts, given your current situation?: none    Anthropometrics  Temp: 97.5 °F (36.4 °C)  Height Method: Stated  Height: 5' 6" (167.6 cm)  Height (inches): 66 in  Weight Method: Bed Scale  Weight: 60.9 kg (134 lb 4.2 oz)  Weight (lb): 134.26 lb  Ideal Body Weight (IBW), Female: 130 lb  % Ideal Body Weight, Female (lb): 106.15 lb  BMI (Calculated): 22.3       Lab/Procedures/Meds  Pertinent Labs Reviewed: reviewed  Pertinent Labs Comments: BUN 35, Creat 1.5, eGFR 38.5, Alb 2.8, A1c 6.2% (7/19/18), Glu 101, POCT 122 (8/13)  Pertinent Medications Reviewed: reviewed  Pertinent Medications Comments: Lasix, statin, magnesium gluconate    Physical Findings/Assessment  Overall Physical Appearance: nourished    Estimated/Assessed Needs  Weight Used For Calorie Calculations: 60.9 kg (134 lb 4.2 oz)  Energy Calorie Requirements (kcal): 1388  Energy Need Method: [place] Alcazar  Protein Requirements: 61-73  Weight Used For Protein Calculations: 60.9 kg (134 lb 4.2 oz)  Fluid Requirements (mL): 1388  Fluid " Need Method: RDA Method  RDA Method (mL): 1388    Nutrition Prescription Ordered  Current Diet Order: diabetic    Evaluation of Received Nutrient/Fluid Intake     % Intake of Estimated Energy Needs: 25 - 50 %  % Meal Intake: 25 - 50 %    Nutrition Risk      Assessment and Plan  Nutrition Problem  Inadequate energy intake    Related to (etiology):   Loss of appetite    Signs and Symptoms (as evidenced by):   Pt reports loss of appetite, pt was previously NPO    Nutrition Diagnosis Status:   New       Monitor and Evaluation  Food and Nutrient Intake: energy intake, food and beverage intake  Food and Nutrient Adminstration: diet order  Anthropometric Measurements: weight change, body mass index, weight  Biochemical Data, Medical Tests and Procedures: electrolyte and renal panel, gastrointestinal profile, glucose/endocrine profile, inflammatory profile, lipid profile  Nutrition-Focused Physical Findings: overall appearance     Nutrition Follow-Up  RD Follow-up?: Yes

## 2018-08-14 NOTE — ASSESSMENT & PLAN NOTE
Continues to be disoriented to time  Likely due to vascular dementia but will rule out other possibilities such as non-convulsive seizures  EEG pending

## 2018-08-14 NOTE — HPI
Temitope Suero is a 78-year-old female with PMHx of breast cancer, CKD, DM, paroxsymal Afib, HTN, HLD, and embolic stroke involving the right posterior cerebral artery. Patient presented to [facility] on 8/12/18 with complaint of fatigue, SOB, and mental status change. Upon arrival was found in Aflutter, HTN emergency (169/126), and elevated Creatinine and BUN. CTH stable vague area of hyperattenuation within a small area of encephalomalacia in the lateral right frontal lobe, possibly a stable area of hemorrhagic infarct. MRI brain revealed small areas of recent infarction within the right frontal lobe in the left cerebellar hemisphere. Right frontal infarct as small associated hemorrhage. Multiple several scattered remote infarcts. Findings concerning for possible embolic disease. Hospital course further complicated by HTN (Cardiology consulted, continuing Benzapril, Coreg, Hydralazine) and MAC (8/14/18 Creatine 1.5 and BUN 35).     Functional History: Patient lives in [place] with niece and her 3 children in a 2 story home with 0 steps to enter.  Prior to admission, (I) with ADLs and mobility. DME: none.

## 2018-08-14 NOTE — PLAN OF CARE
Cm dropped of a list of HH agencies and a list of sitter services at the the patient's bedside. Cm called an spoke to the patient's niece and explained the Recommendations by our therapists of HH with 24/7 care. Cm was told that the family already provides 24/7 care. Cm also in formed the niece that the list for HH and sitter services are at the patient's bedside and the patient could discharge as soon as tomorrow. Sw informed of all the above information. Cm will continue to follow.

## 2018-08-14 NOTE — ASSESSMENT & PLAN NOTE
Antithrombotics for secondary stroke prevention: Anticoagulants: Apixaban 5 mg BID     Statins for secondary stroke prevention and hyperlipidemia, if present:   Statins: Atorvastatin- 80 mg daily    Aggressive risk factor modification: HTN, HLD     Rehab efforts: Occupational Therapy, PT/OT/SLP to evaluate and treat    Diagnostics ordered/pending: CT scan of head without contrast to asses brain parenchyma, HgbA1C to assess blood glucose levels, Lipid Profile to assess cholesterol levels    VTE prophylaxis: None: Reason for No Pharmacological VTE Prophylaxis: Currently on anticoagulation    BP parameters: Infarct: No intervention, SBP <220

## 2018-08-14 NOTE — ASSESSMENT & PLAN NOTE
Afib with RVR on admission  rate controlled with carvedilol 25 mg q12  Started AC with apixaban on 08/13

## 2018-08-14 NOTE — PT/OT/SLP EVAL
Speech Language Pathology Evaluation  Cognitive/Bedside Swallow    Patient Name:  Temitope Suero   MRN:  [id number]  Admitting Diagnosis: Embolic stroke involving left cerebellar artery    Recommendations:                  General Recommendations:  Cognitive-linguistic therapy  Diet recommendations:  Regular, Thin   Aspiration Precautions: Standard aspiration precautions   General Precautions: Standard, aspiration, fall  Communication strategies:  decreased hearing    History:     Past Medical History:   Diagnosis Date    Cancer of left breast, stage 2 11/24/2015    Cerebrovascular small vessel disease 4/11/2018    Bi-thalamic lacunar disease, mod/sev periventricular white matter disease, mixed pattern cerebral microbleeds    Chronic anticoagulation - rivaroxaban 4/17/2018    Chronic systolic heart failure 4/17/2018    Echo 4-2018   1 - Moderately depressed left ventricular systolic function (EF 30-35%).    2 - Biatrial enlargement.    3 - Normal right ventricular systolic function .    4 - Mild mitral regurgitation.    5 - Mild tricuspid regurgitation.    6 - The estimated PA systolic pressure is 34 mmHg.    7 - Increased central venous pressure.    8 - Left pleural effusion.    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 She recalled up to 4 digits and 3 words immediately and after a delay, she recalled 0/3 objects but with cues 2/3. She completed problem solving task with simple answers and was able to provide multiple solutions when asked. She completed categorizational task with 50% acc. She compared items but was unable to contrast them. She sequenced up to 3 items and named only 7 items during word fluency when 15-20 is wnl. Pt with repetition of items and increased time needed. Pt needed redirection to stay on to task at times. Pt reported she has decreased hearing and has hearing aids but does not wear them.      Receptive Language:   Comprehension:   Pt followed complex commands with 66% acc and responded to complex y/n questions given increased time and occasional repetition with 90% acc    Pragmatics:      wfl  Expressive Language:  Verbal:    Wfl. Pt named 5/6 pictured objects. No significant word finding deficits noted.       Motor Speech:  wfl    Voice:   wfl    Visual-Spatial:  To be tested    Reading:   To be tested    Written Expression:   To be tested    Oral Musculature Evaluation  · Oral Musculature: WFL  · Dentition: upper and lower dentures  · Mucosal Quality: good  · Mandibular Strength and Mobility: WFL  · Oral Labial Strength and Mobility: WFL  · Lingual Strength and Mobility: WFL  · Volitional Cough: strong  · Voice Prior to PO Intake: clear    Bedside Swallow Eval:   Consistencies Assessed:  · Thin liquids cup and straw  · Solids crackers     Oral Phase:   · WFL    Pharyngeal Phase:   · no overt clinical signs/symptoms of pharyngeal dysphagia    Compensatory " Strategies  · None    Treatment: Niece reported pt with memory problems that started about a month ago. She also reported that pt doing much better today. Education provided re: need for speech tx. White board updated.     Assessment:     Temitope Suero is a 78 y.o. female with an SLP diagnosis of Cognitive-Linguistic Impairment.  She presents with cognitive impairments.    Goals:   Multidisciplinary Problems     SLP Goals        Problem: SLP Goal    Goal Priority Disciplines Outcome   SLP Goal     SLP    Description:  Goals to be met 8/21  1 pt will participate in further eval of reading/writing and visual spatial skills  2 Pt will complete mental manipulation task with 70% acc and mod a   3 pt will be ox3 using any modality and mod a  4 pt will name 8 items during word fluency task with min a   5 pt will recall aids to help with functional memory recall ( ie calendar, alarms, etc) with min a                     Plan:     · Patient to be seen:  4 x/week   · Plan of Care expires:     · Plan of Care reviewed with:  patient, family   · SLP Follow-Up:  Yes       Discharge recommendations:  Discharge Facility/Level Of Care Needs: home health speech therapy       Time Tracking:     SLP Treatment Date:   08/14/18  Speech Start Time:  0827  Speech Stop Time:  0852     Speech Total Time (min):  25 min    Billable Minutes: Eval 17 and Eval Swallow and Oral Function 8    JEFFREY Navas, CCC-SLP  08/14/2018

## 2018-08-14 NOTE — HOSPITAL COURSE
8/14/18: Evaluated by therapy. Bed Mobility (I)- SBA. Sit to stand SBA. Toilet transfer Linda. UBD Linda. LBD SBA. Toileting modA. Ambulated 200 ft SBA w/ RW. SLP recommendation: regular diet and thin liquids. SLP diagnosis of Cognitive-Linguistic Impairment.

## 2018-08-14 NOTE — PLAN OF CARE
Communicated to Vascular Neurology service, that Cardiology consult will sign off. Happy to address questions/concerns that come up regarding management of pt's aflutter.      Lily Anan, PGY-4 (Cardiology Fellow)

## 2018-08-14 NOTE — SUBJECTIVE & OBJECTIVE
Past Medical History:   Diagnosis Date    Cancer of left breast, stage 2 11/24/2015    Cerebrovascular small vessel disease 4/11/2018    Bi-thalamic lacunar disease, mod/sev periventricular white matter disease, mixed pattern cerebral microbleeds    Chronic anticoagulation - rivaroxaban 4/17/2018    Chronic systolic heart failure 4/17/2018    Echo 4-2018   1 - Moderately depressed left ventricular systolic function (EF 30-35%).    2 - Biatrial enlargement.    3 - Normal right ventricular systolic function .    4 - Mild mitral regurgitation.    5 - Mild tricuspid regurgitation.    6 - The estimated PA systolic pressure is 34 mmHg.    7 - Increased central venous pressure.    8 - Left pleural effusion.    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    Vitamin D deficiency disease 10/28/2013     Past Surgical History:   Procedure Laterality Date    BREAST SURGERY      CHOLECYSTECTOMY       Family History   Problem Relation Age of Onset    Hypertension Mother     Hypertension Father     Glaucoma Father     Heart disease Father     Cancer Sister     Asthma Son     Diabetes Paternal Aunt     Thyroid cancer Neg Hx      Social History     Tobacco Use    Smoking status: Never Smoker    Smokeless tobacco: Never Used   Substance Use Topics    Alcohol use: No    Drug use: No     Review of patient's allergies indicates:  No Known Allergies    Medications: I have reviewed the current medication  administration record.    Medications Prior to Admission   Medication Sig Dispense Refill Last Dose    atorvastatin (LIPITOR) 40 MG tablet Take 1 tablet (40 mg total) by mouth once daily. 90 tablet 4 8/11/2018    benazepril (LOTENSIN) 40 MG tablet Take 1 tablet (40 mg total) by mouth once daily. 90 tablet 4 8/11/2018    chlorthalidone (HYGROTEN) 25 MG Tab Take 1 tablet (25 mg total) by mouth once daily. 90 tablet 3 8/11/2018    letrozole (FEMARA) 2.5 mg Tab 1 Tablet Oral Every day 90 tablet 4 8/11/2018 at Unknown time    rivaroxaban (XARELTO) 15 mg Tab Take 1 tablet (15 mg total) by mouth once daily. 30 tablet 11 8/11/2018    travoprost (TRAVATAN Z) 0.004 % Drop Place 1 drop into both eyes every evening. 2.5 mL 4 8/11/2018    aspirin (ECOTRIN) 81 MG EC tablet Take 81 mg by mouth. 1 Tablet, Delayed Release (E.C.) Oral Every day   Unknown at Unknown time    carvedilol (COREG) 25 MG tablet Take 1 tablet (25 mg total) by mouth 2 (two) times daily with meals. 60 tablet 11 Unknown at Unknown time    cholecalciferol, vitamin D3, (VITAMIN D3) 2,000 unit Cap Take 1 capsule (2,000 Units total) by mouth once daily. 90 capsule 5 Unknown at Unknown time    diclofenac sodium 1 % Gel Apply 2 g topically 3 (three) times daily. 100 g 6 Unknown at Unknown time       Review of Systems   Constitutional: Positive for activity change and fatigue. Negative for chills and fever.   HENT: Negative for ear discharge and ear pain.    Eyes: Negative for pain and itching.   Respiratory: Positive for shortness of breath.    Cardiovascular: Negative for palpitations and leg swelling.   Gastrointestinal: Negative for abdominal distention and abdominal pain.   Genitourinary: Negative for difficulty urinating and dysuria.   Musculoskeletal: Negative for arthralgias and back pain.   Skin: Negative for rash and wound.   Neurological: Positive for speech difficulty.   Psychiatric/Behavioral: Positive for confusion.     Objective:     Vital  Signs (Most Recent):  Temp: 97.4 °F (36.3 °C) (08/13/18 1459)  Pulse: 100 (08/13/18 1919)  Resp: 17 (08/13/18 1459)  BP: 126/89 (08/13/18 1655)  SpO2: 95 % (08/13/18 1655)    Vital Signs Range (Last 24H):  Temp:  [96.7 °F (35.9 °C)-98.1 °F (36.7 °C)]   Pulse:  []   Resp:  [16-20]   BP: (115-189)/()   SpO2:  [93 %-98 %]     Physical Exam   Constitutional: She appears well-developed and well-nourished.   HENT:   Head: Normocephalic and atraumatic.   Eyes: EOM are normal. Pupils are equal, round, and reactive to light.   Neck: Normal range of motion.   Cardiovascular:   irregular rate and rhthym   Pulmonary/Chest: Effort normal.   Abdominal: Soft.   Neurological: She is alert.   Skin: Skin is warm and dry.   Nursing note and vitals reviewed.      Neurological Exam:   LOC: alert  Attention Span: Good   Language: No aphasia, memory issues  Articulation: No dysarthria  Orientation: Person, Place, Time   Visual Fields: Full  EOM (CN III, IV, VI): Full/intact  Pupils (CN II, III): PERRL  Facial Sensation (CN V): Normal  Facial Movement (CN VII): Symmetric facial expression    Gag Reflex: present  Reflexes: flexor plantar responses bilaterally  Motor: Arm left  Normal 5/5  Leg left  Normal 5/5  Arm right  Normal 5/5  Leg right Normal 5/5  Cebellar: No evidence of appendicular or axial ataxia  Sensation: Intact to light touch, temperature and vibration  Tone: Normal tone throughout      Laboratory:  CMP:   Recent Labs   Lab  08/13/18   0431   CALCIUM  9.3   ALBUMIN  3.3*   PROT  6.4   NA  143   K  3.7   CO2  27   CL  104   BUN  24*   CREATININE  1.3   ALKPHOS  158*   ALT  30   AST  29   BILITOT  1.1*     CBC:   Recent Labs   Lab  08/12/18   1227   WBC  4.95   RBC  5.43*   HGB  13.6   HCT  44.1   PLT  188   MCV  81*   MCH  25.0*   MCHC  30.8*     Lipid Panel: No results for input(s): CHOL, LDLCALC, HDL, TRIG in the last 168 hours.  Coagulation:   Recent Labs   Lab  08/12/18   1227   INR  1.2     Hgb A1C:   Recent  Labs   Lab  08/12/18   1227   HGBA1C  6.0*     TSH:   Recent Labs   Lab  08/12/18   1227   TSH  1.367       Diagnostic Results:      Brain imaging:  CT Head w/o contrast 8-12-18 results:  Vague hyperattenuation within the right frontal lobe laterally, in a region of artifact, may reflect sequela of the same however hemorrhage is not entirely excluded.  Given motion in the region, consideration should be given to repeat examination for exclusion of hemorrhage as warranted.  Correlation with history is advised..    Stable left parietal and right cerebellar encephalomalacia.    Sequela of chronic microvascular ischemic change and senescent change.    brain MRI:  Small areas of recent infarction within the right frontal lobe in the left cerebellar hemisphere.  Right frontal infarct as small associated hemorrhage.    Multiple several scattered remote infarcts.  Findings concerning for possible embolic disease.    Moderate chronic microvascular ischemic change with remote thalamic lacunar type infarcts and scattered microhemorrhages.  Constellation of findings suggesting chronic hypertensive encephalopathy.    Cardiac Evaluation:   EKG 8-12-18 results:  Atrial flutter with variable A-V block  Nonspecific T wave abnormality  Abnormal ECG  When compared with ECG of 10-APR-2018 13:56,  The axis Shifted left  Nonspecific T wave abnormality no longer evident in Inferior leads  Nonspecific T wave abnormality, worse in Lateral leads

## 2018-08-14 NOTE — PT/OT/SLP EVAL
Physical Therapy Evaluation    Patient Name:  Temitope Suero   MRN:  [id number]    Recommendations:     Discharge Recommendations:  home health PT and 24 hr supervision   Discharge Equipment Recommendations: none   Barriers to discharge: None    Plan:     During this hospitalization, patient to be seen 5 x/week to address the above listed problems via gait training, therapeutic activities, therapeutic exercises, neuromuscular re-education  · Plan of Care Expires:  09/14/18   Plan of Care Reviewed with: patient(family)    History:     Past Medical History:   Diagnosis Date    Cancer of left breast, stage 2 11/24/2015    Cerebrovascular small vessel disease 4/11/2018    Bi-thalamic lacunar disease, mod/sev periventricular white matter disease, mixed pattern cerebral microbleeds    Chronic anticoagulation - rivaroxaban 4/17/2018    Chronic systolic heart failure 4/17/2018    Echo 4-2018   1 - Moderately depressed left ventricular systolic function (EF 30-35%).    2 - Biatrial enlargement.    3 - Normal right ventricular systolic function .    4 - Mild mitral regurgitation.    5 - Mild tricuspid regurgitation.    6 - The estimated PA systolic pressure is 34 mmHg.    7 - Increased central venous pressure.    8 - Left pleural effusion.    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  Patient found supine in bed upon PT entry to room, agreeable to evaluation.      Chief Complaint: none stated  Patient comments/goals: "I walked 10-12 blocks 2x/day to walk my dog."  Pain/Comfort:  · Pain Rating 1: 0/10  · Pain Rating Post-Intervention 1: 0/10    Living Environment:  Pt lives in a 2 story home with her dtr and nephew, bedroom upstairs (10 steps and 1 rail).  Supervision was provided most of the day (1-2 hrs alone), but family is able to increase to 24 hr supervision if needed.  The patient was independent with no DME and active (walks her dog in the neighborhood).  Increased confusion in the last month.  Does not drive. Patient has the following equipment: none.  Upon discharge, patient will have assistance from her family (24 hr supervision available).    Objective:     Patient found with: bed alarm, telemetry     General Precautions: Standard, aspiration, fall   Patient was found supine in bed with AVASYS in room, family present. She agreed to therapy. General confusion noted throughout the exam. See below for details.     PHYSICAL EXAMINATION  Cognitive Function:  - Oriented to: person and place only, not time or situation   - Level of Alertness: awake, alert, pleasant  - Follows Commands/attention: Follows two-step commands  - Communication: clear/fluent  - Safety awareness/insight to disability: impaired, confusion  Musculoskeletal System  Upper Extremities:   ROM: WFL  Strength: WFL  Lower Extremities:  ROM: WFL  Strength:  Muscle Group R LE L LE Comments   Hip ext 4/5 4/5    Hip flexion  4/5 4/5       Knee flexion  5/5 5/5       Knee ext. 5/5 5/5    Ankle DF 5/5 5/5    Ankle PF 5/5 5/5    Integumentary System: Visible skin intact  Cardiopulmonary System:   Recent vitals:   Vitals:    08/14/18 1123   BP: (!) 131/90   Pulse: 101   Resp: 18   Temp: 97.5 °F (36.4 °C)   Neuromuscular System:  - Sensation: grossly intact LT  - " Coordination: bradykinesia with all movement   Finger to thumb opposition: intact    Finger to nose: grossly intact    Heel to shin: NT  Posture and gross symmetry: rounded shoulders, kyphosis, weight on heels in standing  Vision:  Wears glasses    BALANCE:  Sitting: independent  Standing: SBA for static standing, min assistance for dynamic standing    FUNCTIONAL MOBILITY ASSESSMENT:  Bed Mobility: performed with HOB flat  - Rolling/Turning R: independent  - Rolling/Turning L: independent  - Supine > sit: independent  - Sit > supine: independent  - Scooting EOB: SBA     Transfers:  - Sit <> stand transfer: SBA with close supervision for LOB   - Bed <> chair transfer: CGA with instability noted; furniture walking    Gait:   Gait x 200 feet CGA with instability and path deviation  - Patient demonstrated reciprocal gait pattern with mild to moderate instability, posterior weight shift with toes off the ground, and s/s poor balance including furniture walking and reaching for walls and railings. Stepping strategy in response to small LOB with independent recovery.   - See below for dynamic gait assessment    Gait x 200 feet SBA with Rw  - Pt continued to demonstrate mild path deviation and instability when using Rw.   - This is an unfamiliar assistive device and did not significantly improve her gait pattern or balance    Dynamic Gait Index (DGI):  1. Gait level surface: (1) Moderate Impairment: Walks 20ft, slow speed, abnormal gait pattern, evidence for imbalance.  2. Change in gait speed: (1) Moderate Impairment: Makes only minor adjustments to walking speed, or accomplishes a changein speed with significant gait deviations, or changes speed but has significant gait deviations, orchanges speed but loses balance but is able to recover and continue walking..  3. Gait with horizontal head turns: (1) Moderate Impairment: Performs head turns with moderate change in gait velocity, slows down,staggers but recovers, can  continue to walk..  4. Gait with vertical head turns: (1) Moderate Impairment: Performs head turns with moderate change in gait velocity, slows down,staggers but recovers, can continue to walk..  5. Gait and pivot turn: (2) Mild Impairment: Pivot turns safely in > 3 seconds and stops with no loss of balance..  6. Step over obstacle: (1) Moderate Impairment: Is able to step over box but must stop, then step over. May require verbalCueing..  7. Step around obstacles: (1) Moderate Impairment: Is able to clear cones but must significantly slow, speed to accomplish task,or requires verbal cueing.  8. Steps: (2) Mild Impairment: Alternating feet, must use rail..    TOTAL SCORE: 10 / 24   < 19/24 = predictive of falls in the elderly   > 22/24 = safe ambulators      Therapeutic Activities, Education, or Exercises:  Therapist educated patient and her family on the role of PT, POC, and therapy recommendations of assistance with all gait and HH therapy.  Therapist discussed the patients current mobility status, deficits, and level of assistance with patient. PT discussed balance deficits, results of balance testing, fall risk, RW not effective, and need for 1:1 supervision for gait at home.  Time was provided for active listening, discussion of health disposition, and discussion of safe discharge recommendations. Therapist answered questions to patient/familys satisfaction within scope of practice.  Patient and family are aware of patient's deficits and therapy progression. White board updated to reflect current level of assistance.    FUNCTIONAL OUTCOME MEASURES:   AM-PAC 6 CLICK MOBILITY  Total Score:21     Patient left sitting up in chair with all lines intact, call bell in reach, AVASYS present and family present.  PT re-iterated need for assistance with all OOB mobility.     GOALS:   Multidisciplinary Problems     Physical Therapy Goals        Problem: Physical Therapy Goal    Goal Priority Disciplines Outcome Goal  Variances Interventions   Physical Therapy Goal     PT, PT/OT Ongoing (interventions implemented as appropriate)     Description:    Goals to be met by 8/24/2018    1. Pt will perform sit to stand transfers with independence and no instability in initial stance.    2. Pt will perform bed <> chair transfers with independence.  3. Pt will perform gait x 150 feet with SBA and no instability without AD.  4. Pt will improve balance to > 19/24 on Dynamic Gait index to decrease risk for falls  5. Pt will ascend and descend 10 steps with 1 railing in order to safely access home environment.                   Assessment:     Temitope Suero is a 78 y.o. female admitted with a medical diagnosis of Embolic stroke involving left cerebellar artery.  She was independent with mobility prior to admit, but was having confusion x 1 month per family report. She now presents with the following impairments/functional limitations:  impaired functional mobilty, impaired cognition, decreased safety awareness, gait instability, impaired balance, impaired self care skills.  Balance testing (Dynamic Gait Index) indicate that this patient is at HIGH risk for falls at this time.  Gait training with Rw was not effective at significantly improving her balance during gait.  She was independent with gait in the community and has no history of falls at home.  Her balance is expected to significantly improve with additional practice.  Recommend close supervision or hand held assistance for gait.  She has 24 hr supervision available at home.  Recommend d/c home with 24 hr supervision and follow up with HH therapy when medically appropriate for discharge.     Rehab Prognosis:  good; patient would benefit from acute skilled PT services to address these deficits and reach maximum level of function.      Recent Surgery: * No surgery found *      Clinical Decision Making:   COMPLEXITY OF PT EXAMINATION:  HISTORY  - Comorbidities that affect the PT plan of  care or the patient's ability to participate in/progress with therapy:  1. Chronic A-fib  2. DM  3. Encephalopathy   4. Cervicalgia  5. Stroke 4/10/2018  - Personal Factors:   1. Patient's cognitive status and safety concerns.  2. Time since onset  3. Patient's home situation (environment and family support).  EXAMINATION  - Body Systems:  1. Communication ability, affect, cognition, language, and learning style: the assessment of the ability to make needs known, consciousness, orientation, expected emotional /behavioral responses, and learning preferences  2. Neuromuscular system: a general assessment of gross coordinated movement (eg, balance, gait, locomotion, transfers, and transitions) and motor function (motor control and motor learning)  3. Musculoskeletal system: the assessment of gross symmetry, gross range of motion, gross strength, height, and weight  4. Integumentary system: the assessment of pliability(texture), presence of scar formation, skin color, and skin integrity  5. Cardiovascular/pulmonary system: the assessment of heart rate, respiratory rate, blood pressure, SpO2, and edema   - Activity or participation limitations:   Patient's cognitive status and safety concerns  CLINICAL PRESENTATION: Evolving/changing characteristics  varying levels of awareness or cognitive performance   LEVEL OF COMPLEXITY: Moderate Complexity - at least 1-2 personal factors or comorbidities that impact the plan of care; examination addressing at least 3 body structures and functions, activity limitations, and/or participation restrictions; and clinical presentation with evolving or changing characteristics.      Time Tracking:     PT Received On: 08/14/18  PT Start Time: 1105     PT Stop Time: 1145  PT Total Time (min): 40 min     Billable Minutes: Evaluation 20 and Gait Training 20      Niki Stratton, PT  08/14/2018

## 2018-08-14 NOTE — PLAN OF CARE
Problem: SLP Goal  Goal: SLP Goal  Swallow eval completed. Pt safe to begin regular diet and thin liquids.     JEFFREY Navas, CCC-SLP  8/14/2018

## 2018-08-14 NOTE — PT/OT/SLP EVAL
Occupational Therapy   Evaluation    Name: Temitope Suero  MRN: [id number]  Admitting Diagnosis:  Embolic stroke involving left cerebellar artery      Recommendations:     Discharge Recommendations: home health OT(with 24 hour (A)/(S))  Discharge Equipment Recommendations:  none  Barriers to discharge:  None    History:     Occupational Profile:  Living Environment & PLOF: Pt resides with daughter, nephew, & niece in 2nd story of a 2 story house in [place] with 10 steps 2 rails to enter.  Pt reported with confirmation from niece that pt was independent with ADL's, paying bills, cooking, ambulation & cleaning PTA.  Pt reports that she drives however her niece reported that she does not.  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  Pt enjoys singing.  Equipment Used at Home:  none  Assistance upon Discharge: pt's niece reported that she will be able to be present 24 hours/day & assist pt as needed upon discharge.    Past Medical History:   Diagnosis Date    Cancer of left breast, stage 2 11/24/2015    Cerebrovascular small vessel disease 4/11/2018    Bi-thalamic lacunar disease, mod/sev periventricular white matter disease, mixed pattern cerebral microbleeds    Chronic anticoagulation - rivaroxaban 4/17/2018    Chronic systolic heart failure 4/17/2018    Echo 4-2018   1 - Moderately depressed left ventricular systolic function (EF 30-35%).    2 - Biatrial enlargement.    3 - Normal right ventricular systolic function .    4 - Mild mitral regurgitation.    5 - Mild tricuspid regurgitation.    6 - The estimated PA systolic pressure is 34 mmHg.    7 - Increased central venous pressure.    8 - Left pleural effusion.    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    Pain/Comfort:  · Pain Rating 1: 0/10  · Pain Rating Post-Intervention 1: 0/10    Patients cultural, spiritual, Buddhist conflicts given the current situation: full gospel Gnosticism    Objective:     Communicated with: RN prior to session.  Patient found all lines intact, call button in reach, bed alarm on and niece present and telemetry, bed alarm upon OT entry to room.    General Precautions: Standard, aspiration, NPO, fall(cardiac)   Orthopedic Precautions:    Braces:       Occupational Performance:    Bed Mobility:    · Patient completed Rolling/Turning to Right with stand by assistance  · Patient completed Supine to Sit with supervision  · Patient completed Sit to Supine with supervision    Functional Mobility/Transfers:  · Patient completed Sit <> Stand Transfer with stand by assistance  with  no assistive device from EOB  · Patient completed Toilet Transfer Stand Pivot technique with minimum assistance with  no AD  · Functional Mobility: Pt performed functional mobility in room with CGA & with pt noted to use walls as support with her hands.    Activities of Daily Living:  · Grooming: contact guard assistance while standing at sink  · Upper Body Dressing: minimum assistance donning gown around back while seated EOB  · Lower Body Dressing: stand by assistance donning socks seated EOB  · Toileting: moderate assistance from standard commode    Cognitive/Visual  Perceptual:  Cognitive/Psychosocial Skills:     -       Oriented to: Person and Place   -       Follows Commands/attention:Inattentive, Easily distracted and required repetition of commands at times during session  -       Communication: occasionally difficulty with words or responses to questions & requiring repetition of directions/statements  -       Memory: Impaired STM  -       Safety awareness/insight to disability: impaired   -       Mood/Affect/Coping skills/emotional control: Appropriate to situation  Visual/Perceptual:      -pt reported that vision was hazy (not wearing glasses)      Physical Exam:  Postural examination/scapula alignment:    -       Rounded shoulders  -       Forward head  -       Posterior pelvic tilt  Sensation:    -       Intact    Dominant hand:    -       right  Upper Extremity Range of Motion:     -       Right Upper Extremity: WFL    -       Left Upper Extremity: WFL      Upper Extremity Strength:    -       Right Upper Extremity: WFL  -       Left Upper Extremity: WFL     Strength:    -       Right Upper Extremity: WFL    -       Left Upper Extremity: WFL    Fine Motor Coordination:    -       Impaired  mildly with BUE    AMPAC 6 Click ADL:  AMPAC Total Score: 17    Treatment & Education:  Provided education regarding stroke signs, role of OT, POC, discharge recommendations, need for (A) with all OOB activities, assistance levels needed upon discharge home, need for (A)/(S) upon discharge home, pt performance, & noted SOB.  Pt required (A) with postural control in standing with CGA & SBA while seated EOB.  Education:    Patient left supine with all lines intact, call button in reach, bed alarm on, RN notified, niece present and white board updated.    Assessment:     Temitope Suero is a 78 y.o. female with a medical diagnosis of Embolic stroke involving left cerebellar artery.  She presents with the following performance deficits affecting function: weakness, impaired endurance,  "impaired self care skills, impaired functional mobilty, impaired balance, impaired cognition, decreased upper extremity function, decreased coordination, decreased lower extremity function, decreased safety awareness.      Rehab Prognosis: Fair; patient would benefit from acute skilled OT services to address these deficits and reach maximum level of function.         Clinical Decision Makin.  OT Low:  "Pt evaluation falls under low complexity for evaluation coding due to performance deficits noted in 1-3 areas as stated above and 0 co-morbities affecting current functional status. Data obtained from problem focused assessments. No modifications or assistance was required for completion of evaluation. Only brief occupational profile and history review completed."     Plan:     Patient to be seen 4 x/week to address the above listed problems via self-care/home management, therapeutic activities, therapeutic exercises, sensory integration, cognitive retraining, neuromuscular re-education  · Plan of Care Expires:    · Plan of Care Reviewed with: patient(bryon)    This Plan of care has been discussed with the patient who was involved in its development and understands and is in agreement with the identified goals and treatment plan    GOALS:   Multidisciplinary Problems     Occupational Therapy Goals        Problem: Occupational Therapy Goal    Goal Priority Disciplines Outcome Interventions   Occupational Therapy Goal     OT, PT/OT Ongoing (interventions implemented as appropriate)    Description:  Goals to be met by: 18     Patient will increase functional independence with ADLs by performing:    UE Dressing with [place].  LE Dressing with [place].  Grooming while standing with [place].  Toileting from toilet with [place] for hygiene and clothing management.   Rolling to Bilateral with [place].   Supine to sit with Modified " [place].  Stand pivot transfers with [place].                      Time Tracking:     OT Date of Treatment: 08/14/18  OT Start Time: 1028  OT Stop Time: 1104  OT Total Time (min): 36 min    Billable Minutes:Evaluation 26  Therapeutic Activity 10    ZEV Billingsley  8/14/2018

## 2018-08-14 NOTE — PLAN OF CARE
Problem: Physical Therapy Goal  Goal: Physical Therapy Goal  Outcome: Ongoing (interventions implemented as appropriate)  Patient participated well in therapy.  POC and goals remain appropriate.  Please refer to progress note for functional mobility.     Pt is safe to perform transfers and gait with nursing using 1 person assistance for balance.      Niki Stratton, PT  8/14/2018  [phone] (pager)

## 2018-08-14 NOTE — ASSESSMENT & PLAN NOTE
A1C 6  Currently glucose running WNL  Not on any medications for glycemic control, will continue to monitor

## 2018-08-14 NOTE — ASSESSMENT & PLAN NOTE
See hospital course for functional, cognitive/speech/language, and nutrition/swallow status.      Recommendations  -  Encourage mobility, OOB in chair at least 3 hours per day, and early ambulation as appropriate   -  PT/OT evaluate and treat  -  SLP speech and cognitive evaluate and treat  -  Monitor sleep disturbances and establish consistent sleep-wake cycle  -  Monitor for bowel and bladder dysfunction  -  Monitor for shoulder pain and subluxation  -  Monitor for spasticity  -  Monitor for and prevent skin breakdown and pressure ulcers  · Early mobility, repositioning/weight shifting every 20-30 minutes when sitting, turn patient every 2 hours, proper mattress/overlay and chair cushioning, pressure relief/heel protector boots  -  DVT prophylaxis  -  Reviewed discharge options (IP rehab, SNF, HH therapy, and OP therapy)

## 2018-08-14 NOTE — NURSING
Patient arrived to room 705, appears asleep, no distress noted. Easily aroused, VSS, neuro status stable. Bed alarm active and explained, bed in lowest position, call bell in reach. Will continue to monitor.

## 2018-08-14 NOTE — PLAN OF CARE
Problem: Occupational Therapy Goal  Goal: Occupational Therapy Goal  Goals to be met by: 8/21/18     Patient will increase functional independence with ADLs by performing:    UE Dressing with Modified [place].  LE Dressing with Modified [place].  Grooming while standing with Modified [place].  Toileting from toilet with Modified [place] for hygiene and clothing management.   Rolling to Bilateral with Modified [place].   Supine to sit with Modified [place].  Stand pivot transfers with Modified [place].    Outcome: Ongoing (interventions implemented as appropriate)  OT eval completed.

## 2018-08-14 NOTE — PLAN OF CARE
PCP:Gm Zambrano MD    Extended Emergency Contact Information  Primary Emergency Contact: Shelby Alfaro  Address: [address] [place] [address] .           [place] [address] [facility]  Home Phone: [phone]  Relation: Relative      [id number] - [place] - [address]  [address]  [place] [address]  Phone: [phone] Fax: [phone]    Payor: BLUE CROSS BLUE SHIELD / Plan: BCBS OF [place] / Product Type: Commercial /     Patient was independent living with her niece and two adult grandnephews prior to hospitalization. Awaiting PT/OT/SLP recommendations. Cm will continue to follow.     08/14/18 0845   Discharge Assessment   Assessment Type Discharge Planning Assessment   Confirmed/corrected address and phone number on facesheet? Yes   Assessment information obtained from? Caregiver   Expected Length of Stay (days) 2   Communicated expected length of stay with patient/caregiver yes   Prior to hospitilization cognitive status: Alert/Oriented   Prior to hospitalization functional status: Independent   Current cognitive status: Not Oriented to Person;Not Oriented to Place   Current Functional Status: Independent   Facility Arrived From: Home   Lives With other relative(s)  (niece)   Able to Return to Prior Arrangements unable to determine at this time (comments)   Is patient able to care for self after discharge? Unable to determine at this time (comments)   Who are your caregiver(s) and their phone number(s)? Shelby Alfaro (niece) [phone]   Patient's perception of discharge disposition home or selfcare;home health   Readmission Within The Last 30 Days no previous admission in last 30 days   Patient currently being followed by outpatient case management? No   Patient currently receives any other outside agency services? No   Equipment Currently Used at Home none   Do you have any problems affording any of your prescribed medications? Yes   If yes,  what medications? Travatan Z eye drops   Is the patient taking medications as prescribed? yes   Does the patient have transportation home? Yes   Transportation Available family or friend will provide   Does the patient receive services at the [facility]? No   Discharge Plan A Home with family   Discharge Plan B Home with family;Home Health   Patient/Family In Agreement With Plan yes

## 2018-08-14 NOTE — PLAN OF CARE
Problem: Patient Care Overview  Goal: Plan of Care Review  Outcome: Ongoing (interventions implemented as appropriate)  Plan of care reviewed with patient, she demonstrated understanding. JAE HOLLAND, neuro status stable. Bed alarm active, avasys in place, bed in lowest position, O2 and suction at bedside, call bell in reach. Will continue to monitor.

## 2018-08-15 ENCOUNTER — TELEPHONE (OUTPATIENT)
Dept: NEUROLOGY | Facility: CLINIC | Age: 78
End: 2018-08-15

## 2018-08-15 VITALS
HEART RATE: 90 BPM | BODY MASS INDEX: 21.57 KG/M2 | WEIGHT: 134.25 LBS | RESPIRATION RATE: 18 BRPM | SYSTOLIC BLOOD PRESSURE: 110 MMHG | HEIGHT: 66 IN | TEMPERATURE: 98 F | DIASTOLIC BLOOD PRESSURE: 78 MMHG | OXYGEN SATURATION: 96 %

## 2018-08-15 PROBLEM — G93.6 CYTOTOXIC CEREBRAL EDEMA: Status: ACTIVE | Noted: 2018-08-15

## 2018-08-15 LAB
ALBUMIN SERPL BCP-MCNC: 2.9 G/DL
ALP SERPL-CCNC: 123 U/L
ALT SERPL W/O P-5'-P-CCNC: 20 U/L
ANION GAP SERPL CALC-SCNC: 10 MMOL/L
AST SERPL-CCNC: 21 U/L
BASOPHILS # BLD AUTO: 0.04 K/UL
BASOPHILS NFR BLD: 1 %
BILIRUB SERPL-MCNC: 0.6 MG/DL
BUN SERPL-MCNC: 33 MG/DL
CALCIUM SERPL-MCNC: 9.2 MG/DL
CHLORIDE SERPL-SCNC: 103 MMOL/L
CO2 SERPL-SCNC: 27 MMOL/L
CREAT SERPL-MCNC: 1.5 MG/DL
DIFFERENTIAL METHOD: ABNORMAL
EOSINOPHIL # BLD AUTO: 0.1 K/UL
EOSINOPHIL NFR BLD: 2.5 %
ERYTHROCYTE [DISTWIDTH] IN BLOOD BY AUTOMATED COUNT: 16.9 %
EST. GFR  (AFRICAN AMERICAN): 38.5 ML/MIN/1.73 M^2
EST. GFR  (NON AFRICAN AMERICAN): 33.4 ML/MIN/1.73 M^2
GLUCOSE SERPL-MCNC: 106 MG/DL
HCT VFR BLD AUTO: 41.7 %
HGB BLD-MCNC: 13 G/DL
IMM GRANULOCYTES # BLD AUTO: 0.01 K/UL
IMM GRANULOCYTES NFR BLD AUTO: 0.3 %
LYMPHOCYTES # BLD AUTO: 1.5 K/UL
LYMPHOCYTES NFR BLD: 37.9 %
MCH RBC QN AUTO: 25 PG
MCHC RBC AUTO-ENTMCNC: 31.2 G/DL
MCV RBC AUTO: 80 FL
MONOCYTES # BLD AUTO: 0.5 K/UL
MONOCYTES NFR BLD: 11.6 %
NEUTROPHILS # BLD AUTO: 1.9 K/UL
NEUTROPHILS NFR BLD: 46.7 %
NRBC BLD-RTO: 0 /100 WBC
PLATELET # BLD AUTO: 187 K/UL
PMV BLD AUTO: 11.8 FL
POTASSIUM SERPL-SCNC: 4 MMOL/L
PROT SERPL-MCNC: 5.8 G/DL
RBC # BLD AUTO: 5.2 M/UL
SODIUM SERPL-SCNC: 140 MMOL/L
WBC # BLD AUTO: 3.98 K/UL

## 2018-08-15 PROCEDURE — 63600175 PHARM REV CODE 636 W HCPCS: Performed by: STUDENT IN AN ORGANIZED HEALTH CARE EDUCATION/TRAINING PROGRAM

## 2018-08-15 PROCEDURE — 97116 GAIT TRAINING THERAPY: CPT

## 2018-08-15 PROCEDURE — 85025 COMPLETE CBC W/AUTO DIFF WBC: CPT

## 2018-08-15 PROCEDURE — 93005 ELECTROCARDIOGRAM TRACING: CPT

## 2018-08-15 PROCEDURE — 36415 COLL VENOUS BLD VENIPUNCTURE: CPT

## 2018-08-15 PROCEDURE — 25000003 PHARM REV CODE 250: Performed by: NURSE PRACTITIONER

## 2018-08-15 PROCEDURE — 97535 SELF CARE MNGMENT TRAINING: CPT

## 2018-08-15 PROCEDURE — 97530 THERAPEUTIC ACTIVITIES: CPT

## 2018-08-15 PROCEDURE — 99233 SBSQ HOSP IP/OBS HIGH 50: CPT | Mod: ,,, | Performed by: PSYCHIATRY & NEUROLOGY

## 2018-08-15 PROCEDURE — G8980 MOBILITY D/C STATUS: HCPCS | Mod: CJ

## 2018-08-15 PROCEDURE — 80053 COMPREHEN METABOLIC PANEL: CPT

## 2018-08-15 PROCEDURE — 92507 TX SP LANG VOICE COMM INDIV: CPT

## 2018-08-15 PROCEDURE — 93010 ELECTROCARDIOGRAM REPORT: CPT | Mod: ,,, | Performed by: INTERNAL MEDICINE

## 2018-08-15 PROCEDURE — A4216 STERILE WATER/SALINE, 10 ML: HCPCS | Performed by: PHYSICIAN ASSISTANT

## 2018-08-15 PROCEDURE — 25000003 PHARM REV CODE 250: Performed by: PHYSICIAN ASSISTANT

## 2018-08-15 PROCEDURE — 99232 SBSQ HOSP IP/OBS MODERATE 35: CPT | Mod: ,,, | Performed by: NURSE PRACTITIONER

## 2018-08-15 RX ORDER — FUROSEMIDE 40 MG/1
40 TABLET ORAL 2 TIMES DAILY
Qty: 60 TABLET | Refills: 11 | Status: SHIPPED | OUTPATIENT
Start: 2018-08-15 | End: 2019-11-13 | Stop reason: SDUPTHER

## 2018-08-15 RX ORDER — BENAZEPRIL HYDROCHLORIDE 40 MG/1
20 TABLET ORAL DAILY
Qty: 90 TABLET | Refills: 4 | Status: SHIPPED | OUTPATIENT
Start: 2018-08-15 | End: 2019-11-13 | Stop reason: SDUPTHER

## 2018-08-15 RX ORDER — FUROSEMIDE 10 MG/ML
40 INJECTION INTRAMUSCULAR; INTRAVENOUS 2 TIMES DAILY
Qty: 30 ML | Refills: 11 | Status: SHIPPED | OUTPATIENT
Start: 2018-08-15 | End: 2018-08-15 | Stop reason: HOSPADM

## 2018-08-15 RX ADMIN — BENAZEPRIL HYDROCHLORIDE 20 MG: 20 TABLET, FILM COATED ORAL at 09:08

## 2018-08-15 RX ADMIN — Medication 54 MG: at 09:08

## 2018-08-15 RX ADMIN — VITAMIN D, TAB 1000IU (100/BT) 2000 UNITS: 25 TAB at 09:08

## 2018-08-15 RX ADMIN — Medication 3 ML: at 05:08

## 2018-08-15 RX ADMIN — ATORVASTATIN CALCIUM 80 MG: 20 TABLET, FILM COATED ORAL at 09:08

## 2018-08-15 RX ADMIN — LETROZOLE 2.5 MG: 2.5 TABLET ORAL at 09:08

## 2018-08-15 RX ADMIN — CARVEDILOL 25 MG: 25 TABLET, FILM COATED ORAL at 09:08

## 2018-08-15 RX ADMIN — FUROSEMIDE 40 MG: 10 INJECTION, SOLUTION INTRAMUSCULAR; INTRAVENOUS at 09:08

## 2018-08-15 RX ADMIN — CARVEDILOL 25 MG: 25 TABLET, FILM COATED ORAL at 05:08

## 2018-08-15 RX ADMIN — APIXABAN 5 MG: 5 TABLET, FILM COATED ORAL at 09:08

## 2018-08-15 NOTE — SUBJECTIVE & OBJECTIVE
Subjective:      Interval History:  no major events overnight, she is alert this morning, however not oriented time or place. EEG showed generalized encephalopathy without any epileptiform activity.      apixaban  5 mg Oral BID    atorvastatin  80 mg Oral Daily    benazepril  20 mg Oral Daily    carvedilol  25 mg Oral BID WM    furosemide  40 mg Intravenous BID    letrozole  2.5 mg Oral Daily    magnesium gluconate  54 mg Oral BID    sodium chloride 0.9%  3 mL Intravenous Q8H    vitamin D  2,000 Units Oral Daily       Review of Systems   Constitutional: Positive for activity change. Negative for chills and fever.   HENT: Negative for drooling and voice change.    Eyes: Negative for visual disturbance.   Respiratory: Negative for choking and shortness of breath.    Cardiovascular: Negative for palpitations and leg swelling.   Gastrointestinal: Negative for abdominal pain, nausea and vomiting.   Genitourinary: Negative for dysuria and hematuria.   Musculoskeletal: Negative for gait problem.   Skin: Negative for pallor and wound.   Allergic/Immunologic: Negative for immunocompromised state.   Neurological: Negative for seizures, facial asymmetry, speech difficulty, weakness and headaches.   Psychiatric/Behavioral: Positive for confusion. Negative for agitation. The patient is not nervous/anxious.      Objective:     Vital Signs (Most Recent):  Temp: 98.6 °F (37 °C) (08/15/18 0737)  Pulse: 96 (08/15/18 0754)  Resp: 17 (08/15/18 0737)  BP: (!) 137/95 (08/15/18 0737)  SpO2: 98 % (08/15/18 0737)    Vital Signs Range (Last 24H):  Temp:  [97.3 °F (36.3 °C)-98.6 °F (37 °C)]   Pulse:  []   Resp:  [16-18]   BP: (128-139)/(76-96)   SpO2:  [83 %-98 %]     Physical Exam   Constitutional: She appears well-developed and well-nourished. She is cooperative. She does not appear ill. No distress.   HENT:   Head: Normocephalic and atraumatic.   Mouth/Throat: Mucous membranes are dry.   Eyes: EOM are normal. Pupils are  equal, round, and reactive to light.   Neck: Normal range of motion.   Cardiovascular: Normal rate. An irregularly irregular rhythm present.   Pulses:       Radial pulses are 2+ on the right side, and 2+ on the left side.   Pulmonary/Chest: Effort normal. No tachypnea. No respiratory distress.   Abdominal: Soft.   Neurological: She is alert. She displays no seizure activity.   Skin: Skin is warm.   Psychiatric: She has a normal mood and affect. Her speech is normal and behavior is normal.   Nursing note and vitals reviewed.      Neurological Exam:   LOC: alert  Attention Span: Good   Language: No aphasia  Articulation: No dysarthria  Orientation: Not oriented to place, Not oriented to time  Visual Fields: Full  EOM (CN III, IV, VI): Full/intact  Pupils (CN II, III): PERRL  Facial Sensation (CN V): Normal  Facial Movement (CN VII): Symmetric facial expression    Reflexes: flexor plantar responses bilaterally  Motor: Arm left  Normal 5/5  Leg left  Normal 5/5  Arm right  Normal 5/5  Leg right Normal 5/5  Cebellar: No evidence of appendicular or axial ataxia  Sensation: Intact to light touch, temperature and vibration  Tone: Normal tone throughout      Laboratory:  CMP:   Recent Labs   Lab  08/15/18   0449   CALCIUM  9.2   ALBUMIN  2.9*   PROT  5.8*   NA  140   K  4.0   CO2  27   CL  103   BUN  33*   CREATININE  1.5*   ALKPHOS  123   ALT  20   AST  21   BILITOT  0.6     CBC:   Recent Labs   Lab  08/15/18   0449   WBC  3.98   RBC  5.20   HGB  13.0   HCT  41.7   PLT  187   MCV  80*   MCH  25.0*   MCHC  31.2*     Lipid Panel:   Recent Labs   Lab  08/13/18 2005   CHOL  177   LDLCALC  110.2   HDL  45   TRIG  109     Coagulation:   Recent Labs   Lab  08/14/18   0403   INR  1.1   APTT  23.9     Hgb A1C:   Recent Labs   Lab  08/12/18   1227   HGBA1C  6.0*     TSH:   Recent Labs   Lab  08/12/18   1227   TSH  1.367       Diagnostic Results:      Brain imaging:  CT Head w/o contrast 8-12-18 results:  Vague hyperattenuation  within the right frontal lobe laterally, in a region of artifact, may reflect sequela of the same however hemorrhage is not entirely excluded.  Given motion in the region, consideration should be given to repeat examination for exclusion of hemorrhage as warranted.  Correlation with history is advised..    Stable left parietal and right cerebellar encephalomalacia.    Sequela of chronic microvascular ischemic change and senescent change.    brain MRI:     acute L cerebellar punctate infarct      Subacute R frontoparietal infarct with hemorrhagic conversion      Vascular imaging:  - Brain + Neck MRA on 8/13: no evidence of LVO or high-grade stenosis  - Brain MRV on 8/13: no evidence of venous sinus thrombosis    Cardiac Evaluation:   EKG 8-12-18 results:  Atrial flutter with variable A-V block + LAD

## 2018-08-15 NOTE — ASSESSMENT & PLAN NOTE
A1C 6  Currently glucose running WNL  Not on any medications for glycemic control, will continue to monitor  Will have her follow up with PCP for monitoring A1C and tight glucose control

## 2018-08-15 NOTE — PT/OT/SLP PROGRESS
Speech Language Pathology Treatment    Patient Name:  Temitope Suero   MRN:  [id number]  Admitting Diagnosis: Embolic stroke involving left cerebellar artery    Recommendations:                 General Recommendations:  Cognitive-linguistic therapy  Diet recommendations:  Regular, Liquid Diet Level: Thin   Aspiration Precautions: Standard aspiration precautions   General Precautions: Standard, aspiration, fall  Communication strategies:  none    Subjective     I think I am doing okay  Patient goals: home    Pain/Comfort:  · Pain Rating 1: 0/10  · Pain Rating Post-Intervention 1: 0/10    Objective:     Has the patient been evaluated by SLP for swallowing?   Yes  Keep patient NPO? No   Current Respiratory Status: room air      Pt. Seen at bedside and was alert/cooperative .  Family present reported gradual decline in memory over past few months.  Ms. Suero was oriented to year only with poor recall of recent events and fair-good recall of personal biographical information.  Recall of remote temporal and general information was fair-good.  She responded to category exclusion tasks in field of 4 with 70% accuracy and mental manipulation tasks given 3 items with 70% accuracy.      Assessment:     Temitope Suero is a 78 y.o. female with an SLP diagnosis of Cognitive-Linguistic Impairment.      Goals:   Multidisciplinary Problems     SLP Goals        Problem: SLP Goal    Goal Priority Disciplines Outcome   SLP Goal     SLP Ongoing (interventions implemented as appropriate)   Description:  Goals to be met 8/21  1 pt will participate in further eval of reading/writing and visual spatial skills  2 Pt will complete mental manipulation task with 70% acc and mod a   3 pt will be ox3 using any modality and mod a  4 pt will name 8 items during word fluency task with min a   5 pt will recall aids to help with functional memory recall ( ie calendar, alarms, etc) with min a                     Plan:     · Patient to be seen:  4 x/week    · Plan of Care expires:     · Plan of Care reviewed with:  patient, family   · SLP Follow-Up:  Yes       Discharge recommendations:  home health speech therapy     Time Tracking:     SLP Treatment Date:   08/15/18  Speech Start Time:  0843  Speech Stop Time:  0900     Speech Total Time (min):  17 min    Billable Minutes: Speech Therapy Individual 17       Arabella Soto MA, CCC-SLP  08/15/2018

## 2018-08-15 NOTE — PLAN OF CARE
[facility] will provide home health services to patient upon discharge. Gustabo with [facility] states that he will contact pt's family to discuss copays.     Lynn Fields LMSW  [facility]  Ext. [phone]

## 2018-08-15 NOTE — SUBJECTIVE & OBJECTIVE
Interval History 8/15/2018:  Patient is seen for follow-up rehab evaluation and recommendations: Participating with therapy.     HPI, Past Medical, Family, and Social History remains the same as documented in the initial encounter.    Scheduled Medications:    apixaban  5 mg Oral BID    atorvastatin  80 mg Oral Daily    benazepril  20 mg Oral Daily    carvedilol  25 mg Oral BID WM    furosemide  40 mg Intravenous BID    letrozole  2.5 mg Oral Daily    magnesium gluconate  54 mg Oral BID    sodium chloride 0.9%  3 mL Intravenous Q8H    vitamin D  2,000 Units Oral Daily     Diagnostic Results:   Labs: Reviewed  ECG: Reviewed  MRI: Reviewed    PRN Medications: acetaminophen, dextrose 50%, dextrose 50%, glucagon (human recombinant), glucose, glucose, hydrALAZINE, sodium chloride 0.9%, sodium chloride 0.9%    Review of Systems   Constitutional: Negative for fatigue and fever.   HENT: Positive for hearing loss. Negative for drooling and sore throat.    Eyes: Negative for pain and redness.   Respiratory: Negative for cough and shortness of breath.    Cardiovascular: Negative for chest pain and leg swelling.   Gastrointestinal: Negative for abdominal distention, abdominal pain, nausea and vomiting.   Genitourinary: Negative for difficulty urinating.   Musculoskeletal: Negative for back pain and joint swelling.   Skin: Negative for color change.   Neurological: Negative for dizziness, light-headedness and headaches.   Psychiatric/Behavioral: Positive for confusion. Negative for agitation.     Objective:     Vital Signs (Most Recent):  Temp: 98.6 °F (37 °C) (08/15/18 0737)  Pulse: 96 (08/15/18 0754)  Resp: 17 (08/15/18 0737)  BP: (!) 137/95 (08/15/18 0737)  SpO2: 98 % (08/15/18 0737)    Vital Signs (24h Range):  Temp:  [97.3 °F (36.3 °C)-98.6 °F (37 °C)] 98.6 °F (37 °C)  Pulse:  [] 96  Resp:  [16-18] 17  SpO2:  [83 %-98 %] 98 %  BP: (128-139)/(76-96) 137/95     Physical Exam   Constitutional: She appears  well-developed and well-nourished.   HENT:   Head: Normocephalic and atraumatic.   Eyes: EOM are normal. Pupils are equal, round, and reactive to light.   Neck: Normal range of motion. Neck supple.   Cardiovascular: An irregular rhythm present.   Pulmonary/Chest: Effort normal and breath sounds normal.   Abdominal: Soft. Normal appearance and bowel sounds are normal. She exhibits no distension and no abdominal bruit. There is no tenderness. There is no rigidity.   Musculoskeletal: Normal range of motion.   Neurological:   -  Mental Status: Awake and alert, oriented to place. Sitting up in chair. Daughter at bedside. Follows commands with additional prompting. Able to answer correct . Recalls 3/3 objects.   -  Speech and language:  no dysarthria.    -  Vision: no ptosis, no diplopia   -  Facial movement (CN VII): symmetrical  -  Motor: RUE: 5/5, 5/5 .  LUE: 5/5, 5/5 . RLE: 5/5, DF 5/5, PF 5/5.  LLE: 5/5, DF 5/5, PF 5/5.  -  Tone: normal  -  Sensory:  Intact to light touch.    Skin: Skin is warm and dry.   Psychiatric: She has a normal mood and affect.     NEUROLOGICAL EXAMINATION:     CRANIAL NERVES     CN III, IV, VI   Pupils are equal, round, and reactive to light.  Extraocular motions are normal.

## 2018-08-15 NOTE — PROGRESS NOTES
[facility]  Physical Medicine & Rehab  Progress Note    Patient Name: Temitope Suero  MRN: [id number]  Admission Date: 8/12/2018  Length of Stay: 3 days  Attending Physician: Gordon Moss MD    Subjective:     Principal Problem:Embolic stroke involving left cerebellar artery    Hospital Course:   8/14/18: Evaluated by therapy. Bed Mobility (I)- SBA. Sit to stand SBA. Toilet transfer Linda. UBD Linda. LBD SBA. Toileting modA. Ambulated 200 ft SBA w/ RW. SLP recommendation: regular diet and thin liquids. SLP diagnosis of Cognitive-Linguistic Impairment.      Interval History 8/15/2018:  Patient is seen for follow-up rehab evaluation and recommendations: Participating with therapy.     HPI, Past Medical, Family, and Social History remains the same as documented in the initial encounter.    Scheduled Medications:    apixaban  5 mg Oral BID    atorvastatin  80 mg Oral Daily    benazepril  20 mg Oral Daily    carvedilol  25 mg Oral BID WM    furosemide  40 mg Intravenous BID    letrozole  2.5 mg Oral Daily    magnesium gluconate  54 mg Oral BID    sodium chloride 0.9%  3 mL Intravenous Q8H    vitamin D  2,000 Units Oral Daily     Diagnostic Results:   Labs: Reviewed  ECG: Reviewed  MRI: Reviewed    PRN Medications: acetaminophen, dextrose 50%, dextrose 50%, glucagon (human recombinant), glucose, glucose, hydrALAZINE, sodium chloride 0.9%, sodium chloride 0.9%    Review of Systems   Constitutional: Negative for fatigue and fever.   HENT: Positive for hearing loss. Negative for drooling and sore throat.    Eyes: Negative for pain and redness.   Respiratory: Negative for cough and shortness of breath.    Cardiovascular: Negative for chest pain and leg swelling.   Gastrointestinal: Negative for abdominal distention, abdominal pain, nausea and vomiting.   Genitourinary: Negative for difficulty urinating.   Musculoskeletal: Negative for back pain and joint swelling.   Skin: Negative for color  change.   Neurological: Negative for dizziness, light-headedness and headaches.   Psychiatric/Behavioral: Positive for confusion. Negative for agitation.     Objective:     Vital Signs (Most Recent):  Temp: 98.6 °F (37 °C) (08/15/18 0737)  Pulse: 96 (08/15/18 0754)  Resp: 17 (08/15/18 0737)  BP: (!) 137/95 (08/15/18 0737)  SpO2: 98 % (08/15/18 0737)    Vital Signs (24h Range):  Temp:  [97.3 °F (36.3 °C)-98.6 °F (37 °C)] 98.6 °F (37 °C)  Pulse:  [] 96  Resp:  [16-18] 17  SpO2:  [83 %-98 %] 98 %  BP: (128-139)/(76-96) 137/95     Physical Exam   Constitutional: She appears well-developed and well-nourished.   HENT:   Head: Normocephalic and atraumatic.   Eyes: EOM are normal. Pupils are equal, round, and reactive to light.   Neck: Normal range of motion. Neck supple.   Cardiovascular: An irregular rhythm present.   Pulmonary/Chest: Effort normal and breath sounds normal.   Abdominal: Soft. Normal appearance and bowel sounds are normal. She exhibits no distension and no abdominal bruit. There is no tenderness. There is no rigidity.   Musculoskeletal: Normal range of motion.   Neurological:   -  Mental Status: Awake and alert, oriented to place. Sitting up in chair. Daughter at bedside. Follows commands with additional prompting. Able to answer correct . Recalls 3/3 objects.   -  Speech and language:  no dysarthria.    -  Vision: no ptosis, no diplopia   -  Facial movement (CN VII): symmetrical  -  Motor: RUE: 5/5, 5/5 .  LUE: 5/5, 5/5 . RLE: 5/5, DF 5/5, PF 5/5.  LLE: 5/5, DF 5/5, PF 5/5.  -  Tone: normal  -  Sensory:  Intact to light touch.    Skin: Skin is warm and dry.   Psychiatric: She has a normal mood and affect.     Assessment/Plan:      * Embolic stroke involving left cerebellar artery    See hospital course for functional, cognitive/speech/language, and nutrition/swallow status.      Recommendations  -  Encourage mobility, OOB in chair at least 3 hours per day, and early ambulation as  appropriate   -  PT/OT evaluate and treat  -  SLP speech and cognitive evaluate and treat  -  Monitor sleep disturbances and establish consistent sleep-wake cycle  -  Monitor for bowel and bladder dysfunction  -  Monitor for shoulder pain and subluxation  -  Monitor for spasticity  -  Monitor for and prevent skin breakdown and pressure ulcers  · Early mobility, repositioning/weight shifting every 20-30 minutes when sitting, turn patient every 2 hours, proper mattress/overlay and chair cushioning, pressure relief/heel protector boots  -  DVT prophylaxis  -  Reviewed discharge options (IP rehab, SNF, HH therapy, and OP therapy)        Encephalopathy    - EEG pending         History of stroke    - see embolic stroke involving left cerebellar artery         Mixed hyperlipidemia    - stroke risk factor          Recommend Home Health Therapy. HH preference per patient/[facility].  Will follow for additional rehab needs or change in post-acute recommendation.    Lolita Valentino NP  Department of Physical Medicine & Rehab   [facility]

## 2018-08-15 NOTE — TELEPHONE ENCOUNTER
----- Message from Megan Richardson RN sent at 8/15/2018 11:36 AM CDT -----  Contact: Megan Matamoros  Please schedule a hospital follow up appt in 4-6 weeks. The patient was seen by Dr. Moss while in patient. Please call the patient with date and time of appt.

## 2018-08-15 NOTE — ASSESSMENT & PLAN NOTE
Patient failed xarelto, therefore we decided to switch to apixaban  started while inpatient w/o any complications, Will continue upon discharge

## 2018-08-15 NOTE — PLAN OF CARE
Problem: Fall Risk (Adult)  Goal: Absence of Falls  Patient will demonstrate the desired outcomes by discharge/transition of care.  Outcome: Ongoing (interventions implemented as appropriate)   08/15/18 0724   Fall Risk (Adult)   Absence of Falls achieves outcome   Avasys, bed alarm, and family at bedside.    Problem: Patient Care Overview  Goal: Plan of Care Review  Outcome: Ongoing (interventions implemented as appropriate)   08/15/18 0724   Coping/Psychosocial   Plan Of Care Reviewed With patient;family   All questions answered at this time.     Comments: A&Ox3. VSS. Afebrile. Patient resting in bed comfortably overnight, with no acute events. Will continue to monitor.

## 2018-08-15 NOTE — PLAN OF CARE
Problem: Occupational Therapy Goal  Goal: Occupational Therapy Goal  Goals to be met by: 8/21/18     Patient will increase functional independence with ADLs by performing:    UE Dressing with Modified [place].  LE Dressing with Modified [place].  Grooming while standing with Modified [place].  Toileting from toilet with Modified [place] for hygiene and clothing management.   Rolling to Bilateral with Modified [place].   Supine to sit with Modified [place].  Stand pivot transfers with Modified [place].     Outcome: Ongoing (interventions implemented as appropriate)  Goals remain appropriate

## 2018-08-15 NOTE — PLAN OF CARE
[facility]    HOME HEALTH ORDERS  FACE TO FACE ENCOUNTER    Patient Name: Temitope Suero  YOB: 1940    PCP: Gm Zambrano MD   PCP Address: [address] / [place] [address]  PCP Phone Number: [phone]  PCP Fax: [phone]    Encounter Date: 08/15/2018    Admit to Home Health    Diagnoses:  Active Hospital Problems    Diagnosis  POA    *Embolic stroke involving left cerebellar artery [I63.442]  Yes    Deterioration of memory [R41.3]  Yes    Encephalopathy [G93.40]  Yes    Atrial flutter [I48.92]  Yes    Disorientation [R41.0]  Yes    Abnormal CT scan of head [R93.0]  Yes    Chronic anticoagulation - rivaroxaban [Z79.01]  Not Applicable     Chronic    Acute renal failure superimposed on stage 3 chronic kidney disease [N17.9, N18.3]  Yes    Acute on chronic systolic heart failure exacerbated htn emergency [I50.23]  Yes     Echo 4-2018    1 - Moderately depressed left ventricular systolic function (EF 30-35%).     2 - Biatrial enlargement.     3 - Normal right ventricular systolic function .     4 - Mild mitral regurgitation.     5 - Mild tricuspid regurgitation.     6 - The estimated PA systolic pressure is 34 mmHg.     7 - Increased central venous pressure.     8 - Left pleural effusion.       History of stroke [Z86.73]  Not Applicable     R SCA remote infarct on imaging (unknown timing)      Mixed hyperlipidemia [E78.2]  Yes     Chronic    Toxic multinodular goiter [E05.20]  Yes    Type 2 diabetes mellitus with diabetic chronic kidney disease [E11.22]  Yes    Paroxysmal atrial fibrillation [I48.0]  Yes      Resolved Hospital Problems    Diagnosis Date Resolved POA    Malignant hypertensive urgency [I16.0] 08/14/2018 Yes    Hypertensive emergency [I16.1] 08/14/2018 Yes       Future Appointments   Date Time Provider Department Center   8/21/2018  3:30 PM [facility]     Follow-up Information     [facility] On 8/21/2018.    Why:  appt at 3:30 pm  Contact information:  [address]  [facility] [address]  [phone]             [facility]    Specialty:  Neurology  Why:  The office will call you to schedule an appt in 4-6 weeks. If the office does not call you by 8/22/18 please call to schedule an appt.  Contact information:  [address]  [facility] [id number] [phone]  Additional information:  7th Floor           Gm Zambrano MD.    Specialty:  Internal Medicine  Why:  for glucose tight control and monitoring A1C  Contact information:  [address]  [facility] [address]  [phone]             [facility] NEUROLOGY In 4 weeks.    Specialty:  Neurology  Why:  for evaluation of memory   Contact information:  [address]  [facility] [address]  [phone]                   I have seen and examined this patient face to face today. My clinical findings that support the need for the home health skilled services and home bound status are the following:  Weakness/numbness causing balance and gait disturbance due to Stroke making it taxing to leave home.    Allergies:Review of patient's allergies indicates:  No Known Allergies    Diet: regular diet and fluid consistency thin    Activities: activity as tolerated    Nursing:   SN to complete comprehensive assessment including routine vital signs. Instruct on disease process and s/s of complications to report to MD. Review/verify medication list sent home with the patient at time of discharge  and instruct patient/caregiver as needed. Frequency may be adjusted depending on start of care date.    Notify MD if SBP > 160 or < 90; DBP > 90 or < 50; HR > 120 or < 50; Temp > 101; Other:   New neurologic symptoms      CONSULTS:    Physical Therapy to evaluate and treat. Evaluate for home safety and equipment needs; Establish/upgrade home exercise program. Perform / instruct on therapeutic exercises, gait  training, transfer training, and Range of Motion.  Occupational Therapy to evaluate and treat. Evaluate home environment for safety and equipment needs. Perform/Instruct on transfers, ADL training, ROM, and therapeutic exercises.  Speech Therapy  to evaluate and treat for  Language, Swallowing and Cognition.   to evaluate for community resources/long-range planning.    MISCELLANEOUS CARE:  Diabetic Care:   SN to perform and educate Diabetic management with blood glucose monitoring:, Fingerstick blood sugar AC and HS and Report CBG < 60 or > 350 to physician.    WOUND CARE ORDERS  n/a      Medications: Review discharge medications with patient and family and provide education.      Current Discharge Medication List      START taking these medications    Details   apixaban 5 mg Tab Take 1 tablet (5 mg total) by mouth 2 (two) times daily.  Qty: 60 tablet, Refills: 11      furosemide (LASIX) 10 mg/mL injection Inject 4 mLs (40 mg total) into the vein 2 (two) times daily.  Qty: 30 mL, Refills: 11         CONTINUE these medications which have CHANGED    Details   benazepril (LOTENSIN) 40 MG tablet Take 0.5 tablets (20 mg total) by mouth once daily.  Qty: 90 tablet, Refills: 4    Associated Diagnoses: Essential hypertension         CONTINUE these medications which have NOT CHANGED    Details   atorvastatin (LIPITOR) 40 MG tablet Take 1 tablet (40 mg total) by mouth once daily.  Qty: 90 tablet, Refills: 4    Comments: Please consider 90 day supplies to promote better adherence  Associated Diagnoses: Embolic stroke involving right posterior cerebral artery; Cerebrovascular small vessel disease      letrozole (FEMARA) 2.5 mg Tab 1 Tablet Oral Every day  Qty: 90 tablet, Refills: 4    Associated Diagnoses: Personal history of breast cancer      travoprost (TRAVATAN Z) 0.004 % Drop Place 1 drop into both eyes every evening.  Qty: 2.5 mL, Refills: 4    Associated Diagnoses: Chronic primary angle-closure glaucoma,  indeterminate stage, unspecified laterality      carvedilol (COREG) 25 MG tablet Take 1 tablet (25 mg total) by mouth 2 (two) times daily with meals.  Qty: 60 tablet, Refills: 11    Associated Diagnoses: Essential hypertension      cholecalciferol, vitamin D3, (VITAMIN D3) 2,000 unit Cap Take 1 capsule (2,000 Units total) by mouth once daily.  Qty: 90 capsule, Refills: 5    Associated Diagnoses: Vitamin D deficiency disease         STOP taking these medications       chlorthalidone (HYGROTEN) 25 MG Tab Comments:   Reason for Stopping:         rivaroxaban (XARELTO) 15 mg Tab Comments:   Reason for Stopping:         aspirin (ECOTRIN) 81 MG EC tablet Comments:   Reason for Stopping:         diclofenac sodium 1 % Gel Comments:   Reason for Stopping:               I certify that this patient is confined to her home and needs physical therapy, speech therapy and occupational therapy.

## 2018-08-15 NOTE — ASSESSMENT & PLAN NOTE
77 y/o AAF with Subacute R frontal infarct with hemorrhagic transformation, and acute L cerebellar punctate infarct.   EEG was obtained for the work up of encephalopathy, showed generalized encephalopathy w/o epileptiform discharges. Will discharge home and have her follow up in vascular neurology clinic in 4-6 weeks. Also advised family to call if noticed any deterioration of mental status.       Antithrombotics for secondary stroke prevention: Anticoagulants: Apixaban 5 mg BID   Statins for secondary stroke prevention and hyperlipidemia, if present:   Statins: Atorvastatin- 80 mg daily  Aggressive risk factor modification: HTN, HLD  Rehab efforts: Occupational Therapy, PT/OT/SLP to evaluate and treat -> home health PT/OT  Diagnostics ordered/pending: None   VTE prophylaxis: None: Reason for No Pharmacological VTE Prophylaxis: Currently on anticoagulation  BP parameters: Infarct: 3 days out, sBP<160

## 2018-08-15 NOTE — ASSESSMENT & PLAN NOTE
Continues to be disoriented to time  Likely due to vascular dementia but will rule out other possibilities such as non-convulsive seizures  EEG with generalized encephalopathy  Will have her follow up in neurology clinic for evaluation of memory and cognition

## 2018-08-15 NOTE — PT/OT/SLP PROGRESS
Occupational Therapy   Treatment    Name: Temitope Suero  MRN: [id number]  Admitting Diagnosis:  Embolic stroke involving left cerebellar artery       Recommendations:     Discharge Recommendations: home health OT(24 hour (A)/(S))  Discharge Equipment Recommendations:  none  Barriers to discharge:  None    Subjective   Pt reported that she felt better today.  Pt's niece reported that they were told that the BP medications were making the pt dizzy.  Communicated with: RN prior to session.  Pain/Comfort:  · Pain Rating 1: 0/10  · Pain Rating Post-Intervention 1: 0/10    Patients cultural, spiritual, Mosque conflicts given the current situation: full gospel Zoroastrianism    Objective:     Patient found with: telemetry    General Precautions: Standard, aspiration, fall(cardiac)     Occupational Performance:    Functional Mobility/Transfers:  · Patient completed Sit <> Stand Transfer with stand by assistance  with  no assistive device   · Patient completed Toilet Transfer Stand Pivot technique with minimum assistance with  no AD  · Functional Mobility: Pt performed functional mobility in room to bathroom & back & in hallway with CGA-Min (A) for postural control with pt with occasional LOB requiring CGA-Min (A) to correct safely.  Provided cues to facilitate need to decrease speed during mobility especially during turns in room & hallway.    Activities of Daily Living:  · Grooming: minimum assistance with washing hands with (A) to find & correctly obtain soap from dispenser on her left  · Upper Body Dressing: stand by assistance while standing to don gown around back  · Lower Body Dressing: stand by assistance donning socks while seated in chair  · Toileting: minimum assistance from standard commode    Patient left up in chair with all lines intact, call button in reach, RN notified, niece present and white board updated.    AMPAC 6 Click:  AMPAC Total Score: 18    Treatment & Education:  Pt performed visual scanning activities  in room during ADL's to find objects & to mobilize safely to decrease risk of tripping over objects & in hallway to assess location & destination.  Pt performed AROM exercises with BUE in all planes x 10 reps each while seated in chair.  Pt required SBA-Min (A) with standing balance during ADL's.  Provided education regarding pt's current deficits & improvements with pt & niece.  Provided education on assistance level that would be needed upon discharge home during functional mobility with pt & niece verbalizing understanding.  Pt had no further questions & when asked whether there were any concerns pt reported none.    Education:    Assessment:     Temitope Suero is a 78 y.o. female with a medical diagnosis of Embolic stroke involving left cerebellar artery.  She presents with good participation and motivation.  Pt with improved mentation during session.  Pt continues to be at risk for falls.  Performance deficits affecting function are weakness, impaired endurance, impaired functional mobilty, impaired self care skills, impaired balance, visual deficits, impaired cognition, decreased lower extremity function, decreased upper extremity function, decreased coordination, decreased safety awareness.      Rehab Prognosis:  good; patient would benefit from acute skilled OT services to address these deficits and reach maximum level of function.       Plan:     Patient to be seen 4 x/week to address the above listed problems via self-care/home management, therapeutic exercises, neuromuscular re-education, cognitive retraining, therapeutic activities, sensory integration  · Plan of Care Expires:    · Plan of Care Reviewed with: patient(bryon)    This Plan of care has been discussed with the patient who was involved in its development and understands and is in agreement with the identified goals and treatment plan    GOALS:   Multidisciplinary Problems     Occupational Therapy Goals        Problem: Occupational Therapy Goal     Goal Priority Disciplines Outcome Interventions   Occupational Therapy Goal     OT, PT/OT Ongoing (interventions implemented as appropriate)    Description:  Goals to be met by: 8/21/18     Patient will increase functional independence with ADLs by performing:    UE Dressing with Modified [place].  LE Dressing with Modified [place].  Grooming while standing with Modified [place].  Toileting from toilet with Modified [place] for hygiene and clothing management.   Rolling to Bilateral with Modified [place].   Supine to sit with Modified [place].  Stand pivot transfers with Modified [place].                      Time Tracking:     OT Date of Treatment: 08/15/18  OT Start Time: 1127  OT Stop Time: 1152  OT Total Time (min): 25 min    Billable Minutes:Self Care/Home Management 15  Therapeutic Activity 10    ZEV Billingsley  8/15/2018

## 2018-08-15 NOTE — PLAN OF CARE
Problem: Physical Therapy Goal  Goal: Physical Therapy Goal    Goals to be met by 8/24/2018    1. Pt will perform sit to stand transfers with independence and no instability in initial stance.    2. Pt will perform bed <> chair transfers with independence.  3. Pt will perform gait x 150 feet with SBA and no instability without AD. - MET  4. Pt will improve balance to > 19/24 on Dynamic Gait index to decrease risk for falls  5. Pt will ascend and descend 10 steps with 1 railing in order to safely access home environment.    Outcome: Ongoing (interventions implemented as appropriate)  Patient participated well in therapy.  POC and goals remain appropriate.  Please refer to progress note for functional mobility.     Pt is safe to perform transfers and gait with nursing using 1 person assistance.      Niki Stratton, PT  8/15/2018  [phone] (pager)

## 2018-08-15 NOTE — ASSESSMENT & PLAN NOTE
Afib with RVR on admission  rate controlled with carvedilol 25 mg q12  Started AC with apixaban on 08/13, will continue upon D/C

## 2018-08-15 NOTE — PLAN OF CARE
Problem: SLP Goal  Goal: SLP Goal  Goals to be met 8/21  1 pt will participate in further eval of reading/writing and visual spatial skills  2 Pt will complete mental manipulation task with 70% acc and mod a   3 pt will be ox3 using any modality and mod a  4 pt will name 8 items during word fluency task with min a   5 pt will recall aids to help with functional memory recall ( ie calendar, alarms, etc) with min a    Outcome: Ongoing (interventions implemented as appropriate)  Pt. Progressing towards goals.    Arabella Soto MA/CCC-SLP  Speech Language Pathologist  Pager [phone]  8/15/2018

## 2018-08-15 NOTE — PROGRESS NOTES
[facility]  Vascular Neurology  Comprehensive Stroke Center  Progress Note    Assessment/Plan:     * Embolic stroke involving left cerebellar artery    77 y/o AAF with Subacute R frontal infarct with hemorrhagic transformation, and acute L cerebellar punctate infarct.   EEG was obtained for the work up of encephalopathy, showed generalized encephalopathy w/o epileptiform discharges. Will discharge home and have her follow up in vascular neurology clinic in 4-6 weeks. Also advised family to call if noticed any deterioration of mental status.       Antithrombotics for secondary stroke prevention: Anticoagulants: Apixaban 5 mg BID   Statins for secondary stroke prevention and hyperlipidemia, if present:   Statins: Atorvastatin- 80 mg daily  Aggressive risk factor modification: HTN, HLD  Rehab efforts: Occupational Therapy, PT/OT/SLP to evaluate and treat -> home health PT/OT  Diagnostics ordered/pending: None   VTE prophylaxis: None: Reason for No Pharmacological VTE Prophylaxis: Currently on anticoagulation  BP parameters: Infarct: 3 days out, sBP<160        Encephalopathy    Continues to be disoriented to time  Likely due to vascular dementia but will rule out other possibilities such as non-convulsive seizures  EEG with generalized encephalopathy  Will have her follow up in neurology clinic for evaluation of memory and cognition        Atrial flutter    Currently rate-controlled with carvedilol   Also resumed AC with apixaban        Acute on chronic systolic heart failure exacerbated htn emergency    EF 25-30%  Will continue on diuretics, beta blocker and ACEi        Chronic anticoagulation - rivaroxaban    Patient failed xarelto, therefore we decided to switch to apixaban  started while inpatient w/o any complications, Will continue upon discharge        Acute renal failure superimposed on stage 3 chronic kidney disease    Cr trending up, possibly due to ACEi that was started yesterday  This  morning Cr at 1.5, seems to be stable        Mixed hyperlipidemia    , not at goal  Will continue high dose statins (Atorvastatin 80 mg qd)        Type 2 diabetes mellitus with diabetic chronic kidney disease    A1C 6  Currently glucose running WNL  Not on any medications for glycemic control, will continue to monitor  Will have her follow up with PCP for monitoring A1C and tight glucose control        Paroxysmal atrial fibrillation    Afib with RVR on admission  rate controlled with carvedilol 25 mg q12  Started AC with apixaban on 08/13, will continue upon D/C             8/13 pt continue to have AMS, transferred to vascular neurology care.  8/14: more alert this morning. Oriented to self and place but not time. EEG, PT/OT pending. Has failed ANAND overnight, SLP evaluation pending  8/15: no major events overnight, she is alert this morning, however not oriented time or place. EEG showed generalized encephalopathy without any epileptiform activity.     STROKE DOCUMENTATION      NIH Scale:  1a. Level Of Consciousness: 0-->Alert: keenly responsive  1b. LOC Questions: 1-->Answers one question correctly  1c. LOC Commands: 0-->Performs both tasks correctly  2. Best Gaze: 0-->Normal  3. Visual: 0-->No visual loss  4. Facial Palsy: 0-->Normal symmetrical movements  5a. Motor Arm, Left: 0-->No drift: limb holds 90 (or 45) degrees for full 10 secs  5b. Motor Arm, Right: 0-->No drift: limb holds 90 (or 45) degrees for full 10 secs  6a. Motor Leg, Left: 0-->No drift: leg holds 30 degree position for full 5 secs  6b. Motor Leg, Right: 0-->No drift: leg holds 30 degree position for full 5 secs  7. Limb Ataxia: 0-->Absent  8. Sensory: 0-->Normal: no sensory loss  9. Best Language: 0-->No aphasia: normal  10. Dysarthria: 0-->Normal  11. Extinction and Inattention (formerly Neglect): 0-->No abnormality  Total (NIH Stroke Scale): 1       Modified Niles Score: 1  Hodan Coma Scale:    ABCD2 Score:    [id number]  Score:8  HAS -BLED Score:4  ICH Score:   Hunt & Lopez Classification:      Hemorrhagic change of an Ischemic Stroke: Does this patient have an ischemic stroke with hemorrhagic changes? Yes, Grading Scale: HI Type 1 (HI-1) = small petechiae along the margins of the infarct. Is this a symptomatic change?  No - Hemorrhage is not clinically significant        Subjective:      Interval History:  no major events overnight, she is alert this morning, however not oriented time or place. EEG showed generalized encephalopathy without any epileptiform activity.      apixaban  5 mg Oral BID    atorvastatin  80 mg Oral Daily    benazepril  20 mg Oral Daily    carvedilol  25 mg Oral BID WM    furosemide  40 mg Intravenous BID    letrozole  2.5 mg Oral Daily    magnesium gluconate  54 mg Oral BID    sodium chloride 0.9%  3 mL Intravenous Q8H    vitamin D  2,000 Units Oral Daily       Review of Systems   Constitutional: Positive for activity change. Negative for chills and fever.   HENT: Negative for drooling and voice change.    Eyes: Negative for visual disturbance.   Respiratory: Negative for choking and shortness of breath.    Cardiovascular: Negative for palpitations and leg swelling.   Gastrointestinal: Negative for abdominal pain, nausea and vomiting.   Genitourinary: Negative for dysuria and hematuria.   Musculoskeletal: Negative for gait problem.   Skin: Negative for pallor and wound.   Allergic/Immunologic: Negative for immunocompromised state.   Neurological: Negative for seizures, facial asymmetry, speech difficulty, weakness and headaches.   Psychiatric/Behavioral: Positive for confusion. Negative for agitation. The patient is not nervous/anxious.      Objective:     Vital Signs (Most Recent):  Temp: 98.6 °F (37 °C) (08/15/18 0737)  Pulse: 96 (08/15/18 0754)  Resp: 17 (08/15/18 0737)  BP: (!) 137/95 (08/15/18 0737)  SpO2: 98 % (08/15/18 0737)    Vital Signs Range (Last 24H):  Temp:  [97.3 °F (36.3 °C)-98.6 °F  (37 °C)]   Pulse:  []   Resp:  [16-18]   BP: (128-139)/(76-96)   SpO2:  [83 %-98 %]     Physical Exam   Constitutional: She appears well-developed and well-nourished. She is cooperative. She does not appear ill. No distress.   HENT:   Head: Normocephalic and atraumatic.   Mouth/Throat: Mucous membranes are dry.   Eyes: EOM are normal. Pupils are equal, round, and reactive to light.   Neck: Normal range of motion.   Cardiovascular: Normal rate. An irregularly irregular rhythm present.   Pulses:       Radial pulses are 2+ on the right side, and 2+ on the left side.   Pulmonary/Chest: Effort normal. No tachypnea. No respiratory distress.   Abdominal: Soft.   Neurological: She is alert. She displays no seizure activity.   Skin: Skin is warm.   Psychiatric: She has a normal mood and affect. Her speech is normal and behavior is normal.   Nursing note and vitals reviewed.      Neurological Exam:   LOC: alert  Attention Span: Good   Language: No aphasia  Articulation: No dysarthria  Orientation: Not oriented to place, Not oriented to time  Visual Fields: Full  EOM (CN III, IV, VI): Full/intact  Pupils (CN II, III): PERRL  Facial Sensation (CN V): Normal  Facial Movement (CN VII): Symmetric facial expression    Reflexes: flexor plantar responses bilaterally  Motor: Arm left  Normal 5/5  Leg left  Normal 5/5  Arm right  Normal 5/5  Leg right Normal 5/5  Cebellar: No evidence of appendicular or axial ataxia  Sensation: Intact to light touch, temperature and vibration  Tone: Normal tone throughout      Laboratory:  CMP:   Recent Labs   Lab  08/15/18   0449   CALCIUM  9.2   ALBUMIN  2.9*   PROT  5.8*   NA  140   K  4.0   CO2  27   CL  103   BUN  33*   CREATININE  1.5*   ALKPHOS  123   ALT  20   AST  21   BILITOT  0.6     CBC:   Recent Labs   Lab  08/15/18   0449   WBC  3.98   RBC  5.20   HGB  13.0   HCT  41.7   PLT  187   MCV  80*   MCH  25.0*   MCHC  31.2*     Lipid Panel:   Recent Labs   Lab  08/13/18 2005   CHOL  177    LDLCALC  110.2   HDL  45   TRIG  109     Coagulation:   Recent Labs   Lab  08/14/18   0403   INR  1.1   APTT  23.9     Hgb A1C:   Recent Labs   Lab  08/12/18   1227   HGBA1C  6.0*     TSH:   Recent Labs   Lab  08/12/18   1227   TSH  1.367       Diagnostic Results:      Brain imaging:  CT Head w/o contrast 8-12-18 results:  Vague hyperattenuation within the right frontal lobe laterally, in a region of artifact, may reflect sequela of the same however hemorrhage is not entirely excluded.  Given motion in the region, consideration should be given to repeat examination for exclusion of hemorrhage as warranted.  Correlation with history is advised..    Stable left parietal and right cerebellar encephalomalacia.    Sequela of chronic microvascular ischemic change and senescent change.    brain MRI:     acute L cerebellar punctate infarct      Subacute R frontoparietal infarct with hemorrhagic conversion      Vascular imaging:  - Brain + Neck MRA on 8/13: no evidence of LVO or high-grade stenosis  - Brain MRV on 8/13: no evidence of venous sinus thrombosis    Cardiac Evaluation:   EKG 8-12-18 results:  Atrial flutter with variable A-V block + LAD        Jeannine Haro MD  Comprehensive Stroke Center  Department of Vascular Neurology   [facility]

## 2018-08-15 NOTE — PLAN OF CARE
08/15/18 1206   Final Note   Assessment Type Final Discharge Note   Discharge Disposition Home-Health     Patient is discharged to home with HH. Cm gave the patient and her niece a list of HH agencies and asked for a few choices. Sw notified and will set up HH. Denys made [facility] appt with Dr. Smith on 8/21/18 at 3:30 pm. Denys sent in basket message to Vascular Neurology for hospital follow up in 4-6 weeks. Denys corona checked the patient's new prescription for Eliquis for $55.00 but [facility] pharmacist signed the patient up for a program which reduced the cost to $10.00. Cm will give the patient and her niece the card for the prescription program so they can get the reduced price at any pharmacy. Denys explained the information regarding the patient medication. Shelby the patient's niece verbalized understanding.  The patient's family will provide transportation home today.      [id number]    Denys gave the Eliquis discount card to the patient's Rn Samanta and asked that she ensure the patient's daughter receives it upon discharge. Samanta stated she will give it to her with the rest of her discharge paperwork.

## 2018-08-15 NOTE — PROCEDURES
DATE OF PROCEDURE:  08/14/2018    EEG #:  [id number].    LOCATION OF SERVICE:  [facility].    REQUESTING PHYSICIAN:  Dr. Penn.     ELECTROENCEPHALOGRAM REPORT     METHODOLOGY:  Electroencephalographic (EEG) recording is recorded with   electrodes placed according to the International 10-20 placement system.  Thirty   two (32) channels of digital signal (sampling rate of 512/sec), including T1   and T2, were simultaneously recorded from the scalp and may include EKG, EMG,   and/or eye monitors.  Recording band pass was 0.1 to 512 Hz.  Digital video   recording of the patient is simultaneously recorded with the EEG.  The patient   is instructed to report clinical symptoms which may occur during the recording   session.  EEG and video recording are stored and archived in digital format.    Activation procedures, which include photic stimulation, hyperventilation and   instructing patients to perform simple tasks, are done in selected patients  The EEG is displayed on a monitor screen and can be reviewed using different   montages.  Computer assisted-analysis is employed to detect spike and   electrographic seizure activity.   The entire record is submitted for computer   analysis.  The entire recording is visually reviewed, and the times identified   by computer analysis as being spikes or seizures are reviewed again.    Compressed spectral analysis (CSA) is also performed on the activity recorded   from each individual channel.  This is displayed as a power display of   frequencies from 0 to 30 Hz over time.   The CSA is reviewed looking for   asymmetries in power between homologous areas of the scalp, then compared with   the original EEG recording.    [employer] software was also utilized in the review of this study.  This software   suite analyzes the EEG recording in multiple domains.  Coherence and rhythmicity   are computed to identify EEG sections which may contain organized seizures.    Each channel undergoes  analysis to detect the presence of spike and sharp waves   which have special and morphological characteristics of epileptic activity.  The   routine EEG recording is converted from special into frequency domain.  This is   then displayed comparing homologous areas to identify areas of significant   asymmetry.  Algorithm to identify non-cortically generated artifact is used to   separate artifact from the EEG.      EEG FINDINGS:  Recording was obtained at the patient's bedside in her hospital   room.  The patient was awake and interacting with the staff.  Background showed   a posterior dominant rhythm, which was somewhat irregular and consisted of a 7   to 9 Hz frequencies intermixed.  This was seen in the occipital, parietal and   posterior temporal regions.  A low-voltage irregular beta was noted in the mid   and frontal region.  Intermittently, irregular theta and at times delta   frequencies were intermixed and these seen bilaterally, but maximum in the   temporal leads and often were asynchronous.  There are no clear lateralized or   focal findings.  No spike or sharp wave activity was seen.  Sleep was not   recorded.    IMPRESSION:  Mildly abnormal EEG with mild slowing noted bilaterally indicative   of a nonfocal and nonspecific encephalopathy without evidence for an irritative   process.    CLINICAL CORRELATION:  The patient is a 78-year-old female and EEG was requested   to rule out seizures.  The patient apparently had a metabolic stroke involving   the right posterior cerebral artery; however, there are no changes on EEG that   would correlate with that history.  She also has paroxysmal AFib and has a   history of being confused at times and could not hear or see very well.  This   tracing, however, does not show evidence of focal dysfunction nor is there   evidence for an irritative process.      RR/HN  dd: 08/15/2018 09:59:06 (CDT)  td: 08/15/2018 10:21:09 (CDT)  Doc ID   #[id number]  Job ID  #[id number]    CC:

## 2018-08-15 NOTE — PT/OT/SLP PROGRESS
"Physical Therapy Treatment    Patient Name:  Temitope Suero   MRN:  [id number]    Recommendations:     Discharge Recommendations:  home health PT   Discharge Equipment Recommendations: none   Barriers to discharge: None (24 hr supervision available)    Plan:     During this hospitalization, patient to be seen 4 x/week to address the above listed problems via gait training, therapeutic activities, therapeutic exercises, neuromuscular re-education  · Plan of Care Expires:  09/14/18   Plan of Care Reviewed with: patient(aniceto)    Subjective     Communicated with Rn prior to session.  Patient found sitting up in chair upon PT entry to room, agreeable to treatment.      Chief Complaint: none stated  Patient comments/goals: "When I get home I am going to clean up.  You know how things get when you are not there. Everything gets moved."  Pain/Comfort:  · Pain Rating 1: 0/10  · Pain Rating Post-Intervention 1: 0/10    Objective:     Patient found with: telemetry(AVASYS)     General Precautions: Standard, aspiration, fall(confusion)     Patient was found sitting up in chair with niece at bedside. She agreed to therapy.  Gait and mobility reassessed to ensure no DME required for safe d/c home.  See below for details    Functional Mobility:  Transfers:   · Sit <> Stand Transfer:  SBA   · Bed <> Chair Transfer: SBA  · Toilet transfer: NT     Gait:  Gait x 200 feet HHA with minimal gait deviations when patient decreases pace only.   Gait x 200 feet SBA with no DME, mild gait deviations and very mild path deviation 1-2 inches with no overt LOB, no safety concerns.   Therapeutic Activities, Exercises, or Education:  PT educated patient and niece on improvement with gait, recommendation for 24 hr supervision and HH therapy.     Functional Outcome Measures:    AM-PAC 6 CLICK MOBILITY  Turning over in bed (including adjusting bedclothes, sheets and blankets)?: 4  Sitting down on and standing up from a chair with arms (e.g., wheelchair, " bedside commode, etc.): 4  Moving from lying on back to sitting on the side of the bed?: 4  Moving to and from a bed to a chair (including a wheelchair)?: 3  Need to walk in hospital room?: 3  Climbing 3-5 steps with a railing?: 3  Basic Mobility Total Score: 21     Patient left sitting up in chair with all lines intact, call bell in reach, niece present and AVASYS in room..    GOALS:   Multidisciplinary Problems     Physical Therapy Goals        Problem: Physical Therapy Goal    Goal Priority Disciplines Outcome Goal Variances Interventions   Physical Therapy Goal     PT, PT/OT Ongoing (interventions implemented as appropriate)     Description:    Goals to be met by 8/24/2018    1. Pt will perform sit to stand transfers with independence and no instability in initial stance.    2. Pt will perform bed <> chair transfers with independence.  3. Pt will perform gait x 150 feet with SBA and no instability without AD. - MET  4. Pt will improve balance to > 19/24 on Dynamic Gait index to decrease risk for falls  5. Pt will ascend and descend 10 steps with 1 railing in order to safely access home environment.                    Assessment:     Temitope Suero is a 78 y.o. female admitted with a medical diagnosis of Embolic stroke involving left cerebellar artery. Her gait and balance were slightly improved today.  She continues to present with very mild balance deficits, which may be baseline.  She would definitely benefit from follow up with HH therapy.  Otherwise she is physically safe to d.c home with supervision when she is medically appropriate.  No DME needs at this time.     She presents with the following impairments/functional limitations:  impaired functional mobilty, gait instability, impaired balance, impaired self care skills, decreased safety awareness, impaired cognition.    Rehab Prognosis:  good; patient would benefit from acute skilled PT services to address these deficits and reach maximum level of  function.      Recent Surgery: * No surgery found *          Time Tracking:     PT Received On: 08/15/18  PT Start Time: 0940   PT Stop Time: 0950  PT Total Time (min): 10 min     Billable Minutes: Gait Training 10    Treatment Type: Treatment  PT/PTA: PT         Niki Stratton, PT  8/15/2018  [phone] (pager)

## 2018-08-15 NOTE — ASSESSMENT & PLAN NOTE
Cr trending up, possibly due to ACEi that was started yesterday  This morning Cr at 1.5, seems to be stable

## 2018-08-16 NOTE — PT/OT/SLP DISCHARGE
Physical Therapy Discharge Summary    Name: Temitope Suero  MRN: [id number]   Principal Problem: Embolic stroke involving left cerebellar artery     Patient Discharged from acute Physical Therapy on 8/15/2018.  Please refer to prior PT noted date on 8/15/2018 for functional status.     Assessment:     Patient appropriate for care in another setting.    Objective:     GOALS:   Multidisciplinary Problems     Physical Therapy Goals     Not on file          Multidisciplinary Problems (Resolved)        Problem: Physical Therapy Goal    Goal Priority Disciplines Outcome Goal Variances Interventions   Physical Therapy Goal   (Resolved)     PT, PT/OT Outcome(s) achieved     Description:    Goals to be met by 8/24/2018    1. Pt will perform sit to stand transfers with independence and no instability in initial stance.    2. Pt will perform bed <> chair transfers with independence.  3. Pt will perform gait x 150 feet with SBA and no instability without AD. - MET  4. Pt will improve balance to > 19/24 on Dynamic Gait index to decrease risk for falls  5. Pt will ascend and descend 10 steps with 1 railing in order to safely access home environment.                      Reasons for Discontinuation of Therapy Services  Transfer to alternate level of care.      Plan:     Patient Discharged to: Home with Home Health Service.    Niki Stratton, PT  8/16/2018

## 2018-08-16 NOTE — PLAN OF CARE
[facility]    HOME HEALTH ORDERS  FACE TO FACE ENCOUNTER    Patient Name: Temitope Suero  YOB: 1940    PCP: Gm Zambrano MD   PCP Address: [address] / [place] [address]  PCP Phone Number: [phone]  PCP Fax: [phone]    Encounter Date: 08/15/2018    Admit to Home Health    Diagnoses:  Active Hospital Problems    Diagnosis  POA    *Embolic stroke involving left cerebellar artery [I63.442]  Yes    Cytotoxic cerebral edema [G93.6]  Yes    Deterioration of memory [R41.3]  Yes    Encephalopathy [G93.40]  Yes    Atrial flutter [I48.92]  Yes    Disorientation [R41.0]  Yes    Abnormal CT scan of head [R93.0]  Yes    Chronic anticoagulation - rivaroxaban [Z79.01]  Not Applicable     Chronic    Acute renal failure superimposed on stage 3 chronic kidney disease [N17.9, N18.3]  Yes    Acute on chronic systolic heart failure exacerbated htn emergency [I50.23]  Yes     Echo 4-2018    1 - Moderately depressed left ventricular systolic function (EF 30-35%).     2 - Biatrial enlargement.     3 - Normal right ventricular systolic function .     4 - Mild mitral regurgitation.     5 - Mild tricuspid regurgitation.     6 - The estimated PA systolic pressure is 34 mmHg.     7 - Increased central venous pressure.     8 - Left pleural effusion.       History of stroke [Z86.73]  Not Applicable     R SCA remote infarct on imaging (unknown timing)      Mixed hyperlipidemia [E78.2]  Yes     Chronic    Toxic multinodular goiter [E05.20]  Yes    Type 2 diabetes mellitus with diabetic chronic kidney disease [E11.22]  Yes    Paroxysmal atrial fibrillation [I48.0]  Yes      Resolved Hospital Problems    Diagnosis Date Resolved POA    Malignant hypertensive urgency [I16.0] 08/14/2018 Yes    Hypertensive emergency [I16.1] 08/14/2018 Yes       Future Appointments   Date Time Provider Department Center   8/21/2018  3:30 PM YSABEL Hernandez [facility]      Follow-up Information     [facility] On 8/21/2018.    Why:  appt at 3:30 pm  Contact information:  [address]  [facility] [address]  [phone]             [facility].    Specialty:  Neurology  Why:  The office will call you to schedule an appt in 4-6 weeks. If the office does not call you by 8/22/18 please call to schedule an appt.  Contact information:  [address]  [facility] [id number] [phone]  Additional information:  7th Floor           Gm Zambrano MD.    Specialty:  Internal Medicine  Why:  for glucose tight control and monitoring A1C  Contact information:  [address]  [facility] [address]  [phone]             [facility] NEUROLOGY In 4 weeks.    Specialty:  Neurology  Why:  for evaluation of memory   Contact information:  [address]  [facility] [id number] [phone]                   I have seen and examined this patient face to face today. My clinical findings that support the need for the home health skilled services and home bound status are the following:  Weakness/numbness causing balance and gait disturbance due to Stroke and Weakness/Debility making it taxing to leave home.    Allergies:Review of patient's allergies indicates:  No Known Allergies    Diet: regular diet and fluid consistency thin    Activities: activity as tolerated    Nursing:   SN to complete comprehensive assessment including routine vital signs. Instruct on disease process and s/s of complications to report to MD. Review/verify medication list sent home with the patient at time of discharge  and instruct patient/caregiver as needed. Frequency may be adjusted depending on start of care date.    Notify MD if SBP > 160 or < 90; DBP > 90 or < 50; HR > 120 or < 50; Temp > 101; Other:   New neurologic symptoms      CONSULTS:    Physical Therapy to evaluate and treat. Evaluate for home safety and equipment needs; Establish/upgrade home  exercise program. Perform / instruct on therapeutic exercises, gait training, transfer training, and Range of Motion.  Occupational Therapy to evaluate and treat. Evaluate home environment for safety and equipment needs. Perform/Instruct on transfers, ADL training, ROM, and therapeutic exercises.  Speech Therapy  to evaluate and treat for  Language and Cognition.   to evaluate for community resources/long-range planning.  Aide to provide assistance with personal care, ADLs, and vital signs.    MISCELLANEOUS CARE:  Diabetic Care:   SN to perform and educate Diabetic management with blood glucose monitoring:, Fingerstick blood sugar AC and HS and Report CBG < 60 or > 350 to physician      WOUND CARE ORDERS  n/a      Medications: Review discharge medications with patient and family and provide education.      Current Discharge Medication List      START taking these medications    Details   apixaban 5 mg Tab Take 1 tablet (5 mg total) by mouth 2 (two) times daily.  Qty: 60 tablet, Refills: 11      furosemide (LASIX) 40 MG tablet Take 1 tablet (40 mg total) by mouth 2 (two) times daily.  Qty: 60 tablet, Refills: 11         CONTINUE these medications which have CHANGED    Details   benazepril (LOTENSIN) 40 MG tablet Take 0.5 tablets (20 mg total) by mouth once daily.  Qty: 90 tablet, Refills: 4    Associated Diagnoses: Essential hypertension         CONTINUE these medications which have NOT CHANGED    Details   atorvastatin (LIPITOR) 40 MG tablet Take 1 tablet (40 mg total) by mouth once daily.  Qty: 90 tablet, Refills: 4    Comments: Please consider 90 day supplies to promote better adherence  Associated Diagnoses: Embolic stroke involving right posterior cerebral artery; Cerebrovascular small vessel disease      letrozole (FEMARA) 2.5 mg Tab 1 Tablet Oral Every day  Qty: 90 tablet, Refills: 4    Associated Diagnoses: Personal history of breast cancer      travoprost (TRAVATAN Z) 0.004 % Drop Place 1 drop  into both eyes every evening.  Qty: 2.5 mL, Refills: 4    Associated Diagnoses: Chronic primary angle-closure glaucoma, indeterminate stage, unspecified laterality      carvedilol (COREG) 25 MG tablet Take 1 tablet (25 mg total) by mouth 2 (two) times daily with meals.  Qty: 60 tablet, Refills: 11    Associated Diagnoses: Essential hypertension      cholecalciferol, vitamin D3, (VITAMIN D3) 2,000 unit Cap Take 1 capsule (2,000 Units total) by mouth once daily.  Qty: 90 capsule, Refills: 5    Associated Diagnoses: Vitamin D deficiency disease         STOP taking these medications       chlorthalidone (HYGROTEN) 25 MG Tab Comments:   Reason for Stopping:         rivaroxaban (XARELTO) 15 mg Tab Comments:   Reason for Stopping:         aspirin (ECOTRIN) 81 MG EC tablet Comments:   Reason for Stopping:         diclofenac sodium 1 % Gel Comments:   Reason for Stopping:               I certify that this patient is confined to her home and needs intermittent skilled nursing care, physical therapy and occupational therapy.

## 2018-08-16 NOTE — ASSESSMENT & PLAN NOTE
79 y/o AAF with Subacute R frontal infarct with hemorrhagic transformation, and acute L cerebellar punctate infarct.   EEG was obtained for the work up of encephalopathy, showed generalized encephalopathy w/o epileptiform discharges. Will discharge home and have her follow up in vascular neurology clinic in 4-6 weeks. Also advised family to call if noticed any deterioration of mental status.       Antithrombotics for secondary stroke prevention: Anticoagulants: Apixaban 5 mg BID   Statins for secondary stroke prevention and hyperlipidemia, if present:   Statins: Atorvastatin- 80 mg daily  Aggressive risk factor modification: HTN, HLD  Rehab efforts: Occupational Therapy, PT/OT/SLP to evaluate and treat -> home health PT/OT  Diagnostics ordered/pending: None   VTE prophylaxis: None: Reason for No Pharmacological VTE Prophylaxis: Currently on anticoagulation  BP parameters: Infarct: 3 days out, sBP<160

## 2018-08-16 NOTE — DISCHARGE SUMMARY
"[facility]  Vascular Neurology  Comprehensive Stroke Center  Discharge Summary     Summary:     Admit Date: 8/12/2018 11:51 AM    Discharge Date and Time:  08/15/2018 7:23 PM    Attending Physician: Gordon Moss MD     Discharge Provider: Jeannine Haro MD    History of Present Illness: 77 y/o female who lives with her daughter and nephew but her daughter has been out of town. She presents to the ED C/O fatigue and SOB. She has history of AFIB, CHF, HTN, stroke in 4-10-18. In ED she was found to be in afib with RVR and hypertensive urgency with /126.   Family reports that patient has been having episodes of forgetfulness for the past month. She can remember date, year etc one day and the next she does not remember.   We are called to consult due to CT head read of possible "Vague hyperattenuation within the right frontal lobe laterally, in a region of artifact, may reflect sequela of the same however hemorrhage is not entirely excluded.  Given motion in the region, consideration should be given to repeat examination for exclusion of hemorrhage as warranted."  Patient with no real focal neuro deficit beside memory loss  Risk factor HTN, afib, CHF, prior stroke    Would recommend repeat CT scan head or MRI to evaluate for possible stroke both do not feel the 1 month off and on memory loss is related to stroke but could be dementia/vascular dementia. Recommend consult to General neurology.   Discussed with Vascular Fellow Dr Woods    [facility] Course (synopsis of major diagnoses, care, treatment, and services provided during the course of the hospital stay): 8/13 pt continue to have AMS, transferred to vascular neurology care.  8/14: more alert this morning. Oriented to self and place but not time. EEG, PT/OT pending. Has failed ANAND overnight, SLP evaluation pending  8/15: no major events overnight, she is alert this morning, however not oriented time or place. EEG showed " generalized encephalopathy without any epileptiform activity.     STROKE DOCUMENTATION       NIH Scale:  1a. Level Of Consciousness: 0-->Alert: keenly responsive  1b. LOC Questions: 1-->Answers one question correctly  1c. LOC Commands: 0-->Performs both tasks correctly  2. Best Gaze: 0-->Normal  3. Visual: 0-->No visual loss  4. Facial Palsy: 0-->Normal symmetrical movements  5a. Motor Arm, Left: 0-->No drift: limb holds 90 (or 45) degrees for full 10 secs  5b. Motor Arm, Right: 0-->No drift: limb holds 90 (or 45) degrees for full 10 secs  6a. Motor Leg, Left: 0-->No drift: leg holds 30 degree position for full 5 secs  6b. Motor Leg, Right: 0-->No drift: leg holds 30 degree position for full 5 secs  7. Limb Ataxia: 0-->Absent  8. Sensory: 0-->Normal: no sensory loss  9. Best Language: 0-->No aphasia: normal  10. Dysarthria: 0-->Normal  11. Extinction and Inattention (formerly Neglect): 0-->No abnormality  Total (NIH Stroke Scale): 1      Modified [place] Score: 1  Downs Coma Scale:    ABCD2 Score:    [id number] Score:8  HAS -BLED Score:4  ICH Score:   Hunt & Lopez Classification:       Assessment/Plan:     Diagnostic Results:    Brain imaging:  CT Head w/o contrast 8-12-18 results:  Vague hyperattenuation within the right frontal lobe laterally, in a region of artifact, may reflect sequela of the same however hemorrhage is not entirely excluded.  Given motion in the region, consideration should be given to repeat examination for exclusion of hemorrhage as warranted.  Correlation with history is advised..    Stable left parietal and right cerebellar encephalomalacia.    Sequela of chronic microvascular ischemic change and senescent change.     brain MRI:      acute L cerebellar punctate infarct       Subacute R frontoparietal infarct with hemorrhagic conversion       Vascular imaging:  - Brain + Neck MRA on 8/13: no evidence of LVO or high-grade stenosis  - Brain MRV on 8/13: no evidence of venous sinus  thrombosis     Cardiac Evaluation:   EKG 8-12-18 results:  Atrial flutter with variable A-V block + LAD      Interventions: None    Complications: None    Disposition: Home or Self Care + home health PT/OT    Final Active Diagnoses:    Diagnosis Date Noted POA    PRINCIPAL PROBLEM:  Embolic stroke involving left cerebellar artery [I63.442] 08/13/2018 Yes    Cytotoxic cerebral edema [G93.6] 08/15/2018 Yes    Deterioration of memory [R41.3] 08/13/2018 Yes    Encephalopathy [G93.40] 08/13/2018 Yes    Atrial flutter [I48.92] 08/12/2018 Yes    Disorientation [R41.0] 08/12/2018 Yes    Abnormal CT scan of head [R93.0] 08/12/2018 Yes    Chronic anticoagulation - rivaroxaban [Z79.01] 04/17/2018 Not Applicable     Chronic    Acute renal failure superimposed on stage 3 chronic kidney disease [N17.9, N18.3] 04/17/2018 Yes    Acute on chronic systolic heart failure exacerbated htn emergency [I50.23] 04/17/2018 Yes    History of stroke [Z86.73] 04/11/2018 Not Applicable    Mixed hyperlipidemia [E78.2] 10/28/2013 Yes     Chronic    Toxic multinodular goiter [E05.20] 10/28/2013 Yes    Type 2 diabetes mellitus with diabetic chronic kidney disease [E11.22] 07/10/2012 Yes    Paroxysmal atrial fibrillation [I48.0] 07/10/2012 Yes      Problems Resolved During this Admission:    Diagnosis Date Noted Date Resolved POA    Malignant hypertensive urgency [I16.0] 08/13/2018 08/14/2018 Yes    Hypertensive emergency [I16.1] 08/12/2018 08/14/2018 Yes     * Embolic stroke involving left cerebellar artery    79 y/o AAF with Subacute R frontal infarct with hemorrhagic transformation, and acute L cerebellar punctate infarct.   EEG was obtained for the work up of encephalopathy, showed generalized encephalopathy w/o epileptiform discharges. Will discharge home and have her follow up in vascular neurology clinic in 4-6 weeks. Also advised family to call if noticed any deterioration of mental status.       Antithrombotics for secondary  stroke prevention: Anticoagulants: Apixaban 5 mg BID   Statins for secondary stroke prevention and hyperlipidemia, if present:   Statins: Atorvastatin- 80 mg daily  Aggressive risk factor modification: HTN, HLD  Rehab efforts: Occupational Therapy, PT/OT/SLP to evaluate and treat -> home health PT/OT  Diagnostics ordered/pending: None   VTE prophylaxis: None: Reason for No Pharmacological VTE Prophylaxis: Currently on anticoagulation  BP parameters: Infarct: 3 days out, sBP<160            Encephalopathy    Continues to be disoriented to time  Likely due to vascular dementia but will rule out other possibilities such as non-convulsive seizures  EEG with generalized encephalopathy  Will have her follow up in neurology clinic for evaluation of memory and cognition        Atrial flutter    Currently rate-controlled with carvedilol   Also resumed AC with apixaban        Acute on chronic systolic heart failure exacerbated htn emergency    EF 25-30%  Will continue on diuretics, beta blocker and ACEi        Chronic anticoagulation - rivaroxaban    Patient failed xarelto, therefore we decided to switch to apixaban  started while inpatient w/o any complications, Will continue upon discharge        Acute renal failure superimposed on stage 3 chronic kidney disease    Cr trending up, possibly due to ACEi that was started yesterday  This morning Cr at 1.5, seems to be stable        Mixed hyperlipidemia    , not at goal  Will continue high dose statins (Atorvastatin 80 mg qd)        Type 2 diabetes mellitus with diabetic chronic kidney disease    A1C 6  Currently glucose running WNL  Not on any medications for glycemic control, will continue to monitor  Will have her follow up with PCP for monitoring A1C and tight glucose control        Paroxysmal atrial fibrillation    Afib with RVR on admission  rate controlled with carvedilol 25 mg q12  Started AC with apixaban on 08/13, will continue upon D/C             Recommendations:     Post-discharge complication risks: None    Stroke Education given to: patient and family    Follow-up in Stroke Clinic in 4-6 weeks.    Discharge Plan:  Statin: Atorvastatin 80 mg  Anticoagulant: Apixaban  Aggresive risk factor modification:  Hypertension  High Cholesterol  Diet  Atrial Fibrillation    Follow Up:  Follow-up Information     [facility] On 8/21/2018.    Why:  appt at 3:30 pm  Contact information:  [address]  [facility] [address]  [phone]             [facility].    Specialty:  Neurology  Why:  The office will call you to schedule an appt in 4-6 weeks. If the office does not call you by 8/22/18 please call to schedule an appt.  Contact information:  [address]  [facility] [id number] [phone]  Additional information:  7th Floor           Gm Zambrano MD.    Specialty:  Internal Medicine  Why:  for glucose tight control and monitoring A1C  Contact information:  [address]  [facility] [address]  [phone]             [facility] NEUROLOGY In 4 weeks.    Specialty:  Neurology  Why:  for evaluation of memory   Contact information[facility]  [facility] [facility] [address]  [phone]                 Patient Instructions:      Ambulatory Referral to Vascular Neurology   Referral Priority: Routine Referral Type: Consultation   Referral Reason: Specialty Services Required   Requested Specialty: Vascular Neurology   Number of Visits Requested: 1     Ambulatory Referral to Neurology   Referral Priority: Routine Referral Type: Consultation   Referral Reason: Specialty Services Required   Requested Specialty: Neurology   Number of Visits Requested: 1       Medications:  Reconciled Home Medications:   START taking these medications     Details   apixaban 5 mg Tab Take 1 tablet (5 mg total) by mouth 2 (two) times daily.  Qty: 60 tablet, Refills: 11       furosemide (LASIX) 40 MG tablet Take 1  tablet (40 mg total) by mouth 2 (two) times daily.  Qty: 60 tablet, Refills: 11                 CONTINUE these medications which have CHANGED     Details   benazepril (LOTENSIN) 40 MG tablet Take 0.5 tablets (20 mg total) by mouth once daily.  Qty: 90 tablet, Refills: 4     Associated Diagnoses: Essential hypertension                 CONTINUE these medications which have NOT CHANGED     Details   atorvastatin (LIPITOR) 40 MG tablet Take 1 tablet (40 mg total) by mouth once daily.  Qty: 90 tablet, Refills: 4     Comments: Please consider 90 day supplies to promote better adherence  Associated Diagnoses: Embolic stroke involving right posterior cerebral artery; Cerebrovascular small vessel disease       letrozole (FEMARA) 2.5 mg Tab 1 Tablet Oral Every day  Qty: 90 tablet, Refills: 4     Associated Diagnoses: Personal history of breast cancer       travoprost (TRAVATAN Z) 0.004 % Drop Place 1 drop into both eyes every evening.  Qty: 2.5 mL, Refills: 4     Associated Diagnoses: Chronic primary angle-closure glaucoma, indeterminate stage, unspecified laterality       carvedilol (COREG) 25 MG tablet Take 1 tablet (25 mg total) by mouth 2 (two) times daily with meals.  Qty: 60 tablet, Refills: 11     Associated Diagnoses: Essential hypertension       cholecalciferol, vitamin D3, (VITAMIN D3) 2,000 unit Cap Take 1 capsule (2,000 Units total) by mouth once daily.  Qty: 90 capsule, Refills: 5     Associated Diagnoses: Vitamin D deficiency disease                STOP taking these medications         chlorthalidone (HYGROTEN) 25 MG Tab Comments:   Reason for Stopping:            rivaroxaban (XARELTO) 15 mg Tab Comments:   Reason for Stopping:            aspirin (ECOTRIN) 81 MG EC tablet Comments:   Reason for Stopping:            diclofenac sodium 1 % Gel Comments:   Reason for Stopping:                     Jeannine Haro MD  Comprehensive Stroke Center  Department of Vascular Neurology   [facility]

## 2018-08-17 ENCOUNTER — PATIENT OUTREACH (OUTPATIENT)
Dept: ADMINISTRATIVE | Facility: CLINIC | Age: 78
End: 2018-08-17

## 2018-08-17 ENCOUNTER — TELEPHONE (OUTPATIENT)
Dept: NEUROLOGY | Facility: CLINIC | Age: 78
End: 2018-08-17

## 2018-08-17 LAB
BACTERIA BLD CULT: NORMAL
BACTERIA BLD CULT: NORMAL

## 2018-08-17 NOTE — PATIENT INSTRUCTIONS
Discharge Instructions for Stroke  You have been diagnosed with a stroke, or with a TIA (transient ischemic attack). Or you have been identified as having a high risk for stroke. During a stroke, blood stops flowing to part of your brain. This can damage areas in the brain that control other parts of the body. Symptoms after a stroke depend on which part of the brain has been affected.  Stroke risk factors  Once youve had a stroke, youre at greater risk for another one. Listed below are some other factors that can increase your risk for a stroke:  · High blood pressure  · High cholesterol  · Cigarette or cigar smoking  · Diabetes  · Carotid or other artery disease  · Atrial fibrillation, atrial flutter, or other heart disease  · Not being physically active  · Obesity  · Certain blood disorders (such as sickle cell anemia)  · Excessive alcohol use  · Abuse of street drugs  · Race  · Gender  · Family history of stroke  · Diet high in salty, fried, or greasy foods  Changes in daily living  Doing your regular tasks may be difficult after youve had a stroke, but you can learn new ways to manage your daily activities. In fact, doing daily activities may help you to regain muscle strength and bring back function to affected limbs. Be patient, give yourself time to adjust, and appreciate the progress you make.  Daily activities  You may be at risk of falling. Make changes to your home to help you walk more easily. A therapist will decide if you need an assistive device to walk safely.  You may need to see an occupational therapist or physical therapist to learn new ways of doing things. For example, you may need to make adjustments when bathing or dressing:  Tips for showering or bathing  · Test the water temperature with a hand or foot that was not affected by the stroke.  · Use grab bars, a shower seat, a hand-held showerhead, and a long-handled brush.  Tips for getting dressed  · Dress while sitting, starting with  the affected side or limb.  · Wear shirts that pull easily over your head. Wear pants or skirts with elastic waistbands.  · Use zippers with loops attached to the pull tabs.  Lifestyle changes  · Take your medicines exactly as directed. Dont skip doses.  · Begin an exercise program. Ask your provider how to get started. Also ask how much activity you should try to get on a daily or weekly basis. You can benefit from simple activities such as walking or gardening.  · Limit alcohol intake. Men should have no more than 2 alcoholic drinks a day. Women should limit themselves to 1 alcoholic drink per day.  · Know your cholesterol level. Follow your providers recommendations about how to keep cholesterol under control.  · If you are a smoker, quit now. Join a stop-smoking program to improve your chances of success. Ask your provider about medicines or other methods to help you quit.  · Learn stress management techniques to help you deal with stress in your home and work life.  Diet  Your healthcare provider will give you information on dietary changes that you may need to make, based on your situation. Your provider may recommend that you see a registered dietitian for help with diet changes. Changes may include:  · Reducing fat and cholesterol intake  · Reducing salt (sodium) intake, especially if you have high blood pressure  · Eating more fresh vegetables and fruits  · Eating more lean proteins, such as fish, poultry, and beans and peas (legumes)  · Eating less red meat and processed meats  · Using low-fat dairy products  · Limiting vegetable oils and nut oils  · Limiting sweets and processed foods such as chips, cookies, and baked goods  Follow-up care  · Keep your medical appointments. Close follow-up is important to stroke rehabilitation and recovery.  · Some medicines require blood tests to check for progress or problems. Keep follow-up appointments for any blood tests ordered by your providers.  When to call  911  Call 911 right away if you have any of the following symptoms of stroke:  · Weakness, tingling, or loss of feeling on one side of your face or body  · Sudden double vision or trouble seeing in one or both eyes  · Sudden trouble talking or slurred speech  · Trouble understanding others  · Sudden, severe headache  · Dizziness, loss of balance, or a sense of falling  · Blackouts or seizures      F.A.S.T. is an easy way to remember the signs of stroke. When you see these signs, you know that you need to call 911 fast.  F.A.S.T. stands for:  · F is for face drooping. One side of the face is drooping or numb. When the person smiles, the smile is uneven.  · A is for arm weakness. One arm is weak or numb. When the person lifts both arms at the same time, one arm may drift downward.  · S is for speech difficulty. You may notice slurred speech or trouble speaking. The person can't repeat a simple sentence correctly when asked.  · T is for time to call 911. If someone shows any of these symptoms, even if they go away, call 911 immediately. Make note of the time the symptoms first appeared.  Date Last Reviewed: 8/26/2015 © 2000-2018 [employer]. [address] [place] [address], [place] [address]. All rights reserved. This information is not intended as a substitute for professional medical care. Always follow your healthcare professional's instructions.

## 2018-08-17 NOTE — TELEPHONE ENCOUNTER
----- Message from Cheli Campos sent at 8/17/2018  4:06 PM CDT -----  Contact: Aparna Ma  Needs Advice    Reason for call:    States she just referral for home health. Patient sister requested that it be post pone until Monday.   Communication Preference: [phone]   Additional Information:

## 2018-08-21 ENCOUNTER — OFFICE VISIT (OUTPATIENT)
Dept: PRIMARY CARE CLINIC | Facility: CLINIC | Age: 78
End: 2018-08-21
Payer: COMMERCIAL

## 2018-08-21 VITALS
SYSTOLIC BLOOD PRESSURE: 120 MMHG | OXYGEN SATURATION: 98 % | HEIGHT: 66 IN | WEIGHT: 134.5 LBS | BODY MASS INDEX: 21.62 KG/M2 | HEART RATE: 52 BPM | DIASTOLIC BLOOD PRESSURE: 64 MMHG

## 2018-08-21 DIAGNOSIS — Z85.3 HISTORY OF BREAST CANCER: ICD-10-CM

## 2018-08-21 DIAGNOSIS — E78.2 MIXED HYPERLIPIDEMIA: Chronic | ICD-10-CM

## 2018-08-21 DIAGNOSIS — N18.30 TYPE 2 DIABETES MELLITUS WITH STAGE 3 CHRONIC KIDNEY DISEASE, WITHOUT LONG-TERM CURRENT USE OF INSULIN: ICD-10-CM

## 2018-08-21 DIAGNOSIS — I67.9 CEREBROVASCULAR SMALL VESSEL DISEASE: ICD-10-CM

## 2018-08-21 DIAGNOSIS — E11.22 TYPE 2 DIABETES MELLITUS WITH STAGE 3 CHRONIC KIDNEY DISEASE, WITHOUT LONG-TERM CURRENT USE OF INSULIN: ICD-10-CM

## 2018-08-21 DIAGNOSIS — I50.22 CHRONIC SYSTOLIC HEART FAILURE: ICD-10-CM

## 2018-08-21 DIAGNOSIS — I48.0 PAROXYSMAL ATRIAL FIBRILLATION: ICD-10-CM

## 2018-08-21 DIAGNOSIS — N18.30 STAGE 3 CHRONIC KIDNEY DISEASE: ICD-10-CM

## 2018-08-21 DIAGNOSIS — Z86.73 HISTORY OF STROKE: ICD-10-CM

## 2018-08-21 DIAGNOSIS — I63.442 EMBOLIC STROKE INVOLVING LEFT CEREBELLAR ARTERY: Primary | ICD-10-CM

## 2018-08-21 DIAGNOSIS — Z79.01 CHRONIC ANTICOAGULATION: Chronic | ICD-10-CM

## 2018-08-21 DIAGNOSIS — I10 MALIGNANT HYPERTENSION: ICD-10-CM

## 2018-08-21 DIAGNOSIS — R41.3 DETERIORATION OF MEMORY: ICD-10-CM

## 2018-08-21 PROBLEM — G93.6 CYTOTOXIC CEREBRAL EDEMA: Status: RESOLVED | Noted: 2018-08-15 | Resolved: 2018-08-21

## 2018-08-21 PROBLEM — I63.431 EMBOLIC STROKE INVOLVING RIGHT POSTERIOR CEREBRAL ARTERY: Status: RESOLVED | Noted: 2018-04-11 | Resolved: 2018-08-21

## 2018-08-21 PROBLEM — M54.2 CERVICALGIA: Status: RESOLVED | Noted: 2018-04-17 | Resolved: 2018-08-21

## 2018-08-21 PROBLEM — G93.40 ENCEPHALOPATHY: Status: RESOLVED | Noted: 2018-08-13 | Resolved: 2018-08-21

## 2018-08-21 PROCEDURE — 99496 TRANSJ CARE MGMT HIGH F2F 7D: CPT | Mod: S$GLB,,, | Performed by: INTERNAL MEDICINE

## 2018-08-21 PROCEDURE — 99999 PR PBB SHADOW E&M-EST. PATIENT-LVL III: CPT | Mod: PBBFAC,,, | Performed by: INTERNAL MEDICINE

## 2018-08-21 NOTE — Clinical Note
[facility] Visit (Post Discharge Follow-up) Today: - Our clinic physicians and nurses plan to follow the patient up for any medical issues in the [facility] for 30 days post discharge.Future Appointments:Future Appointments9/20/2018  9:20 AM    Gm Zambrano Jr., MD     [facility] 10/2/2018  1:00 PM    Chon Quiroga MD       [facility]

## 2018-08-21 NOTE — PROGRESS NOTES
"[facility]  New Visit Progress Note   Recent Hospital Discharge     PRESENTING HISTORY     Chief Complaint/Reason for Visit:  Follow up Hospital Discharge   Chief Complaint   Patient presents with    Hospital Follow Up     PCP: Gm Zambrano MD    History of Present Illness: Ms. Temitope Suero is a 78 y.o. female who was recently admitted to the hospital.    Admit Date: 8/12/2018 11:51 AM  Discharge Date and Time:  08/15/2018 7:23 PM  Attending Physician: Gordon Moss MD   Discharge Provider: Jeannine Haro MD     History of Present Illness: 77 y/o female who lives with her daughter and nephew but her daughter has been out of town. She presents to the ED C/O fatigue and SOB. She has history of AFIB, CHF, HTN, stroke in 4-10-18. In ED she was found to be in afib with RVR and hypertensive urgency with /126.   Family reports that patient has been having episodes of forgetfulness for the past month. She can remember date, year etc one day and the next she does not remember.   We are called to consult due to CT head read of possible "Vague hyperattenuation within the right frontal lobe laterally, in a region of artifact, may reflect sequela of the same however hemorrhage is not entirely excluded.  Given motion in the region, consideration should be given to repeat examination for exclusion of hemorrhage as warranted."  Patient with no real focal neuro deficit beside memory loss  Risk factor HTN, afib, CHF, prior stroke     Would recommend repeat CT scan head or MRI to evaluate for possible stroke both do not feel the 1 month off and on memory loss is related to stroke but could be dementia/vascular dementia. Recommend consult to General neurology.   Discussed with Vascular Fellow Dr Woods     [facility] Course (synopsis of major diagnoses, care, treatment, and services provided during the course of the hospital stay): 8/13 pt continue to have AMS, transferred to vascular neurology care.  8/14: " more alert this morning. Oriented to self and place but not time. EEG, PT/OT pending. Has failed ANAND overnight, SLP evaluation pending  8/15: no major events overnight, she is alert this morning, however not oriented time or place. EEG showed generalized encephalopathy without any epileptiform activity.      STROKE DOCUMENTATION    NIH Scale:  1a. Level Of Consciousness: 0-->Alert: keenly responsive  1b. LOC Questions: 1-->Answers one question correctly  1c. LOC Commands: 0-->Performs both tasks correctly  2. Best Gaze: 0-->Normal  3. Visual: 0-->No visual loss  4. Facial Palsy: 0-->Normal symmetrical movements  5a. Motor Arm, Left: 0-->No drift: limb holds 90 (or 45) degrees for full 10 secs  5b. Motor Arm, Right: 0-->No drift: limb holds 90 (or 45) degrees for full 10 secs  6a. Motor Leg, Left: 0-->No drift: leg holds 30 degree position for full 5 secs  6b. Motor Leg, Right: 0-->No drift: leg holds 30 degree position for full 5 secs  7. Limb Ataxia: 0-->Absent  8. Sensory: 0-->Normal: no sensory loss  9. Best Language: 0-->No aphasia: normal  10. Dysarthria: 0-->Normal  11. Extinction and Inattention (formerly Neglect): 0-->No abnormality  Total (NIH Stroke Scale): 1     Modified Jordan Score: 1  [place] Coma Scale:    ABCD2 Score:    [id number] Score:8  HAS -BLED Score:4  ICH Score:   Hunt & Lopez Classification:      Assessment/Plan:      Diagnostic Results:     Brain imaging:  CT Head w/o contrast 8-12-18 results:  Vague hyperattenuation within the right frontal lobe laterally, in a region of artifact, may reflect sequela of the same however hemorrhage is not entirely excluded.  Given motion in the region, consideration should be given to repeat examination for exclusion of hemorrhage as warranted.  Correlation with history is advised..    Stable left parietal and right cerebellar encephalomalacia.    Sequela of chronic microvascular ischemic change and senescent change.     brain MRI:      acute L cerebellar  punctate infarct       Subacute R frontoparietal infarct with hemorrhagic conversion       Vascular imaging:  - Brain + Neck MRA on 8/13: no evidence of LVO or high-grade stenosis  - Brain MRV on 8/13: no evidence of venous sinus thrombosis     Cardiac Evaluation:   EKG 8-12-18 results:  Atrial flutter with variable A-V block + LAD    Interventions: None  Complications: None  Disposition: Home or Self Care + home health PT/OT             Final Active Diagnoses:     Diagnosis Date Noted POA    PRINCIPAL PROBLEM:  Embolic stroke involving left cerebellar artery [I63.442] 08/13/2018 Yes    Cytotoxic cerebral edema [G93.6] 08/15/2018 Yes    Deterioration of memory [R41.3] 08/13/2018 Yes    Encephalopathy [G93.40] 08/13/2018 Yes    Atrial flutter [I48.92] 08/12/2018 Yes    Disorientation [R41.0] 08/12/2018 Yes    Abnormal CT scan of head [R93.0] 08/12/2018 Yes    Chronic anticoagulation - rivaroxaban [Z79.01] 04/17/2018 Not Applicable       Chronic    Acute renal failure superimposed on stage 3 chronic kidney disease [N17.9, N18.3] 04/17/2018 Yes    Acute on chronic systolic heart failure exacerbated htn emergency [I50.23] 04/17/2018 Yes    History of stroke [Z86.73] 04/11/2018 Not Applicable    Mixed hyperlipidemia [E78.2] 10/28/2013 Yes       Chronic    Toxic multinodular goiter [E05.20] 10/28/2013 Yes    Type 2 diabetes mellitus with diabetic chronic kidney disease [E11.22] 07/10/2012 Yes    Paroxysmal atrial fibrillation [I48.0] 07/10/2012 Yes       Problems Resolved During this Admission:     Diagnosis Date Noted Date Resolved POA    Malignant hypertensive urgency [I16.0] 08/13/2018 08/14/2018 Yes    Hypertensive emergency [I16.1] 08/12/2018 08/14/2018 Yes          * Embolic stroke involving left cerebellar artery     77 y/o AAF with Subacute R frontal infarct with hemorrhagic transformation, and acute L cerebellar punctate infarct.   EEG was obtained for the work up of encephalopathy, showed  generalized encephalopathy w/o epileptiform discharges. Will discharge home and have her follow up in vascular neurology clinic in 4-6 weeks. Also advised family to call if noticed any deterioration of mental status.     Antithrombotics for secondary stroke prevention: Anticoagulants: Apixaban 5 mg BID   Statins for secondary stroke prevention and hyperlipidemia, if present:   Statins: Atorvastatin- 80 mg daily  Aggressive risk factor modification: HTN, HLD  Rehab efforts: Occupational Therapy, PT/OT/SLP to evaluate and treat -> home health PT/OT  Diagnostics ordered/pending: None   VTE prophylaxis: None: Reason for No Pharmacological VTE Prophylaxis: Currently on anticoagulation  BP parameters: Infarct: 3 days out, sBP<160          Encephalopathy     Continues to be disoriented to time  Likely due to vascular dementia but will rule out other possibilities such as non-convulsive seizures  EEG with generalized encephalopathy  Will have her follow up in neurology clinic for evaluation of memory and cognition       Atrial flutter     Currently rate-controlled with carvedilol   Also resumed AC with apixaban       Acute on chronic systolic heart failure exacerbated htn emergency     EF 25-30%  Will continue on diuretics, beta blocker and ACEi       Chronic anticoagulation - rivaroxaban     Patient failed xarelto, therefore we decided to switch to apixaban  started while inpatient w/o any complications, Will continue upon discharge       Acute renal failure superimposed on stage 3 chronic kidney disease     Cr trending up, possibly due to ACEi that was started yesterday  This morning Cr at 1.5, seems to be stable       Mixed hyperlipidemia     , not at goal  Will continue high dose statins (Atorvastatin 80 mg qd)       Type 2 diabetes mellitus with diabetic chronic kidney disease     A1C 6  Currently glucose running WNL  Not on any medications for glycemic control, will continue to monitor  Will have her follow up  with PCP for monitoring A1C and tight glucose control          Paroxysmal atrial fibrillation     Afib with RVR on admission  rate controlled with carvedilol 25 mg q12  Started AC with apixaban on 08/13, will continue upon D/C                Recommendations:      Post-discharge complication risks: None     Stroke Education given to: patient and family     Follow-up in Stroke Clinic in 4-6 weeks.     Discharge Plan:  Statin: Atorvastatin 80 mg  Anticoagulant: Apixaban  Aggresive risk factor modification:  Hypertension  High Cholesterol  Diet  Atrial Fibrillation  ______________________________________________________    Today:    Doing better. Walking okay.  Doing PT/OT at home.    We discussed the importance of taking meds.    Gave Apixaban coupon $10 copayment card to the family.    PAST HISTORY:     Past Medical History:   Diagnosis Date    Atrial flutter 8/12/2018    Cancer of left breast, stage 2 11/24/2015    Cerebrovascular small vessel disease 4/11/2018    Bi-thalamic lacunar disease, mod/sev periventricular white matter disease, mixed pattern cerebral microbleeds    Cervicalgia 4/17/2018    Chronic anticoagulation - apixaban 4/17/2018    Previous on Xarelto but changed to apixaban 8-2018 due to recurrent embolic stroke.    Chronic systolic heart failure 4/17/2018    Echo 4-2018   1 - Moderately depressed left ventricular systolic function (EF 30-35%).    2 - Biatrial enlargement.    3 - Normal right ventricular systolic function .    4 - Mild mitral regurgitation.    5 - Mild tricuspid regurgitation.    6 - The estimated PA systolic pressure is 34 mmHg.    7 - Increased central venous pressure.    8 - Left pleural effusion.     CKD stage 3 due to type 2 diabetes mellitus 4/17/2018    Embolic stroke involving left cerebellar artery 8/13/2018    Embolic stroke involving right posterior cerebral artery 4/11/2018    Encephalopathy 8/13/2018    Essential hypertension 10/28/2013    Glaucoma suspect of  both eyes 12/9/2013    Hearing loss, sensorineural 12/16/2013    Malignant hypertension 8/12/2018    Mixed hyperlipidemia 10/28/2013    Obesity 1/16/2014    Paroxysmal atrial fibrillation 7/10/2012    Stage 3 chronic kidney disease 4/17/2018    Toxic multinodular goiter 10/28/2013    Type 2 diabetes mellitus with stage 3 chronic kidney disease, without long-term current use of insulin 7/10/2012    Diet controlled.   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    Vitamin D deficiency disease 10/28/2013       Past Surgical History:   Procedure Laterality Date    BREAST SURGERY      CHOLECYSTECTOMY         Family History   Problem Relation Age of Onset    Hypertension Mother     Hypertension Father     Glaucoma Father     Heart disease Father     Cancer Sister     Asthma Son     Diabetes Paternal Aunt     Thyroid cancer Neg Hx        Social History     Socioeconomic History    Marital status: Single     Spouse name: Not on file    Number of children: 1    Years of education: Not on file    Highest education level: Not on file   Social Needs    Financial resource strain: Not on file    Food insecurity - worry: Not on file    Food insecurity - inability: Not on file    Transportation needs - medical: Not on file    Transportation needs - non-medical: Not on file   Occupational History     Employer: [employer]   Tobacco Use    Smoking status: Never Smoker    Smokeless tobacco: Never Used   Substance and Sexual Activity    Alcohol use: No    Drug use: No    Sexual activity: Not Currently   Other Topics Concern    Not on file   Social History Narrative    Not on file       MEDICATIONS & ALLERGIES:     Current Outpatient Medications on File Prior to Visit   Medication Sig Dispense Refill    apixaban 5 mg Tab Take 1 tablet (5 mg total) by mouth 2 (two) times daily. 60 tablet 11    atorvastatin (LIPITOR) 40 MG tablet Take 1 tablet (40 mg total) by mouth once daily. 90 tablet 4     benazepril (LOTENSIN) 40 MG tablet Take 0.5 tablets (20 mg total) by mouth once daily. 90 tablet 4    carvedilol (COREG) 25 MG tablet Take 1 tablet (25 mg total) by mouth 2 (two) times daily with meals. 60 tablet 11    cholecalciferol, vitamin D3, (VITAMIN D3) 2,000 unit Cap Take 1 capsule (2,000 Units total) by mouth once daily. 90 capsule 5    furosemide (LASIX) 40 MG tablet Take 1 tablet (40 mg total) by mouth 2 (two) times daily. 60 tablet 11    letrozole (FEMARA) 2.5 mg Tab 1 Tablet Oral Every day 90 tablet 4    travoprost (TRAVATAN Z) 0.004 % Drop Place 1 drop into both eyes every evening. 2.5 mL 4     Review of patient's allergies indicates:  No Known Allergies    OBJECTIVE:     Vital Signs:  Vitals:    08/21/18 1536   BP: 120/64   Pulse: (!) 52     Wt Readings from Last 1 Encounters:   08/21/18 1536 61 kg (134 lb 8 oz)     Body mass index is 21.71 kg/m².     Physical Exam:  General: Well developed, well nourished. No distress.  HEENT: Head is normocephalic, atraumatic; ears are normal.    Eyes: Clear conjunctiva.  Neck: Supple, symmetrical neck; trachea midline.  Lungs: Clear to auscultation bilaterally and normal respiratory effort.  Cardiovascular: Irreg Irreg around 55 BPM.  Extremities: No LE edema.    Abdomen: Abdomen is soft, non-tender non-distended with normal bowel sounds.  Skin: Skin color, texture, turgor normal. No rashes.  Musculoskeletal: Normal gait.   Lymph Nodes: No cervical or supraclavicular adenopathy.  Neurologic: Normal strength and tone. No focal numbness or weakness.   Psychiatric: Normal affect. Alert.     Laboratory  Lab Results   Component Value Date    WBC 3.98 08/15/2018    HGB 13.0 08/15/2018    HCT 41.7 08/15/2018    MCV 80 (L) 08/15/2018     08/15/2018     BMP  Lab Results   Component Value Date     08/15/2018    K 4.0 08/15/2018     08/15/2018    CO2 27 08/15/2018    BUN 33 (H) 08/15/2018    CREATININE 1.5 (H) 08/15/2018    CALCIUM 9.2 08/15/2018     ANIONGAP 10 08/15/2018    ESTGFRAFRICA 38.5 (A) 08/15/2018    EGFRNONAA 33.4 (A) 08/15/2018     Lab Results   Component Value Date    ALT 20 08/15/2018    AST 21 08/15/2018    ALKPHOS 123 08/15/2018    BILITOT 0.6 08/15/2018     Lab Results   Component Value Date    INR 1.1 08/14/2018    INR 1.2 08/12/2018    INR 1.1 04/11/2018     Lab Results   Component Value Date    HGBA1C 6.0 (H) 08/12/2018       TRANSITION OF CARE:     [facility] On Call Contact Note: 8-17-18    Family and/or Caretaker present at visit?  Yes.  Diagnostic tests reviewed/disposition: No diagnosic tests pending after this hospitalization.  Disease/illness education: Embolic stroke, CHF, anticoagulation.  Home health/community services discussion/referrals: Patient has home health [facility] at [facility].   Establishment or re-establishment of referral orders for community resources: No other necessary community resources.   Discussion with other health care providers: No discussion with other health care providers necessary.     Transition of Care Visit:     I have reviewed and updated the history and problem list.  I have reconciled the medication list.  I have discussed the hospitalization and current medical issues, prognosis and plans with the patient/family.  I  spent more than 50% of time discussing the care with the patient/family.  Total Encounter in the [facility]: 60 minutes.    Medications Reconciliation:   I have reconciled the patient's home medications and discharge medications with the patient/family. I have updated all changes.  Refer to After-Visit Medication List.    ASSESSMENT & PLAN:     Embolic stroke involving left cerebellar artery  Chronic anticoagulation - apixaban  Paroxysmal atrial fibrillation  Cerebrovascular small vessel disease  Deterioration of memory  History of stroke  - Recurrent embolic stroke from atrial fib / flutter.    Xarelto was changed to apixaban during admission.    Currently doing well.  -  Gave Apixaban coupon to family for $10 copay.    Stressed importance of taking meds every day and not miss a dose.    Chronic systolic heart failure  Malignant hypertension  Stage 3 chronic kidney disease  - BP controlled now.    On Lotensin 20 mg daily, Coreg 25 mg BID and Lasix 40 mg BID.    Type 2 diabetes mellitus with stage 3 chronic kidney disease, without long-term current use of insulin  - Diet controlled. A1C 6.0    Mixed hyperlipidemia  - On statin.    History of breast cancer  - On Femara treatment.    Scheduled Follow-up :  Future Appointments   Date Time Provider Department Center   9/20/2018  9:20 AM mG Zambrano Jr., MD [facility]   10/2/2018  1:00 PM Chon Quiroga MD [facility]     After Visit Medication List :     Medication List           Accurate as of 8/21/18  4:00 PM. If you have any questions, ask your nurse or doctor.               CONTINUE taking these medications    apixaban 5 mg Tab  Take 1 tablet (5 mg total) by mouth 2 (two) times daily.     atorvastatin 40 MG tablet  Commonly known as:  LIPITOR  Take 1 tablet (40 mg total) by mouth once daily.     benazepril 40 MG tablet  Commonly known as:  LOTENSIN  Take 0.5 tablets (20 mg total) by mouth once daily.     carvedilol 25 MG tablet  Commonly known as:  COREG  Take 1 tablet (25 mg total) by mouth 2 (two) times daily with meals.     cholecalciferol (vitamin D3) 2,000 unit Cap  Commonly known as:  VITAMIN D3  Take 1 capsule (2,000 Units total) by mouth once daily.     furosemide 40 MG tablet  Commonly known as:  LASIX  Take 1 tablet (40 mg total) by mouth 2 (two) times daily.     letrozole 2.5 mg Tab  Commonly known as:  FEMARA  1 Tablet Oral Every day     travoprost 0.004 % ophthalmic solution  Commonly known as:  TRAVATAN Z  Place 1 drop into both eyes every evening.            Signing Physician:  Fredy Smith MD

## 2018-08-22 ENCOUNTER — TELEPHONE (OUTPATIENT)
Dept: INTERNAL MEDICINE | Facility: CLINIC | Age: 78
End: 2018-08-22

## 2018-08-22 NOTE — TELEPHONE ENCOUNTER
----- Message from Fredy Smith MD sent at 8/21/2018  4:03 PM CDT -----  [facility] Clinic Visit (Post Discharge Follow-up) Today:   - Our clinic physicians and nurses plan to follow the patient up for any medical issues in the [facility] for 30 days post discharge.    Future Appointments:  Future Appointments  9/20/2018  9:20 AM    Gm Zambrano Jr., MD     [facility]   10/2/2018  1:00 PM    Chon Quiroga MD       [facility]

## 2018-08-23 ENCOUNTER — TELEPHONE (OUTPATIENT)
Dept: NEUROLOGY | Facility: CLINIC | Age: 78
End: 2018-08-23

## 2018-08-23 ENCOUNTER — TELEPHONE (OUTPATIENT)
Dept: INTERNAL MEDICINE | Facility: CLINIC | Age: 78
End: 2018-08-23

## 2018-08-23 NOTE — TELEPHONE ENCOUNTER
Call returned/Spoke with Paula-    She informed me that the message below was a fyi, no need to do anything on our end.

## 2018-08-23 NOTE — TELEPHONE ENCOUNTER
----- Message from Melanie Draper RN sent at 8/23/2018  4:35 PM CDT -----      ----- Message -----  From: Rito Lopez  Sent: 8/23/2018   8:40 AM  To: Isa Leyva Staff    Needs Advice    Reason for call: Paula states pt was discharged on Monday, and she is completing discharge paperwork asap.  Communication Preference: Paula ruelas/ [facility] @ [phone]  Additional Information:

## 2018-08-23 NOTE — TELEPHONE ENCOUNTER
----- Message from Lolita Worrell sent at 8/23/2018  8:45 AM CDT -----  Contact:  [facility] [phone]  Pt was admitted to [facility] 2 weeks ago. She is fit to be discharged if the doctor is not okay with that please give a call back.

## 2018-08-24 NOTE — PHYSICIAN QUERY
PT Name: Temitope Suero  MR #: [id number]    Physician Query Form - Atrial Flutter Specificity Clarification     CDS/: Margie Hdez               Contact information:[email or web address]  This form is a permanent document in the medical record.     Query Date: August 24, 2018    By submitting this query, we are merely seeking further clarification of documentation. Please utilize your independent clinical judgment when addressing the question(s) below.    The medical record contains the following:   Indicators     Supporting Clinical Findings Location in Medical Record   x Atrial Flutter Atrial flutter  - new onset flutter    H&P    EKG results     x Medication - continue rivaraxiban   H&P    Treatment      Other       Provider, please further specify the Atrial Flutter diagnosis.    [  ] Atypical  [  ] Type I  [  ] Type II  [  ] Typical  [  ] Other (please specify): ___________________________________  [ X ] Clinically Undetermined    Please document in your progress notes daily for the duration of treatment until resolved, and include in your discharge summary.

## 2018-09-17 ENCOUNTER — TELEPHONE (OUTPATIENT)
Dept: NEUROLOGY | Facility: CLINIC | Age: 78
End: 2018-09-17

## 2018-09-17 NOTE — TELEPHONE ENCOUNTER
----- Message from Jillian Robertson sent at 9/13/2018  1:40 PM CDT -----  Contact: Coral ([facility])@ [phone]  Calling regarding patient, calling to get the status of the plan of care for the patient that was faxed over. Please call.

## 2018-09-18 NOTE — PT/OT/SLP DISCHARGE
Occupational Therapy Discharge Summary    Temitope Suero  MRN: [id number]   Principal Problem: Embolic stroke involving left cerebellar artery      Patient Discharged from acute Occupational Therapy on 9/18/18.  Please refer to prior OT note dated 8/15/18 for functional status.    Assessment:      Patient has not met goals.    Objective:     GOALS:   Multidisciplinary Problems     Occupational Therapy Goals     Not on file          Multidisciplinary Problems (Resolved)        Problem: Occupational Therapy Goal    Goal Priority Disciplines Outcome Interventions   Occupational Therapy Goal   (Resolved)     OT, PT/OT Outcome(s) achieved    Description:  Goals to be met by: 8/21/18     Patient will increase functional independence with ADLs by performing:    UE Dressing with Modified [place]. - not met  LE Dressing with Modified [place]. - not met  Grooming while standing with Modified [place]. - not met  Toileting from toilet with Modified [place] for hygiene and clothing management. - not met  Rolling to Bilateral with Modified [place]. - not met  Supine to sit with Modified [place]. - not met  Stand pivot transfers with Modified [place]. - not met                       Reasons for Discontinuation of Therapy Services  Transfer to alternate level of care.      Plan:     Patient Discharged to: Home with Home Health Service    ZEV Billingsley  9/18/2018

## 2018-09-18 NOTE — PLAN OF CARE
Problem: Occupational Therapy Goal  Goal: Occupational Therapy Goal  Goals to be met by: 8/21/18     Patient will increase functional independence with ADLs by performing:    UE Dressing with Modified [place]. - not met  LE Dressing with Modified [place]. - not met  Grooming while standing with Modified [place]. - not met  Toileting from toilet with Modified [place] for hygiene and clothing management. - not met  Rolling to Bilateral with Modified [place]. - not met  Supine to sit with Modified [place]. - not met  Stand pivot transfers with Modified [place]. - not met     Outcome: Outcome(s) achieved Date Met: 09/18/18  No goals met. Hospital discharge.

## 2018-09-19 ENCOUNTER — TELEPHONE (OUTPATIENT)
Dept: NEUROLOGY | Facility: CLINIC | Age: 78
End: 2018-09-19

## 2018-09-19 NOTE — TELEPHONE ENCOUNTER
----- Message from Amaury Meade sent at 9/19/2018 11:27 AM CDT -----  Contact: Coral ([facility]) [phone]  Needs Advice    Reason for call:Coral called to speak to someone regarding the patient's plan of care. She faxed it over 9/6/18. Please contact her to discuss further.          Communication Preference:PHONE    Additional Information:

## 2018-09-20 ENCOUNTER — OFFICE VISIT (OUTPATIENT)
Dept: INTERNAL MEDICINE | Facility: CLINIC | Age: 78
End: 2018-09-20
Payer: COMMERCIAL

## 2018-09-20 ENCOUNTER — IMMUNIZATION (OUTPATIENT)
Dept: INTERNAL MEDICINE | Facility: CLINIC | Age: 78
End: 2018-09-20
Payer: COMMERCIAL

## 2018-09-20 ENCOUNTER — TELEPHONE (OUTPATIENT)
Dept: HEMATOLOGY/ONCOLOGY | Facility: CLINIC | Age: 78
End: 2018-09-20

## 2018-09-20 VITALS
OXYGEN SATURATION: 99 % | BODY MASS INDEX: 21.42 KG/M2 | WEIGHT: 136.44 LBS | SYSTOLIC BLOOD PRESSURE: 120 MMHG | HEART RATE: 85 BPM | HEIGHT: 67 IN | DIASTOLIC BLOOD PRESSURE: 70 MMHG

## 2018-09-20 DIAGNOSIS — R41.3 DETERIORATION OF MEMORY: ICD-10-CM

## 2018-09-20 DIAGNOSIS — N18.30 STAGE 3 CHRONIC KIDNEY DISEASE: ICD-10-CM

## 2018-09-20 DIAGNOSIS — E78.2 MIXED HYPERLIPIDEMIA: Chronic | ICD-10-CM

## 2018-09-20 DIAGNOSIS — Z85.3 HISTORY OF BREAST CANCER: ICD-10-CM

## 2018-09-20 DIAGNOSIS — E55.9 VITAMIN D DEFICIENCY DISEASE: Primary | ICD-10-CM

## 2018-09-20 DIAGNOSIS — Z86.73 HISTORY OF STROKE: ICD-10-CM

## 2018-09-20 DIAGNOSIS — E05.20 TOXIC MULTINODULAR GOITER: ICD-10-CM

## 2018-09-20 DIAGNOSIS — E11.22 TYPE 2 DIABETES MELLITUS WITH STAGE 3 CHRONIC KIDNEY DISEASE, WITHOUT LONG-TERM CURRENT USE OF INSULIN: Chronic | ICD-10-CM

## 2018-09-20 DIAGNOSIS — I67.9 CEREBROVASCULAR SMALL VESSEL DISEASE: ICD-10-CM

## 2018-09-20 DIAGNOSIS — I48.0 PAROXYSMAL ATRIAL FIBRILLATION: ICD-10-CM

## 2018-09-20 DIAGNOSIS — N18.30 TYPE 2 DIABETES MELLITUS WITH STAGE 3 CHRONIC KIDNEY DISEASE, WITHOUT LONG-TERM CURRENT USE OF INSULIN: Chronic | ICD-10-CM

## 2018-09-20 DIAGNOSIS — I63.442 EMBOLIC STROKE INVOLVING LEFT CEREBELLAR ARTERY: ICD-10-CM

## 2018-09-20 PROBLEM — I10 MALIGNANT HYPERTENSION: Status: RESOLVED | Noted: 2018-08-12 | Resolved: 2018-09-20

## 2018-09-20 PROCEDURE — 1101F PT FALLS ASSESS-DOCD LE1/YR: CPT | Mod: CPTII,S$GLB,, | Performed by: INTERNAL MEDICINE

## 2018-09-20 PROCEDURE — 90662 IIV NO PRSV INCREASED AG IM: CPT | Mod: S$GLB,,, | Performed by: INTERNAL MEDICINE

## 2018-09-20 PROCEDURE — 99214 OFFICE O/P EST MOD 30 MIN: CPT | Mod: 25,S$GLB,, | Performed by: INTERNAL MEDICINE

## 2018-09-20 PROCEDURE — 3078F DIAST BP <80 MM HG: CPT | Mod: CPTII,S$GLB,, | Performed by: INTERNAL MEDICINE

## 2018-09-20 PROCEDURE — 99999 PR PBB SHADOW E&M-EST. PATIENT-LVL V: CPT | Mod: PBBFAC,,, | Performed by: INTERNAL MEDICINE

## 2018-09-20 PROCEDURE — 3074F SYST BP LT 130 MM HG: CPT | Mod: CPTII,S$GLB,, | Performed by: INTERNAL MEDICINE

## 2018-09-20 PROCEDURE — 90471 IMMUNIZATION ADMIN: CPT | Mod: S$GLB,,, | Performed by: INTERNAL MEDICINE

## 2018-09-20 NOTE — TELEPHONE ENCOUNTER
----- Message from Landy Galicia RN sent at 9/20/2018 10:56 AM CDT -----  Josette, this is the pt you tried to call back in April for follow up with Beatriz /Dr Lazaro.  Was last seen 2 years ago.  Looks like pt didn't return your call.  Can you try again?  I will check later if she calls back.  If not, I will send letter, copy Dr Zambrano    Thanks,  Landy  ----- Message -----  From: Ginger Scott  Sent: 9/20/2018  10:22 AM  To: [facility]    Dr Zambrano want pt to be seen in [place] do you all do that

## 2018-09-20 NOTE — PROGRESS NOTES
Ms. Suero is a 78 -year-old female coming in today to follow Her htn and other multiple problems. I last saw her in 12/2016.    Since last visit she has been hospitalized twice - once in April and once in August for CVA -- in April it seem she had gotten off her meds because she was feeling well.  She had Afib with RVR and significant htn tht was controled on home meds- She had stop taking her coumadin and refused to follow up in coumadin clinic.  IN August she had another episode. She had L cerebellar stroke.  She was place on apixaban and lipitor was increased to 80 mg a day.  He daughter is here today and tells me she is helping remind her to take her meds.  She is also getting speach therapy and pt at home.  She has improved a lot since both events. > Speach had improved to ekta to normal-- Memory is has improved but not come back to normal.                1. She has diabetes mellitus type 2. She has had it for multiple years.   She is on metformin 1 g twice a day. Recent labs: glucose was normal and HgbA1c was 6.0. .  Renal fxn was 38.5 . Creatinine was 1.5    She denies any polyuria or polydipsia. She does not check her glucose at home. SHe has not seen a eye doctor recently.         2. She has history of AFib. She was hospitalized in February 2011 for AFib,   rapid ventricular response, pulmonary edema. She has had a non-Q-wave MI.   Currently, she says she is feeling better. She is not having any kind of   chest pain or shortness of breath. She is on Apixiban . SHe is not followed by cards for the Afib.      3. toxic MNG --hyperthyroidism and she was put on methimazole by endocrine in 2014-- SHe had not followed up. Recent TSH was normal. She has a thyroid nodule and she had it was biopsied few years ago. She denies any palpitations. Se denies hoarsness.        4. She has a history of breast cancer and she is on letrozole followed by   Dr. Lazaro in Hematology-Oncology. . Her bone scan showed an area of  "abnormality in the upper lumbar lower   thoracic area with no other abnormality noted. 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 .She saw Hem last in 2016-- I see some phone notes where they have been trying to get her back to clinic-- also she is past due for mmg      5. She has vitamin D deficiency. Vit D was 20 last visit. she is  taking vit D replacement --given to her in April 2018      6. . Hyperlipidemia- Her chol is 159, with the ldl of 90, hdl 52, and tg 82. . SHe has been on lipitor 40for the last 4 years.                  Mother with Breast cancer. No thyroid cancer in family.     ROS : Gen - no fatigue   Eyes - she states her vision is blurry at time.    ENT - no hoarseness or sore throat  CV - no CP or SOB  Pulm - no cough or wheezing  GI - no N/V/D   no dysuria or incontinence  MS - back and knee pian as above.    Skin -as above Neuro - occasional HA, NO dizziness  Heme - no abnormal bleeding or bruising  Endo - no polydipsia, or temperature changes  Psych - no anxiety or depression     PHYSICAL EXAMINATION: /70 (BP Location: Right arm, Patient Position: Sitting, BP Method: Large (Manual))   Pulse 85   Ht 5' 7" (1.702 m)   Wt 61.9 kg (136 lb 7.4 oz)   SpO2 99%   BMI 21.37 kg/m²     GENERAL: A well-appearing 78 year-old female. SHe is alert and asking and answering questions appropriately.  She does have some trouble finding words.  Unaware of date, month, - she is aware of person and place.    regular.   HEENT: Ear canals are open. TMs are clear. Oropharynx is clear. Thyroid is enlarged.   NECK: Supple and she has no JVD . She had a bilateral goiter- about same size as last time. She has no carotid bruits.   CHEST: Clear without wheeze.   CARDIOVASCULAR: S1, S2, regular " rate but irregular rhythm.   ABDOMEN: Soft, nontender, no hepatosplenomegaly.   LOWER EXTREMITIES: no edema. Normal ROM of LE bilaterally and normal ROM of bilateral UE.    MOOD: good--pleasant.   Protective Sensation (w/ 10 gram monofilament):  Right: Intact  Left: Intact    Visual Inspection:  Normal -  Bilateral    Pedal Pulses:   Right: Present  Left: Present    Posterior tibialis:   Right:Present  Left: Present          ASSESSMENT:        Atrial fibrillation, unspecified- needs to stay on Apixiban      Type 2 diabetes mellitus with diabetic chronic kidney disease  Doing well-- GFR is stable so continue metformin.       Vitamin D deficiency disease     - ergocalciferol - replacement will recheck on f/u.      Essential hypertension-- continue current meds - well controled      Hyperlipemia- stable- continue current meds.  lipitor 80 mg a day      Toxic multinodular goiter- resolved      Glaucoma suspect of both eyes and in need of f/u for DM-- will refer back to optho .    CKD (chronic kidney disease) stage 3, GFR 30-59 ml/min  -   Cancer of left breast, stage 2-- past due for follow up with ONc and also needs mmg       Memory issues /s/p strokes x 2 and also noncompliance- discussed with daughter to help make sure she remember her follow ups and check to make sure she is taking her meds.

## 2018-09-21 ENCOUNTER — HOME CARE VISIT (OUTPATIENT)
Dept: NEUROLOGY | Facility: HOSPITAL | Age: 78
End: 2018-09-21

## 2018-09-28 ENCOUNTER — TELEPHONE (OUTPATIENT)
Dept: NEUROLOGY | Facility: CLINIC | Age: 78
End: 2018-09-28

## 2018-09-28 NOTE — TELEPHONE ENCOUNTER
Patient has appointment with Neuro on [facility] on 10/02, called and left message for patient to call and confirm if patient still wanted to roberts it.

## 2018-09-28 NOTE — TELEPHONE ENCOUNTER
----- Message from Jarvis Ospina sent at 9/28/2018  2:59 PM CDT -----  Contact: Magdalene alarcon ) @ [phone]  Caller is calling to r/s the 10-2nd appt to the [facility] [place] location, [facility] call

## 2018-10-02 ENCOUNTER — OFFICE VISIT (OUTPATIENT)
Dept: NEUROLOGY | Facility: CLINIC | Age: 78
End: 2018-10-02
Payer: COMMERCIAL

## 2018-10-02 ENCOUNTER — TELEPHONE (OUTPATIENT)
Dept: NEUROLOGY | Facility: CLINIC | Age: 78
End: 2018-10-02

## 2018-10-02 VITALS
SYSTOLIC BLOOD PRESSURE: 137 MMHG | HEART RATE: 79 BPM | DIASTOLIC BLOOD PRESSURE: 78 MMHG | HEIGHT: 67 IN | BODY MASS INDEX: 21.35 KG/M2 | WEIGHT: 136 LBS

## 2018-10-02 DIAGNOSIS — Z86.73 HISTORY OF STROKE: Primary | ICD-10-CM

## 2018-10-02 DIAGNOSIS — I63.442 EMBOLIC STROKE INVOLVING LEFT CEREBELLAR ARTERY: ICD-10-CM

## 2018-10-02 DIAGNOSIS — I67.9 CEREBROVASCULAR SMALL VESSEL DISEASE: ICD-10-CM

## 2018-10-02 PROCEDURE — 99215 OFFICE O/P EST HI 40 MIN: CPT | Mod: S$GLB,,, | Performed by: NEUROLOGICAL SURGERY

## 2018-10-02 PROCEDURE — 1101F PT FALLS ASSESS-DOCD LE1/YR: CPT | Mod: CPTII,S$GLB,, | Performed by: NEUROLOGICAL SURGERY

## 2018-10-02 PROCEDURE — 3075F SYST BP GE 130 - 139MM HG: CPT | Mod: CPTII,S$GLB,, | Performed by: NEUROLOGICAL SURGERY

## 2018-10-02 PROCEDURE — 99999 PR PBB SHADOW E&M-EST. PATIENT-LVL III: CPT | Mod: PBBFAC,,, | Performed by: NEUROLOGICAL SURGERY

## 2018-10-02 PROCEDURE — 3078F DIAST BP <80 MM HG: CPT | Mod: CPTII,S$GLB,, | Performed by: NEUROLOGICAL SURGERY

## 2018-10-02 NOTE — TELEPHONE ENCOUNTER
----- Message from Jillian Robertson sent at 10/2/2018  2:06 PM CDT -----  Contact: Coral( [facility]) @ [phone]  Calling to get the status of the orders for the patient to continue to get treatment ([facility]). Please call.

## 2018-10-03 NOTE — TELEPHONE ENCOUNTER
----- Message from Rito Lopez sent at 10/3/2018  8:22 AM CDT -----  Needs Advice    Reason for call: Coral is asking to speak w/ Marleen regarding the paperwork that waxed to her yesterday        Communication Preference: Coral w/ Elma Replaced by [facility] @ [phone]    Additional Information:

## 2018-10-03 NOTE — TELEPHONE ENCOUNTER
Talked with Coral and faxed all sheets again this morning. She stated she was missing one sheet from the first verbal order and one sheet from the plan of care.

## 2018-10-05 NOTE — PROGRESS NOTES
Chief Complaint   Patient presents with    Stroke        Temitope Suero is a 78 y.o. female with a history of multiple medical diagnoses as listed below that presents for follow-up of pain hospitalized for hypertensive urgency.  She has a to fibrillation hypertension prior stroke April 2018.  She presents to the hospital she was found to be in atrial fibrillation with rapid ventricular response and also had a notably elevated blood pressure in the 190s over 120s.  She had been forgetful according to her family was having difficulty with her orientation to time. She has since been better per her family, but is not yet to her baseline.    PAST MEDICAL HISTORY:  Past Medical History:   Diagnosis Date    Atrial flutter 8/12/2018    Cancer of left breast, stage 2 11/24/2015    Cerebrovascular small vessel disease 4/11/2018    Bi-thalamic lacunar disease, mod/sev periventricular white matter disease, mixed pattern cerebral microbleeds    Cervicalgia 4/17/2018    Chronic anticoagulation - apixaban 4/17/2018    Previous on Xarelto but changed to apixaban 8-2018 due to recurrent embolic stroke.    Chronic systolic heart failure 4/17/2018    Echo 4-2018   1 - Moderately depressed left ventricular systolic function (EF 30-35%).    2 - Biatrial enlargement.    3 - Normal right ventricular systolic function .    4 - Mild mitral regurgitation.    5 - Mild tricuspid regurgitation.    6 - The estimated PA systolic pressure is 34 mmHg.    7 - Increased central venous pressure.    8 - Left pleural effusion.     CKD stage 3 due to type 2 diabetes mellitus 4/17/2018    Embolic stroke involving left cerebellar artery 8/13/2018    Embolic stroke involving right posterior cerebral artery 4/11/2018    Encephalopathy 8/13/2018    Essential hypertension 10/28/2013    Glaucoma suspect of both eyes 12/9/2013    Hearing loss, sensorineural 12/16/2013    Malignant hypertension 8/12/2018    Mixed hyperlipidemia 10/28/2013     Obesity 1/16/2014    Paroxysmal atrial fibrillation 7/10/2012    Stage 3 chronic kidney disease 4/17/2018    Toxic multinodular goiter 10/28/2013    Type 2 diabetes mellitus with stage 3 chronic kidney disease, without long-term current use of insulin 7/10/2012    Diet controlled.   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    Vitamin D deficiency disease 10/28/2013       PAST SURGICAL HISTORY:  Past Surgical History:   Procedure Laterality Date    BREAST SURGERY      CHOLECYSTECTOMY         SOCIAL HISTORY:  Social History     Socioeconomic History    Marital status: Single     Spouse name: Not on file    Number of children: 1    Years of education: Not on file    Highest education level: Not on file   Social Needs    Financial resource strain: Not on file    Food insecurity - worry: Not on file    Food insecurity - inability: Not on file    Transportation needs - medical: Not on file    Transportation needs - non-medical: Not on file   Occupational History     Employer: [employer]   Tobacco Use    Smoking status: Never Smoker    Smokeless tobacco: Never Used   Substance and Sexual Activity    Alcohol use: No    Drug use: No    Sexual activity: Not Currently   Other Topics Concern    Not on file   Social History Narrative    Not on file       FAMILY HISTORY:  Family History   Problem Relation Age of Onset    Hypertension Mother     Hypertension Father     Glaucoma Father     Heart disease Father     Cancer Sister     Asthma Son     Diabetes Paternal Aunt     Thyroid cancer Neg Hx        ALLERGIES AND MEDICATIONS: updated and reviewed.  Review of patient's allergies indicates:  No Known Allergies  Current Outpatient Medications   Medication Sig Dispense Refill    apixaban 5 mg Tab Take 1 tablet (5 mg total) by mouth 2 (two) times daily. 60 tablet 11    atorvastatin (LIPITOR) 40 MG tablet Take 1 tablet (40 mg total) by mouth once daily. 90 tablet 4    benazepril (LOTENSIN) 40 MG  tablet Take 0.5 tablets (20 mg total) by mouth once daily. 90 tablet 4    carvedilol (COREG) 25 MG tablet Take 1 tablet (25 mg total) by mouth 2 (two) times daily with meals. 60 tablet 11    cholecalciferol, vitamin D3, (VITAMIN D3) 2,000 unit Cap Take 1 capsule (2,000 Units total) by mouth once daily. 90 capsule 5    furosemide (LASIX) 40 MG tablet Take 1 tablet (40 mg total) by mouth 2 (two) times daily. 60 tablet 11    letrozole (FEMARA) 2.5 mg Tab 1 Tablet Oral Every day 90 tablet 4    travoprost (TRAVATAN Z) 0.004 % Drop Place 1 drop into both eyes every evening. 2.5 mL 4     No current facility-administered medications for this visit.        Review of Systems   Constitutional: Negative for activity change, appetite change, fever and unexpected weight change.   HENT: Negative for trouble swallowing and voice change.    Eyes: Negative for photophobia and visual disturbance.   Respiratory: Negative for apnea and shortness of breath.    Cardiovascular: Negative for chest pain and leg swelling.   Gastrointestinal: Negative for constipation and nausea.   Genitourinary: Negative for difficulty urinating.   Musculoskeletal: Negative for back pain, gait problem and neck pain.   Skin: Negative for color change and pallor.   Neurological: Negative for dizziness, seizures, syncope, weakness and numbness.   Hematological: Negative for adenopathy.   Psychiatric/Behavioral: Negative for agitation, confusion and decreased concentration.       Neurologic Exam     Mental Status   Oriented to person, place, and time.   Registration: recalls 3 of 3 objects.   Attention: normal. Concentration: normal.   Speech: speech is normal   Level of consciousness: alert  Knowledge: good.     Cranial Nerves     CN II   Visual fields full to confrontation.   Right visual field deficit: none  Left visual field deficit: none     CN III, IV, VI   Pupils are equal, round, and reactive to light.  Extraocular motions are normal.   Right pupil:  Size: 3 mm. Shape: regular. Accommodation: intact.   Left pupil: Size: 3 mm. Shape: regular. Accommodation: intact.   CN III: no CN III palsy  CN VI: no CN VI palsy  Nystagmus: none   Diplopia: none  Ophthalmoparesis: none  Upgaze: normal  Downgaze: normal  Conjugate gaze: present    CN V   Facial sensation intact.   Right facial sensation deficit: none  Left facial sensation deficit: none    CN VII   Facial expression full, symmetric.   Right facial weakness: none  Left facial weakness: none    CN VIII   CN VIII normal.     CN IX, X   CN IX normal.   CN X normal.   Palate: symmetric    CN XI   CN XI normal.   Right sternocleidomastoid strength: normal  Left sternocleidomastoid strength: normal  Right trapezius strength: normal  Left trapezius strength: normal    CN XII   CN XII normal.   Tongue deviation: none    Motor Exam   Muscle bulk: normal  Overall muscle tone: normal  Right arm tone: normal  Left arm tone: normal  Right leg tone: normal  Left leg tone: normal    Strength   Strength 5/5 throughout.     Sensory Exam   Right arm light touch: normal  Left arm light touch: normal  Right leg light touch: normal  Left leg light touch: normal  Right arm vibration: normal  Left arm vibration: normal  Right leg vibration: normal  Left leg vibration: normal  Right arm proprioception: normal  Left arm proprioception: normal  Right leg proprioception: normal  Left leg proprioception: normal  Right arm pinprick: normal  Left arm pinprick: normal  Right leg pinprick: normal  Left leg pinprick: normal    Gait, Coordination, and Reflexes     Gait  Gait: normal    Coordination   Romberg: negative  Finger to nose coordination: normal  Heel to shin coordination: normal  Tandem walking coordination: normal    Tremor   Resting tremor: absent    Reflexes   Right brachioradialis: 2+  Left brachioradialis: 2+  Right biceps: 2+  Left biceps: 2+  Right triceps: 2+  Left triceps: 2+  Right patellar: 2+  Left patellar: 2+  Right  "achilles: 2+  Left achilles: 2+  Right plantar: normal  Left plantar: normal      Physical Exam   Constitutional: She is oriented to person, place, and time. She appears well-developed and well-nourished.   HENT:   Head: Normocephalic and atraumatic.   Eyes: EOM are normal. Pupils are equal, round, and reactive to light.   Neck: Normal range of motion.   Cardiovascular: Normal rate and intact distal pulses.   Pulmonary/Chest: Effort normal. No apnea. No respiratory distress.   Musculoskeletal: Normal range of motion.   Neurological: She is alert and oriented to person, place, and time. She has normal strength. She has a normal Finger-Nose-Finger Test, a normal Heel to Shin Test, a normal Romberg Test and a normal Tandem Gait Test. Gait normal.   Reflex Scores:       Tricep reflexes are 2+ on the right side and 2+ on the left side.       Bicep reflexes are 2+ on the right side and 2+ on the left side.       Brachioradialis reflexes are 2+ on the right side and 2+ on the left side.       Patellar reflexes are 2+ on the right side and 2+ on the left side.       Achilles reflexes are 2+ on the right side and 2+ on the left side.  Skin: Skin is warm and dry.   Psychiatric: She has a normal mood and affect. Her speech is normal and behavior is normal. Thought content normal.   Vitals reviewed.      Vitals:    10/02/18 1329   BP: 137/78   BP Location: Right arm   Patient Position: Sitting   BP Method: Large (Automatic)   Pulse: 79   Weight: 61.7 kg (136 lb 0.4 oz)   Height: 5' 7" (1.702 m)       Assessment & Plan:    Problem List Items Addressed This Visit     Cerebrovascular small vessel disease    Overview     Bi-thalamic lacunar disease, mod/sev periventricular white matter disease, mixed pattern cerebral microbleeds         History of stroke - Primary    Overview     R SCA remote infarct on imaging (unknown timing)         Embolic stroke involving left cerebellar artery          Follow-up: Follow-up in about 2 months " (around 12/2/2018).  More than 50% of this 45 minute encounter was spent in counseling and coordinating care.

## 2018-10-08 ENCOUNTER — OFFICE VISIT (OUTPATIENT)
Dept: OPTOMETRY | Facility: CLINIC | Age: 78
End: 2018-10-08
Payer: COMMERCIAL

## 2018-10-08 DIAGNOSIS — H52.03 HYPEROPIA, BILATERAL: ICD-10-CM

## 2018-10-08 DIAGNOSIS — H40.2294: ICD-10-CM

## 2018-10-08 DIAGNOSIS — I63.442 EMBOLIC STROKE INVOLVING LEFT CEREBELLAR ARTERY: ICD-10-CM

## 2018-10-08 DIAGNOSIS — H25.13 NS (NUCLEAR SCLEROSIS), BILATERAL: ICD-10-CM

## 2018-10-08 DIAGNOSIS — N18.30 TYPE 2 DIABETES MELLITUS WITH STAGE 3 CHRONIC KIDNEY DISEASE, WITHOUT LONG-TERM CURRENT USE OF INSULIN: Chronic | ICD-10-CM

## 2018-10-08 DIAGNOSIS — H40.013 OPEN ANGLE WITH BORDERLINE FINDINGS AND LOW GLAUCOMA RISK IN BOTH EYES: Primary | ICD-10-CM

## 2018-10-08 DIAGNOSIS — H52.4 PRESBYOPIA: ICD-10-CM

## 2018-10-08 DIAGNOSIS — H40.013 OPEN ANGLE WITH BORDERLINE FINDINGS OF BOTH EYES: Primary | ICD-10-CM

## 2018-10-08 DIAGNOSIS — H40.003 GLAUCOMA SUSPECT OF BOTH EYES: ICD-10-CM

## 2018-10-08 DIAGNOSIS — E11.22 TYPE 2 DIABETES MELLITUS WITH STAGE 3 CHRONIC KIDNEY DISEASE, WITHOUT LONG-TERM CURRENT USE OF INSULIN: Chronic | ICD-10-CM

## 2018-10-08 DIAGNOSIS — E11.9 TYPE 2 DIABETES MELLITUS WITHOUT OPHTHALMIC MANIFESTATIONS: ICD-10-CM

## 2018-10-08 DIAGNOSIS — I67.9 CEREBROVASCULAR SMALL VESSEL DISEASE: ICD-10-CM

## 2018-10-08 PROCEDURE — 92015 DETERMINE REFRACTIVE STATE: CPT | Mod: S$GLB,,, | Performed by: OPTOMETRIST

## 2018-10-08 PROCEDURE — 92133 CPTRZD OPH DX IMG PST SGM ON: CPT | Mod: S$GLB,,, | Performed by: OPTOMETRIST

## 2018-10-08 PROCEDURE — 99999 PR PBB SHADOW E&M-EST. PATIENT-LVL II: CPT | Mod: PBBFAC,,, | Performed by: OPTOMETRIST

## 2018-10-08 PROCEDURE — 92004 COMPRE OPH EXAM NEW PT 1/>: CPT | Mod: S$GLB,,, | Performed by: OPTOMETRIST

## 2018-10-08 RX ORDER — TRAVOPROST OPHTHALMIC SOLUTION 0.04 MG/ML
1 SOLUTION OPHTHALMIC NIGHTLY
Qty: 2.5 ML | Refills: 4 | Status: SHIPPED | OUTPATIENT
Start: 2018-10-08 | End: 2018-11-07

## 2018-10-08 NOTE — PROGRESS NOTES
HPI     78yr old female present for Annual IDDM DFE. Patient says no trouble with   vision at distance or near with current glasses. Patient denies floaters,   flashes and headaches. Patient states last BSL in normal range and   Hemoglobin A1C       Date                     Value               Ref Range             Status                08/12/2018               6.0 (H)             4.0 - 5.6 %           Final                  Last edited by Duke Koch MA on 10/8/2018  9:33 AM. (History)            Assessment /Plan     For exam results, see Encounter Report.    Open angle with borderline findings and low glaucoma risk in both eyes  -     Posterior Segment OCT Optic Nerve-OD thin overall , OS borderline thin SUP temp    Glaucoma suspect of both eyes  GS 2/2 increased c/d OU  + fam h/o  - nl IOP  - previous dx of LTG with Dr. Scott?  -on travatan Z?  Refilled travatan Z 1 drop OU QHS    NS (nuclear sclerosis), bilateral   boderline visually significant, monitor    Hyperopia, bilateral  Presbyopia   Rx specs    Embolic stroke involving left cerebellar artery  Cerebrovascular small vessel disease    Type 2 diabetes mellitus with stage 3 chronic kidney disease, without long-term current use of insulin  Type 2 diabetes mellitus without ophthalmic manifestations   No retinopathy monitor

## 2018-10-08 NOTE — LETTER
October 8, 2018      Gm Zambrano Jr., MD  [address]  [facility] [address]           [facility]  [address]  [facility] [address]  Phone: [phone]  Fax: [phone]          Patient: Temitope Suero   MR Number: [id number]   YOB: 1940   Date of Visit: 10/8/2018       Dear Dr. Gm Zambrano Jr.:    Thank you for referring Temitope Suero to me for evaluation. Attached you will find relevant portions of my assessment and plan of care.    If you have questions, please do not hesitate to call me. I look forward to following Temitope Suero along with you.    Sincerely,    Ivy Canchola, OD    Enclosure  CC:  No Recipients    If you would like to receive this communication electronically, please contact [email or web address] or [phone] to request more information on [employer] Link access.    For providers and/or their staff who would like to refer a patient to [facility], please contact us through our one-stop-shop provider referral line, [facility], at [phone].    If you feel you have received this communication in error or would no longer like to receive these types of communications, please e-mail [email or web address]

## 2018-10-24 VITALS
RESPIRATION RATE: 20 BRPM | SYSTOLIC BLOOD PRESSURE: 112 MMHG | OXYGEN SATURATION: 95 % | HEART RATE: 84 BPM | DIASTOLIC BLOOD PRESSURE: 84 MMHG

## 2018-10-24 NOTE — PROGRESS NOTES
AAOx2.  Lives w/ daughter who was present for and engaged in visit.  NIHSS-1; does not smoke; refrains from adding salt, salty and fast food; walks for exercise; compliant w/ all meds.   Education provided on goal of Stroke Mobile, s/s of stroke, diet, exercise, medications.  Encouraged to utilize stroke team for any concern.

## 2018-11-02 ENCOUNTER — HOSPITAL ENCOUNTER (OUTPATIENT)
Dept: RADIOLOGY | Facility: HOSPITAL | Age: 78
Discharge: HOME | End: 2018-11-02
Attending: INTERNAL MEDICINE
Payer: COMMERCIAL

## 2018-11-02 DIAGNOSIS — Z85.3 HISTORY OF BREAST CANCER: ICD-10-CM

## 2018-11-02 PROCEDURE — 77067 SCR MAMMO BI INCL CAD: CPT | Mod: 26,,, | Performed by: RADIOLOGY

## 2018-11-02 PROCEDURE — 77067 SCR MAMMO BI INCL CAD: CPT | Mod: TC

## 2018-11-07 ENCOUNTER — CLINICAL SUPPORT (OUTPATIENT)
Dept: OPHTHALMOLOGY | Facility: CLINIC | Age: 78
End: 2018-11-07
Payer: COMMERCIAL

## 2018-11-07 ENCOUNTER — OFFICE VISIT (OUTPATIENT)
Dept: OPTOMETRY | Facility: CLINIC | Age: 78
End: 2018-11-07
Payer: COMMERCIAL

## 2018-11-07 DIAGNOSIS — H40.013 OPEN ANGLE WITH BORDERLINE FINDINGS AND LOW GLAUCOMA RISK IN BOTH EYES: Primary | ICD-10-CM

## 2018-11-07 DIAGNOSIS — H40.003 GLAUCOMA SUSPECT OF BOTH EYES: ICD-10-CM

## 2018-11-07 DIAGNOSIS — H40.013 OPEN ANGLE WITH BORDERLINE FINDINGS OF BOTH EYES: ICD-10-CM

## 2018-11-07 PROCEDURE — 92083 EXTENDED VISUAL FIELD XM: CPT | Mod: S$GLB,,, | Performed by: OPTOMETRIST

## 2018-11-07 PROCEDURE — 92012 INTRM OPH EXAM EST PATIENT: CPT | Mod: S$GLB,,, | Performed by: OPTOMETRIST

## 2018-11-07 PROCEDURE — 99999 PR PBB SHADOW E&M-EST. PATIENT-LVL II: CPT | Mod: PBBFAC,,, | Performed by: OPTOMETRIST

## 2018-11-07 RX ORDER — LATANOPROST 50 UG/ML
1 SOLUTION/ DROPS OPHTHALMIC NIGHTLY
Qty: 2.5 ML | Refills: 12 | Status: ON HOLD | OUTPATIENT
Start: 2018-11-07 | End: 2023-01-01

## 2018-11-08 ENCOUNTER — HOME CARE VISIT (OUTPATIENT)
Dept: NEUROLOGY | Facility: HOSPITAL | Age: 78
End: 2018-11-08

## 2018-11-09 NOTE — PROGRESS NOTES
"HPI     78yr old female present for IOP & Gonio w/ HVF. Patient states no changes   with vision OU since last seen. Patient was given Travatan Z but states   she's hasn't used the drops in 2 years. Patient states she didn't pick   drops up from pharmacy due to drops "being expensive". Pt denies eye pain,   headaches and floaters.     Last edited by Duke Koch MA on 11/7/2018 10:50 AM. (History)            Assessment /Plan     For exam results, see Encounter Report.    Open angle with borderline findings and low glaucoma risk in both eyes  Glaucoma suspect of both eyes     -     Posterior Segment OCT Optic Nerve-OD thin overall , OS WNL/ borderline thin SUP temp   HVF unreiliable, overall depression OU   Cupping OU  -        GS 2/2 increased c/d OU  + fam h/o  - nl IOP  - previous dx of LTG with Dr. Scott?  -previously on travatan Z?     Start latanoprost 0.005 % ophthalmic solution; Place 1 drop into both eyes every evening.  Dispense: 2.5 mL; Refill: 12  Consult Dr. Hull [facility] for eval        NS (nuclear sclerosis), bilateral              boderline visually significant, monitor     Hyperopia, bilateral  Presbyopia              Rx specs     Embolic stroke involving left cerebellar artery  Cerebrovascular small vessel disease     Type 2 diabetes mellitus with stage 3 chronic kidney disease, without long-term current use of insulin  Type 2 diabetes mellitus without ophthalmic manifestations              No retinopathy monitor                     "

## 2018-11-28 NOTE — ASSESSMENT & PLAN NOTE
- was not taking baby aspirin for prevention prior to admit, start 81 mg  - given dose of rivaroxiban in ED, held subsequent doses pending stroke team recommendations   -ams, head ct with R lateral frontal lobe small hemorrhagic area, pending MRI noncontrast and stroke team follow up     Needs appointment for future refills

## 2018-12-17 VITALS
SYSTOLIC BLOOD PRESSURE: 112 MMHG | DIASTOLIC BLOOD PRESSURE: 68 MMHG | OXYGEN SATURATION: 98 % | HEART RATE: 76 BPM | RESPIRATION RATE: 20 BRPM

## 2018-12-31 ENCOUNTER — INITIAL CONSULT (OUTPATIENT)
Dept: OPHTHALMOLOGY | Facility: CLINIC | Age: 78
End: 2018-12-31
Payer: COMMERCIAL

## 2018-12-31 DIAGNOSIS — H40.1232 LOW-TENSION GLAUCOMA, BILATERAL, MODERATE STAGE: Primary | ICD-10-CM

## 2018-12-31 DIAGNOSIS — I69.398 VISUAL FIELD LOSS FOLLOWING STROKE: ICD-10-CM

## 2018-12-31 DIAGNOSIS — H25.13 NUCLEAR SENILE CATARACT OF BOTH EYES: ICD-10-CM

## 2018-12-31 DIAGNOSIS — H54.7 VISUAL FIELD LOSS FOLLOWING STROKE: ICD-10-CM

## 2018-12-31 PROCEDURE — 92020 GONIOSCOPY: CPT | Mod: S$GLB,,, | Performed by: OPHTHALMOLOGY

## 2018-12-31 PROCEDURE — 92020 PR SPECIAL EYE EVAL,GONIOSCOPY: ICD-10-PCS | Mod: S$GLB,,, | Performed by: OPHTHALMOLOGY

## 2018-12-31 PROCEDURE — 92014 PR EYE EXAM, EST PATIENT,COMPREHESV: ICD-10-PCS | Mod: S$GLB,,, | Performed by: OPHTHALMOLOGY

## 2018-12-31 PROCEDURE — 92250 FUNDUS PHOTOGRAPHY W/I&R: CPT | Mod: S$GLB,,, | Performed by: OPHTHALMOLOGY

## 2018-12-31 PROCEDURE — 99999 PR PBB SHADOW E&M-EST. PATIENT-LVL II: CPT | Mod: PBBFAC,,, | Performed by: OPHTHALMOLOGY

## 2018-12-31 PROCEDURE — 92014 COMPRE OPH EXAM EST PT 1/>: CPT | Mod: S$GLB,,, | Performed by: OPHTHALMOLOGY

## 2018-12-31 PROCEDURE — 99999 PR PBB SHADOW E&M-EST. PATIENT-LVL II: ICD-10-PCS | Mod: PBBFAC,,, | Performed by: OPHTHALMOLOGY

## 2018-12-31 PROCEDURE — 92250 COLOR FUNDUS PHOTOGRAPHY - OU - BOTH EYES: ICD-10-PCS | Mod: S$GLB,,, | Performed by: OPHTHALMOLOGY

## 2018-12-31 NOTE — LETTER
January 1, 2019      Ivy Canchola, OD  [address]  [facility] [address]           [facility]  [address]  [facility] [address]  Phone: [phone]  Fax: [phone]          Patient: Temitope Suero   MR Number: [id number]   YOB: 1940   Date of Visit: 12/31/2018       Dear Dr. Ivy Canchola:    Thank you for referring Temitope Suero to me for evaluation. Attached you will find relevant portions of my assessment and plan of care.    If you have questions, please do not hesitate to call me. I look forward to following Temitope Suero along with you.    Sincerely,    Gayathri Hull MD    Enclosure  CC:  No Recipients    If you would like to receive this communication electronically, please contact [email or web address] or [phone] to request more information on [employer] Link access.    For providers and/or their staff who would like to refer a patient to [facility], please contact us through our one-stop-shop provider referral line, [facility] [place], at [phone].    If you feel you have received this communication in error or would no longer like to receive these types of communications, please e-mail [email or web address]

## 2018-12-31 NOTE — PROGRESS NOTES
HPI     Glaucoma      Additional comments: glaucoma eval              Comments     Pt here for glaucoma evaluation per Dr. Canchola. Pt is a glaucoma suspect   based on large CDR and also has a family hx of glaucoma (father). Pt is   also pervious pt of Dr. Scott's (notes in [facility]).    1. OAG Suspect  2. NS OU  3. Type 2 DM no DR  4. Hyperopia / Presbyopia  5. Hx CVA    MEDS:  Latanoprost QHS OU          Last edited by Mee Patel MA on 12/31/2018  2:19 PM. (History)            Assessment /Plan     For exam results, see Encounter Report.    Low-tension glaucoma, bilateral, moderate stage    Visual field loss following stroke    Nuclear senile cataract of both eyes        Pt here with her niece today - 2018)        1. Glaucoma (type and duration)    LTG - dx - jemma 6.2008   First HVF   2000   First photos   2009   Treatment / Drops started   2008 - stopped on her own from 2009 to 2018           Family history    + father         Glaucoma meds    Latanoprost ou         H/O adverse rxn to glaucoma drops    none        LASERS    none        GLAUCOMA SURGERIES    none        OTHER EYE SURGERIES    none        CDR    0.75 / 0.8        Tbase    17-19 / 16-18          Tmax    19/18            Ttarget    ?             HVF    4 test 2000 to  2013 - +SAD od // +SAD  Os   NO test 2014 to 2018    1 test 2018 to 2018 - dense SAD and left inf quadratic loss od // dense SAD and left inf quadrantic loss   ++ VF loss 2/2 CVA - 2018        Gonio    +3 ou         CCT    545/560        OCT    1 test 2018 to 2018 - RNFL - dec. TS/NS/G/TI od // bord TS os        HRT    2 test 2008 to 2009 - MR - nl od // bord S os        Disc photos    2009, 2013 , 2018     - Ttoday    14/15 (good resp to latanoprost)   - Test done today    Gonio / DFE / photos / establish care     2. H/O CVA 2018    ++ left inf quadrantic VF loss ou    Difficult to assess glaucoma vs CVA damage     3. NS       PLAN   Photos today   Cont latanoprost     F/U with  repeat HVF - need to establish new baseline

## 2019-01-04 ENCOUNTER — HOME CARE VISIT (OUTPATIENT)
Dept: NEUROLOGY | Facility: HOSPITAL | Age: 79
End: 2019-01-04

## 2019-02-20 VITALS
HEART RATE: 104 BPM | OXYGEN SATURATION: 97 % | RESPIRATION RATE: 20 BRPM | SYSTOLIC BLOOD PRESSURE: 154 MMHG | DIASTOLIC BLOOD PRESSURE: 82 MMHG

## 2019-02-27 ENCOUNTER — HOME CARE VISIT (OUTPATIENT)
Dept: NEUROLOGY | Facility: HOSPITAL | Age: 79
End: 2019-02-27

## 2019-03-26 VITALS
DIASTOLIC BLOOD PRESSURE: 88 MMHG | HEART RATE: 57 BPM | OXYGEN SATURATION: 98 % | SYSTOLIC BLOOD PRESSURE: 138 MMHG | RESPIRATION RATE: 20 BRPM

## 2019-03-27 NOTE — PROGRESS NOTES
AAOx2.  No orientation to time.  Daughter present and engaged in visit.  NIHSS-1; does not smoke; refrains from adding salt, salty and fast food; compliant w/ all meds; walks dog or exercise.  Education provided on goal of stroke mobile, s/s of stroke diet, exercise, medications.  Verbalized understanding.  Encouraged to utilize stroke team for any concern.

## 2019-05-02 DIAGNOSIS — Z85.3 PERSONAL HISTORY OF BREAST CANCER: ICD-10-CM

## 2019-05-02 DIAGNOSIS — E55.9 VITAMIN D DEFICIENCY DISEASE: ICD-10-CM

## 2019-05-02 RX ORDER — ACETAMINOPHEN 500 MG
1 TABLET ORAL DAILY
Qty: 90 CAPSULE | Refills: 5 | Status: SHIPPED | OUTPATIENT
Start: 2019-05-02 | End: 2019-11-13 | Stop reason: SDUPTHER

## 2019-05-02 RX ORDER — LETROZOLE 2.5 MG/1
TABLET, FILM COATED ORAL
Qty: 90 TABLET | Refills: 4 | Status: SHIPPED | OUTPATIENT
Start: 2019-05-02 | End: 2022-03-07

## 2019-07-08 DIAGNOSIS — I67.9 CEREBROVASCULAR SMALL VESSEL DISEASE: ICD-10-CM

## 2019-07-08 DIAGNOSIS — I63.431 EMBOLIC STROKE INVOLVING RIGHT POSTERIOR CEREBRAL ARTERY: ICD-10-CM

## 2019-07-08 RX ORDER — ATORVASTATIN CALCIUM 40 MG/1
40 TABLET, FILM COATED ORAL DAILY
Qty: 90 TABLET | Refills: 0 | Status: SHIPPED | OUTPATIENT
Start: 2019-07-08 | End: 2019-11-09 | Stop reason: SDUPTHER

## 2019-07-31 DIAGNOSIS — I10 ESSENTIAL HYPERTENSION: ICD-10-CM

## 2019-07-31 RX ORDER — CARVEDILOL 25 MG/1
25 TABLET ORAL 2 TIMES DAILY WITH MEALS
Qty: 60 TABLET | Refills: 0 | Status: SHIPPED | OUTPATIENT
Start: 2019-07-31 | End: 2019-08-30 | Stop reason: SDUPTHER

## 2019-08-08 ENCOUNTER — HOSPITAL ENCOUNTER (OUTPATIENT)
Dept: RADIOLOGY | Facility: HOSPITAL | Age: 79
Discharge: HOME | End: 2019-08-08
Attending: INTERNAL MEDICINE
Payer: COMMERCIAL

## 2019-08-08 ENCOUNTER — HOSPITAL ENCOUNTER (OUTPATIENT)
Dept: RADIOLOGY | Facility: CLINIC | Age: 79
Discharge: HOME | End: 2019-08-08
Attending: INTERNAL MEDICINE
Payer: COMMERCIAL

## 2019-08-08 ENCOUNTER — TELEPHONE (OUTPATIENT)
Dept: SURGERY | Facility: CLINIC | Age: 79
End: 2019-08-08

## 2019-08-08 ENCOUNTER — OFFICE VISIT (OUTPATIENT)
Dept: INTERNAL MEDICINE | Facility: CLINIC | Age: 79
End: 2019-08-08
Payer: COMMERCIAL

## 2019-08-08 VITALS
DIASTOLIC BLOOD PRESSURE: 100 MMHG | SYSTOLIC BLOOD PRESSURE: 168 MMHG | HEIGHT: 67 IN | OXYGEN SATURATION: 99 % | HEART RATE: 90 BPM | WEIGHT: 151.25 LBS | BODY MASS INDEX: 23.74 KG/M2

## 2019-08-08 DIAGNOSIS — R60.0 ARM EDEMA: ICD-10-CM

## 2019-08-08 DIAGNOSIS — E55.9 VITAMIN D DEFICIENCY DISEASE: Primary | ICD-10-CM

## 2019-08-08 DIAGNOSIS — I50.22 CHRONIC SYSTOLIC HEART FAILURE: ICD-10-CM

## 2019-08-08 DIAGNOSIS — Z79.01 CHRONIC ANTICOAGULATION: Chronic | ICD-10-CM

## 2019-08-08 DIAGNOSIS — E78.2 MIXED HYPERLIPIDEMIA: Chronic | ICD-10-CM

## 2019-08-08 DIAGNOSIS — E05.20 TOXIC MULTINODULAR GOITER: ICD-10-CM

## 2019-08-08 DIAGNOSIS — N18.30 STAGE 3 CHRONIC KIDNEY DISEASE: ICD-10-CM

## 2019-08-08 DIAGNOSIS — Z86.73 HISTORY OF STROKE: ICD-10-CM

## 2019-08-08 DIAGNOSIS — Z78.0 POST-MENOPAUSAL: ICD-10-CM

## 2019-08-08 DIAGNOSIS — H40.9 GLAUCOMA, UNSPECIFIED GLAUCOMA TYPE, UNSPECIFIED LATERALITY: ICD-10-CM

## 2019-08-08 DIAGNOSIS — E11.22 TYPE 2 DIABETES MELLITUS WITH STAGE 3 CHRONIC KIDNEY DISEASE, WITHOUT LONG-TERM CURRENT USE OF INSULIN: Chronic | ICD-10-CM

## 2019-08-08 DIAGNOSIS — I48.0 PAROXYSMAL ATRIAL FIBRILLATION: ICD-10-CM

## 2019-08-08 DIAGNOSIS — I67.9 CEREBROVASCULAR SMALL VESSEL DISEASE: ICD-10-CM

## 2019-08-08 DIAGNOSIS — Z85.3 HISTORY OF BREAST CANCER: ICD-10-CM

## 2019-08-08 DIAGNOSIS — N18.30 TYPE 2 DIABETES MELLITUS WITH STAGE 3 CHRONIC KIDNEY DISEASE, WITHOUT LONG-TERM CURRENT USE OF INSULIN: Chronic | ICD-10-CM

## 2019-08-08 PROCEDURE — 77080 DEXA BONE DENSITY SPINE HIP: ICD-10-PCS | Mod: 26,,, | Performed by: INTERNAL MEDICINE

## 2019-08-08 PROCEDURE — 93971 EXTREMITY STUDY: CPT | Mod: TC,LT

## 2019-08-08 PROCEDURE — 3080F PR MOST RECENT DIASTOLIC BLOOD PRESSURE >= 90 MM HG: ICD-10-PCS | Mod: CPTII,S$GLB,, | Performed by: INTERNAL MEDICINE

## 2019-08-08 PROCEDURE — 3080F DIAST BP >= 90 MM HG: CPT | Mod: CPTII,S$GLB,, | Performed by: INTERNAL MEDICINE

## 2019-08-08 PROCEDURE — 77080 DXA BONE DENSITY AXIAL: CPT | Mod: TC

## 2019-08-08 PROCEDURE — 93971 US UPPER EXTREMITY VEINS LEFT: ICD-10-PCS | Mod: 26,LT,, | Performed by: RADIOLOGY

## 2019-08-08 PROCEDURE — 99214 OFFICE O/P EST MOD 30 MIN: CPT | Mod: S$GLB,,, | Performed by: INTERNAL MEDICINE

## 2019-08-08 PROCEDURE — 1101F PT FALLS ASSESS-DOCD LE1/YR: CPT | Mod: CPTII,S$GLB,, | Performed by: INTERNAL MEDICINE

## 2019-08-08 PROCEDURE — 99999 PR PBB SHADOW E&M-EST. PATIENT-LVL V: ICD-10-PCS | Mod: PBBFAC,,, | Performed by: INTERNAL MEDICINE

## 2019-08-08 PROCEDURE — 77080 DXA BONE DENSITY AXIAL: CPT | Mod: 26,,, | Performed by: INTERNAL MEDICINE

## 2019-08-08 PROCEDURE — 99999 PR PBB SHADOW E&M-EST. PATIENT-LVL V: CPT | Mod: PBBFAC,,, | Performed by: INTERNAL MEDICINE

## 2019-08-08 PROCEDURE — 1101F PR PT FALLS ASSESS DOC 0-1 FALLS W/OUT INJ PAST YR: ICD-10-PCS | Mod: CPTII,S$GLB,, | Performed by: INTERNAL MEDICINE

## 2019-08-08 PROCEDURE — 3077F PR MOST RECENT SYSTOLIC BLOOD PRESSURE >= 140 MM HG: ICD-10-PCS | Mod: CPTII,S$GLB,, | Performed by: INTERNAL MEDICINE

## 2019-08-08 PROCEDURE — 93971 EXTREMITY STUDY: CPT | Mod: 26,LT,, | Performed by: RADIOLOGY

## 2019-08-08 PROCEDURE — 99214 PR OFFICE/OUTPT VISIT, EST, LEVL IV, 30-39 MIN: ICD-10-PCS | Mod: S$GLB,,, | Performed by: INTERNAL MEDICINE

## 2019-08-08 PROCEDURE — 3077F SYST BP >= 140 MM HG: CPT | Mod: CPTII,S$GLB,, | Performed by: INTERNAL MEDICINE

## 2019-08-08 NOTE — TELEPHONE ENCOUNTER
Spoke with daughter, she stated pt had mammogram 6 months ago at [facility] and Dr. Zambrano has been refilling her Letrozole. She has not seen medical oncology since 2016. Pt scheduled with Beatriz Cutler, she was the last provider to see pt. Discussed time , date and location of appts with daughter, she verbalized understnding

## 2019-08-08 NOTE — PROGRESS NOTES
Ms. Suero is a 79  -year-old female coming in today to follow Her htn and other multiple problems. I last saw her in 9/2018.  NO recent hospitalizations.    Last year she has been hospitalized twice - once in April and once in August for CVA -- in April it seem she had gotten off her meds because she was feeling well.  She had Afib with RVR and significant htn that was controled on home meds- She had stop taking her coumadin and refused to follow up in coumadin clinic.  IN August she had another episode. She had L cerebellar stroke.  She was place on apixaban and lipitor was increased to 80 mg a day.  He daughter is here today and tells that she is taking her meds.    She has improved a lot since both events. > Speach had improved to ekta to normal-- Memory is has improved (so-so ) but not come back to normal.                1. She has diabetes mellitus type 2. She has had it for multiple years.   She is on metformin 1 g twice a day. Recent labs: glucose was normal and HgbA1c was 6.0 ( 2018)   Renal fxn was 38.5 . Creatinine was 1.5    She denies any polyuria or polydipsia. She does not check her glucose at home. SHe saw optho in [facility] 2018 -- looks like they wanted her to follow up in 4 months- around April for her glucoma.          2. She has history of AFib. She was hospitalized in February 2011 for AFib,   rapid ventricular response, pulmonary edema. She has had a non-Q-wave MI.   Currently, she says she is feeling better. She is not having any kind of   chest pain or shortness of breath. She is on Apixiban . SHe is not followed by cards for the Afib.      3. toxic MNG --hyperthyroidism and she was put on methimazole by endocrine in 2014-- SHe had not followed up. lasrt year tsh was normal . She has a thyroid nodule and she had it was biopsied few years ago. She denies any palpitations. Se denies hoarsness.  No recnet labs       4. She has a history of breast cancer and she is on letrozole followed by   Dr. Lazaro in  "Hematology-Oncology.  Looks like they have not seen Hem/onc in > 2 years and thye have been trying to reschedule.  I confirmed phone number today as being correct.  .   . Her bone scan showed an area of abnormality in the upper lumbar lower   thoracic area with no other abnormality noted. 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 .She saw Hem last in 2016-- I see some phone notes where they have been trying to get her back to clinic--last mmg was 11/ 2018   5. She has vitamin D deficiency. Vit D was 20 last visit. she is  taking vit D replacement --given to her in April 2018      6. . Hyperlipidemia- Her chol is 159, with the ldl of 90, hdl 52, and tg 82. . SHe has been on lipitor 40for the last 4 years.           She did not take her meds today because she is fasting.  She has had 2 week history of her left arm swelling.  Slight pain in her wrist- no truama.            Mother with Breast cancer. No thyroid cancer in family.     ROS : Gen - no fatigue   Eyes - she states her vision is blurry at time.    ENT - no hoarseness or sore throat  CV - no CP or SOB  Pulm - no cough or wheezing  GI - no N/V/D   no dysuria or incontinence  MS - back and knee pian as above.    Skin -as above Neuro - occasional HA, NO dizziness  Heme - no abnormal bleeding or bruising  Endo - no polydipsia, or temperature changes  Psych - no anxiety or depression     PHYSICAL EXAMINATION:BP (!) 168/100   Pulse 90   Ht 5' 7" (1.702 m)   Wt 68.6 kg (151 lb 3.8 oz)   SpO2 99%   BMI 23.69 kg/m²     GENERAL: A well-appearing 80 year-old female. SHe is alert and asking and answering questions appropriately.  She does have some trouble finding words.  Unaware of date, month, - she is aware of person and place.    regular. "   HEENT: Ear canals are open. TMs are clear. Oropharynx is clear. Thyroid is enlarged.   NECK: Supple and she has no JVD . She had a bilateral goiter- about same size as last time. She has no carotid bruits.   CHEST: Clear without wheeze.   CARDIOVASCULAR: S1, S2, regular rate but irregular rhythm.   ABDOMEN: Soft, nontender, no hepatosplenomegaly.   LOWER EXTREMITIES: 1+edema. Normal ROM of LE bilaterally and normal ROM of bilateral UE.    UPPER EXTERMITIES:   1+ pitting soft edema in her left arm- not present in right arm.  Normal capillary refill-- NO axillary adenopathy.    MOOD: good--pleasant.   Protective Sensation (w/ 10 gram monofilament):  Right: decreased   Left: decreased     Visual Inspection:  Normal -  Bilateral     Pedal Pulses:   Right: Present  Left: Present     Posterior tibialis:   Right:Present  Left: Present             ASSESSMENT:   I will follow up later this month.  Labs today.  Refer back to optho and Hem/onc.  Discussed with daughter that both have been reaching out to her to follow up and have not been able to get in touch.  She needs to take her meds before coming in- even if she is fasting.      Vitamin D deficiency disease  -     Vitamin D; Future; Expected date: 08/08/2019    Type 2 diabetes mellitus with stage 3 chronic kidney disease, without long-term current use of insulin  -     Lipid panel; Future; Expected date: 08/08/2019  -     Comprehensive metabolic panel; Future; Expected date: 08/08/2019  -     Hemoglobin A1c; Future; Expected date: 08/08/2019    Toxic multinodular goiter  -     TSH; Future; Expected date: 08/08/2019    Stage 3 chronic kidney disease  -     CBC auto differential; Future; Expected date: 08/08/2019    Paroxysmal atrial fibrillation    Mixed hyperlipidemia  -     Lipid panel; Future; Expected date: 08/08/2019    History of stroke    History of breast cancer  -     Ambulatory Referral to Hematology / Oncology    Chronic systolic heart failure    Chronic  anticoagulation - apixaban    Cerebrovascular small vessel disease    Arm edema  -     US Upper Extremity Veins Left; Future; Expected date: 08/08/2019    Glaucoma, unspecified glaucoma type, unspecified laterality  -     Ambulatory consult to Optometry    Post-menopausal  -     DXA Bone Density Spine And Hip; Future; Expected date: 08/08/2019

## 2019-08-09 ENCOUNTER — TELEPHONE (OUTPATIENT)
Dept: NEUROLOGY | Facility: CLINIC | Age: 79
End: 2019-08-09

## 2019-08-09 NOTE — TELEPHONE ENCOUNTER
----- Message from Rito Lopez sent at 8/7/2019 12:50 PM CDT -----  Pharmacy Calling    Reason for call: Refill    Pharm[facility] Name: [facility]    Prescription Name: eliquis 5 MG    Phone Number: [phone]    Additional Information:

## 2019-08-30 DIAGNOSIS — I10 ESSENTIAL HYPERTENSION: ICD-10-CM

## 2019-08-30 RX ORDER — CARVEDILOL 25 MG/1
TABLET ORAL
Qty: 60 TABLET | Refills: 0 | Status: SHIPPED | OUTPATIENT
Start: 2019-08-30 | End: 2019-10-04 | Stop reason: SDUPTHER

## 2019-09-06 ENCOUNTER — TELEPHONE (OUTPATIENT)
Dept: HEMATOLOGY/ONCOLOGY | Facility: CLINIC | Age: 79
End: 2019-09-06

## 2019-09-09 ENCOUNTER — OFFICE VISIT (OUTPATIENT)
Dept: INTERNAL MEDICINE | Facility: CLINIC | Age: 79
End: 2019-09-09
Payer: COMMERCIAL

## 2019-09-09 ENCOUNTER — OFFICE VISIT (OUTPATIENT)
Dept: HEMATOLOGY/ONCOLOGY | Facility: CLINIC | Age: 79
End: 2019-09-09
Payer: COMMERCIAL

## 2019-09-09 ENCOUNTER — IMMUNIZATION (OUTPATIENT)
Dept: PHARMACY | Facility: CLINIC | Age: 79
End: 2019-09-09
Payer: COMMERCIAL

## 2019-09-09 ENCOUNTER — IMMUNIZATION (OUTPATIENT)
Dept: PHARMACY | Facility: CLINIC | Age: 79
End: 2019-09-09

## 2019-09-09 VITALS
WEIGHT: 137.81 LBS | OXYGEN SATURATION: 97 % | HEART RATE: 109 BPM | SYSTOLIC BLOOD PRESSURE: 94 MMHG | DIASTOLIC BLOOD PRESSURE: 60 MMHG | BODY MASS INDEX: 21.63 KG/M2 | HEIGHT: 67 IN

## 2019-09-09 VITALS
OXYGEN SATURATION: 97 % | TEMPERATURE: 99 F | HEIGHT: 67 IN | DIASTOLIC BLOOD PRESSURE: 75 MMHG | WEIGHT: 138.69 LBS | RESPIRATION RATE: 18 BRPM | BODY MASS INDEX: 21.77 KG/M2 | SYSTOLIC BLOOD PRESSURE: 122 MMHG | HEART RATE: 84 BPM

## 2019-09-09 DIAGNOSIS — I48.0 PAROXYSMAL ATRIAL FIBRILLATION: ICD-10-CM

## 2019-09-09 DIAGNOSIS — R41.3 DETERIORATION OF MEMORY: Primary | ICD-10-CM

## 2019-09-09 DIAGNOSIS — E55.9 VITAMIN D DEFICIENCY DISEASE: ICD-10-CM

## 2019-09-09 DIAGNOSIS — N18.30 TYPE 2 DIABETES MELLITUS WITH STAGE 3 CHRONIC KIDNEY DISEASE, WITHOUT LONG-TERM CURRENT USE OF INSULIN: Chronic | ICD-10-CM

## 2019-09-09 DIAGNOSIS — N18.30 STAGE 3 CHRONIC KIDNEY DISEASE: ICD-10-CM

## 2019-09-09 DIAGNOSIS — I63.442 EMBOLIC STROKE INVOLVING LEFT CEREBELLAR ARTERY: ICD-10-CM

## 2019-09-09 DIAGNOSIS — E05.20 TOXIC MULTINODULAR GOITER: ICD-10-CM

## 2019-09-09 DIAGNOSIS — E11.22 TYPE 2 DIABETES MELLITUS WITH STAGE 3 CHRONIC KIDNEY DISEASE, WITHOUT LONG-TERM CURRENT USE OF INSULIN: Chronic | ICD-10-CM

## 2019-09-09 DIAGNOSIS — Z85.3 HISTORY OF BREAST CANCER: Primary | ICD-10-CM

## 2019-09-09 DIAGNOSIS — Z86.73 HISTORY OF STROKE: ICD-10-CM

## 2019-09-09 DIAGNOSIS — E78.2 MIXED HYPERLIPIDEMIA: Chronic | ICD-10-CM

## 2019-09-09 DIAGNOSIS — Z85.3 HISTORY OF BREAST CANCER: ICD-10-CM

## 2019-09-09 DIAGNOSIS — I67.9 CEREBROVASCULAR SMALL VESSEL DISEASE: ICD-10-CM

## 2019-09-09 PROCEDURE — 1101F PR PT FALLS ASSESS DOC 0-1 FALLS W/OUT INJ PAST YR: ICD-10-PCS | Mod: CPTII,S$GLB,, | Performed by: INTERNAL MEDICINE

## 2019-09-09 PROCEDURE — 3074F PR MOST RECENT SYSTOLIC BLOOD PRESSURE < 130 MM HG: ICD-10-PCS | Mod: CPTII,S$GLB,, | Performed by: INTERNAL MEDICINE

## 2019-09-09 PROCEDURE — 3078F PR MOST RECENT DIASTOLIC BLOOD PRESSURE < 80 MM HG: ICD-10-PCS | Mod: CPTII,S$GLB,, | Performed by: PHYSICIAN ASSISTANT

## 2019-09-09 PROCEDURE — 99214 PR OFFICE/OUTPT VISIT, EST, LEVL IV, 30-39 MIN: ICD-10-PCS | Mod: S$GLB,,, | Performed by: INTERNAL MEDICINE

## 2019-09-09 PROCEDURE — 3074F SYST BP LT 130 MM HG: CPT | Mod: CPTII,S$GLB,, | Performed by: PHYSICIAN ASSISTANT

## 2019-09-09 PROCEDURE — 3078F PR MOST RECENT DIASTOLIC BLOOD PRESSURE < 80 MM HG: ICD-10-PCS | Mod: CPTII,S$GLB,, | Performed by: INTERNAL MEDICINE

## 2019-09-09 PROCEDURE — 99999 PR PBB SHADOW E&M-EST. PATIENT-LVL III: ICD-10-PCS | Mod: PBBFAC,,, | Performed by: INTERNAL MEDICINE

## 2019-09-09 PROCEDURE — 1101F PT FALLS ASSESS-DOCD LE1/YR: CPT | Mod: CPTII,S$GLB,, | Performed by: INTERNAL MEDICINE

## 2019-09-09 PROCEDURE — 99203 PR OFFICE/OUTPT VISIT, NEW, LEVL III, 30-44 MIN: ICD-10-PCS | Mod: S$GLB,,, | Performed by: PHYSICIAN ASSISTANT

## 2019-09-09 PROCEDURE — 3074F SYST BP LT 130 MM HG: CPT | Mod: CPTII,S$GLB,, | Performed by: INTERNAL MEDICINE

## 2019-09-09 PROCEDURE — 99203 OFFICE O/P NEW LOW 30 MIN: CPT | Mod: S$GLB,,, | Performed by: PHYSICIAN ASSISTANT

## 2019-09-09 PROCEDURE — 99999 PR PBB SHADOW E&M-EST. PATIENT-LVL III: CPT | Mod: PBBFAC,,, | Performed by: INTERNAL MEDICINE

## 2019-09-09 PROCEDURE — 99214 OFFICE O/P EST MOD 30 MIN: CPT | Mod: S$GLB,,, | Performed by: INTERNAL MEDICINE

## 2019-09-09 PROCEDURE — 3078F DIAST BP <80 MM HG: CPT | Mod: CPTII,S$GLB,, | Performed by: PHYSICIAN ASSISTANT

## 2019-09-09 PROCEDURE — 3074F PR MOST RECENT SYSTOLIC BLOOD PRESSURE < 130 MM HG: ICD-10-PCS | Mod: CPTII,S$GLB,, | Performed by: PHYSICIAN ASSISTANT

## 2019-09-09 PROCEDURE — 1101F PT FALLS ASSESS-DOCD LE1/YR: CPT | Mod: CPTII,S$GLB,, | Performed by: PHYSICIAN ASSISTANT

## 2019-09-09 PROCEDURE — 99999 PR PBB SHADOW E&M-EST. PATIENT-LVL IV: ICD-10-PCS | Mod: PBBFAC,,, | Performed by: PHYSICIAN ASSISTANT

## 2019-09-09 PROCEDURE — 1101F PR PT FALLS ASSESS DOC 0-1 FALLS W/OUT INJ PAST YR: ICD-10-PCS | Mod: CPTII,S$GLB,, | Performed by: PHYSICIAN ASSISTANT

## 2019-09-09 PROCEDURE — 99999 PR PBB SHADOW E&M-EST. PATIENT-LVL IV: CPT | Mod: PBBFAC,,, | Performed by: PHYSICIAN ASSISTANT

## 2019-09-09 PROCEDURE — 3078F DIAST BP <80 MM HG: CPT | Mod: CPTII,S$GLB,, | Performed by: INTERNAL MEDICINE

## 2019-09-09 NOTE — PROGRESS NOTES
Ms. Suero is a 79  -year-old female coming in today to follow Her htn and other multiple problems. I last saw her in 8/2019-- prior to that it was  in 9/2018.       Last year she has been hospitalized twice - once in April and once in August for CVA -- in April it seem she had gotten off her meds because she was feeling well.  She had Afib with RVR and significant htn that was controled on home meds- She had stop taking her coumadin and refused to follow up in coumadin clinic.  IN August she had another episode. She had L cerebellar stroke.  She was place on apixaban and lipitor was increased to 80 mg a day.  He daughter is here today and tells that she is taking her meds.    She has improved a lot since both events. > Speach had improved to ekta to normal-- Memory is has improved (so-so ) but not come back to normal.                1. She has diabetes mellitus type 2. She has had it for multiple years.   She is on metformin 1 g twice a day. Recent labs: glucose was normal and HgbA1c was 6.0 ( 8/2019)   Renal fxn was 45ml/min  . Creatinine was 1.3    She denies any polyuria or polydipsia. She does not check her glucose at home. SHe saw kayleeho in [facility] 2018 -- looks like they wanted her to follow up in 4 months- around April for her glucoma.  SO we made her an appointment and she has that for later on this month.          2. She has history of AFib. She was hospitalized in February 2011 for AFib,   rapid ventricular response, pulmonary edema. She has had a non-Q-wave MI.   Currently, she says she is feeling better. She is not having any kind of   chest pain or shortness of breath. She is on Apixiban . SHe is not followed by cards for the Afib.      3. toxic MNG --hyperthyroidism and she was put on methimazole by endocrine in 2014-- SHe had not followed up. recent tsh was normal.   She has a thyroid nodule and she had it was biopsied few years ago. She denies any palpitations. Se denies hoarsness.        4. She has a  "history of breast cancer and she is on letrozole followed by   Dr. Lazaro in Hematology-Oncology.  Looks like they have not seen Hem/onc in > 2 years and thye have been trying to reschedule.  I confirmed phone number today as being correct at last visit .  .   . Her bone scan showed an area of abnormality in the upper lumbar lower   thoracic area with no other abnormality noted. 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 .She saw Hem last in 2016-- - Last visit we scheduled him Onc and they see her later today.      5. She has vitamin D deficiency. Vit D was 41 last visit. she is  taking vit D replacement 2000 iu a day --given to her in April 2018      6. . Hyperlipidemia- Her chol is 194, with the ldl of 114, hdl 65, and tg 72. . SHe has been on lipitor 40 for the last 4 years.           She has had 1 month  history of her left arm swelling.  Slight pain in her wrist- no truama.   US was negative for DVT and the swelling is improving.           Mother with Breast cancer. No thyroid cancer in family.     ROS : Gen - no fatigue   Eyes - she states her vision is blurry at time.    ENT - no hoarseness or sore throat  CV - no CP or SOB  Pulm - no cough or wheezing  GI - no N/V/D   no dysuria or incontinence  MS - back and knee pian as above.    Skin -as above Neuro - occasional HA, NO dizziness  Heme - no abnormal bleeding or bruising  Endo - no polydipsia, or temperature changes  Psych - no anxiety or depression     PHYSICAL EXAMINATION:BP BP 94/60 (BP Location: Right arm, Patient Position: Sitting, BP Method: Medium (Manual))   Pulse 109   Ht 5' 7" (1.702 m)   Wt 62.5 kg (137 lb 12.6 oz)   SpO2 97%   BMI 21.58 kg/m²       GENERAL: A well-appearing 79 year-old female. SHe is alert and asking and " answering questions appropriately.  She does have some trouble finding words.  Unaware of date, month, - she is aware of person and place.    regular.   HEENT: Ear canals are open. TMs are clear. Oropharynx is clear. Thyroid is enlarged.   NECK: Supple and she has no JVD . She had a bilateral goiter- about same size as last time. She has no carotid bruits.   CHEST: Clear without wheeze.   CARDIOVASCULAR: S1, S2, regular rate but irregular rhythm.   ABDOMEN: Soft, nontender, no hepatosplenomegaly.   LOWER EXTREMITIES: 1+edema. Normal ROM of LE bilaterally and normal ROM of bilateral UE.    UPPER EXTERMITIES:   1+ pitting soft edema in her left arm- not present in right arm.  Normal capillary refill-- NO axillary adenopathy.    MOOD: good--pleasant.              ASSESSMENT:   SHe is seeing Hem/onc today  .  Discussed with daughter that both have been reaching out to her to follow up and have not been able to get in touch.  She needs to take her meds regulaerly including before visits.       Vitamin D deficiency disease  -       Type 2 diabetes mellitus with stage 3 chronic kidney disease, without long-term current use of insulin  -      Toxic multinodular goiter  -        Stage 3 chronic kidney disease  -    better      Paroxysmal atrial fibrillation     Mixed hyperlipidemia  -        History of stroke     History of breast cancer  -         Chronic systolic heart failure     Chronic anticoagulation - apixaban     Cerebrovascular small vessel disease     Arm edema  -     resolving     Glaucoma, unspecified glaucoma type, unspecified laterality  -     Ambulatory consult to Optometry- coming up in 3 weeks.     She si living with niece who is here today and we discussed watching her to make sure she is taking meds and coming to appointments

## 2019-09-09 NOTE — LETTER
September 13, 2019      Gm Zambrano Jr., MD  [address]  [facility] [address]           [facility] Hematology Oncology  [address]  [facility] [address]  Phone: [phone]          Patient: Temitope Suero   MR Number: [id number]   YOB: 1940   Date of Visit: 9/9/2019       Dear Dr. Gm Zambrano Jr.:    Thank you for referring Temitope Suero to me for evaluation. Attached you will find relevant portions of my assessment and plan of care.    If you have questions, please do not hesitate to call me. I look forward to following Temitope Suero along with you.    Sincerely,    Beatriz Cutler PA-C    Enclosure  CC:  No Recipients    If you would like to receive this communication electronically, please contact [email or web address] or [phone] to request more information on [employer] Link access.    For providers and/or their staff who would like to refer a patient to [facility], please contact us through our one-stop-shop provider referral line, [facility] , at [phone].    If you feel you have received this communication in error or would no longer like to receive these types of communications, please e-mail [email or web address]

## 2019-09-25 ENCOUNTER — OFFICE VISIT (OUTPATIENT)
Dept: OPTOMETRY | Facility: CLINIC | Age: 79
End: 2019-09-25
Payer: COMMERCIAL

## 2019-09-25 DIAGNOSIS — H52.4 PRESBYOPIA: Primary | ICD-10-CM

## 2019-09-25 PROCEDURE — 99999 PR PBB SHADOW E&M-EST. PATIENT-LVL II: ICD-10-PCS | Mod: PBBFAC,,, | Performed by: OPTOMETRIST

## 2019-09-25 PROCEDURE — 99999 PR PBB SHADOW E&M-EST. PATIENT-LVL II: CPT | Mod: PBBFAC,,, | Performed by: OPTOMETRIST

## 2019-09-25 PROCEDURE — 92015 PR REFRACTION: ICD-10-PCS | Mod: S$GLB,,, | Performed by: OPTOMETRIST

## 2019-09-25 PROCEDURE — 92015 DETERMINE REFRACTIVE STATE: CPT | Mod: S$GLB,,, | Performed by: OPTOMETRIST

## 2019-09-25 NOTE — LETTER
September 27, 2019      Gm Zambrano Jr., MD  [address]  [facility] [address]           [facility]  [address]  [facility] [address]  Phone: [phone]  Fax: [phone]          Patient: Temitope Suero   MR Number: [id number]   YOB: 1940   Date of Visit: 9/25/2019       Dear Dr. Gm Zambrano Jr.:    Thank you for referring Temitope Suero to me for evaluation. Attached you will find relevant portions of my assessment and plan of care.    If you have questions, please do not hesitate to call me. I look forward to following Temitope Suero along with you.    Sincerely,    Ivy Canchola, OD    Enclosure  CC:  No Recipients    If you would like to receive this communication electronically, please contact [email or web address] or [phone] to request more information on [employer] Link access.    For providers and/or their staff who would like to refer a patient to [facility], please contact us through our one-stop-shop provider referral line, [facility], at [phone].    If you feel you have received this communication in error or would no longer like to receive these types of communications, please e-mail [email or web address]

## 2019-09-26 ENCOUNTER — TELEPHONE (OUTPATIENT)
Dept: OPHTHALMOLOGY | Facility: CLINIC | Age: 79
End: 2019-09-26

## 2019-09-30 NOTE — PROGRESS NOTES
HPI     Last eye exam was 12/31/18 with Dr Hull.    Pt states she sometimes has some blurriness  Pt is happy with current glasses.  Pt using Latanoprost BID OU         Last edited by Dalia Dickson on 9/25/2019 10:40 AM. (History)            Assessment /Plan     For exam results, see Encounter Report.    Presbyopia      Rx specs    Return to Dr. Hull as scheduled

## 2019-10-04 ENCOUNTER — TELEPHONE (OUTPATIENT)
Dept: INTERNAL MEDICINE | Facility: CLINIC | Age: 79
End: 2019-10-04

## 2019-10-04 DIAGNOSIS — I10 ESSENTIAL HYPERTENSION: ICD-10-CM

## 2019-10-04 RX ORDER — CARVEDILOL 25 MG/1
25 TABLET ORAL 2 TIMES DAILY WITH MEALS
Qty: 180 TABLET | Refills: 2 | Status: SHIPPED | OUTPATIENT
Start: 2019-10-04 | End: 2019-11-14 | Stop reason: SDUPTHER

## 2019-10-11 ENCOUNTER — TELEPHONE (OUTPATIENT)
Dept: INTERNAL MEDICINE | Facility: CLINIC | Age: 79
End: 2019-10-11

## 2019-10-11 NOTE — TELEPHONE ENCOUNTER
----- Message from Lora Foster sent at 10/11/2019  1:40 PM CDT -----  Contact: pt bryon Alfaro [phone]  Pt bryon Del Castillo called asking for doctor note to excuse her from jury duty. Per Magdalene she is the only one who can sit with pt daily.  Please advise

## 2019-10-15 NOTE — TELEPHONE ENCOUNTER
----- Message from Rachel Frances sent at 10/15/2019 11:22 AM CDT -----  Contact: Meera/Magdalene Alfaro [phone]  Meera needs a note stated that she is taking care of her aunt (above patient). Because it is needed for her to be excused from jury duty.    Please call and advice    Thank You

## 2019-11-04 ENCOUNTER — HOSPITAL ENCOUNTER (OUTPATIENT)
Dept: RADIOLOGY | Facility: HOSPITAL | Age: 79
Discharge: HOME | End: 2019-11-04
Attending: PHYSICIAN ASSISTANT
Payer: COMMERCIAL

## 2019-11-04 DIAGNOSIS — Z85.3 HISTORY OF BREAST CANCER: ICD-10-CM

## 2019-11-04 DIAGNOSIS — Z12.31 VISIT FOR SCREENING MAMMOGRAM: ICD-10-CM

## 2019-11-04 PROCEDURE — 77067 MAMMO DIGITAL SCREENING BILAT WITH TOMOSYNTHESIS_CAD: ICD-10-PCS | Mod: 26,,, | Performed by: RADIOLOGY

## 2019-11-04 PROCEDURE — 77063 MAMMO DIGITAL SCREENING BILAT WITH TOMOSYNTHESIS_CAD: ICD-10-PCS | Mod: 26,,, | Performed by: RADIOLOGY

## 2019-11-04 PROCEDURE — 77067 SCR MAMMO BI INCL CAD: CPT | Mod: 26,,, | Performed by: RADIOLOGY

## 2019-11-04 PROCEDURE — 77067 SCR MAMMO BI INCL CAD: CPT | Mod: TC

## 2019-11-04 PROCEDURE — 77063 BREAST TOMOSYNTHESIS BI: CPT | Mod: 26,,, | Performed by: RADIOLOGY

## 2019-11-09 DIAGNOSIS — I67.9 CEREBROVASCULAR SMALL VESSEL DISEASE: ICD-10-CM

## 2019-11-09 DIAGNOSIS — I63.431 EMBOLIC STROKE INVOLVING RIGHT POSTERIOR CEREBRAL ARTERY: ICD-10-CM

## 2019-11-11 RX ORDER — ATORVASTATIN CALCIUM 40 MG/1
TABLET, FILM COATED ORAL
Qty: 90 TABLET | Refills: 3 | Status: SHIPPED | OUTPATIENT
Start: 2019-11-11 | End: 2020-11-24 | Stop reason: SDUPTHER

## 2019-11-13 DIAGNOSIS — I10 ESSENTIAL HYPERTENSION: ICD-10-CM

## 2019-11-13 DIAGNOSIS — E55.9 VITAMIN D DEFICIENCY DISEASE: ICD-10-CM

## 2019-11-13 NOTE — TELEPHONE ENCOUNTER
"----- Message from Truong Ousmane sent at 2019  9:54 AM CST -----  Contact: Patient [phone]  RX request - refill or new RX.  Is this a refill or new RX:  Refill  RX name and strength: benazepril (LOTENSIN) 40 MG tablet  Directions:   Is this a 30 day or 90 day RX:    Pharmacy name and phone #[facility]/pharmacy #[id number], [place] - [address] [phone] (Phone)  [phone] (Fax)      RX request - refill or new RX.  Is this a refill or new RX:  Refill  RX name and strength: furosemide (LASIX) 40 MG tablet (  Directions:   Is this a 30 day or 90 day RX:    Pharmacy name and phone # [facility]/pharmacy #[id number], [place] - [address] [phone] (Phone)  [phone] (Fax)      RX request - refill or new RX.  Is this a refill or new RX:  Refill  RX name and strength: cholecalciferol, vitamin D3, (VITAMIN D3) 2,000 unit Cap  Directions:   Is this a 30 day or 90 day RX:    Pharmacy name and phone # (DON'T enter "on file" or "in chart"):   Comments:        RX request - refill or new RX.  Is this a refill or new RX:  Refill  RX name and strength: atorvastatin (LIPITOR) 40 MG tablet  Directions:   Is this a 30 day or 90 day RX:    Pharmacy name and phone # [facility]/pharmacy #[id number] [place], [place] - [address] [phone] (Phone) [phone] (Fax)    RX request - refill or new RX.  Is this a refill or new RX:  Refill  RX name and strength: Eliquis   Directions:   Is this a 30 day or 90 day RX:    Pharmacy name and phone # [facility], [place] - [address] [phone] (Phone) [phone] (Fax)      RX request - refill or new RX.  Is this a refill or new RX:    RX name and strength:   Directions:   Is this a 30 day or 90 day RX:    Pharmacy name and phone # [id number] - [place] - [address] [phone] (Phone) [phone] (Fax)        RX request - refill or new RX.  Is this a refill or new RX:  Refill  RX name and " strength:  carvedilol (COREG) 25 MG tablet   Directions:   Is this a 30 day or 90 day RX:    Pharmacy name and phone # [id number] [place] [facility] [address] [phone] (Phone) [phone] (Fax)      Comment: also would like to have all other Rx's that are needing to be refilled, stating has been out for a week now, please call to inform has been sent.    Please call an advise  Thank you

## 2019-11-14 RX ORDER — BENAZEPRIL HYDROCHLORIDE 40 MG/1
20 TABLET ORAL DAILY
Qty: 90 TABLET | Refills: 4 | Status: SHIPPED | OUTPATIENT
Start: 2019-11-14 | End: 2020-11-24 | Stop reason: SDUPTHER

## 2019-11-14 RX ORDER — FUROSEMIDE 40 MG/1
40 TABLET ORAL 2 TIMES DAILY
Qty: 60 TABLET | Refills: 11 | Status: SHIPPED | OUTPATIENT
Start: 2019-11-14 | End: 2020-11-24

## 2019-11-14 RX ORDER — ACETAMINOPHEN 500 MG
1 TABLET ORAL DAILY
Qty: 90 CAPSULE | Refills: 5 | Status: SHIPPED | OUTPATIENT
Start: 2019-11-14 | End: 2020-11-24 | Stop reason: SDUPTHER

## 2019-11-14 RX ORDER — CARVEDILOL 25 MG/1
25 TABLET ORAL 2 TIMES DAILY WITH MEALS
Qty: 180 TABLET | Refills: 2 | Status: SHIPPED | OUTPATIENT
Start: 2019-11-14 | End: 2020-11-24 | Stop reason: SDUPTHER

## 2019-11-14 NOTE — TELEPHONE ENCOUNTER
----- Message from Truong Romeo sent at 11/14/2019 10:07 AM CST -----  Contact: Meera Alfaro [phone]  Meera Alfaro, calling to check the update status of the request to have all Rx's sent to pharmacy below, stating has not received a call back from office informing as to why the Rx's has not been sent only one. Meera Alfaro [phone]    Please call an advise  Thank you

## 2019-11-19 ENCOUNTER — PATIENT MESSAGE (OUTPATIENT)
Dept: PHARMACY | Facility: CLINIC | Age: 79
End: 2019-11-19

## 2020-03-09 NOTE — PLAN OF CARE
Problem: Patient Care Overview  Goal: Plan of Care Review  Outcome: Ongoing (interventions implemented as appropriate)  Pt. Remains free from falls/ injury/trauma. PT continues to get out of bed without calling. Patient and niece educated on the importance of calling for assistance. Telesitter ordered. Awaiting camera. No complaints of CP, SOB, or discomfort. Blood glucose monitoring ACHS maintained. Pt diurese well throughout the night. Plan of Care reviewed with patient. VSS and NADN. Will continue to monitor.          done

## 2020-10-05 ENCOUNTER — PATIENT MESSAGE (OUTPATIENT)
Dept: ADMINISTRATIVE | Facility: HOSPITAL | Age: 80
End: 2020-10-05

## 2020-10-07 ENCOUNTER — TELEPHONE (OUTPATIENT)
Dept: FAMILY MEDICINE | Facility: CLINIC | Age: 80
End: 2020-10-07

## 2020-10-22 ENCOUNTER — TELEPHONE (OUTPATIENT)
Dept: ADMINISTRATIVE | Facility: HOSPITAL | Age: 80
End: 2020-10-22

## 2020-11-24 ENCOUNTER — LAB VISIT (OUTPATIENT)
Dept: LAB | Facility: HOSPITAL | Age: 80
End: 2020-11-24
Payer: COMMERCIAL

## 2020-11-24 ENCOUNTER — OFFICE VISIT (OUTPATIENT)
Dept: INTERNAL MEDICINE | Facility: CLINIC | Age: 80
End: 2020-11-24
Payer: COMMERCIAL

## 2020-11-24 VITALS
DIASTOLIC BLOOD PRESSURE: 88 MMHG | BODY MASS INDEX: 24.6 KG/M2 | SYSTOLIC BLOOD PRESSURE: 154 MMHG | WEIGHT: 156.75 LBS | HEART RATE: 64 BPM | HEIGHT: 67 IN | OXYGEN SATURATION: 97 %

## 2020-11-24 DIAGNOSIS — E11.22 TYPE 2 DIABETES MELLITUS WITH STAGE 3 CHRONIC KIDNEY DISEASE, WITHOUT LONG-TERM CURRENT USE OF INSULIN, UNSPECIFIED WHETHER STAGE 3A OR 3B CKD: ICD-10-CM

## 2020-11-24 DIAGNOSIS — I63.431 EMBOLIC STROKE INVOLVING RIGHT POSTERIOR CEREBRAL ARTERY: ICD-10-CM

## 2020-11-24 DIAGNOSIS — Z00.00 HEALTH CARE MAINTENANCE: ICD-10-CM

## 2020-11-24 DIAGNOSIS — N18.30 TYPE 2 DIABETES MELLITUS WITH STAGE 3 CHRONIC KIDNEY DISEASE, WITHOUT LONG-TERM CURRENT USE OF INSULIN, UNSPECIFIED WHETHER STAGE 3A OR 3B CKD: ICD-10-CM

## 2020-11-24 DIAGNOSIS — I48.0 PAROXYSMAL ATRIAL FIBRILLATION: ICD-10-CM

## 2020-11-24 DIAGNOSIS — E05.20 TOXIC MULTINODULAR GOITER: ICD-10-CM

## 2020-11-24 DIAGNOSIS — I67.9 CEREBROVASCULAR SMALL VESSEL DISEASE: ICD-10-CM

## 2020-11-24 DIAGNOSIS — I10 ESSENTIAL HYPERTENSION: ICD-10-CM

## 2020-11-24 DIAGNOSIS — I10 ESSENTIAL HYPERTENSION: Primary | ICD-10-CM

## 2020-11-24 DIAGNOSIS — E55.9 VITAMIN D DEFICIENCY DISEASE: ICD-10-CM

## 2020-11-24 LAB
25(OH)D3+25(OH)D2 SERPL-MCNC: 47 NG/ML (ref 30–96)
ALBUMIN SERPL BCP-MCNC: 3.7 G/DL (ref 3.5–5.2)
ALP SERPL-CCNC: 146 U/L (ref 55–135)
ALT SERPL W/O P-5'-P-CCNC: 8 U/L (ref 10–44)
ANION GAP SERPL CALC-SCNC: 10 MMOL/L (ref 8–16)
AST SERPL-CCNC: 16 U/L (ref 10–40)
BASOPHILS # BLD AUTO: 0.03 K/UL (ref 0–0.2)
BASOPHILS NFR BLD: 0.7 % (ref 0–1.9)
BILIRUB SERPL-MCNC: 0.6 MG/DL (ref 0.1–1)
BUN SERPL-MCNC: 28 MG/DL (ref 8–23)
CALCIUM SERPL-MCNC: 9.4 MG/DL (ref 8.7–10.5)
CHLORIDE SERPL-SCNC: 105 MMOL/L (ref 95–110)
CHOLEST SERPL-MCNC: 164 MG/DL (ref 120–199)
CHOLEST/HDLC SERPL: 3.1 {RATIO} (ref 2–5)
CO2 SERPL-SCNC: 29 MMOL/L (ref 23–29)
CREAT SERPL-MCNC: 1.6 MG/DL (ref 0.5–1.4)
DIFFERENTIAL METHOD: ABNORMAL
EOSINOPHIL # BLD AUTO: 0.1 K/UL (ref 0–0.5)
EOSINOPHIL NFR BLD: 2 % (ref 0–8)
ERYTHROCYTE [DISTWIDTH] IN BLOOD BY AUTOMATED COUNT: 15.6 % (ref 11.5–14.5)
EST. GFR  (AFRICAN AMERICAN): 35.1 ML/MIN/1.73 M^2
EST. GFR  (NON AFRICAN AMERICAN): 30.4 ML/MIN/1.73 M^2
ESTIMATED AVG GLUCOSE: 134 MG/DL (ref 68–131)
GLUCOSE SERPL-MCNC: 119 MG/DL (ref 70–110)
HBA1C MFR BLD HPLC: 6.3 % (ref 4–5.6)
HCT VFR BLD AUTO: 38.3 % (ref 37–48.5)
HDLC SERPL-MCNC: 53 MG/DL (ref 40–75)
HDLC SERPL: 32.3 % (ref 20–50)
HGB BLD-MCNC: 11.6 G/DL (ref 12–16)
IMM GRANULOCYTES # BLD AUTO: 0.02 K/UL (ref 0–0.04)
IMM GRANULOCYTES NFR BLD AUTO: 0.4 % (ref 0–0.5)
LDLC SERPL CALC-MCNC: 92.6 MG/DL (ref 63–159)
LYMPHOCYTES # BLD AUTO: 1.3 K/UL (ref 1–4.8)
LYMPHOCYTES NFR BLD: 27.9 % (ref 18–48)
MCH RBC QN AUTO: 26 PG (ref 27–31)
MCHC RBC AUTO-ENTMCNC: 30.3 G/DL (ref 32–36)
MCV RBC AUTO: 86 FL (ref 82–98)
MONOCYTES # BLD AUTO: 0.4 K/UL (ref 0.3–1)
MONOCYTES NFR BLD: 9.2 % (ref 4–15)
NEUTROPHILS # BLD AUTO: 2.7 K/UL (ref 1.8–7.7)
NEUTROPHILS NFR BLD: 59.8 % (ref 38–73)
NONHDLC SERPL-MCNC: 111 MG/DL
NRBC BLD-RTO: 0 /100 WBC
PLATELET # BLD AUTO: 166 K/UL (ref 150–350)
PMV BLD AUTO: 11.5 FL (ref 9.2–12.9)
POTASSIUM SERPL-SCNC: 4.4 MMOL/L (ref 3.5–5.1)
PROT SERPL-MCNC: 6.9 G/DL (ref 6–8.4)
RBC # BLD AUTO: 4.46 M/UL (ref 4–5.4)
SODIUM SERPL-SCNC: 144 MMOL/L (ref 136–145)
TRIGL SERPL-MCNC: 92 MG/DL (ref 30–150)
TSH SERPL DL<=0.005 MIU/L-ACNC: 0.84 UIU/ML (ref 0.4–4)
WBC # BLD AUTO: 4.58 K/UL (ref 3.9–12.7)

## 2020-11-24 PROCEDURE — 3077F SYST BP >= 140 MM HG: CPT | Mod: CPTII,S$GLB,, | Performed by: INTERNAL MEDICINE

## 2020-11-24 PROCEDURE — 80053 COMPREHEN METABOLIC PANEL: CPT

## 2020-11-24 PROCEDURE — 99999 PR PBB SHADOW E&M-EST. PATIENT-LVL III: ICD-10-PCS | Mod: PBBFAC,,, | Performed by: INTERNAL MEDICINE

## 2020-11-24 PROCEDURE — 82306 VITAMIN D 25 HYDROXY: CPT

## 2020-11-24 PROCEDURE — 99999 PR PBB SHADOW E&M-EST. PATIENT-LVL III: CPT | Mod: PBBFAC,,, | Performed by: INTERNAL MEDICINE

## 2020-11-24 PROCEDURE — 85025 COMPLETE CBC W/AUTO DIFF WBC: CPT

## 2020-11-24 PROCEDURE — 3077F PR MOST RECENT SYSTOLIC BLOOD PRESSURE >= 140 MM HG: ICD-10-PCS | Mod: CPTII,S$GLB,, | Performed by: INTERNAL MEDICINE

## 2020-11-24 PROCEDURE — 99214 PR OFFICE/OUTPT VISIT, EST, LEVL IV, 30-39 MIN: ICD-10-PCS | Mod: S$GLB,,, | Performed by: INTERNAL MEDICINE

## 2020-11-24 PROCEDURE — 84443 ASSAY THYROID STIM HORMONE: CPT

## 2020-11-24 PROCEDURE — 1159F PR MEDICATION LIST DOCUMENTED IN MEDICAL RECORD: ICD-10-PCS | Mod: S$GLB,,, | Performed by: INTERNAL MEDICINE

## 2020-11-24 PROCEDURE — 3079F DIAST BP 80-89 MM HG: CPT | Mod: CPTII,S$GLB,, | Performed by: INTERNAL MEDICINE

## 2020-11-24 PROCEDURE — 80061 LIPID PANEL: CPT

## 2020-11-24 PROCEDURE — 83036 HEMOGLOBIN GLYCOSYLATED A1C: CPT

## 2020-11-24 PROCEDURE — 1159F MED LIST DOCD IN RCRD: CPT | Mod: S$GLB,,, | Performed by: INTERNAL MEDICINE

## 2020-11-24 PROCEDURE — 99214 OFFICE O/P EST MOD 30 MIN: CPT | Mod: S$GLB,,, | Performed by: INTERNAL MEDICINE

## 2020-11-24 PROCEDURE — 3079F PR MOST RECENT DIASTOLIC BLOOD PRESSURE 80-89 MM HG: ICD-10-PCS | Mod: CPTII,S$GLB,, | Performed by: INTERNAL MEDICINE

## 2020-11-24 PROCEDURE — 36415 COLL VENOUS BLD VENIPUNCTURE: CPT

## 2020-11-24 RX ORDER — ACETAMINOPHEN 500 MG
1 TABLET ORAL DAILY
Qty: 90 CAPSULE | Refills: 5 | Status: SHIPPED | OUTPATIENT
Start: 2020-11-24 | End: 2022-05-05

## 2020-11-24 RX ORDER — ATORVASTATIN CALCIUM 40 MG/1
40 TABLET, FILM COATED ORAL DAILY
Qty: 90 TABLET | Refills: 3 | Status: SHIPPED | OUTPATIENT
Start: 2020-11-24 | End: 2022-03-07 | Stop reason: SDUPTHER

## 2020-11-24 RX ORDER — BENAZEPRIL HYDROCHLORIDE 40 MG/1
20 TABLET ORAL DAILY
Qty: 90 TABLET | Refills: 4 | Status: SHIPPED | OUTPATIENT
Start: 2020-11-24 | End: 2022-03-07 | Stop reason: SDUPTHER

## 2020-11-24 RX ORDER — CARVEDILOL 25 MG/1
25 TABLET ORAL 2 TIMES DAILY WITH MEALS
Qty: 180 TABLET | Refills: 2 | Status: SHIPPED | OUTPATIENT
Start: 2020-11-24 | End: 2021-07-18

## 2020-11-24 NOTE — PROGRESS NOTES
Subjective     Chief Complaint: annual physical    History of Present Illness:  Ms. Temitope Suero is a 80 y.o. female with history of stroke, afib on eliquis, HFrEF, DM, CKD3, HTN, HLD, vit D deficiency who presents for annual exam.  Denies any new complaints today.    HTN: Does not take blood pressure at home.  Denies any headaches, vision changes, dizziness, lightheadedness.  Currently on benazapril 20 mg qd, coreg 25 mg BID, niece who accompanied patient today to clinic and is primary caregiver at home states that she takes all medications as prescribed and has not missed any doses.  BP in clinic today 154/88, repeat BP was 150/86.  Was previously on lasix for edema, however has not taken it in the last year.      Afib: On eliquis s/p failing coumadin due to noncompliance, rate well controlled.  Denies any falls, melena, hematochezia, bleeding.  Patient and niece were unaware of why she was taking eliquis, however they state that she takes it everyday and has not missed any doses.  Denies any chest pain, shortness of breath.  Not followed by cardiology.     DM: Does not take blood sugars at home.  Patient eats a varied diet, including plants/vegetables, fruits, meats.  States that she occasionally has sweets.  Denies any polyuria/polydipsia.  Last a1c last year 6.0.      Memory problems: Niece states that she continues to have memory difficulty, however this is stable since last visit.  No new episodes of CVA noted.       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  Followed by Dr Lazaro, last visit 9/2019.  Was previously on letrozole adjuvant therapy which was discontinued at last onc visit.  Last mammogram in 2019 negative.      Stroke: History of hospitalization twice in 2018 due to noncompliance with Afib anticoagulation.  Has been on eliquis for the past year, taking medication as prescribed per niece.  Denies any episodes of dysarthria, focal neurological  weakness.      Review of Systems   Constitutional: Negative for fever.   HENT: Negative for sore throat.    Eyes: Negative for blurred vision.   Respiratory: Negative for cough.    Cardiovascular: Negative for chest pain.   Gastrointestinal: Negative for abdominal pain.   Genitourinary: Negative for dysuria.   Musculoskeletal: Negative for back pain.   Neurological: Negative for headaches.   Endo/Heme/Allergies: Does not bruise/bleed easily.   Psychiatric/Behavioral: Positive for memory loss. Negative for depression.       PAST HISTORY:     Past Medical History:   Diagnosis Date    Atrial flutter 8/12/2018    Cancer of left breast, stage 2 11/24/2015    Cataract     Cerebrovascular small vessel disease 4/11/2018    Bi-thalamic lacunar disease, mod/sev periventricular white matter disease, mixed pattern cerebral microbleeds    Cervicalgia 4/17/2018    Chronic anticoagulation - apixaban 4/17/2018    Previous on Xarelto but changed to apixaban 8-2018 due to recurrent embolic stroke.    Chronic systolic heart failure 4/17/2018    Echo 4-2018   1 - Moderately depressed left ventricular systolic function (EF 30-35%).    2 - Biatrial enlargement.    3 - Normal right ventricular systolic function .    4 - Mild mitral regurgitation.    5 - Mild tricuspid regurgitation.    6 - The estimated PA systolic pressure is 34 mmHg.    7 - Increased central venous pressure.    8 - Left pleural effusion.     CKD stage 3 due to type 2 diabetes mellitus 4/17/2018    Embolic stroke involving left cerebellar artery 8/13/2018    Embolic stroke involving right posterior cerebral artery 4/11/2018    Encephalopathy 8/13/2018    Essential hypertension 10/28/2013    Glaucoma     Glaucoma suspect of both eyes 12/9/2013    Hearing loss, sensorineural 12/16/2013    Malignant hypertension 8/12/2018    Mixed hyperlipidemia 10/28/2013    Obesity 1/16/2014    Paroxysmal atrial fibrillation 7/10/2012    Stage 3 chronic kidney disease  4/17/2018    Toxic multinodular goiter 10/28/2013    Type 2 diabetes mellitus with stage 3 chronic kidney disease, without long-term current use of insulin 7/10/2012    Diet controlled.   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    Vitamin D deficiency disease 10/28/2013       Past Surgical History:   Procedure Laterality Date    BREAST BIOPSY      BREAST SURGERY      CHOLECYSTECTOMY         Family History   Problem Relation Age of Onset    Hypertension Mother     Hypertension Father     Glaucoma Father     Heart disease Father     Cancer Sister     Asthma Son     Diabetes Paternal Aunt     Thyroid cancer Neg Hx     Amblyopia Neg Hx     Blindness Neg Hx     Cataracts Neg Hx     Macular degeneration Neg Hx     Retinal detachment Neg Hx     Strabismus Neg Hx        Social History     Socioeconomic History    Marital status: Single     Spouse name: Not on file    Number of children: 1    Years of education: Not on file    Highest education level: Not on file   Occupational History     Employer: [employer]   Social Needs    Financial resource strain: Not on file    Food insecurity     Worry: Not on file     Inability: Not on file    Transportation needs     Medical: Not on file     Non-medical: Not on file   Tobacco Use    Smoking status: Never Smoker    Smokeless tobacco: Never Used   Substance and Sexual Activity    Alcohol use: No    Drug use: No    Sexual activity: Not Currently   Lifestyle    Physical activity     Days per week: Not on file     Minutes per session: Not on file    Stress: Not on file   Relationships    Social connections     Talks on phone: Not on file     Gets together: Not on file     Attends Christian service: Not on file     Active member of club or organization: Not on file     Attends meetings of clubs or organizations: Not on file     Relationship status: Not on file   Other Topics Concern    Not on file   Social History Narrative    Not on file  "      MEDICATIONS & ALLERGIES:     Current Outpatient Medications on File Prior to Visit   Medication Sig    letrozole (FEMARA) 2.5 mg Tab 1 Tablet Oral Every day.    [DISCONTINUED] apixaban (ELIQUIS) 5 mg Tab Take 1 tablet (5 mg total) by mouth 2 (two) times daily.    [DISCONTINUED] atorvastatin (LIPITOR) 40 MG tablet TAKE 1 TABLET BY MOUTH EVERY DAY    [DISCONTINUED] benazepril (LOTENSIN) 40 MG tablet Take 0.5 tablets (20 mg total) by mouth once daily.    [DISCONTINUED] carvedilol (COREG) 25 MG tablet Take 1 tablet (25 mg total) by mouth 2 (two) times daily with meals.    [DISCONTINUED] cholecalciferol, vitamin D3, (VITAMIN D3) 2,000 unit Cap Take 1 capsule (2,000 Units total) by mouth once daily.    latanoprost 0.005 % ophthalmic solution Place 1 drop into both eyes every evening.    [DISCONTINUED] furosemide (LASIX) 40 MG tablet Take 1 tablet (40 mg total) by mouth 2 (two) times daily.    [DISCONTINUED] travoprost, benzalkonium, (TRAVATAN) 0.004 % ophthalmic solution Place 1 drop into both eyes every evening.     No current facility-administered medications on file prior to visit.        Review of patient's allergies indicates:  No Known Allergies    OBJECTIVE:     Vital Signs:  Vitals:    11/24/20 1003   BP: (!) 154/88   BP Location: Right arm   Patient Position: Sitting   BP Method: Medium (Manual)   Pulse: 64   SpO2: 97%   Weight: 71.1 kg (156 lb 12 oz)   Height: 5' 7" (1.702 m)       Body mass index is 24.55 kg/m².     Physical Exam:  General:  Well developed, well nourished, no acute distress  Head: Normocephalic, atraumatic  Eyes: PERRL, EOMI, clear sclera  Throat: No posterior pharyngeal erythema or exudate, no tonsillar exudate  Neck: supple, normal ROM, no thyromegaly   CVS:  RRR, S1 and S2 normal, no murmurs, rubs, gallops, 2+ peripheral pulses  Resp:  Lungs clear to auscultation, no wheezes, rales, rhonchi, cough  GI:  Abdomen soft, non-tender, non-distended, normoactive bowel sounds  MSK:  " No muscle atrophy, cyanosis, peripheral edema   Skin:  No rashes, ulcers, erythema  Neuro:  CNII-XII grossly intact, no focal deficits noted  Psych:  Appropriate mood and affect, normal judgement  Protective Sensation (w/ 10 gram monofilament):  Right: Intact  Left: Intact    Visual Inspection:  Normal -  Bilateral and Dry Skin -  Bilateral    Pedal Pulses:   Right: Present  Left: Present    Posterior tibialis:   Right:Present  Left: Present        Laboratory  Lab Results   Component Value Date    WBC 4.53 08/08/2019    HGB 12.3 08/08/2019    HCT 39.2 08/08/2019    MCV 83 08/08/2019     08/08/2019     @[id number](GLU,NA,K,Cl,CO2,BUN,Creatinine,Calcium,MG)@  Lab Results   Component Value Date    INR 1.1 08/14/2018    INR 1.2 08/12/2018    INR 1.1 04/11/2018     Lab Results   Component Value Date    HGBA1C 6.0 (H) 08/08/2019     No results for input(s): POCTGLUCOSE in the last 72 hours.    Diagnostic Results:  Labs: Reviewed    ASSESSMENT & PLAN:   Ms. Temitope Suero is a 80 y.o. female who presents today for annual exam        Temitope was seen today for annual exam.    Diagnoses and all orders for this visit:    Essential hypertension  Advised patient to start taking blood pressures at home, if SBP persistently >140, niece will contact clinic and we will increase benazepril to 40 mg daily.    -     benazepriL (LOTENSIN) 40 MG tablet; Take 0.5 tablets (20 mg total) by mouth once daily.  -     carvediloL (COREG) 25 MG tablet; Take 1 tablet (25 mg total) by mouth 2 (two) times daily with meals.    Health care maintenance  Will get flu shot today.   -     CBC Auto Differential; Future  -     Comprehensive Metabolic Panel; Future  -     Lipid Panel; Future    Type 2 diabetes mellitus with stage 3 chronic kidney disease, without long-term current use of insulin, unspecified whether stage 3a or 3b CKD  Diabetic foot exam normal today.  Will schedule optometry for diabetic eye exam.   -     Hemoglobin A1C; Future  -      Ambulatory referral/consult to Optometry; Future    Vitamin D deficiency disease  -     Vitamin D; Future  -     cholecalciferol, vitamin D3, (VITAMIN D3) 50 mcg (2,000 unit) Cap; Take 1 capsule (2,000 Units total) by mouth once daily.    Embolic stroke involving right posterior cerebral artery  -     atorvastatin (LIPITOR) 40 MG tablet; Take 1 tablet (40 mg total) by mouth once daily.    Cerebrovascular small vessel disease  -     atorvastatin (LIPITOR) 40 MG tablet; Take 1 tablet (40 mg total) by mouth once daily.    Paroxysmal atrial fibrillation  -     apixaban (ELIQUIS) 5 mg Tab; Take 1 tablet (5 mg total) by mouth 2 (two) times daily.    Toxic multinodular goiter  -     TSH; Future        RTC in 6 weeks for hypertension follow up    Case discussed with Dr Kenya Coleman MD  Internal Medicine PGY1  [facility]  [address] [place] [id number] [phone]

## 2020-12-17 RX ORDER — FUROSEMIDE 40 MG/1
TABLET ORAL
Qty: 180 TABLET | Refills: 3 | OUTPATIENT
Start: 2020-12-17

## 2020-12-17 NOTE — TELEPHONE ENCOUNTER
Covering for Dr. Zambrano.  Received refill request for furosemide 40 mg twice daily.  Per chart review this Rx was recently discontinued by resident at visit 11/24/20 as she had not taken the Rx for the past year for edema.  Patient also has significantly reduced renal function on most recent CMP 11/24/20.  Refill request denied.  Recommend appointment with any available provider to discuss Rx refill for furosemide, urgent care appt if having any lower extremity swelling.  Please notify patient.

## 2021-01-07 ENCOUNTER — PATIENT OUTREACH (OUTPATIENT)
Dept: ADMINISTRATIVE | Facility: OTHER | Age: 81
End: 2021-01-07

## 2021-04-16 ENCOUNTER — PATIENT MESSAGE (OUTPATIENT)
Dept: RESEARCH | Facility: HOSPITAL | Age: 81
End: 2021-04-16

## 2021-04-22 ENCOUNTER — PATIENT OUTREACH (OUTPATIENT)
Dept: ADMINISTRATIVE | Facility: HOSPITAL | Age: 81
End: 2021-04-22

## 2021-04-22 ENCOUNTER — PATIENT MESSAGE (OUTPATIENT)
Dept: ADMINISTRATIVE | Facility: HOSPITAL | Age: 81
End: 2021-04-22

## 2021-05-04 ENCOUNTER — PATIENT OUTREACH (OUTPATIENT)
Dept: ADMINISTRATIVE | Facility: HOSPITAL | Age: 81
End: 2021-05-04

## 2021-05-10 ENCOUNTER — TELEPHONE (OUTPATIENT)
Dept: INTERNAL MEDICINE | Facility: CLINIC | Age: 81
End: 2021-05-10

## 2021-05-31 NOTE — PROGRESS NOTES
Subjective:       Patient ID: Temitope Suero is a 79 y.o. female.    Chief Complaint: Cancer of left breast, stage 2    Patient last seen 6/2016.    Ms. 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 She has been on letrozole therapy.   Breast history:stage IIA (T1a N1) status post lumpectomy in 02/2009, radiation therapy and    subsequently on letrozole hormonal therapy in 7/2009   In the fall of 2011 she had a bone scan, which showed some uptake in the upper lumbar lower    thoracic area. 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      Patient feeling well and without complaints. No fever, chills, nausea, vomiting, shortness of breath, breast pain/complaints or change in bowel/urinary habits.   She remains on letrozole without significant side effects from that medication.   No pain.      Review of Systems   Constitutional: Negative.    HENT: Negative for postnasal drip, rhinorrhea and sinus pressure.    Eyes: Negative for visual disturbance.   Respiratory: Negative for cough, chest tightness, shortness of breath and wheezing.    Cardiovascular: Negative for chest pain, palpitations and leg swelling.   Gastrointestinal: Negative for abdominal distention, abdominal pain, blood in stool, constipation, diarrhea, nausea and vomiting.   Genitourinary: Negative for dysuria, hematuria and vaginal bleeding.   Musculoskeletal: Negative for arthralgias, back pain, joint swelling and myalgias.   Skin: Negative for rash.   Neurological: Negative for dizziness, weakness and light-headedness.   Hematological: Negative for adenopathy. Does not bruise/bleed easily.   Psychiatric/Behavioral: The patient is not nervous/anxious.        Objective:      Physical Exam   Constitutional: She is oriented to person, place, and time. She  appears well-developed and well-nourished. No distress.   ECOG 0  Presents with daughter     HENT:   Head: Normocephalic.   Mouth/Throat: Oropharynx is clear and moist. No oropharyngeal exudate.   Eyes: Conjunctivae are normal. No scleral icterus.   Neck: Normal range of motion. Neck supple. No thyromegaly present.   Cardiovascular: Normal rate, regular rhythm, normal heart sounds and intact distal pulses.   Pulmonary/Chest: Effort normal and breath sounds normal.   Right breast without mass or nodules.    Left breast with well healed lateral mastectomy incision, no mass or nodules.  There is no axillary or supraclavicular adenopathy bilaterally.    Abdominal: Soft. Bowel sounds are normal. She exhibits no distension and no mass. There is no tenderness.   Musculoskeletal: Normal range of motion. She exhibits no edema.   No spinal or paraspinal tenderness to palpation     Lymphadenopathy:     She has no cervical adenopathy.   Neurological: She is alert and oriented to person, place, and time. No cranial nerve deficit.   Skin: Skin is warm and dry.   Psychiatric: She has a normal mood and affect. Her behavior is normal. Judgment and thought content normal.   Vitals reviewed.      Assessment:       1. History of breast cancer        Plan:     Patient has completed 10 years of adjuvant endocrine therapy and will discontinue Letrozole.   Overall doing well without evidence of disease.  RTC in 1 year.  Annual mammogram due 11/2019.  Patient knows to call in interim if any issues.    normal (ped)...

## 2021-08-03 ENCOUNTER — PATIENT MESSAGE (OUTPATIENT)
Dept: ADMINISTRATIVE | Facility: HOSPITAL | Age: 81
End: 2021-08-03

## 2021-10-18 ENCOUNTER — PATIENT MESSAGE (OUTPATIENT)
Dept: ADMINISTRATIVE | Facility: HOSPITAL | Age: 81
End: 2021-10-18
Payer: COMMERCIAL

## 2022-01-01 ENCOUNTER — PATIENT OUTREACH (OUTPATIENT)
Dept: ADMINISTRATIVE | Facility: CLINIC | Age: 82
End: 2022-01-01
Payer: COMMERCIAL

## 2022-01-01 ENCOUNTER — ANTI-COAG VISIT (OUTPATIENT)
Dept: CARDIOLOGY | Facility: CLINIC | Age: 82
End: 2022-01-01
Payer: COMMERCIAL

## 2022-01-01 ENCOUNTER — DOCUMENT SCAN (OUTPATIENT)
Dept: HOME HEALTH SERVICES | Facility: HOSPITAL | Age: 82
End: 2022-01-01

## 2022-01-01 ENCOUNTER — DOCUMENT SCAN (OUTPATIENT)
Dept: HOME HEALTH SERVICES | Facility: HOSPITAL | Age: 82
End: 2022-01-01
Payer: COMMERCIAL

## 2022-01-01 ENCOUNTER — LAB VISIT (OUTPATIENT)
Dept: LAB | Facility: HOSPITAL | Age: 82
End: 2022-01-01
Attending: INTERNAL MEDICINE
Payer: COMMERCIAL

## 2022-01-01 ENCOUNTER — PATIENT MESSAGE (OUTPATIENT)
Dept: INTERNAL MEDICINE | Facility: CLINIC | Age: 82
End: 2022-01-01
Payer: COMMERCIAL

## 2022-01-01 ENCOUNTER — LAB VISIT (OUTPATIENT)
Dept: LAB | Facility: HOSPITAL | Age: 82
End: 2022-01-01
Payer: COMMERCIAL

## 2022-01-01 ENCOUNTER — HOME CARE VISIT (OUTPATIENT)
Dept: NEUROLOGY | Facility: HOSPITAL | Age: 82
End: 2022-01-01
Payer: COMMERCIAL

## 2022-01-01 ENCOUNTER — TELEPHONE (OUTPATIENT)
Dept: INTERNAL MEDICINE | Facility: CLINIC | Age: 82
End: 2022-01-01
Payer: COMMERCIAL

## 2022-01-01 ENCOUNTER — PATIENT MESSAGE (OUTPATIENT)
Dept: CARDIOLOGY | Facility: CLINIC | Age: 82
End: 2022-01-01
Payer: COMMERCIAL

## 2022-01-01 ENCOUNTER — OFFICE VISIT (OUTPATIENT)
Dept: INTERNAL MEDICINE | Facility: CLINIC | Age: 82
End: 2022-01-01
Payer: COMMERCIAL

## 2022-01-01 ENCOUNTER — EXTERNAL HOME HEALTH (OUTPATIENT)
Dept: HOME HEALTH SERVICES | Facility: HOSPITAL | Age: 82
End: 2022-01-01
Payer: COMMERCIAL

## 2022-01-01 ENCOUNTER — HOSPITAL ENCOUNTER (INPATIENT)
Facility: HOSPITAL | Age: 82
LOS: 2 days | Discharge: HOME HEALTH | DRG: 300 | End: 2022-06-02
Attending: STUDENT IN AN ORGANIZED HEALTH CARE EDUCATION/TRAINING PROGRAM | Admitting: INTERNAL MEDICINE
Payer: MEDICARE

## 2022-01-01 ENCOUNTER — TELEPHONE (OUTPATIENT)
Dept: NEUROLOGY | Facility: HOSPITAL | Age: 82
End: 2022-01-01
Payer: COMMERCIAL

## 2022-01-01 ENCOUNTER — TELEPHONE (OUTPATIENT)
Dept: INTERNAL MEDICINE | Facility: CLINIC | Age: 82
End: 2022-01-01

## 2022-01-01 ENCOUNTER — HOSPITAL ENCOUNTER (EMERGENCY)
Facility: HOSPITAL | Age: 82
Discharge: HOME | End: 2022-11-09
Attending: EMERGENCY MEDICINE
Payer: COMMERCIAL

## 2022-01-01 VITALS — HEART RATE: 82 BPM | SYSTOLIC BLOOD PRESSURE: 110 MMHG | OXYGEN SATURATION: 99 % | DIASTOLIC BLOOD PRESSURE: 64 MMHG

## 2022-01-01 VITALS
WEIGHT: 127.63 LBS | HEIGHT: 67 IN | SYSTOLIC BLOOD PRESSURE: 108 MMHG | DIASTOLIC BLOOD PRESSURE: 68 MMHG | HEART RATE: 77 BPM | OXYGEN SATURATION: 99 % | BODY MASS INDEX: 20.03 KG/M2

## 2022-01-01 VITALS
BODY MASS INDEX: 22.1 KG/M2 | TEMPERATURE: 98 F | DIASTOLIC BLOOD PRESSURE: 70 MMHG | HEART RATE: 84 BPM | OXYGEN SATURATION: 98 % | WEIGHT: 141.13 LBS | SYSTOLIC BLOOD PRESSURE: 108 MMHG | RESPIRATION RATE: 17 BRPM

## 2022-01-01 VITALS
TEMPERATURE: 98 F | DIASTOLIC BLOOD PRESSURE: 76 MMHG | OXYGEN SATURATION: 99 % | HEART RATE: 82 BPM | SYSTOLIC BLOOD PRESSURE: 128 MMHG | RESPIRATION RATE: 18 BRPM

## 2022-01-01 DIAGNOSIS — I51.3 LV (LEFT VENTRICULAR) MURAL THROMBUS: ICD-10-CM

## 2022-01-01 DIAGNOSIS — I48.91 ATRIAL FIBRILLATION: ICD-10-CM
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
## 2022-01-01 DIAGNOSIS — I48.92 ATRIAL FLUTTER, UNSPECIFIED TYPE: ICD-10-CM

## 2022-01-01 DIAGNOSIS — I73.9 PAD (PERIPHERAL ARTERY DISEASE): ICD-10-CM

## 2022-01-01 DIAGNOSIS — I51.3 LV (LEFT VENTRICULAR) MURAL THROMBUS: Primary | ICD-10-CM

## 2022-01-01 DIAGNOSIS — N18.32 STAGE 3B CHRONIC KIDNEY DISEASE: ICD-10-CM

## 2022-01-01 DIAGNOSIS — S00.83XA CONTUSION OF FOREHEAD, INITIAL ENCOUNTER: ICD-10-CM

## 2022-01-01 DIAGNOSIS — E11.22 TYPE 2 DIABETES MELLITUS WITH STAGE 3B CHRONIC KIDNEY DISEASE, WITHOUT LONG-TERM CURRENT USE OF INSULIN: Chronic | ICD-10-CM

## 2022-01-01 DIAGNOSIS — I70.229 CRITICAL LOWER LIMB ISCHEMIA: ICD-10-CM

## 2022-01-01 DIAGNOSIS — I63.431 EMBOLIC STROKE INVOLVING RIGHT POSTERIOR CEREBRAL ARTERY: ICD-10-CM

## 2022-01-01 DIAGNOSIS — M25.512 LEFT SHOULDER PAIN: ICD-10-CM

## 2022-01-01 DIAGNOSIS — F03.90 DEMENTIA WITHOUT BEHAVIORAL DISTURBANCE, UNSPECIFIED DEMENTIA TYPE: ICD-10-CM

## 2022-01-01 DIAGNOSIS — R53.81 DEBILITY: ICD-10-CM

## 2022-01-01 DIAGNOSIS — I70.229 CRITICAL LOWER LIMB ISCHEMIA: Primary | ICD-10-CM

## 2022-01-01 DIAGNOSIS — N18.32 TYPE 2 DIABETES MELLITUS WITH STAGE 3B CHRONIC KIDNEY DISEASE, WITHOUT LONG-TERM CURRENT USE OF INSULIN: Chronic | ICD-10-CM

## 2022-01-01 DIAGNOSIS — R07.9 CHEST PAIN: ICD-10-CM

## 2022-01-01 DIAGNOSIS — I67.9 CEREBROVASCULAR SMALL VESSEL DISEASE: ICD-10-CM

## 2022-01-01 DIAGNOSIS — I73.9 PVD (PERIPHERAL VASCULAR DISEASE): ICD-10-CM

## 2022-01-01 DIAGNOSIS — S42.035A NONDISPLACED FRACTURE OF LATERAL END OF LEFT CLAVICLE, INITIAL ENCOUNTER FOR CLOSED FRACTURE: ICD-10-CM

## 2022-01-01 DIAGNOSIS — Z78.0 MENOPAUSE: ICD-10-CM

## 2022-01-01 DIAGNOSIS — L30.8 DERMATITIS ASSOCIATED WITH MOISTURE: ICD-10-CM

## 2022-01-01 DIAGNOSIS — E78.2 MIXED HYPERLIPIDEMIA: Primary | Chronic | ICD-10-CM

## 2022-01-01 DIAGNOSIS — M79.89 LEG SWELLING: ICD-10-CM

## 2022-01-01 DIAGNOSIS — I70.90 ARTERIAL OCCLUSION: Primary | ICD-10-CM

## 2022-01-01 LAB
ALBUMIN SERPL BCP-MCNC: 2.5 G/DL (ref 3.5–5.2)
ALBUMIN SERPL BCP-MCNC: 2.5 G/DL (ref 3.5–5.2)
ALBUMIN SERPL BCP-MCNC: 2.7 G/DL (ref 3.5–5.2)
ALBUMIN SERPL BCP-MCNC: 3 G/DL (ref 3.5–5.2)
ALP SERPL-CCNC: 109 U/L (ref 55–135)
ALP SERPL-CCNC: 131 U/L (ref 55–135)
ALP SERPL-CCNC: 135 U/L (ref 55–135)
ALP SERPL-CCNC: 145 U/L (ref 55–135)
ALT SERPL W/O P-5'-P-CCNC: 14 U/L (ref 10–44)
ALT SERPL W/O P-5'-P-CCNC: 15 U/L (ref 10–44)
ALT SERPL W/O P-5'-P-CCNC: 17 U/L (ref 10–44)
ALT SERPL W/O P-5'-P-CCNC: 18 U/L (ref 10–44)
ANION GAP SERPL CALC-SCNC: 10 MMOL/L (ref 8–16)
ANION GAP SERPL CALC-SCNC: 11 MMOL/L (ref 8–16)
ANION GAP SERPL CALC-SCNC: 13 MMOL/L (ref 8–16)
ANION GAP SERPL CALC-SCNC: 9 MMOL/L (ref 8–16)
AST SERPL-CCNC: 19 U/L (ref 10–40)
AST SERPL-CCNC: 22 U/L (ref 10–40)
AST SERPL-CCNC: 26 U/L (ref 10–40)
AST SERPL-CCNC: 26 U/L (ref 10–40)
BASOPHILS # BLD AUTO: 0.03 K/UL (ref 0–0.2)
BASOPHILS # BLD AUTO: 0.04 K/UL (ref 0–0.2)
BASOPHILS # BLD AUTO: 0.04 K/UL (ref 0–0.2)
BASOPHILS # BLD AUTO: 0.05 K/UL (ref 0–0.2)
BASOPHILS # BLD AUTO: 0.05 K/UL (ref 0–0.2)
BASOPHILS NFR BLD: 0.5 % (ref 0–1.9)
BASOPHILS NFR BLD: 0.7 % (ref 0–1.9)
BASOPHILS NFR BLD: 0.8 % (ref 0–1.9)
BASOPHILS NFR BLD: 1 % (ref 0–1.9)
BASOPHILS NFR BLD: 1.1 % (ref 0–1.9)
BILIRUB SERPL-MCNC: 0.3 MG/DL (ref 0.1–1)
BILIRUB SERPL-MCNC: 0.3 MG/DL (ref 0.1–1)
BILIRUB SERPL-MCNC: 0.4 MG/DL (ref 0.1–1)
BILIRUB SERPL-MCNC: 0.8 MG/DL (ref 0.1–1)
BUN SERPL-MCNC: 16 MG/DL (ref 8–23)
BUN SERPL-MCNC: 22 MG/DL (ref 8–23)
BUN SERPL-MCNC: 24 MG/DL (ref 8–23)
BUN SERPL-MCNC: 26 MG/DL (ref 8–23)
CALCIUM SERPL-MCNC: 8.6 MG/DL (ref 8.7–10.5)
CALCIUM SERPL-MCNC: 8.7 MG/DL (ref 8.7–10.5)
CALCIUM SERPL-MCNC: 8.7 MG/DL (ref 8.7–10.5)
CALCIUM SERPL-MCNC: 9 MG/DL (ref 8.7–10.5)
CHLORIDE SERPL-SCNC: 102 MMOL/L (ref 95–110)
CHLORIDE SERPL-SCNC: 104 MMOL/L (ref 95–110)
CHLORIDE SERPL-SCNC: 104 MMOL/L (ref 95–110)
CHLORIDE SERPL-SCNC: 108 MMOL/L (ref 95–110)
CO2 SERPL-SCNC: 22 MMOL/L (ref 23–29)
CO2 SERPL-SCNC: 26 MMOL/L (ref 23–29)
CO2 SERPL-SCNC: 27 MMOL/L (ref 23–29)
CO2 SERPL-SCNC: 34 MMOL/L (ref 23–29)
CREAT SERPL-MCNC: 1.3 MG/DL (ref 0.5–1.4)
CREAT SERPL-MCNC: 1.3 MG/DL (ref 0.5–1.4)
CREAT SERPL-MCNC: 1.5 MG/DL (ref 0.5–1.4)
CREAT SERPL-MCNC: 1.6 MG/DL (ref 0.5–1.4)
CTP QC/QA: YES
DIFFERENTIAL METHOD: ABNORMAL
EOSINOPHIL # BLD AUTO: 0.1 K/UL (ref 0–0.5)
EOSINOPHIL # BLD AUTO: 0.1 K/UL (ref 0–0.5)
EOSINOPHIL # BLD AUTO: 0.2 K/UL (ref 0–0.5)
EOSINOPHIL NFR BLD: 1.7 % (ref 0–8)
EOSINOPHIL NFR BLD: 2.5 % (ref 0–8)
EOSINOPHIL NFR BLD: 3.1 % (ref 0–8)
EOSINOPHIL NFR BLD: 3.1 % (ref 0–8)
EOSINOPHIL NFR BLD: 4.5 % (ref 0–8)
ERYTHROCYTE [DISTWIDTH] IN BLOOD BY AUTOMATED COUNT: 16.2 % (ref 11.5–14.5)
ERYTHROCYTE [DISTWIDTH] IN BLOOD BY AUTOMATED COUNT: 16.2 % (ref 11.5–14.5)
ERYTHROCYTE [DISTWIDTH] IN BLOOD BY AUTOMATED COUNT: 16.3 % (ref 11.5–14.5)
ERYTHROCYTE [DISTWIDTH] IN BLOOD BY AUTOMATED COUNT: 18.8 % (ref 11.5–14.5)
ERYTHROCYTE [DISTWIDTH] IN BLOOD BY AUTOMATED COUNT: 20.1 % (ref 11.5–14.5)
EST. GFR  (AFRICAN AMERICAN): 37.4 ML/MIN/1.73 M^2
EST. GFR  (AFRICAN AMERICAN): 44.5 ML/MIN/1.73 M^2
EST. GFR  (AFRICAN AMERICAN): 44.5 ML/MIN/1.73 M^2
EST. GFR  (NO RACE VARIABLE): 32.2 ML/MIN/1.73 M^2
EST. GFR  (NON AFRICAN AMERICAN): 32.4 ML/MIN/1.73 M^2
EST. GFR  (NON AFRICAN AMERICAN): 38.6 ML/MIN/1.73 M^2
EST. GFR  (NON AFRICAN AMERICAN): 38.6 ML/MIN/1.73 M^2
ESTIMATED AVG GLUCOSE: 120 MG/DL (ref 68–131)
FACT X PPP CHRO-ACNC: 0.41 IU/ML (ref 0.3–0.7)
FACT X PPP CHRO-ACNC: 1.08 IU/ML (ref 0.3–0.7)
FACT X PPP CHRO-ACNC: 1.1 IU/ML (ref 0.3–0.7)
FACT X PPP CHRO-ACNC: 1.28 IU/ML (ref 0.3–0.7)
GLUCOSE SERPL-MCNC: 102 MG/DL (ref 70–110)
GLUCOSE SERPL-MCNC: 103 MG/DL (ref 70–110)
GLUCOSE SERPL-MCNC: 114 MG/DL (ref 70–110)
GLUCOSE SERPL-MCNC: 140 MG/DL (ref 70–110)
GLUCOSE SERPL-MCNC: 180 MG/DL (ref 70–110)
HBA1C MFR BLD: 5.8 % (ref 4–5.6)
HCT VFR BLD AUTO: 36.2 % (ref 37–48.5)
HCT VFR BLD AUTO: 37.1 % (ref 37–48.5)
HCT VFR BLD AUTO: 37.4 % (ref 37–48.5)
HCT VFR BLD AUTO: 37.7 % (ref 37–48.5)
HCT VFR BLD AUTO: 38.1 % (ref 37–48.5)
HGB BLD-MCNC: 11.1 G/DL (ref 12–16)
HGB BLD-MCNC: 11.2 G/DL (ref 12–16)
HGB BLD-MCNC: 11.4 G/DL (ref 12–16)
HGB BLD-MCNC: 11.7 G/DL (ref 12–16)
HGB BLD-MCNC: 11.8 G/DL (ref 12–16)
IMM GRANULOCYTES # BLD AUTO: 0.01 K/UL (ref 0–0.04)
IMM GRANULOCYTES # BLD AUTO: 0.02 K/UL (ref 0–0.04)
IMM GRANULOCYTES NFR BLD AUTO: 0.2 % (ref 0–0.5)
IMM GRANULOCYTES NFR BLD AUTO: 0.4 % (ref 0–0.5)
INR PPP: 1.3 (ref 0.8–1.2)
INR PPP: 1.3 (ref 0.8–1.2)
INR PPP: 1.4 (ref 0.8–1.2)
INR PPP: 1.4 (ref 0.8–1.2)
INR PPP: 1.6
INR PPP: 1.6 (ref 0.8–1.2)
INR PPP: 1.7
INR PPP: 1.8 (ref 0.8–1.2)
INR PPP: 1.8 (ref 0.8–1.2)
INR PPP: 1.9 (ref 0.8–1.2)
INR PPP: 2
INR PPP: 2
INR PPP: 2.1 (ref 0.8–1.2)
INR PPP: 2.2 (ref 0.8–1.2)
INR PPP: 2.2 (ref 0.8–1.2)
INR PPP: 2.3 (ref 0.8–1.2)
INR PPP: 2.4 (ref 0.8–1.2)
INR PPP: 2.5
INR PPP: 2.5 (ref 0.8–1.2)
INR PPP: 2.6
INR PPP: 2.6
INR PPP: 2.6 (ref 0.8–1.2)
INR PPP: 2.6 (ref 0.8–1.2)
INR PPP: 2.9 (ref 0.8–1.2)
INR PPP: 3 (ref 0.8–1.2)
INR PPP: 3.1 (ref 0.8–1.2)
INR PPP: 3.2
INR PPP: 3.4
INR PPP: 3.4
INR PPP: 3.4 (ref 0.8–1.2)
INR PPP: 4.2 (ref 0.8–1.2)
INR PPP: 4.4 (ref 0.8–1.2)
INR PPP: 5.1
INR PPP: 5.2 (ref 0.8–1.2)
INR PPP: 5.3
INR PPP: 5.5 (ref 0.8–1.2)
INR PPP: 6.3
INR PPP: 7.4
LYMPHOCYTES # BLD AUTO: 0.9 K/UL (ref 1–4.8)
LYMPHOCYTES # BLD AUTO: 1.2 K/UL (ref 1–4.8)
LYMPHOCYTES # BLD AUTO: 1.3 K/UL (ref 1–4.8)
LYMPHOCYTES # BLD AUTO: 1.3 K/UL (ref 1–4.8)
LYMPHOCYTES # BLD AUTO: 1.5 K/UL (ref 1–4.8)
LYMPHOCYTES NFR BLD: 16.7 % (ref 18–48)
LYMPHOCYTES NFR BLD: 19.8 % (ref 18–48)
LYMPHOCYTES NFR BLD: 25 % (ref 18–48)
LYMPHOCYTES NFR BLD: 29 % (ref 18–48)
LYMPHOCYTES NFR BLD: 30.5 % (ref 18–48)
MAGNESIUM SERPL-MCNC: 1.6 MG/DL (ref 1.6–2.6)
MAGNESIUM SERPL-MCNC: 1.6 MG/DL (ref 1.6–2.6)
MCH RBC QN AUTO: 24.7 PG (ref 27–31)
MCH RBC QN AUTO: 24.9 PG (ref 27–31)
MCH RBC QN AUTO: 25 PG (ref 27–31)
MCH RBC QN AUTO: 25.1 PG (ref 27–31)
MCH RBC QN AUTO: 25.1 PG (ref 27–31)
MCHC RBC AUTO-ENTMCNC: 29.9 G/DL (ref 32–36)
MCHC RBC AUTO-ENTMCNC: 29.9 G/DL (ref 32–36)
MCHC RBC AUTO-ENTMCNC: 31 G/DL (ref 32–36)
MCHC RBC AUTO-ENTMCNC: 31 G/DL (ref 32–36)
MCHC RBC AUTO-ENTMCNC: 31.5 G/DL (ref 32–36)
MCV RBC AUTO: 78 FL (ref 82–98)
MCV RBC AUTO: 81 FL (ref 82–98)
MCV RBC AUTO: 81 FL (ref 82–98)
MCV RBC AUTO: 83 FL (ref 82–98)
MCV RBC AUTO: 84 FL (ref 82–98)
MONOCYTES # BLD AUTO: 0.4 K/UL (ref 0.3–1)
MONOCYTES # BLD AUTO: 0.5 K/UL (ref 0.3–1)
MONOCYTES # BLD AUTO: 0.5 K/UL (ref 0.3–1)
MONOCYTES # BLD AUTO: 0.6 K/UL (ref 0.3–1)
MONOCYTES # BLD AUTO: 0.7 K/UL (ref 0.3–1)
MONOCYTES NFR BLD: 10.2 % (ref 4–15)
MONOCYTES NFR BLD: 11.1 % (ref 4–15)
MONOCYTES NFR BLD: 11.2 % (ref 4–15)
MONOCYTES NFR BLD: 12.5 % (ref 4–15)
MONOCYTES NFR BLD: 6.9 % (ref 4–15)
NEUTROPHILS # BLD AUTO: 2.5 K/UL (ref 1.8–7.7)
NEUTROPHILS # BLD AUTO: 2.6 K/UL (ref 1.8–7.7)
NEUTROPHILS # BLD AUTO: 3.1 K/UL (ref 1.8–7.7)
NEUTROPHILS # BLD AUTO: 3.8 K/UL (ref 1.8–7.7)
NEUTROPHILS # BLD AUTO: 4 K/UL (ref 1.8–7.7)
NEUTROPHILS NFR BLD: 52.6 % (ref 38–73)
NEUTROPHILS NFR BLD: 55.5 % (ref 38–73)
NEUTROPHILS NFR BLD: 60.7 % (ref 38–73)
NEUTROPHILS NFR BLD: 64.5 % (ref 38–73)
NEUTROPHILS NFR BLD: 73.6 % (ref 38–73)
NRBC BLD-RTO: 0 /100 WBC
PHOSPHATE SERPL-MCNC: 3 MG/DL (ref 2.7–4.5)
PHOSPHATE SERPL-MCNC: 3 MG/DL (ref 2.7–4.5)
PLATELET # BLD AUTO: 278 K/UL (ref 150–450)
PLATELET # BLD AUTO: 291 K/UL (ref 150–450)
PLATELET # BLD AUTO: 299 K/UL (ref 150–450)
PLATELET # BLD AUTO: 306 K/UL (ref 150–450)
PLATELET # BLD AUTO: 344 K/UL (ref 150–450)
PMV BLD AUTO: 11.2 FL (ref 9.2–12.9)
PMV BLD AUTO: 11.4 FL (ref 9.2–12.9)
PMV BLD AUTO: 11.5 FL (ref 9.2–12.9)
PMV BLD AUTO: 11.7 FL (ref 9.2–12.9)
PMV BLD AUTO: 12.4 FL (ref 9.2–12.9)
POCT GLUCOSE: 105 MG/DL (ref 70–110)
POCT GLUCOSE: 109 MG/DL (ref 70–110)
POCT GLUCOSE: 114 MG/DL (ref 70–110)
POCT GLUCOSE: 128 MG/DL (ref 70–110)
POCT GLUCOSE: 128 MG/DL (ref 70–110)
POCT GLUCOSE: 178 MG/DL (ref 70–110)
POTASSIUM SERPL-SCNC: 3.1 MMOL/L (ref 3.5–5.1)
POTASSIUM SERPL-SCNC: 4 MMOL/L (ref 3.5–5.1)
POTASSIUM SERPL-SCNC: 4.2 MMOL/L (ref 3.5–5.1)
POTASSIUM SERPL-SCNC: 4.7 MMOL/L (ref 3.5–5.1)
PROT SERPL-MCNC: 5.7 G/DL (ref 6–8.4)
PROT SERPL-MCNC: 6.1 G/DL (ref 6–8.4)
PROT SERPL-MCNC: 6.2 G/DL (ref 6–8.4)
PROT SERPL-MCNC: 6.6 G/DL (ref 6–8.4)
PROTHROMBIN TIME: 13.4 SEC (ref 9–12.5)
PROTHROMBIN TIME: 13.5 SEC (ref 9–12.5)
PROTHROMBIN TIME: 14 SEC (ref 9–12.5)
PROTHROMBIN TIME: 14.3 SEC (ref 9–12.5)
PROTHROMBIN TIME: 16.7 SEC (ref 9–12.5)
PROTHROMBIN TIME: 18.1 SEC (ref 9–12.5)
PROTHROMBIN TIME: 18.2 SEC (ref 9–12.5)
PROTHROMBIN TIME: 19.1 SEC (ref 9–12.5)
PROTHROMBIN TIME: 20.7 SEC (ref 9–12.5)
PROTHROMBIN TIME: 21.7 SEC (ref 9–12.5)
PROTHROMBIN TIME: 21.9 SEC (ref 9–12.5)
PROTHROMBIN TIME: 23 SEC (ref 9–12.5)
PROTHROMBIN TIME: 23.9 SEC (ref 9–12.5)
PROTHROMBIN TIME: 24.8 SEC (ref 9–12.5)
PROTHROMBIN TIME: 25.5 SEC (ref 9–12.5)
PROTHROMBIN TIME: 25.6 SEC (ref 9–12.5)
PROTHROMBIN TIME: 28.9 SEC (ref 9–12.5)
PROTHROMBIN TIME: 29.8 SEC (ref 9–12.5)
PROTHROMBIN TIME: 29.9 SEC (ref 9–12.5)
PROTHROMBIN TIME: 33.4 SEC (ref 9–12.5)
PROTHROMBIN TIME: 40.8 SEC (ref 9–12.5)
PROTHROMBIN TIME: 42.3 SEC (ref 9–12.5)
PROTHROMBIN TIME: 49.5 SEC (ref 9–12.5)
PROTHROMBIN TIME: 52.4 SEC (ref 9–12.5)
RBC # BLD AUTO: 4.43 M/UL (ref 4–5.4)
RBC # BLD AUTO: 4.5 M/UL (ref 4–5.4)
RBC # BLD AUTO: 4.62 M/UL (ref 4–5.4)
RBC # BLD AUTO: 4.68 M/UL (ref 4–5.4)
RBC # BLD AUTO: 4.71 M/UL (ref 4–5.4)
SARS-COV-2 RDRP RESP QL NAA+PROBE: NEGATIVE
SODIUM SERPL-SCNC: 136 MMOL/L (ref 136–145)
SODIUM SERPL-SCNC: 141 MMOL/L (ref 136–145)
SODIUM SERPL-SCNC: 144 MMOL/L (ref 136–145)
SODIUM SERPL-SCNC: 149 MMOL/L (ref 136–145)
WBC # BLD AUTO: 4.51 K/UL (ref 3.9–12.7)
WBC # BLD AUTO: 4.89 K/UL (ref 3.9–12.7)
WBC # BLD AUTO: 5.08 K/UL (ref 3.9–12.7)
WBC # BLD AUTO: 5.38 K/UL (ref 3.9–12.7)
WBC # BLD AUTO: 5.9 K/UL (ref 3.9–12.7)

## 2022-01-01 PROCEDURE — 93793 ANTICOAG MGMT PT WARFARIN: CPT | Mod: S$GLB,,,

## 2022-01-01 PROCEDURE — 80053 COMPREHEN METABOLIC PANEL: CPT | Performed by: INTERNAL MEDICINE

## 2022-01-01 PROCEDURE — 85025 COMPLETE CBC W/AUTO DIFF WBC: CPT | Mod: 91

## 2022-01-01 PROCEDURE — 36415 COLL VENOUS BLD VENIPUNCTURE: CPT | Performed by: INTERNAL MEDICINE

## 2022-01-01 PROCEDURE — 85025 COMPLETE CBC W/AUTO DIFF WBC: CPT | Performed by: STUDENT IN AN ORGANIZED HEALTH CARE EDUCATION/TRAINING PROGRAM

## 2022-01-01 PROCEDURE — 85610 PROTHROMBIN TIME: CPT | Performed by: INTERNAL MEDICINE

## 2022-01-01 PROCEDURE — 93793 PR ANTICOAGULANT MGMT FOR PT TAKING WARFARIN: ICD-10-PCS | Mod: S$GLB,,,

## 2022-01-01 PROCEDURE — 99223 PR INITIAL HOSPITAL CARE,LEVL III: ICD-10-PCS | Mod: ,,, | Performed by: HOSPITALIST

## 2022-01-01 PROCEDURE — 85025 COMPLETE CBC W/AUTO DIFF WBC: CPT | Performed by: HOSPITALIST

## 2022-01-01 PROCEDURE — 99219 PR INITIAL OBSERVATION CARE,LEVL II: ICD-10-PCS | Mod: ,,, | Performed by: SURGERY

## 2022-01-01 PROCEDURE — 93010 ELECTROCARDIOGRAM REPORT: CPT | Mod: ,,, | Performed by: INTERNAL MEDICINE

## 2022-01-01 PROCEDURE — 3288F PR FALLS RISK ASSESSMENT DOCUMENTED: ICD-10-PCS | Mod: CPTII,S$GLB,, | Performed by: INTERNAL MEDICINE

## 2022-01-01 PROCEDURE — 85520 HEPARIN ASSAY: CPT | Performed by: HOSPITALIST

## 2022-01-01 PROCEDURE — G0180 PR HOME HEALTH MD CERTIFICATION: ICD-10-PCS | Mod: ,,, | Performed by: HOSPITALIST

## 2022-01-01 PROCEDURE — 84100 ASSAY OF PHOSPHORUS: CPT | Performed by: HOSPITALIST

## 2022-01-01 PROCEDURE — 83735 ASSAY OF MAGNESIUM: CPT | Performed by: HOSPITALIST

## 2022-01-01 PROCEDURE — 99999 PR PBB SHADOW E&M-EST. PATIENT-LVL III: CPT | Mod: PBBFAC,,, | Performed by: INTERNAL MEDICINE

## 2022-01-01 PROCEDURE — 1101F PR PT FALLS ASSESS DOC 0-1 FALLS W/OUT INJ PAST YR: ICD-10-PCS | Mod: CPTII,S$GLB,, | Performed by: INTERNAL MEDICINE

## 2022-01-01 PROCEDURE — 80053 COMPREHEN METABOLIC PANEL: CPT | Performed by: HOSPITALIST

## 2022-01-01 PROCEDURE — 99223 1ST HOSP IP/OBS HIGH 75: CPT | Mod: ,,, | Performed by: HOSPITALIST

## 2022-01-01 PROCEDURE — 3078F PR MOST RECENT DIASTOLIC BLOOD PRESSURE < 80 MM HG: ICD-10-PCS | Mod: CPTII,S$GLB,, | Performed by: INTERNAL MEDICINE

## 2022-01-01 PROCEDURE — 85610 PROTHROMBIN TIME: CPT | Mod: 91

## 2022-01-01 PROCEDURE — 93923 PR NON-INVASIVE PHYSIOLOGIC STUDY EXTREMITY 3 LEVELS: ICD-10-PCS | Mod: 26,,, | Performed by: SURGERY

## 2022-01-01 PROCEDURE — 97116 GAIT TRAINING THERAPY: CPT

## 2022-01-01 PROCEDURE — 93923 UPR/LXTR ART STDY 3+ LVLS: CPT | Mod: 26,,, | Performed by: SURGERY

## 2022-01-01 PROCEDURE — 99214 OFFICE O/P EST MOD 30 MIN: CPT | Mod: S$GLB,,, | Performed by: INTERNAL MEDICINE

## 2022-01-01 PROCEDURE — 99284 EMERGENCY DEPT VISIT MOD MDM: CPT | Mod: CS,,, | Performed by: STUDENT IN AN ORGANIZED HEALTH CARE EDUCATION/TRAINING PROGRAM

## 2022-01-01 PROCEDURE — 99999 PR PBB SHADOW E&M-EST. PATIENT-LVL III: ICD-10-PCS | Mod: PBBFAC,,, | Performed by: INTERNAL MEDICINE

## 2022-01-01 PROCEDURE — 93005 ELECTROCARDIOGRAM TRACING: CPT

## 2022-01-01 PROCEDURE — 85610 PROTHROMBIN TIME: CPT | Performed by: HOSPITALIST

## 2022-01-01 PROCEDURE — 85520 HEPARIN ASSAY: CPT | Mod: 91 | Performed by: HOSPITALIST

## 2022-01-01 PROCEDURE — 99284 EMERGENCY DEPT VISIT MOD MDM: CPT | Mod: 57,,, | Performed by: EMERGENCY MEDICINE

## 2022-01-01 PROCEDURE — 3074F SYST BP LT 130 MM HG: CPT | Mod: CPTII,S$GLB,, | Performed by: INTERNAL MEDICINE

## 2022-01-01 PROCEDURE — 63600175 PHARM REV CODE 636 W HCPCS

## 2022-01-01 PROCEDURE — 85025 COMPLETE CBC W/AUTO DIFF WBC: CPT | Performed by: INTERNAL MEDICINE

## 2022-01-01 PROCEDURE — 92610 EVALUATE SWALLOWING FUNCTION: CPT

## 2022-01-01 PROCEDURE — 25000003 PHARM REV CODE 250

## 2022-01-01 PROCEDURE — 99238 HOSP IP/OBS DSCHRG MGMT 30/<: CPT | Mod: ,,, | Performed by: HOSPITALIST

## 2022-01-01 PROCEDURE — 23500 PR CLOSED RX CLAVICLE FRACTURE: ICD-10-PCS | Mod: 54,LT,, | Performed by: EMERGENCY MEDICINE

## 2022-01-01 PROCEDURE — 25000003 PHARM REV CODE 250: Performed by: HOSPITALIST

## 2022-01-01 PROCEDURE — 1126F PR PAIN SEVERITY QUANTIFIED, NO PAIN PRESENT: ICD-10-PCS | Mod: CPTII,S$GLB,, | Performed by: INTERNAL MEDICINE

## 2022-01-01 PROCEDURE — 84100 ASSAY OF PHOSPHORUS: CPT | Performed by: INTERNAL MEDICINE

## 2022-01-01 PROCEDURE — 11000001 HC ACUTE MED/SURG PRIVATE ROOM

## 2022-01-01 PROCEDURE — 99219 PR INITIAL OBSERVATION CARE,LEVL II: CPT | Mod: ,,, | Performed by: SURGERY

## 2022-01-01 PROCEDURE — G0180 MD CERTIFICATION HHA PATIENT: HCPCS | Mod: ,,, | Performed by: HOSPITALIST

## 2022-01-01 PROCEDURE — 3074F PR MOST RECENT SYSTOLIC BLOOD PRESSURE < 130 MM HG: ICD-10-PCS | Mod: CPTII,S$GLB,, | Performed by: INTERNAL MEDICINE

## 2022-01-01 PROCEDURE — 83735 ASSAY OF MAGNESIUM: CPT | Performed by: INTERNAL MEDICINE

## 2022-01-01 PROCEDURE — 3288F FALL RISK ASSESSMENT DOCD: CPT | Mod: CPTII,S$GLB,, | Performed by: INTERNAL MEDICINE

## 2022-01-01 PROCEDURE — 23500 CLTX CLAVICULAR FX W/O MNPJ: CPT | Mod: 54,LT,, | Performed by: EMERGENCY MEDICINE

## 2022-01-01 PROCEDURE — 92523 SPEECH SOUND LANG COMPREHEN: CPT

## 2022-01-01 PROCEDURE — 97161 PT EVAL LOW COMPLEX 20 MIN: CPT

## 2022-01-01 PROCEDURE — 80053 COMPREHEN METABOLIC PANEL: CPT | Performed by: STUDENT IN AN ORGANIZED HEALTH CARE EDUCATION/TRAINING PROGRAM

## 2022-01-01 PROCEDURE — 99284 PR EMERGENCY DEPT VISIT,LEVEL IV: ICD-10-PCS | Mod: CS,,, | Performed by: STUDENT IN AN ORGANIZED HEALTH CARE EDUCATION/TRAINING PROGRAM

## 2022-01-01 PROCEDURE — 1126F AMNT PAIN NOTED NONE PRSNT: CPT | Mod: CPTII,S$GLB,, | Performed by: INTERNAL MEDICINE

## 2022-01-01 PROCEDURE — 36415 COLL VENOUS BLD VENIPUNCTURE: CPT | Performed by: HOSPITALIST

## 2022-01-01 PROCEDURE — 1159F MED LIST DOCD IN RCRD: CPT | Mod: CPTII,S$GLB,, | Performed by: INTERNAL MEDICINE

## 2022-01-01 PROCEDURE — 97165 OT EVAL LOW COMPLEX 30 MIN: CPT

## 2022-01-01 PROCEDURE — 36415 COLL VENOUS BLD VENIPUNCTURE: CPT

## 2022-01-01 PROCEDURE — 85520 HEPARIN ASSAY: CPT

## 2022-01-01 PROCEDURE — 97535 SELF CARE MNGMENT TRAINING: CPT

## 2022-01-01 PROCEDURE — 99238 PR HOSPITAL DISCHARGE DAY,<30 MIN: ICD-10-PCS | Mod: ,,, | Performed by: HOSPITALIST

## 2022-01-01 PROCEDURE — 85610 PROTHROMBIN TIME: CPT | Performed by: STUDENT IN AN ORGANIZED HEALTH CARE EDUCATION/TRAINING PROGRAM

## 2022-01-01 PROCEDURE — U0002 COVID-19 LAB TEST NON-CDC: HCPCS | Performed by: HOSPITALIST

## 2022-01-01 PROCEDURE — 94761 N-INVAS EAR/PLS OXIMETRY MLT: CPT

## 2022-01-01 PROCEDURE — 93010 EKG 12-LEAD: ICD-10-PCS | Mod: ,,, | Performed by: INTERNAL MEDICINE

## 2022-01-01 PROCEDURE — 1101F PT FALLS ASSESS-DOCD LE1/YR: CPT | Mod: CPTII,S$GLB,, | Performed by: INTERNAL MEDICINE

## 2022-01-01 PROCEDURE — 99214 PR OFFICE/OUTPT VISIT, EST, LEVL IV, 30-39 MIN: ICD-10-PCS | Mod: S$GLB,,, | Performed by: INTERNAL MEDICINE

## 2022-01-01 PROCEDURE — 82962 GLUCOSE BLOOD TEST: CPT

## 2022-01-01 PROCEDURE — 99285 EMERGENCY DEPT VISIT HI MDM: CPT | Mod: 25

## 2022-01-01 PROCEDURE — 99284 PR EMERGENCY DEPT VISIT,LEVEL IV: ICD-10-PCS | Mod: 57,,, | Performed by: EMERGENCY MEDICINE

## 2022-01-01 PROCEDURE — 1159F PR MEDICATION LIST DOCUMENTED IN MEDICAL RECORD: ICD-10-PCS | Mod: CPTII,S$GLB,, | Performed by: INTERNAL MEDICINE

## 2022-01-01 PROCEDURE — 97530 THERAPEUTIC ACTIVITIES: CPT

## 2022-01-01 PROCEDURE — 83036 HEMOGLOBIN GLYCOSYLATED A1C: CPT | Performed by: INTERNAL MEDICINE

## 2022-01-01 PROCEDURE — 92507 TX SP LANG VOICE COMM INDIV: CPT

## 2022-01-01 PROCEDURE — 3078F DIAST BP <80 MM HG: CPT | Mod: CPTII,S$GLB,, | Performed by: INTERNAL MEDICINE

## 2022-01-01 PROCEDURE — 99284 EMERGENCY DEPT VISIT MOD MDM: CPT | Mod: 25

## 2022-01-01 RX ORDER — HEPARIN SODIUM,PORCINE/D5W 25000/250
0-40 INTRAVENOUS SOLUTION INTRAVENOUS CONTINUOUS
Status: DISCONTINUED | OUTPATIENT
Start: 2022-01-01 | End: 2022-01-01 | Stop reason: HOSPADM

## 2022-01-01 RX ORDER — METOPROLOL SUCCINATE 100 MG/1
100 TABLET, EXTENDED RELEASE ORAL DAILY
Qty: 90 TABLET | Refills: 3 | Status: SHIPPED | OUTPATIENT
Start: 2022-01-01 | End: 2022-01-01 | Stop reason: SDUPTHER

## 2022-01-01 RX ORDER — WARFARIN 4 MG/1
4 TABLET ORAL DAILY
Qty: 30 TABLET | Refills: 2 | Status: SHIPPED | OUTPATIENT
Start: 2022-01-01 | End: 2023-01-01

## 2022-01-01 RX ORDER — FUROSEMIDE 40 MG/1
40 TABLET ORAL 2 TIMES DAILY
Status: DISCONTINUED | OUTPATIENT
Start: 2022-01-01 | End: 2022-01-01 | Stop reason: HOSPADM

## 2022-01-01 RX ORDER — TALC
6 POWDER (GRAM) TOPICAL NIGHTLY PRN
Status: DISCONTINUED | OUTPATIENT
Start: 2022-01-01 | End: 2022-01-01 | Stop reason: HOSPADM

## 2022-01-01 RX ORDER — LATANOPROST 50 UG/ML
1 SOLUTION/ DROPS OPHTHALMIC NIGHTLY
Status: DISCONTINUED | OUTPATIENT
Start: 2022-01-01 | End: 2022-01-01 | Stop reason: HOSPADM

## 2022-01-01 RX ORDER — WARFARIN 2 MG/1
4 TABLET ORAL DAILY
Status: DISCONTINUED | OUTPATIENT
Start: 2022-01-01 | End: 2022-01-01

## 2022-01-01 RX ORDER — NAPROXEN SODIUM 220 MG/1
81 TABLET, FILM COATED ORAL DAILY
Refills: 0
Start: 2022-01-01 | End: 2022-01-01 | Stop reason: SDUPTHER

## 2022-01-01 RX ORDER — TRAZODONE HYDROCHLORIDE 100 MG/1
100 TABLET ORAL NIGHTLY PRN
Qty: 30 TABLET | Refills: 2 | OUTPATIENT
Start: 2022-01-01 | End: 2023-10-11

## 2022-01-01 RX ORDER — HYDRALAZINE HYDROCHLORIDE 50 MG/1
50 TABLET, FILM COATED ORAL EVERY 8 HOURS
Qty: 270 TABLET | Refills: 2 | Status: SHIPPED | OUTPATIENT
Start: 2022-01-01 | End: 2023-01-01

## 2022-01-01 RX ORDER — CLOPIDOGREL BISULFATE 75 MG/1
75 TABLET ORAL DAILY
Qty: 90 TABLET | Refills: 0 | Status: SHIPPED | OUTPATIENT
Start: 2022-01-01 | End: 2022-01-01

## 2022-01-01 RX ORDER — ACETAMINOPHEN 325 MG/1
650 TABLET ORAL EVERY 4 HOURS PRN
Status: DISCONTINUED | OUTPATIENT
Start: 2022-01-01 | End: 2022-01-01 | Stop reason: HOSPADM

## 2022-01-01 RX ORDER — AMOXICILLIN 250 MG
1 CAPSULE ORAL DAILY
Status: DISCONTINUED | OUTPATIENT
Start: 2022-01-01 | End: 2022-01-01 | Stop reason: HOSPADM

## 2022-01-01 RX ORDER — ATORVASTATIN CALCIUM 80 MG/1
80 TABLET, FILM COATED ORAL DAILY
Qty: 90 TABLET | Refills: 3 | Status: SHIPPED | OUTPATIENT
Start: 2022-01-01 | End: 2022-01-01 | Stop reason: SDUPTHER

## 2022-01-01 RX ORDER — TRAZODONE HYDROCHLORIDE 100 MG/1
100 TABLET ORAL NIGHTLY PRN
Status: DISCONTINUED | OUTPATIENT
Start: 2022-01-01 | End: 2022-01-01 | Stop reason: HOSPADM

## 2022-01-01 RX ORDER — WARFARIN 4 MG/1
4 TABLET ORAL DAILY
Qty: 30 TABLET | Refills: 2 | OUTPATIENT
Start: 2022-01-01 | End: 2023-11-03

## 2022-01-01 RX ORDER — NAPROXEN SODIUM 220 MG/1
81 TABLET, FILM COATED ORAL DAILY
Qty: 90 TABLET | Refills: 3 | Status: ON HOLD | OUTPATIENT
Start: 2022-01-01 | End: 2023-01-01

## 2022-01-01 RX ORDER — IBUPROFEN 200 MG
16 TABLET ORAL
Status: DISCONTINUED | OUTPATIENT
Start: 2022-01-01 | End: 2022-01-01 | Stop reason: HOSPADM

## 2022-01-01 RX ORDER — ONDANSETRON 2 MG/ML
4 INJECTION INTRAMUSCULAR; INTRAVENOUS EVERY 8 HOURS PRN
Status: DISCONTINUED | OUTPATIENT
Start: 2022-01-01 | End: 2022-01-01 | Stop reason: HOSPADM

## 2022-01-01 RX ORDER — HYDROCODONE BITARTRATE AND ACETAMINOPHEN 5; 325 MG/1; MG/1
1 TABLET ORAL EVERY 6 HOURS PRN
Status: DISCONTINUED | OUTPATIENT
Start: 2022-01-01 | End: 2022-01-01 | Stop reason: HOSPADM

## 2022-01-01 RX ORDER — NALOXONE HCL 0.4 MG/ML
0.02 VIAL (ML) INJECTION
Status: DISCONTINUED | OUTPATIENT
Start: 2022-01-01 | End: 2022-01-01 | Stop reason: HOSPADM

## 2022-01-01 RX ORDER — ATORVASTATIN CALCIUM 80 MG/1
80 TABLET, FILM COATED ORAL DAILY
Start: 2022-01-01 | End: 2022-01-01 | Stop reason: SDUPTHER

## 2022-01-01 RX ORDER — ATORVASTATIN CALCIUM 20 MG/1
80 TABLET, FILM COATED ORAL DAILY
Status: DISCONTINUED | OUTPATIENT
Start: 2022-01-01 | End: 2022-01-01 | Stop reason: HOSPADM

## 2022-01-01 RX ORDER — FUROSEMIDE 40 MG/1
40 TABLET ORAL 2 TIMES DAILY
Qty: 180 TABLET | Refills: 1 | Status: SHIPPED | OUTPATIENT
Start: 2022-01-01 | End: 2023-01-01

## 2022-01-01 RX ORDER — SODIUM CHLORIDE 0.9 % (FLUSH) 0.9 %
10 SYRINGE (ML) INJECTION EVERY 6 HOURS PRN
Status: DISCONTINUED | OUTPATIENT
Start: 2022-01-01 | End: 2022-01-01 | Stop reason: HOSPADM

## 2022-01-01 RX ORDER — HYDRALAZINE HYDROCHLORIDE 50 MG/1
50 TABLET, FILM COATED ORAL EVERY 8 HOURS
Status: DISCONTINUED | OUTPATIENT
Start: 2022-01-01 | End: 2022-01-01 | Stop reason: HOSPADM

## 2022-01-01 RX ORDER — NAPROXEN SODIUM 220 MG/1
81 TABLET, FILM COATED ORAL DAILY
Status: DISCONTINUED | OUTPATIENT
Start: 2022-01-01 | End: 2022-01-01 | Stop reason: HOSPADM

## 2022-01-01 RX ORDER — DONEPEZIL HYDROCHLORIDE 5 MG/1
5 TABLET, FILM COATED ORAL NIGHTLY
Status: DISCONTINUED | OUTPATIENT
Start: 2022-01-01 | End: 2022-01-01 | Stop reason: HOSPADM

## 2022-01-01 RX ORDER — IBUPROFEN 200 MG
24 TABLET ORAL
Status: DISCONTINUED | OUTPATIENT
Start: 2022-01-01 | End: 2022-01-01 | Stop reason: HOSPADM

## 2022-01-01 RX ORDER — METOPROLOL SUCCINATE 100 MG/1
100 TABLET, EXTENDED RELEASE ORAL DAILY
Status: DISCONTINUED | OUTPATIENT
Start: 2022-01-01 | End: 2022-01-01 | Stop reason: HOSPADM

## 2022-01-01 RX ORDER — TRAZODONE HYDROCHLORIDE 100 MG/1
100 TABLET ORAL NIGHTLY PRN
Qty: 30 TABLET | Refills: 2 | Status: SHIPPED | OUTPATIENT
Start: 2022-01-01 | End: 2023-01-01

## 2022-01-01 RX ORDER — CLOPIDOGREL BISULFATE 75 MG/1
75 TABLET ORAL DAILY
Qty: 90 TABLET | Refills: 3 | Status: SHIPPED | OUTPATIENT
Start: 2022-01-01 | End: 2023-01-01 | Stop reason: SDUPTHER

## 2022-01-01 RX ORDER — ATORVASTATIN CALCIUM 20 MG/1
40 TABLET, FILM COATED ORAL DAILY
Status: DISCONTINUED | OUTPATIENT
Start: 2022-01-01 | End: 2022-01-01

## 2022-01-01 RX ORDER — HYDROMORPHONE HYDROCHLORIDE 1 MG/ML
0.2 INJECTION, SOLUTION INTRAMUSCULAR; INTRAVENOUS; SUBCUTANEOUS EVERY 6 HOURS PRN
Status: DISCONTINUED | OUTPATIENT
Start: 2022-01-01 | End: 2022-01-01 | Stop reason: HOSPADM

## 2022-01-01 RX ORDER — NAPROXEN SODIUM 220 MG/1
81 TABLET, FILM COATED ORAL DAILY
Qty: 90 TABLET | Refills: 3 | Status: SHIPPED | OUTPATIENT
Start: 2022-01-01 | End: 2022-01-01

## 2022-01-01 RX ORDER — DONEPEZIL HYDROCHLORIDE 5 MG/1
5 TABLET, FILM COATED ORAL NIGHTLY
Qty: 90 TABLET | Refills: 3 | Status: SHIPPED | OUTPATIENT
Start: 2022-01-01 | End: 2023-01-01

## 2022-01-01 RX ORDER — GLUCAGON 1 MG
1 KIT INJECTION
Status: DISCONTINUED | OUTPATIENT
Start: 2022-01-01 | End: 2022-01-01 | Stop reason: HOSPADM

## 2022-01-01 RX ORDER — CLOPIDOGREL BISULFATE 75 MG/1
75 TABLET ORAL DAILY
Status: DISCONTINUED | OUTPATIENT
Start: 2022-01-01 | End: 2022-01-01 | Stop reason: HOSPADM

## 2022-01-01 RX ORDER — POLYETHYLENE GLYCOL 3350 17 G/17G
17 POWDER, FOR SOLUTION ORAL 2 TIMES DAILY PRN
Status: DISCONTINUED | OUTPATIENT
Start: 2022-01-01 | End: 2022-01-01 | Stop reason: HOSPADM

## 2022-01-01 RX ORDER — ATORVASTATIN CALCIUM 80 MG/1
80 TABLET, FILM COATED ORAL DAILY
Qty: 90 TABLET | Refills: 3 | Status: SHIPPED | OUTPATIENT
Start: 2022-01-01 | End: 2023-01-01 | Stop reason: SDUPTHER

## 2022-01-01 RX ORDER — OXYCODONE AND ACETAMINOPHEN 5; 325 MG/1; MG/1
1 TABLET ORAL EVERY 6 HOURS PRN
Qty: 6 TABLET | Refills: 0 | Status: SHIPPED | OUTPATIENT
Start: 2022-01-01 | End: 2022-01-01

## 2022-01-01 RX ORDER — PROCHLORPERAZINE EDISYLATE 5 MG/ML
5 INJECTION INTRAMUSCULAR; INTRAVENOUS EVERY 6 HOURS PRN
Status: DISCONTINUED | OUTPATIENT
Start: 2022-01-01 | End: 2022-01-01 | Stop reason: HOSPADM

## 2022-01-01 RX ORDER — METOPROLOL SUCCINATE 100 MG/1
100 TABLET, EXTENDED RELEASE ORAL DAILY
Qty: 90 TABLET | Refills: 3 | Status: SHIPPED | OUTPATIENT
Start: 2022-01-01 | End: 2023-01-01 | Stop reason: SDUPTHER

## 2022-01-01 RX ORDER — CLOPIDOGREL BISULFATE 75 MG/1
75 TABLET ORAL DAILY
Qty: 90 TABLET | Refills: 3 | Status: SHIPPED | OUTPATIENT
Start: 2022-01-01 | End: 2022-01-01 | Stop reason: SDUPTHER

## 2022-01-01 RX ORDER — CLOPIDOGREL BISULFATE 75 MG/1
75 TABLET ORAL DAILY
Qty: 30 TABLET | Refills: 11
Start: 2022-01-01 | End: 2022-01-01 | Stop reason: SDUPTHER

## 2022-01-01 RX ORDER — INSULIN ASPART 100 [IU]/ML
0-5 INJECTION, SOLUTION INTRAVENOUS; SUBCUTANEOUS
Status: DISCONTINUED | OUTPATIENT
Start: 2022-01-01 | End: 2022-01-01 | Stop reason: HOSPADM

## 2022-01-01 RX ADMIN — DONEPEZIL HYDROCHLORIDE 5 MG: 5 TABLET, FILM COATED ORAL at 10:06

## 2022-01-01 RX ADMIN — HYDRALAZINE HYDROCHLORIDE 50 MG: 50 TABLET ORAL at 05:06

## 2022-01-01 RX ADMIN — Medication 6 MG: at 12:06

## 2022-01-01 RX ADMIN — TRAZODONE HYDROCHLORIDE 100 MG: 100 TABLET ORAL at 08:05

## 2022-01-01 RX ADMIN — HYDRALAZINE HYDROCHLORIDE 50 MG: 50 TABLET ORAL at 02:06

## 2022-01-01 RX ADMIN — FUROSEMIDE 40 MG: 40 TABLET ORAL at 09:05

## 2022-01-01 RX ADMIN — CLOPIDOGREL 75 MG: 75 TABLET, FILM COATED ORAL at 08:06

## 2022-01-01 RX ADMIN — LATANOPROST 1 DROP: 50 SOLUTION OPHTHALMIC at 08:05

## 2022-01-01 RX ADMIN — SENNOSIDES AND DOCUSATE SODIUM 1 TABLET: 50; 8.6 TABLET ORAL at 08:06

## 2022-01-01 RX ADMIN — ASPIRIN 81 MG CHEWABLE TABLET 81 MG: 81 TABLET CHEWABLE at 11:05

## 2022-01-01 RX ADMIN — DONEPEZIL HYDROCHLORIDE 5 MG: 5 TABLET, FILM COATED ORAL at 08:05

## 2022-01-01 RX ADMIN — METOPROLOL SUCCINATE 100 MG: 100 TABLET, EXTENDED RELEASE ORAL at 09:05

## 2022-01-01 RX ADMIN — HEPARIN SODIUM 18 UNITS/KG/HR: 5000 INJECTION INTRAVENOUS; SUBCUTANEOUS at 06:05

## 2022-01-01 RX ADMIN — LATANOPROST 1 DROP: 50 SOLUTION OPHTHALMIC at 09:06

## 2022-01-01 RX ADMIN — ASPIRIN 81 MG CHEWABLE TABLET 81 MG: 81 TABLET CHEWABLE at 08:06

## 2022-01-01 RX ADMIN — FUROSEMIDE 40 MG: 40 TABLET ORAL at 08:05

## 2022-01-01 RX ADMIN — SENNOSIDES AND DOCUSATE SODIUM 1 TABLET: 50; 8.6 TABLET ORAL at 09:05

## 2022-01-01 RX ADMIN — HYDRALAZINE HYDROCHLORIDE 50 MG: 50 TABLET ORAL at 02:05

## 2022-01-01 RX ADMIN — METOPROLOL SUCCINATE 100 MG: 100 TABLET, EXTENDED RELEASE ORAL at 08:06

## 2022-01-01 RX ADMIN — ATORVASTATIN CALCIUM 40 MG: 20 TABLET, FILM COATED ORAL at 09:05

## 2022-01-01 RX ADMIN — LATANOPROST 1 DROP: 50 SOLUTION OPHTHALMIC at 06:05

## 2022-01-01 RX ADMIN — HYDRALAZINE HYDROCHLORIDE 50 MG: 50 TABLET ORAL at 06:05

## 2022-01-01 RX ADMIN — SACUBITRIL AND VALSARTAN 1 TABLET: 24; 26 TABLET, FILM COATED ORAL at 09:05

## 2022-01-01 RX ADMIN — FUROSEMIDE 40 MG: 40 TABLET ORAL at 10:06

## 2022-01-01 RX ADMIN — HYDRALAZINE HYDROCHLORIDE 50 MG: 50 TABLET ORAL at 10:05

## 2022-01-01 RX ADMIN — ATORVASTATIN CALCIUM 80 MG: 20 TABLET, FILM COATED ORAL at 08:06

## 2022-01-01 RX ADMIN — SACUBITRIL AND VALSARTAN 1 TABLET: 24; 26 TABLET, FILM COATED ORAL at 08:05

## 2022-01-01 RX ADMIN — TRAZODONE HYDROCHLORIDE 100 MG: 100 TABLET ORAL at 10:06

## 2022-01-01 RX ADMIN — SACUBITRIL AND VALSARTAN 1 TABLET: 24; 26 TABLET, FILM COATED ORAL at 08:06

## 2022-01-01 RX ADMIN — FUROSEMIDE 40 MG: 40 TABLET ORAL at 08:06

## 2022-01-01 RX ADMIN — Medication 6 MG: at 10:06

## 2022-01-18 ENCOUNTER — PATIENT MESSAGE (OUTPATIENT)
Dept: ADMINISTRATIVE | Facility: HOSPITAL | Age: 82
End: 2022-01-18
Payer: COMMERCIAL

## 2022-02-14 NOTE — TELEPHONE ENCOUNTER
No new care gaps identified.  Powered by [employer]. Reference number: [id number].   2/14/2022 5:34:49 PM CST

## 2022-02-23 ENCOUNTER — TELEPHONE (OUTPATIENT)
Dept: INTERNAL MEDICINE | Facility: CLINIC | Age: 82
End: 2022-02-23
Payer: COMMERCIAL

## 2022-02-23 RX ORDER — FUROSEMIDE 40 MG/1
TABLET ORAL
Qty: 30 TABLET | Refills: 0 | Status: SHIPPED | OUTPATIENT
Start: 2022-02-23 | End: 2022-03-07

## 2022-02-23 NOTE — TELEPHONE ENCOUNTER
----- Message from Benji Soriano sent at 2/22/2022  5:29 PM CST -----  Regarding: Patient advice  Contact: Pt  Pt called in regards to getting an medication refill       Please advise , Pt can be reached at [phone]

## 2022-03-07 ENCOUNTER — OFFICE VISIT (OUTPATIENT)
Dept: INTERNAL MEDICINE | Facility: CLINIC | Age: 82
End: 2022-03-07
Payer: COMMERCIAL

## 2022-03-07 ENCOUNTER — LAB VISIT (OUTPATIENT)
Dept: LAB | Facility: HOSPITAL | Age: 82
End: 2022-03-07
Attending: INTERNAL MEDICINE
Payer: COMMERCIAL

## 2022-03-07 DIAGNOSIS — I48.0 PAROXYSMAL ATRIAL FIBRILLATION: ICD-10-CM

## 2022-03-07 DIAGNOSIS — E11.22 TYPE 2 DIABETES MELLITUS WITH STAGE 3B CHRONIC KIDNEY DISEASE, WITHOUT LONG-TERM CURRENT USE OF INSULIN: ICD-10-CM

## 2022-03-07 DIAGNOSIS — I63.431 EMBOLIC STROKE INVOLVING RIGHT POSTERIOR CEREBRAL ARTERY: ICD-10-CM

## 2022-03-07 DIAGNOSIS — I10 ESSENTIAL HYPERTENSION: ICD-10-CM

## 2022-03-07 DIAGNOSIS — F02.80 ALZHEIMER'S DEMENTIA WITHOUT BEHAVIORAL DISTURBANCE, UNSPECIFIED TIMING OF DEMENTIA ONSET: Primary | ICD-10-CM

## 2022-03-07 DIAGNOSIS — N18.32 STAGE 3B CHRONIC KIDNEY DISEASE: ICD-10-CM

## 2022-03-07 DIAGNOSIS — I67.9 CEREBROVASCULAR SMALL VESSEL DISEASE: ICD-10-CM

## 2022-03-07 DIAGNOSIS — N18.32 TYPE 2 DIABETES MELLITUS WITH STAGE 3B CHRONIC KIDNEY DISEASE, WITHOUT LONG-TERM CURRENT USE OF INSULIN: ICD-10-CM

## 2022-03-07 DIAGNOSIS — G30.9 ALZHEIMER'S DEMENTIA WITHOUT BEHAVIORAL DISTURBANCE, UNSPECIFIED TIMING OF DEMENTIA ONSET: Primary | ICD-10-CM

## 2022-03-07 LAB
ALBUMIN SERPL BCP-MCNC: 3 G/DL (ref 3.5–5.2)
ALP SERPL-CCNC: 127 U/L (ref 55–135)
ALT SERPL W/O P-5'-P-CCNC: 12 U/L (ref 10–44)
ANION GAP SERPL CALC-SCNC: 12 MMOL/L (ref 8–16)
AST SERPL-CCNC: 14 U/L (ref 10–40)
BASOPHILS # BLD AUTO: 0.05 K/UL (ref 0–0.2)
BASOPHILS NFR BLD: 0.9 % (ref 0–1.9)
BILIRUB SERPL-MCNC: 0.5 MG/DL (ref 0.1–1)
BUN SERPL-MCNC: 19 MG/DL (ref 8–23)
CALCIUM SERPL-MCNC: 8.8 MG/DL (ref 8.7–10.5)
CHLORIDE SERPL-SCNC: 108 MMOL/L (ref 95–110)
CHOLEST SERPL-MCNC: 181 MG/DL (ref 120–199)
CHOLEST/HDLC SERPL: 3.9 {RATIO} (ref 2–5)
CO2 SERPL-SCNC: 22 MMOL/L (ref 23–29)
CREAT SERPL-MCNC: 1.4 MG/DL (ref 0.5–1.4)
DIFFERENTIAL METHOD: ABNORMAL
EOSINOPHIL # BLD AUTO: 0.1 K/UL (ref 0–0.5)
EOSINOPHIL NFR BLD: 1.8 % (ref 0–8)
ERYTHROCYTE [DISTWIDTH] IN BLOOD BY AUTOMATED COUNT: 16.2 % (ref 11.5–14.5)
EST. GFR  (AFRICAN AMERICAN): 40.6 ML/MIN/1.73 M^2
EST. GFR  (NON AFRICAN AMERICAN): 35.3 ML/MIN/1.73 M^2
ESTIMATED AVG GLUCOSE: 154 MG/DL (ref 68–131)
GLUCOSE SERPL-MCNC: 270 MG/DL (ref 70–110)
HBA1C MFR BLD: 7 % (ref 4–5.6)
HCT VFR BLD AUTO: 40.9 % (ref 37–48.5)
HDLC SERPL-MCNC: 47 MG/DL (ref 40–75)
HDLC SERPL: 26 % (ref 20–50)
HGB BLD-MCNC: 12.3 G/DL (ref 12–16)
IMM GRANULOCYTES # BLD AUTO: 0.03 K/UL (ref 0–0.04)
IMM GRANULOCYTES NFR BLD AUTO: 0.6 % (ref 0–0.5)
LDLC SERPL CALC-MCNC: 106.8 MG/DL (ref 63–159)
LYMPHOCYTES # BLD AUTO: 1 K/UL (ref 1–4.8)
LYMPHOCYTES NFR BLD: 18.1 % (ref 18–48)
MCH RBC QN AUTO: 25.9 PG (ref 27–31)
MCHC RBC AUTO-ENTMCNC: 30.1 G/DL (ref 32–36)
MCV RBC AUTO: 86 FL (ref 82–98)
MONOCYTES # BLD AUTO: 0.5 K/UL (ref 0.3–1)
MONOCYTES NFR BLD: 8.7 % (ref 4–15)
NEUTROPHILS # BLD AUTO: 3.8 K/UL (ref 1.8–7.7)
NEUTROPHILS NFR BLD: 69.9 % (ref 38–73)
NONHDLC SERPL-MCNC: 134 MG/DL
NRBC BLD-RTO: 0 /100 WBC
PLATELET # BLD AUTO: 226 K/UL (ref 150–450)
PMV BLD AUTO: 11.9 FL (ref 9.2–12.9)
POTASSIUM SERPL-SCNC: 4.1 MMOL/L (ref 3.5–5.1)
PROT SERPL-MCNC: 6.1 G/DL (ref 6–8.4)
RBC # BLD AUTO: 4.74 M/UL (ref 4–5.4)
SODIUM SERPL-SCNC: 142 MMOL/L (ref 136–145)
TRIGL SERPL-MCNC: 136 MG/DL (ref 30–150)
WBC # BLD AUTO: 5.41 K/UL (ref 3.9–12.7)

## 2022-03-07 PROCEDURE — 3288F PR FALLS RISK ASSESSMENT DOCUMENTED: ICD-10-PCS | Mod: CPTII,S$GLB,, | Performed by: INTERNAL MEDICINE

## 2022-03-07 PROCEDURE — 85025 COMPLETE CBC W/AUTO DIFF WBC: CPT | Performed by: INTERNAL MEDICINE

## 2022-03-07 PROCEDURE — 3077F PR MOST RECENT SYSTOLIC BLOOD PRESSURE >= 140 MM HG: ICD-10-PCS | Mod: CPTII,S$GLB,, | Performed by: INTERNAL MEDICINE

## 2022-03-07 PROCEDURE — 3288F FALL RISK ASSESSMENT DOCD: CPT | Mod: CPTII,S$GLB,, | Performed by: INTERNAL MEDICINE

## 2022-03-07 PROCEDURE — 1126F PR PAIN SEVERITY QUANTIFIED, NO PAIN PRESENT: ICD-10-PCS | Mod: CPTII,S$GLB,, | Performed by: INTERNAL MEDICINE

## 2022-03-07 PROCEDURE — 3077F SYST BP >= 140 MM HG: CPT | Mod: CPTII,S$GLB,, | Performed by: INTERNAL MEDICINE

## 2022-03-07 PROCEDURE — 83036 HEMOGLOBIN GLYCOSYLATED A1C: CPT | Performed by: INTERNAL MEDICINE

## 2022-03-07 PROCEDURE — 1101F PT FALLS ASSESS-DOCD LE1/YR: CPT | Mod: CPTII,S$GLB,, | Performed by: INTERNAL MEDICINE

## 2022-03-07 PROCEDURE — 3051F PR MOST RECENT HEMOGLOBIN A1C LEVEL 7.0 - < 8.0%: ICD-10-PCS | Mod: CPTII,S$GLB,, | Performed by: INTERNAL MEDICINE

## 2022-03-07 PROCEDURE — 3080F PR MOST RECENT DIASTOLIC BLOOD PRESSURE >= 90 MM HG: ICD-10-PCS | Mod: CPTII,S$GLB,, | Performed by: INTERNAL MEDICINE

## 2022-03-07 PROCEDURE — 80053 COMPREHEN METABOLIC PANEL: CPT | Performed by: INTERNAL MEDICINE

## 2022-03-07 PROCEDURE — 3080F DIAST BP >= 90 MM HG: CPT | Mod: CPTII,S$GLB,, | Performed by: INTERNAL MEDICINE

## 2022-03-07 PROCEDURE — 99214 OFFICE O/P EST MOD 30 MIN: CPT | Mod: S$GLB,,, | Performed by: INTERNAL MEDICINE

## 2022-03-07 PROCEDURE — 3051F HG A1C>EQUAL 7.0%<8.0%: CPT | Mod: CPTII,S$GLB,, | Performed by: INTERNAL MEDICINE

## 2022-03-07 PROCEDURE — 80061 LIPID PANEL: CPT | Performed by: INTERNAL MEDICINE

## 2022-03-07 PROCEDURE — 99999 PR PBB SHADOW E&M-EST. PATIENT-LVL III: ICD-10-PCS | Mod: PBBFAC,,, | Performed by: INTERNAL MEDICINE

## 2022-03-07 PROCEDURE — 1159F PR MEDICATION LIST DOCUMENTED IN MEDICAL RECORD: ICD-10-PCS | Mod: CPTII,S$GLB,, | Performed by: INTERNAL MEDICINE

## 2022-03-07 PROCEDURE — 99999 PR PBB SHADOW E&M-EST. PATIENT-LVL III: CPT | Mod: PBBFAC,,, | Performed by: INTERNAL MEDICINE

## 2022-03-07 PROCEDURE — 1159F MED LIST DOCD IN RCRD: CPT | Mod: CPTII,S$GLB,, | Performed by: INTERNAL MEDICINE

## 2022-03-07 PROCEDURE — 99214 PR OFFICE/OUTPT VISIT, EST, LEVL IV, 30-39 MIN: ICD-10-PCS | Mod: S$GLB,,, | Performed by: INTERNAL MEDICINE

## 2022-03-07 PROCEDURE — 1126F AMNT PAIN NOTED NONE PRSNT: CPT | Mod: CPTII,S$GLB,, | Performed by: INTERNAL MEDICINE

## 2022-03-07 PROCEDURE — 36415 COLL VENOUS BLD VENIPUNCTURE: CPT | Performed by: INTERNAL MEDICINE

## 2022-03-07 PROCEDURE — 1101F PR PT FALLS ASSESS DOC 0-1 FALLS W/OUT INJ PAST YR: ICD-10-PCS | Mod: CPTII,S$GLB,, | Performed by: INTERNAL MEDICINE

## 2022-03-07 RX ORDER — CARVEDILOL 25 MG/1
25 TABLET ORAL 2 TIMES DAILY WITH MEALS
Qty: 180 TABLET | Refills: 2 | Status: ON HOLD | OUTPATIENT
Start: 2022-03-07 | End: 2022-05-11 | Stop reason: HOSPADM

## 2022-03-07 RX ORDER — FUROSEMIDE 40 MG/1
40 TABLET ORAL DAILY
Qty: 90 TABLET | Refills: 3 | Status: SHIPPED | OUTPATIENT
Start: 2022-03-07 | End: 2022-03-14

## 2022-03-07 RX ORDER — BENAZEPRIL HYDROCHLORIDE 20 MG/1
20 TABLET ORAL DAILY
Qty: 90 TABLET | Refills: 3 | Status: ON HOLD | OUTPATIENT
Start: 2022-03-07 | End: 2022-05-11 | Stop reason: HOSPADM

## 2022-03-07 RX ORDER — DONEPEZIL HYDROCHLORIDE 5 MG/1
5 TABLET, FILM COATED ORAL NIGHTLY
Qty: 90 TABLET | Refills: 3 | Status: SHIPPED | OUTPATIENT
Start: 2022-03-07 | End: 2022-01-01 | Stop reason: SDUPTHER

## 2022-03-07 RX ORDER — ATORVASTATIN CALCIUM 40 MG/1
40 TABLET, FILM COATED ORAL DAILY
Qty: 90 TABLET | Refills: 3 | Status: ON HOLD | OUTPATIENT
Start: 2022-03-07 | End: 2022-01-01 | Stop reason: SDUPTHER

## 2022-03-07 NOTE — PROGRESS NOTES
Chief Complaint: annual physical     History of Present Illness:  Ms. Temitope Suero is a 82 y.o. female with history of stroke, afib on eliquis, HFrEF, DM, CKD3, HTN, HLD, vit D deficiency who presents for annual exam.  Denies any new complaints today.  I last saw her in 10/2020.       HTN: Does not take blood pressure at home.  Denies any headaches, vision changes, dizziness, lightheadedness.  Currently on benazapril 20 mg qd, coreg 25 mg BID, niece who accompanied patient today to clinic and is primary caregiver at home states that she takes all medications as prescribed and has not missed any doses.  BP in clinic today            Afib: On eliquis s/p failing coumadin due to noncompliance, rate well controlled.  Denies any falls, melena, hematochezia, bleeding.  Patient and niece were unaware of why she was taking eliquis, however they state that she takes it everyday and has not missed any doses.  Denies any chest pain, shortness of breath.  Not followed by cardiology.      DM: Does not take blood sugars at home.  Patient eats a varied diet, including plants/vegetables, fruits, meats.  States that she occasionally has sweets.  Denies any polyuria/polydipsia.  Last a1c last year 6.3 in 11/2020--     SHe has CKD with GFR of   35  In 11/2020-- creatine was 1.6.       Memory problems: Niece states that she continues to have memory difficulty, however this is stable since last visit.  No new episodes of CVA noted.        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  Followed by Dr Lazaro, last visit 9/2019.  Was previously on letrozole adjuvant therapy which was discontinued at last onc visit.  Last mammogram in 2019 negative.       Stroke: History of hospitalization twice in 2018 due to noncompliance with Afib anticoagulation.  Has been on eliquis for the past year, taking medication as prescribed per niece.  Denies any episodes of dysarthria, focal neurological  "weakness.       Review of Systems   Constitutional: Negative for fever.   HENT: Negative for sore throat.    Eyes: Negative for blurred vision.   Respiratory: Negative for cough.    Cardiovascular: Negative for chest pain.   Gastrointestinal: Negative for abdominal pain.   Genitourinary: Negative for dysuria.   Musculoskeletal: Negative for back pain.   Neurological: Negative for headaches.   Endo/Heme/Allergies: Does not bruise/bleed easily.   Psychiatric/Behavioral: Positive for memory loss. Negative for depression.           Review of patient's allergies indicates:  No Known Allergies     OBJECTIVE:           BP (!) 144/104 (BP Location: Left arm, Patient Position: Sitting, BP Method: Large (Manual))   Pulse 106   Ht 5' 7" (1.702 m)   Wt 69.4 kg (153 lb)   SpO2 99%   BMI 23.96 kg/m²      recheckl :146/98     She is off her blood pressure meds-- ran out.      Physical Exam:  General:  Well developed, well nourished, no acute distress.  .she answers questions but is wrong often.  Niece corrects her.    Head: Normocephalic, atraumatic  Eyes: PERRL, EOMI, clear sclera  Throat: No posterior pharyngeal erythema or exudate, no tonsillar exudate  Neck: supple, normal ROM, no thyromegaly   CVS:  RRR, S1 and S2 normal, no murmurs, rubs, gallops, 2+ peripheral pulses  Resp:  Lungs clear to auscultation, no wheezes, rales, rhonchi, cough  GI:  Abdomen soft, non-tender, non-distended, normoactive bowel sounds  MSK:  No muscle atrophy, cyanosis, peripheral edema   Skin:  No rashes, ulcers, erythema  Neuro:  CNII-XII grossly intact, no focal deficits noted  Psych:  Appropriate mood and affect, normal judgement  Protective Sensation (w/ 10 gram monofilament):  Right: Intact  Left: Intact     Visual Inspection:  Normal -  Bilateral and Dry Skin -  Bilateral     Pedal Pulses:   Right: Present  Left: Present     Posterior tibialis:   Right:Present  Left: Present               Diagnostic Results:  Labs: Reviewed     ASSESSMENT " & PLAN:   Ms. Temitope Suero is a 82 y.o. female who presents today for annual exam      Alzheimer's dementia without behavioral disturbance, unspecified timing of dementia onset    Embolic stroke involving right posterior cerebral artery  -     atorvastatin (LIPITOR) 40 MG tablet; Take 1 tablet (40 mg total) by mouth once daily.  Dispense: 90 tablet; Refill: 3    Cerebrovascular small vessel disease  -     Lipid Panel; Future; Expected date: 03/07/2022  -     atorvastatin (LIPITOR) 40 MG tablet; Take 1 tablet (40 mg total) by mouth once daily.  Dispense: 90 tablet; Refill: 3    Essential hypertension  -     benazepriL (LOTENSIN) 20 MG tablet; Take 1 tablet (20 mg total) by mouth once daily.  Dispense: 90 tablet; Refill: 3  -     carvediloL (COREG) 25 MG tablet; Take 1 tablet (25 mg total) by mouth 2 (two) times daily with meals.  Dispense: 180 tablet; Refill: 2    Paroxysmal atrial fibrillation  -     apixaban (ELIQUIS) 2.5 mg Tab; Take 1 tablet (2.5 mg total) by mouth 2 (two) times daily.  Dispense: 180 tablet; Refill: 3    Stage 3b chronic kidney disease  -     Comprehensive Metabolic Panel; Future; Expected date: 03/07/2022  -     CBC Auto Differential; Future; Expected date: 03/07/2022    Type 2 diabetes mellitus with stage 3b chronic kidney disease, without long-term current use of insulin  -     Hemoglobin A1C; Future; Expected date: 03/07/2022    Other orders  -     furosemide (LASIX) 40 MG tablet; Take 1 tablet (40 mg total) by mouth once daily.  Dispense: 90 tablet; Refill: 3  -     donepeziL (ARICEPT) 5 MG tablet; Take 1 tablet (5 mg total) by mouth every evening.  Dispense: 90 tablet; Refill: 3      Restart bp meds and follw up in 6 months.

## 2022-03-11 VITALS
OXYGEN SATURATION: 99 % | BODY MASS INDEX: 24.01 KG/M2 | HEIGHT: 67 IN | DIASTOLIC BLOOD PRESSURE: 98 MMHG | HEART RATE: 106 BPM | SYSTOLIC BLOOD PRESSURE: 146 MMHG | WEIGHT: 153 LBS

## 2022-03-12 NOTE — TELEPHONE ENCOUNTER

## 2022-03-14 RX ORDER — FUROSEMIDE 40 MG/1
TABLET ORAL
Qty: 30 TABLET | Refills: 4 | Status: SHIPPED | OUTPATIENT
Start: 2022-03-14 | End: 2022-03-29

## 2022-03-28 NOTE — TELEPHONE ENCOUNTER
No new care gaps identified.  Powered by "[employer] by [employer]. Reference number: [id number].   3/28/2022 3:45:45 PM CDT

## 2022-03-29 RX ORDER — FUROSEMIDE 40 MG/1
TABLET ORAL
Qty: 180 TABLET | Refills: 1 | Status: SHIPPED | OUTPATIENT
Start: 2022-03-29 | End: 2022-01-01 | Stop reason: SDUPTHER

## 2022-03-29 NOTE — TELEPHONE ENCOUNTER
This Rx Request does not qualify for assessment with the [facility]   Please review protocol details and the Care Due Message for extra clinical information    Reasons Rx Request may be deferred:  Labs/Vitals abnormal    Note composed:12:44 PM 03/29/2022

## 2022-05-04 ENCOUNTER — TELEPHONE (OUTPATIENT)
Dept: INTERNAL MEDICINE | Facility: CLINIC | Age: 82
End: 2022-05-04
Payer: COMMERCIAL

## 2022-05-04 PROCEDURE — 99285 EMERGENCY DEPT VISIT HI MDM: CPT | Mod: 25

## 2022-05-04 PROCEDURE — 96361 HYDRATE IV INFUSION ADD-ON: CPT

## 2022-05-04 PROCEDURE — 96365 THER/PROPH/DIAG IV INF INIT: CPT

## 2022-05-04 PROCEDURE — 99285 PR EMERGENCY DEPT VISIT,LEVEL V: ICD-10-PCS | Mod: CS,,, | Performed by: EMERGENCY MEDICINE

## 2022-05-04 PROCEDURE — 99285 EMERGENCY DEPT VISIT HI MDM: CPT | Mod: CS,,, | Performed by: EMERGENCY MEDICINE

## 2022-05-04 PROCEDURE — P9612 CATHETERIZE FOR URINE SPEC: HCPCS

## 2022-05-04 NOTE — TELEPHONE ENCOUNTER
----- Message from Ameena Mason sent at 5/4/2022  8:44 AM CDT -----  Regarding: pt bryon called  Name of Who is Calling: GUSTAVO FOSS [[id number]] Giacomo ( niece)      What is the request in detail: pt bryon is requesting appt for her aunt do to her memory and would like her to be seen asap. Please advise       Can the clinic reply by [facility]: No      What Number to Call Back if not in [facility]:[phone]

## 2022-05-05 ENCOUNTER — HOSPITAL ENCOUNTER (INPATIENT)
Facility: HOSPITAL | Age: 82
LOS: 13 days | Discharge: NURSING, REHAB OR LONG-TERM CARE | DRG: 177 | End: 2022-05-18
Attending: EMERGENCY MEDICINE | Admitting: EMERGENCY MEDICINE
Payer: COMMERCIAL

## 2022-05-05 DIAGNOSIS — R45.1 AGITATION: ICD-10-CM

## 2022-05-05 DIAGNOSIS — I48.92 ATRIAL FLUTTER, UNSPECIFIED TYPE: Primary | ICD-10-CM

## 2022-05-05 DIAGNOSIS — L30.8 DERMATITIS ASSOCIATED WITH MOISTURE: ICD-10-CM

## 2022-05-05 DIAGNOSIS — J18.9 PNEUMONIA DUE TO INFECTIOUS ORGANISM, UNSPECIFIED LATERALITY, UNSPECIFIED PART OF LUNG: ICD-10-CM

## 2022-05-05 DIAGNOSIS — R94.31 QT PROLONGATION: ICD-10-CM

## 2022-05-05 DIAGNOSIS — I51.3 LV (LEFT VENTRICULAR) MURAL THROMBUS: ICD-10-CM

## 2022-05-05 DIAGNOSIS — R41.82 AMS (ALTERED MENTAL STATUS): ICD-10-CM

## 2022-05-05 DIAGNOSIS — I48.91 ATRIAL FIBRILLATION WITH RVR: ICD-10-CM

## 2022-05-05 DIAGNOSIS — I50.9 CHF (CONGESTIVE HEART FAILURE): ICD-10-CM

## 2022-05-05 DIAGNOSIS — G92.9 TOXIC ENCEPHALOPATHY: ICD-10-CM

## 2022-05-05 PROBLEM — I13.0 BENIGN HYPERTENSIVE HEART AND KIDNEY DISEASE WITH HF AND CKD: Status: ACTIVE | Noted: 2022-05-05

## 2022-05-05 PROBLEM — I21.4 NSTEMI (NON-ST ELEVATED MYOCARDIAL INFARCTION): Status: ACTIVE | Noted: 2022-05-05
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
## 2022-05-05 PROBLEM — N17.9 ACUTE KIDNEY INJURY SUPERIMPOSED ON CKD: Status: ACTIVE | Noted: 2022-05-05

## 2022-05-05 PROBLEM — N18.9 ACUTE KIDNEY INJURY SUPERIMPOSED ON CKD: Status: ACTIVE | Noted: 2022-05-05

## 2022-05-05 LAB
ALBUMIN SERPL BCP-MCNC: 2.8 G/DL (ref 3.5–5.2)
ALP SERPL-CCNC: 137 U/L (ref 55–135)
ALT SERPL W/O P-5'-P-CCNC: 16 U/L (ref 10–44)
AMPHET+METHAMPHET UR QL: NEGATIVE
ANION GAP SERPL CALC-SCNC: 9 MMOL/L (ref 8–16)
APTT BLDCRRT: 22.9 SEC (ref 21–32)
AST SERPL-CCNC: 22 U/L (ref 10–40)
BARBITURATES UR QL SCN>200 NG/ML: NEGATIVE
BASOPHILS # BLD AUTO: 0.02 K/UL (ref 0–0.2)
BASOPHILS # BLD AUTO: 0.02 K/UL (ref 0–0.2)
BASOPHILS NFR BLD: 0.2 % (ref 0–1.9)
BASOPHILS NFR BLD: 0.2 % (ref 0–1.9)
BENZODIAZ UR QL SCN>200 NG/ML: NEGATIVE
BILIRUB SERPL-MCNC: 0.9 MG/DL (ref 0.1–1)
BILIRUB UR QL STRIP: NEGATIVE
BUN SERPL-MCNC: 56 MG/DL (ref 8–23)
BZE UR QL SCN: NEGATIVE
CALCIUM SERPL-MCNC: 8.8 MG/DL (ref 8.7–10.5)
CANNABINOIDS UR QL SCN: NEGATIVE
CHLORIDE SERPL-SCNC: 123 MMOL/L (ref 95–110)
CLARITY UR REFRACT.AUTO: CLEAR
CO2 SERPL-SCNC: 20 MMOL/L (ref 23–29)
COLOR UR AUTO: NORMAL
CREAT SERPL-MCNC: 2.2 MG/DL (ref 0.5–1.4)
CREAT UR-MCNC: 30 MG/DL (ref 15–325)
CTP QC/QA: YES
DIFFERENTIAL METHOD: ABNORMAL
DIFFERENTIAL METHOD: ABNORMAL
EOSINOPHIL # BLD AUTO: 0 K/UL (ref 0–0.5)
EOSINOPHIL # BLD AUTO: 0 K/UL (ref 0–0.5)
EOSINOPHIL NFR BLD: 0.1 % (ref 0–8)
EOSINOPHIL NFR BLD: 0.1 % (ref 0–8)
ERYTHROCYTE [DISTWIDTH] IN BLOOD BY AUTOMATED COUNT: 16.8 % (ref 11.5–14.5)
ERYTHROCYTE [DISTWIDTH] IN BLOOD BY AUTOMATED COUNT: 17.9 % (ref 11.5–14.5)
EST. GFR  (AFRICAN AMERICAN): 23.5 ML/MIN/1.73 M^2
EST. GFR  (NON AFRICAN AMERICAN): 20.4 ML/MIN/1.73 M^2
GLUCOSE SERPL-MCNC: 207 MG/DL (ref 70–110)
GLUCOSE UR QL STRIP: NEGATIVE
HCT VFR BLD AUTO: 42.6 % (ref 37–48.5)
HCT VFR BLD AUTO: 44.6 % (ref 37–48.5)
HGB BLD-MCNC: 12.8 G/DL (ref 12–16)
HGB BLD-MCNC: 13.4 G/DL (ref 12–16)
HGB UR QL STRIP: NEGATIVE
IMM GRANULOCYTES # BLD AUTO: 0.03 K/UL (ref 0–0.04)
IMM GRANULOCYTES # BLD AUTO: 0.07 K/UL (ref 0–0.04)
IMM GRANULOCYTES NFR BLD AUTO: 0.3 % (ref 0–0.5)
IMM GRANULOCYTES NFR BLD AUTO: 0.7 % (ref 0–0.5)
INR PPP: 1.4 (ref 0.8–1.2)
KETONES UR QL STRIP: NEGATIVE
LACTATE SERPL-SCNC: 2.3 MMOL/L (ref 0.5–2.2)
LACTATE SERPL-SCNC: 2.3 MMOL/L (ref 0.5–2.2)
LEUKOCYTE ESTERASE UR QL STRIP: NEGATIVE
LYMPHOCYTES # BLD AUTO: 0.6 K/UL (ref 1–4.8)
LYMPHOCYTES # BLD AUTO: 1.2 K/UL (ref 1–4.8)
LYMPHOCYTES NFR BLD: 12.9 % (ref 18–48)
LYMPHOCYTES NFR BLD: 7.2 % (ref 18–48)
MAGNESIUM SERPL-MCNC: 2.2 MG/DL (ref 1.6–2.6)
MCH RBC QN AUTO: 24.9 PG (ref 27–31)
MCH RBC QN AUTO: 25.2 PG (ref 27–31)
MCHC RBC AUTO-ENTMCNC: 30 G/DL (ref 32–36)
MCHC RBC AUTO-ENTMCNC: 30 G/DL (ref 32–36)
MCV RBC AUTO: 83 FL (ref 82–98)
MCV RBC AUTO: 84 FL (ref 82–98)
METHADONE UR QL SCN>300 NG/ML: NEGATIVE
MONOCYTES # BLD AUTO: 0.4 K/UL (ref 0.3–1)
MONOCYTES # BLD AUTO: 0.7 K/UL (ref 0.3–1)
MONOCYTES NFR BLD: 4.8 % (ref 4–15)
MONOCYTES NFR BLD: 7.7 % (ref 4–15)
NEUTROPHILS # BLD AUTO: 7.5 K/UL (ref 1.8–7.7)
NEUTROPHILS # BLD AUTO: 7.7 K/UL (ref 1.8–7.7)
NEUTROPHILS NFR BLD: 78.4 % (ref 38–73)
NEUTROPHILS NFR BLD: 87.4 % (ref 38–73)
NITRITE UR QL STRIP: NEGATIVE
NRBC BLD-RTO: 0 /100 WBC
NRBC BLD-RTO: 0 /100 WBC
OPIATES UR QL SCN: NEGATIVE
PCP UR QL SCN>25 NG/ML: NEGATIVE
PH UR STRIP: 5 [PH] (ref 5–8)
PLATELET # BLD AUTO: 154 K/UL (ref 150–450)
PLATELET # BLD AUTO: 180 K/UL (ref 150–450)
PMV BLD AUTO: 11.7 FL (ref 9.2–12.9)
PMV BLD AUTO: ABNORMAL FL (ref 9.2–12.9)
POCT GLUCOSE: 171 MG/DL (ref 70–110)
POCT GLUCOSE: 174 MG/DL (ref 70–110)
POTASSIUM SERPL-SCNC: 3.8 MMOL/L (ref 3.5–5.1)
PROT SERPL-MCNC: 5.8 G/DL (ref 6–8.4)
PROT UR QL STRIP: NEGATIVE
PROTHROMBIN TIME: 14 SEC (ref 9–12.5)
RBC # BLD AUTO: 5.08 M/UL (ref 4–5.4)
RBC # BLD AUTO: 5.38 M/UL (ref 4–5.4)
SARS-COV-2 RDRP RESP QL NAA+PROBE: NEGATIVE
SODIUM SERPL-SCNC: 152 MMOL/L (ref 136–145)
SP GR UR STRIP: 1.01 (ref 1–1.03)
TOXICOLOGY INFORMATION: NORMAL
TROPONIN I SERPL DL<=0.01 NG/ML-MCNC: 0.33 NG/ML (ref 0–0.03)
TROPONIN I SERPL DL<=0.01 NG/ML-MCNC: 0.35 NG/ML (ref 0–0.03)
TSH SERPL DL<=0.005 MIU/L-ACNC: 2.13 UIU/ML (ref 0.4–4)
URN SPEC COLLECT METH UR: NORMAL
WBC # BLD AUTO: 8.78 K/UL (ref 3.9–12.7)
WBC # BLD AUTO: 9.61 K/UL (ref 3.9–12.7)

## 2022-05-05 PROCEDURE — 84443 ASSAY THYROID STIM HORMONE: CPT | Performed by: STUDENT IN AN ORGANIZED HEALTH CARE EDUCATION/TRAINING PROGRAM

## 2022-05-05 PROCEDURE — 63700000 PHARM REV CODE 250 ALT 637 W/O HCPCS: Performed by: HOSPITALIST

## 2022-05-05 PROCEDURE — 80307 DRUG TEST PRSMV CHEM ANLYZR: CPT | Performed by: STUDENT IN AN ORGANIZED HEALTH CARE EDUCATION/TRAINING PROGRAM

## 2022-05-05 PROCEDURE — 25000003 PHARM REV CODE 250: Performed by: HOSPITALIST

## 2022-05-05 PROCEDURE — 84484 ASSAY OF TROPONIN QUANT: CPT | Mod: 91 | Performed by: HOSPITALIST

## 2022-05-05 PROCEDURE — 63600175 PHARM REV CODE 636 W HCPCS: Performed by: HOSPITALIST

## 2022-05-05 PROCEDURE — 83605 ASSAY OF LACTIC ACID: CPT | Mod: 91 | Performed by: HOSPITALIST

## 2022-05-05 PROCEDURE — 81003 URINALYSIS AUTO W/O SCOPE: CPT | Mod: 59 | Performed by: STUDENT IN AN ORGANIZED HEALTH CARE EDUCATION/TRAINING PROGRAM

## 2022-05-05 PROCEDURE — 11000001 HC ACUTE MED/SURG PRIVATE ROOM

## 2022-05-05 PROCEDURE — 63600175 PHARM REV CODE 636 W HCPCS: Performed by: STUDENT IN AN ORGANIZED HEALTH CARE EDUCATION/TRAINING PROGRAM

## 2022-05-05 PROCEDURE — 85730 THROMBOPLASTIN TIME PARTIAL: CPT | Performed by: EMERGENCY MEDICINE

## 2022-05-05 PROCEDURE — 83605 ASSAY OF LACTIC ACID: CPT | Performed by: STUDENT IN AN ORGANIZED HEALTH CARE EDUCATION/TRAINING PROGRAM

## 2022-05-05 PROCEDURE — 83735 ASSAY OF MAGNESIUM: CPT | Performed by: EMERGENCY MEDICINE

## 2022-05-05 PROCEDURE — 99223 1ST HOSP IP/OBS HIGH 75: CPT | Mod: CS,,, | Performed by: HOSPITALIST

## 2022-05-05 PROCEDURE — 93010 EKG 12-LEAD: ICD-10-PCS | Mod: ,,, | Performed by: INTERNAL MEDICINE

## 2022-05-05 PROCEDURE — 85610 PROTHROMBIN TIME: CPT | Performed by: EMERGENCY MEDICINE

## 2022-05-05 PROCEDURE — 80053 COMPREHEN METABOLIC PANEL: CPT | Performed by: EMERGENCY MEDICINE

## 2022-05-05 PROCEDURE — 87040 BLOOD CULTURE FOR BACTERIA: CPT | Performed by: STUDENT IN AN ORGANIZED HEALTH CARE EDUCATION/TRAINING PROGRAM

## 2022-05-05 PROCEDURE — 93010 ELECTROCARDIOGRAM REPORT: CPT | Mod: ,,, | Performed by: INTERNAL MEDICINE

## 2022-05-05 PROCEDURE — 85025 COMPLETE CBC W/AUTO DIFF WBC: CPT | Mod: 91 | Performed by: STUDENT IN AN ORGANIZED HEALTH CARE EDUCATION/TRAINING PROGRAM

## 2022-05-05 PROCEDURE — 93005 ELECTROCARDIOGRAM TRACING: CPT

## 2022-05-05 PROCEDURE — 99223 PR INITIAL HOSPITAL CARE,LEVL III: ICD-10-PCS | Mod: CS,,, | Performed by: HOSPITALIST

## 2022-05-05 PROCEDURE — 85025 COMPLETE CBC W/AUTO DIFF WBC: CPT | Performed by: HOSPITALIST

## 2022-05-05 PROCEDURE — 84484 ASSAY OF TROPONIN QUANT: CPT | Performed by: STUDENT IN AN ORGANIZED HEALTH CARE EDUCATION/TRAINING PROGRAM

## 2022-05-05 PROCEDURE — U0002 COVID-19 LAB TEST NON-CDC: HCPCS | Performed by: STUDENT IN AN ORGANIZED HEALTH CARE EDUCATION/TRAINING PROGRAM

## 2022-05-05 RX ORDER — POLYETHYLENE GLYCOL 3350 17 G/17G
17 POWDER, FOR SOLUTION ORAL DAILY PRN
Status: DISCONTINUED | OUTPATIENT
Start: 2022-05-05 | End: 2022-05-19 | Stop reason: HOSPADM

## 2022-05-05 RX ORDER — TALC
6 POWDER (GRAM) TOPICAL NIGHTLY PRN
Status: DISCONTINUED | OUTPATIENT
Start: 2022-05-05 | End: 2022-05-19 | Stop reason: HOSPADM

## 2022-05-05 RX ORDER — ONDANSETRON 2 MG/ML
8 INJECTION INTRAMUSCULAR; INTRAVENOUS EVERY 6 HOURS PRN
Status: DISCONTINUED | OUTPATIENT
Start: 2022-05-05 | End: 2022-05-19 | Stop reason: HOSPADM

## 2022-05-05 RX ORDER — IBUPROFEN 200 MG
24 TABLET ORAL
Status: DISCONTINUED | OUTPATIENT
Start: 2022-05-05 | End: 2022-05-19 | Stop reason: HOSPADM

## 2022-05-05 RX ORDER — AZITHROMYCIN 250 MG/1
250 TABLET, FILM COATED ORAL DAILY
Status: DISCONTINUED | OUTPATIENT
Start: 2022-05-06 | End: 2022-05-06

## 2022-05-05 RX ORDER — DROPERIDOL 2.5 MG/ML
1.25 INJECTION, SOLUTION INTRAMUSCULAR; INTRAVENOUS ONCE
Status: DISCONTINUED | OUTPATIENT
Start: 2022-05-05 | End: 2022-05-05

## 2022-05-05 RX ORDER — CARVEDILOL 25 MG/1
25 TABLET ORAL 2 TIMES DAILY WITH MEALS
Status: DISCONTINUED | OUTPATIENT
Start: 2022-05-05 | End: 2022-05-06

## 2022-05-05 RX ORDER — IBUPROFEN 200 MG
16 TABLET ORAL
Status: DISCONTINUED | OUTPATIENT
Start: 2022-05-05 | End: 2022-05-19 | Stop reason: HOSPADM

## 2022-05-05 RX ORDER — AZITHROMYCIN 250 MG/1
500 TABLET, FILM COATED ORAL ONCE
Status: COMPLETED | OUTPATIENT
Start: 2022-05-05 | End: 2022-05-05

## 2022-05-05 RX ORDER — GLUCAGON 1 MG
1 KIT INJECTION
Status: DISCONTINUED | OUTPATIENT
Start: 2022-05-05 | End: 2022-05-19 | Stop reason: HOSPADM

## 2022-05-05 RX ORDER — INSULIN ASPART 100 [IU]/ML
0-5 INJECTION, SOLUTION INTRAVENOUS; SUBCUTANEOUS
Status: DISCONTINUED | OUTPATIENT
Start: 2022-05-05 | End: 2022-05-19 | Stop reason: HOSPADM

## 2022-05-05 RX ORDER — SODIUM CHLORIDE 0.9 % (FLUSH) 0.9 %
5 SYRINGE (ML) INJECTION
Status: DISCONTINUED | OUTPATIENT
Start: 2022-05-05 | End: 2022-05-19 | Stop reason: HOSPADM

## 2022-05-05 RX ORDER — ATORVASTATIN CALCIUM 20 MG/1
40 TABLET, FILM COATED ORAL DAILY
Status: DISCONTINUED | OUTPATIENT
Start: 2022-05-05 | End: 2022-05-19 | Stop reason: HOSPADM

## 2022-05-05 RX ORDER — DONEPEZIL HYDROCHLORIDE 5 MG/1
5 TABLET, FILM COATED ORAL NIGHTLY
Status: DISCONTINUED | OUTPATIENT
Start: 2022-05-05 | End: 2022-05-19 | Stop reason: HOSPADM

## 2022-05-05 RX ORDER — ACETAMINOPHEN 325 MG/1
650 TABLET ORAL EVERY 4 HOURS PRN
Status: DISCONTINUED | OUTPATIENT
Start: 2022-05-05 | End: 2022-05-19 | Stop reason: HOSPADM

## 2022-05-05 RX ORDER — ACETAMINOPHEN 500 MG
1000 TABLET ORAL EVERY 8 HOURS PRN
Status: DISCONTINUED | OUTPATIENT
Start: 2022-05-05 | End: 2022-05-19 | Stop reason: HOSPADM

## 2022-05-05 RX ADMIN — VANCOMYCIN HYDROCHLORIDE 1500 MG: 1.5 INJECTION, POWDER, LYOPHILIZED, FOR SOLUTION INTRAVENOUS at 03:05

## 2022-05-05 RX ADMIN — ATORVASTATIN CALCIUM 40 MG: 40 TABLET, FILM COATED ORAL at 09:05

## 2022-05-05 RX ADMIN — APIXABAN 2.5 MG: 2.5 TABLET, FILM COATED ORAL at 08:05

## 2022-05-05 RX ADMIN — AZITHROMYCIN MONOHYDRATE 500 MG: 250 TABLET ORAL at 09:05

## 2022-05-05 RX ADMIN — APIXABAN 2.5 MG: 2.5 TABLET, FILM COATED ORAL at 09:05

## 2022-05-05 RX ADMIN — CARVEDILOL 25 MG: 12.5 TABLET, FILM COATED ORAL at 09:05

## 2022-05-05 RX ADMIN — DONEPEZIL HYDROCHLORIDE 5 MG: 5 TABLET ORAL at 08:05

## 2022-05-05 RX ADMIN — SODIUM CHLORIDE, SODIUM LACTATE, POTASSIUM CHLORIDE, AND CALCIUM CHLORIDE 1000 ML: .6; .31; .03; .02 INJECTION, SOLUTION INTRAVENOUS at 11:05

## 2022-05-05 RX ADMIN — CEFTRIAXONE 2 G: 2 INJECTION, SOLUTION INTRAVENOUS at 01:05

## 2022-05-05 RX ADMIN — SODIUM CHLORIDE, SODIUM LACTATE, POTASSIUM CHLORIDE, AND CALCIUM CHLORIDE 500 ML: .6; .31; .03; .02 INJECTION, SOLUTION INTRAVENOUS at 05:05

## 2022-05-05 NOTE — ASSESSMENT & PLAN NOTE
· Likely 2/2 pneumonia  · Head CT negative for acute infarct, can consider MRI brain if no improvement in mentation with treatment of infection

## 2022-05-05 NOTE — H&P
[facility] - Emergency Dept  [facility]  History & Physical    Patient Name: Temitope Suero  MRN: [id number]  Patient Class: IP- Inpatient  Admission Date: 5/5/2022  Attending Physician: Janell Nelson MD   Primary Care Provider: Gm Zambrano MD         Patient information was obtained from relative(s) and ER records.     Subjective:     Principal Problem:Toxic encephalopathy    Chief Complaint:   Chief Complaint   Patient presents with    Altered Mental Status     Pt arrives via EMS c/o altered mental status x 2 days. Family reports cloudy, foul smelling urine x 1 week.        HPI: Ms. Temitope Suero is a 82 y.o. female who presents to the ER by family for evaluation of a fall, AMS, and increased work of breath.  History is obtained from family Shelby, as patient is confused.  She reports that over the last 2 does - when she does talk, she wouldn't make sense.  She normally talks well and has fluent speech, but she does have some memory loss from an old stroke.  She has been having a dry cough, no fevers or chills - but has reported foul smelling urine.  She reports decreased PO intake the last week and that her mouth has been dry.  She did have a fall, but didn't hit her head.  She normally ambulates without DME.    Upon arrival to the ER, vitals were temp 98.4F, , /70 satting 96% on room air.  CXR showed a LLL PNA.  Head CT showed chronic ischemic changes.  Labs were notable for Trop 0.322, Creatinine 2.2, and Lactic 2.3.  She was given Vanc and Ceftriaxone and was admitted to Hospital Medicine.      Past Medical History:   Diagnosis Date    Atrial flutter 8/12/2018    Cancer of left breast, stage 2 11/24/2015    Cataract     Cerebrovascular small vessel disease 4/11/2018    Bi-thalamic lacunar disease, mod/sev periventricular white matter disease, mixed pattern cerebral microbleeds    Cervicalgia 4/17/2018    Chronic anticoagulation - apixaban 4/17/2018    Previous on Xarelto but changed to  apixaban 8-2018 due to recurrent embolic stroke.    Chronic systolic heart failure 4/17/2018    Echo 4-2018   1 - Moderately depressed left ventricular systolic function (EF 30-35%).    2 - Biatrial enlargement.    3 - Normal right ventricular systolic function .    4 - Mild mitral regurgitation.    5 - Mild tricuspid regurgitation.    6 - The estimated PA systolic pressure is 34 mmHg.    7 - Increased central venous pressure.    8 - Left pleural effusion.     CKD stage 3 due to type 2 diabetes mellitus 4/17/2018    Embolic stroke involving left cerebellar artery 8/13/2018    Embolic stroke involving right posterior cerebral artery 4/11/2018    Encephalopathy 8/13/2018    Essential hypertension 10/28/2013    Glaucoma     Glaucoma suspect of both eyes 12/9/2013    Hearing loss, sensorineural 12/16/2013    Malignant hypertension 8/12/2018    Mixed hyperlipidemia 10/28/2013    Obesity 1/16/2014    Paroxysmal atrial fibrillation 7/10/2012    Stage 3 chronic kidney disease 4/17/2018    Toxic multinodular goiter 10/28/2013    Type 2 diabetes mellitus with stage 3 chronic kidney disease, without long-term current use of insulin 7/10/2012    Diet controlled.   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     Current Outpatient Medications on File Prior to Encounter   Medication Sig    apixaban (ELIQUIS) 2.5 mg Tab Take 1 tablet (2.5 mg total) by mouth 2 (two) times daily.    atorvastatin (LIPITOR) 40 MG tablet Take 1 tablet (40 mg total) by mouth once daily.    benazepriL (LOTENSIN) 20 MG tablet Take 1 tablet (20 mg total) by mouth once daily.    carvediloL (COREG) 25 MG tablet Take 1 tablet (25 mg total)  by mouth 2 (two) times daily with meals.    cholecalciferol, vitamin D3, (VITAMIN D3) 50 mcg (2,000 unit) Cap Take 1 capsule (2,000 Units total) by mouth once daily.    donepeziL (ARICEPT) 5 MG tablet Take 1 tablet (5 mg total) by mouth every evening.    furosemide (LASIX) 40 MG tablet TAKE 1 TABLET BY MOUTH TWICE A DAY    latanoprost 0.005 % ophthalmic solution Place 1 drop into both eyes every evening.    [DISCONTINUED] travoprost, benzalkonium, (TRAVATAN) 0.004 % ophthalmic solution Place 1 drop into both eyes every evening.     Family History       Problem Relation (Age of Onset)    Asthma Son    Cancer Sister    Diabetes Paternal Aunt    Glaucoma Father    Heart disease Father    Hypertension Mother, Father          Tobacco Use    Smoking status: Never Smoker    Smokeless tobacco: Never Used   Substance and Sexual Activity    Alcohol use: No    Drug use: No    Sexual activity: Not Currently     Review of Systems   Unable to perform ROS: Mental status change   Objective:     Vital Signs (Most Recent):  Temp: 98.5 °F (36.9 °C) (05/05/22 1020)  Pulse: 105 (05/05/22 1020)  Resp: 14 (05/05/22 1020)  BP: (!) 147/102 (05/05/22 1020)  SpO2: 97 % (05/05/22 1020)   Vital Signs (24h Range):  Temp:  [98.4 °F (36.9 °C)-98.5 °F (36.9 °C)] 98.5 °F (36.9 °C)  Pulse:  [] 105  Resp:  [13-20] 14  SpO2:  [93 %-99 %] 97 %  BP: (120-158)/() 147/102     Weight: 69.4 kg (153 lb)  Body mass index is 23.96 kg/m².    Physical Exam  Vitals reviewed.   Constitutional:       Appearance: Normal appearance. She is well-developed.   HENT:      Head: Normocephalic and atraumatic.   Eyes:      General: No scleral icterus.     Pupils: Pupils are equal, round, and reactive to light.   Cardiovascular:      Rate and Rhythm: Normal rate and regular rhythm.      Heart sounds: No murmur heard.  Pulmonary:      Effort: Pulmonary effort is normal. No respiratory distress.      Breath sounds: Normal breath sounds. No wheezing.    Abdominal:      General: Bowel sounds are normal. There is no distension.      Palpations: Abdomen is soft.      Tenderness: There is no abdominal tenderness.   Musculoskeletal:         General: Normal range of motion.      Cervical back: Normal range of motion and neck supple.   Skin:     General: Skin is warm and dry.   Neurological:      Comments: Lethargic   Psychiatric:         Behavior: Behavior normal.         CRANIAL NERVES     CN III, IV, VI   Pupils are equal, round, and reactive to light.     Significant Labs: All pertinent labs within the past 24 hours have been reviewed.  CBC:   Recent Labs   Lab 05/05/22  0123 05/05/22  0914   WBC 8.78 9.61   HGB 12.8 13.4   HCT 42.6 44.6    180     CMP:   Recent Labs   Lab 05/05/22  0431   *   K 3.8   *   CO2 20*   *   BUN 56*   CREATININE 2.2*   CALCIUM 8.8   PROT 5.8*   ALBUMIN 2.8*   BILITOT 0.9   ALKPHOS 137*   AST 22   ALT 16   ANIONGAP 9   EGFRNONAA 20.4*     Lactic Acid:   Recent Labs   Lab 05/05/22 0123 05/05/22  0914   LACTATE 2.3* 2.3*     Troponin:   Recent Labs   Lab 05/05/22  0123 05/05/22  0914   TROPONINI 0.354* 0.332*       Significant Imaging: I have reviewed all pertinent imaging results/findings within the past 24 hours.  CXR: I have reviewed all pertinent results/findings within the past 24 hours and my personal findings are:  LLL consolidation    Assessment/Plan:     * Toxic encephalopathy  · Likely 2/2 pneumonia  · Head CT negative for acute infarct, can consider MRI brain if no improvement in mentation with treatment of infection      Left lower lobe pneumonia  · CXR with LLL PNA  · Satting in the 90s on room air  · Afebrile and without leukocytosis but Lactic 2.3  · Will give additional IVF and repeat Lactic with morning labs  · Continue Ceftriaxone and start Azithro, day 1      NSTEMI (non-ST elevated myocardial infarction)  · Trop 0.322 on admit and flat  · No reports of chest pain  · EKG not ischemic  · Likely  demand 2/2 infection  · Monitor      Chronic systolic heart failure  · 2D echo from 2018 with EF 30-35%  · Repeat 2D echo  · BNP pending  · Continue Coreg 25mg PO BID  · Hold Lasix and Benazepril with MAC  · Monitor volume status closely      Acute kidney injury superimposed on CKD  · Creatinine 2.2 on admit, baseline closer to 1.3  · Possibly infection induced and Lasix/Benazepril  · Gentle IVF  · Can consider urine studies and renal US if no improvement    Stage 3 chronic kidney disease  · Chronic issue  · See MAC below      Paroxysmal atrial fibrillation  · Chronic and stable  · Continue Eliquis 2.5mg PO BID  · Continue Coreg 25mg PO BID      Fall  · PT/OT      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  · As above      Type 2 diabetes mellitus with stage 3 chronic kidney disease, without long-term current use of insulin  · HbA1c 7  · Home DM regimen:  Diet controlled  · SSI with POCT accuchecks to achieve blood glucose of 140-180 while hospitalized  · Diabetic diet        Deterioration of memory  · Chronic issue  · Continue Aricept      Chronic anticoagulation - apixaban  · Chronic and stable  · Continue Eliquis 2.5mg PO BID      History of stroke  · Chronic and stable  · Head CT without acute infarct  · Continue Lipitor and Eliquis      Mixed hyperlipidemia  · Chronic and stable  · Continue Lipitor 40mg PO daily      VTE Risk Mitigation (From admission, onward)         Ordered     apixaban tablet 2.5 mg  2 times daily         05/05/22 3516                   Janell Nelson MD  Department of Hospital Medicine   [facility] - Emergency Dept

## 2022-05-05 NOTE — ED NOTES
Patient placed in [id number]. Patient vitals taken and assessment done.  Patient alarms in place. Patient resting comfortably. Patient unable to answer orientation  Questions.

## 2022-05-05 NOTE — ED PROVIDER NOTES
Encounter Date: 5/4/2022       History     Chief Complaint   Patient presents with    Altered Mental Status     Pt arrives via EMS c/o altered mental status x 2 days. Family reports cloudy, foul smelling urine x 1 week.     HPI     82 y.o. female with CVA with residual deficits, afib on eliquis, HFrEF, DM, CKD3, HTN, HLD, presenting with AMS, BIBEMS. Pt given 800 cc LR PTA. Daughter notes she has been confused, difficulty with comprehension, fatigued for 2 days. Baseline mental status is A&Ox3 with some memory slowing recently. No prior UTI hx but she has had malodorous urine. 2 days ago, pt had an unwitnessed fall. Daughter states she was laying on her back and alert, c/o knee pain afterward. She has been coughing intermittently, and breathing deeper in the last 2 days.   Denies chest pain, SOB, nausea, vomiting, abd pain, fevers.     Review of patient's allergies indicates:  No Known Allergies  Past Medical History:   Diagnosis Date    Atrial flutter 8/12/2018    Cancer of left breast, stage 2 11/24/2015    Cataract     Cerebrovascular small vessel disease 4/11/2018    Bi-thalamic lacunar disease, mod/sev periventricular white matter disease, mixed pattern cerebral microbleeds    Cervicalgia 4/17/2018    Chronic anticoagulation - apixaban 4/17/2018    Previous on Xarelto but changed to apixaban 8-2018 due to recurrent embolic stroke.    Chronic systolic heart failure 4/17/2018    Echo 4-2018   1 - Moderately depressed left ventricular systolic function (EF 30-35%).    2 - Biatrial enlargement.    3 - Normal right ventricular systolic function .    4 - Mild mitral regurgitation.    5 - Mild tricuspid regurgitation.    6 - The estimated PA systolic pressure is 34 mmHg.    7 - Increased central venous pressure.    8 - Left pleural effusion.     CKD stage 3 due to type 2 diabetes mellitus 4/17/2018    Embolic stroke involving left cerebellar artery 8/13/2018    Embolic stroke involving right posterior  cerebral artery 4/11/2018    Encephalopathy 8/13/2018    Essential hypertension 10/28/2013    Glaucoma     Glaucoma suspect of both eyes 12/9/2013    Hearing loss, sensorineural 12/16/2013    Malignant hypertension 8/12/2018    Mixed hyperlipidemia 10/28/2013    Obesity 1/16/2014    Paroxysmal atrial fibrillation 7/10/2012    Stage 3 chronic kidney disease 4/17/2018    Toxic multinodular goiter 10/28/2013    Type 2 diabetes mellitus with stage 3 chronic kidney disease, without long-term current use of insulin 7/10/2012    Diet controlled.   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    Vitamin D deficiency disease 10/28/2013     Past Surgical History:   Procedure Laterality Date    BREAST BIOPSY      BREAST SURGERY      CHOLECYSTECTOMY       Family History   Problem Relation Age of Onset    Hypertension Mother     Hypertension Father     Glaucoma Father     Heart disease Father     Cancer Sister     Asthma Son     Diabetes Paternal Aunt     Thyroid cancer Neg Hx     Amblyopia Neg Hx     Blindness Neg Hx     Cataracts Neg Hx     Macular degeneration Neg Hx     Retinal detachment Neg Hx     Strabismus Neg Hx      Social History     Tobacco Use    Smoking status: Never Smoker    Smokeless tobacco: Never Used   Substance Use Topics    Alcohol use: No    Drug use: No     Review of Systems   Constitutional: Positive for activity change, appetite change and fatigue. Negative for fever.   HENT: Negative for sore throat.    Eyes: Negative for visual disturbance.   Respiratory: Positive for cough. Negative for shortness of breath and wheezing.    Cardiovascular: Negative for chest pain, palpitations and leg swelling.   Gastrointestinal: Negative for nausea and vomiting.   Endocrine: Negative for polyuria.   Genitourinary: Negative for dysuria.   Musculoskeletal: Negative for back pain.   Skin: Negative for rash.   Allergic/Immunologic: Negative for immunocompromised state.   Neurological:  Negative for weakness.   Hematological: Does not bruise/bleed easily.   Psychiatric/Behavioral: Positive for confusion.       Physical Exam     Initial Vitals [05/04/22 2317]   BP Pulse Resp Temp SpO2   120/70 110 20 -- 96 %      MAP       --         Physical Exam    Nursing note and vitals reviewed.  Constitutional:   Elderly, altered   HENT:   Head: Normocephalic and atraumatic.   Neck:   Normal range of motion.  Cardiovascular: Normal heart sounds and intact distal pulses.   tachycardia   Pulmonary/Chest: Breath sounds normal. No respiratory distress.   Abdominal: Abdomen is soft. Bowel sounds are normal. She exhibits no distension. There is no abdominal tenderness. There is no rebound and no guarding.   Musculoskeletal:         General: Normal range of motion.      Cervical back: Normal range of motion.     Neurological: She is alert.   Pt verbally arouses to name. Not significantly fatigued, +confusion. A&Ox1 to name.    diminished, cannot close hands bl.   Pupils 3 mm R, 2 mm L.   Moves legs to command.  Resists straightening arms to request.    Skin: Skin is warm.   Psychiatric: She has a normal mood and affect.         ED Course   Procedures  Labs Reviewed   TROPONIN I - Abnormal; Notable for the following components:       Result Value    Troponin I 0.354 (*)     All other components within normal limits   LACTIC ACID, PLASMA - Abnormal; Notable for the following components:    Lactate (Lactic Acid) 2.3 (*)     All other components within normal limits   PROTIME-INR - Abnormal; Notable for the following components:    Prothrombin Time 14.0 (*)     INR 1.4 (*)     All other components within normal limits   COMPREHENSIVE METABOLIC PANEL - Abnormal; Notable for the following components:    Sodium 152 (*)     Chloride 123 (*)     CO2 20 (*)     Glucose 207 (*)     BUN 56 (*)     Creatinine 2.2 (*)     Total Protein 5.8 (*)     Albumin 2.8 (*)     Alkaline Phosphatase 137 (*)     eGFR if   23.5 (*)     eGFR if non  20.4 (*)     All other components within normal limits   CULTURE, BLOOD   CULTURE, BLOOD   TSH   URINALYSIS, REFLEX TO URINE CULTURE    Narrative:     Specimen Source->Urine   APTT   MAGNESIUM   DRUG SCREEN PANEL, URINE EMERGENCY   B-TYPE NATRIURETIC PEPTIDE   CBC W/ AUTO DIFFERENTIAL   DRUG SCREEN PANEL, URINE EMERGENCY   SARS-COV-2 RDRP GENE          Imaging Results          X-Ray Chest AP Portable (Final result)  Result time 05/05/22 02:22:15    Final result by Ralph Shanks MD (05/05/22 02:22:15)                 Impression:      Cardiomegaly with mild interstitial edema.    Retrocardiac airspace opacity left base with obscuration of the left hemidiaphragm.  Finding may represent left basilar pneumonia or atelectasis.  No significant effusion identified.      Electronically signed by: Ralph Shanks MD  Date:    05/05/2022  Time:    02:22             Narrative:    EXAMINATION:  XR CHEST AP PORTABLE    CLINICAL HISTORY:  Altered mental status, unspecified    TECHNIQUE:  Single frontal view of the chest was performed.    COMPARISON:  08/12/2018.    FINDINGS:  Left basilar retrocardiac opacity with obscuration of the left hemidiaphragm.  Left CP angle appears sharp.    Minimal scarring right base.  Right CP angle appears sharp.  No airspace consolidation on the right.  Mild interstitial edema present.  No pneumothorax on either side.  Surgical clips overlie the axillary regions bilaterally.    Cardiomediastinal silhouette is enlarged, similar to prior.                               CT Cervical Spine Without Contrast (Final result)  Result time 05/05/22 01:28:58    Final result by Laura Alejo MD (05/05/22 01:28:58)                 Impression:      1. No CT evidence of acute cervical spine fracture or traumatic subluxation.  Clinical correlation and further evaluation as warranted.  2. Multilevel degenerative change of the cervical spine as discussed above.  3.  Moderate to large volume of left pleural fluid.  Bilateral ground-glass attenuation of the visualized pulmonary parenchyma which can be seen with edema versus infectious or non infectious inflammatory etiologies.  Clinical correlation advised.  4. Enlarged thyroid suggestive of multinodular goiter with large bilateral thyroid lobe nodules.  Further evaluation with nonemergent ultrasound advised.      Electronically signed by: Laura Alejo MD  Date:    05/05/2022  Time:    01:28             Narrative:    EXAMINATION:  CT CERVICAL SPINE WITHOUT CONTRAST    CLINICAL HISTORY:  AMS;    TECHNIQUE:  Low dose axial images, sagittal and coronal reformations were performed though the cervical spine.  Contrast was not administered.    COMPARISON:  None    FINDINGS:  There is straightening and slight reversal of normal cervical lordosis.  There is minimal retrolisthesis of C3 on C4, presumably relating to significant degenerative change at this level.  Otherwise, cervical vertebral body alignment is within normal limits.  Vertebral body heights appear maintained.  The facet joints articulate appropriately.  No significant prevertebral soft tissue swelling.  There is multilevel intervertebral disc height loss most pronounced at the C3-C4 level.  There is multilevel degenerative change of the cervical spine consisting of posterior disc osteophyte complexes, uncovertebral joint DJD and facet arthropathy.  For example there is mild to moderate spinal canal stenosis at the C3-C4 level with bilateral moderate/severe neural foraminal narrowing at this level.    There is diffuse enlargement of the thyroid gland (left lobe slightly greater than left).  There are multiple hypodense nodules throughout the thyroid suggestive of multinodular goiter.  There is a 2.4 cm left thyroid lobe nodule and 2.0 cm partially calcified right thyroid lobe nodule.  Further evaluation with nonemergent thyroid ultrasound is advised.  There is a  moderate/large volume of incompletely visualized left pleural fluid present.  There is ground-glass attenuation of the visualized pulmonary parenchyma which can be seen with edema versus infectious or non infectious inflammatory etiologies.                               CT Head Without Contrast (Final result)  Result time 05/05/22 01:38:08    Final result by Laura Alejo MD (05/05/22 01:38:08)                 Impression:      Redemonstration of multifocal remote infarcts throughout the bilateral cerebral hemispheres and right cerebellum as discussed above.  Findings superimposed upon a background of chronic microvascular ischemic change.  If there is clinical concern for acute ischemia, further evaluation with MRI is advised if there are no clinical contraindications.    Allowing for patient motion/streak artifact.  No definite CT evidence of acute intracranial abnormality.  Further evaluation as clinically warranted.      Electronically signed by: Laura Alejo MD  Date:    05/05/2022  Time:    01:38             Narrative:    EXAMINATION:  CT HEAD WITHOUT CONTRAST    CLINICAL HISTORY:  AMS;    TECHNIQUE:  Low dose axial images were obtained through the head.  Coronal and sagittal reformations were also performed. Contrast was not administered.    COMPARISON:  Head CT and MRI brain 08/13/2018    FINDINGS:  Head CT:    Please note image quality is degraded by patient motion/streak artifact.  This limits evaluation of posterior fossa structures in particular.  Allowing for this, there is no definite evidence of acute intracranial hemorrhage, hydrocephalus, midline shift or mass effect.  Brain parenchyma appears similar to prior exam with several regions of encephalomalacia in keeping with remote infarcts in the right frontal lobe, left parietal lobe, paramedian right occipital lobe, and right cerebellum.  There is additional hypoattenuation in the supratentorial white matter which is nonspecific but likely reflect  sequela of chronic microvascular ischemic change.  The basal cisterns are patent. The mastoid air cells and visualized paranasal sinuses are clear of acute process.  The visualized bones of the calvarium demonstrate no acute osseous abnormality noting hyperostosis frontalis interna.                                 Medications   lactated ringers bolus 500 mL (has no administration in time range)   cefTRIAXone (ROCEPHIN) 2 g/50 mL D5W IVPB (0 g Intravenous Stopped 5/5/22 0245)   vancomycin 1.5 g in dextrose 5 % 250 mL IVPB (ready to mix) (0 mg Intravenous Stopped 5/5/22 0540)                 ED Course as of 05/05/22 0541   u May 05, 2022   0142 CT Cervical Spine Without Contrast     1. No CT evidence of acute cervical spine fracture or traumatic subluxation.  Clinical correlation and further evaluation as warranted.  2. Multilevel degenerative change of the cervical spine as discussed above.  3. Moderate to large volume of left pleural fluid.  Bilateral ground-glass attenuation of the visualized pulmonary parenchyma which can be seen with edema versus infectious or non infectious inflammatory etiologies.  Clinical correlation advised.  4. Enlarged thyroid suggestive of multinodular goiter with large bilateral thyroid lobe nodules.  Further evaluation with nonemergent ultrasound advised.    [NP]   0142 CT Head Without Contrast  Impression:     Redemonstration of multifocal remote infarcts throughout the bilateral cerebral hemispheres and right cerebellum as discussed above.  Findings superimposed upon a background of chronic microvascular ischemic change.  If there is clinical concern for acute ischemia, further evaluation with MRI is advised if there are no clinical contraindications.     Allowing for patient motion/streak artifact.  No definite CT evidence of acute intracranial abnormality.  Further evaluation as clinically warranted.    [NP]   0144 S/p 800 cc fluid with EMS [NP]   0239 Troponin I(!): 0.354 [NP]    0239 Lactate, Nasir(!): 2.3 [NP]   0510 Sodium(!): 152 [NP]   0510 Chloride(!): 123 [NP]   0510 Creatinine(!): 2.2 [NP]   0510 Creatinine(!): 2.2  Baseline 1.4 [NP]      ED Course User Index  [NP] Mary Anne MD             82 y.o. female with CVA with residual deficits, afib on eliquis, HFrEF, DM, CKD3, HTN, HLD, presenting with AMS, BIBEMS. DDx for ICH, stroke, UTI, pna, covid, PE, dementia, ACS, dehydration, metabolic changes, toxin ingestion. Vitals hypertensive to 145/101, , sating 99% on RA at RR 20. Not meeting sepsis criteria on arrival, however on exam pt alert but significantly altered, difficulty obtaining exam. Pt was able to move feet on command and say name. A&Ox1. Minimal ability to fully  bilaterally. Otherwise resisting eye opening for pupil exam. Partial exam with 3 mm on R, 2 mm on L. CTH and Cervical spine without acute changes, somewhat limited by motion artifact. Ordered vanc and rocephin. Pt with decreased EF to 30% on prior Echo. She received 800 cc IVF with EMS. Ordered additional 500 cc LR. Labs with trop 0.353, likely demand and elevated Cr. Cr 2.2, baseline 1.4. Na 152, Cl 123. Consulted pt to medicine for admission.     Mary El MD PGY4  [facility]  5:02 AM 05/05/2022              Clinical Impression:   Final diagnoses:  [R41.82] AMS (altered mental status)  [J18.9] Pneumonia due to infectious organism, unspecified laterality, unspecified part of lung (Primary)          ED Disposition Condition    Admit               Mary Anne MD  Resident  05/05/22 0566

## 2022-05-05 NOTE — ASSESSMENT & PLAN NOTE
· Trop 0.322 on admit and flat  · No reports of chest pain  · EKG not ischemic  · Likely demand 2/2 infection  · Monitor

## 2022-05-05 NOTE — PHARMACY MED REC
"Admission Medication History     The home medication history was taken by Silva Luther.    You may go to "Admission" then "Reconcile Home Medications" tabs to review and/or act upon these items.      The home medication list has been updated by the Pharmacy department.    Please read ALL comments highlighted in yellow.    Please address this information as you see fit.     Feel free to contact us if you have any questions or require assistance.      The medications listed below were removed from the home medication list. Please reorder if appropriate:  Patient reports no longer taking the following medication(s):   CHOLECALCIFEROL, VITAMIN D3, 50MCG (2,000 UNIT)    Medications listed below were obtained from: Patient/family    Medication Sig    apixaban (ELIQUIS) 2.5 mg Tab   Take 1 tablet (2.5 mg total) by mouth 2 (two) times daily.    atorvastatin (LIPITOR) 40 MG tablet   Take 1 tablet (40 mg total) by mouth once daily.    benazepriL (LOTENSIN) 20 MG tablet   Take 1 tablet (20 mg total) by mouth once daily.    carvediloL (COREG) 25 MG tablet   Take 1 tablet (25 mg total) by mouth 2 (two) times daily with meals.    donepeziL (ARICEPT) 5 MG tablet   Take 1 tablet (5 mg total) by mouth every evening.    furosemide (LASIX) 40 MG tablet   TAKE 1 TABLET BY MOUTH TWICE A DAY    latanoprost 0.005 % ophthalmic solution   Place 1 drop into both eyes every evening.           Silva Luther  EXT [id number]                    .          "

## 2022-05-05 NOTE — TELEPHONE ENCOUNTER
Labs ordered to be completed in one month No new care gaps identified.  Powered by [employer]. Reference number: [id number].   3/12/2022 1:35:36 PM CST

## 2022-05-05 NOTE — ASSESSMENT & PLAN NOTE
· Creatinine 2.2 on admit, baseline closer to 1.3  · Possibly infection induced and Lasix/Benazepril  · Gentle IVF  · Can consider urine studies and renal US if no improvement

## 2022-05-05 NOTE — ASSESSMENT & PLAN NOTE
· CXR with LLL PNA  · Satting in the 90s on room air  · Afebrile and without leukocytosis but Lactic 2.3  · Will give additional IVF and repeat Lactic with morning labs  · Continue Ceftriaxone and start Azithro, day 1

## 2022-05-05 NOTE — SUBJECTIVE & OBJECTIVE
Past Medical History:   Diagnosis Date    Atrial flutter 8/12/2018    Cancer of left breast, stage 2 11/24/2015    Cataract     Cerebrovascular small vessel disease 4/11/2018    Bi-thalamic lacunar disease, mod/sev periventricular white matter disease, mixed pattern cerebral microbleeds    Cervicalgia 4/17/2018    Chronic anticoagulation - apixaban 4/17/2018    Previous on Xarelto but changed to apixaban 8-2018 due to recurrent embolic stroke.    Chronic systolic heart failure 4/17/2018    Echo 4-2018   1 - Moderately depressed left ventricular systolic function (EF 30-35%).    2 - Biatrial enlargement.    3 - Normal right ventricular systolic function .    4 - Mild mitral regurgitation.    5 - Mild tricuspid regurgitation.    6 - The estimated PA systolic pressure is 34 mmHg.    7 - Increased central venous pressure.    8 - Left pleural effusion.     CKD stage 3 due to type 2 diabetes mellitus 4/17/2018    Embolic stroke involving left cerebellar artery 8/13/2018    Embolic stroke involving right posterior cerebral artery 4/11/2018    Encephalopathy 8/13/2018    Essential hypertension 10/28/2013    Glaucoma     Glaucoma suspect of both eyes 12/9/2013    Hearing loss, sensorineural 12/16/2013    Malignant hypertension 8/12/2018    Mixed hyperlipidemia 10/28/2013    Obesity 1/16/2014    Paroxysmal atrial fibrillation 7/10/2012    Stage 3 chronic kidney disease 4/17/2018    Toxic multinodular goiter 10/28/2013    Type 2 diabetes mellitus with stage 3 chronic kidney disease, without long-term current use of insulin 7/10/2012    Diet controlled.    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     Current Outpatient Medications  on File Prior to Encounter   Medication Sig    apixaban (ELIQUIS) 2.5 mg Tab Take 1 tablet (2.5 mg total) by mouth 2 (two) times daily.    atorvastatin (LIPITOR) 40 MG tablet Take 1 tablet (40 mg total) by mouth once daily.    benazepriL (LOTENSIN) 20 MG tablet Take 1 tablet (20 mg total) by mouth once daily.    carvediloL (COREG) 25 MG tablet Take 1 tablet (25 mg total) by mouth 2 (two) times daily with meals.    cholecalciferol, vitamin D3, (VITAMIN D3) 50 mcg (2,000 unit) Cap Take 1 capsule (2,000 Units total) by mouth once daily.    donepeziL (ARICEPT) 5 MG tablet Take 1 tablet (5 mg total) by mouth every evening.    furosemide (LASIX) 40 MG tablet TAKE 1 TABLET BY MOUTH TWICE A DAY    latanoprost 0.005 % ophthalmic solution Place 1 drop into both eyes every evening.    [DISCONTINUED] travoprost, benzalkonium, (TRAVATAN) 0.004 % ophthalmic solution Place 1 drop into both eyes every evening.     Family History       Problem Relation (Age of Onset)    Asthma Son    Cancer Sister    Diabetes Paternal Aunt    Glaucoma Father    Heart disease Father    Hypertension Mother, Father          Tobacco Use    Smoking status: Never Smoker    Smokeless tobacco: Never Used   Substance and Sexual Activity    Alcohol use: No    Drug use: No    Sexual activity: Not Currently     Review of Systems   Unable to perform ROS: Mental status change   Objective:     Vital Signs (Most Recent):  Temp: 98.5 °F (36.9 °C) (05/05/22 1020)  Pulse: 105 (05/05/22 1020)  Resp: 14 (05/05/22 1020)  BP: (!) 147/102 (05/05/22 1020)  SpO2: 97 % (05/05/22 1020)   Vital Signs (24h Range):  Temp:  [98.4 °F (36.9 °C)-98.5 °F (36.9 °C)] 98.5 °F (36.9 °C)  Pulse:  [] 105  Resp:  [13-20] 14  SpO2:  [93 %-99 %] 97 %  BP: (120-158)/() 147/102     Weight: 69.4 kg (153 lb)  Body mass index is 23.96 kg/m².    Physical Exam  Vitals reviewed.   Constitutional:       Appearance: Normal appearance. She is well-developed.   HENT:      Head: Normocephalic  and atraumatic.   Eyes:      General: No scleral icterus.     Pupils: Pupils are equal, round, and reactive to light.   Cardiovascular:      Rate and Rhythm: Normal rate and regular rhythm.      Heart sounds: No murmur heard.  Pulmonary:      Effort: Pulmonary effort is normal. No respiratory distress.      Breath sounds: Normal breath sounds. No wheezing.   Abdominal:      General: Bowel sounds are normal. There is no distension.      Palpations: Abdomen is soft.      Tenderness: There is no abdominal tenderness.   Musculoskeletal:         General: Normal range of motion.      Cervical back: Normal range of motion and neck supple.   Skin:     General: Skin is warm and dry.   Neurological:      Comments: Lethargic   Psychiatric:         Behavior: Behavior normal.         CRANIAL NERVES     CN III, IV, VI   Pupils are equal, round, and reactive to light.     Significant Labs: All pertinent labs within the past 24 hours have been reviewed.  CBC:   Recent Labs   Lab 05/05/22 0123 05/05/22 0914   WBC 8.78 9.61   HGB 12.8 13.4   HCT 42.6 44.6    180     CMP:   Recent Labs   Lab 05/05/22  0431   *   K 3.8   *   CO2 20*   *   BUN 56*   CREATININE 2.2*   CALCIUM 8.8   PROT 5.8*   ALBUMIN 2.8*   BILITOT 0.9   ALKPHOS 137*   AST 22   ALT 16   ANIONGAP 9   EGFRNONAA 20.4*     Lactic Acid:   Recent Labs   Lab 05/05/22  0123 05/05/22  0914   LACTATE 2.3* 2.3*     Troponin:   Recent Labs   Lab 05/05/22  0123 05/05/22 0914   TROPONINI 0.354* 0.332*       Significant Imaging: I have reviewed all pertinent imaging results/findings within the past 24 hours.  CXR: I have reviewed all pertinent results/findings within the past 24 hours and my personal findings are:  LLL consolidation

## 2022-05-05 NOTE — ASSESSMENT & PLAN NOTE
· HbA1c 7  · Home DM regimen:  Diet controlled  · SSI with POCT accuchecks to achieve blood glucose of 140-180 while hospitalized  · Diabetic diet

## 2022-05-05 NOTE — ED TRIAGE NOTES
Temitope Suero, a 82 y.o. female presents to the ED w/ complaint of altered mental status. Per patient daughter, patient has become increasingly lethargic and stopped talking about 2 days ago.     Triage note:  Chief Complaint   Patient presents with    Altered Mental Status     Pt arrives via EMS c/o altered mental status x 2 days. Family reports cloudy, foul smelling urine x 1 week.     Review of patient's allergies indicates:  No Known Allergies  Past Medical History:   Diagnosis Date    Atrial flutter 8/12/2018    Cancer of left breast, stage 2 11/24/2015    Cataract     Cerebrovascular small vessel disease 4/11/2018    Bi-thalamic lacunar disease, mod/sev periventricular white matter disease, mixed pattern cerebral microbleeds    Cervicalgia 4/17/2018    Chronic anticoagulation - apixaban 4/17/2018    Previous on Xarelto but changed to apixaban 8-2018 due to recurrent embolic stroke.    Chronic systolic heart failure 4/17/2018    Echo 4-2018   1 - Moderately depressed left ventricular systolic function (EF 30-35%).    2 - Biatrial enlargement.    3 - Normal right ventricular systolic function .    4 - Mild mitral regurgitation.    5 - Mild tricuspid regurgitation.    6 - The estimated PA systolic pressure is 34 mmHg.    7 - Increased central venous pressure.    8 - Left pleural effusion.     CKD stage 3 due to type 2 diabetes mellitus 4/17/2018    Embolic stroke involving left cerebellar artery 8/13/2018    Embolic stroke involving right posterior cerebral artery 4/11/2018    Encephalopathy 8/13/2018    Essential hypertension 10/28/2013    Glaucoma     Glaucoma suspect of both eyes 12/9/2013    Hearing loss, sensorineural 12/16/2013    Malignant hypertension 8/12/2018    Mixed hyperlipidemia 10/28/2013    Obesity 1/16/2014    Paroxysmal atrial fibrillation 7/10/2012    Stage 3 chronic kidney disease 4/17/2018    Toxic multinodular goiter 10/28/2013    Type 2 diabetes mellitus with stage 3  chronic kidney disease, without long-term current use of insulin 7/10/2012    Diet controlled.   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

## 2022-05-05 NOTE — ASSESSMENT & PLAN NOTE
· 2D echo from 2018 with EF 30-35%  · Repeat 2D echo  · BNP pending  · Continue Coreg 25mg PO BID  · Hold Lasix and Benazepril with MAC  · Monitor volume status closely

## 2022-05-05 NOTE — CONSULTS
PharmD Consult    1.) Admit medication history/reconciliation: Complete, see note entered by Silva Luther 5/5/22    2.) Renally adjust all medications: Apixaban 2.5 mg BID while Scr > 1.5. No renal adjustments needed. Will continue to monitor daily through departmental protocols          Thank you for the consult, will sign off.  Reddy Stauffer Pharm.D., BCPS  [id number]      **Note: Consults are reviewed Monday-Friday 7:00am-3:30pm. The above recommendations are only suggested. The recommendations should be considered in conjunction with all patient factors.**

## 2022-05-05 NOTE — ASSESSMENT & PLAN NOTE
Pt contacted. He states one of the reasons is that he can't go anywhere because he has to wait for them to come. Pt states one time he was already out, and they called him. Pt also states he is feeling better.   Pt mentioned Occupational and Physical Ther · Chronic issue  · Continue Aricept

## 2022-05-05 NOTE — HPI
Ms. Temitope Suero is a 82 y.o. female who presents to the ER by family for evaluation of a fall, AMS, and increased work of breath.  History is obtained from family Shelby, as patient is confused.  She reports that over the last 2 does - when she does talk, she wouldn't make sense.  She normally talks well and has fluent speech, but she does have some memory loss from an old stroke.  She has been having a dry cough, no fevers or chills - but has reported foul smelling urine.  She reports decreased PO intake the last week and that her mouth has been dry.  She did have a fall, but didn't hit her head.  She normally ambulates without DME.    Upon arrival to the ER, vitals were temp 98.4F, , /70 satting 96% on room air.  CXR showed a LLL PNA.  Head CT showed chronic ischemic changes.  Labs were notable for Trop 0.322, Creatinine 2.2, and Lactic 2.3.  She was given Vanc and Ceftriaxone and was admitted to Hospital Medicine.

## 2022-05-06 LAB
ALBUMIN SERPL BCP-MCNC: 2.5 G/DL (ref 3.5–5.2)
ALBUMIN SERPL BCP-MCNC: 2.7 G/DL (ref 3.5–5.2)
ALP SERPL-CCNC: 126 U/L (ref 55–135)
ALP SERPL-CCNC: 134 U/L (ref 55–135)
ALT SERPL W/O P-5'-P-CCNC: 13 U/L (ref 10–44)
ALT SERPL W/O P-5'-P-CCNC: 15 U/L (ref 10–44)
ANION GAP SERPL CALC-SCNC: 14 MMOL/L (ref 8–16)
ANION GAP SERPL CALC-SCNC: 14 MMOL/L (ref 8–16)
AST SERPL-CCNC: 17 U/L (ref 10–40)
AST SERPL-CCNC: 25 U/L (ref 10–40)
BASOPHILS # BLD AUTO: 0.01 K/UL (ref 0–0.2)
BASOPHILS # BLD AUTO: 0.03 K/UL (ref 0–0.2)
BASOPHILS NFR BLD: 0.1 % (ref 0–1.9)
BASOPHILS NFR BLD: 0.3 % (ref 0–1.9)
BILIRUB SERPL-MCNC: 0.5 MG/DL (ref 0.1–1)
BILIRUB SERPL-MCNC: 0.6 MG/DL (ref 0.1–1)
BNP SERPL-MCNC: 755 PG/ML (ref 0–99)
BUN SERPL-MCNC: 59 MG/DL (ref 8–23)
BUN SERPL-MCNC: 62 MG/DL (ref 8–23)
CALCIUM SERPL-MCNC: 8.6 MG/DL (ref 8.7–10.5)
CALCIUM SERPL-MCNC: 8.7 MG/DL (ref 8.7–10.5)
CHLORIDE SERPL-SCNC: 115 MMOL/L (ref 95–110)
CHLORIDE SERPL-SCNC: 118 MMOL/L (ref 95–110)
CO2 SERPL-SCNC: 19 MMOL/L (ref 23–29)
CO2 SERPL-SCNC: 20 MMOL/L (ref 23–29)
CREAT SERPL-MCNC: 2.2 MG/DL (ref 0.5–1.4)
CREAT SERPL-MCNC: 2.2 MG/DL (ref 0.5–1.4)
DIFFERENTIAL METHOD: ABNORMAL
DIFFERENTIAL METHOD: ABNORMAL
EOSINOPHIL # BLD AUTO: 0.1 K/UL (ref 0–0.5)
EOSINOPHIL # BLD AUTO: 0.1 K/UL (ref 0–0.5)
EOSINOPHIL NFR BLD: 0.7 % (ref 0–8)
EOSINOPHIL NFR BLD: 0.8 % (ref 0–8)
ERYTHROCYTE [DISTWIDTH] IN BLOOD BY AUTOMATED COUNT: 17.6 % (ref 11.5–14.5)
ERYTHROCYTE [DISTWIDTH] IN BLOOD BY AUTOMATED COUNT: 18 % (ref 11.5–14.5)
EST. GFR  (AFRICAN AMERICAN): 23.5 ML/MIN/1.73 M^2
EST. GFR  (AFRICAN AMERICAN): 23.5 ML/MIN/1.73 M^2
EST. GFR  (NON AFRICAN AMERICAN): 20.4 ML/MIN/1.73 M^2
EST. GFR  (NON AFRICAN AMERICAN): 20.4 ML/MIN/1.73 M^2
GLUCOSE SERPL-MCNC: 139 MG/DL (ref 70–110)
GLUCOSE SERPL-MCNC: 230 MG/DL (ref 70–110)
HCT VFR BLD AUTO: 47.1 % (ref 37–48.5)
HCT VFR BLD AUTO: 51.2 % (ref 37–48.5)
HGB BLD-MCNC: 13.7 G/DL (ref 12–16)
HGB BLD-MCNC: 15.4 G/DL (ref 12–16)
IMM GRANULOCYTES # BLD AUTO: 0.03 K/UL (ref 0–0.04)
IMM GRANULOCYTES # BLD AUTO: 0.04 K/UL (ref 0–0.04)
IMM GRANULOCYTES NFR BLD AUTO: 0.3 % (ref 0–0.5)
IMM GRANULOCYTES NFR BLD AUTO: 0.5 % (ref 0–0.5)
LACTATE SERPL-SCNC: 2.2 MMOL/L (ref 0.5–2.2)
LACTATE SERPL-SCNC: 3.6 MMOL/L (ref 0.5–2.2)
LYMPHOCYTES # BLD AUTO: 0.9 K/UL (ref 1–4.8)
LYMPHOCYTES # BLD AUTO: 1.1 K/UL (ref 1–4.8)
LYMPHOCYTES NFR BLD: 10.7 % (ref 18–48)
LYMPHOCYTES NFR BLD: 9.9 % (ref 18–48)
MAGNESIUM SERPL-MCNC: 2.1 MG/DL (ref 1.6–2.6)
MCH RBC QN AUTO: 24.6 PG (ref 27–31)
MCH RBC QN AUTO: 24.8 PG (ref 27–31)
MCHC RBC AUTO-ENTMCNC: 29.1 G/DL (ref 32–36)
MCHC RBC AUTO-ENTMCNC: 30.1 G/DL (ref 32–36)
MCV RBC AUTO: 83 FL (ref 82–98)
MCV RBC AUTO: 85 FL (ref 82–98)
MONOCYTES # BLD AUTO: 0.6 K/UL (ref 0.3–1)
MONOCYTES # BLD AUTO: 0.8 K/UL (ref 0.3–1)
MONOCYTES NFR BLD: 6.6 % (ref 4–15)
MONOCYTES NFR BLD: 7.9 % (ref 4–15)
NEUTROPHILS # BLD AUTO: 7 K/UL (ref 1.8–7.7)
NEUTROPHILS # BLD AUTO: 8.4 K/UL (ref 1.8–7.7)
NEUTROPHILS NFR BLD: 80.2 % (ref 38–73)
NEUTROPHILS NFR BLD: 82 % (ref 38–73)
NRBC BLD-RTO: 0 /100 WBC
NRBC BLD-RTO: 1 /100 WBC
PHOSPHATE SERPL-MCNC: 4.3 MG/DL (ref 2.7–4.5)
PLATELET # BLD AUTO: 200 K/UL (ref 150–450)
PLATELET # BLD AUTO: 203 K/UL (ref 150–450)
PMV BLD AUTO: 11.6 FL (ref 9.2–12.9)
PMV BLD AUTO: 12.3 FL (ref 9.2–12.9)
POCT GLUCOSE: 162 MG/DL (ref 70–110)
POCT GLUCOSE: 195 MG/DL (ref 70–110)
POTASSIUM SERPL-SCNC: 3.9 MMOL/L (ref 3.5–5.1)
POTASSIUM SERPL-SCNC: 4.2 MMOL/L (ref 3.5–5.1)
PROT SERPL-MCNC: 5.1 G/DL (ref 6–8.4)
PROT SERPL-MCNC: 5.3 G/DL (ref 6–8.4)
RBC # BLD AUTO: 5.57 M/UL (ref 4–5.4)
RBC # BLD AUTO: 6.2 M/UL (ref 4–5.4)
SODIUM SERPL-SCNC: 149 MMOL/L (ref 136–145)
SODIUM SERPL-SCNC: 151 MMOL/L (ref 136–145)
TROPONIN I SERPL DL<=0.01 NG/ML-MCNC: 0.27 NG/ML (ref 0–0.03)
WBC # BLD AUTO: 10.43 K/UL (ref 3.9–12.7)
WBC # BLD AUTO: 8.58 K/UL (ref 3.9–12.7)

## 2022-05-06 PROCEDURE — 99291 PR CRITICAL CARE, E/M 30-74 MINUTES: ICD-10-PCS | Mod: ,,, | Performed by: HOSPITALIST

## 2022-05-06 PROCEDURE — 99233 PR SUBSEQUENT HOSPITAL CARE,LEVL III: ICD-10-PCS | Mod: ,,, | Performed by: HOSPITALIST

## 2022-05-06 PROCEDURE — 93010 EKG 12-LEAD: ICD-10-PCS | Mod: ,,, | Performed by: INTERNAL MEDICINE

## 2022-05-06 PROCEDURE — 25000003 PHARM REV CODE 250: Performed by: HOSPITALIST

## 2022-05-06 PROCEDURE — 80053 COMPREHEN METABOLIC PANEL: CPT | Performed by: HOSPITALIST

## 2022-05-06 PROCEDURE — 36415 COLL VENOUS BLD VENIPUNCTURE: CPT | Performed by: HOSPITALIST

## 2022-05-06 PROCEDURE — 20600001 HC STEP DOWN PRIVATE ROOM

## 2022-05-06 PROCEDURE — 92610 EVALUATE SWALLOWING FUNCTION: CPT

## 2022-05-06 PROCEDURE — 63600175 PHARM REV CODE 636 W HCPCS: Performed by: HOSPITALIST

## 2022-05-06 PROCEDURE — 99291 CRITICAL CARE FIRST HOUR: CPT | Mod: ,,, | Performed by: HOSPITALIST

## 2022-05-06 PROCEDURE — 83735 ASSAY OF MAGNESIUM: CPT | Performed by: HOSPITALIST

## 2022-05-06 PROCEDURE — 93005 ELECTROCARDIOGRAM TRACING: CPT

## 2022-05-06 PROCEDURE — 84100 ASSAY OF PHOSPHORUS: CPT | Performed by: HOSPITALIST

## 2022-05-06 PROCEDURE — 93010 ELECTROCARDIOGRAM REPORT: CPT | Mod: ,,, | Performed by: INTERNAL MEDICINE

## 2022-05-06 PROCEDURE — 84484 ASSAY OF TROPONIN QUANT: CPT | Performed by: HOSPITALIST

## 2022-05-06 PROCEDURE — 99233 SBSQ HOSP IP/OBS HIGH 50: CPT | Mod: ,,, | Performed by: HOSPITALIST

## 2022-05-06 PROCEDURE — 83605 ASSAY OF LACTIC ACID: CPT | Performed by: HOSPITALIST

## 2022-05-06 PROCEDURE — 83880 ASSAY OF NATRIURETIC PEPTIDE: CPT | Performed by: HOSPITALIST

## 2022-05-06 PROCEDURE — 85025 COMPLETE CBC W/AUTO DIFF WBC: CPT | Performed by: HOSPITALIST

## 2022-05-06 RX ORDER — ENOXAPARIN SODIUM 100 MG/ML
30 INJECTION SUBCUTANEOUS EVERY 24 HOURS
Status: DISCONTINUED | OUTPATIENT
Start: 2022-05-06 | End: 2022-05-08

## 2022-05-06 RX ORDER — ENOXAPARIN SODIUM 100 MG/ML
40 INJECTION SUBCUTANEOUS EVERY 24 HOURS
Status: DISCONTINUED | OUTPATIENT
Start: 2022-05-06 | End: 2022-05-06

## 2022-05-06 RX ADMIN — INSULIN ASPART 2 UNITS: 100 INJECTION, SOLUTION INTRAVENOUS; SUBCUTANEOUS at 11:05

## 2022-05-06 RX ADMIN — AZITHROMYCIN 500 MG: 500 INJECTION, POWDER, LYOPHILIZED, FOR SOLUTION INTRAVENOUS at 10:05

## 2022-05-06 RX ADMIN — ENOXAPARIN SODIUM 30 MG: 30 INJECTION, SOLUTION INTRAVENOUS; SUBCUTANEOUS at 05:05

## 2022-05-06 RX ADMIN — CEFTRIAXONE 1 G: 1 INJECTION, SOLUTION INTRAVENOUS at 08:05

## 2022-05-06 RX ADMIN — PIPERACILLIN SODIUM AND TAZOBACTAM SODIUM 4.5 G: 4; .5 INJECTION, POWDER, LYOPHILIZED, FOR SOLUTION INTRAVENOUS at 12:05

## 2022-05-06 RX ADMIN — SODIUM CHLORIDE, SODIUM LACTATE, POTASSIUM CHLORIDE, AND CALCIUM CHLORIDE 500 ML: .6; .31; .03; .02 INJECTION, SOLUTION INTRAVENOUS at 11:05

## 2022-05-06 RX ADMIN — DONEPEZIL HYDROCHLORIDE 5 MG: 5 TABLET ORAL at 08:05

## 2022-05-06 NOTE — ASSESSMENT & PLAN NOTE
· CXR with LLL PNA  · Satting in the 90s on room air  · Afebrile and without leukocytosis but Lactic 2.3 on admission that trended down to 2.2  · Continue Ceftriaxone and Azithro, day 2

## 2022-05-06 NOTE — ASSESSMENT & PLAN NOTE
· HbA1c 7  · Home DM regimen:  Diet controlled  · SSI with POCT accuchecks to achieve blood glucose of 140-180 while hospitalized  · Diabetic diet, purred and nectar thick per SLP

## 2022-05-06 NOTE — SUBJECTIVE & OBJECTIVE
Interval History: No acute events overnight.  This morning awake and eating, but not swallowing her food or speaking.  Made NPO until SLP eval.  Creatinine still elevated.  BNP 700s - 2D echo pending.    Review of Systems   Unable to perform ROS: Mental status change   Objective:     Vital Signs (Most Recent):  Temp: 97.5 °F (36.4 °C) (05/06/22 0807)  Pulse: 89 (05/06/22 0807)  Resp: 20 (05/06/22 0807)  BP: (!) 154/96 (05/06/22 0807)  SpO2: 99 % (05/06/22 0807)   Vital Signs (24h Range):  Temp:  [96.2 °F (35.7 °C)-98.6 °F (37 °C)] 97.5 °F (36.4 °C)  Pulse:  [86-98] 89  Resp:  [16-20] 20  SpO2:  [94 %-100 %] 99 %  BP: (132-166)/() 154/96     Weight: 64.4 kg (141 lb 15.6 oz)  Body mass index is 22.24 kg/m².  No intake or output data in the 24 hours ending 05/06/22 1029   Physical Exam  Constitutional:       Appearance: Normal appearance. She is well-developed.   HENT:      Head: Normocephalic and atraumatic.   Cardiovascular:      Rate and Rhythm: Normal rate and regular rhythm.      Heart sounds: No murmur heard.  Pulmonary:      Effort: Pulmonary effort is normal. No respiratory distress.      Breath sounds: Normal breath sounds. No wheezing or rales.   Abdominal:      General: There is no distension.      Palpations: Abdomen is soft.      Tenderness: There is no abdominal tenderness.   Musculoskeletal:         General: No deformity.   Skin:     General: Skin is warm.   Neurological:      Comments: Makes eye contact, but wont speak or follow directions       Significant Labs: All pertinent labs within the past 24 hours have been reviewed.  CBC:   Recent Labs   Lab 05/05/22  0123 05/05/22  0914 05/06/22  0851   WBC 8.78 9.61 10.43   HGB 12.8 13.4 15.4   HCT 42.6 44.6 51.2*    180 200     CMP:   Recent Labs   Lab 05/05/22  0431 05/06/22  0851   * 151*   K 3.8 4.2   * 118*   CO2 20* 19*   * 139*   BUN 56* 59*   CREATININE 2.2* 2.2*   CALCIUM 8.8 8.7   PROT 5.8* 5.3*   ALBUMIN 2.8* 2.7*    BILITOT 0.9 0.6   ALKPHOS 137* 134   AST 22 17   ALT 16 13   ANIONGAP 9 14   EGFRNONAA 20.4* 20.4*     Lactic Acid:   Recent Labs   Lab 05/05/22  0123 05/05/22  0914 05/06/22  0851   LACTATE 2.3* 2.3* 2.2       Significant Imaging: I have reviewed all pertinent imaging results/findings within the past 24 hours.

## 2022-05-06 NOTE — PROGRESS NOTES
[facility]  Progress Note    Patient Name: Temitope Suero  MRN: [id number]  Patient Class: IP- Inpatient   Admission Date: 5/5/2022  Length of Stay: 1 days  Attending Physician: Janell Nelson MD  Primary Care Provider: Gm Zambrano MD        Subjective:     Principal Problem:Toxic encephalopathy        HPI:  Ms. Temitope Suero is a 82 y.o. female who presents to the ER by family for evaluation of a fall, AMS, and increased work of breath.  History is obtained from family Shelby, as patient is confused.  She reports that over the last 2 does - when she does talk, she wouldn't make sense.  She normally talks well and has fluent speech, but she does have some memory loss from an old stroke.  She has been having a dry cough, no fevers or chills - but has reported foul smelling urine.  She reports decreased PO intake the last week and that her mouth has been dry.  She did have a fall, but didn't hit her head.  She normally ambulates without DME.    Upon arrival to the ER, vitals were temp 98.4F, , /70 satting 96% on room air.  CXR showed a LLL PNA.  Head CT showed chronic ischemic changes.  Labs were notable for Trop 0.322, Creatinine 2.2, and Lactic 2.3.  She was given Vanc and Ceftriaxone and was admitted to Hospital Medicine.      Overview/Hospital Course:  Ms. Suero was admitted to Hospital Medicine for management of a toxic encephalopathy 2/2 LLL pneumonia.  She was started on Ceftriaxone/Azithro.  Her mentation was slow to improve.  SLP evaluated and cleared for diet.  She was given small IVF boluses for her MAC.      Interval History: No acute events overnight.  This morning awake and eating, but not swallowing her food or speaking.  Made NPO until SLP eval.  Creatinine still elevated.  BNP 700s - 2D echo pending.    Review of Systems   Unable to perform ROS: Mental status change   Objective:     Vital Signs (Most Recent):  Temp: 97.5 °F (36.4 °C) (05/06/22 0807)  Pulse: 89  (05/06/22 0807)  Resp: 20 (05/06/22 0807)  BP: (!) 154/96 (05/06/22 0807)  SpO2: 99 % (05/06/22 0807)   Vital Signs (24h Range):  Temp:  [96.2 °F (35.7 °C)-98.6 °F (37 °C)] 97.5 °F (36.4 °C)  Pulse:  [86-98] 89  Resp:  [16-20] 20  SpO2:  [94 %-100 %] 99 %  BP: (132-166)/() 154/96     Weight: 64.4 kg (141 lb 15.6 oz)  Body mass index is 22.24 kg/m².  No intake or output data in the 24 hours ending 05/06/22 1029   Physical Exam  Constitutional:       Appearance: Normal appearance. She is well-developed.   HENT:      Head: Normocephalic and atraumatic.   Cardiovascular:      Rate and Rhythm: Normal rate and regular rhythm.      Heart sounds: No murmur heard.  Pulmonary:      Effort: Pulmonary effort is normal. No respiratory distress.      Breath sounds: Normal breath sounds. No wheezing or rales.   Abdominal:      General: There is no distension.      Palpations: Abdomen is soft.      Tenderness: There is no abdominal tenderness.   Musculoskeletal:         General: No deformity.   Skin:     General: Skin is warm.   Neurological:      Comments: Makes eye contact, but wont speak or follow directions       Significant Labs: All pertinent labs within the past 24 hours have been reviewed.  CBC:   Recent Labs   Lab 05/05/22  0123 05/05/22  0914 05/06/22  0851   WBC 8.78 9.61 10.43   HGB 12.8 13.4 15.4   HCT 42.6 44.6 51.2*    180 200     CMP:   Recent Labs   Lab 05/05/22  0431 05/06/22  0851   * 151*   K 3.8 4.2   * 118*   CO2 20* 19*   * 139*   BUN 56* 59*   CREATININE 2.2* 2.2*   CALCIUM 8.8 8.7   PROT 5.8* 5.3*   ALBUMIN 2.8* 2.7*   BILITOT 0.9 0.6   ALKPHOS 137* 134   AST 22 17   ALT 16 13   ANIONGAP 9 14   EGFRNONAA 20.4* 20.4*     Lactic Acid:   Recent Labs   Lab 05/05/22  0123 05/05/22  0914 05/06/22  0851   LACTATE 2.3* 2.3* 2.2       Significant Imaging: I have reviewed all pertinent imaging results/findings within the past 24 hours.      Assessment/Plan:      * Toxic  encephalopathy  · Likely 2/2 pneumonia  · Head CT negative for acute infarct, can consider MRI brain if no improvement in mentation with treatment of infection      Left lower lobe pneumonia  · CXR with LLL PNA  · Satting in the 90s on room air  · Afebrile and without leukocytosis but Lactic 2.3 on admission that trended down to 2.2  · Continue Ceftriaxone and Azithro, day 2      NSTEMI (non-ST elevated myocardial infarction)  · Trop 0.322 on admit and flat  · No reports of chest pain  · EKG not ischemic  · Likely demand 2/2 infection  · Monitor      Chronic systolic heart failure  · 2D echo from 2018 with EF 30-35%  · Repeat 2D echo  · BNP 700s  · Continue Coreg 25mg PO BID  · Hold Lasix and Benazepril with MAC  · Monitor volume status closely    Acute kidney injury superimposed on CKD  · Creatinine 2.2 on admit, baseline closer to 1.3 - 2.2 today  · Possibly infection induced and Lasix/Benazepril  · Gentle IVF bolus today  · Can consider urine studies and renal US if no improvement    Stage 3 chronic kidney disease  · Chronic issue  · See MAC below      Paroxysmal atrial fibrillation  · Chronic and stable  · Continue Eliquis 2.5mg PO BID  · Continue Coreg 25mg PO BID      Fall  · PT/OT      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  · As above      Type 2 diabetes mellitus with stage 3 chronic kidney disease, without long-term current use of insulin  · HbA1c 7  · Home DM regimen:  Diet controlled  · SSI with POCT accuchecks to achieve blood glucose of 140-180 while hospitalized  · Diabetic diet, purred and nectar thick per SLP        Deterioration of memory  · Chronic issue  · Continue Aricept      Chronic anticoagulation - apixaban  · Chronic and stable  · Continue Eliquis 2.5mg PO BID      History of stroke  · Chronic and stable  · Head CT without acute infarct  · Continue Lipitor and Eliquis      Mixed hyperlipidemia  · Chronic and stable  · Continue Lipitor 40mg PO daily        VTE Risk  Mitigation (From admission, onward)         Ordered     apixaban tablet 2.5 mg  2 times daily         05/05/22 0717                Discharge Planning   WALDEMAR:      Code Status: Full Code   Is the patient medically ready for discharge?:     Reason for patient still in hospital (select all that apply): Patient trending condition                     Janell Nelson MD  Department of Hospital Medicine   [facility]

## 2022-05-06 NOTE — PT/OT/SLP EVAL
Speech Language Pathology Evaluation  Bedside Swallow    Patient Name:  Temitope Suero   MRN:  [id number]  Admitting Diagnosis: Toxic encephalopathy    Recommendations:                 General Recommendations:  Speech language evaluation, Cognitive-linguistic evaluation and Ongoing swallow assessment  Diet recommendations:  Puree, Nectar Thick   To achieve nectar-thickened liquids, please use 6 oz of any liquid to 1 packet of thickener.  Aspiration Precautions: 1 bite/sip at a time, Assistance with meals and Assistance with thickening liquids, Check for pocketing/oral residue, Eliminate distractions, Feed only when awake/alert, HOB to 90 degrees, Meds crushed in puree, Monitor for s/s of aspiration, Small bites/sips and Strict aspiration precautions   General Precautions: Standard, aspiration, pureed diet, nectar thick  Communication strategies:  provide increased time to answer and go to room if call light pushed    History:     Past Medical History:   Diagnosis Date    Atrial flutter 8/12/2018    Cancer of left breast, stage 2 11/24/2015    Cataract     Cerebrovascular small vessel disease 4/11/2018    Bi-thalamic lacunar disease, mod/sev periventricular white matter disease, mixed pattern cerebral microbleeds    Cervicalgia 4/17/2018    Chronic anticoagulation - apixaban 4/17/2018    Previous on Xarelto but changed to apixaban 8-2018 due to recurrent embolic stroke.    Chronic systolic heart failure 4/17/2018    Echo 4-2018   1 - Moderately depressed left ventricular systolic function (EF 30-35%).    2 - Biatrial enlargement.    3 - Normal right ventricular systolic function .    4 - Mild mitral regurgitation.    5 - Mild tricuspid regurgitation.    6 - The estimated PA systolic pressure is 34 mmHg.    7 - Increased central venous pressure.    8 - Left pleural effusion.     CKD stage 3 due to type 2 diabetes mellitus 4/17/2018    Embolic stroke involving left cerebellar artery 8/13/2018    Embolic stroke  involving right posterior cerebral artery 4/11/2018    Encephalopathy 8/13/2018    Essential hypertension 10/28/2013    Glaucoma     Glaucoma suspect of both eyes 12/9/2013    Hearing loss, sensorineural 12/16/2013    Malignant hypertension 8/12/2018    Mixed hyperlipidemia 10/28/2013    Obesity 1/16/2014    Paroxysmal atrial fibrillation 7/10/2012    Stage 3 chronic kidney disease 4/17/2018    Toxic multinodular goiter 10/28/2013    Type 2 diabetes mellitus with stage 3 chronic kidney disease, without long-term current use of insulin 7/10/2012    Diet controlled.   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    Prior diet: JEN    Subjective     Patient awake and alert with overt confusion  Communicated with nurse prior to session     Pain/Comfort:  · Pain Rating 1: 0/10  · Pain Rating Post-Intervention 1: 0/10    Respiratory Status: Room air    Objective:     Oral Musculature Evaluation  · Oral Musculature: unable to assess due to poor participation/comprehension  · Dentition: present and adequate  · Secretion Management: adequate  · Mucosal Quality: adequate  · Mandibular Strength and Mobility: WFL  · Volitional Cough: unable to elicit  · Volitional Swallow: unable to elicit  · Voice Prior to PO Intake: WFL    Bedside Swallow Eval:   Consistencies Assessed:  · Thin liquids x2 (via straw)  · Nectar thick liquids x4 (via straw)  · Puree x3 (tsp applesauce)     Oral Phase:   · Prolonged A-P transit of puree trials with adequate oral clearance   · Required cueing to attend to task during oral phase    Pharyngeal Phase:   · Delayed swallow initiation across trials  · Immediate coughing following thin liquid  trials    Compensatory Strategies  · None    Treatment: Patient sitting up in bed with RN present during session. She demonstrated overt confusion throughout session, not appropriately responding to simple yes/no questions or following simple commands. SLP educated patient regarding recs, but she did not acknowledge teaching. Patient left in bed with RN present. Recs communicated to team.    Assessment:     Temitope Suero is a 82 y.o. female with an SLP diagnosis of Dysphagia and Cognitive-Linguistic Impairment.  ST to continue to follow.   Goals:   Multidisciplinary Problems     SLP Goals        Problem: SLP    Goal Priority Disciplines Outcome   SLP Goal     SLP Ongoing, Progressing   Description: Speech-Language Pathology Goals  Goals to be met by 5/13/22  1. Patient will participate in ongoing swallow assessment in order to determine safest least restrictive diet.   2. Patient will participate in a speech/language/cog eval.                    Plan:     · Patient to be seen:  4 x/week   · Plan of Care expires:  06/05/22  · Plan of Care reviewed with:  patient   · SLP Follow-Up:  Yes       Discharge recommendations:   (pending)   Barriers to Discharge:  None    Time Tracking:     SLP Treatment Date:   05/06/22  Speech Start Time:  0947  Speech Stop Time:  1000     Speech Total Time (min):  13 min    Billable Minutes: Eval Swallow and Oral Function 13    Xavi Craig CCC-SLP  Speech-Language Pathology  Pager: [phone]   05/06/2022

## 2022-05-06 NOTE — ASSESSMENT & PLAN NOTE
· 2D echo from 2018 with EF 30-35%  · Repeat 2D echo  · BNP 700s  · Continue Coreg 25mg PO BID  · Hold Lasix and Benazepril with MAC  · Monitor volume status closely

## 2022-05-06 NOTE — ASSESSMENT & PLAN NOTE
· Creatinine 2.2 on admit, baseline closer to 1.3 - 2.2 today  · Possibly infection induced and Lasix/Benazepril  · Gentle IVF bolus today  · Can consider urine studies and renal US if no improvement

## 2022-05-06 NOTE — PLAN OF CARE
Problem: Adult Inpatient Plan of Care  Goal: Plan of Care Review  Outcome: Ongoing, Progressing  Goal: Patient-Specific Goal (Individualized)  Outcome: Ongoing, Progressing  Goal: Absence of Hospital-Acquired Illness or Injury  Outcome: Ongoing, Progressing  Goal: Optimal Comfort and Wellbeing  Outcome: Ongoing, Progressing     Problem: Diabetes Comorbidity  Goal: Blood Glucose Level Within Targeted Range  Outcome: Ongoing, Progressing     Problem: Fluid Imbalance (Pneumonia)  Goal: Fluid Balance  Outcome: Ongoing, Progressing     Problem: Infection (Pneumonia)  Goal: Resolution of Infection Signs and Symptoms  Outcome: Ongoing, Progressing     Problem: Skin Injury Risk Increased  Goal: Skin Health and Integrity  Outcome: Ongoing, Progressing

## 2022-05-06 NOTE — PLAN OF CARE
Problem: SLP  Goal: SLP Goal  Description: Speech-Language Pathology Goals  Goals to be met by 5/13/22  1. Patient will participate in ongoing swallow assessment in order to determine safest least restrictive diet.   2. Patient will participate in a speech/language/cog eval.   Outcome: Ongoing, Progressing     Patient seen for a bedside swallow eval. SLP recommending a Puree Diet/Nectar-thick Liquids for the patient at this time.     Xavi Craig, CCC-SLP  Speech-Language Pathology  Pager: [phone]

## 2022-05-06 NOTE — HOSPITAL COURSE
Ms. Suero was admitted to Hospital Medicine for management of a toxic encephalopathy 2/2 LLL pneumonia.  She was started on Ceftriaxone/Azithro.  Her mentation was slow to improve.  SLP evaluated and cleared for diet.  She was given small IVF boluses for her MAC.  On the morning of 5/6, she was found to not be swallowing her food and chewing for a prolonged time.  She was made NPO.  She had a rapid response for diaphoresis and increased work of breathing.  Antibiotics were changed to Zosyn with concern for aspiration.  On 5/8, her mentation worsened again and she was not speaking or following commands.  2D echo was ordered that showed a large, solid LV thrombus.  Head CT was ordered to r/o a bleed prior to starting Heparin which was negative.  She was started on Heparin.  The following morning, she found to be in new onset atrial flutter with HR 120s.  Cardiology was consulted.  Coreg was changed to Metoprolol and Entresto/Hydralazine were added.  Vascular Neurology was consulted given persistent mentation changes and new LV thrombus with concern for an acute ischemia event.  She was started on Warfarin, goal INR 2-3.  MRI brain was negative for a stroke.  Her mentation continued to improve.  PT/OT say SNF.

## 2022-05-06 NOTE — CARE UPDATE
"RAPID RESPONSE NURSE AI ALERT       AI alert received.    Chart Reviewed: 05/06/2022, 4:25 PM    MRN: [id number]  Bed: 87109/41040 A    Dx: Toxic encephalopathy    Temitope Suero has a past medical history of Atrial flutter, Cancer of left breast, stage 2, Cataract, Cerebrovascular small vessel disease, Cervicalgia, Chronic anticoagulation - apixaban, Chronic systolic heart failure, CKD stage 3 due to type 2 diabetes mellitus, Embolic stroke involving left cerebellar artery, Embolic stroke involving right posterior cerebral artery, Encephalopathy, Essential hypertension, Glaucoma, Glaucoma suspect of both eyes, Hearing loss, sensorineural, Malignant hypertension, Mixed hyperlipidemia, Obesity, Paroxysmal atrial fibrillation, Stage 3 chronic kidney disease, Toxic multinodular goiter, Type 2 diabetes mellitus with stage 3 chronic kidney disease, without long-term current use of insulin, Vertebrobasilar dolichoectasia, and Vitamin D deficiency disease.    Last VS: BP (!) 144/82   Pulse 93   Temp 97.2 °F (36.2 °C) (Axillary)   Resp (!) 23   Ht 5' 7" (1.702 m)   Wt 64.4 kg (141 lb 15.6 oz)   SpO2 98%   Breastfeeding No   BMI 22.24 kg/m²     24H Vital Sign Range:  Temp:  [96.2 °F (35.7 °C)-98.6 °F (37 °C)]   Pulse:  []   Resp:  [16-23]   BP: (113-162)/()   SpO2:  [94 %-99 %]     Level of Consciousness (AVPU): responds to voice    Recent Labs     05/05/22  0914 05/06/22  0851 05/06/22  1215   WBC 9.61 10.43 8.58   HGB 13.4 15.4 13.7   HCT 44.6 51.2* 47.1    200 203       Recent Labs     05/05/22  0431 05/06/22  0851 05/06/22  1215   * 151* 149*   K 3.8 4.2 3.9   * 118* 115*   CO2 20* 19* 20*   CREATININE 2.2* 2.2* 2.2*   * 139* 230*   PHOS  --  4.3  --    MG 2.2 2.1  --         No results for input(s): PH, PCO2, PO2, HCO3, POCSATURATED, BE in the last 72 hours.     OXYGEN:        O2 Device (Oxygen Therapy): room air    MEWS score: 4    charge RN, [place] contacted. No concerns " verbalized at this time. Instructed to call [phone] for further concerns or assistance.    Gloria Boucher RN

## 2022-05-06 NOTE — CODE/ RAPID DOCUMENTATION
RAPID RESPONSE NURSE NOTE        Admit Date: 2022  LOS: 1  Code Status: Full Code   Date of Consult: 2022  : 1940  Age: 82 y.o.  Weight:   Wt Readings from Last 1 Encounters:   22 64.4 kg (141 lb 15.6 oz)     Sex: female  Race: Black or    Bed: 78550/02261 A:   MRN: [id number]  Time Rapid Response Team page Received: 1104  Time Rapid Response Team at Bedside: 1107  Time Rapid Response Team left Bedside: 1210  Was the patient discharged from an ICU this admission? No   Was the patient discharged from a PACU within last 24 hours? No   Did the patient receive conscious sedation/general anesthesia in last 24 hours? No  Was the patient in the ED within the past 24 hours? Yes  Was the patient on NIPPV within the past 24 hours? No   Did this progress into an ARC or CPA: no  Attending Physician: Janell Nelson MD  Primary Service: [facility] B       SITUATION    Notified by charge RN via phone call.  Reason for alert: extreme agitation, unable to obtain blood pressure, increased work of breathing  Called to evaluate the patient for neuro and respiratory    BACKGROUND     Why is the patient in the hospital?: Toxic encephalopathy    Patient has a past medical history of Atrial flutter, Cancer of left breast, stage 2, Cataract, Cerebrovascular small vessel disease, Cervicalgia, Chronic anticoagulation - apixaban, Chronic systolic heart failure, CKD stage 3 due to type 2 diabetes mellitus, Embolic stroke involving left cerebellar artery, Embolic stroke involving right posterior cerebral artery, Encephalopathy, Essential hypertension, Glaucoma, Glaucoma suspect of both eyes, Hearing loss, sensorineural, Malignant hypertension, Mixed hyperlipidemia, Obesity, Paroxysmal atrial fibrillation, Stage 3 chronic kidney disease, Toxic multinodular goiter, Type 2 diabetes mellitus with stage 3 chronic kidney disease, without long-term current use of insulin, Vertebrobasilar dolichoectasia, and  Vitamin D deficiency disease.    Last Vitals:  Temp: 97.2 °F (36.2 °C) (05/06 1430)  Pulse: 109 (05/06 1430)  Resp: 22 (05/06 1430)  BP: 144/82 (05/06 1430)  SpO2: 99 % (05/06 1430)    24 Hours Vitals Range:  Temp:  [96.2 °F (35.7 °C)-98.6 °F (37 °C)]   Pulse:  []   Resp:  [16-22]   BP: (113-162)/()   SpO2:  [94 %-99 %]     Labs:  Recent Labs     05/05/22  0914 05/06/22  0851 05/06/22  1215   WBC 9.61 10.43 8.58   HGB 13.4 15.4 13.7   HCT 44.6 51.2* 47.1    200 203       Recent Labs     05/05/22  0431 05/06/22  0851 05/06/22  1215   * 151* 149*   K 3.8 4.2 3.9   * 118* 115*   CO2 20* 19* 20*   CREATININE 2.2* 2.2* 2.2*   * 139* 230*   PHOS  --  4.3  --    MG 2.2 2.1  --         No results for input(s): PH, PCO2, PO2, HCO3, POCSATURATED, BE in the last 72 hours.     ASSESSMENT    Physical Exam    INTERVENTIONS    The patient was seen for a Neurological problem. Staff concerns included unexplained agitation or delirium. The following interventions were performed: POCT glucose and basic metabolic panel.  Upon arrival to bedside, pt extremely agitated and trying to get out of bed.  Pt moving all extremities but unable to answer questions.  Pt diaphoretic.  Unable to obtain blood pressure.  Dr. Omar Sharp came to bedside.  EKG ordered and obtained and shown to Dr. Nelson.  CXR obtained.  Pt placed on 2L NC.  Pt's agitation subsided, but remained tachypnic.  We were eventually able to get a saturation of 100% via portable oximeter.  Labs ordered per Dr. Nelson.  500mL bolus ordered to be given over 2 hours due to pt's cardiac history.  Echo ordered yesterday pending.  Dr. Nelson suspecting aspiration and antibiotics adjusted.  Upon departure pt's vitals: 139/90 (111), HR 82, RR 30, 100% oxygen saturation.     RECOMMENDATIONS    We recommend: telemetry monitoring, lactic acid, troponin, CBC, CXR, EKG, transfer to step-down unit    PROVIDER ESCALATION    Orders received and case  discussed with Dr. Nelson.    Disposition: Tx to [facility], bed [address]203.    FOLLOW UP    bedside RNSneha updated on plan of care. Instructed to call the Rapid Response Nurse, Gloria Boucher RN at [phone] for additional questions or concerns.

## 2022-05-06 NOTE — PT/OT/SLP PROGRESS
Occupational Therapy      Patient Name:  Temitope Suero   MRN:  [id number]    Patient not seen today secondary to RN reporting Rapid Response called,  Transfer to ICU pending, awaiting clearance. OT will follow-up as inidcated.    5/6/2022

## 2022-05-06 NOTE — PROGRESS NOTES
Pharmacist Renal Dose Adjustment Note    Temitope Suero is a 82 y.o. female being treated with the medication piperacillin-tazobactam.    Patient Data:    Vital Signs (Most Recent):  Temp: 97.4 °F (36.3 °C) (05/06/22 1058)  Pulse: 104 (05/06/22 1058)  Resp: 18 (05/06/22 1058)  BP: 113/83 (05/06/22 1058)  SpO2: 99 % (05/06/22 0807) Vital Signs (72h Range):  Temp:  [96.2 °F (35.7 °C)-98.6 °F (37 °C)]   Pulse:  []   Resp:  [13-20]   BP: (113-166)/()   SpO2:  [93 %-100 %]      Recent Labs   Lab 05/05/22  0431 05/06/22  0851   CREATININE 2.2* 2.2*     Serum creatinine: 2.2 mg/dL (H) 05/06/22 0851  Estimated creatinine clearance: 19.5 mL/min (A)    Medication: Piperacillin-tazobactam will be renally adjusted to 4.5 g Q12H (CrCl < 20 mL/min).    Pharmacist's Name: Ralph Alvarado  Pharmacist's Extension: [phone]

## 2022-05-06 NOTE — SIGNIFICANT EVENT
Hospital Medicine  Significant Event Note      Patient Name: Temitope Suero  MRN:  [id number]  [place] Medicine [place] [facility] [facility] Janell Nelson MD  Date of Admission:  5/5/2022     Principal Problem:  Toxic encephalopathy        History of Present Illness:  Ms. Temitope Suero is a 82 y.o. female who is currently admitted to Hospital Medicine for toxic encephalopathy 2/2 LLL pneumonia, also found to have a type 2 demand NSTEMI and MAC.  Mentation improved today that she was able to eat breakfast, but would not swallow her food but continued to chew.  She was made NPO and did not receive her PO meds.  She was evaluated by SLP later and cleared for diet.      Around 11am, called to bedside for increased work of breathing, agitation, and diaphoresis.  Rapid Response and bedside RN and LPN at bedside.  On arrival, BP unable to be achieved manually.  500cc LR bolus ordered.  CXR ordered.  Placed on 2L NC.  EKG ordered with afib, HR 93.  Sugar 247, given insulin.  Labs ordered and antibiotics upescalated to Zosyn.   BP calculated on the leg as 130s after receiving partial bolus.  Mentation improved and breathing settled.      Physical Exam:    Temp:  [96.2 °F (35.7 °C)-98.6 °F (37 °C)]   Pulse:  []   Resp:  [16-20]   BP: (113-166)/()   SpO2:  [94 %-100 %]     Constitutional: Appears lethargic  Cardiovascular: Normal heart rate.  Afib  Pulmonary/Chest: Increased work of breathing, accessory muscle use  Abdominal: Soft. Bowel sounds are normal.  No distension.  No tenderness  Neurological: Making eye contact, says occasional words      Plan:   CXR   EKG   500cc LR bolus   CBC, CMP, Trop, Lactic   Change Ceftriaxone/Azithro to Zosyn for aspiration coverage   2D echo pending from yesterday   Transfer to SD unit      Plan of care discussed with bedside RN DEB Amaro      Critical Care Time: 60 minutes    Critical Care was time spent personally by me on the following activities: evaluating this  patient's organ dysfunction, development of treatment plan, discussing treatment plan with patient or surrogate and bedside caregivers, discussions with consultants, evaluation of patient's response to treatment, examination of patient, ordering and performing treatments and interventions, ordering and review of laboratory studies, ordering and review of radiographic studies, re-evaluation of patient's condition. This critical care time did not overlap with that of any other provider or involve time for any separately billed procedures    Diagnosis requiring critical care: Acute Respiratory Failure      Janell Nelson MD  [facility]

## 2022-05-06 NOTE — PLAN OF CARE
Pt was dc'd to the ICU floor prior to Sw completing dc assessment, Sw/CM to follow on 14th floor.

## 2022-05-06 NOTE — PT/OT/SLP PROGRESS
Physical Therapy      Patient Name:  Temitope Suero   MRN:  [id number]    Patient not seen today secondary to: attempted eval in AM, pt not alert. Attempted eval in PM, pt being stepped up per nsg. Will need new PT orders when medically appropriate.    5/6/2022  Saida Fortune, PT

## 2022-05-07 PROBLEM — I51.3 LV (LEFT VENTRICULAR) MURAL THROMBUS: Status: ACTIVE | Noted: 2022-05-07

## 2022-05-07 LAB
ALBUMIN SERPL BCP-MCNC: 2.6 G/DL (ref 3.5–5.2)
ALP SERPL-CCNC: 118 U/L (ref 55–135)
ALT SERPL W/O P-5'-P-CCNC: 13 U/L (ref 10–44)
ANION GAP SERPL CALC-SCNC: 10 MMOL/L (ref 8–16)
ASCENDING AORTA: 3.63 CM
AST SERPL-CCNC: 16 U/L (ref 10–40)
AV INDEX (PROSTH): 0.52
AV MEAN GRADIENT: 2 MMHG
AV PEAK GRADIENT: 3 MMHG
AV VALVE AREA: 1.56 CM2
AV VELOCITY RATIO: 0.7
BASOPHILS # BLD AUTO: 0.02 K/UL (ref 0–0.2)
BASOPHILS NFR BLD: 0.3 % (ref 0–1.9)
BILIRUB SERPL-MCNC: 0.6 MG/DL (ref 0.1–1)
BSA FOR ECHO PROCEDURE: 1.74 M2
BUN SERPL-MCNC: 59 MG/DL (ref 8–23)
CALCIUM SERPL-MCNC: 8.6 MG/DL (ref 8.7–10.5)
CHLORIDE SERPL-SCNC: 116 MMOL/L (ref 95–110)
CO2 SERPL-SCNC: 25 MMOL/L (ref 23–29)
CREAT SERPL-MCNC: 2.3 MG/DL (ref 0.5–1.4)
CV ECHO LV RWT: 0.34 CM
DIFFERENTIAL METHOD: ABNORMAL
DOP CALC AO PEAK VEL: 0.88 M/S
DOP CALC AO VTI: 17.82 CM
DOP CALC LVOT AREA: 3 CM2
DOP CALC LVOT DIAMETER: 1.96 CM
DOP CALC LVOT PEAK VEL: 0.62 M/S
DOP CALC LVOT STROKE VOLUME: 27.86 CM3
DOP CALCLVOT PEAK VEL VTI: 9.24 CM
E WAVE DECELERATION TIME: 183.72 MSEC
E/A RATIO: 3.17
E/E' RATIO: 26.29 M/S
ECHO LV POSTERIOR WALL: 0.86 CM (ref 0.6–1.1)
EJECTION FRACTION: 25 %
EOSINOPHIL # BLD AUTO: 0.1 K/UL (ref 0–0.5)
EOSINOPHIL NFR BLD: 1.5 % (ref 0–8)
ERYTHROCYTE [DISTWIDTH] IN BLOOD BY AUTOMATED COUNT: 17.4 % (ref 11.5–14.5)
EST. GFR  (AFRICAN AMERICAN): 22.3 ML/MIN/1.73 M^2
EST. GFR  (NON AFRICAN AMERICAN): 19.3 ML/MIN/1.73 M^2
FRACTIONAL SHORTENING: 16 % (ref 28–44)
GLUCOSE SERPL-MCNC: 124 MG/DL (ref 70–110)
HCT VFR BLD AUTO: 44.7 % (ref 37–48.5)
HGB BLD-MCNC: 13.4 G/DL (ref 12–16)
IMM GRANULOCYTES # BLD AUTO: 0.04 K/UL (ref 0–0.04)
IMM GRANULOCYTES NFR BLD AUTO: 0.5 % (ref 0–0.5)
INTERVENTRICULAR SEPTUM: 0.78 CM (ref 0.6–1.1)
IVRT: 68.51 MSEC
LA MAJOR: 6.45 CM
LA MINOR: 7 CM
LA WIDTH: 4.45 CM
LACTATE SERPL-SCNC: 1.6 MMOL/L (ref 0.5–2.2)
LEFT ATRIUM SIZE: 4.42 CM
LEFT ATRIUM VOLUME INDEX MOD: 61.5 ML/M2
LEFT ATRIUM VOLUME INDEX: 64.5 ML/M2
LEFT ATRIUM VOLUME MOD: 107 CM3
LEFT ATRIUM VOLUME: 112.24 CM3
LEFT INTERNAL DIMENSION IN SYSTOLE: 4.26 CM (ref 2.1–4)
LEFT VENTRICLE DIASTOLIC VOLUME INDEX: 69.56 ML/M2
LEFT VENTRICLE DIASTOLIC VOLUME: 121.03 ML
LEFT VENTRICLE MASS INDEX: 82 G/M2
LEFT VENTRICLE SYSTOLIC VOLUME INDEX: 46.6 ML/M2
LEFT VENTRICLE SYSTOLIC VOLUME: 81.09 ML
LEFT VENTRICULAR INTERNAL DIMENSION IN DIASTOLE: 5.05 CM (ref 3.5–6)
LEFT VENTRICULAR MASS: 142.56 G
LV LATERAL E/E' RATIO: 23 M/S
LV SEPTAL E/E' RATIO: 30.67 M/S
LYMPHOCYTES # BLD AUTO: 1 K/UL (ref 1–4.8)
LYMPHOCYTES NFR BLD: 12.4 % (ref 18–48)
MCH RBC QN AUTO: 25.1 PG (ref 27–31)
MCHC RBC AUTO-ENTMCNC: 30 G/DL (ref 32–36)
MCV RBC AUTO: 84 FL (ref 82–98)
MONOCYTES # BLD AUTO: 0.7 K/UL (ref 0.3–1)
MONOCYTES NFR BLD: 8.9 % (ref 4–15)
MV PEAK A VEL: 0.29 M/S
MV PEAK E VEL: 0.92 M/S
MV STENOSIS PRESSURE HALF TIME: 53.28 MS
MV VALVE AREA P 1/2 METHOD: 4.13 CM2
NEUTROPHILS # BLD AUTO: 6.1 K/UL (ref 1.8–7.7)
NEUTROPHILS NFR BLD: 76.4 % (ref 38–73)
NRBC BLD-RTO: 0 /100 WBC
PISA TR MAX VEL: 2.7 M/S
PLATELET # BLD AUTO: 167 K/UL (ref 150–450)
PMV BLD AUTO: ABNORMAL FL (ref 9.2–12.9)
POCT GLUCOSE: 165 MG/DL (ref 70–110)
POCT GLUCOSE: 170 MG/DL (ref 70–110)
POCT GLUCOSE: 247 MG/DL (ref 70–110)
POTASSIUM SERPL-SCNC: 4 MMOL/L (ref 3.5–5.1)
PROT SERPL-MCNC: 5.1 G/DL (ref 6–8.4)
RA MAJOR: 5.3 CM
RA PRESSURE: 3 MMHG
RA WIDTH: 4.2 CM
RBC # BLD AUTO: 5.34 M/UL (ref 4–5.4)
RIGHT ATRIAL AREA: 20.2 CM2
RIGHT VENTRICULAR END-DIASTOLIC DIMENSION: 2.63 CM
SINUS: 2.93 CM
SODIUM SERPL-SCNC: 151 MMOL/L (ref 136–145)
STJ: 2.4 CM
TDI LATERAL: 0.04 M/S
TDI SEPTAL: 0.03 M/S
TDI: 0.04 M/S
TR MAX PG: 29 MMHG
TRICUSPID ANNULAR PLANE SYSTOLIC EXCURSION: 1.95 CM
TV REST PULMONARY ARTERY PRESSURE: 32 MMHG
WBC # BLD AUTO: 7.99 K/UL (ref 3.9–12.7)

## 2022-05-07 PROCEDURE — 99233 PR SUBSEQUENT HOSPITAL CARE,LEVL III: ICD-10-PCS | Mod: ,,, | Performed by: STUDENT IN AN ORGANIZED HEALTH CARE EDUCATION/TRAINING PROGRAM

## 2022-05-07 PROCEDURE — 99233 SBSQ HOSP IP/OBS HIGH 50: CPT | Mod: ,,, | Performed by: STUDENT IN AN ORGANIZED HEALTH CARE EDUCATION/TRAINING PROGRAM

## 2022-05-07 PROCEDURE — 63600175 PHARM REV CODE 636 W HCPCS: Performed by: HOSPITALIST

## 2022-05-07 PROCEDURE — 80053 COMPREHEN METABOLIC PANEL: CPT | Performed by: HOSPITALIST

## 2022-05-07 PROCEDURE — 36415 COLL VENOUS BLD VENIPUNCTURE: CPT | Performed by: HOSPITALIST

## 2022-05-07 PROCEDURE — 85025 COMPLETE CBC W/AUTO DIFF WBC: CPT | Performed by: HOSPITALIST

## 2022-05-07 PROCEDURE — 20600001 HC STEP DOWN PRIVATE ROOM

## 2022-05-07 PROCEDURE — 83605 ASSAY OF LACTIC ACID: CPT | Performed by: HOSPITALIST

## 2022-05-07 PROCEDURE — 25000003 PHARM REV CODE 250: Performed by: HOSPITALIST

## 2022-05-07 RX ADMIN — ENOXAPARIN SODIUM 30 MG: 30 INJECTION, SOLUTION INTRAVENOUS; SUBCUTANEOUS at 05:05

## 2022-05-07 RX ADMIN — PIPERACILLIN SODIUM AND TAZOBACTAM SODIUM 4.5 G: 4; .5 INJECTION, POWDER, LYOPHILIZED, FOR SOLUTION INTRAVENOUS at 01:05

## 2022-05-07 RX ADMIN — ATORVASTATIN CALCIUM 40 MG: 40 TABLET, FILM COATED ORAL at 10:05

## 2022-05-07 NOTE — ASSESSMENT & PLAN NOTE
· 2D echo from 2018 with EF 30-35%  · Repeat TTE on 5/6 shows EF 25-30%, G3DD, severe LAE, normal CVP, LV thrombus  · Continue Coreg 25mg PO BID  · Hold Lasix and Benazepril with MAC, euvolemia  · Monitor volume status closely

## 2022-05-07 NOTE — PLAN OF CARE
Problem: Adult Inpatient Plan of Care  Goal: Plan of Care Review  Outcome: Ongoing, Progressing  Goal: Patient-Specific Goal (Individualized)  Outcome: Ongoing, Progressing  Goal: Absence of Hospital-Acquired Illness or Injury  Outcome: Ongoing, Progressing  Goal: Optimal Comfort and Wellbeing  Outcome: Ongoing, Progressing  Goal: Readiness for Transition of Care  Outcome: Ongoing, Progressing     Problem: Diabetes Comorbidity  Goal: Blood Glucose Level Within Targeted Range  Outcome: Ongoing, Progressing     Problem: Fluid and Electrolyte Imbalance (Acute Kidney Injury/Impairment)  Goal: Fluid and Electrolyte Balance  Outcome: Ongoing, Progressing     Problem: Oral Intake Inadequate (Acute Kidney Injury/Impairment)  Goal: Optimal Nutrition Intake  Outcome: Ongoing, Progressing     Problem: Renal Function Impairment (Acute Kidney Injury/Impairment)  Goal: Effective Renal Function  Outcome: Ongoing, Progressing     Problem: Fluid Imbalance (Pneumonia)  Goal: Fluid Balance  Outcome: Ongoing, Progressing     Problem: Infection (Pneumonia)  Goal: Resolution of Infection Signs and Symptoms  Outcome: Ongoing, Progressing     Problem: Respiratory Compromise (Pneumonia)  Goal: Effective Oxygenation and Ventilation  Outcome: Ongoing, Progressing     Problem: Skin Injury Risk Increased  Goal: Skin Health and Integrity  Outcome: Ongoing, Progressing     Problem: Impaired Wound Healing  Goal: Optimal Wound Healing  Outcome: Ongoing, Progressing

## 2022-05-07 NOTE — ASSESSMENT & PLAN NOTE
· Likely 2/2 pneumonia, more suspicious for CVA given finding of LV thrombus  · Repeat CTH today given worsening mental status compared to yesterday, r/o hemorrhage prior to starting heparin gtt  · Obtain MRI w/wo if CTH negative  · Will make NPO again today pending repeat SLP eval

## 2022-05-07 NOTE — SUBJECTIVE & OBJECTIVE
Interval History:   NAEON. This morning Ms. Suero is awake and alert, answering straightforward questions, but oriented x 0 and unable to follow simple commands. She states she feels well and denies any focal neuro deficits, denies dyspnea/cough.    Review of Systems   Unable to perform ROS: Mental status change   Objective:     Vital Signs (Most Recent):  Temp: 98.3 °F (36.8 °C) (05/07/22 1136)  Pulse: 67 (05/07/22 1240)  Resp: 18 (05/07/22 1136)  BP: (!) 172/80 (05/07/22 1240)  SpO2: 97 % (05/07/22 1136)   Vital Signs (24h Range):  Temp:  [97.2 °F (36.2 °C)-98.3 °F (36.8 °C)] 98.3 °F (36.8 °C)  Pulse:  [] 67  Resp:  [16-23] 18  SpO2:  [88 %-99 %] 97 %  BP: ()/(68-93) 172/80     Weight: 64 kg (141 lb)  Body mass index is 22.08 kg/m².    Intake/Output Summary (Last 24 hours) at 5/7/2022 1435  Last data filed at 5/6/2022 1645  Gross per 24 hour   Intake 100 ml   Output --   Net 100 ml      Physical Exam  Constitutional:       General: She is not in acute distress.     Appearance: Normal appearance. She is well-developed. She is not ill-appearing.   HENT:      Head: Normocephalic and atraumatic.   Cardiovascular:      Rate and Rhythm: Normal rate and regular rhythm.      Heart sounds: No murmur heard.  Pulmonary:      Effort: Pulmonary effort is normal. No respiratory distress.      Breath sounds: Normal breath sounds. No wheezing or rales.   Abdominal:      General: There is no distension.      Palpations: Abdomen is soft.      Tenderness: There is no abdominal tenderness.   Musculoskeletal:         General: No deformity.   Skin:     General: Skin is warm.   Neurological:      General: No focal deficit present.      Mental Status: She is alert. She is disoriented.      Comments: Makes eye contact, but wont speak or follow directions       Significant Labs: All pertinent labs within the past 24 hours have been reviewed.    Significant Imaging: I have reviewed all pertinent imaging results/findings within  the past 24 hours.

## 2022-05-07 NOTE — CARE UPDATE
RAPID RESPONSE NURSE ROUND       Rounding completed with charge RN, [place]. No concerns verbalized at this time. Instructed to call [phone] for further concerns or assistance.

## 2022-05-07 NOTE — CARE UPDATE
RAPID RESPONSE NURSE FOLLOW-UP NOTE       Followed up with patient for a rapid response.  No acute issues at this time. Reviewed plan of care with charge RNSue, bedside RN Clara.   Team will continue to follow.  Please call Rapid Response RN, Selma Thomas RN with any questions or concerns at [phone].

## 2022-05-07 NOTE — ASSESSMENT & PLAN NOTE
· CXR with LLL PNA  · Satting in the 90s on room air  · Afebrile and without leukocytosis but Lactic 2.3 on admission that trended down to 2.2  · Started on Ceftriaxone and Azithro, switched to Zosyn on 5/6

## 2022-05-07 NOTE — ASSESSMENT & PLAN NOTE
· Creatinine 2.2 on admit, baseline closer to 1.3 - 2.2 today  · Possibly infection induced and Lasix/Benazepril  · Caution with IVF given CHF  · Renal US notable for chronic medical renal disease and 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

## 2022-05-07 NOTE — PLAN OF CARE
Pt is confused not following command, stable, RA. No pain observed,bed alarm and tele sitter in place. Call light within reach              Problem: Adult Inpatient Plan of Care  Goal: Plan of Care Review  Outcome: Ongoing, Progressing  Goal: Patient-Specific Goal (Individualized)  Outcome: Ongoing, Progressing  Goal: Absence of Hospital-Acquired Illness or Injury  Outcome: Ongoing, Progressing  Goal: Optimal Comfort and Wellbeing  Outcome: Ongoing, Progressing  Goal: Readiness for Transition of Care  Outcome: Ongoing, Progressing

## 2022-05-07 NOTE — PROGRESS NOTES
[facility] - Intensive Care ([place] [address])  [facility] Medicine  Progress Note    Patient Name: Temitope Suero  MRN: [id number]  Patient Class: IP- Inpatient   Admission Date: 5/5/2022  Length of Stay: 2 days  Attending Physician: Geoffrey Barahona MD  Primary Care Provider: Gm Zambrano MD        Subjective:     Principal Problem:Toxic encephalopathy        HPI:  Ms. Temitope Suero is a 82 y.o. female who presents to the ER by family for evaluation of a fall, AMS, and increased work of breath.  History is obtained from family Shelby, as patient is confused.  She reports that over the last 2 does - when she does talk, she wouldn't make sense.  She normally talks well and has fluent speech, but she does have some memory loss from an old stroke.  She has been having a dry cough, no fevers or chills - but has reported foul smelling urine.  She reports decreased PO intake the last week and that her mouth has been dry.  She did have a fall, but didn't hit her head.  She normally ambulates without DME.    Upon arrival to the ER, vitals were temp 98.4F, , /70 satting 96% on room air.  CXR showed a LLL PNA.  Head CT showed chronic ischemic changes.  Labs were notable for Trop 0.322, Creatinine 2.2, and Lactic 2.3.  She was given Vanc and Ceftriaxone and was admitted to Hospital Medicine.      Overview/Hospital Course:  Ms. Suero was admitted to Hospital Medicine for management of a toxic encephalopathy 2/2 LLL pneumonia.  She was started on Ceftriaxone/Azithro.  Her mentation was slow to improve.  SLP evaluated and cleared for diet.  She was given small IVF boluses for her MAC.      Interval History:   NAEON. This morning Ms. Suero is awake and alert, answering straightforward questions, but oriented x 0 and unable to follow simple commands. She states she feels well and denies any focal neuro deficits, denies dyspnea/cough.    Review of Systems   Unable to perform ROS: Mental status change   Objective:     Vital Signs (Most  Recent):  Temp: 98.3 °F (36.8 °C) (05/07/22 1136)  Pulse: 67 (05/07/22 1240)  Resp: 18 (05/07/22 1136)  BP: (!) 172/80 (05/07/22 1240)  SpO2: 97 % (05/07/22 1136)   Vital Signs (24h Range):  Temp:  [97.2 °F (36.2 °C)-98.3 °F (36.8 °C)] 98.3 °F (36.8 °C)  Pulse:  [] 67  Resp:  [16-23] 18  SpO2:  [88 %-99 %] 97 %  BP: ()/(68-93) 172/80     Weight: 64 kg (141 lb)  Body mass index is 22.08 kg/m².    Intake/Output Summary (Last 24 hours) at 5/7/2022 1435  Last data filed at 5/6/2022 1645  Gross per 24 hour   Intake 100 ml   Output --   Net 100 ml      Physical Exam  Constitutional:       General: She is not in acute distress.     Appearance: Normal appearance. She is well-developed. She is not ill-appearing.   HENT:      Head: Normocephalic and atraumatic.   Cardiovascular:      Rate and Rhythm: Normal rate and regular rhythm.      Heart sounds: No murmur heard.  Pulmonary:      Effort: Pulmonary effort is normal. No respiratory distress.      Breath sounds: Normal breath sounds. No wheezing or rales.   Abdominal:      General: There is no distension.      Palpations: Abdomen is soft.      Tenderness: There is no abdominal tenderness.   Musculoskeletal:         General: No deformity.   Skin:     General: Skin is warm.   Neurological:      General: No focal deficit present.      Mental Status: She is alert. She is disoriented.      Comments: Makes eye contact, but wont speak or follow directions       Significant Labs: All pertinent labs within the past 24 hours have been reviewed.    Significant Imaging: I have reviewed all pertinent imaging results/findings within the past 24 hours.      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  · As above      Left lower lobe pneumonia  · CXR with LLL PNA  · Satting in the 90s on room air  · Afebrile and without leukocytosis but Lactic 2.3 on admission that trended down to 2.2  · Started on Ceftriaxone and Azithro, switched to Zosyn on 5/6      NSTEMI (non-ST elevated myocardial infarction)  · Trop 0.322 on admit and flat  · No reports of chest pain  · EKG not ischemic  · Likely demand 2/2 infection  · Monitor      Acute kidney injury superimposed on CKD  · Creatinine 2.2 on admit, baseline closer to 1.3 - 2.2 today  · Possibly infection induced and Lasix/Benazepril  · Caution with IVF given CHF  · Renal US notable for chronic medical renal disease and 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    Type 2 diabetes mellitus with stage 3 chronic kidney disease, without long-term current use of insulin  · HbA1c 7  · Home DM regimen:  Diet controlled  · SSI with POCT accuchecks to achieve blood glucose of 140-180 while hospitalized  · Diabetic diet, purred and nectar thick per SLP        Deterioration of memory  · Chronic issue  · Continue Aricept      Chronic systolic heart failure  · 2D echo from 2018 with EF 30-35%  · Repeat TTE on 5/6 shows EF 25-30%, G3DD, severe LAE, normal CVP, LV thrombus  · Continue Coreg 25mg PO BID  · Hold Lasix and Benazepril with MAC, euvolemia  · Monitor volume status closely    Chronic anticoagulation - apixaban  · Chronic and stable  · Continue Eliquis 2.5mg PO BID      Stage 3 chronic kidney disease  · Chronic issue  · See MAC below      History of stroke  · Chronic and stable  · Head CT without acute infarct  · Continue Lipitor and Eliquis      Mixed hyperlipidemia  · Chronic and stable  · Continue Lipitor 40mg PO daily      Paroxysmal atrial fibrillation  · Chronic and stable  · Home  Eliquis 2.5 BID held on 5/6  · Continue Coreg 25mg PO BID        VTE Risk Mitigation (From admission, onward)         Ordered     enoxaparin injection 30 mg  Daily         05/06/22 1230                Discharge Planning   WALDEMAR:      Code Status: Full Code   Is the patient medically ready for discharge?:     Reason for patient still in hospital (select all that apply): Patient trending condition                     Geoffrey Barahona MD  Department of Hospital Medicine   [facility] - Intensive Care ([place]-14)

## 2022-05-08 PROBLEM — I48.92 ATRIAL FLUTTER: Status: ACTIVE | Noted: 2022-05-08

## 2022-05-08 LAB
ALBUMIN SERPL BCP-MCNC: 2.6 G/DL (ref 3.5–5.2)
ALP SERPL-CCNC: 108 U/L (ref 55–135)
ALT SERPL W/O P-5'-P-CCNC: 13 U/L (ref 10–44)
ANION GAP SERPL CALC-SCNC: 11 MMOL/L (ref 8–16)
APTT BLDCRRT: 133 SEC (ref 21–32)
APTT BLDCRRT: 25.9 SEC (ref 21–32)
APTT BLDCRRT: 89.7 SEC (ref 21–32)
AST SERPL-CCNC: 17 U/L (ref 10–40)
BASOPHILS # BLD AUTO: 0.01 K/UL (ref 0–0.2)
BASOPHILS NFR BLD: 0.1 % (ref 0–1.9)
BILIRUB SERPL-MCNC: 0.6 MG/DL (ref 0.1–1)
BUN SERPL-MCNC: 49 MG/DL (ref 8–23)
CALCIUM SERPL-MCNC: 8.5 MG/DL (ref 8.7–10.5)
CHLORIDE SERPL-SCNC: 120 MMOL/L (ref 95–110)
CO2 SERPL-SCNC: 20 MMOL/L (ref 23–29)
CREAT SERPL-MCNC: 1.8 MG/DL (ref 0.5–1.4)
DIFFERENTIAL METHOD: ABNORMAL
EOSINOPHIL # BLD AUTO: 0.1 K/UL (ref 0–0.5)
EOSINOPHIL NFR BLD: 1.6 % (ref 0–8)
ERYTHROCYTE [DISTWIDTH] IN BLOOD BY AUTOMATED COUNT: 16.4 % (ref 11.5–14.5)
EST. GFR  (AFRICAN AMERICAN): 30 ML/MIN/1.73 M^2
EST. GFR  (NON AFRICAN AMERICAN): 26 ML/MIN/1.73 M^2
GLUCOSE SERPL-MCNC: 119 MG/DL (ref 70–110)
HCT VFR BLD AUTO: 40.2 % (ref 37–48.5)
HGB BLD-MCNC: 12.3 G/DL (ref 12–16)
IMM GRANULOCYTES # BLD AUTO: 0.03 K/UL (ref 0–0.04)
IMM GRANULOCYTES NFR BLD AUTO: 0.4 % (ref 0–0.5)
INR PPP: 1.1 (ref 0.8–1.2)
LYMPHOCYTES # BLD AUTO: 0.9 K/UL (ref 1–4.8)
LYMPHOCYTES NFR BLD: 12.1 % (ref 18–48)
MCH RBC QN AUTO: 24.6 PG (ref 27–31)
MCHC RBC AUTO-ENTMCNC: 30.6 G/DL (ref 32–36)
MCV RBC AUTO: 81 FL (ref 82–98)
MONOCYTES # BLD AUTO: 0.6 K/UL (ref 0.3–1)
MONOCYTES NFR BLD: 8 % (ref 4–15)
NEUTROPHILS # BLD AUTO: 5.7 K/UL (ref 1.8–7.7)
NEUTROPHILS NFR BLD: 77.8 % (ref 38–73)
NRBC BLD-RTO: 0 /100 WBC
PLATELET # BLD AUTO: 199 K/UL (ref 150–450)
PMV BLD AUTO: 12.6 FL (ref 9.2–12.9)
POCT GLUCOSE: 150 MG/DL (ref 70–110)
POCT GLUCOSE: 152 MG/DL (ref 70–110)
POCT GLUCOSE: 159 MG/DL (ref 70–110)
POCT GLUCOSE: 159 MG/DL (ref 70–110)
POTASSIUM SERPL-SCNC: 3.2 MMOL/L (ref 3.5–5.1)
PROT SERPL-MCNC: 5.4 G/DL (ref 6–8.4)
PROTHROMBIN TIME: 11.7 SEC (ref 9–12.5)
RBC # BLD AUTO: 4.99 M/UL (ref 4–5.4)
SODIUM SERPL-SCNC: 151 MMOL/L (ref 136–145)
WBC # BLD AUTO: 7.29 K/UL (ref 3.9–12.7)

## 2022-05-08 PROCEDURE — 85730 THROMBOPLASTIN TIME PARTIAL: CPT | Mod: 91 | Performed by: STUDENT IN AN ORGANIZED HEALTH CARE EDUCATION/TRAINING PROGRAM

## 2022-05-08 PROCEDURE — 85025 COMPLETE CBC W/AUTO DIFF WBC: CPT | Performed by: INTERNAL MEDICINE

## 2022-05-08 PROCEDURE — 85730 THROMBOPLASTIN TIME PARTIAL: CPT | Mod: 91 | Performed by: HOSPITALIST

## 2022-05-08 PROCEDURE — 25000003 PHARM REV CODE 250: Performed by: STUDENT IN AN ORGANIZED HEALTH CARE EDUCATION/TRAINING PROGRAM

## 2022-05-08 PROCEDURE — 93005 ELECTROCARDIOGRAM TRACING: CPT

## 2022-05-08 PROCEDURE — 85730 THROMBOPLASTIN TIME PARTIAL: CPT | Performed by: INTERNAL MEDICINE

## 2022-05-08 PROCEDURE — 25000003 PHARM REV CODE 250: Performed by: HOSPITALIST

## 2022-05-08 PROCEDURE — 92526 ORAL FUNCTION THERAPY: CPT

## 2022-05-08 PROCEDURE — 20600001 HC STEP DOWN PRIVATE ROOM

## 2022-05-08 PROCEDURE — 85610 PROTHROMBIN TIME: CPT | Performed by: INTERNAL MEDICINE

## 2022-05-08 PROCEDURE — 99223 PR INITIAL HOSPITAL CARE,LEVL III: ICD-10-PCS | Mod: ,,, | Performed by: INTERNAL MEDICINE

## 2022-05-08 PROCEDURE — 93010 ELECTROCARDIOGRAM REPORT: CPT | Mod: ,,, | Performed by: INTERNAL MEDICINE

## 2022-05-08 PROCEDURE — 25000003 PHARM REV CODE 250: Performed by: INTERNAL MEDICINE

## 2022-05-08 PROCEDURE — 99233 SBSQ HOSP IP/OBS HIGH 50: CPT | Mod: ,,, | Performed by: HOSPITALIST

## 2022-05-08 PROCEDURE — 80053 COMPREHEN METABOLIC PANEL: CPT | Performed by: HOSPITALIST

## 2022-05-08 PROCEDURE — 36415 COLL VENOUS BLD VENIPUNCTURE: CPT | Performed by: HOSPITALIST

## 2022-05-08 PROCEDURE — 99222 1ST HOSP IP/OBS MODERATE 55: CPT | Mod: ,,, | Performed by: PSYCHIATRY & NEUROLOGY

## 2022-05-08 PROCEDURE — 99222 PR INITIAL HOSPITAL CARE,LEVL II: ICD-10-PCS | Mod: ,,, | Performed by: PSYCHIATRY & NEUROLOGY

## 2022-05-08 PROCEDURE — 63600175 PHARM REV CODE 636 W HCPCS: Performed by: HOSPITALIST

## 2022-05-08 PROCEDURE — 63600175 PHARM REV CODE 636 W HCPCS: Performed by: INTERNAL MEDICINE

## 2022-05-08 PROCEDURE — 99233 PR SUBSEQUENT HOSPITAL CARE,LEVL III: ICD-10-PCS | Mod: ,,, | Performed by: HOSPITALIST

## 2022-05-08 PROCEDURE — 99223 1ST HOSP IP/OBS HIGH 75: CPT | Mod: ,,, | Performed by: INTERNAL MEDICINE

## 2022-05-08 PROCEDURE — 94761 N-INVAS EAR/PLS OXIMETRY MLT: CPT

## 2022-05-08 PROCEDURE — 93010 EKG 12-LEAD: ICD-10-PCS | Mod: ,,, | Performed by: INTERNAL MEDICINE

## 2022-05-08 RX ORDER — WARFARIN SODIUM 5 MG/1
5 TABLET ORAL DAILY
Status: DISCONTINUED | OUTPATIENT
Start: 2022-05-08 | End: 2022-05-11

## 2022-05-08 RX ORDER — POTASSIUM CHLORIDE 7.45 MG/ML
10 INJECTION INTRAVENOUS
Status: COMPLETED | OUTPATIENT
Start: 2022-05-08 | End: 2022-05-08

## 2022-05-08 RX ORDER — MUPIROCIN 20 MG/G
OINTMENT TOPICAL 2 TIMES DAILY
Status: COMPLETED | OUTPATIENT
Start: 2022-05-08 | End: 2022-05-12

## 2022-05-08 RX ORDER — METOPROLOL TARTRATE 25 MG/1
25 TABLET, FILM COATED ORAL EVERY 6 HOURS
Status: DISCONTINUED | OUTPATIENT
Start: 2022-05-08 | End: 2022-05-10

## 2022-05-08 RX ORDER — HYDRALAZINE HYDROCHLORIDE 50 MG/1
50 TABLET, FILM COATED ORAL EVERY 8 HOURS
Status: DISCONTINUED | OUTPATIENT
Start: 2022-05-08 | End: 2022-05-19 | Stop reason: HOSPADM

## 2022-05-08 RX ORDER — HEPARIN SODIUM,PORCINE/D5W 25000/250
0-40 INTRAVENOUS SOLUTION INTRAVENOUS CONTINUOUS
Status: DISCONTINUED | OUTPATIENT
Start: 2022-05-08 | End: 2022-05-12

## 2022-05-08 RX ORDER — CARVEDILOL 12.5 MG/1
25 TABLET ORAL 2 TIMES DAILY
Status: DISCONTINUED | OUTPATIENT
Start: 2022-05-08 | End: 2022-05-08

## 2022-05-08 RX ADMIN — METOPROLOL TARTRATE 25 MG: 25 TABLET, FILM COATED ORAL at 04:05

## 2022-05-08 RX ADMIN — ATORVASTATIN CALCIUM 40 MG: 40 TABLET, FILM COATED ORAL at 09:05

## 2022-05-08 RX ADMIN — WARFARIN SODIUM 5 MG: 5 TABLET ORAL at 04:05

## 2022-05-08 RX ADMIN — Medication 6 MG: at 09:05

## 2022-05-08 RX ADMIN — HEPARIN SODIUM 15 UNITS/KG/HR: 5000 INJECTION INTRAVENOUS; SUBCUTANEOUS at 10:05

## 2022-05-08 RX ADMIN — SODIUM CHLORIDE 500 ML: 0.9 INJECTION, SOLUTION INTRAVENOUS at 04:05

## 2022-05-08 RX ADMIN — POTASSIUM CHLORIDE 10 MEQ: 7.46 INJECTION, SOLUTION INTRAVENOUS at 07:05

## 2022-05-08 RX ADMIN — DONEPEZIL HYDROCHLORIDE 5 MG: 5 TABLET ORAL at 09:05

## 2022-05-08 RX ADMIN — CARVEDILOL 25 MG: 12.5 TABLET, FILM COATED ORAL at 12:05

## 2022-05-08 RX ADMIN — HEPARIN SODIUM 18 UNITS/KG/HR: 5000 INJECTION INTRAVENOUS; SUBCUTANEOUS at 06:05

## 2022-05-08 RX ADMIN — PIPERACILLIN SODIUM AND TAZOBACTAM SODIUM 4.5 G: 4; .5 INJECTION, POWDER, LYOPHILIZED, FOR SOLUTION INTRAVENOUS at 01:05

## 2022-05-08 RX ADMIN — HYDRALAZINE HYDROCHLORIDE 50 MG: 50 TABLET ORAL at 09:05

## 2022-05-08 RX ADMIN — PIPERACILLIN SODIUM AND TAZOBACTAM SODIUM 4.5 G: 4; .5 INJECTION, POWDER, LYOPHILIZED, FOR SOLUTION INTRAVENOUS at 11:05

## 2022-05-08 RX ADMIN — PIPERACILLIN SODIUM AND TAZOBACTAM SODIUM 4.5 G: 4; .5 INJECTION, POWDER, LYOPHILIZED, FOR SOLUTION INTRAVENOUS at 10:05

## 2022-05-08 RX ADMIN — MUPIROCIN: 20 OINTMENT TOPICAL at 09:05

## 2022-05-08 RX ADMIN — POTASSIUM CHLORIDE 10 MEQ: 7.46 INJECTION, SOLUTION INTRAVENOUS at 05:05

## 2022-05-08 RX ADMIN — POTASSIUM CHLORIDE 10 MEQ: 7.46 INJECTION, SOLUTION INTRAVENOUS at 06:05

## 2022-05-08 RX ADMIN — POTASSIUM CHLORIDE 10 MEQ: 7.46 INJECTION, SOLUTION INTRAVENOUS at 09:05

## 2022-05-08 NOTE — ASSESSMENT & PLAN NOTE
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

## 2022-05-08 NOTE — PROGRESS NOTES
Pharmacist Renal Dose Adjustment Note    Temitope Suero is a 82 y.o. female being treated with the medication piperacillin-tazobactam    Patient Data:    Vital Signs (Most Recent):  Temp: 97.6 °F (36.4 °C) (05/08/22 0745)  Pulse: 106 (05/08/22 0800)  Resp: 19 (05/08/22 0800)  BP: (!) 163/116 (05/08/22 0800)  SpO2: 98 % (05/08/22 0800)   Vital Signs (72h Range):  Temp:  [96.2 °F (35.7 °C)-98.6 °F (37 °C)]   Pulse:  []   Resp:  [14-30]   BP: ()/()   SpO2:  [88 %-100 %]      Recent Labs   Lab 05/06/22  1215 05/07/22  0547 05/08/22  0301   CREATININE 2.2* 2.3* 1.8*     Serum creatinine: 1.8 mg/dL (H) 05/08/22 0301  Estimated creatinine clearance: 23.8 mL/min (A)    Piperacillin-tazobactam 4.5 g IV every 12 hours will be renally adjusted to piperacillin-tazobactam 4.5 g IV every 8 hours    Pharmacist's Name: Ralph Alvarado  Pharmacist's Extension: [phone]

## 2022-05-08 NOTE — ASSESSMENT & PLAN NOTE
· CXR with LLL PNA  · Satting in the 90s on room air  · Afebrile and without leukocytosis but Lactic 2.3 on admission that trended down  · Started on Ceftriaxone and Azithro, switched to Zosyn on 5/6

## 2022-05-08 NOTE — H&P
[facility] [place]  Cardiology Consult       Temitope Suero  YOB: 1940  Medical Record Number:  [id number]  Attending Physician:  Janell Nelson MD   Date of Admission: 5/5/2022       Hospital Day:  3  Current Principal Problem:  Toxic encephalopathy      History     Cc: fall, altered mental status     HPI  Ms Temitope Suero is an 82 year old female with PMH of CVA, HLD, atrial fibrillation/flutter, HFrEF, CKD, DM2 who presented to [facility] on 5/5 after a fall and development of encephalopathy/speech difficulty which is an abrupt change from her baseline. She was admitted to medicine for further workup. CT head negative for obvious stroke or brain bleed. Chest x-ray with possible left basilar pneumonia so placed on CAP therapy. Has been encephalopathic with waxing/waning mental status since hospitalization. An echocardiogram was obtained which showed baseline EF 25-30% and large solid LV thrombus, new from previous. On presentation she was in atrial flutter which appears to be longstanding persistent based on EKG tracings over the last 4 years.     She had a decline in her neuro function with increased confusion yesterday. Repeat head CT still without acute changes. She was started on heparin drip for LV thrombus. Also has been more tachycardic and hypertensive this morning. Sent for urgent MRI which is currently in progress.     On my assessment she is in no distress, responds to questions but suspect difficulty with comprehension. Frequently answers inappropriately or unable to follow commands.       Medications - Outpatient  Prior to Admission medications    Medication Sig Start Date End Date Taking? Authorizing Provider   apixaban (ELIQUIS) 2.5 mg Tab Take 1 tablet (2.5 mg total) by mouth 2 (two) times daily. 3/7/22  Yes Gm Zambrano Jr., MD   atorvastatin (LIPITOR) 40 MG tablet Take 1 tablet (40 mg total) by mouth once daily. 3/7/22  Yes Gm Zambrano Jr., MD   benazepriL (LOTENSIN) 20 MG  tablet Take 1 tablet (20 mg total) by mouth once daily. 3/7/22  Yes Gm Zambrano Jr., MD   carvediloL (COREG) 25 MG tablet Take 1 tablet (25 mg total) by mouth 2 (two) times daily with meals. 3/7/22  Yes Gm Zambrano Jr., MD   donepeziL (ARICEPT) 5 MG tablet Take 1 tablet (5 mg total) by mouth every evening. 3/7/22  Yes Gm Zambrano Jr., MD   furosemide (LASIX) 40 MG tablet TAKE 1 TABLET BY MOUTH TWICE A DAY 3/29/22  Yes Gm Zambrano Jr., MD   latanoprost 0.005 % ophthalmic solution Place 1 drop into both eyes every evening. 11/7/18 5/5/22 Yes Ivy Canchola, [place]   travoprost, benzalkonium, (TRAVATAN) 0.004 % ophthalmic solution Place 1 drop into both eyes every evening. 12/9/13 3/29/15  Alejandra Parikh MD         Medications - Current  Scheduled Meds:   atorvastatin  40 mg Oral Daily    donepeziL  5 mg Oral QHS    mupirocin   Nasal BID    piperacillin-tazobactam (ZOSYN) IVPB  4.5 g Intravenous Q12H     Continuous Infusions:   heparin (porcine) in D5W 18 Units/kg/hr (05/08/22 0601)     PRN Meds:.acetaminophen, acetaminophen, dextrose 10%, dextrose 10%, glucagon (human recombinant), glucose, glucose, heparin (PORCINE), heparin (PORCINE), insulin aspart U-100, melatonin, ondansetron, polyethylene glycol, sodium chloride 0.9%      Allergies  Review of patient's allergies indicates:  No Known Allergies      Past Medical History  Past Medical History:   Diagnosis Date    Atrial flutter 8/12/2018    Cancer of left breast, stage 2 11/24/2015    Cataract     Cerebrovascular small vessel disease 4/11/2018    Bi-thalamic lacunar disease, mod/sev periventricular white matter disease, mixed pattern cerebral microbleeds    Cervicalgia 4/17/2018    Chronic anticoagulation - apixaban 4/17/2018    Previous on Xarelto but changed to apixaban 8-2018 due to recurrent embolic stroke.    Chronic systolic heart failure 4/17/2018    Echo 4-2018   1 - Moderately depressed left ventricular systolic function (EF  30-35%).    2 - Biatrial enlargement.    3 - Normal right ventricular systolic function .    4 - Mild mitral regurgitation.    5 - Mild tricuspid regurgitation.    6 - The estimated PA systolic pressure is 34 mmHg.    7 - Increased central venous pressure.    8 - Left pleural effusion.     CKD stage 3 due to type 2 diabetes mellitus 4/17/2018    Embolic stroke involving left cerebellar artery 8/13/2018    Embolic stroke involving right posterior cerebral artery 4/11/2018    Encephalopathy 8/13/2018    Essential hypertension 10/28/2013    Glaucoma     Glaucoma suspect of both eyes 12/9/2013    Hearing loss, sensorineural 12/16/2013    Malignant hypertension 8/12/2018    Mixed hyperlipidemia 10/28/2013    Obesity 1/16/2014    Paroxysmal atrial fibrillation 7/10/2012    Stage 3 chronic kidney disease 4/17/2018    Toxic multinodular goiter 10/28/2013    Type 2 diabetes mellitus with stage 3 chronic kidney disease, without long-term current use of insulin 7/10/2012    Diet controlled.   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    Vitamin D deficiency disease 10/28/2013         Past Surgical History  Past Surgical History:   Procedure Laterality Date    BREAST BIOPSY      BREAST SURGERY      CHOLECYSTECTOMY           Social History  Social History     Socioeconomic History    Marital status: Single    Number of children: 1   Occupational History     Employer: [employer]   Tobacco Use    Smoking status: Never Smoker    Smokeless tobacco: Never Used   Substance and Sexual Activity    Alcohol use: No    Drug use: No    Sexual activity: Not Currently         ROS  10 point ROS performed and negative except as stated in  HPI     Physical Examination         Vital Signs  Vitals  Temp: 97.6 °F (36.4 °C)  Temp src: Oral  Pulse: 106  Heart Rate Source: Monitor  Resp: 19  SpO2: 98 %  Pulse Oximetry Type: Continuous  O2 Device (Oxygen Therapy): room air  BP: (!) 163/116  MAP (mmHg): 134  BP Location:  Right arm  BP Method: Automatic  Patient Position: Lying          24 Hour VS Range    Temp:  [97.6 °F (36.4 °C)-98.3 °F (36.8 °C)]   Pulse:  []   Resp:  [16-23]   BP: (134-173)/()   SpO2:  [89 %-100 %]     Intake/Output Summary (Last 24 hours) at 5/8/2022 0910  Last data filed at 5/8/2022 0417  Gross per 24 hour   Intake --   Output 400 ml   Net -400 ml         Physical Exam:   Constitutional: no acute distress  HEENT: NCAT, EOMI, no scleral icterus  Cardiovascular: Irregularly irregular rhythm, no murmurs appreciated. 2+ carotid, radial, DP pulses bilaterally    Pulmonary: Left basilar crackles, right side clear to auscultation    Abdomen: nontender, non-distended   Neuro: alert but encephalopathic. No focal deficits noted   Extremities: warm, no edema   MSK: no deformities  Integument: intact, no rashes       Data       Recent Labs   Lab 05/05/22  0914 05/06/22  0851 05/06/22  1215 05/07/22  0547 05/08/22  0310   WBC 9.61 10.43 8.58 7.99 7.29   HGB 13.4 15.4 13.7 13.4 12.3   HCT 44.6 51.2* 47.1 44.7 40.2    200 203 167 199        Recent Labs   Lab 05/05/22  0155 05/08/22  0310   INR 1.4* 1.1        Recent Labs   Lab 05/05/22  0431 05/06/22  0851 05/06/22  1215 05/07/22  0547 05/08/22  0301   * 151* 149* 151* 151*   K 3.8 4.2 3.9 4.0 3.2*   * 118* 115* 116* 120*   CO2 20* 19* 20* 25 20*   BUN 56* 59* 62* 59* 49*   CREATININE 2.2* 2.2* 2.2* 2.3* 1.8*   ANIONGAP 9 14 14 10 11   CALCIUM 8.8 8.7 8.6* 8.6* 8.5*        Recent Labs   Lab 05/05/22  0431 05/06/22  0851 05/06/22  1215 05/07/22  0547 05/08/22  0301   PROT 5.8* 5.3* 5.1* 5.1* 5.4*   ALBUMIN 2.8* 2.7* 2.5* 2.6* 2.6*   BILITOT 0.9 0.6 0.5 0.6 0.6   ALKPHOS 137* 134 126 118 108   AST 22 17 25 16 17   ALT 16 13 15 13 13        Recent Labs   Lab 05/05/22  0123 05/05/22  0914 05/06/22  1215   TROPONINI 0.354* 0.332* 0.268*        BNP (pg/mL)   Date Value   05/06/2022 755 (H)   08/12/2018 1,030 (H)   02/20/2011 854 (H)   02/20/2011 868  (H)       Recent Labs   Lab 05/05/22  0122   LABBLOO No Growth to date  No Growth to date  No Growth to date  No Growth to date  No Growth to date  No Growth to date  No Growth to date  No Growth to date        Telemetry: Went into atrial flutter ~4 AM today, previously sinus      ECG:  Atrial flutter with variable block    No ischemic changes         Assessment & Plan   82 F with PMH of CVA, HLD, atrial fibrillation/flutter HFrEF, CKD, DM2 who presented to [facility] with encephalopathy. Initially treated for sepsis/CAP but growing concern for acute CVA, MRI pending. Found to have large LV thrombus, on heparin gtt. Also becoming more tachycardic in atrial flutter.     LV thrombus:   Likely secondary to known cardiomyopathy, decreased systolic function. Will consider apixiaban failure. Would continue heparin gtt for the time being and transition to warfarin for goal INR 2-3.     Atrial flutter:   In and out since hospitalization. Seems to have heavy burden based on prior EKG tracings over the years. This morning around 4 AM converted from sinus to a possible atrial tachycardia, then shortly after converted to atrial flutter with rapid ventricular response. Currently rate controlled.   -LV thrombus contraindicates cardioversion, rate control strategy for now   -Add beta blocker once stroke ruled out, metoprolol 25 mg q6 hours.       Thank you for the consult. We will continue to follow.     Eitan Mendez MD  [facility]   Cardiovascular Disease PGY-IV

## 2022-05-08 NOTE — ASSESSMENT & PLAN NOTE
· Likely 2/2 pneumonia, more suspicious for CVA given finding of LV thrombus  · Head CT and MRI brain both negative for acute ischemic event  · Vascular Neurology consulted  · SLP cleared for diet

## 2022-05-08 NOTE — ASSESSMENT & PLAN NOTE
· TTE on 5/6 shows EF 25-30%, G3DD, severe LAE, normal CVP, large solid LV thrombus  · Continue Coreg 25mg PO BID  · Hold Lasix and Benazepril with MAC, euvolemia  · Monitor volume status closely

## 2022-05-08 NOTE — CARE UPDATE
Radiology attempted to get ahold of nurse during day, but unable to connect during that availability, so patient was delayed.  Radiology stated they will get in as soon as able to tonight.   Nurse instructed to call night nocturnist when back from CT to determine if hep gtt safe to start for LV thrombus.        Amanuel Delarosa MD  Internal Medicine Staff

## 2022-05-08 NOTE — NURSING
2100 spoke to radiology tech Isaias for inquiring about completion of CT of head, he stated that pt will go within the next hour, Notified MD Delarosa.     0128 Called radiology again and spoke to Francisco in regards to patient CT of Head, he stated that he will put in for transport.     0136 notified MD Casper of pt BP, awaiting response    0259 Dr. Delarosa instructed to notify night MD when patient has went down for her CTH, Notified Dr. Casper that patient went down for CTH, but it has not been read yet    0538 waiting for heparin gtt from  Pharmacy, not in any of the [employer] systems         END OF SHIFT NOTE  Pt rested well throughout shift, no distress at this time, no complaints, slightly hypertensive, but bp has decreased, camera remain at bedside, heparin gtt initiated. Hr slightly increased, ranging from 90's-115. Other VSS, bed in lowest position, side rails up x2. Bed alarm set, personal items within reach. lise Gordon, JINN RN

## 2022-05-08 NOTE — ASSESSMENT & PLAN NOTE
· Chronic issue but now with new onset atrial flutter  · Anticoagulation as above  · Continue Coreg 25mg PO BID

## 2022-05-08 NOTE — NURSING
Pt in bed resting comfortably. Heparin gtt infusing at 15u/kg/hr. Potassium and normal saline infusing. Pt tolerated meds crushed with apple sauce, ate very little (<25% ) of lunch and dinner. Pure wick in place. Safety precautions maintained. Bed in lowest position, call light in reach.

## 2022-05-08 NOTE — ASSESSMENT & PLAN NOTE
· Noted on TTE 5/7  · Has been on Eliquis outpatient  · Change Eliquis to Heparin, might need Warfarin  · Cards consulted

## 2022-05-08 NOTE — PROGRESS NOTES
[facility] - Intensive Care ([place] [id number])  [facility] Medicine  Progress Note    Patient Name: Temitope Suero  MRN: [id number]  Patient Class: IP- Inpatient   Admission Date: 5/5/2022  Length of Stay: 3 days  Attending Physician: Janell Nelson MD  Primary Care Provider: Gm Zambrano MD        Subjective:     Principal Problem:Toxic encephalopathy        HPI:  Ms. Temitope Suero is a 82 y.o. female who presents to the ER by family for evaluation of a fall, AMS, and increased work of breath.  History is obtained from family Shelby, as patient is confused.  She reports that over the last 2 does - when she does talk, she wouldn't make sense.  She normally talks well and has fluent speech, but she does have some memory loss from an old stroke.  She has been having a dry cough, no fevers or chills - but has reported foul smelling urine.  She reports decreased PO intake the last week and that her mouth has been dry.  She did have a fall, but didn't hit her head.  She normally ambulates without DME.    Upon arrival to the ER, vitals were temp 98.4F, , /70 satting 96% on room air.  CXR showed a LLL PNA.  Head CT showed chronic ischemic changes.  Labs were notable for Trop 0.322, Creatinine 2.2, and Lactic 2.3.  She was given Vanc and Ceftriaxone and was admitted to Hospital Medicine.      Overview/Hospital Course:  Ms. Suero was admitted to Hospital Medicine for management of a toxic encephalopathy 2/2 LLL pneumonia.  She was started on Ceftriaxone/Azithro.  Her mentation was slow to improve.  SLP evaluated and cleared for diet.  She was given small IVF boluses for her MAC.  On the morning of 5/6, she was found to not be swallowing her food and chewing for a prolonged time.  She was made NPO.  She had a rapid response for diaphoresis and increased work of breathing.  Antibiotics were changed to Zosyn with concern for aspiration.  On 5/8, her mentation worsened again and she was not speaking or following commands.  2D  echo was ordered that showed a large, solid LV thrombus.  Head CT was ordered to r/o a bleed prior to starting Heparin which was negative.  She was started on Heparin.  The following morning, she found to be in new onset atrial flutter with HR 120s.  Cardiology was consulted.  Vascular Neurology was consulted given persistent mentation changes and new LV thrombus with concern for an acute ischemia event. MRI brain was negative for a stroke.      Interval History: No acute events overnight.  This morning still not speaking or following commands.  Vascular Neuro consulted and MRI brain ordered.  Found to be in new onset aflutter, Cards consulted.      Review of Systems   Unable to perform ROS: Mental status change   Objective:     Vital Signs (Most Recent):  Temp: 97.5 °F (36.4 °C) (05/08/22 1100)  Pulse: (!) 117 (05/08/22 1100)  Resp: 20 (05/08/22 1100)  BP: (!) 182/96 (05/08/22 1100)  SpO2: 97 % (05/08/22 1100)   Vital Signs (24h Range):  Temp:  [97.5 °F (36.4 °C)-98.1 °F (36.7 °C)] 97.5 °F (36.4 °C)  Pulse:  [] 117  Resp:  [16-23] 20  SpO2:  [89 %-100 %] 97 %  BP: (134-182)/() 182/96     Weight: 64 kg (141 lb)  Body mass index is 22.08 kg/m².    Intake/Output Summary (Last 24 hours) at 5/8/2022 1142  Last data filed at 5/8/2022 0417  Gross per 24 hour   Intake --   Output 400 ml   Net -400 ml        Physical Exam  Constitutional:       Appearance: Normal appearance. She is well-developed.   HENT:      Head: Normocephalic and atraumatic.   Cardiovascular:      Rate and Rhythm: Tachycardia.  Regular rhythm     Heart sounds: No murmur heard.  Pulmonary:      Effort: Pulmonary effort is normal. No respiratory distress.      Breath sounds: Normal breath sounds. No wheezing or rales.   Abdominal:      General: There is no distension.      Palpations: Abdomen is soft.      Tenderness: There is no abdominal tenderness.   Musculoskeletal:         General: No deformity.   Skin:     General: Skin is warm.    Neurological:      Comments: Makes eye contact, but wont speak or follow directions       Significant Labs: All pertinent labs within the past 24 hours have been reviewed.  CBC:   Recent Labs   Lab 05/06/22  1215 05/07/22  0547 05/08/22  0310   WBC 8.58 7.99 7.29   HGB 13.7 13.4 12.3   HCT 47.1 44.7 40.2    167 199       CMP:   Recent Labs   Lab 05/06/22  1215 05/07/22  0547 05/08/22  0301   * 151* 151*   K 3.9 4.0 3.2*   * 116* 120*   CO2 20* 25 20*   * 124* 119*   BUN 62* 59* 49*   CREATININE 2.2* 2.3* 1.8*   CALCIUM 8.6* 8.6* 8.5*   PROT 5.1* 5.1* 5.4*   ALBUMIN 2.5* 2.6* 2.6*   BILITOT 0.5 0.6 0.6   ALKPHOS 126 118 108   AST 25 16 17   ALT 15 13 13   ANIONGAP 14 10 11   EGFRNONAA 20.4* 19.3* 26.0*       Lactic Acid:   Recent Labs   Lab 05/06/22  1216 05/07/22  0547   LACTATE 3.6* 1.6         Significant Imaging: I have reviewed all pertinent imaging results/findings within the past 24 hours.      Assessment/Plan:      * Toxic encephalopathy  · Likely 2/2 pneumonia, more suspicious for CVA given finding of LV thrombus  · Head CT and MRI brain both negative for acute ischemic event  · Vascular Neurology consulted  · SLP cleared for diet    Left lower lobe pneumonia  · CXR with LLL PNA  · Satting in the 90s on room air  · Afebrile and without leukocytosis but Lactic 2.3 on admission that trended down  · Started on Ceftriaxone and Azithro, switched to Zosyn on 5/6      NSTEMI (non-ST elevated myocardial infarction)  · Trop 0.322 on admit and flat  · No reports of chest pain  · EKG not ischemic  · Likely demand 2/2 infection  · Monitor      Chronic systolic heart failure  · TTE on 5/6 shows EF 25-30%, G3DD, severe LAE, normal CVP, large solid LV thrombus  · Continue Coreg 25mg PO BID  · Hold Lasix and Benazepril with MAC, euvolemia  · Monitor volume status closely    Atrial flutter  · New onset on 5/8 with HR 130s  · Cards consulted  · Continue Coreg 25mg PO BID      Acute kidney injury  superimposed on CKD  · Creatinine 2.2 on admit, baseline closer to 1.3 - 1.8 today  · Possibly infection induced and Lasix/Benazepril  · Caution with IVF given CHF  · Renal US notable for chronic medical renal disease and 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    Chronic anticoagulation  · Was on Xarelto and changed to Eliquis in 2018 due to recurrent embolic strokes  · Now with a new large solid LV thrombus  · Continue Heparin, likely to need Warfarin on DC      Paroxysmal atrial fibrillation  · Chronic issue but now with new onset atrial flutter  · Anticoagulation as above  · Continue Coreg 25mg PO BID      LV (left ventricular) mural thrombus  · Noted on TTE 5/7  · Has been on Eliquis outpatient  · Change Eliquis to Heparin, might need Warfarin  · Cards consulted      Stage 3 chronic kidney disease  · Chronic issue  · See MAC below      Fall  · PT/OT      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  · As above      Type 2 diabetes mellitus with stage 3 chronic kidney disease, without long-term current use of insulin  · HbA1c 7  · Home DM regimen:  Diet controlled  · SSI with POCT accuchecks to achieve blood glucose of 140-180 while hospitalized  · Diabetic diet, purred and nectar thick per SLP        Deterioration of memory  · Chronic issue  · Continue Aricept      History of stroke  · Chronic and stable  · Head CT and MRI without acute infarct  · Continue Lipitor  · Vascular Neuro consult as above      Mixed hyperlipidemia  · Chronic and stable  · Continue Lipitor 40mg PO daily        VTE Risk Mitigation (From admission, onward)         Ordered     heparin 25,000 units in dextrose 5% (100 units/ml) IV bolus from bag - ADDITIONAL PRN BOLUS - 60 units/kg  As needed (PRN)        Question:  Heparin Infusion Adjustment (DO NOT MODIFY ANSWER)  Answer:  [email or web address]\Images\Pharmacy\HeparinInfusions\heparin HIGH INTENSITY nomogram for [facility] [id number]    05/08/22 0301     heparin 25,000 units in dextrose 5% (100  units/ml) IV bolus from bag - ADDITIONAL PRN BOLUS - 30 units/kg  As needed (PRN)        Question:  Heparin Infusion Adjustment (DO NOT MODIFY ANSWER)  Answer:  [email or web address]\Images\Pharmacy\HeparinInfusions\heparin HIGH INTENSITY nomogram for [facility] [id number]    05/08/22 0301     heparin 25,000 units in dextrose 5% 250 mL (100 units/mL) infusion HIGH INTENSITY nomogram - [facility]  Continuous        Question Answer Comment   Heparin Infusion Adjustment (DO NOT MODIFY ANSWER) [email or web address]\[email or web address]\Images\Pharmacy\HeparinInfusions\heparin HIGH INTENSITY nomogram for [facility] [id number]    Begin at (in units/kg/hr) 18        05/08/22 0301                Discharge Planning   WALDEMAR:      Code Status: Full Code   Is the patient medically ready for discharge?:     Reason for patient still in hospital (select all that apply): Patient trending condition                     Janell Nelson MD  Department of Hospital Medicine   [facility] - Intensive Care ([place]-[facility])

## 2022-05-08 NOTE — ASSESSMENT & PLAN NOTE
"Previously on xarelto but changed to eliquis. Reportedly due to "embolic stroke" per previous note  Currently on heparin drip  "

## 2022-05-08 NOTE — ASSESSMENT & PLAN NOTE
Ms. Suero is a 82 yoF with a prior stroke who presented to [facility] ED after a fall and with AMS and increased work of breathing. The patient had reportedly not been making sense while talking. The patient was admitted to medicine and for workup of AMS and was started on antibiotics for PNA. CTH demonstrated chronic ischemic findings, however MRI demonstrated no acute infarct. TTE was completed and demonstrated an EF of 25-30% with a noted LV thrombus. The patient was started on heparin drip. VN consulted given patients exam and TTE findings.    Patient currently on heparin drip  No stroke on MRI  Will continue to follow patient given reported prior strokes in setting of AFIB and LV thrombus  Will need to be transitioned to AC in future  No further recommendations at this time  Please contact with any questions or concerns

## 2022-05-08 NOTE — CONSULTS
[facility] [place]  Cardiology Consult      Temitope Suero  YOB: 1940  Medical Record Number:  [id number]  Attending Physician:  Janell Nelson MD   Date of Admission: 5/5/2022       Hospital Day:  3  Current Principal Problem:  Toxic encephalopathy      History     Cc: fall, altered mental status     HPI  Ms Temitope Suero is an 82 year old female with PMH of CVA, HLD, atrial fibrillation/flutter, HFrEF, CKD, DM2 who [place] to [facility] [place] on 5[place]5 after a fall and development of encephalopathy/speech difficulty which is an abrupt change from her baseline. She was admitted to medicine for further workup. CT head negative for obvious stroke or brain bleed. Chest x-ray with possible left basilar pneumonia so placed on CAP therapy. Has been encephalopathic with waxing/waning mental status since hospitalization. An echocardiogram was obtained which showed baseline EF 25-30% and large solid LV thrombus, new from previous. On presentation she was in atrial flutter which appears to be longstanding persistent based on EKG tracings over the last 4 years.      She had a decline in her neuro function with increased confusion yesterday. Repeat head CT still without acute changes. She was started on heparin drip for LV thrombus. Also has been more tachycardic and hypertensive this morning. Sent for urgent MRI which is currently in progress.      On my assessment she is in no distress, responds to questions but suspect difficulty with comprehension. Frequently answers inappropriately or unable to follow commands.       Medications - Outpatient  Prior to Admission medications    Medication Sig Start Date End Date Taking? Authorizing Provider   apixaban (ELIQUIS) 2.5 mg Tab Take 1 tablet (2.5 mg total) by mouth 2 (two) times daily. 3/7/22  Yes Gm Zambrano Jr., MD   atorvastatin (LIPITOR) 40 MG tablet Take 1 tablet (40 mg total) by mouth once daily. 3/7/22  Yes Gm Zambrano Jr., MD   benazepriL (LOTENSIN) 20 MG  tablet Take 1 tablet (20 mg total) by mouth once daily. 3/7/22  Yes Gm Zambrano Jr., MD   carvediloL (COREG) 25 MG tablet Take 1 tablet (25 mg total) by mouth 2 (two) times daily with meals. 3/7/22  Yes Gm Zambrano Jr., MD   donepeziL (ARICEPT) 5 MG tablet Take 1 tablet (5 mg total) by mouth every evening. 3/7/22  Yes Gm Zambrano Jr., MD   furosemide (LASIX) 40 MG tablet TAKE 1 TABLET BY MOUTH TWICE A DAY 3/29/22  Yes Gm Zambrano Jr., MD   latanoprost 0.005 % ophthalmic solution Place 1 drop into both eyes every evening. 11/7/18 5/5/22 Yes Ivy Canchola, [place]   travoprost, benzalkonium, (TRAVATAN) 0.004 % ophthalmic solution Place 1 drop into both eyes every evening. 12/9/13 3/29/15  Alejandra Parikh MD         Medications - Current  Scheduled Meds:   atorvastatin  40 mg Oral Daily    carvediloL  25 mg Oral BID    donepeziL  5 mg Oral QHS    mupirocin   Nasal BID    piperacillin-tazobactam (ZOSYN) IVPB  4.5 g Intravenous Q8H     Continuous Infusions:   heparin (porcine) in D5W 18 Units/kg/hr (05/08/22 0601)     PRN Meds:.acetaminophen, acetaminophen, dextrose 10%, dextrose 10%, glucagon (human recombinant), glucose, glucose, heparin (PORCINE), heparin (PORCINE), insulin aspart U-100, melatonin, ondansetron, polyethylene glycol, sodium chloride 0.9%      Allergies  Review of patient's allergies indicates:  No Known Allergies      Past Medical History  Past Medical History:   Diagnosis Date    Atrial flutter 8/12/2018    Cancer of left breast, stage 2 11/24/2015    Cataract     Cerebrovascular small vessel disease 4/11/2018    Bi-thalamic lacunar disease, mod/sev periventricular white matter disease, mixed pattern cerebral microbleeds    Cervicalgia 4/17/2018    Chronic anticoagulation - apixaban 4/17/2018    Previous on Xarelto but changed to apixaban 8-2018 due to recurrent embolic stroke.    Chronic systolic heart failure 4/17/2018    Echo 4-2018   1 - Moderately depressed left  ventricular systolic function (EF 30-35%).    2 - Biatrial enlargement.    3 - Normal right ventricular systolic function .    4 - Mild mitral regurgitation.    5 - Mild tricuspid regurgitation.    6 - The estimated PA systolic pressure is 34 mmHg.    7 - Increased central venous pressure.    8 - Left pleural effusion.     CKD stage 3 due to type 2 diabetes mellitus 4/17/2018    Embolic stroke involving left cerebellar artery 8/13/2018    Embolic stroke involving right posterior cerebral artery 4/11/2018    Encephalopathy 8/13/2018    Essential hypertension 10/28/2013    Glaucoma     Glaucoma suspect of both eyes 12/9/2013    Hearing loss, sensorineural 12/16/2013    Malignant hypertension 8/12/2018    Mixed hyperlipidemia 10/28/2013    Obesity 1/16/2014    Paroxysmal atrial fibrillation 7/10/2012    Stage 3 chronic kidney disease 4/17/2018    Toxic multinodular goiter 10/28/2013    Type 2 diabetes mellitus with stage 3 chronic kidney disease, without long-term current use of insulin 7/10/2012    Diet controlled.   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    Vitamin D deficiency disease 10/28/2013         Past Surgical History  Past Surgical History:   Procedure Laterality Date    BREAST BIOPSY      BREAST SURGERY      CHOLECYSTECTOMY           Social History  Social History     Socioeconomic History    Marital status: Single    Number of children: 1   Occupational History     Employer: [employer]   Tobacco Use    Smoking status: Never Smoker    Smokeless tobacco: Never Used   Substance and Sexual Activity    Alcohol use: No    Drug use: No    Sexual activity: Not Currently         ROS  10 point ROS performed and negative except as stated in HPI       Physical Examination         Vital Signs  Vitals  Temp: 98.4 °F (36.9 °C)  Temp src: Oral  Pulse: 99  Heart Rate Source: Monitor  Resp: 19  SpO2: 99 %  Pulse Oximetry Type: Continuous  O2 Device (Oxygen Therapy): room air  BP: (!)  180/109  MAP (mmHg): 135  BP Location: Right arm  BP Method: Automatic  Patient Position: Lying          24 Hour VS Range    Temp:  [97.5 °F (36.4 °C)-98.4 °F (36.9 °C)]   Pulse:  []   Resp:  [16-23]   BP: (134-182)/()   SpO2:  [89 %-100 %]     Intake/Output Summary (Last 24 hours) at 5/8/2022 1405  Last data filed at 5/8/2022 0417  Gross per 24 hour   Intake --   Output 400 ml   Net -400 ml         Physical Exam:   Constitutional: no acute distress  HEENT: NCAT, EOMI, no scleral icterus  Cardiovascular: Irregularly irregular rhythm, no murmurs appreciated. 2+ carotid, radial, DP pulses bilaterally    Pulmonary: Left basilar crackles, right side clear to auscultation    Abdomen: nontender, non-distended   Neuro: alert but encephalopathic. No focal deficits noted   Extremities: warm, no edema   MSK: no deformities  Integument: intact, no rashes        Data       Recent Labs   Lab 05/05/22  0914 05/06/22  0851 05/06/22  1215 05/07/22  0547 05/08/22  0310   WBC 9.61 10.43 8.58 7.99 7.29   HGB 13.4 15.4 13.7 13.4 12.3   HCT 44.6 51.2* 47.1 44.7 40.2    200 203 167 199        Recent Labs   Lab 05/05/22  0155 05/08/22  0310   INR 1.4* 1.1        Recent Labs   Lab 05/05/22  0431 05/06/22  0851 05/06/22  1215 05/07/22  0547 05/08/22  0301   * 151* 149* 151* 151*   K 3.8 4.2 3.9 4.0 3.2*   * 118* 115* 116* 120*   CO2 20* 19* 20* 25 20*   BUN 56* 59* 62* 59* 49*   CREATININE 2.2* 2.2* 2.2* 2.3* 1.8*   ANIONGAP 9 14 14 10 11   CALCIUM 8.8 8.7 8.6* 8.6* 8.5*        Recent Labs   Lab 05/05/22  0431 05/06/22  0851 05/06/22  1215 05/07/22  0547 05/08/22  0301   PROT 5.8* 5.3* 5.1* 5.1* 5.4*   ALBUMIN 2.8* 2.7* 2.5* 2.6* 2.6*   BILITOT 0.9 0.6 0.5 0.6 0.6   ALKPHOS 137* 134 126 118 108   AST 22 17 25 16 17   ALT 16 13 15 13 13        Recent Labs   Lab 05/05/22  0123 05/05/22  0914 05/06/22  1215   TROPONINI 0.354* 0.332* 0.268*        BNP (pg/mL)   Date Value   05/06/2022 755 (H)   08/12/2018 1,030  (H)   02/20/2011 854 (H)   02/20/2011 868 (H)       Recent Labs   Lab 05/05/22  0122   LABBLOO No Growth to date  No Growth to date  No Growth to date  No Growth to date  No Growth to date  No Growth to date  No Growth to date  No Growth to date      Telemetry: Went into atrial flutter ~4 AM today, previously sinus       ECG:  Atrial flutter with variable block               No ischemic changes         Assessment & Plan   82 F with PMH of CVA, HLD, atrial fibrillation/flutter HFrEF, CKD, DM2 who presented to [facility] with encephalopathy. Initially treated for sepsis/CAP but growing concern for acute CVA, MRI pending. Found to have large LV thrombus, on heparin gtt. Also becoming more tachycardic in atrial flutter.      LV thrombus:   Likely secondary to known cardiomyopathy, decreased systolic function. Will consider apixiaban failure. Would continue heparin gtt for the time being and transition to warfarin for goal INR 2-3.      Atrial flutter:   In and out since hospitalization. Seems to have heavy burden based on prior EKG tracings over the years. This morning around 4 AM converted from sinus to a possible atrial tachycardia, then shortly after converted to atrial flutter with rapid ventricular response. Currently rate controlled.   -LV thrombus contraindicates cardioversion, rate control strategy for now   -Add beta blocker once stroke ruled out, metoprolol 25 mg q6 hours.     HFrEF:   Seems euvolemic at this time to slightly dry. Reinitiate GDMT as tolerated.         Thank you for the consult. We will continue to follow.       Eitan Mendez MD  [facility]   Cardiovascular Disease PGY-IV

## 2022-05-08 NOTE — CONSULTS
"[facility] - Intensive Care ([facility]-14)  Vascular Neurology  Comprehensive Stroke Center  Consult Note    Inpatient consult to Vascular (Stroke) Neurology  Consult performed by: Rocky Campos MD  Consult ordered by: Janell Nelson MD        Assessment/Plan:     Patient is a 82 y.o. year old female with:    LV (left ventricular) mural thrombus  Ms. Suero is a 82 yoF with a prior stroke who presented to [facility] ED after a fall and with AMS and increased work of breathing. The patient had reportedly not been making sense while talking. The patient was admitted to medicine and for workup of AMS and was started on antibiotics for PNA. CTH demonstrated chronic ischemic findings, however MRI demonstrated no acute infarct. TTE was completed and demonstrated an EF of 25-30% with a noted LV thrombus. The patient was started on heparin drip. VN consulted given patients exam and TTE findings.    Patient currently on heparin drip  No stroke on MRI  Will continue to follow patient given reported prior strokes in setting of AFIB and LV thrombus  Will need to be transitioned to AC in future  No further recommendations at this time  Please contact with any questions or concerns    Type 2 diabetes mellitus with stage 3 chronic kidney disease, without long-term current use of insulin  Stroke RF  Management per primary team    Chronic systolic heart failure  TTE with 25-30% EF  LV thrombus  On AC    Chronic anticoagulation  Previously on xarelto but changed to eliquis. Reportedly due to "embolic stroke" per previous note  Currently on heparin drip    Mixed hyperlipidemia  Stroke RF  On atorvastatin 40  No stroke on MRI  Management per primary team    Paroxysmal atrial fibrillation  See above  Currently on heparin drip        STROKE DOCUMENTATION     Acute Stroke Times   Last Known Normal Date: 05/03/22  Last Known Normal Time: 0800  Symptom Onset Date: 05/03/22  Symptom Onset Time: 0800  Stroke Team Called Date: 05/08/22  Stroke Team " Called Time: 0940  Stroke Team Arrival Date: 05/08/22  Stroke Team Arrival Time: 1005    NIH Scale:  1a. Level of Consciousness: 0-->Alert, keenly responsive  1b. LOC Questions: 1-->Answers one question correctly  1c. LOC Commands: 0-->Performs both tasks correctly  2. Best Gaze: 0-->Normal  3. Visual: 0-->No visual loss  4. Facial Palsy: 0-->Normal symmetrical movements  5a. Motor Arm, Left: 0-->No drift, limb holds 90 (or 45) degrees for full 10 secs  5b. Motor Arm, Right: 0-->No drift, limb holds 90 (or 45) degrees for full 10 secs  6a. Motor Leg, Left: 0-->No drift, leg holds 30 degree position for full 5 secs  6b. Motor Leg, Right: 0-->No drift, leg holds 30 degree position for full 5 secs  7. Limb Ataxia: 1-->Present in one limb  8. Sensory: 0-->Normal, no sensory loss  9. Best Language: 1-->Mild-to-moderate aphasia, some obvious loss of fluency or facility of comprehension, without significant limitation on ideas expressed or form of expression. Reduction of speech and/or comprehension, however, makes conversation. . . (see row details)  10. Dysarthria: 1-->Mild-to-moderate dysarthria, patient slurs at least some words and, at worst, can be understood with some difficulty  11. Extinction and Inattention (formerly Neglect): 0-->No abnormality  Total (NIH Stroke Scale): 4    Modified [place]    [place] Coma Scale:    ABCD2 Score:    [id number] Score:   HAS -BLED Score:   ICH Score:   Hunt & Lopez Classification:       Thrombolysis Candidate? No, Strong suspicion for stroke mimic or alternative diagnosis     Delays to Thrombolysis?  No    Interventional Revascularization Candidate?   Is the patient eligible for mechanical endovascular reperfusion (CHIKI)?  No; at this time symptoms not suggestive of large vessel occlusion    Delays to Thrombectomy? No    Hemorrhagic change of an Ischemic Stroke: Does this patient have an ischemic stroke with hemorrhagic changes? No     Subjective:     History of Present  Illness:  Ms. Suero is a 82 yoF with a prior stroke who presented to [facility] ED after a fall and with AMS and increased work of breathing. The patient had reportedly not been making sense while talking. The patient was admitted to medicine and for workup of AMS and was started on antibiotics for PNA. CTH demonstrated chronic ischemic findings, however MRI demonstrated no acute infarct. TTE was completed and demonstrated an EF of 25-30% with a noted LV thrombus. The patient was started on heparin drip. VN consulted given patients exam and TTE findings.                Past Medical History:   Diagnosis Date    Atrial flutter 8/12/2018    Cancer of left breast, stage 2 11/24/2015    Cataract     Cerebrovascular small vessel disease 4/11/2018    Bi-thalamic lacunar disease, mod/sev periventricular white matter disease, mixed pattern cerebral microbleeds    Cervicalgia 4/17/2018    Chronic anticoagulation - apixaban 4/17/2018    Previous on Xarelto but changed to apixaban 8-2018 due to recurrent embolic stroke.    Chronic systolic heart failure 4/17/2018    Echo 4-2018   1 - Moderately depressed left ventricular systolic function (EF 30-35%).    2 - Biatrial enlargement.    3 - Normal right ventricular systolic function .    4 - Mild mitral regurgitation.    5 - Mild tricuspid regurgitation.    6 - The estimated PA systolic pressure is 34 mmHg.    7 - Increased central venous pressure.    8 - Left pleural effusion.     CKD stage 3 due to type 2 diabetes mellitus 4/17/2018    Embolic stroke involving left cerebellar artery 8/13/2018    Embolic stroke involving right posterior cerebral artery 4/11/2018    Encephalopathy 8/13/2018    Essential hypertension 10/28/2013    Glaucoma     Glaucoma suspect of both eyes 12/9/2013    Hearing loss, sensorineural 12/16/2013    Malignant hypertension 8/12/2018    Mixed hyperlipidemia 10/28/2013    Obesity 1/16/2014    Paroxysmal atrial fibrillation 7/10/2012    Stage 3  chronic kidney disease 4/17/2018    Toxic multinodular goiter 10/28/2013    Type 2 diabetes mellitus with stage 3 chronic kidney disease, without long-term current use of insulin 7/10/2012    Diet controlled.   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    Vitamin D deficiency disease 10/28/2013     Past Surgical History:   Procedure Laterality Date    BREAST BIOPSY      BREAST SURGERY      CHOLECYSTECTOMY       Family History   Problem Relation Age of Onset    Hypertension Mother     Hypertension Father     Glaucoma Father     Heart disease Father     Cancer Sister     Asthma Son     Diabetes Paternal Aunt     Thyroid cancer Neg Hx     Amblyopia Neg Hx     Blindness Neg Hx     Cataracts Neg Hx     Macular degeneration Neg Hx     Retinal detachment Neg Hx     Strabismus Neg Hx      Social History     Tobacco Use    Smoking status: Never Smoker    Smokeless tobacco: Never Used   Substance Use Topics    Alcohol use: No    Drug use: No     Review of patient's allergies indicates:  No Known Allergies    Medications: I have reviewed the current medication administration record.    Medications Prior to Admission   Medication Sig Dispense Refill Last Dose    apixaban (ELIQUIS) 2.5 mg Tab Take 1 tablet (2.5 mg total) by mouth 2 (two) times daily. 180 tablet 3     atorvastatin (LIPITOR) 40 MG tablet Take 1 tablet (40 mg total) by mouth once daily. 90 tablet 3     benazepriL (LOTENSIN) 20 MG tablet Take 1 tablet (20 mg total) by mouth once daily. 90 tablet 3     carvediloL (COREG) 25 MG tablet Take 1 tablet (25 mg total) by mouth 2 (two) times daily with meals. 180 tablet 2     donepeziL (ARICEPT) 5 MG tablet Take 1 tablet (5 mg total) by mouth every evening. 90 tablet 3     furosemide (LASIX) 40 MG tablet TAKE 1 TABLET BY MOUTH TWICE A  tablet 1     latanoprost 0.005 % ophthalmic solution Place 1 drop into both eyes every evening. 2.5 mL 12        Review of Systems   Unable to perform  ROS: Mental status change   Constitutional:  Negative for chills and fever.   HENT:  Negative for rhinorrhea and sneezing.    Eyes:  Negative for discharge and redness.   Respiratory:  Negative for cough and wheezing.    Cardiovascular:  Negative for chest pain and palpitations.   Gastrointestinal:  Negative for nausea and vomiting.   Skin:  Negative for pallor and rash.   Neurological:  Positive for speech difficulty. Negative for facial asymmetry.   Psychiatric/Behavioral:  Positive for confusion and decreased concentration.    Objective:     Vital Signs (Most Recent):  Temp: 98.6 °F (37 °C) (05/08/22 1648)  Pulse: 89 (05/08/22 1648)  Resp: 18 (05/08/22 1648)  BP: (!) 144/94 (05/08/22 1648)  SpO2: 98 % (05/08/22 1648)    Vital Signs Range (Last 24H):  Temp:  [97.1 °F (36.2 °C)-98.6 °F (37 °C)]   Pulse:  []   Resp:  [15-23]   BP: (134-182)/()   SpO2:  [89 %-100 %]     Physical Exam  Constitutional:       Appearance: Normal appearance.   HENT:      Head: Normocephalic and atraumatic.      Nose: Nose normal.      Mouth/Throat:      Mouth: Mucous membranes are moist.      Pharynx: Oropharynx is clear.   Eyes:      General: No scleral icterus.     Extraocular Movements: Extraocular movements intact.      Conjunctiva/sclera: Conjunctivae normal.      Pupils: Pupils are equal, round, and reactive to light.   Pulmonary:      Effort: Pulmonary effort is normal. No respiratory distress.      Breath sounds: No wheezing.   Abdominal:      General: Abdomen is flat. There is no distension.      Tenderness: There is no abdominal tenderness.   Musculoskeletal:         General: No tenderness or deformity.      Cervical back: Normal range of motion. No rigidity.   Skin:     General: Skin is warm.      Coloration: Skin is not jaundiced.   Neurological:      Mental Status: She is alert. She is disoriented.       Neurological Exam:   LOC: alert  Attention Span: poor  Articulation: Dysarthria  Orientation: Not oriented to  place, Not oriented to time  EOM (CN III, IV, VI): Full/intact  Pupils (CN II, III): PERRL  Facial Movement (CN VII): Symmetric facial expression    Motor: Arm left  Paresis: 4/5  Arm right  Paresis: 4/5  Leg right Paresis: 4/5      Laboratory:  CMP:   Recent Labs   Lab 05/08/22  0301   CALCIUM 8.5*   ALBUMIN 2.6*   PROT 5.4*   *   K 3.2*   CO2 20*   *   BUN 49*   CREATININE 1.8*   ALKPHOS 108   ALT 13   AST 17   BILITOT 0.6     CBC:   Recent Labs   Lab 05/08/22  0310   WBC 7.29   RBC 4.99   HGB 12.3   HCT 40.2      MCV 81*   MCH 24.6*   MCHC 30.6*       Diagnostic Results:      Brain imaging:  MRI without acute stroke    Vessel Imaging:    Cardiac Evaluation:   TTE was completed and demonstrated an EF of 25-30% with a noted LV thrombus.         Rocky Campos MD  Comprehensive Stroke Center  Department of Vascular Neurology   [facility] - Intensive Care ([place]-14)

## 2022-05-08 NOTE — HPI
Ms. Suero is a 82 yoF with a prior stroke who presented to [facility] ED after a fall and with AMS and increased work of breathing. The patient had reportedly not been making sense while talking. The patient was admitted to medicine and for workup of AMS and was started on antibiotics for PNA. CTH demonstrated chronic ischemic findings, however MRI demonstrated no acute infarct. TTE was completed and demonstrated an EF of 25-30% with a noted LV thrombus. The patient was started on heparin drip. VN consulted given patients exam and TTE findings.

## 2022-05-08 NOTE — PT/OT/SLP PROGRESS
Speech Language Pathology Treatment    Patient Name:  Temitope Suero   MRN:  [id number]  Admitting Diagnosis: Toxic encephalopathy    Recommendations:                 General Recommendations:  Dysphagia therapy  Diet recommendations:  Puree, Liquid Diet Level: Thin   Aspiration Precautions:   · Assistance with meals  · HOB to 90 degrees  · Meds crushed in puree  · Small bites/sips  · Standard aspiration precautions   General Precautions: Standard, pureed diet  Communication strategies:  go to room if call light pushed    Subjective     Patient awake and alert. Cleared with RN prior to session. No family members present.     Pain/Comfort:  ·   No c/o pain.     Respiratory Status: Room air    Objective:     Has the patient been evaluated by SLP for swallowing?   Yes  Keep patient NPO? No   Current Respiratory Status:        Patient seen for follow up diet assessment.  Patient tolerated thin liquids via tsp cup and straw across 7oz along with puree trials x4 with no overt signs of airway compromise.  Patient with clear vocal quality post all trials and no changes in vital signs.  Upon regular solids, patient continued to remove trial from oral cavity despite cues and instructions. Overall, recommend a pureed diet and thin liquids at this time. Skilled education was provided to patient and family members re: diet recs, standard aspiration precautions of which to follow, and ongoing ST plan of care. Recommend reinforcement and assistance with meals.       Assessment:     Temitope Suero is a 82 y.o. female with an SLP diagnosis of Dysphagia.      Goals:   Multidisciplinary Problems     SLP Goals        Problem: SLP    Goal Priority Disciplines Outcome   SLP Goal     SLP Ongoing, Progressing   Description: Speech-Language Pathology Goals  Goals to be met by 5/13/22  1. Patient will participate in ongoing swallow assessment in order to determine safest least restrictive diet.   2. Patient will participate in a speech/language/cog  [place].                    Plan:     · Patient to be seen:  4 x/week   · Plan of Care expires:  06/05/22  · Plan of Care reviewed with:  patient   · SLP Follow-Up:  Yes       Discharge recommendations:   (pending)   Barriers to Discharge:  None    Time Tracking:     SLP Treatment Date:   05/08/22  Speech Start Time:  0812  Speech Stop Time:  0820     Speech Total Time (min):  8 min    Billable Minutes: Treatment Swallowing Dysfunction 8    05/08/2022

## 2022-05-08 NOTE — ASSESSMENT & PLAN NOTE
· Was on Xarelto and changed to Eliquis in 2018 due to recurrent embolic strokes  · Now with a new large solid LV thrombus  · Continue Heparin, likely to need Warfarin on DC

## 2022-05-08 NOTE — CARE UPDATE
RAPID RESPONSE NURSE PROACTIVE ROUNDING NOTE       Time of Visit: 0857    Admit Date: 2022  LOS: 3  Code Status: Full Code   Date of Visit: 2022  : 1940  Age: 82 y.o.  Sex: female  Race: Black or   Bed: 68468/34093 A:   MRN: [id number]  Was the patient discharged from an ICU this admission? No   Was the patient discharged from a PACU within last 24 hours? No   Did the patient receive conscious sedation/general anesthesia in last 24 hours? No  Was the patient in the ED within the past 24 hours? No  Was the patient on NIPPV within the past 24 hours? No   Attending Physician: Janell Nelson MD  Primary Service: [facility]   Time spent at the bedside: < 15 min    SITUATION    Notified by charge RN via phone call.  Reason for alert: Tachycardia, hypertension  Called to evaluate the patient for Circulatory    BACKGROUND     Why is the patient in the hospital?: Toxic encephalopathy    Patient has a past medical history of Atrial flutter, Cancer of left breast, stage 2, Cataract, Cerebrovascular small vessel disease, Cervicalgia, Chronic anticoagulation - apixaban, Chronic systolic heart failure, CKD stage 3 due to type 2 diabetes mellitus, Embolic stroke involving left cerebellar artery, Embolic stroke involving right posterior cerebral artery, Encephalopathy, Essential hypertension, Glaucoma, Glaucoma suspect of both eyes, Hearing loss, sensorineural, Malignant hypertension, Mixed hyperlipidemia, Obesity, Paroxysmal atrial fibrillation, Stage 3 chronic kidney disease, Toxic multinodular goiter, Type 2 diabetes mellitus with stage 3 chronic kidney disease, without long-term current use of insulin, Vertebrobasilar dolichoectasia, and Vitamin D deficiency disease.    Last Vitals:  Temp: 97.6 °F (36.4 °C) (745)  Pulse: 106 (800)  Resp: 19 (800)  BP: 163/116 (800)  SpO2: 98 % (800)    24 Hours Vitals Range:  Temp:  [97.6 °F (36.4 °C)-98.3 °F (36.8 °C)]    Pulse:  []   Resp:  [16-23]   BP: (134-173)/()   SpO2:  [89 %-100 %]     Labs:  Recent Labs     05/06/22  1215 05/07/22  0547 05/08/22  0310   WBC 8.58 7.99 7.29   HGB 13.7 13.4 12.3   HCT 47.1 44.7 40.2    167 199       Recent Labs     05/06/22  0851 05/06/22  1215 05/07/22  0547 05/08/22  0301   * 149* 151* 151*   K 4.2 3.9 4.0 3.2*   * 115* 116* 120*   CO2 19* 20* 25 20*   CREATININE 2.2* 2.2* 2.3* 1.8*   * 230* 124* 119*   PHOS 4.3  --   --   --    MG 2.1  --   --   --         No results for input(s): PH, PCO2, PO2, HCO3, POCSATURATED, BE in the last 72 hours.     ASSESSMENT    Physical Exam  HENT:      Mouth/Throat:      Mouth: Mucous membranes are moist.      Pharynx: Oropharynx is clear.   Eyes:      Pupils: Pupils are equal, round, and reactive to light.   Cardiovascular:      Rate and Rhythm: Tachycardia present. Rhythm irregular.   Pulmonary:      Effort: Pulmonary effort is normal.      Breath sounds: Normal breath sounds.   Abdominal:      General: Abdomen is flat.      Palpations: Abdomen is soft.   Skin:     General: Skin is warm.      Capillary Refill: Capillary refill takes less than 2 seconds.   Neurological:      Mental Status: She is alert. She is disoriented.      GCS: GCS eye subscore is 4. GCS verbal subscore is 3. GCS motor subscore is 5.   Psychiatric:         Attention and Perception: She is inattentive.         Mood and Affect: Mood normal.         Speech: Speech is delayed.       Pt seen on morning rounds for hypertension and tachycardia. Upon assessment pt is awake but disoriented and only intermittently follows commands. On bedside monitor, pt is tachy low 100s with atrial flutter (rate intermittently reading 200s). SBP obtained, 138. Pt scheduled for MRI.     INTERVENTIONS    The patient was seen for a Cardiac problem. Staff concerns included tachycardia and hypertension. The following interventions were performed: continuous cardiac monitoring.      RECOMMENDATIONS    F/u with MRI results. Monitor HD status closely with tachycardia. Recs per cards.    PROVIDER ESCALATION    Yes/No  no    Orders received and case discussed with NA.    Disposition: Remain in room 89205.    FOLLOW-UP    charge RN, [place] updated on plan of care. Instructed to call the Rapid Response Nurse, Gm Meier RN at [phone] for additional questions or concerns.

## 2022-05-08 NOTE — CARE UPDATE
Checked in on patient, as she is awaiting CTH to r/o hemorrhage, as she has been found to have LV thrombus and needs to start hep gtt if not. STAT CT was placed earlier today, will see where is in in line for this.         Amanuel Delarosa MD  Internal Medicine Staff

## 2022-05-08 NOTE — ASSESSMENT & PLAN NOTE
· Chronic and stable  · Head CT and MRI without acute infarct  · Continue Lipitor  · Vascular Neuro consult as above

## 2022-05-08 NOTE — NURSING
Dr. Nelson notified for pt's bp being 185/138. Pt showing hr in the 120s aflutter on bedside monitor. EKG and bp meds ordered. WCTM

## 2022-05-08 NOTE — SUBJECTIVE & OBJECTIVE
Interval History: No acute events overnight.  This morning still not speaking or following commands.  Vascular Neuro consulted and MRI brain ordered.  Found to be in new onset aflutter, Cards consulted.      Review of Systems   Unable to perform ROS: Mental status change   Objective:     Vital Signs (Most Recent):  Temp: 97.5 °F (36.4 °C) (05/08/22 1100)  Pulse: (!) 117 (05/08/22 1100)  Resp: 20 (05/08/22 1100)  BP: (!) 182/96 (05/08/22 1100)  SpO2: 97 % (05/08/22 1100)   Vital Signs (24h Range):  Temp:  [97.5 °F (36.4 °C)-98.1 °F (36.7 °C)] 97.5 °F (36.4 °C)  Pulse:  [] 117  Resp:  [16-23] 20  SpO2:  [89 %-100 %] 97 %  BP: (134-182)/() 182/96     Weight: 64 kg (141 lb)  Body mass index is 22.08 kg/m².    Intake/Output Summary (Last 24 hours) at 5/8/2022 1142  Last data filed at 5/8/2022 0417  Gross per 24 hour   Intake --   Output 400 ml   Net -400 ml        Physical Exam  Constitutional:       Appearance: Normal appearance. She is well-developed.   HENT:      Head: Normocephalic and atraumatic.   Cardiovascular:      Rate and Rhythm: Normal rate and regular rhythm.      Heart sounds: No murmur heard.  Pulmonary:      Effort: Pulmonary effort is normal. No respiratory distress.      Breath sounds: Normal breath sounds. No wheezing or rales.   Abdominal:      General: There is no distension.      Palpations: Abdomen is soft.      Tenderness: There is no abdominal tenderness.   Musculoskeletal:         General: No deformity.   Skin:     General: Skin is warm.   Neurological:      Comments: Makes eye contact, but wont speak or follow directions       Significant Labs: All pertinent labs within the past 24 hours have been reviewed.  CBC:   Recent Labs   Lab 05/06/22  1215 05/07/22  0547 05/08/22  0310   WBC 8.58 7.99 7.29   HGB 13.7 13.4 12.3   HCT 47.1 44.7 40.2    167 199       CMP:   Recent Labs   Lab 05/06/22  1215 05/07/22  0547 05/08/22  0301   * 151* 151*   K 3.9 4.0 3.2*   * 116*  120*   CO2 20* 25 20*   * 124* 119*   BUN 62* 59* 49*   CREATININE 2.2* 2.3* 1.8*   CALCIUM 8.6* 8.6* 8.5*   PROT 5.1* 5.1* 5.4*   ALBUMIN 2.5* 2.6* 2.6*   BILITOT 0.5 0.6 0.6   ALKPHOS 126 118 108   AST 25 16 17   ALT 15 13 13   ANIONGAP 14 10 11   EGFRNONAA 20.4* 19.3* 26.0*       Lactic Acid:   Recent Labs   Lab 05/06/22  1216 05/07/22  0547   LACTATE 3.6* 1.6         Significant Imaging: I have reviewed all pertinent imaging results/findings within the past 24 hours.

## 2022-05-09 PROBLEM — E44.0 MODERATE MALNUTRITION: Status: ACTIVE | Noted: 2022-05-09

## 2022-05-09 LAB
ALBUMIN SERPL BCP-MCNC: 2.6 G/DL (ref 3.5–5.2)
ALP SERPL-CCNC: 110 U/L (ref 55–135)
ALT SERPL W/O P-5'-P-CCNC: 18 U/L (ref 10–44)
ANION GAP SERPL CALC-SCNC: 9 MMOL/L (ref 8–16)
APTT BLDCRRT: 31.7 SEC (ref 21–32)
APTT BLDCRRT: 47.3 SEC (ref 21–32)
APTT BLDCRRT: 64.8 SEC (ref 21–32)
APTT BLDCRRT: 75 SEC (ref 21–32)
AST SERPL-CCNC: 23 U/L (ref 10–40)
BASOPHILS # BLD AUTO: 0.02 K/UL (ref 0–0.2)
BASOPHILS NFR BLD: 0.3 % (ref 0–1.9)
BILIRUB SERPL-MCNC: 0.7 MG/DL (ref 0.1–1)
BUN SERPL-MCNC: 38 MG/DL (ref 8–23)
CALCIUM SERPL-MCNC: 8.7 MG/DL (ref 8.7–10.5)
CHLORIDE SERPL-SCNC: 122 MMOL/L (ref 95–110)
CO2 SERPL-SCNC: 20 MMOL/L (ref 23–29)
CREAT SERPL-MCNC: 1.7 MG/DL (ref 0.5–1.4)
DIFFERENTIAL METHOD: ABNORMAL
EOSINOPHIL # BLD AUTO: 0.1 K/UL (ref 0–0.5)
EOSINOPHIL NFR BLD: 1.2 % (ref 0–8)
ERYTHROCYTE [DISTWIDTH] IN BLOOD BY AUTOMATED COUNT: 16.6 % (ref 11.5–14.5)
EST. GFR  (AFRICAN AMERICAN): 32.1 ML/MIN/1.73 M^2
EST. GFR  (NON AFRICAN AMERICAN): 27.9 ML/MIN/1.73 M^2
GLUCOSE SERPL-MCNC: 155 MG/DL (ref 70–110)
HCT VFR BLD AUTO: 41.2 % (ref 37–48.5)
HGB BLD-MCNC: 12.4 G/DL (ref 12–16)
IMM GRANULOCYTES # BLD AUTO: 0.03 K/UL (ref 0–0.04)
IMM GRANULOCYTES NFR BLD AUTO: 0.4 % (ref 0–0.5)
INR PPP: 1.1 (ref 0.8–1.2)
LYMPHOCYTES # BLD AUTO: 1 K/UL (ref 1–4.8)
LYMPHOCYTES NFR BLD: 13.6 % (ref 18–48)
MCH RBC QN AUTO: 24.6 PG (ref 27–31)
MCHC RBC AUTO-ENTMCNC: 30.1 G/DL (ref 32–36)
MCV RBC AUTO: 82 FL (ref 82–98)
MONOCYTES # BLD AUTO: 0.5 K/UL (ref 0.3–1)
MONOCYTES NFR BLD: 6.5 % (ref 4–15)
NEUTROPHILS # BLD AUTO: 5.9 K/UL (ref 1.8–7.7)
NEUTROPHILS NFR BLD: 78 % (ref 38–73)
NRBC BLD-RTO: 0 /100 WBC
PLATELET # BLD AUTO: 210 K/UL (ref 150–450)
PMV BLD AUTO: 12.7 FL (ref 9.2–12.9)
POCT GLUCOSE: 150 MG/DL (ref 70–110)
POCT GLUCOSE: 152 MG/DL (ref 70–110)
POCT GLUCOSE: 161 MG/DL (ref 70–110)
POCT GLUCOSE: 168 MG/DL (ref 70–110)
POCT GLUCOSE: 173 MG/DL (ref 70–110)
POCT GLUCOSE: 206 MG/DL (ref 70–110)
POTASSIUM SERPL-SCNC: 3.8 MMOL/L (ref 3.5–5.1)
PROT SERPL-MCNC: 5.7 G/DL (ref 6–8.4)
PROTHROMBIN TIME: 11.9 SEC (ref 9–12.5)
RBC # BLD AUTO: 5.05 M/UL (ref 4–5.4)
SODIUM SERPL-SCNC: 151 MMOL/L (ref 136–145)
WBC # BLD AUTO: 7.58 K/UL (ref 3.9–12.7)

## 2022-05-09 PROCEDURE — 99232 SBSQ HOSP IP/OBS MODERATE 35: CPT | Mod: ,,, | Performed by: INTERNAL MEDICINE

## 2022-05-09 PROCEDURE — 97165 OT EVAL LOW COMPLEX 30 MIN: CPT

## 2022-05-09 PROCEDURE — 99233 PR SUBSEQUENT HOSPITAL CARE,LEVL III: ICD-10-PCS | Mod: ,,, | Performed by: HOSPITALIST

## 2022-05-09 PROCEDURE — 97535 SELF CARE MNGMENT TRAINING: CPT

## 2022-05-09 PROCEDURE — 25000003 PHARM REV CODE 250: Performed by: HOSPITALIST

## 2022-05-09 PROCEDURE — 97530 THERAPEUTIC ACTIVITIES: CPT

## 2022-05-09 PROCEDURE — 85730 THROMBOPLASTIN TIME PARTIAL: CPT | Performed by: HOSPITALIST

## 2022-05-09 PROCEDURE — 25000003 PHARM REV CODE 250: Performed by: INTERNAL MEDICINE

## 2022-05-09 PROCEDURE — 85025 COMPLETE CBC W/AUTO DIFF WBC: CPT | Performed by: INTERNAL MEDICINE

## 2022-05-09 PROCEDURE — 94761 N-INVAS EAR/PLS OXIMETRY MLT: CPT

## 2022-05-09 PROCEDURE — 85730 THROMBOPLASTIN TIME PARTIAL: CPT | Mod: 91 | Performed by: HOSPITALIST

## 2022-05-09 PROCEDURE — 99232 PR SUBSEQUENT HOSPITAL CARE,LEVL II: ICD-10-PCS | Mod: ,,, | Performed by: INTERNAL MEDICINE

## 2022-05-09 PROCEDURE — 80053 COMPREHEN METABOLIC PANEL: CPT | Performed by: HOSPITALIST

## 2022-05-09 PROCEDURE — 63600175 PHARM REV CODE 636 W HCPCS: Performed by: HOSPITALIST

## 2022-05-09 PROCEDURE — 20600001 HC STEP DOWN PRIVATE ROOM

## 2022-05-09 PROCEDURE — 25000003 PHARM REV CODE 250: Performed by: STUDENT IN AN ORGANIZED HEALTH CARE EDUCATION/TRAINING PROGRAM

## 2022-05-09 PROCEDURE — 36415 COLL VENOUS BLD VENIPUNCTURE: CPT | Performed by: HOSPITALIST

## 2022-05-09 PROCEDURE — 99233 SBSQ HOSP IP/OBS HIGH 50: CPT | Mod: ,,, | Performed by: HOSPITALIST

## 2022-05-09 PROCEDURE — 85610 PROTHROMBIN TIME: CPT | Performed by: HOSPITALIST

## 2022-05-09 PROCEDURE — 95819 EEG AWAKE AND ASLEEP: CPT

## 2022-05-09 PROCEDURE — 95819 PR EEG,W/AWAKE & ASLEEP RECORD: ICD-10-PCS | Mod: 26,,, | Performed by: PSYCHIATRY & NEUROLOGY

## 2022-05-09 PROCEDURE — 95819 EEG AWAKE AND ASLEEP: CPT | Mod: 26,,, | Performed by: PSYCHIATRY & NEUROLOGY

## 2022-05-09 RX ADMIN — SACUBITRIL AND VALSARTAN 1 TABLET: 24; 26 TABLET, FILM COATED ORAL at 10:05

## 2022-05-09 RX ADMIN — PIPERACILLIN SODIUM AND TAZOBACTAM SODIUM 4.5 G: 4; .5 INJECTION, POWDER, LYOPHILIZED, FOR SOLUTION INTRAVENOUS at 06:05

## 2022-05-09 RX ADMIN — HYDRALAZINE HYDROCHLORIDE 50 MG: 50 TABLET ORAL at 10:05

## 2022-05-09 RX ADMIN — METOPROLOL TARTRATE 25 MG: 25 TABLET, FILM COATED ORAL at 06:05

## 2022-05-09 RX ADMIN — MUPIROCIN: 20 OINTMENT TOPICAL at 10:05

## 2022-05-09 RX ADMIN — WARFARIN SODIUM 5 MG: 5 TABLET ORAL at 05:05

## 2022-05-09 RX ADMIN — METOPROLOL TARTRATE 25 MG: 25 TABLET, FILM COATED ORAL at 12:05

## 2022-05-09 RX ADMIN — MUPIROCIN: 20 OINTMENT TOPICAL at 09:05

## 2022-05-09 RX ADMIN — HEPARIN SODIUM 13 UNITS/KG/HR: 5000 INJECTION INTRAVENOUS; SUBCUTANEOUS at 06:05

## 2022-05-09 RX ADMIN — SACUBITRIL AND VALSARTAN 1 TABLET: 24; 26 TABLET, FILM COATED ORAL at 09:05

## 2022-05-09 RX ADMIN — HYDRALAZINE HYDROCHLORIDE 50 MG: 50 TABLET ORAL at 01:05

## 2022-05-09 RX ADMIN — DONEPEZIL HYDROCHLORIDE 5 MG: 5 TABLET ORAL at 10:05

## 2022-05-09 RX ADMIN — METOPROLOL TARTRATE 25 MG: 25 TABLET, FILM COATED ORAL at 05:05

## 2022-05-09 RX ADMIN — ACETAMINOPHEN 650 MG: 325 TABLET ORAL at 09:05

## 2022-05-09 RX ADMIN — METOPROLOL TARTRATE 25 MG: 25 TABLET, FILM COATED ORAL at 01:05

## 2022-05-09 RX ADMIN — PIPERACILLIN SODIUM AND TAZOBACTAM SODIUM 4.5 G: 4; .5 INJECTION, POWDER, LYOPHILIZED, FOR SOLUTION INTRAVENOUS at 10:05

## 2022-05-09 RX ADMIN — HYDRALAZINE HYDROCHLORIDE 50 MG: 50 TABLET ORAL at 06:05

## 2022-05-09 RX ADMIN — PIPERACILLIN SODIUM AND TAZOBACTAM SODIUM 4.5 G: 4; .5 INJECTION, POWDER, LYOPHILIZED, FOR SOLUTION INTRAVENOUS at 01:05

## 2022-05-09 RX ADMIN — ATORVASTATIN CALCIUM 40 MG: 40 TABLET, FILM COATED ORAL at 09:05

## 2022-05-09 NOTE — PROGRESS NOTES
[facility] - Intensive Care ([place] [address])  [facility] Medicine  Progress Note    Patient Name: Temitope Suero  MRN: [id number]  Patient Class: IP- Inpatient   Admission Date: 5/5/2022  Length of Stay: 4 days  Attending Physician: Janell Nelson MD  Primary Care Provider: Gm Zambrano MD        Subjective:     Principal Problem:Toxic encephalopathy        HPI:  Ms. Temitope Suero is a 82 y.o. female who presents to the ER by family for evaluation of a fall, AMS, and increased work of breath.  History is obtained from family Shelby, as patient is confused.  She reports that over the last 2 does - when she does talk, she wouldn't make sense.  She normally talks well and has fluent speech, but she does have some memory loss from an old stroke.  She has been having a dry cough, no fevers or chills - but has reported foul smelling urine.  She reports decreased PO intake the last week and that her mouth has been dry.  She did have a fall, but didn't hit her head.  She normally ambulates without DME.    Upon arrival to the ER, vitals were temp 98.4F, , /70 satting 96% on room air.  CXR showed a LLL PNA.  Head CT showed chronic ischemic changes.  Labs were notable for Trop 0.322, Creatinine 2.2, and Lactic 2.3.  She was given Vanc and Ceftriaxone and was admitted to Hospital Medicine.      Overview/Hospital Course:  Ms. Suero was admitted to Hospital Medicine for management of a toxic encephalopathy 2/2 LLL pneumonia.  She was started on Ceftriaxone/Azithro.  Her mentation was slow to improve.  SLP evaluated and cleared for diet.  She was given small IVF boluses for her MAC.  On the morning of 5/6, she was found to not be swallowing her food and chewing for a prolonged time.  She was made NPO.  She had a rapid response for diaphoresis and increased work of breathing.  Antibiotics were changed to Zosyn with concern for aspiration.  On 5/8, her mentation worsened again and she was not speaking or following commands.  2D  echo was ordered that showed a large, solid LV thrombus.  Head CT was ordered to r/o a bleed prior to starting Heparin which was negative.  She was started on Heparin.  The following morning, she found to be in new onset atrial flutter with HR 120s.  Cardiology was consulted.  Vascular Neurology was consulted given persistent mentation changes and new LV thrombus with concern for an acute ischemia event. MRI brain was negative for a stroke.      Interval History: No acute events overnight.  This morning still not following commands, but will say a few words.  Cards adjusting medications.    Review of Systems   Unable to perform ROS: Mental status change   Objective:     Vital Signs (Most Recent):  Temp: 96.7 °F (35.9 °C) (05/09/22 0810)  Pulse: 87 (05/09/22 0810)  Resp: 15 (05/09/22 0810)  BP: (!) 140/97 (05/09/22 0810)  SpO2: 98 % (05/09/22 0810)   Vital Signs (24h Range):  Temp:  [96.7 °F (35.9 °C)-98.6 °F (37 °C)] 96.7 °F (35.9 °C)  Pulse:  [] 87  Resp:  [15-41] 15  SpO2:  [95 %-100 %] 98 %  BP: (140-180)/() 140/97     Weight: 64 kg (141 lb 1.5 oz)  Body mass index is 22.1 kg/m².    Intake/Output Summary (Last 24 hours) at 5/9/2022 1235  Last data filed at 5/9/2022 0602  Gross per 24 hour   Intake 100 ml   Output 300 ml   Net -200 ml        Physical Exam  Constitutional:       Appearance: Normal appearance. She is well-developed.   HENT:      Head: Normocephalic and atraumatic.   Cardiovascular:      Rate and Rhythm: Normal rate and regular rhythm.      Heart sounds: No murmur heard.  Pulmonary:      Effort: Pulmonary effort is normal. No respiratory distress.      Breath sounds: Normal breath sounds. No wheezing or rales.   Abdominal:      General: There is no distension.      Palpations: Abdomen is soft.      Tenderness: There is no abdominal tenderness.   Musculoskeletal:         General: No deformity.   Skin:     General: Skin is warm.   Neurological:      Comments: Makes eye contact, but wont  follow directions       Significant Labs: All pertinent labs within the past 24 hours have been reviewed.  CBC:   Recent Labs   Lab 05/08/22  0310 05/09/22  0330   WBC 7.29 7.58   HGB 12.3 12.4   HCT 40.2 41.2    210       CMP:   Recent Labs   Lab 05/08/22  0301 05/09/22  1008   * 151*   K 3.2* 3.8   * 122*   CO2 20* 20*   * 155*   BUN 49* 38*   CREATININE 1.8* 1.7*   CALCIUM 8.5* 8.7   PROT 5.4* 5.7*   ALBUMIN 2.6* 2.6*   BILITOT 0.6 0.7   ALKPHOS 108 110   AST 17 23   ALT 13 18   ANIONGAP 11 9   EGFRNONAA 26.0* 27.9*       Lactic Acid:   No results for input(s): LACTATE in the last 48 hours.      Significant Imaging: I have reviewed all pertinent imaging results/findings within the past 24 hours.      Assessment/Plan:      * Toxic encephalopathy  · Likely 2/2 pneumonia, more suspicious for CVA given finding of LV thrombus  · Head CT and MRI brain both negative for acute ischemic event  · Vascular Neurology consulted  · SLP cleared for diet  · Will check EEG to r/o seizures    Left lower lobe pneumonia  · CXR with LLL PNA  · Satting in the 90s on room air  · Afebrile and without leukocytosis but Lactic 2.3 on admission that trended down  · Started on Ceftriaxone and Azithro, switched to Zosyn on 5/6      NSTEMI (non-ST elevated myocardial infarction)  · Trop 0.322 on admit and flat  · No reports of chest pain  · EKG not ischemic  · Likely demand 2/2 infection  · Monitor      Chronic systolic heart failure  · TTE on 5/6 shows EF 25-30%, G3DD, severe LAE, normal CVP, large solid LV thrombus  · Continue BB  · Cards started Entresto  · Monitor volume status closely    Atrial flutter  · New onset on 5/8 with HR 130s  · Cards consulted  · Continue Metoprolol 25mg PO q6      Acute kidney injury superimposed on CKD  · Creatinine 2.2 on admit, baseline closer to 1.3 - 1.7 today  · Possibly infection induced and Lasix/Benazepril  · Caution with IVF given CHF  · Renal US notable for chronic medical  renal disease and 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    Chronic anticoagulation  · Was on Xarelto and changed to Eliquis in 2018 due to recurrent embolic strokes  · Now with a new large solid LV thrombus  · Continue Heparin with Warfarin bridge  · Pharmacy consulted for dosing      Paroxysmal atrial fibrillation  · Chronic issue but now with new onset atrial flutter  · Anticoagulation as above  · Continue BB      LV (left ventricular) mural thrombus  · Noted on TTE 5/7  · Has been on Eliquis outpatient  · Continue Heparin bridge to Warfarin  · Cards consulted      Stage 3 chronic kidney disease  · Chronic issue  · See MAC below      Fall  · PT/OT      Moderate malnutrition  Nutrition consulted. Most recent weight and BMI monitored-          Malnutrition (Moderate to Severe)  Weight Loss (Malnutrition): 7.5% in 3 months              Measurements:  Wt Readings from Last 1 Encounters:   05/09/22 64 kg (141 lb 1.5 oz)   Body mass index is 22.1 kg/m².    Recommendations: Recommendation/Intervention: 1.  Goals: Meet % EEN, EPN by RD f/u date    Patient has been screened and assessed by RD. RD will follow patient.      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  · As above      Type 2 diabetes mellitus with stage 3 chronic kidney disease, without long-term current use of insulin  · HbA1c 7  · Home DM regimen:  Diet controlled  · SSI with POCT accuchecks to achieve blood glucose of 140-180 while hospitalized  · Diabetic diet, purred and nectar thick per SLP        Deterioration of memory  · Chronic issue  · Continue Aricept      History of stroke  · Chronic and stable  · Head CT and MRI without acute infarct  · Continue Lipitor  · Vascular Neuro consult as above      Mixed hyperlipidemia  · Chronic and stable  · Continue Lipitor 40mg PO daily        VTE Risk Mitigation (From admission, onward)         Ordered     warfarin (COUMADIN) tablet 5 mg  Daily         05/08/22 1517     heparin 25,000 units in dextrose  5% (100 units/ml) IV bolus from bag - ADDITIONAL PRN BOLUS - 60 units/kg  As needed (PRN)        Question:  Heparin Infusion Adjustment (DO NOT MODIFY ANSWER)  Answer:  [email or web address]\epic\Images\Pharmacy\HeparinInfusions\heparin HIGH INTENSITY nomogram for [facility] [id number].pdf    05/08/22 0301     heparin 25,000 units in dextrose 5% (100 units/ml) IV bolus from bag - ADDITIONAL PRN BOLUS - 30 units/kg  As needed (PRN)        Question:  Heparin Infusion Adjustment (DO NOT MODIFY ANSWER)  Answer:  \\[email or web address]\epic\Images\Pharmacy\HeparinInfusions\heparin HIGH INTENSITY nomogram for [facility] [id number].pdf    05/08/22 0301     heparin 25,000 units in dextrose 5% 250 mL (100 units/mL) infusion HIGH INTENSITY nomogram - [facility]  Continuous        Question Answer Comment   Heparin Infusion Adjustment (DO NOT MODIFY ANSWER) \\[email or web address]\epic\Images\Pharmacy\HeparinInfusions\heparin HIGH INTENSITY nomogram for [facility] [id number].pdf    Begin at (in units/kg/hr) 18        05/08/22 0301                Discharge Planning   WALDEMAR: 5/13/2022     Code Status: Full Code   Is the patient medically ready for discharge?:     Reason for patient still in hospital (select all that apply): Patient trending condition                     Janell Nelson MD  Department of Hospital Medicine   [facility] - Intensive Care ([place]-[facility])

## 2022-05-09 NOTE — PLAN OF CARE
Patient with LV thrombus and negative MRI for acute infarct. Okay with heparin drip and transition to warfarin as previously recommended by cardiology. No further recommendations at this time. Please contact our team regarding any questions or concerns. Refer to consult note from 5/8/22 for further details. Stroke team will sign off at this time.       Jimy Unger PA-C  Vascular Neurology   [phone]

## 2022-05-09 NOTE — ASSESSMENT & PLAN NOTE
· Was on Xarelto and changed to Eliquis in 2018 due to recurrent embolic strokes  · Now with a new large solid LV thrombus  · Continue Heparin with Warfarin bridge  · Pharmacy consulted for dosing

## 2022-05-09 NOTE — CARE UPDATE
RAPID RESPONSE NURSE ROUND       Rounding completed with charge RN, [place]. No concerns verbalized at this time. Instructed to call [phone] for further concerns or assistance.

## 2022-05-09 NOTE — SUBJECTIVE & OBJECTIVE
Interval History: No acute events overnight.  This morning still not following commands, but will say a few words.  Cards adjusting medications.    Review of Systems   Unable to perform ROS: Mental status change   Objective:     Vital Signs (Most Recent):  Temp: 96.7 °F (35.9 °C) (05/09/22 0810)  Pulse: 87 (05/09/22 0810)  Resp: 15 (05/09/22 0810)  BP: (!) 140/97 (05/09/22 0810)  SpO2: 98 % (05/09/22 0810)   Vital Signs (24h Range):  Temp:  [96.7 °F (35.9 °C)-98.6 °F (37 °C)] 96.7 °F (35.9 °C)  Pulse:  [] 87  Resp:  [15-41] 15  SpO2:  [95 %-100 %] 98 %  BP: (140-180)/() 140/97     Weight: 64 kg (141 lb 1.5 oz)  Body mass index is 22.1 kg/m².    Intake/Output Summary (Last 24 hours) at 5/9/2022 1235  Last data filed at 5/9/2022 0602  Gross per 24 hour   Intake 100 ml   Output 300 ml   Net -200 ml        Physical Exam  Constitutional:       Appearance: Normal appearance. She is well-developed.   HENT:      Head: Normocephalic and atraumatic.   Cardiovascular:      Rate and Rhythm: Normal rate and regular rhythm.      Heart sounds: No murmur heard.  Pulmonary:      Effort: Pulmonary effort is normal. No respiratory distress.      Breath sounds: Normal breath sounds. No wheezing or rales.   Abdominal:      General: There is no distension.      Palpations: Abdomen is soft.      Tenderness: There is no abdominal tenderness.   Musculoskeletal:         General: No deformity.   Skin:     General: Skin is warm.   Neurological:      Comments: Makes eye contact, but wont follow directions       Significant Labs: All pertinent labs within the past 24 hours have been reviewed.  CBC:   Recent Labs   Lab 05/08/22  0310 05/09/22  0330   WBC 7.29 7.58   HGB 12.3 12.4   HCT 40.2 41.2    210       CMP:   Recent Labs   Lab 05/08/22  0301 05/09/22  1008   * 151*   K 3.2* 3.8   * 122*   CO2 20* 20*   * 155*   BUN 49* 38*   CREATININE 1.8* 1.7*   CALCIUM 8.5* 8.7   PROT 5.4* 5.7*   ALBUMIN 2.6* 2.6*    BILITOT 0.6 0.7   ALKPHOS 108 110   AST 17 23   ALT 13 18   ANIONGAP 11 9   EGFRNONAA 26.0* 27.9*       Lactic Acid:   No results for input(s): LACTATE in the last 48 hours.      Significant Imaging: I have reviewed all pertinent imaging results/findings within the past 24 hours.

## 2022-05-09 NOTE — ASSESSMENT & PLAN NOTE
· Chronic issue but now with new onset atrial flutter  · Anticoagulation as above  · Continue BB

## 2022-05-09 NOTE — ASSESSMENT & PLAN NOTE
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

## 2022-05-09 NOTE — PLAN OF CARE
Problem: Occupational Therapy  Goal: Occupational Therapy Goal  Description: Goals to be met by: 5/23/22     Patient will increase functional independence with ADLs by performing:    Feeding with [place].  UE Dressing with Minimal Assistance.  LE Dressing with Minimal Assistance.  Grooming while standing with Minimal Assistance.  Toileting from toilet with Minimal Assistance for hygiene and clothing management.   Toilet transfer to toilet with Minimal Assistance.  Increased functional strength to WFL for increased participation in ADLs.    Outcome: Ongoing, Progressing

## 2022-05-09 NOTE — SUBJECTIVE & OBJECTIVE
Past Medical History:   Diagnosis Date    Atrial flutter 8/12/2018    Cancer of left breast, stage 2 11/24/2015    Cataract     Cerebrovascular small vessel disease 4/11/2018    Bi-thalamic lacunar disease, mod/sev periventricular white matter disease, mixed pattern cerebral microbleeds    Cervicalgia 4/17/2018    Chronic anticoagulation - apixaban 4/17/2018    Previous on Xarelto but changed to apixaban 8-2018 due to recurrent embolic stroke.    Chronic systolic heart failure 4/17/2018    Echo 4-2018   1 - Moderately depressed left ventricular systolic function (EF 30-35%).    2 - Biatrial enlargement.    3 - Normal right ventricular systolic function .    4 - Mild mitral regurgitation.    5 - Mild tricuspid regurgitation.    6 - The estimated PA systolic pressure is 34 mmHg.    7 - Increased central venous pressure.    8 - Left pleural effusion.     CKD stage 3 due to type 2 diabetes mellitus 4/17/2018    Embolic stroke involving left cerebellar artery 8/13/2018    Embolic stroke involving right posterior cerebral artery 4/11/2018    Encephalopathy 8/13/2018    Essential hypertension 10/28/2013    Glaucoma     Glaucoma suspect of both eyes 12/9/2013    Hearing loss, sensorineural 12/16/2013    Malignant hypertension 8/12/2018    Mixed hyperlipidemia 10/28/2013    Obesity 1/16/2014    Paroxysmal atrial fibrillation 7/10/2012    Stage 3 chronic kidney disease 4/17/2018    Toxic multinodular goiter 10/28/2013    Type 2 diabetes mellitus with stage 3 chronic kidney disease, without long-term current use of insulin 7/10/2012    Diet controlled.    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    Vitamin D deficiency disease 10/28/2013     Past Surgical History:   Procedure Laterality Date    BREAST BIOPSY      BREAST SURGERY      CHOLECYSTECTOMY       Family History   Problem Relation Age of Onset    Hypertension Mother     Hypertension Father     Glaucoma Father     Heart disease Father     Cancer Sister     Asthma Son      Diabetes Paternal Aunt     Thyroid cancer Neg Hx     Amblyopia Neg Hx     Blindness Neg Hx     Cataracts Neg Hx     Macular degeneration Neg Hx     Retinal detachment Neg Hx     Strabismus Neg Hx      Social History     Tobacco Use    Smoking status: Never Smoker    Smokeless tobacco: Never Used   Substance Use Topics    Alcohol use: No    Drug use: No     Review of patient's allergies indicates:  No Known Allergies    Medications: I have reviewed the current medication administration record.    Medications Prior to Admission   Medication Sig Dispense Refill Last Dose    apixaban (ELIQUIS) 2.5 mg Tab Take 1 tablet (2.5 mg total) by mouth 2 (two) times daily. 180 tablet 3     atorvastatin (LIPITOR) 40 MG tablet Take 1 tablet (40 mg total) by mouth once daily. 90 tablet 3     benazepriL (LOTENSIN) 20 MG tablet Take 1 tablet (20 mg total) by mouth once daily. 90 tablet 3     carvediloL (COREG) 25 MG tablet Take 1 tablet (25 mg total) by mouth 2 (two) times daily with meals. 180 tablet 2     donepeziL (ARICEPT) 5 MG tablet Take 1 tablet (5 mg total) by mouth every evening. 90 tablet 3     furosemide (LASIX) 40 MG tablet TAKE 1 TABLET BY MOUTH TWICE A  tablet 1     latanoprost 0.005 % ophthalmic solution Place 1 drop into both eyes every evening. 2.5 mL 12        Review of Systems   Unable to perform ROS: Mental status change   Constitutional:  Negative for chills and fever.   HENT:  Negative for rhinorrhea and sneezing.    Eyes:  Negative for discharge and redness.   Respiratory:  Negative for cough and wheezing.    Cardiovascular:  Negative for chest pain and palpitations.   Gastrointestinal:  Negative for nausea and vomiting.   Skin:  Negative for pallor and rash.   Neurological:  Positive for speech difficulty. Negative for facial asymmetry.   Psychiatric/Behavioral:  Positive for confusion and decreased concentration.    Objective:     Vital Signs (Most Recent):  Temp: 98.6 °F (37 °C) (05/08/22  1648)  Pulse: 89 (05/08/22 1648)  Resp: 18 (05/08/22 1648)  BP: (!) 144/94 (05/08/22 1648)  SpO2: 98 % (05/08/22 1648)    Vital Signs Range (Last 24H):  Temp:  [97.1 °F (36.2 °C)-98.6 °F (37 °C)]   Pulse:  []   Resp:  [15-23]   BP: (134-182)/()   SpO2:  [89 %-100 %]     Physical Exam  Constitutional:       Appearance: Normal appearance.   HENT:      Head: Normocephalic and atraumatic.      Nose: Nose normal.      Mouth/Throat:      Mouth: Mucous membranes are moist.      Pharynx: Oropharynx is clear.   Eyes:      General: No scleral icterus.     Extraocular Movements: Extraocular movements intact.      Conjunctiva/sclera: Conjunctivae normal.      Pupils: Pupils are equal, round, and reactive to light.   Pulmonary:      Effort: Pulmonary effort is normal. No respiratory distress.      Breath sounds: No wheezing.   Abdominal:      General: Abdomen is flat. There is no distension.      Tenderness: There is no abdominal tenderness.   Musculoskeletal:         General: No tenderness or deformity.      Cervical back: Normal range of motion. No rigidity.   Skin:     General: Skin is warm.      Coloration: Skin is not jaundiced.   Neurological:      Mental Status: She is alert. She is disoriented.       Neurological Exam:   LOC: alert  Attention Span: poor  Articulation: Dysarthria  Orientation: Not oriented to place, Not oriented to time  EOM (CN III, IV, VI): Full/intact  Pupils (CN II, III): PERRL  Facial Movement (CN VII): Symmetric facial expression    Motor: Arm left  Paresis: 4/5  Arm right  Paresis: 4/5  Leg right Paresis: 4/5      Laboratory:  CMP:   Recent Labs   Lab 05/08/22  0301   CALCIUM 8.5*   ALBUMIN 2.6*   PROT 5.4*   *   K 3.2*   CO2 20*   *   BUN 49*   CREATININE 1.8*   ALKPHOS 108   ALT 13   AST 17   BILITOT 0.6     CBC:   Recent Labs   Lab 05/08/22  0310   WBC 7.29   RBC 4.99   HGB 12.3   HCT 40.2      MCV 81*   MCH 24.6*   MCHC 30.6*       Diagnostic Results:      Brain  imaging:  MRI without acute stroke    Vessel Imaging:    Cardiac Evaluation:   TTE was completed and demonstrated an EF of 25-30% with a noted LV thrombus.

## 2022-05-09 NOTE — SUBJECTIVE & OBJECTIVE
Interval History:   Pt with no acute events overnight. MRI of head negative for acute CVA. Still on heparin gtt now bridged with coumadin.     Review of Systems   Unable to perform ROS: Dementia   Objective:     Vital Signs (Most Recent):  Temp: 96.9 °F (36.1 °C) (05/09/22 1220)  Pulse: (!) 111 (05/09/22 1347)  Resp: 20 (05/09/22 1220)  BP: (!) 142/97 (05/09/22 1220)  SpO2: 97 % (05/09/22 1220)   Vital Signs (24h Range):  Temp:  [96.7 °F (35.9 °C)-98.6 °F (37 °C)] 96.9 °F (36.1 °C)  Pulse:  [] 111  Resp:  [15-41] 20  SpO2:  [95 %-100 %] 97 %  BP: (140-164)/() 142/97     Weight: 64 kg (141 lb 1.5 oz)  Body mass index is 22.1 kg/m².     SpO2: 97 %  O2 Device (Oxygen Therapy): room air      Intake/Output Summary (Last 24 hours) at 5/9/2022 1424  Last data filed at 5/9/2022 0602  Gross per 24 hour   Intake 100 ml   Output 300 ml   Net -200 ml       Lines/Drains/Airways       Peripheral Intravenous Line  Duration                  Peripheral IV - Single Lumen 05/05/22 0127 18 G Right Wrist 4 days         Peripheral IV - Single Lumen 05/05/22 0127 20 G Right Forearm 4 days                    Physical Exam  Constitutional:       Appearance: Normal appearance.   HENT:      Head: Normocephalic and atraumatic.      Nose: Nose normal.      Mouth/Throat:      Mouth: Mucous membranes are moist.   Cardiovascular:      Rate and Rhythm: Normal rate. Rhythm irregular.      Pulses: Normal pulses.   Pulmonary:      Effort: Pulmonary effort is normal. No respiratory distress.      Breath sounds: No wheezing or rales.   Abdominal:      General: Abdomen is flat.      Palpations: Abdomen is soft.   Musculoskeletal:      Right lower leg: No edema.      Left lower leg: No edema.   Skin:     General: Skin is warm.      Capillary Refill: Capillary refill takes 2 to 3 seconds.   Neurological:      Mental Status: She is alert.      Comments: Pt not oriented to person, place, time       Significant Labs: CMP   Recent Labs   Lab  05/08/22  0301 05/09/22  1008   * 151*   K 3.2* 3.8   * 122*   CO2 20* 20*   * 155*   BUN 49* 38*   CREATININE 1.8* 1.7*   CALCIUM 8.5* 8.7   PROT 5.4* 5.7*   ALBUMIN 2.6* 2.6*   BILITOT 0.6 0.7   ALKPHOS 108 110   AST 17 23   ALT 13 18   ANIONGAP 11 9   ESTGFRAFRICA 30.0* 32.1*   EGFRNONAA 26.0* 27.9*    and CBC   Recent Labs   Lab 05/08/22  0310 05/09/22  0330   WBC 7.29 7.58   HGB 12.3 12.4   HCT 40.2 41.2    210

## 2022-05-09 NOTE — SIGNIFICANT EVENT
Notified of high diastolic BP, checked again with manual 145/115.   May be related to thrombus in ventricle.   Did not have new ischemic stroke, so do not necessarily need permissive hypertension.  -Since diastolic is much higher relatively, will use hydralazine to target.  -Further review by day team/cardiology         Amanuel Delarosa MD  Internal Medicine Staff

## 2022-05-09 NOTE — PLAN OF CARE
Recommendations     1. Liberalize diet to 2000 kcal ADA, Pureed (texture per SLP). Add Boost Glucose Control ONS & encourage adequate PO intake.  2. RD to monitor & follow-up.     Goals: Meet % EEN, EPN by RD f/u date  Nutrition Goal Status: new  Communication of RD Recs: reviewed with RN

## 2022-05-09 NOTE — ASSESSMENT & PLAN NOTE
· TTE on 5/6 shows EF 25-30%, G3DD, severe LAE, normal CVP, large solid LV thrombus  · Continue BB  · Cards started [place]  · Monitor volume status closely

## 2022-05-09 NOTE — PT/OT/SLP EVAL
"Occupational Therapy   Evaluation and Treatment     Name: Temitope Suero  MRN: [id number]  Admitting Diagnosis:  Toxic encephalopathy  Recent Surgery: * No surgery found *      Recommendations:     Discharge Recommendations: nursing facility, skilled  Discharge Equipment Recommendations:  walker, rolling, bedside commode, shower chair  Barriers to discharge:  Other (Comment) (increased assistance required)    Assessment:     Temitope Suero is a 82 y.o. female with a medical diagnosis of Toxic encephalopathy.  She presents with the following performance deficits affecting function: weakness, impaired endurance, impaired self care skills, impaired functional mobilty, gait instability, impaired balance, impaired cognition, decreased coordination, decreased upper extremity function, decreased lower extremity function, decreased safety awareness, impaired fine motor.      Pt agreeable to therapy, but with limited words this date. Pt is not oriented and demonstrated as very confused throughout the session. She required multiple verbal cues to participate in self care tasks. Pt participated in feeding EOB with Max A and Pt unable to hold spoon even with [place] A. Performed 2 stands with Mod A x 2 and RW. Pt would benefit from continued skilled acute OT services in order to maximize independence and safety with ADLs and functional mobility to ensure safe return to PLOF in the least restrictive environment. OT recommending SNF once pt is medically appropriate for d/c.     Rehab Prognosis: Fair; patient would benefit from acute skilled OT services to address these deficits and reach maximum level of function.       Plan:     Patient to be seen 4 x/week to address the above listed problems via self-care/home management, therapeutic activities, therapeutic exercises  · Plan of Care Expires: 06/05/22  · Plan of Care Reviewed with: patient    Subjective     "Ok" Pt only repeating words back, did not answer questions    Chief Complaint: " None stated   Patient/Family Comments/goals: To return to PLOF    Occupational Profile:    Pt is a poor historian, information obtained from chart review   Living Environment: Pt lives with her niece Madhavi in a H with 0 ROBBY   Previous level of function: Independent with ADLs and ambulation - Pt did not use any equipment   Equipment Used at Home:  other (see comments) (unknown)  Assistance upon Discharge: Pt will have assistance from her Niece Madhavi    Pain/Comfort:  · Pain Rating 1: 0/10    Patients cultural, spiritual, Judaism conflicts given the current situation: no    Objective:     Communicated with: RN prior to session.  Patient found HOB elevated with telemetry, pulse ox (continuous), peripheral IV, blood pressure cuff upon OT entry to room.    General Precautions: Standard, fall   Orthopedic Precautions:N/A   Braces: N/A  Respiratory Status: Room air    Occupational Performance:    Bed Mobility:    · Patient completed Rolling/Turning to Right with moderate assistance  · Patient completed Scooting/Bridging with minimum assistance  · Patient completed Supine to Sit with moderate assistance  · Patient completed Sit to Supine with moderate assistance   · Pt sat EOB ~20 minutes with Mod A progressing to SBA     Functional Mobility/Transfers:  · Patient completed Sit <> Stand Transfer with moderate assistance and of 2 persons  with  rolling walker   · 1st trial: Pt stood ~75% up with BLEs against the bed   · 2nd trail: Pt stood ~90% up with increased posture and cues to hold head up     Activities of Daily Living:  · Feeding:  maximal assistance : To eat lunch sitting EOB. Attempted [place] A in B hands with no active participation. Pt opened her mouth and participated in eating once fed.   · Lower Body Dressing: total assistance : To don B socks     Cognitive/Visual Perceptual:  Cognitive/Psychosocial Skills:     -       Oriented to: none  -       Follows Commands/attention:Inattentive  -       Safety  awareness/insight to disability: impaired   -       Mood/Affect/Coping skills/emotional control: Labile    Physical Exam:   Balance:  Static Sitting   stand by assistance   Dynamic Sitting   minimum assistance   Static Standing   minimum assistance   Dynamic Standing   moderate assistance     Upper Extremity Function:   Edema None noted   Sensation    -       Intact   Hand Dominance Right   LUE ROM PROM WFL   RUE ROM PROM WFL   LUE Strength  N/A Pt unable to follow commands    RUE Strength  N/A Pt unable to follow commands    LUE  Strength N/A   RUE  Strength  N/A   Fine Motor Skills     -       N/A   Gross Motor skills    unable to assess. Pt with no command following. Appears as if LUE is more impaired than RUE from observations. Pt with old CVA.          AMPAC 6 Click ADL:  AMPAC Total Score: 12    Treatment & Education:   Therapist provided facilitation and instruction of proper body mechanics and fall prevention strategies during tasks listed above.   Instructed patient to use call light to have nursing staff assist with needs/transfers.   Discussed OT POC and answered all questions within OT scope of practice.   Whiteboard updated     Education:    Patient left HOB elevated with all lines intact and call button in reach    GOALS:   Multidisciplinary Problems     Occupational Therapy Goals        Problem: Occupational Therapy    Goal Priority Disciplines Outcome Interventions   Occupational Therapy Goal     OT, PT/OT Ongoing, Progressing    Description: Goals to be met by: 5/23/22     Patient will increase functional independence with ADLs by performing:    Feeding with [place].  UE Dressing with Minimal Assistance.  LE Dressing with Minimal Assistance.  Grooming while standing with Minimal Assistance.  Toileting from toilet with Minimal Assistance for hygiene and clothing management.   Toilet transfer to toilet with Minimal Assistance.  Increased functional strength to WFL for increased  participation in ADLs.                     History:     Past Medical History:   Diagnosis Date    Atrial flutter 8/12/2018    Cancer of left breast, stage 2 11/24/2015    Cataract     Cerebrovascular small vessel disease 4/11/2018    Bi-thalamic lacunar disease, mod/sev periventricular white matter disease, mixed pattern cerebral microbleeds    Cervicalgia 4/17/2018    Chronic anticoagulation - apixaban 4/17/2018    Previous on Xarelto but changed to apixaban 8-2018 due to recurrent embolic stroke.    Chronic systolic heart failure 4/17/2018    Echo 4-2018   1 - Moderately depressed left ventricular systolic function (EF 30-35%).    2 - Biatrial enlargement.    3 - Normal right ventricular systolic function .    4 - Mild mitral regurgitation.    5 - Mild tricuspid regurgitation.    6 - The estimated PA systolic pressure is 34 mmHg.    7 - Increased central venous pressure.    8 - Left pleural effusion.     CKD stage 3 due to type 2 diabetes mellitus 4/17/2018    Embolic stroke involving left cerebellar artery 8/13/2018    Embolic stroke involving right posterior cerebral artery 4/11/2018    Encephalopathy 8/13/2018    Essential hypertension 10/28/2013    Glaucoma     Glaucoma suspect of both eyes 12/9/2013    Hearing loss, sensorineural 12/16/2013    Malignant hypertension 8/12/2018    Mixed hyperlipidemia 10/28/2013    Obesity 1/16/2014    Paroxysmal atrial fibrillation 7/10/2012    Stage 3 chronic kidney disease 4/17/2018    Toxic multinodular goiter 10/28/2013    Type 2 diabetes mellitus with stage 3 chronic kidney disease, without long-term current use of insulin 7/10/2012    Diet controlled.   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

## 2022-05-09 NOTE — ASSESSMENT & PLAN NOTE
Nutrition Problem:  Moderate Protein-Calorie Malnutrition  Malnutrition in the context of Acute Illness/Injury    Related to (etiology):  Inability to consume sufficient energy    Signs and Symptoms (as evidenced by):  Body Fat Depletion: moderate depletion of orbitals and triceps   Muscle Mass Depletion: moderate depletion of temples, clavicle region and lower extremities   Weight Loss: 7.5% x 3 months    Interventions(treatment strategy):  Collaboration of nutrition care w/ other providers  [facility]    Nutrition Diagnosis Status:  New

## 2022-05-09 NOTE — PROGRESS NOTES
[facility] - Intensive Care ([place] [address])  Cardiology  Progress Note    Patient Name: Temtiope Suero  MRN: [id number]  Admission Date: 5/5/2022  Hospital Length of Stay: 4 days  Code Status: Full Code   Attending Physician: Janell Nelson MD   Primary Care Physician: Gm Zambrano MD  Expected Discharge Date: 5/13/2022  Principal Problem:Toxic encephalopathy    Subjective:       Interval History:   Pt with no acute events overnight. MRI of head negative for acute CVA. Still on heparin gtt now bridged with coumadin.     Review of Systems   Unable to perform ROS: Dementia   Objective:     Vital Signs (Most Recent):  Temp: 96.9 °F (36.1 °C) (05/09/22 1220)  Pulse: (!) 111 (05/09/22 1347)  Resp: 20 (05/09/22 1220)  BP: (!) 142/97 (05/09/22 1220)  SpO2: 97 % (05/09/22 1220)   Vital Signs (24h Range):  Temp:  [96.7 °F (35.9 °C)-98.6 °F (37 °C)] 96.9 °F (36.1 °C)  Pulse:  [] 111  Resp:  [15-41] 20  SpO2:  [95 %-100 %] 97 %  BP: (140-164)/() 142/97     Weight: 64 kg (141 lb 1.5 oz)  Body mass index is 22.1 kg/m².     SpO2: 97 %  O2 Device (Oxygen Therapy): room air      Intake/Output Summary (Last 24 hours) at 5/9/2022 1424  Last data filed at 5/9/2022 0602  Gross per 24 hour   Intake 100 ml   Output 300 ml   Net -200 ml       Lines/Drains/Airways       Peripheral Intravenous Line  Duration                  Peripheral IV - Single Lumen 05/05/22 0127 18 G Right Wrist 4 days         Peripheral IV - Single Lumen 05/05/22 0127 20 G Right Forearm 4 days                    Physical Exam  Constitutional:       Appearance: Normal appearance.   HENT:      Head: Normocephalic and atraumatic.      Nose: Nose normal.      Mouth/Throat:      Mouth: Mucous membranes are moist.   Cardiovascular:      Rate and Rhythm: Normal rate. Rhythm irregular.      Pulses: Normal pulses.   Pulmonary:      Effort: Pulmonary effort is normal. No respiratory distress.      Breath sounds: No wheezing or rales.   Abdominal:      General: Abdomen  is flat.      Palpations: Abdomen is soft.   Musculoskeletal:      Right lower leg: No edema.      Left lower leg: No edema.   Skin:     General: Skin is warm.      Capillary Refill: Capillary refill takes 2 to 3 seconds.   Neurological:      Mental Status: She is alert.      Comments: Pt not oriented to person, place, time       Significant Labs: CMP   Recent Labs   Lab 05/08/22  0301 05/09/22  1008   * 151*   K 3.2* 3.8   * 122*   CO2 20* 20*   * 155*   BUN 49* 38*   CREATININE 1.8* 1.7*   CALCIUM 8.5* 8.7   PROT 5.4* 5.7*   ALBUMIN 2.6* 2.6*   BILITOT 0.6 0.7   ALKPHOS 108 110   AST 17 23   ALT 13 18   ANIONGAP 11 9   ESTGFRAFRICA 30.0* 32.1*   EGFRNONAA 26.0* 27.9*    and CBC   Recent Labs   Lab 05/08/22  0310 05/09/22  0330   WBC 7.29 7.58   HGB 12.3 12.4   HCT 40.2 41.2    210           Assessment and Plan:       LV (left ventricular) mural thrombus  82 F with PMH of CVA, HLD, atrial fibrillation/flutter HFrEF, CKD, DM2 [place] [facility] [place]. Initially treated for sepsis/CAP concern for acute CVA, MRI negative. Found to have large LV thrombus, on heparin gtt. Also tachycardic in atrial flutter.      LV thrombus:   Likely secondary to known cardiomyopathy, decreased systolic function. Will consider apixiaban failure. Would continue heparin gtt for the time being and transition to warfarin for goal INR 2-3  - Continue heparin to Warfarin bridging      Atrial flutter:   - Remains rate controlled.   -LV thrombus contraindicates cardioversion, rate control strategy for now   - Continue metoprolol 25 mg q6 hours.      HFrEF:   -Reinitiate GDMT as tolerated.     Thank you for your consult, Cardiology will sign off. Please re-consult if patient's status changes.     VTE Risk Mitigation (From admission, onward)         Ordered     warfarin (COUMADIN) tablet 5 mg  Daily         05/08/22 1517     heparin 25,000 units in dextrose 5% (100 units/ml) IV bolus from bag - ADDITIONAL  PRN BOLUS - 60 units/kg  As needed (PRN)        Question:  Heparin Infusion Adjustment (DO NOT MODIFY ANSWER)  Answer:  [email or web address]\epic\Images\Pharmacy\HeparinInfusions\heparin HIGH INTENSITY nomogram for [facility] [id number]    05/08/22 0301     heparin 25,000 units in dextrose 5% (100 units/ml) IV bolus from bag - ADDITIONAL PRN BOLUS - 30 units/kg  As needed (PRN)        Question:  Heparin Infusion Adjustment (DO NOT MODIFY ANSWER)  Answer:  [email or web address]\epic\Images\Pharmacy\HeparinInfusions\heparin HIGH INTENSITY nomogram for [facility] [id number]    05/08/22 0301     heparin 25,000 units in dextrose 5% 250 mL (100 units/mL) infusion HIGH INTENSITY nomogram - [facility]  Continuous        Question Answer Comment   Heparin Infusion Adjustment (DO NOT MODIFY ANSWER) [email or web address]\epic\Images\Pharmacy\HeparinInfusions\heparin HIGH INTENSITY nomogram for [facility] [id number]    Begin at (in units/kg/hr) 18        05/08/22 0301                Diandra Christy MD  Cardiology  [facility] - Intensive Care ([place]-[facility])

## 2022-05-09 NOTE — ASSESSMENT & PLAN NOTE
82 F with PMH of CVA, HLD, atrial fibrillation/flutter HFrEF, CKD, DM2 who presented to [facility] with encephalopathy. Initially treated for sepsis/CAP but growing concern for acute CVA, MRI pending. Found to have large LV thrombus, on heparin gtt. Also becoming more tachycardic in atrial flutter.      LV thrombus:   Likely secondary to known cardiomyopathy, decreased systolic function. Will consider apixiaban failure. Would continue heparin gtt for the time being and transition to warfarin for goal INR 2-3.      Atrial flutter:   In and out since hospitalization. Seems to have heavy burden based on prior EKG tracings over the years. This morning around 4 AM converted from sinus to a possible atrial tachycardia, then shortly after converted to atrial flutter with rapid ventricular response. Currently rate controlled.   -LV thrombus contraindicates cardioversion, rate control strategy for now   -Add beta blocker once stroke ruled out, metoprolol 25 mg q6 hours.      HFrEF:   Seems euvolemic at this time to slightly dry. Reinitiate GDMT as tolerated.

## 2022-05-09 NOTE — CONSULTS
[facility] - Int[facility] Care ([facility]  Adult Nutrition  Consult Note    SUMMARY     Recommendations    1. Liberalize diet to 2000 kcal ADA, Pureed (texture per SLP). Add Boost Glucose Control ONS & encourage adequate PO intake.  2. RD to monitor & follow-up.    Goals: Meet % EEN, EPN by RD f/u date  Nutrition Goal Status: new  Communication of RD Recs: reviewed with RN    Assessment and Plan    Moderate malnutrition    Nutrition Problem:  Moderate Protein-Calorie Malnutrition  Malnutrition in the context of Acute Illness/Injury    Related to (etiology):  Inability to consume sufficient energy    Signs and Symptoms (as evidenced by):  Body Fat Depletion: moderate depletion of orbitals and triceps   Muscle Mass Depletion: moderate depletion of temples, clavicle region and lower extremities   Weight Loss: 7.5% x 3 months    Interventions(treatment strategy):  Collaboration of nutrition care w/ other providers  ONS    Nutrition Diagnosis Status:  New     Malnutrition Assessment    Weight Loss (Malnutrition): 7.5% in 3 months   Orbital Region (Subcutaneous Fat Loss): moderate depletion  Upper Arm Region (Subcutaneous Fat Loss): moderate depletion   [place] Region (Muscle Loss): moderate depletion  Clavicle Bone Region (Muscle Loss): moderate depletion  Dorsal Hand (Muscle Loss): moderate depletion  Anterior Thigh Region (Muscle Loss): moderate depletion     Reason for Assessment    Reason For Assessment: consult  Diagnosis: other (see comments) (Encephalopathy)  Relevant Medical History: HLD, HF, CKD, DM  Interdisciplinary Rounds: did not attend    General Information Comments: Pureed diet, per SLP. Disoriented w/ no family at bedside. Per RN, pt tolerating diet w/ 25% PO intake. Unsure of PO intake PTA; per chart review, pt weighed 153# x 3/2022 & 156# x 11/2020. NFPE reveals pt w/ moderate fat & muscle wasting. RD feels pt meets criteria for moderate malnutrition. Please see PES statment for  "details.  Nutrition Discharge Planning: Adequate nutrition    Nutrition/Diet History    Spiritual, Cultural Beliefs, Christianity Practices, Values that Affect Care: no  Factors Affecting Nutritional Intake: decreased appetite    Anthropometrics    Temp: 96.7 °F (35.9 °C)  Height Method: Stated  Height: 5' 7" (170.2 cm)  Height (inches): 67 in  Weight Method: Bed Scale  Weight: 64 kg (141 lb 1.5 oz)  Weight (lb): 141.1 lb  Ideal Body Weight (IBW), Female: 135 lb  % Ideal Body Weight, Female (lb): 104.52 %  BMI (Calculated): 22.1  BMI Grade: 18.5-24.9 - normal  Usual Body Weight (UBW), k.5 kg  % Usual Body Weight: 92.28  % Weight Change From Usual Weight: -7.91 %    Lab/Procedures/Meds    Pertinent Labs Reviewed: reviewed  Pertinent Labs Comments: Na 151, BUN 49, Creat 1.8, GFR 30, A1C 7  Pertinent Medications Reviewed: reviewed  Pertinent Medications Comments: Heparin    Estimated/Assessed Needs    Weight Used For Calorie Calculations: 64 kg (141 lb 1.5 oz)     Energy Calorie Requirements (kcal): 1479 kcal/d  Energy Need Method: [place]-St Jeor (1.3 PAL)     Protein Requirements: 77 g/d (1.2 g/kg)  Weight Used For Protein Calculations: 64 kg (141 lb 1.5 oz)     Estimated Fluid Requirement Method: other (see comments) (Per MD or 1 mL/kcal)  RDA Method (mL): 1479    Nutrition Prescription Ordered    Current Diet Order: 2000 kcal ADA, Cardiac, Pureed    Evaluation of Received Nutrient/Fluid Intake    I/O: +1L since admit    Comments: LBM:     Tolerance: tolerating    Nutrition Risk    Level of Risk/Frequency of Follow-up:  (1x/week)     Monitor and Evaluation    Food and Nutrient Intake: energy intake, food and beverage intake  Food and Nutrient Adminstration: diet order  Physical Activity and Function: nutrition-related ADLs and IADLs  Anthropometric Measurements: weight change, weight  Biochemical Data, Medical Tests and Procedures: inflammatory profile, lipid profile, glucose/endocrine profile, electrolyte and " renal panel, gastrointestinal profile  Nutrition-Focused Physical Findings: overall appearance     Nutrition Follow-Up    RD Follow-up?: Yes

## 2022-05-09 NOTE — PROCEDURES
Routine EEG Report    Temitope Suero  [id number]  1940    DATE OF SERVICE: 5/9/2022  REASON FOR CONSULT:  82-year-old woman admitted with shortness of breath and confusion.  Evaluate for evidence of epileptiform activity.    METHODOLOGY   Electroencephalographic (EEG) recording is with electrodes placed according to the International 10-20 placement system.  Thirty two (32) channels of digital signal (sampling rate of 512/sec) including T1 and T2 was simultaneously recorded from the scalp and may include  EKG, EMG, and/or eye monitors.  Recording band pass was 0.1 to 512 hz.  Digital video recording of the patient is simultaneously recorded with the EEG.  The patient is instructed report clinical symptoms which may occur during the recording session.  EEG and video recording is stored and archived in digital format. Activation procedures which include photic stimulation, hyperventilation and instructing patients to perform simple task are done in selected patients.    The EEG is displayed on a monitor screen and can be reviewed using different montages.  Computer assisted analysis is employed to detect spike and electrographic seizure activity.   The entire record is submitted for computer analysis.  The entire recording is visually reviewed and the times identified by computer analysis as being spikes or seizures are reviewed again.  Compresses spectral analysis (CSA) is also performed on the activity recorded from each individual channel.  This is displayed as a power display of frequencies from 0 to 30 Hz over time.   The CSA is reviewed looking for asymmetries in power between homologous areas of the scalp and then compared with the original EEG recording.     [employer] software is also utilized in the review of this study.  This software suite analyzes the EEG recording in multiple domains.  Coherence and rhythmicity is computed to identify EEG sections which may contain organized seizures.  Each channel undergoes  analysis to detect presence of spike and sharp waves which have special and morphological characteristic of epileptic activity.  The routine EEG recording is converted from spacial into frequency domain.  This is then displayed comparing homologous areas to identify areas of significant asymmetry.  Algorithm to identify non-cortically generated artifact is used to separate eye movement, EMG and other artifact from the EEG.      EEG FINDINGS  Background activity:   The background is continuous, relatively symmetric, somewhat disorganized, mixed frequency theta/delta activity.    Sleep:  There is poorly formed sleep architecture.    Activation procedures:   The patient is breathing room air and will wake up with stimulation however she does not answer orientation questions or follow commands    Cardiac Monitor:   Irregular    Impression:   This is an abnormal awake and asleep routine EEG because of a slow and disorganized background consistent with diffuse cortical dysfunction and a moderate encephalopathy.  This finding is nonspecific with regards to etiology but can be seen in the setting of toxic/metabolic derangements, infection, and as a medication effect.  There are no prominent focal findings however encephalopathy obscures focal findings.  There are no pushbutton activations, no epileptiform discharges, and no electrographic seizures.    Sindhu Mann MD PhD  Neurology-Epilepsy  [facility].

## 2022-05-09 NOTE — ASSESSMENT & PLAN NOTE
· Likely 2/2 pneumonia, more suspicious for CVA given finding of LV thrombus  · Head CT and MRI brain both negative for acute ischemic event  · Vascular Neurology consulted  · SLP cleared for diet  · Will check EEG to r/o seizures

## 2022-05-09 NOTE — ASSESSMENT & PLAN NOTE
· Noted on TTE 5/7  · Has been on Eliquis outpatient  · Continue Heparin bridge to Warfarin  · Cards consulted

## 2022-05-09 NOTE — NURSING
1959 pt DBP high, notified MD delarosa via secure chat, awaiting response    0136 Dr Delarosa at bedside, notified him of DBP, he explained in detail about pts disease process in relation to hypertension, no new orders, [facility]    0140 APTT therapeutic range, no change, willl put next order in         END OF SHIFT NOTE  Pt rested well throughout shift, no distress at this time, no complaints, bp slightly high, other VSS, bed in lowest position, side rails up x2. Bed alarm set, camera remain at bedside, heparin gtt now at 13 units and next aPTT order is in. personal items within reach. [facility]      Cathy Gordon, JINN RN

## 2022-05-09 NOTE — ASSESSMENT & PLAN NOTE
Nutrition consulted. Most recent weight and BMI monitored-          Malnutrition (Moderate to Severe)  Weight Loss (Malnutrition): 7.5% in 3 months              Measurements:  Wt Readings from Last 1 Encounters:   05/09/22 64 kg (141 lb 1.5 oz)   Body mass index is 22.1 kg/m².    Recommendations: Recommendation/Intervention: 1.  Goals: Meet % EEN, EPN by RD f/u date    Patient has been screened and assessed by RD. RD will follow patient.

## 2022-05-09 NOTE — PLAN OF CARE
[facility] - Intensive Care ([place] [address])  Initial Discharge Assessment       Primary Care Provider: Gm Zambrano MD    Admission Diagnosis: CHF (congestive heart failure) [I50.9]  Pneumonia due to infectious organism, unspecified laterality, unspecified part of lung [J18.9]  AMS (altered mental status) [R41.82]    Admission Date: 5/5/2022  Expected Discharge Date: 5/13/2022    Discharge Barriers Identified: None    Payor: MEDICARE / Plan: MEDICARE A ONLY / Product Type: Government /     Extended Emergency Contact Information  Primary Emergency Contact: Shelby Alfaro  Address: [address] [place] [address] .           [place], [place] [address] [place] [address]  Home Phone: [phone]  Relation: Relative    Discharge Plan A: Home with family, Home Health  Discharge Plan B: Skilled Nursing Facility      [id number] - [place], [place] - [address]  [address]  [place] [address]  Phone: [phone] Fax: [phone]      Initial Assessment (most recent)     Adult Discharge Assessment - 05/09/22 1247        Discharge Assessment    Assessment Type Discharge Planning Assessment     Confirmed/corrected address, phone number and insurance Yes     Confirmed Demographics Correct on Facesheet     Source of Information family     If unable to respond/provide information was family/caregiver contacted? Yes     Contact Name/Number Shelby Vega) [phone]     Does patient/caregiver understand observation status Yes     Communicated WALDEMAR with patient/caregiver Date not available/Unable to determine     Reason For Admission Toxic Encephalopathy     Lives With other relative(s)     Do you expect to return to your current living situation? Yes     Do you have help at home or someone to help you manage your care at home? Yes     Who are your caregiver(s) and their phone number(s)? Shelby Vega) [phone]     Prior to hospitilization cognitive status: Unable to Assess     Current cognitive  status: Not Oriented to Person;Not Oriented to Place     Walking or Climbing Stairs Difficulty none     Dressing/Bathing Difficulty none     Home Layout Able to live on 1st floor     Equipment Currently Used at Home none     Readmission within 30 days? No     Patient currently being followed by outpatient case management? No     Do you currently have service(s) that help you manage your care at home? No     Do you take prescription medications? Yes     Do you have prescription coverage? Yes     Coverage Medicare Part A only and BCBS     Do you have any problems affording any of your prescribed medications? No     Is the patient taking medications as prescribed? yes     Who is going to help you get home at discharge? [facility] [phone]     How do you get to doctors appointments? family or friend will provide     Are you on dialysis? No     Do you take coumadin? No     Discharge Plan A Home with family;Home Health     Discharge Plan B Skilled Nursing Facility     DME Needed Upon Discharge  other (see comments)   TBD    Discharge Plan discussed with: Caregiver     Name(s) and Number(s) [facility] [phone]     Discharge Barriers Identified None        Relationship/Environment    Name(s) of Who Lives With Patient [facility] [phone]                  [facility] [phone] in room for Discharge Planning Assessment.  Patient is not oriented and is unable to answer questions.  Per Shelby Alfaro ([facility]) [phone], patient lives with her in a single story with 0 step(s) to enter.   Per Shelby Alfaro ([facility]) [phone], patient was ambulatory with ADLS and used no DME for ambulation.  Patient will have assistance from [facility] [phone] upon discharge.  All questions addressed.  Assigned SW/CM will follow for needs.    Emi Prado, [facility] [place]  [phone]

## 2022-05-10 PROBLEM — I48.92 ATRIAL FLUTTER, UNSPECIFIED TYPE: Status: ACTIVE | Noted: 2022-05-10

## 2022-05-10 LAB
ALBUMIN SERPL BCP-MCNC: 2.3 G/DL (ref 3.5–5.2)
ALP SERPL-CCNC: 106 U/L (ref 55–135)
ALT SERPL W/O P-5'-P-CCNC: 13 U/L (ref 10–44)
ANION GAP SERPL CALC-SCNC: 10 MMOL/L (ref 8–16)
APTT BLDCRRT: 137.3 SEC (ref 21–32)
APTT BLDCRRT: 51.3 SEC (ref 21–32)
AST SERPL-CCNC: 23 U/L (ref 10–40)
BACTERIA BLD CULT: NORMAL
BACTERIA BLD CULT: NORMAL
BASOPHILS # BLD AUTO: 0.02 K/UL (ref 0–0.2)
BASOPHILS NFR BLD: 0.3 % (ref 0–1.9)
BILIRUB SERPL-MCNC: 0.5 MG/DL (ref 0.1–1)
BUN SERPL-MCNC: 32 MG/DL (ref 8–23)
CALCIUM SERPL-MCNC: 8.5 MG/DL (ref 8.7–10.5)
CHLORIDE SERPL-SCNC: 121 MMOL/L (ref 95–110)
CO2 SERPL-SCNC: 20 MMOL/L (ref 23–29)
CREAT SERPL-MCNC: 1.4 MG/DL (ref 0.5–1.4)
DIFFERENTIAL METHOD: ABNORMAL
EOSINOPHIL # BLD AUTO: 0.1 K/UL (ref 0–0.5)
EOSINOPHIL NFR BLD: 1.7 % (ref 0–8)
ERYTHROCYTE [DISTWIDTH] IN BLOOD BY AUTOMATED COUNT: 16.9 % (ref 11.5–14.5)
EST. GFR  (AFRICAN AMERICAN): 40.6 ML/MIN/1.73 M^2
EST. GFR  (NON AFRICAN AMERICAN): 35.3 ML/MIN/1.73 M^2
GLUCOSE SERPL-MCNC: 142 MG/DL (ref 70–110)
HCT VFR BLD AUTO: 45.1 % (ref 37–48.5)
HGB BLD-MCNC: 13.9 G/DL (ref 12–16)
IMM GRANULOCYTES # BLD AUTO: 0.04 K/UL (ref 0–0.04)
IMM GRANULOCYTES NFR BLD AUTO: 0.6 % (ref 0–0.5)
LYMPHOCYTES # BLD AUTO: 1.1 K/UL (ref 1–4.8)
LYMPHOCYTES NFR BLD: 14.7 % (ref 18–48)
MAGNESIUM SERPL-MCNC: 1.9 MG/DL (ref 1.6–2.6)
MCH RBC QN AUTO: 24.7 PG (ref 27–31)
MCHC RBC AUTO-ENTMCNC: 30.8 G/DL (ref 32–36)
MCV RBC AUTO: 80 FL (ref 82–98)
MONOCYTES # BLD AUTO: 0.5 K/UL (ref 0.3–1)
MONOCYTES NFR BLD: 6.6 % (ref 4–15)
NEUTROPHILS # BLD AUTO: 5.4 K/UL (ref 1.8–7.7)
NEUTROPHILS NFR BLD: 76.1 % (ref 38–73)
NRBC BLD-RTO: 0 /100 WBC
PLATELET # BLD AUTO: 246 K/UL (ref 150–450)
PMV BLD AUTO: 12.4 FL (ref 9.2–12.9)
POCT GLUCOSE: 138 MG/DL (ref 70–110)
POCT GLUCOSE: 154 MG/DL (ref 70–110)
POCT GLUCOSE: 218 MG/DL (ref 70–110)
POTASSIUM SERPL-SCNC: 3.5 MMOL/L (ref 3.5–5.1)
PROT SERPL-MCNC: 5.3 G/DL (ref 6–8.4)
RBC # BLD AUTO: 5.63 M/UL (ref 4–5.4)
SODIUM SERPL-SCNC: 151 MMOL/L (ref 136–145)
WBC # BLD AUTO: 7.13 K/UL (ref 3.9–12.7)

## 2022-05-10 PROCEDURE — 97535 SELF CARE MNGMENT TRAINING: CPT

## 2022-05-10 PROCEDURE — 83735 ASSAY OF MAGNESIUM: CPT | Performed by: INTERNAL MEDICINE

## 2022-05-10 PROCEDURE — 85730 THROMBOPLASTIN TIME PARTIAL: CPT | Mod: 91 | Performed by: HOSPITALIST

## 2022-05-10 PROCEDURE — 97116 GAIT TRAINING THERAPY: CPT

## 2022-05-10 PROCEDURE — 25000003 PHARM REV CODE 250: Performed by: STUDENT IN AN ORGANIZED HEALTH CARE EDUCATION/TRAINING PROGRAM

## 2022-05-10 PROCEDURE — 25000003 PHARM REV CODE 250: Performed by: HOSPITALIST

## 2022-05-10 PROCEDURE — 99233 SBSQ HOSP IP/OBS HIGH 50: CPT | Mod: ,,, | Performed by: HOSPITALIST

## 2022-05-10 PROCEDURE — 20600001 HC STEP DOWN PRIVATE ROOM

## 2022-05-10 PROCEDURE — 80053 COMPREHEN METABOLIC PANEL: CPT | Performed by: HOSPITALIST

## 2022-05-10 PROCEDURE — 30200315 PPD INTRADERMAL TEST REV CODE 302: Performed by: HOSPITALIST

## 2022-05-10 PROCEDURE — 63600175 PHARM REV CODE 636 W HCPCS: Performed by: HOSPITALIST

## 2022-05-10 PROCEDURE — 36415 COLL VENOUS BLD VENIPUNCTURE: CPT | Performed by: HOSPITALIST

## 2022-05-10 PROCEDURE — 86580 TB INTRADERMAL TEST: CPT | Performed by: HOSPITALIST

## 2022-05-10 PROCEDURE — 92526 ORAL FUNCTION THERAPY: CPT

## 2022-05-10 PROCEDURE — 85025 COMPLETE CBC W/AUTO DIFF WBC: CPT | Performed by: INTERNAL MEDICINE

## 2022-05-10 PROCEDURE — 99233 PR SUBSEQUENT HOSPITAL CARE,LEVL III: ICD-10-PCS | Mod: ,,, | Performed by: HOSPITALIST

## 2022-05-10 PROCEDURE — 25000003 PHARM REV CODE 250: Performed by: INTERNAL MEDICINE

## 2022-05-10 PROCEDURE — 97162 PT EVAL MOD COMPLEX 30 MIN: CPT

## 2022-05-10 PROCEDURE — 63600175 PHARM REV CODE 636 W HCPCS: Performed by: INTERNAL MEDICINE

## 2022-05-10 RX ORDER — AMOXICILLIN AND CLAVULANATE POTASSIUM 400; 57 MG/5ML; MG/5ML
400 POWDER, FOR SUSPENSION ORAL EVERY 12 HOURS
Status: COMPLETED | OUTPATIENT
Start: 2022-05-11 | End: 2022-05-15

## 2022-05-10 RX ORDER — DEXTROSE MONOHYDRATE 50 MG/ML
INJECTION, SOLUTION INTRAVENOUS CONTINUOUS
Status: DISCONTINUED | OUTPATIENT
Start: 2022-05-10 | End: 2022-05-11

## 2022-05-10 RX ORDER — FUROSEMIDE 40 MG/1
40 TABLET ORAL 2 TIMES DAILY
Status: DISCONTINUED | OUTPATIENT
Start: 2022-05-10 | End: 2022-05-19 | Stop reason: HOSPADM

## 2022-05-10 RX ORDER — METOPROLOL SUCCINATE 100 MG/1
100 TABLET, EXTENDED RELEASE ORAL DAILY
Status: DISCONTINUED | OUTPATIENT
Start: 2022-05-10 | End: 2022-05-19 | Stop reason: HOSPADM

## 2022-05-10 RX ORDER — POTASSIUM CHLORIDE 20 MEQ/1
40 TABLET, EXTENDED RELEASE ORAL ONCE
Status: COMPLETED | OUTPATIENT
Start: 2022-05-10 | End: 2022-05-10

## 2022-05-10 RX ADMIN — POTASSIUM CHLORIDE 40 MEQ: 20 TABLET, EXTENDED RELEASE ORAL at 08:05

## 2022-05-10 RX ADMIN — SACUBITRIL AND VALSARTAN 1 TABLET: 24; 26 TABLET, FILM COATED ORAL at 08:05

## 2022-05-10 RX ADMIN — TUBERCULIN PURIFIED PROTEIN DERIVATIVE 5 UNITS: 5 INJECTION, SOLUTION INTRADERMAL at 10:05

## 2022-05-10 RX ADMIN — ATORVASTATIN CALCIUM 40 MG: 40 TABLET, FILM COATED ORAL at 08:05

## 2022-05-10 RX ADMIN — MUPIROCIN: 20 OINTMENT TOPICAL at 09:05

## 2022-05-10 RX ADMIN — WARFARIN SODIUM 5 MG: 5 TABLET ORAL at 06:05

## 2022-05-10 RX ADMIN — HYDRALAZINE HYDROCHLORIDE 50 MG: 50 TABLET ORAL at 06:05

## 2022-05-10 RX ADMIN — DEXTROSE: 5 SOLUTION INTRAVENOUS at 06:05

## 2022-05-10 RX ADMIN — METOPROLOL TARTRATE 25 MG: 25 TABLET, FILM COATED ORAL at 06:05

## 2022-05-10 RX ADMIN — METOPROLOL SUCCINATE 100 MG: 100 TABLET, EXTENDED RELEASE ORAL at 08:05

## 2022-05-10 RX ADMIN — METOPROLOL TARTRATE 25 MG: 25 TABLET, FILM COATED ORAL at 12:05

## 2022-05-10 RX ADMIN — DONEPEZIL HYDROCHLORIDE 5 MG: 5 TABLET ORAL at 09:05

## 2022-05-10 RX ADMIN — HYDRALAZINE HYDROCHLORIDE 50 MG: 50 TABLET ORAL at 03:05

## 2022-05-10 RX ADMIN — SACUBITRIL AND VALSARTAN 1 TABLET: 24; 26 TABLET, FILM COATED ORAL at 09:05

## 2022-05-10 RX ADMIN — MUPIROCIN: 20 OINTMENT TOPICAL at 08:05

## 2022-05-10 RX ADMIN — PIPERACILLIN SODIUM AND TAZOBACTAM SODIUM 4.5 G: 4; .5 INJECTION, POWDER, LYOPHILIZED, FOR SOLUTION INTRAVENOUS at 06:05

## 2022-05-10 RX ADMIN — FUROSEMIDE 40 MG: 40 TABLET ORAL at 09:05

## 2022-05-10 RX ADMIN — INSULIN ASPART 2 UNITS: 100 INJECTION, SOLUTION INTRAVENOUS; SUBCUTANEOUS at 06:05

## 2022-05-10 RX ADMIN — HYDRALAZINE HYDROCHLORIDE 50 MG: 50 TABLET ORAL at 09:05

## 2022-05-10 RX ADMIN — HEPARIN SODIUM 16 UNITS/KG/HR: 5000 INJECTION INTRAVENOUS; SUBCUTANEOUS at 03:05

## 2022-05-10 RX ADMIN — FUROSEMIDE 40 MG: 40 TABLET ORAL at 08:05

## 2022-05-10 RX ADMIN — PIPERACILLIN SODIUM AND TAZOBACTAM SODIUM 4.5 G: 4; .5 INJECTION, POWDER, LYOPHILIZED, FOR SOLUTION INTRAVENOUS at 10:05

## 2022-05-10 NOTE — PLAN OF CARE
Problem: Fluid Imbalance (Pneumonia)  Goal: Fluid Balance  Outcome: Ongoing, Progressing     Problem: Infection (Pneumonia)  Goal: Resolution of Infection Signs and Symptoms  Outcome: Ongoing, Progressing     Problem: Skin Injury Risk Increased  Goal: Skin Health and Integrity  Outcome: Ongoing, Progressing     Problem: Fall Injury Risk  Goal: Absence of Fall and Fall-Related Injury  Outcome: Ongoing, Progressing

## 2022-05-10 NOTE — CONSULTS
PHARMACY CONSULT NOTE: WARFARIN  Temitope Suero is a 82 y.o. female on warfarin therapy for left ventricular mural thrombus. PharmD has been consulted for warfarin dosing.    Assessment/Plan    Lab Results   Component Value Date    INR 1.1 05/09/2022    INR 1.1 05/08/2022    INR 1.4 (H) 05/05/2022     1. Continue warfarin 5 mg PO daily  2. Continue heparin gtt (goal aPTT 39-69) until INR > 2; adjustments per nursing protocol. If needed to facilitate discharge to SNF, switch to enoxaparin 1 mg/kg q12h  3. Goal INR 2-3  4. Will require [facility] enrollment. PCP is Gm Zambrano. Will send referral to Coumadin Clinic.    Thank you for the consult, will continue to follow  Reddy Stauffer, Pharm.D., BCPS  [id number]      **Note: Consults are reviewed Monday-Friday 7:00am-3:30pm. THE ABOVE RECOMMENDATIONS ARE ONLY SUGGESTED.THE RECOMMENDATIONS SHOULD BE CONSIDERED IN CONJUNCTION WITH ALL PATIENT FACTORS. **

## 2022-05-10 NOTE — NURSING
0339 notified Dr. Casper of 7 beat run of vtach, pt resting comfortably, VSS         END OF SHIFT NOTE  Pt rested well throughout shift, no distress at this time, no complaints, VSS, bp been WNL intermittently. Awaiting lab recollect for aPTT. bed in lowest position, side rails up x2. Bed alarm set, personal items within reach. [facility]      JIN RenaeN RN

## 2022-05-10 NOTE — PLAN OF CARE
05/10/22 0733   Post-Acute Status   Post-Acute Authorization Placement   Post-Acute Placement Status Referrals Sent   Coverage Medicare Part A and BCBS   Discharge Delays None known at this time   Discharge Plan   Discharge Plan A Skilled Nursing Facility   Discharge Plan B Skilled Nursing Facility     CAROLINA forwarded 5 SNF referrals to [facility], [facility], and [facility], via [employer]. CAROLINA also completed LOCET and faxed PASSR to [phone]. Awaiting 142.    Emi Prado, [facility] [place]  [phone]

## 2022-05-10 NOTE — NURSING
Patient given CHG bath with PCT.Perineal and oral care done. Mepilex applied to sacrum. Linens changed. Patient tolerated well.

## 2022-05-10 NOTE — SUBJECTIVE & OBJECTIVE
Interval History: No acute events overnight.  This morning awake, following, commands, and answering questions.  Still takes a while to swallow, but much better.  Updated family Shelby.    Review of Systems   Constitutional:  Negative for chills, fatigue and fever.   HENT:  Positive for trouble swallowing.    Respiratory:  Negative for cough and shortness of breath.    Cardiovascular:  Negative for chest pain, palpitations and leg swelling.   Gastrointestinal:  Negative for abdominal pain, diarrhea, nausea and vomiting.   Genitourinary:  Negative for dysuria and urgency.   Neurological:  Negative for dizziness and headaches.   Psychiatric/Behavioral:  Positive for confusion.    All other systems reviewed and are negative.  Objective:     Vital Signs (Most Recent):  Temp: 97.5 °F (36.4 °C) (05/10/22 1220)  Pulse: 87 (05/10/22 0837)  Resp: 16 (05/10/22 1220)  BP: (!) 130/99 (05/10/22 0837)  SpO2: 97 % (05/10/22 0338)   Vital Signs (24h Range):  Temp:  [97.5 °F (36.4 °C)-98.3 °F (36.8 °C)] 97.5 °F (36.4 °C)  Pulse:  [] 87  Resp:  [16-31] 16  SpO2:  [97 %-99 %] 97 %  BP: (124-159)/() 130/99     Weight: 64 kg (141 lb 1.5 oz)  Body mass index is 22.1 kg/m².    Intake/Output Summary (Last 24 hours) at 5/10/2022 1327  Last data filed at 5/10/2022 0031  Gross per 24 hour   Intake 770 ml   Output --   Net 770 ml        Physical Exam  Constitutional:       Appearance: Normal appearance. She is well-developed.   HENT:      Head: Normocephalic and atraumatic.   Cardiovascular:      Rate and Rhythm: Normal rate and regular rhythm.      Heart sounds: No murmur heard.  Pulmonary:      Effort: Pulmonary effort is normal. No respiratory distress.      Breath sounds: Normal breath sounds. No wheezing or rales.   Abdominal:      General: There is no distension.      Palpations: Abdomen is soft.      Tenderness: There is no abdominal tenderness.   Musculoskeletal:         General: No deformity.   Skin:     General: Skin is  warm.   Neurological:      Comments: Makes eye contact.  Answering some questions today.  Conversant       Significant Labs: All pertinent labs within the past 24 hours have been reviewed.  CBC:   Recent Labs   Lab 05/09/22  0330 05/10/22  0517   WBC 7.58 7.13   HGB 12.4 13.9   HCT 41.2 45.1    246       CMP:   Recent Labs   Lab 05/09/22  1008 05/10/22  0517   * 151*   K 3.8 3.5   * 121*   CO2 20* 20*   * 142*   BUN 38* 32*   CREATININE 1.7* 1.4   CALCIUM 8.7 8.5*   PROT 5.7* 5.3*   ALBUMIN 2.6* 2.3*   BILITOT 0.7 0.5   ALKPHOS 110 106   AST 23 23   ALT 18 13   ANIONGAP 9 10   EGFRNONAA 27.9* 35.3*       Lactic Acid:   No results for input(s): LACTATE in the last 48 hours.      Significant Imaging: I have reviewed all pertinent imaging results/findings within the past 24 hours.

## 2022-05-10 NOTE — PLAN OF CARE
Problem: Physical Therapy  Goal: Physical Therapy Goal  Description: Goals to be met by: 2022     Patient will increase functional independence with mobility by performin. Supine to sit with Stand-by Assistance  2. Sit to supine with Stand-by Assistance  3. Sit to stand transfer with Stand-by Assistance  4. Bed to chair transfer with Stand-by Assistance using Rolling Walker  5. Gait  x 150 feet with Stand-by Assistance using Rolling Walker.   6. Lower extremity exercise program x15 reps per handout, with supervision    Outcome: Ongoing, Progressing

## 2022-05-10 NOTE — PT/OT/SLP EVAL
Physical Therapy Evaluation    Patient Name:  Temitope Suero   MRN:  [id number]    Recommendations:     Discharge Recommendations:  nursing facility, skilled   Discharge Equipment Recommendations:  (TBD)   Barriers to discharge: None    Assessment:     Temitope Suero is a 82 y.o. female admitted with a medical diagnosis of Toxic encephalopathy.  She presents with the following impairments/functional limitations:  weakness, impaired endurance, impaired self care skills, impaired functional mobilty, gait instability, impaired balance, impaired cognition, decreased safety awareness, decreased coordination Pt. with poor initiation and requiring constant verbal/tactile cues to complete tasks. Pt. poor historian/confused, but answered some questions appropriately.    Rehab Prognosis: Fair; patient would benefit from acute skilled PT services to address these deficits and reach maximum level of function.    Recent Surgery: * No surgery found *      Plan:     During this hospitalization, patient to be seen 3 x/week to address the identified rehab impairments via gait training, therapeutic activities, therapeutic exercises, neuromuscular re-education and progress toward the following goals:    · Plan of Care Expires:  06/09/22    Subjective     Chief Complaint: none  Patient/Family Comments/goals: pt. Agreeable to PT  Pain/Comfort:  · Pain Rating 1: 0/10  · Pain Rating Post-Intervention 1: 0/10    Patients cultural, spiritual, Mu-ism conflicts given the current situation: no    Living Environment:  Pt. Stated she lives with sister. Per chart she lives with niece. Need to verify living situation  Prior to admission, patients level of function needs to be verified.  Equipment used at home:  (need to verify).  Upon discharge, patient may need to have assistance.    Objective:     Communicated with nursing prior to session.  Patient found supine with peripheral IV, pulse ox (continuous), telemetry  upon PT entry to  room.    General Precautions: Standard, fall   Orthopedic Precautions:N/A   Braces: N/A  Respiratory Status: Room air    Exams:  · RLE ROM: WFL  · RLE Strength: WFL  · LLE ROM: WFL  · LLE Strength: WFL    Functional Mobility:  · Bed Mobility:     · Rolling Right: moderate assistance  · Scooting: moderate assistance  · Supine to Sit: moderate assistance  · Sit to Supine: moderate assistance  · Transfers:     · Sit to Stand:  moderate assistance with rolling walker  · Gait: 80' with RW with Min-Mod A for balance/guidance/RW management/hand placement on RW. Pt. amb. with decreased step length/danae.  · Balance: fair    Therapeutic Activities and Exercises:   Discussed therapy needs, goals, and POC.    AM-PAC 6 CLICK MOBILITY  Total Score:16     Patient left supine with all lines intact and call button in reach.    GOALS:   Multidisciplinary Problems     Physical Therapy Goals        Problem: Physical Therapy    Goal Priority Disciplines Outcome Goal Variances Interventions   Physical Therapy Goal     PT, PT/OT Ongoing, Progressing     Description: Goals to be met by: 2022     Patient will increase functional independence with mobility by performin. Supine to sit with Stand-by Assistance  2. Sit to supine with Stand-by Assistance  3. Sit to stand transfer with Stand-by Assistance  4. Bed to chair transfer with Stand-by Assistance using Rolling Walker  5. Gait  x 150 feet with Stand-by Assistance using Rolling Walker.   6. Lower extremity exercise program x15 reps per handout, with supervision                     History:     Past Medical History:   Diagnosis Date    Atrial flutter 2018    Cancer of left breast, stage 2 2015    Cataract     Cerebrovascular small vessel disease 2018    Bi-thalamic lacunar disease, mod/sev periventricular white matter disease, mixed pattern cerebral microbleeds    Cervicalgia 2018    Chronic anticoagulation - apixaban 2018    Previous on  Xarelto but changed to apixaban 8-2018 due to recurrent embolic stroke.    Chronic systolic heart failure 4/17/2018    Echo 4-2018   1 - Moderately depressed left ventricular systolic function (EF 30-35%).    2 - Biatrial enlargement.    3 - Normal right ventricular systolic function .    4 - Mild mitral regurgitation.    5 - Mild tricuspid regurgitation.    6 - The estimated PA systolic pressure is 34 mmHg.    7 - Increased central venous pressure.    8 - Left pleural effusion.     CKD stage 3 due to type 2 diabetes mellitus 4/17/2018    Embolic stroke involving left cerebellar artery 8/13/2018    Embolic stroke involving right posterior cerebral artery 4/11/2018    Encephalopathy 8/13/2018    Essential hypertension 10/28/2013    Glaucoma     Glaucoma suspect of both eyes 12/9/2013    Hearing loss, sensorineural 12/16/2013    Malignant hypertension 8/12/2018    Mixed hyperlipidemia 10/28/2013    Obesity 1/16/2014    Paroxysmal atrial fibrillation 7/10/2012    Stage 3 chronic kidney disease 4/17/2018    Toxic multinodular goiter 10/28/2013    Type 2 diabetes mellitus with stage 3 chronic kidney disease, without long-term current use of insulin 7/10/2012    Diet controlled.   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

## 2022-05-10 NOTE — ASSESSMENT & PLAN NOTE
· Likely 2/2 pneumonia, more suspicious for CVA given finding of LV thrombus  · Head CT and MRI brain both negative for acute ischemic event  · Vascular Neurology consulted  · SLP cleared for diet  · EEG negative for seizures

## 2022-05-10 NOTE — PROGRESS NOTES
[facility] - Intensive Care ([place] [id number])  [facility]  Progress Note    Patient Name: Temitope Suero  MRN: [id number]  Patient Class: IP- Inpatient   Admission Date: 5/5/2022  Length of Stay: 5 days  Attending Physician: Janell Nelson MD  Primary Care Provider: Gm Zambrano MD        Subjective:     Principal Problem:Toxic encephalopathy        HPI:  Ms. Temitope Suero is a 82 y.o. female who presents to the ER by family for evaluation of a fall, AMS, and increased work of breath.  History is obtained from family Shelby, as patient is confused.  She reports that over the last 2 does - when she does talk, she wouldn't make sense.  She normally talks well and has fluent speech, but she does have some memory loss from an old stroke.  She has been having a dry cough, no fevers or chills - but has reported foul smelling urine.  She reports decreased PO intake the last week and that her mouth has been dry.  She did have a fall, but didn't hit her head.  She normally ambulates without DME.    Upon arrival to the ER, vitals were temp 98.4F, , /70 satting 96% on room air.  CXR showed a LLL PNA.  Head CT showed chronic ischemic changes.  Labs were notable for Trop 0.322, Creatinine 2.2, and Lactic 2.3.  She was given Vanc and Ceftriaxone and was admitted to Hospital Medicine.      Overview/Hospital Course:  Ms. Suero was admitted to Hospital Medicine for management of a toxic encephalopathy 2/2 LLL pneumonia.  She was started on Ceftriaxone/Azithro.  Her mentation was slow to improve.  SLP evaluated and cleared for diet.  She was given small IVF boluses for her MAC.  On the morning of 5/6, she was found to not be swallowing her food and chewing for a prolonged time.  She was made NPO.  She had a rapid response for diaphoresis and increased work of breathing.  Antibiotics were changed to Zosyn with concern for aspiration.  On 5/8, her mentation worsened again and she was not speaking or following commands.  2D  echo was ordered that showed a large, solid LV thrombus.  Head CT was ordered to r/o a bleed prior to starting Heparin which was negative.  She was started on Heparin.  The following morning, she found to be in new onset atrial flutter with HR 120s.  Cardiology was consulted.  Vascular Neurology was consulted given persistent mentation changes and new LV thrombus with concern for an acute ischemia event. MRI brain was negative for a stroke.      Interval History: No acute events overnight.  This morning awake, following, commands, and answering questions.  Still takes a while to swallow, but much better.  Updated family Shelby.    Review of Systems   Constitutional:  Negative for chills, fatigue and fever.   HENT:  Positive for trouble swallowing.    Respiratory:  Negative for cough and shortness of breath.    Cardiovascular:  Negative for chest pain, palpitations and leg swelling.   Gastrointestinal:  Negative for abdominal pain, diarrhea, nausea and vomiting.   Genitourinary:  Negative for dysuria and urgency.   Neurological:  Negative for dizziness and headaches.   Psychiatric/Behavioral:  Positive for confusion.    All other systems reviewed and are negative.  Objective:     Vital Signs (Most Recent):  Temp: 97.5 °F (36.4 °C) (05/10/22 1220)  Pulse: 87 (05/10/22 0837)  Resp: 16 (05/10/22 1220)  BP: (!) 130/99 (05/10/22 0837)  SpO2: 97 % (05/10/22 0338)   Vital Signs (24h Range):  Temp:  [97.5 °F (36.4 °C)-98.3 °F (36.8 °C)] 97.5 °F (36.4 °C)  Pulse:  [] 87  Resp:  [16-31] 16  SpO2:  [97 %-99 %] 97 %  BP: (124-159)/() 130/99     Weight: 64 kg (141 lb 1.5 oz)  Body mass index is 22.1 kg/m².    Intake/Output Summary (Last 24 hours) at 5/10/2022 1327  Last data filed at 5/10/2022 0031  Gross per 24 hour   Intake 770 ml   Output --   Net 770 ml        Physical Exam  Constitutional:       Appearance: Normal appearance. She is well-developed.   HENT:      Head: Normocephalic and atraumatic.   Cardiovascular:       Rate and Rhythm: Normal rate and regular rhythm.      Heart sounds: No murmur heard.  Pulmonary:      Effort: Pulmonary effort is normal. No respiratory distress.      Breath sounds: Normal breath sounds. No wheezing or rales.   Abdominal:      General: There is no distension.      Palpations: Abdomen is soft.      Tenderness: There is no abdominal tenderness.   Musculoskeletal:         General: No deformity.   Skin:     General: Skin is warm.   Neurological:      Comments: Makes eye contact.  Answering some questions today.  Conversant       Significant Labs: All pertinent labs within the past 24 hours have been reviewed.  CBC:   Recent Labs   Lab 05/09/22  0330 05/10/22  0517   WBC 7.58 7.13   HGB 12.4 13.9   HCT 41.2 45.1    246       CMP:   Recent Labs   Lab 05/09/22  1008 05/10/22  0517   * 151*   K 3.8 3.5   * 121*   CO2 20* 20*   * 142*   BUN 38* 32*   CREATININE 1.7* 1.4   CALCIUM 8.7 8.5*   PROT 5.7* 5.3*   ALBUMIN 2.6* 2.3*   BILITOT 0.7 0.5   ALKPHOS 110 106   AST 23 23   ALT 18 13   ANIONGAP 9 10   EGFRNONAA 27.9* 35.3*       Lactic Acid:   No results for input(s): LACTATE in the last 48 hours.      Significant Imaging: I have reviewed all pertinent imaging results/findings within the past 24 hours.      Assessment/Plan:      * Toxic encephalopathy  · Likely 2/2 pneumonia, more suspicious for CVA given finding of LV thrombus  · Head CT and MRI brain both negative for acute ischemic event  · Vascular Neurology consulted  · SLP cleared for diet  · EEG negative for seizures    Left lower lobe pneumonia  · CXR with LLL PNA  · Satting in the 90s on room air  · Afebrile and without leukocytosis but Lactic 2.3 on admission that trended down  · Started on Ceftriaxone and Azithro, switched to Zosyn on 5/6      NSTEMI (non-ST elevated myocardial infarction)  · Trop 0.322 on admit and flat  · No reports of chest pain  · EKG not ischemic  · Likely demand 2/2  infection  · Monitor      Chronic systolic heart failure  · TTE on 5/6 shows EF 25-30%, G3DD, severe LAE, normal CVP, large solid LV thrombus  · Continue BB  · Cards started Entresto  · Monitor volume status closely    Atrial flutter  · New onset on 5/8 with HR 130s  · Cards consulted  · Continue Toprol 100mg PO daily      Acute kidney injury superimposed on CKD  · Creatinine 2.2 on admit, baseline closer to 1.3 - 1.7 today  · Possibly infection induced and Lasix/Benazepril  · Caution with IVF given CHF  · Renal US notable for chronic medical renal disease and 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    Chronic anticoagulation  · Was on Xarelto and changed to Eliquis in 2018 due to recurrent embolic strokes  · Now with a new large solid LV thrombus  · Continue Heparin with Warfarin bridge  · Pharmacy consulted for dosing      Paroxysmal atrial fibrillation  · Chronic issue but now with new onset atrial flutter  · Anticoagulation as above  · Continue BB      LV (left ventricular) mural thrombus  · Noted on TTE 5/7  · Has been on Eliquis outpatient  · Continue Heparin bridge to Warfarin  · Cards consulted      Stage 3 chronic kidney disease  · Chronic issue  · See MAC below      Fall  · PT/OT say SNF      Moderate malnutrition  Nutrition consulted. Most recent weight and BMI monitored-          Malnutrition (Moderate to Severe)  Weight Loss (Malnutrition): 7.5% in 3 months              Measurements:  Wt Readings from Last 1 Encounters:   05/09/22 64 kg (141 lb 1.5 oz)   Body mass index is 22.1 kg/m².    Recommendations: Recommendation/Intervention: 1.  Goals: Meet % EEN, EPN by RD f/u date    Patient has been screened and assessed by RD. RD will follow patient.      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  · As above      Type 2 diabetes mellitus with stage 3 chronic kidney disease, without long-term current use of insulin  · HbA1c 7  · Home DM regimen:  Diet controlled  · SSI with POCT accuchecks to achieve  blood glucose of 140-180 while hospitalized  · Diabetic diet, purred and nectar thick per SLP        Deterioration of memory  · Chronic issue  · Continue Aricept      History of stroke  · Chronic and stable  · Head CT and MRI without acute infarct  · Continue Lipitor  · Vascular Neuro consult as above      Mixed hyperlipidemia  · Chronic and stable  · Continue Lipitor 40mg PO daily        VTE Risk Mitigation (From admission, onward)         Ordered     warfarin (COUMADIN) tablet 5 mg  Daily         05/08/22 1517     heparin 25,000 units in dextrose 5% (100 units/ml) IV bolus from bag - ADDITIONAL PRN BOLUS - 60 units/kg  As needed (PRN)        Question:  Heparin Infusion Adjustment (DO NOT MODIFY ANSWER)  Answer:  [email or web address]\epic\Images\Pharmacy\HeparinInfusions\heparin HIGH INTENSITY nomogram for [facility] [id number].pdf    05/08/22 0301     heparin 25,000 units in dextrose 5% (100 units/ml) IV bolus from bag - ADDITIONAL PRN BOLUS - 30 units/kg  As needed (PRN)        Question:  Heparin Infusion Adjustment (DO NOT MODIFY ANSWER)  Answer:  [email or web address]\epic\Images\Pharmacy\HeparinInfusions\heparin HIGH INTENSITY nomogram for [facility] [id number].pdf    05/08/22 0301     heparin 25,000 units in dextrose 5% 250 mL (100 units/mL) infusion HIGH INTENSITY nomogram - [facility]  Continuous        Question Answer Comment   Heparin Infusion Adjustment (DO NOT MODIFY ANSWER) [email or web address]\epic\Images\Pharmacy\HeparinInfusions\heparin HIGH INTENSITY nomogram for [facility] [id number].pdf    Begin at (in units/kg/hr) 18        05/08/22 0301                Discharge Planning   WALDEMAR: 5/13/2022     Code Status: Full Code   Is the patient medically ready for discharge?:     Reason for patient still in hospital (select all that apply): Patient trending condition and Pending disposition  Discharge Plan A: Skilled Nursing Facility   Discharge Delays: None known at this time              Janell Nelson MD  Department of Hospital Medicine   [facility] - Intensive Care  ([place])

## 2022-05-10 NOTE — NURSING
attempted to collect blood unsuccesfully. PTT collected from right arm by primary RN. Heparin drip infusing in left forearm. Lab given to  for labeling

## 2022-05-10 NOTE — PT/OT/SLP PROGRESS
Speech Language Pathology Treatment    Patient Name:  Temitope Suero   MRN:  [id number]  Admitting Diagnosis: Toxic encephalopathy    Recommendations:                 General Recommendations:  Dysphagia therapy and Cognitive-linguistic evaluation  Diet recommendations:  Puree, Liquid Diet Level: Thin   Aspiration Precautions: 1 bite/sip at a time, Alternate means of nutrition/hydration, Assistance with meals, HOB to 90 degrees, Meds crushed in puree, Small bites/sips and Strict aspiration precautions   General Precautions: Standard, fall, pureed diet    Subjective   Awake, very pleasantly confused. NSG reports pt has been anxious today. NSG reports pt tolerating diet well for the most part, had one episode of coughing with thin, tolerated 75% of breakfast & dinner & is having meds crushed. NSG reported this morning pt had a pill pocketed from when given whole so she has been crushing ever since.     Pain/Comfort:  · Pain Rating 1: 0/10  · Pain Rating Post-Intervention 2: 0/10    Respiratory Status: Room air    Objective:     Has the patient been evaluated by SLP for swallowing?   Yes  Keep patient NPO? No     Pt repositioned to upright with help of NSG. Pt tolerated cup & straw sips of thin (6 oz) presented by SLP with adequate timely swallow & no s/s aspiration. Pt accepted bites of puree x4 yet manipulated in disorganized way eventually swallowing with complete oral clearance & no s/s aspiration. SLP deems puree & thin safest diet at this time 2/2 oral dysphagia & solids were not attempted. All communicated with NSG at bedside. SLP educated pt on all recs, precautions & risks & s/s aspiration yet pt with no evidence of learning. All communicated with NSG at bedside. Pt was very confused, SLP did not attempt cognitive or linguistic assessment.     Assessment:     Temitope Suero is a 82 y.o. female with an SLP diagnosis of Dysphagia and Cognitive-Linguistic Impairment    Goals:   Multidisciplinary Problems     SLP Goals         Problem: SLP    Goal Priority Disciplines Outcome   SLP Goal     SLP Ongoing, Progressing   Description: Speech-Language Pathology Goals  Goals to be met by 5/13/22  1. Patient will participate in ongoing swallow assessment in order to determine safest least restrictive diet.   2. Patient will participate in a speech/language/cog eval.                    Plan:     · Patient to be seen:  3 x/week   · Plan of Care expires:  06/05/22  · Plan of Care reviewed with:  patient   · SLP Follow-Up:  Yes       Discharge recommendations:   (TBD)       Time Tracking:     SLP Treatment Date:   05/10/22  Speech Start Time:  1540  Speech Stop Time:  1556     Speech Total Time (min):  16 min    Billable Minutes: Speech Therapy Individual 8 and Treatment Swallowing Dysfunction 8    05/10/2022

## 2022-05-10 NOTE — PLAN OF CARE
Problem: Adult Inpatient Plan of Care  Goal: Plan of Care Review  Outcome: Ongoing, Progressing  Goal: Patient-Specific Goal (Individualized)  Outcome: Ongoing, Progressing  Goal: Absence of Hospital-Acquired Illness or Injury  Outcome: Ongoing, Progressing  Goal: Optimal Comfort and Wellbeing  Outcome: Ongoing, Progressing  Goal: Readiness for Transition of Care  Outcome: Ongoing, Progressing     Problem: Diabetes Comorbidity  Goal: Blood Glucose Level Within Targeted Range  Outcome: Ongoing, Progressing     Problem: Fluid and Electrolyte Imbalance (Acute Kidney Injury/Impairment)  Goal: Fluid and Electrolyte Balance  Outcome: Ongoing, Progressing     Problem: Oral Intake Inadequate (Acute Kidney Injury/Impairment)  Goal: Optimal Nutrition Intake  Outcome: Ongoing, Progressing     Problem: Renal Function Impairment (Acute Kidney Injury/Impairment)  Goal: Effective Renal Function  Outcome: Ongoing, Progressing     Problem: Fluid Imbalance (Pneumonia)  Goal: Fluid Balance  Outcome: Ongoing, Progressing     Problem: Infection (Pneumonia)  Goal: Resolution of Infection Signs and Symptoms  Outcome: Ongoing, Progressing     Problem: Respiratory Compromise (Pneumonia)  Goal: Effective Oxygenation and Ventilation  Outcome: Ongoing, Progressing     Problem: Skin Injury Risk Increased  Goal: Skin Health and Integrity  Outcome: Ongoing, Progressing     Problem: Impaired Wound Healing  Goal: Optimal Wound Healing  Outcome: Ongoing, Progressing     Problem: Fall Injury Risk  Goal: Absence of Fall and Fall-Related Injury  Outcome: Ongoing, Progressing

## 2022-05-11 LAB
ALBUMIN SERPL BCP-MCNC: 2.4 G/DL (ref 3.5–5.2)
ALP SERPL-CCNC: 105 U/L (ref 55–135)
ALT SERPL W/O P-5'-P-CCNC: 17 U/L (ref 10–44)
ANION GAP SERPL CALC-SCNC: 12 MMOL/L (ref 8–16)
APTT BLDCRRT: 54.4 SEC (ref 21–32)
APTT BLDCRRT: 64.2 SEC (ref 21–32)
AST SERPL-CCNC: 28 U/L (ref 10–40)
BASOPHILS # BLD AUTO: 0.02 K/UL (ref 0–0.2)
BASOPHILS NFR BLD: 0.4 % (ref 0–1.9)
BILIRUB SERPL-MCNC: 0.5 MG/DL (ref 0.1–1)
BUN SERPL-MCNC: 27 MG/DL (ref 8–23)
CALCIUM SERPL-MCNC: 8.5 MG/DL (ref 8.7–10.5)
CHLORIDE SERPL-SCNC: 117 MMOL/L (ref 95–110)
CO2 SERPL-SCNC: 19 MMOL/L (ref 23–29)
CREAT SERPL-MCNC: 1.5 MG/DL (ref 0.5–1.4)
DIFFERENTIAL METHOD: ABNORMAL
EOSINOPHIL # BLD AUTO: 0.1 K/UL (ref 0–0.5)
EOSINOPHIL NFR BLD: 1.8 % (ref 0–8)
ERYTHROCYTE [DISTWIDTH] IN BLOOD BY AUTOMATED COUNT: 16.9 % (ref 11.5–14.5)
EST. GFR  (AFRICAN AMERICAN): 37.4 ML/MIN/1.73 M^2
EST. GFR  (NON AFRICAN AMERICAN): 32.4 ML/MIN/1.73 M^2
GLUCOSE SERPL-MCNC: 128 MG/DL (ref 70–110)
HCT VFR BLD AUTO: 46.1 % (ref 37–48.5)
HGB BLD-MCNC: 14.2 G/DL (ref 12–16)
IMM GRANULOCYTES # BLD AUTO: 0.04 K/UL (ref 0–0.04)
IMM GRANULOCYTES NFR BLD AUTO: 0.7 % (ref 0–0.5)
INR PPP: 1.8 (ref 0.8–1.2)
LYMPHOCYTES # BLD AUTO: 1.3 K/UL (ref 1–4.8)
LYMPHOCYTES NFR BLD: 23.8 % (ref 18–48)
MCH RBC QN AUTO: 24.7 PG (ref 27–31)
MCHC RBC AUTO-ENTMCNC: 30.8 G/DL (ref 32–36)
MCV RBC AUTO: 80 FL (ref 82–98)
MONOCYTES # BLD AUTO: 0.5 K/UL (ref 0.3–1)
MONOCYTES NFR BLD: 8.5 % (ref 4–15)
NEUTROPHILS # BLD AUTO: 3.5 K/UL (ref 1.8–7.7)
NEUTROPHILS NFR BLD: 64.8 % (ref 38–73)
NRBC BLD-RTO: 0 /100 WBC
PLATELET # BLD AUTO: 230 K/UL (ref 150–450)
PMV BLD AUTO: 13 FL (ref 9.2–12.9)
POCT GLUCOSE: 139 MG/DL (ref 70–110)
POCT GLUCOSE: 142 MG/DL (ref 70–110)
POCT GLUCOSE: 146 MG/DL (ref 70–110)
POCT GLUCOSE: 153 MG/DL (ref 70–110)
POTASSIUM SERPL-SCNC: 4.5 MMOL/L (ref 3.5–5.1)
PROT SERPL-MCNC: 5.2 G/DL (ref 6–8.4)
PROTHROMBIN TIME: 18 SEC (ref 9–12.5)
RBC # BLD AUTO: 5.76 M/UL (ref 4–5.4)
SARS-COV-2 RDRP RESP QL NAA+PROBE: NEGATIVE
SODIUM SERPL-SCNC: 148 MMOL/L (ref 136–145)
WBC # BLD AUTO: 5.41 K/UL (ref 3.9–12.7)

## 2022-05-11 PROCEDURE — 99232 PR SUBSEQUENT HOSPITAL CARE,LEVL II: ICD-10-PCS | Mod: ,,, | Performed by: HOSPITALIST

## 2022-05-11 PROCEDURE — 99232 SBSQ HOSP IP/OBS MODERATE 35: CPT | Mod: ,,, | Performed by: HOSPITALIST

## 2022-05-11 PROCEDURE — 25000003 PHARM REV CODE 250: Performed by: HOSPITALIST

## 2022-05-11 PROCEDURE — 80053 COMPREHEN METABOLIC PANEL: CPT | Performed by: HOSPITALIST

## 2022-05-11 PROCEDURE — 20600001 HC STEP DOWN PRIVATE ROOM

## 2022-05-11 PROCEDURE — 85610 PROTHROMBIN TIME: CPT | Performed by: HOSPITALIST

## 2022-05-11 PROCEDURE — 25000003 PHARM REV CODE 250: Performed by: INTERNAL MEDICINE

## 2022-05-11 PROCEDURE — 36415 COLL VENOUS BLD VENIPUNCTURE: CPT | Performed by: HOSPITALIST

## 2022-05-11 PROCEDURE — U0002 COVID-19 LAB TEST NON-CDC: HCPCS | Performed by: HOSPITALIST

## 2022-05-11 PROCEDURE — 25000003 PHARM REV CODE 250: Performed by: STUDENT IN AN ORGANIZED HEALTH CARE EDUCATION/TRAINING PROGRAM

## 2022-05-11 PROCEDURE — 85730 THROMBOPLASTIN TIME PARTIAL: CPT | Performed by: HOSPITALIST

## 2022-05-11 PROCEDURE — 85730 THROMBOPLASTIN TIME PARTIAL: CPT | Mod: 91 | Performed by: HOSPITALIST

## 2022-05-11 PROCEDURE — 97535 SELF CARE MNGMENT TRAINING: CPT

## 2022-05-11 PROCEDURE — 85025 COMPLETE CBC W/AUTO DIFF WBC: CPT | Performed by: INTERNAL MEDICINE

## 2022-05-11 RX ORDER — AMOXICILLIN AND CLAVULANATE POTASSIUM 400; 57 MG/5ML; MG/5ML
400 POWDER, FOR SUSPENSION ORAL EVERY 12 HOURS
Qty: 75 ML | Refills: 0 | Status: SHIPPED | OUTPATIENT
Start: 2022-05-11 | End: 2022-05-18 | Stop reason: HOSPADM

## 2022-05-11 RX ORDER — WARFARIN 1 MG/1
3 TABLET ORAL DAILY
Status: DISCONTINUED | OUTPATIENT
Start: 2022-05-11 | End: 2022-05-13

## 2022-05-11 RX ORDER — HYDRALAZINE HYDROCHLORIDE 25 MG/1
25 TABLET, FILM COATED ORAL EVERY 8 HOURS PRN
Status: DISCONTINUED | OUTPATIENT
Start: 2022-05-11 | End: 2022-05-19 | Stop reason: HOSPADM

## 2022-05-11 RX ORDER — DEXTROSE MONOHYDRATE 50 MG/ML
INJECTION, SOLUTION INTRAVENOUS CONTINUOUS
Status: ACTIVE | OUTPATIENT
Start: 2022-05-11 | End: 2022-05-11

## 2022-05-11 RX ORDER — HYDRALAZINE HYDROCHLORIDE 50 MG/1
50 TABLET, FILM COATED ORAL EVERY 8 HOURS
Qty: 270 TABLET | Refills: 0 | Status: SHIPPED | OUTPATIENT
Start: 2022-05-11 | End: 2022-05-18 | Stop reason: SDUPTHER

## 2022-05-11 RX ORDER — WARFARIN 3 MG/1
3 TABLET ORAL DAILY
Qty: 90 TABLET | Refills: 0 | Status: SHIPPED | OUTPATIENT
Start: 2022-05-11 | End: 2022-05-18 | Stop reason: HOSPADM

## 2022-05-11 RX ORDER — METOPROLOL SUCCINATE 100 MG/1
100 TABLET, EXTENDED RELEASE ORAL DAILY
Qty: 90 TABLET | Refills: 0 | Status: SHIPPED | OUTPATIENT
Start: 2022-05-12 | End: 2022-05-18 | Stop reason: SDUPTHER

## 2022-05-11 RX ADMIN — HEPARIN SODIUM 12 UNITS/KG/HR: 5000 INJECTION INTRAVENOUS; SUBCUTANEOUS at 05:05

## 2022-05-11 RX ADMIN — FUROSEMIDE 40 MG: 40 TABLET ORAL at 09:05

## 2022-05-11 RX ADMIN — WARFARIN SODIUM 3 MG: 1 TABLET ORAL at 07:05

## 2022-05-11 RX ADMIN — HYDRALAZINE HYDROCHLORIDE 50 MG: 50 TABLET ORAL at 06:05

## 2022-05-11 RX ADMIN — DONEPEZIL HYDROCHLORIDE 5 MG: 5 TABLET ORAL at 09:05

## 2022-05-11 RX ADMIN — FUROSEMIDE 40 MG: 40 TABLET ORAL at 08:05

## 2022-05-11 RX ADMIN — HYDRALAZINE HYDROCHLORIDE 25 MG: 25 TABLET, FILM COATED ORAL at 11:05

## 2022-05-11 RX ADMIN — SACUBITRIL AND VALSARTAN 1 TABLET: 24; 26 TABLET, FILM COATED ORAL at 09:05

## 2022-05-11 RX ADMIN — AMOXICILLIN AND CLAVULANATE POTASSIUM 400 MG: 400; 57 POWDER, FOR SUSPENSION ORAL at 05:05

## 2022-05-11 RX ADMIN — MUPIROCIN: 20 OINTMENT TOPICAL at 08:05

## 2022-05-11 RX ADMIN — HYDRALAZINE HYDROCHLORIDE 50 MG: 50 TABLET ORAL at 09:05

## 2022-05-11 RX ADMIN — MUPIROCIN: 20 OINTMENT TOPICAL at 09:05

## 2022-05-11 RX ADMIN — AMOXICILLIN AND CLAVULANATE POTASSIUM 400 MG: 400; 57 POWDER, FOR SUSPENSION ORAL at 09:05

## 2022-05-11 RX ADMIN — METOPROLOL SUCCINATE 100 MG: 100 TABLET, EXTENDED RELEASE ORAL at 08:05

## 2022-05-11 RX ADMIN — ATORVASTATIN CALCIUM 40 MG: 40 TABLET, FILM COATED ORAL at 08:05

## 2022-05-11 RX ADMIN — SACUBITRIL AND VALSARTAN 1 TABLET: 24; 26 TABLET, FILM COATED ORAL at 08:05

## 2022-05-11 RX ADMIN — HYDRALAZINE HYDROCHLORIDE 50 MG: 50 TABLET ORAL at 01:05

## 2022-05-11 NOTE — PHYSICIAN QUERY
PT Name: Temitope Suero  MR #: [id number]    DOCUMENTATION CLARIFICATION     CDS/:  Donaldo Rowell RN, CDS                   Contact Information:  [email or web address]    This form is a permanent document in the medical record.     Query Date: May 11, 2022    By submitting this query, we are merely seeking further clarification of documentation. Please utilize your independent clinical judgment when addressing the question(s) below.    The Medical Record contains the following:   Indicators   Supporting Clinical Findings Location in Medical Record   X AMS, Confusion,  LOC, etc.  Ms. Temitope Suero is a 82 y.o. female who presents to the ER by family for evaluation of a fall, AMS, and increased work of breath.  History is obtained from family Shelby, as patient is confused.  She reports that over the last 2 does - when she does talk, she wouldn't make sense.    Positive for confusion     PN 5/11   X Acute/Chronic Illness Toxic Encephalopathy  LLL PNA  NSTEMI  Chronic Systolic HF  Atrial flutter  MAC   CKD stage 3  LV mural thrombus  Moderate Malnutrition  HTN  DM  Hx of stroke  HLD    PN 5/11   X Radiology Findings Retrocardiac airspace opacity left base with obscuration of the left hemidiaphragm.  Finding may represent left basilar pneumonia or atelectasis.  No significant effusion identified   CXR 5/5    Electrolyte Imbalance     X Medication Ceftriaxone  Azithromycin  MAR        Treatment             X Other Toxic encephalopathy 2/2 pneumonia    Her mentation was slow to improve.  SLP evaluated and cleared for diet.  She was given small IVF boluses for her MAC.  PN 5/11     The noted clinical guidelines are only system guidelines and do not replace the providers clinical judgment.    The National [place] of Neurologic Disorders and Stroke (NINDS) of the NIH describes encephalopathy as any diffuse disease of the brain that alters brain function or structure.    Provider, please clarify the type  of Encephalopathy    [  x ] Metabolic Encephalopathy - Due to electrolyte imbalance, metabolic derangements, or infectious processes, includes Septic Encephalopathy, Uremic Encephalopathy     [   ] Toxic Encephalopathy - Due to drugs, chemicals, or other toxic substances     [   ] Other Encephalopathy (please specify): ____________________     [   ] Other neurological condition- Includes Post-ictal altered mental status (please specify condition): __________     [   ]  Clinically Undetermined       Please document in your progress notes daily for the duration of treatment until resolved, and include in your discharge summary.    References:  LISETH Kirk RN, CCDS. (2018, June 9). Notes from the Instructor: Encephalopathy tips. Retrieved October 22, 2020, from [email or web address]-[email or web address]    ICD-9-CM Coding Clinic First Quarter 2013, Effective with discharges: October 21, 2013 [facility] § Seizure with encephalopathy due to postictal state (2013).    ICD-10-CM/intelworks Integrated Codebook (Version V.20.8.10.0) [Computer software]. (2020). Retrieved October 21, 2020.    National [place] of Neurological Disorders and Stroke. (2019, March 27). Retrieved October 22, 2020, from https://www.ninds.nih.gov/Disorders/All-Disorders/Cvpqtlinogwgyt-Nrwwddoofwr-Stwp    Form No. 87596

## 2022-05-11 NOTE — NURSING
Order place for COVID test by Dr. Nelson. Clarified if rapid or PCR needed since patient going to SNF. Per Dr. Nelson rapid COVID test ok. Communication sent via secure chat. Rapid covid swab requested from    [facility] to be sent via tube station

## 2022-05-11 NOTE — ASSESSMENT & PLAN NOTE
· CXR with [facility]  · Satting in the 90s on room air  · Afebrile and without leukocytosis but Lactic 2.3 on admission that trended down  · Started on Ceftriaxone and Azithro, switched to Zosyn on 5/6 and then to Augmentin to complete a 10 day course

## 2022-05-11 NOTE — PLAN OF CARE
[facility]  [address]  [place] [address]  Phone: [phone]    Nursing Home Orders      05/11/2022      Admit To Nursing Home: Skilled Bed        Diagnoses:  Active Hospital Problems    Diagnosis  POA    *Toxic encephalopathy [G92.9]  Yes     Priority: 1 - High    Left lower lobe pneumonia [J18.9]  Yes     Priority: 2     NSTEMI (non-ST elevated myocardial infarction) [I21.4]  Yes     Priority: 3     Chronic systolic heart failure [I50.22]  Yes     Priority: 3      Echo 4-2018    1 - Moderately depressed left ventricular systolic function (EF 30-35%).     2 - Biatrial enlargement.     3 - Normal right ventricular systolic function .     4 - Mild mitral regurgitation.     5 - Mild tricuspid regurgitation.     6 - The estimated PA systolic pressure is 34 mmHg.     7 - Increased central venous pressure.     8 - Left pleural effusion.       Atrial flutter [I48.92]  No     Priority: 4     Acute kidney injury superimposed on CKD [N17.9, N18.9]  Yes     Priority: 4     Chronic anticoagulation [Z79.01]  Not Applicable     Priority: 4      Chronic     Previous on Xarelto but changed to apixaban 8-2018 due to recurrent embolic stroke.     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        Resolved Hospital Problems   No resolved problems to display.         Patient is homebound due to:  Toxic encephalopathy      Allergies:Review of patient's allergies indicates:  No Known Allergies      Code Status:    Code Status: Full Code      Vitals:  Routine   Every shift (Skilled Nursing patients)      Diet: Diabetic, pureed, thin liquids      Referrals/ Consults     Physical Therapy to evaluate and treat     Occupational Therapy to evaluate and treat     Speech Therapy  to evaluate and treat     Nutrition to evaluate and recommend diet      Activities:   activity as tolerated      Wound Care Orders:  no      Nursing Precautions:    Aspiration , Fall and Pressure ulcer prevention      Labs:  Labs per facility protocol  INR weekly, goal INR 2-3      Diabetes Care:       Check blood sugar:        Fingerstick blood sugar AC and HS    Fingerstick blood sugar every 6 hours if unable to eat       Report CBG < 60 or > 400 to physician.                                          Insulin Sliding Scale          Glucose  Novolog Insulin Subcutaneous        0 - 60   Orange juice or glucose tablet, hold insulin      No insulin   201-250  2 units   251-300  4 units   301-350  6 units   351-400  8 units   >400   10 units then call physician      Medications: Discontinue all previous medication orders, if any. See new list below.     Augmentin end date:  5/16/22    Current Discharge Medication List      START taking these medications    Details   amoxicillin-clavulanate (AUGMENTIN) 400-57 mg/5 mL SusR Take 5 mLs (400 mg total) by mouth every 12 (twelve) hours. for 5 days  Qty: 50 mL, Refills: 0      hydrALAZINE (APRESOLINE) 50 MG tablet Take 1 tablet (50 mg total) by mouth every 8 (eight) hours.  Qty: 270 tablet, Refills: 0    Comments: .      metoprolol succinate (TOPROL-XL) 100 MG 24 hr tablet Take 1 tablet (100 mg total) by mouth once daily.  Qty: 90 tablet, Refills: 0    Comments: .       sacubitriL-valsartan (ENTRESTO) 24-26 mg per tablet Take 1 tablet by mouth 2 (two) times daily.  Qty: 180 tablet, Refills: 0      warfarin (COUMADIN) 3 MG tablet Take 1 tablet (3 mg total) by mouth Daily.  Qty: 90 tablet, Refills: 0         CONTINUE these medications which have NOT CHANGED    Details   atorvastatin (LIPITOR) 40 MG tablet Take 1 tablet (40 mg total) by mouth once daily.  Qty: 90 tablet, Refills: 3    Associated Diagnoses: Embolic stroke involving right posterior cerebral artery; Cerebrovascular small vessel disease      donepeziL (ARICEPT) 5 MG tablet Take 1 tablet (5 mg total) by mouth every evening.  Qty: 90 tablet, Refills: 3      furosemide (LASIX) 40 MG tablet TAKE 1 TABLET BY MOUTH TWICE A DAY  Qty: 180 tablet, Refills: 1      latanoprost 0.005 % ophthalmic solution Place 1 drop into both eyes every evening.  Qty: 2.5 mL, Refills: 12         STOP taking these medications       apixaban (ELIQUIS) 2.5 mg Tab Comments:   Reason for Stopping:         benazepriL (LOTENSIN) 20 MG tablet Comments:   Reason for Stopping:         carvediloL (COREG) 25 MG tablet Comments:   Reason for Stopping:                 Future Appointments   Date Time Provider Department Center   5/12/2022 10:00 AM Gm Zambrano Jr., MD [facility] IM Red [facility]   9/8/2022  9:20 AM Gm Zambrano Jr., MD [facility] Red Nelson MD  [facility] Medicine

## 2022-05-11 NOTE — PLAN OF CARE
SW forwarded updated clinicals in [facility] for SNF placement (PASSR, 142, Chest Xray, Vaccine hx, PT/OT, Progress Notes). Chart previously read that patient's primary was Medicare Part A and Secondary was BCBS. As a result, [facility] denied patient. Chart has been corrected to read BCBS Primary, Medicare secondary.    Emi Prado, [facility] [place]  [phone]

## 2022-05-11 NOTE — CONSULTS
PHARMACY CONSULT NOTE: WARFARIN  Temitope Suero is a 82 y.o. female on warfarin therapy for left ventricular mural thrombus. PharmD has been consulted for warfarin dosing.    Assessment/Plan    Lab Results   Component Value Date    INR 1.8 (H) 05/11/2022    INR 1.1 05/09/2022    INR 1.1 05/08/2022     1. Change to warfarin 3 mg daily  2. Continue heparin gtt (goal aPTT 39-69) until INR > 2; adjustments per nursing protocol. If needed to facilitate discharge to SNF, switch to enoxaparin 1 mg/kg q24h  3. Goal INR 2-3  4. I have sent referral to [facility].    Thank you for the consult, will continue to follow  Reddy Stauffer, Pharm.D., BCPS  [id number]      **Note: Consults are reviewed Monday-Friday 7:00am-3:30pm. THE ABOVE RECOMMENDATIONS ARE ONLY SUGGESTED.THE RECOMMENDATIONS SHOULD BE CONSIDERED IN CONJUNCTION WITH ALL PATIENT FACTORS. **

## 2022-05-11 NOTE — NURSING
Notified Dr. Nelson that patient by 179/97. no prn coverage last dose of hydralazine give at 1400. thank you!

## 2022-05-11 NOTE — ASSESSMENT & PLAN NOTE
· Likely 2/2 pneumonia, more suspicious for CVA given finding of LV thrombus  · Head CT and MRI brain both negative for acute ischemic event  · Vascular Neurology consulted  · SLP cleared for diet  · EEG negative for seizures  · Mentation continues to improve

## 2022-05-11 NOTE — PLAN OF CARE
Pt alert to self, stable, Ra. Continuous on heparin drip APTT x2  WNL no change. Safety measures in place call light within reach.        Problem: Adult Inpatient Plan of Care  Goal: Plan of Care Review  Outcome: Ongoing, Progressing  Goal: Patient-Specific Goal (Individualized)  Outcome: Ongoing, Progressing  Goal: Absence of Hospital-Acquired Illness or Injury  Outcome: Ongoing, Progressing  Goal: Optimal Comfort and Wellbeing  Outcome: Ongoing, Progressing  Goal: Readiness for Transition of Care  Outcome: Ongoing, Progressing

## 2022-05-11 NOTE — PT/OT/SLP PROGRESS
Occupational Therapy Treatment    Patient Name:  Temitope Suero   MRN:  [id number]  Admit Date: 5/5/2022  Admitting Diagnosis:  Toxic encephalopathy   Length of Stay: 6 days  Recent Surgery: * No surgery found *      Recommendations:     Discharge Recommendations: nursing facility, skilled  Discharge Equipment Recommendations:  other (see comments) (TBD (progress pending))  Barriers to discharge:  Other (Comment) (Increased skilled assistance required)    Plan:     Patient to be seen 4 x/week to address the above listed problems via self-care/home management, therapeutic activities, therapeutic exercises, cognitive retraining  · Plan of Care Expires: 06/05/22  · Plan of Care Reviewed with: patient    Assessment:   Temitope Suero is a 82 y.o. female with a medical diagnosis of Toxic encephalopathy.  She presents with the following performance deficits affecting function: weakness, impaired endurance, impaired self care skills, impaired functional mobilty, gait instability, impaired balance, decreased safety awareness, decreased lower extremity function, decreased upper extremity function, decreased coordination, impaired cognition, decreased ROM, impaired coordination, impaired fine motor, impaired cardiopulmonary response to activity, edema.      Pt tolerated session fairly this date and willing to participate. Demonstrating continued impaired cognition, increased weakness, increased fatigue, impaired endurance, impaired balance, impaired BLE function, poor task initiation and decreased safety awareness, requiring increased time, assistance and redirecting to complete functional tasks. Patient completed bed mobility with Mod A while HOB elevated. Patient with increased fidgeting and shifting towards lines, IVs and clothing, increased redirecting and cueing provided to complete functional tasks. Patient completed sit>stand transfer x 3 trials with Mod A using RW, required increased time to achieve full stance, observed with  "potential dizziness/lightheadedness during initial stance as patient with eye changes and observed fatigue, unable to describe feeling. Patient completed functional mobility of household distance to<>From bathroom with Mod A using RW, required Max A cueing for RW management and step sequencing. Patient is progressing towards established goals, and continues to benefit from acute skilled OT services to increase functional performance and improve quality of life. OT to continue to recommend SNF at discharge to improve pt functional independence and increase patient safety before returning home.      Rehab Prognosis: Fair; patient would benefit from acute skilled OT services to address these deficits and reach maximum level of function.        Subjective   Communicated with: Nurse Karlie prior to session.  Patient found HOB elevated with bed alarm, blood pressure cuff, telemetry, pulse ox (continuous), peripheral IV (Avasys camera) upon OT entry to room. Pt agreeable to participate at this time.    Patient: " Can I go in there? " - patient with increased requests to go to bathroom, upon stance, requires redirecting to initiate mobility to bathroom    Pain/Comfort:  · Pain Rating 1: 0/10  · Pain Rating Post-Intervention 1: 0/10    Objective:   Patient found with: bed alarm, blood pressure cuff, telemetry, pulse ox (continuous), peripheral IV (Avasys camera)   General Precautions: Standard, Cardiac fall, pureed diet   Orthopedic Precautions:N/A   Braces: N/A   Oxygen Device: Room Air  Vitals: BP (!) 154/95   Pulse 102   Temp 98.2 °F (36.8 °C) (Axillary)   Resp 18   Ht 5' 7" (1.702 m)   Wt 64 kg (141 lb 1.5 oz)   SpO2 96%   Breastfeeding No   BMI 22.10 kg/m²     Outcome Measures:  AMPAC 6 Click ADL: 12    Cognition:   · Alert  · Command following: easily distracted by internal and environmental stimuli  · Communication: clear/fluent    Occupational Performance:  Bed Mobility:    · Patient completed " Rolling/Turning to Right with moderate assistance  · Patient completed Supine to Sit with moderate assistance on R side of bed  · Scooting anteriorly to EOB to have both feet planted on floor: moderate assistance  · Patient completed Sit to Supine with moderate assistance on R side of bed    Functional Mobility/Transfers:   Static Sitting EOB: SBA   Patient completed Sit <> Stand Transfer from EOB with moderate assistance  with  rolling walker x2 trials, observed with potential dizziness with initial stance  o Patient completed stand from toilet level with Mod A using RW and grab bar; required increased cueing for task completion   Static Standing Balance: CGA   Patient completed Toilet Transfer Step Transfer technique with maximal assistance with  rolling walker and grab bars; required hand placement on grab bar and cueing to descend into sitting   Patient completed functional mobility of household distance ~20ft with Mod A using RW; required max cueing for RW management, hand placement, and step sequencing.      Activities of Daily Living:  · Upper Body Dressing: maximal assistance to doff/don gown  · Lower Body Dressing: maximal assistance to doff/don adult brief  · Toileting: maximal assistance to perform pericare in supported stance following BM    AMPAC 6 Click ADL:  AMPAC Total Score: 12    Treatment & Education:  -Pt education on OT role and POC.  -Importance of E/OOB activity with staff assistance, emphasis on daily participation  -Safety during functional transfer and mobility ensured  -Patient provided with education on importance of Bilateral UB/LB integration during functional tasks for improvement in functional performance.   -Education provided/reviewed, questions answered within OT scope of practice.   -Patient will continue requiring reinforcement to demonstrate understanding and learning this date.         Patient left HOB elevated with all lines intact, call button in reach, bed alarm on and  nurse present    GOALS:   Multidisciplinary Problems     Occupational Therapy Goals        Problem: Occupational Therapy    Goal Priority Disciplines Outcome Interventions   Occupational Therapy Goal     OT, PT/OT Ongoing, Progressing    Description: Goals to be met by: 5/23/22     Patient will increase functional independence with ADLs by performing:    Feeding with Carey.  UE Dressing with Minimal Assistance.  LE Dressing with Minimal Assistance.  Grooming while standing with Minimal Assistance.  Toileting from toilet with Minimal Assistance for hygiene and clothing management.   Toilet transfer to toilet with Minimal Assistance.  Increased functional strength to WFL for increased participation in ADLs.                     Time Tracking:     OT Date of Treatment: 05/11/22  OT Start Time: 1140  OT Stop Time: 1206  OT Total Time (min): 26 min    Billable Minutes:Self Care/Home Management 26 5/11/2022

## 2022-05-11 NOTE — PHYSICIAN QUERY
PT Name: Temitope Suero  MR #: [id number]     DOCUMENTATION CLARIFICATION     CDS/:  Donaldo Rowell RN, CDS                   Contact Information:  [email or web address]    This form is a permanent document in the medical record.     Query Date: May 11, 2022    By submitting this query, we are merely seeking further clarification of documentation.  Please utilize your independent clinical judgment when addressing the question(s) below.    The Medical Record contains the following:   Indicators   Supporting Clinical Findings Location in Medical Record    Non-blanchable erythema/redness      Ulcer/Injury/Skin Breakdown      Deep Tissue Injury     X Wound care consult Consulted for altered skin integrity to coccyx. Patient resting in bed  agreeable to wound care assessment.     Patient then rolled onto her side and coccyx dressing removed. Dressing was moist from incontinence.    Date First Assessed/Time First Assessed: 05/06/22 1500   Altered Skin Integrity Present on Admission: suspected hospital acquired  Location: Perineum  Primary Wound Type: Incontinence associated dermatitis  Description of Altered Skin Integrity: Parti...    Integrity Partial thickness tissue loss. Shallow open ulcer with a red or pink wound bed, without slough. Intact or Open/Ruptured Serum-filled blister.    Dressing Appearance Intact  Drainage Amount None  Appearance Dry  Tissue loss description Partial thickness  Periwound Area Dry  Wound Length (cm) 1 cm  Wound Width (cm) 0.7 cm  Wound Depth (cm) 0 cm  Wound Volume (cm^3) 0 cm^3  Wound Surface Area (cm^2) 0.7 cm^2                   Wound Care Consult 5/11   X Acute/Chronic Illness NSTEMI  Chronic Systolic HF  Atrial flutter  MAC   CKD stage 3  LV mural thrombus  Moderate Malnutrition  HTN  DM  Hx of stroke  HLD   HM PN 5/11   X Medication/Treatment Wound measured and pictures taken, a new border was applied until received order for Triad. Nurse outside of room and informed of  care    Care Cleansed with:;Sterile normal saline  Dressing Changed   Wound Care Consult 5/11   X Other Recommendations made to primary team for triad cream daily and PRN as well as waffle mattress for prevention of pressure related breakdown . Orders placed Wound Care Consult 5/11         The clinical guidelines noted are only a system guideline. It does not replace the providers clinical judgment.    Per the National Pressure Injury Advisory Panel:   A pressure injury is localized damage to the skin and underlying soft tissue usually over a bony prominence or related to a medical or other device. The injury can present as intact skin or an open ulcer and may be painful. The injury occurs as a result of intense and/or prolonged pressure or pressure in combination with shear. The tolerance of soft tissue for pressure and shear may also be affected by microclimate, nutrition, perfusion, co-morbidities and condition of the soft tissue.       Stage 1 Pressure Injury:  Intact skin with a localized area of non-blanchable erythema, which may appear differently in darkly pigmented skin. Color changes do not include purple or maroon discoloration; these may indicate deep tissue pressure injury.    Stage 2 Pressure Injury:  Partial-thickness loss of skin with exposed dermis. The wound bed is viable, pink or red, moist, and may also present as an intact or ruptured serum-filled blister.    Stage 3 Pressure Injury:  Full-thickness loss of skin, in which adipose (fat) is visible in the ulcer and granulation tissue and epibole (rolled wound edges) are often present. Slough and/or eschar may be visible. Undermining and tunneling may occur.    Stage 4 Pressure Injury:  Full-thickness skin and tissue loss with exposed or directly palpable fascia, muscle, tendon, ligament, cartilage or bone in the ulcer. Slough and/or eschar may be visible. Epibole (rolled edges), undermining and/or tunneling often occur.    Unstageable Pressure  Injury:  Full-thickness skin and tissue loss in which the extent of tissue damage within the ulcer cannot be confirmed because it is obscured by slough or eschar. If slough or eschar is removed, a Stage 3 or Stage 4 pressure injury will be revealed.    Deep Tissue Pressure Injury:  Intact or non-intact skin with localized area of persistent non-blanchable deep red, maroon, purple discoloration or epidermal separation revealing a dark wound bed or blood filled blister. This injury results from intense and/or prolonged pressure and shear forces at the bone-muscle interface. The wound may evolve rapidly to reveal the actual extent of tissue injury, or may resolve without tissue loss. If necrotic tissue, subcutaneous tissue, granulation tissue, fascia, muscle or other underlying structures are visible, this indicates a full thickness pressure injury (Unstageable, Stage 3 or Stage 4). Do not use DTPI to describe vascular, traumatic, neuropathic, or dermatologic conditions.   Medical Device Related Pressure Injury: This describes an etiology. Medical device related pressure injuries result from the use of devices designed and applied for diagnostic or therapeutic purposes. The resultant pressure injury generally conforms to the pattern or shape of the device. The injury should be staged using the staging system.    Mucosal Membrane Pressure Injury: Mucosal membrane pressure injury is found on mucous membranes with a history of a medical device in use at the location of the injury. Due to the anatomy of the tissue these ulcers cannot be staged.       Provider, please provide the integumentary diagnosis related to the documentation of Perineum: Please also indicate POA status below.     [   ] Pressure Injury/Decubitus Ulcer, Stage 2     [ x  ] Moisture associated dermatitis     [   ] Other Integumentary Diagnosis (please specify):______________     [  ] Clinically Undetermined       Present on admission (POA) status:    [   x ] Yes (Y)   [   ] No (N)   [   ] Documentation insufficient to determine if condition is POA (U)   [   ] Clinically Undetermined (W)       Please document in your progress notes daily for the duration of treatment until resolved and include in your discharge summary.    Reference:    JULITO Angulo., GAGANDEEP Pittman., Goldberg, M., JULITO Villarreal, JULITO Copeland, & YOSHI Gee (2016). Revised National Pressure Ulcer Advisory Panel Pressure Injury Staging System: Revised Pressure Injury Staging System. J Wound Ostomy Continence Nurs, 43(6), 585-597. doi:10.1097/[id number]    Form No.[id number]

## 2022-05-11 NOTE — PROGRESS NOTES
[facility] - Intensive Care ([place] [address])  [facility] Medicine  Progress Note    Patient Name: Temitope Suero  MRN: [id number]  Patient Class: IP- Inpatient   Admission Date: 5/5/2022  Length of Stay: 6 days  Attending Physician: Janell Nelson MD  Primary Care Provider: Gm Zambrano MD        Subjective:     Principal Problem:Toxic encephalopathy        HPI:  Ms. Temitope Suero is a 82 y.o. female who presents to the ER by family for evaluation of a fall, AMS, and increased work of breath.  History is obtained from family Shelby, as patient is confused.  She reports that over the last 2 does - when she does talk, she wouldn't make sense.  She normally talks well and has fluent speech, but she does have some memory loss from an old stroke.  She has been having a dry cough, no fevers or chills - but has reported foul smelling urine.  She reports decreased PO intake the last week and that her mouth has been dry.  She did have a fall, but didn't hit her head.  She normally ambulates without DME.    Upon arrival to the ER, vitals were temp 98.4F, , /70 satting 96% on room air.  CXR showed a LLL PNA.  Head CT showed chronic ischemic changes.  Labs were notable for Trop 0.322, Creatinine 2.2, and Lactic 2.3.  She was given Vanc and Ceftriaxone and was admitted to Hospital Medicine.      Overview/Hospital Course:  Ms. Suero was admitted to Hospital Medicine for management of a toxic encephalopathy 2/2 LLL pneumonia.  She was started on Ceftriaxone/Azithro.  Her mentation was slow to improve.  SLP evaluated and cleared for diet.  She was given small IVF boluses for her MAC.  On the morning of 5/6, she was found to not be swallowing her food and chewing for a prolonged time.  She was made NPO.  She had a rapid response for diaphoresis and increased work of breathing.  Antibiotics were changed to Zosyn with concern for aspiration.  On 5/8, her mentation worsened again and she was not speaking or following commands.  2D  echo was ordered that showed a large, solid LV thrombus.  Head CT was ordered to r/o a bleed prior to starting Heparin which was negative.  She was started on Heparin.  The following morning, she found to be in new onset atrial flutter with HR 120s.  Cardiology was consulted.  Coreg was changed to Metoprolol and Entresto/Hydralazine were added.  Vascular Neurology was consulted given persistent mentation changes and new LV thrombus with concern for an acute ischemia event.  She was started on Warfarin, goal INR 2-3.  MRI brain was negative for a stroke.  Her mentation continued to improve.  PT/OT say SNF.      Interval History: No acute events overnight.  This morning awake, following, commands, and answering questions.  No complaints still.  Still not oriented to place.  Waiting on SNF    Review of Systems   Constitutional:  Negative for chills, fatigue and fever.   HENT:  Negative for trouble swallowing.    Respiratory:  Negative for cough and shortness of breath.    Cardiovascular:  Negative for chest pain, palpitations and leg swelling.   Gastrointestinal:  Negative for abdominal pain, diarrhea, nausea and vomiting.   Genitourinary:  Negative for dysuria and urgency.   Neurological:  Negative for dizziness and headaches.   Psychiatric/Behavioral:  Positive for confusion.    All other systems reviewed and are negative.  Objective:     Vital Signs (Most Recent):  Temp: 98.2 °F (36.8 °C) (05/11/22 1132)  Pulse: 102 (05/11/22 1132)  Resp: 18 (05/11/22 1132)  BP: (!) 127/92 (05/11/22 1132)  SpO2: 96 % (05/11/22 1132)   Vital Signs (24h Range):  Temp:  [97.9 °F (36.6 °C)-98.4 °F (36.9 °C)] 98.2 °F (36.8 °C)  Pulse:  [] 102  Resp:  [16-23] 18  SpO2:  [94 %-99 %] 96 %  BP: (120-180)/() 127/92     Weight: 64 kg (141 lb 1.5 oz)  Body mass index is 22.1 kg/m².    Intake/Output Summary (Last 24 hours) at 5/11/2022 1252  Last data filed at 5/10/2022 1700  Gross per 24 hour   Intake --   Output 5 ml   Net -5 ml         Physical Exam  Constitutional:       Appearance: Normal appearance. She is well-developed.   HENT:      Head: Normocephalic and atraumatic.   Cardiovascular:      Rate and Rhythm: Normal rate and regular rhythm.      Heart sounds: No murmur heard.  Pulmonary:      Effort: Pulmonary effort is normal. No respiratory distress.      Breath sounds: Normal breath sounds. No wheezing or rales.   Abdominal:      General: There is no distension.      Palpations: Abdomen is soft.      Tenderness: There is no abdominal tenderness.   Musculoskeletal:         General: No deformity.   Skin:     General: Skin is warm.   Neurological:      Comments: Makes eye contact.  Answering some questions today.  Conversant       Significant Labs: All pertinent labs within the past 24 hours have been reviewed.  CBC:   Recent Labs   Lab 05/10/22  0517 05/11/22  0412   WBC 7.13 5.41   HGB 13.9 14.2   HCT 45.1 46.1    230       CMP:   Recent Labs   Lab 05/10/22  0517 05/11/22  0412   * 148*   K 3.5 4.5   * 117*   CO2 20* 19*   * 128*   BUN 32* 27*   CREATININE 1.4 1.5*   CALCIUM 8.5* 8.5*   PROT 5.3* 5.2*   ALBUMIN 2.3* 2.4*   BILITOT 0.5 0.5   ALKPHOS 106 105   AST 23 28   ALT 13 17   ANIONGAP 10 12   EGFRNONAA 35.3* 32.4*       Lactic Acid:   No results for input(s): LACTATE in the last 48 hours.      Significant Imaging: I have reviewed all pertinent imaging results/findings within the past 24 hours.      Assessment/Plan:      * Toxic encephalopathy  · Likely 2/2 pneumonia, more suspicious for CVA given finding of LV thrombus  · Head CT and MRI brain both negative for acute ischemic event  · Vascular Neurology consulted  · SLP cleared for diet  · EEG negative for seizures  · Mentation continues to improve    Left lower lobe pneumonia  · CXR with LLL PNA  · Satting in the 90s on room air  · Afebrile and without leukocytosis but Lactic 2.3 on admission that trended down  · Started on Ceftriaxone and Azithro,  switched to Zosyn on 5/6 and then to Augmentin to complete a 10 day course      NSTEMI (non-ST elevated myocardial infarction)  · Trop 0.322 on admit and flat  · No reports of chest pain  · EKG not ischemic  · Likely demand 2/2 infection  · Monitor      Chronic systolic heart failure  · TTE on 5/6 shows EF 25-30%, G3DD, severe LAE, normal CVP, large solid LV thrombus  · Continue BB  · Cards started Entresto  · Monitor volume status closely    Atrial flutter  · New onset on 5/8 with HR 130s  · Cards consulted  · Continue Toprol 100mg PO daily      Acute kidney injury superimposed on CKD  · Creatinine 2.2 on admit, baseline closer to 1.3 - 1.7 today  · Possibly infection induced and Lasix/Benazepril  · Caution with IVF given CHF  · Renal US notable for chronic medical renal disease and 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    Chronic anticoagulation  · Was on Xarelto and changed to Eliquis in 2018 due to recurrent embolic strokes  · Now with a new large solid LV thrombus  · Continue Heparin with Warfarin bridge  · Pharmacy consulted for dosing      Paroxysmal atrial fibrillation  · Chronic issue but now with new onset atrial flutter  · Anticoagulation as above  · Continue BB      LV (left ventricular) mural thrombus  · Noted on TTE 5/7  · Has been on Eliquis outpatient  · Continue Heparin bridge to Warfarin  · Cards consulted      Stage 3 chronic kidney disease  · Chronic issue  · See MAC below      Fall  · PT/OT say SNF      Moderate malnutrition  Nutrition consulted. Most recent weight and BMI monitored-          Malnutrition (Moderate to Severe)  Weight Loss (Malnutrition): 7.5% in 3 months              Measurements:  Wt Readings from Last 1 Encounters:   05/09/22 64 kg (141 lb 1.5 oz)   Body mass index is 22.1 kg/m².    Recommendations: Recommendation/Intervention: 1.  Goals: Meet % EEN, EPN by RD f/u date    Patient has been screened and assessed by RD. RD will follow patient.      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  · As above      Type 2 diabetes mellitus with stage 3 chronic kidney disease, without long-term current use of insulin  · HbA1c 7  · Home DM regimen:  Diet controlled  · SSI with POCT accuchecks to achieve blood glucose of 140-180 while hospitalized  · Diabetic diet, purred and nectar thick per SLP        Deterioration of memory  · Chronic issue  · Continue Aricept      History of stroke  · Chronic and stable  · Head CT and MRI without acute infarct  · Continue Lipitor  · Vascular Neuro consult as above      Mixed hyperlipidemia  · Chronic and stable  · Continue Lipitor 40mg PO daily      VTE Risk Mitigation (From admission, onward)         Ordered     warfarin (COUMADIN) tablet 3 mg  Daily         05/11/22 0848     heparin 25,000 units in dextrose 5% (100 units/ml) IV bolus from bag - ADDITIONAL PRN BOLUS - 60 units/kg  As needed (PRN)        Question:  Heparin Infusion Adjustment (DO NOT MODIFY ANSWER)  Answer:  [email or web address]\epic\Images\Pharmacy\HeparinInfusions\heparin HIGH INTENSITY nomogram for [facility] [id number]    05/08/22 0301     heparin 25,000 units in dextrose 5% (100 units/ml) IV bolus from bag - ADDITIONAL PRN BOLUS - 30 units/kg  As needed (PRN)        Question:  Heparin Infusion Adjustment (DO NOT MODIFY ANSWER)  Answer:  \\[email or web address]\epic\Images\Pharmacy\HeparinInfusions\heparin HIGH INTENSITY nomogram for [facility] [id number].pdf    05/08/22 0301     heparin 25,000 units in dextrose 5% 250 mL (100 units/mL) infusion HIGH INTENSITY nomogram - [facility]  Continuous        Question Answer Comment   Heparin Infusion Adjustment (DO NOT MODIFY ANSWER) \\[email or web address]\epic\Images\Pharmacy\HeparinInfusions\heparin HIGH INTENSITY nomogram for [facility] [id number].pdf    Begin at (in units/kg/hr) 18        05/08/22 0301                Discharge Planning   WALDEMAR: 5/13/2022     Code Status: Full Code   Is the patient medically ready for discharge?:     Reason for patient still in hospital (select all that apply):  Pending disposition  Discharge Plan A: Skilled Nursing Facility   Discharge Delays: None known at this time              Janell Neslon MD  Department of Hospital Medicine   [facility] - Intensive Care ([place]

## 2022-05-11 NOTE — NURSING
Notified Dr. Nelson via secure chat. also, pt self removed second iv and refusing to be stuck for another. Dextrose 5% is on hold for now

## 2022-05-11 NOTE — SUBJECTIVE & OBJECTIVE
Interval History: No acute events overnight.  This morning awake, following, commands, and answering questions.  No complaints still.  Still not oriented to place.  Waiting on [facility]    Review of Systems   Constitutional:  Negative for chills, fatigue and fever.   HENT:  Negative for trouble swallowing.    Respiratory:  Negative for cough and shortness of breath.    Cardiovascular:  Negative for chest pain, palpitations and leg swelling.   Gastrointestinal:  Negative for abdominal pain, diarrhea, nausea and vomiting.   Genitourinary:  Negative for dysuria and urgency.   Neurological:  Negative for dizziness and headaches.   Psychiatric/Behavioral:  Positive for confusion.    All other systems reviewed and are negative.  Objective:     Vital Signs (Most Recent):  Temp: 98.2 °F (36.8 °C) (05/11/22 1132)  Pulse: 102 (05/11/22 1132)  Resp: 18 (05/11/22 1132)  BP: (!) 127/92 (05/11/22 1132)  SpO2: 96 % (05/11/22 1132)   Vital Signs (24h Range):  Temp:  [97.9 °F (36.6 °C)-98.4 °F (36.9 °C)] 98.2 °F (36.8 °C)  Pulse:  [] 102  Resp:  [16-23] 18  SpO2:  [94 %-99 %] 96 %  BP: (120-180)/() 127/92     Weight: 64 kg (141 lb 1.5 oz)  Body mass index is 22.1 kg/m².    Intake/Output Summary (Last 24 hours) at 5/11/2022 1252  Last data filed at 5/10/2022 1700  Gross per 24 hour   Intake --   Output 5 ml   Net -5 ml        Physical Exam  Constitutional:       Appearance: Normal appearance. She is well-developed.   HENT:      Head: Normocephalic and atraumatic.   Cardiovascular:      Rate and Rhythm: Normal rate and regular rhythm.      Heart sounds: No murmur heard.  Pulmonary:      Effort: Pulmonary effort is normal. No respiratory distress.      Breath sounds: Normal breath sounds. No wheezing or rales.   Abdominal:      General: There is no distension.      Palpations: Abdomen is soft.      Tenderness: There is no abdominal tenderness.   Musculoskeletal:         General: No deformity.   Skin:     General: Skin is warm.    Neurological:      Comments: Makes eye contact.  Answering some questions today.  Conversant       Significant Labs: All pertinent labs within the past 24 hours have been reviewed.  CBC:   Recent Labs   Lab 05/10/22  0517 05/11/22 0412   WBC 7.13 5.41   HGB 13.9 14.2   HCT 45.1 46.1    230       CMP:   Recent Labs   Lab 05/10/22  0517 05/11/22 0412   * 148*   K 3.5 4.5   * 117*   CO2 20* 19*   * 128*   BUN 32* 27*   CREATININE 1.4 1.5*   CALCIUM 8.5* 8.5*   PROT 5.3* 5.2*   ALBUMIN 2.3* 2.4*   BILITOT 0.5 0.5   ALKPHOS 106 105   AST 23 28   ALT 13 17   ANIONGAP 10 12   EGFRNONAA 35.3* 32.4*       Lactic Acid:   No results for input(s): LACTATE in the last 48 hours.      Significant Imaging: I have reviewed all pertinent imaging results/findings within the past 24 hours.

## 2022-05-11 NOTE — PROGRESS NOTES
[facility] - Intensive Care ([place] [id number])  Wound Care    Patient Name:  Temitope Suero   MRN:  [id number]  Date: 5/11/2022  Diagnosis: Toxic encephalopathy    History:     Past Medical History:   Diagnosis Date    Atrial flutter 8/12/2018    Cancer of left breast, stage 2 11/24/2015    Cataract     Cerebrovascular small vessel disease 4/11/2018    Bi-thalamic lacunar disease, mod/sev periventricular white matter disease, mixed pattern cerebral microbleeds    Cervicalgia 4/17/2018    Chronic anticoagulation - apixaban 4/17/2018    Previous on Xarelto but changed to apixaban 8-2018 due to recurrent embolic stroke.    Chronic systolic heart failure 4/17/2018    Echo 4-2018   1 - Moderately depressed left ventricular systolic function (EF 30-35%).    2 - Biatrial enlargement.    3 - Normal right ventricular systolic function .    4 - Mild mitral regurgitation.    5 - Mild tricuspid regurgitation.    6 - The estimated PA systolic pressure is 34 mmHg.    7 - Increased central venous pressure.    8 - Left pleural effusion.     CKD stage 3 due to type 2 diabetes mellitus 4/17/2018    Embolic stroke involving left cerebellar artery 8/13/2018    Embolic stroke involving right posterior cerebral artery 4/11/2018    Encephalopathy 8/13/2018    Essential hypertension 10/28/2013    Glaucoma     Glaucoma suspect of both eyes 12/9/2013    Hearing loss, sensorineural 12/16/2013    Malignant hypertension 8/12/2018    Mixed hyperlipidemia 10/28/2013    Obesity 1/16/2014    Paroxysmal atrial fibrillation 7/10/2012    Stage 3 chronic kidney disease 4/17/2018    Toxic multinodular goiter 10/28/2013    Type 2 diabetes mellitus with stage 3 chronic kidney disease, without long-term current use of insulin 7/10/2012    Diet controlled.    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 Patient resting in bed  agreeable to wound care assessment. Patient then rolled onto her side and coccyx dressing removed. Dressing was moist from incontinence. Wound measured and pictures taken, a new border was applied until received order for Triad. Nurse outside of room and informed of care.      05/11/22 1304   WOCN Assessment   WOCN Total Time (mins) 30   Visit Date 05/11/22   Visit Time 1052   Consult Type New   WOCN Speciality Wound   Wound moisture   Number of Wounds 1   Intervention assessed   Skin Interventions   Device Skin Pressure Protection absorbent pad utilized/changed   Pressure Reduction Techniques frequent weight shift encouraged   Skin Protection incontinence pads utilized   Positioning   Head of Bed (HOB) Positioning HOB elevated   Positioning/Transfer Devices pillows   Pressure Injury Prevention    Check Moisture Management Pad Done   Sacral Foam Dressing Replace   Check Medical Devices Done        Altered Skin Integrity 05/06/22 1500 Perineum Incontinence associated dermatitis Partial thickness tissue loss. Shallow open ulcer with a red or pink wound bed, without slough. Intact or Open/Ruptured Serum-filled blister.   Date First Assessed/Time First Assessed: 05/06/22 1500   Altered Skin Integrity Present on Admission: suspected hospital acquired  Location: Perineum  Primary Wound Type: Incontinence associated dermatitis  Description of Altered Skin Integrity: Parti...   Description of Altered Skin Integrity Partial thickness tissue loss. Shallow open ulcer with a red or pink wound bed, without slough. Intact or Open/Ruptured  Serum-filled blister.   Dressing Appearance Intact   Drainage Amount None   Appearance Dry   Tissue loss description Partial thickness   Periwound Area Dry   Wound Length (cm) 1 cm   Wound Width (cm) 0.7 cm   Wound Depth (cm) 0 cm   Wound Volume (cm^3) 0 cm^3   Wound Surface Area (cm^2) 0.7 cm^2   Care Cleansed with:;Sterile normal saline   Dressing Changed   Dressing Change Due 05/12/22    (Insert flowsheet data here)    Recommendations made to primary team for triad cream daily and PRN as well as waffle mattress for prevention of pressure related breakdown . Orders placed.     05/11/2022

## 2022-05-12 PROBLEM — L30.8 DERMATITIS ASSOCIATED WITH MOISTURE: Status: ACTIVE | Noted: 2022-05-12

## 2022-05-12 LAB
BASOPHILS # BLD AUTO: 0.02 K/UL (ref 0–0.2)
BASOPHILS NFR BLD: 0.3 % (ref 0–1.9)
DIFFERENTIAL METHOD: ABNORMAL
EOSINOPHIL # BLD AUTO: 0.1 K/UL (ref 0–0.5)
EOSINOPHIL NFR BLD: 0.7 % (ref 0–8)
ERYTHROCYTE [DISTWIDTH] IN BLOOD BY AUTOMATED COUNT: 16.5 % (ref 11.5–14.5)
HCT VFR BLD AUTO: 43.1 % (ref 37–48.5)
HGB BLD-MCNC: 13.3 G/DL (ref 12–16)
IMM GRANULOCYTES # BLD AUTO: 0.04 K/UL (ref 0–0.04)
IMM GRANULOCYTES NFR BLD AUTO: 0.6 % (ref 0–0.5)
INR PPP: 2.3 (ref 0.8–1.2)
LYMPHOCYTES # BLD AUTO: 1.2 K/UL (ref 1–4.8)
LYMPHOCYTES NFR BLD: 16.1 % (ref 18–48)
MCH RBC QN AUTO: 24.4 PG (ref 27–31)
MCHC RBC AUTO-ENTMCNC: 30.9 G/DL (ref 32–36)
MCV RBC AUTO: 79 FL (ref 82–98)
MONOCYTES # BLD AUTO: 0.7 K/UL (ref 0.3–1)
MONOCYTES NFR BLD: 9.8 % (ref 4–15)
NEUTROPHILS # BLD AUTO: 5.3 K/UL (ref 1.8–7.7)
NEUTROPHILS NFR BLD: 72.5 % (ref 38–73)
NRBC BLD-RTO: 0 /100 WBC
PLATELET # BLD AUTO: 232 K/UL (ref 150–450)
PMV BLD AUTO: 12.8 FL (ref 9.2–12.9)
POCT GLUCOSE: 140 MG/DL (ref 70–110)
POCT GLUCOSE: 149 MG/DL (ref 70–110)
POCT GLUCOSE: 150 MG/DL (ref 70–110)
POCT GLUCOSE: 176 MG/DL (ref 70–110)
PROTHROMBIN TIME: 23.1 SEC (ref 9–12.5)
RBC # BLD AUTO: 5.45 M/UL (ref 4–5.4)
TB INDURATION 48 - 72 HR READ: 0 MM
WBC # BLD AUTO: 7.27 K/UL (ref 3.9–12.7)

## 2022-05-12 PROCEDURE — 85025 COMPLETE CBC W/AUTO DIFF WBC: CPT | Performed by: INTERNAL MEDICINE

## 2022-05-12 PROCEDURE — 99232 SBSQ HOSP IP/OBS MODERATE 35: CPT | Mod: ,,, | Performed by: STUDENT IN AN ORGANIZED HEALTH CARE EDUCATION/TRAINING PROGRAM

## 2022-05-12 PROCEDURE — 25000003 PHARM REV CODE 250: Performed by: INTERNAL MEDICINE

## 2022-05-12 PROCEDURE — 85610 PROTHROMBIN TIME: CPT | Performed by: HOSPITALIST

## 2022-05-12 PROCEDURE — 99232 PR SUBSEQUENT HOSPITAL CARE,LEVL II: ICD-10-PCS | Mod: ,,, | Performed by: STUDENT IN AN ORGANIZED HEALTH CARE EDUCATION/TRAINING PROGRAM

## 2022-05-12 PROCEDURE — 36415 COLL VENOUS BLD VENIPUNCTURE: CPT | Performed by: HOSPITALIST

## 2022-05-12 PROCEDURE — 25000003 PHARM REV CODE 250: Performed by: HOSPITALIST

## 2022-05-12 PROCEDURE — 97530 THERAPEUTIC ACTIVITIES: CPT

## 2022-05-12 PROCEDURE — 99223 PR INITIAL HOSPITAL CARE,LEVL III: ICD-10-PCS | Mod: ,,, | Performed by: NURSE PRACTITIONER

## 2022-05-12 PROCEDURE — 20600001 HC STEP DOWN PRIVATE ROOM

## 2022-05-12 PROCEDURE — 99223 1ST HOSP IP/OBS HIGH 75: CPT | Mod: ,,, | Performed by: NURSE PRACTITIONER

## 2022-05-12 PROCEDURE — 25000003 PHARM REV CODE 250: Performed by: STUDENT IN AN ORGANIZED HEALTH CARE EDUCATION/TRAINING PROGRAM

## 2022-05-12 RX ADMIN — DONEPEZIL HYDROCHLORIDE 5 MG: 5 TABLET ORAL at 09:05

## 2022-05-12 RX ADMIN — AMOXICILLIN AND CLAVULANATE POTASSIUM 400 MG: 400; 57 POWDER, FOR SUSPENSION ORAL at 08:05

## 2022-05-12 RX ADMIN — MUPIROCIN: 20 OINTMENT TOPICAL at 08:05

## 2022-05-12 RX ADMIN — SACUBITRIL AND VALSARTAN 1 TABLET: 24; 26 TABLET, FILM COATED ORAL at 08:05

## 2022-05-12 RX ADMIN — SACUBITRIL AND VALSARTAN 1 TABLET: 24; 26 TABLET, FILM COATED ORAL at 09:05

## 2022-05-12 RX ADMIN — ACETAMINOPHEN 1000 MG: 500 TABLET ORAL at 05:05

## 2022-05-12 RX ADMIN — WARFARIN SODIUM 3 MG: 1 TABLET ORAL at 05:05

## 2022-05-12 RX ADMIN — HYDRALAZINE HYDROCHLORIDE 50 MG: 50 TABLET ORAL at 05:05

## 2022-05-12 RX ADMIN — AMOXICILLIN AND CLAVULANATE POTASSIUM 400 MG: 400; 57 POWDER, FOR SUSPENSION ORAL at 09:05

## 2022-05-12 RX ADMIN — ACETAMINOPHEN 1000 MG: 500 TABLET ORAL at 09:05

## 2022-05-12 RX ADMIN — MUPIROCIN: 20 OINTMENT TOPICAL at 09:05

## 2022-05-12 RX ADMIN — FUROSEMIDE 40 MG: 40 TABLET ORAL at 08:05

## 2022-05-12 RX ADMIN — FUROSEMIDE 40 MG: 40 TABLET ORAL at 09:05

## 2022-05-12 RX ADMIN — ATORVASTATIN CALCIUM 40 MG: 40 TABLET, FILM COATED ORAL at 08:05

## 2022-05-12 RX ADMIN — HYDRALAZINE HYDROCHLORIDE 50 MG: 50 TABLET ORAL at 09:05

## 2022-05-12 RX ADMIN — Medication 6 MG: at 09:05

## 2022-05-12 RX ADMIN — METOPROLOL SUCCINATE 100 MG: 100 TABLET, EXTENDED RELEASE ORAL at 08:05

## 2022-05-12 RX ADMIN — HYDRALAZINE HYDROCHLORIDE 50 MG: 50 TABLET ORAL at 03:05

## 2022-05-12 NOTE — HPI
Temitope Suero is a 82 year old female who presents to the ER by family for evaluation of a fall, AMS, and increased work of breath.  History is obtained from family Shelby, as patient is confused.  She reports that over the last 2 does - when she does talk, she wouldn't make sense.  She normally talks well and has fluent speech, but she does have some memory loss from an old stroke.  She has been having a dry cough, no fevers or chills - but has reported foul smelling urine.  She reports decreased PO intake the last week and that her mouth has been dry.  She did have a fall, but didn't hit her head.  She normally ambulates without DME. Admitted to Hospital Medicine. Wound care consulted for evaluation of skin breakdown.

## 2022-05-12 NOTE — PLAN OF CARE
Problem: Infection (Pneumonia)  Goal: Resolution of Infection Signs and Symptoms  Outcome: Ongoing, Progressing     Problem: Skin Injury Risk Increased  Goal: Skin Health and Integrity  Outcome: Ongoing, Progressing     Problem: Impaired Wound Healing  Goal: Optimal Wound Healing  Outcome: Ongoing, Progressing

## 2022-05-12 NOTE — CONSULTS
PHARMACY CONSULT NOTE: WARFARIN  Temitope Suero is a 82 y.o. female on warfarin therapy for left ventricular mural thrombus. PharmD has been consulted for warfarin dosing.    Assessment/Plan    Lab Results   Component Value Date    INR 2.3 (H) 05/12/2022    INR 1.8 (H) 05/11/2022    INR 1.1 05/09/2022     1. Continue warfarin 3 mg daily  2. Stop heparin gtt; restart for INR < 2  3. Goal INR 2-3  4. I have sent referral to [facility].    Thank you for the consult, will continue to follow  Reddy Stauffer, Pharm.D., BCPS  [id number]      **Note: Consults are reviewed Monday-Friday 7:00am-3:30pm. THE ABOVE RECOMMENDATIONS ARE ONLY SUGGESTED.THE RECOMMENDATIONS SHOULD BE CONSIDERED IN CONJUNCTION WITH ALL PATIENT FACTORS. **

## 2022-05-12 NOTE — PLAN OF CARE
Pt Aox3, stable,RA. PRN hydralazine given for hypertension effective. No pain noted, telesitter and bed alarm in place. Call light within reach.        Problem: Adult Inpatient Plan of Care  Goal: Plan of Care Review  Outcome: Ongoing, Progressing  Goal: Patient-Specific Goal (Individualized)  Outcome: Ongoing, Progressing  Goal: Absence of Hospital-Acquired Illness or Injury  Outcome: Ongoing, Progressing  Goal: Optimal Comfort and Wellbeing  Outcome: Ongoing, Progressing  Goal: Readiness for Transition of Care  Outcome: Ongoing, Progressing     Problem: Diabetes Comorbidity  Goal: Blood Glucose Level Within Targeted Range  Outcome: Ongoing, Progressing     Problem: Fluid and Electrolyte Imbalance (Acute Kidney Injury/Impairment)  Goal: Fluid and Electrolyte Balance  Outcome: Ongoing, Progressing

## 2022-05-12 NOTE — CONSULTS
[facility] ([place] [id number])  Skin Integrity [facility]  Consult Note    Patient Name: Temitope Suero  MRN: [id number]  Admission Date: 5/5/2022  Hospital Length of Stay: 7 days  Attending Physician: Mirella Delgado MD  Primary Care Provider: Gm Zambrano MD     Consults  Subjective:     History of Present Illness:  Temitope Suero is a 82 year old female who presents to the ER by family for evaluation of a fall, AMS, and increased work of breath.  History is obtained from family Shelby, as patient is confused.  She reports that over the last 2 does - when she does talk, she wouldn't make sense.  She normally talks well and has fluent speech, but she does have some memory loss from an old stroke.  She has been having a dry cough, no fevers or chills - but has reported foul smelling urine.  She reports decreased PO intake the last week and that her mouth has been dry.  She did have a fall, but didn't hit her head.  She normally ambulates without DME. Admitted to Hospital Medicine. Wound care consulted for evaluation of skin breakdown.          Scheduled Meds:   amoxicillin-clavulanate  400 mg Oral Q12H    atorvastatin  40 mg Oral Daily    donepeziL  5 mg Oral QHS    furosemide  40 mg Oral BID    hydrALAZINE  50 mg Oral Q8H    metoprolol succinate  100 mg Oral Daily    mupirocin   Nasal BID    sacubitriL-valsartan  1 tablet Oral BID    warfarin  3 mg Oral Daily     Continuous Infusions:  PRN Meds:acetaminophen, acetaminophen, dextrose 10%, dextrose 10%, glucagon (human recombinant), glucose, glucose, hydrALAZINE, insulin aspart U-100, melatonin, ondansetron, polyethylene glycol, sodium chloride 0.9%    Review of patient's allergies indicates:  No Known Allergies     Past Medical History:   Diagnosis Date    Atrial flutter 8/12/2018    Cancer of left breast, stage 2 11/24/2015    Cataract     Cerebrovascular small vessel disease 4/11/2018    Bi-thalamic lacunar disease, mod/sev periventricular white matter  disease, mixed pattern cerebral microbleeds    Cervicalgia 4/17/2018    Chronic anticoagulation - apixaban 4/17/2018    Previous on Xarelto but changed to apixaban 8-2018 due to recurrent embolic stroke.    Chronic systolic heart failure 4/17/2018    Echo 4-2018   1 - Moderately depressed left ventricular systolic function (EF 30-35%).    2 - Biatrial enlargement.    3 - Normal right ventricular systolic function .    4 - Mild mitral regurgitation.    5 - Mild tricuspid regurgitation.    6 - The estimated PA systolic pressure is 34 mmHg.    7 - Increased central venous pressure.    8 - Left pleural effusion.     CKD stage 3 due to type 2 diabetes mellitus 4/17/2018    Embolic stroke involving left cerebellar artery 8/13/2018    Embolic stroke involving right posterior cerebral artery 4/11/2018    Encephalopathy 8/13/2018    Essential hypertension 10/28/2013    Glaucoma     Glaucoma suspect of both eyes 12/9/2013    Hearing loss, sensorineural 12/16/2013    Malignant hypertension 8/12/2018    Mixed hyperlipidemia 10/28/2013    Obesity 1/16/2014    Paroxysmal atrial fibrillation 7/10/2012    Stage 3 chronic kidney disease 4/17/2018    Toxic multinodular goiter 10/28/2013    Type 2 diabetes mellitus with stage 3 chronic kidney disease, without long-term current use of insulin 7/10/2012    Diet controlled.   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    Vitamin D deficiency disease 10/28/2013     Past Surgical History:   Procedure Laterality Date    BREAST BIOPSY      BREAST SURGERY      CHOLECYSTECTOMY         Family History       Problem Relation (Age of Onset)    Asthma Son    Cancer Sister    Diabetes Paternal Aunt    Glaucoma Father    Heart disease Father    Hypertension Mother, Father          Tobacco Use    Smoking status: Never Smoker    Smokeless tobacco: Never Used   Substance and Sexual Activity    Alcohol use: No    Drug use: No    Sexual activity: Not Currently     Review of  Systems    Objective:     Vital Signs (Most Recent):  Temp: 98.2 °F (36.8 °C) (05/12/22 1153)  Pulse: 84 (05/12/22 1528)  Resp: (!) 21 (05/12/22 1153)  BP: 109/78 (05/12/22 1153)  SpO2: 99 % (05/12/22 0400)   Vital Signs (24h Range):  Temp:  [97.7 °F (36.5 °C)-99.6 °F (37.6 °C)] 98.2 °F (36.8 °C)  Pulse:  [] 84  Resp:  [20-22] 21  SpO2:  [96 %-99 %] 99 %  BP: (109-185)/() 109/78     Weight: 64 kg (141 lb 1.5 oz)  Body mass index is 22.1 kg/m².  Physical Exam  Constitutional:       Appearance: Normal appearance.   Skin:     General: Skin is warm and dry.      Findings: Lesion present.   Neurological:      Mental Status: She is alert.       Laboratory:  All pertinent labs reviewed within the last 24 hours.    Diagnostic Results:  None        Assessment/Plan:          [facility] Skin Integrity Evaluation    Skin Integrity [facility] evaluation of patient as part of the comprehensive skin care team.   She has been admitted for 7 days. Skin injury was noted on 5/6/22. POA no.    Sacrum          Dermatitis associated with moisture  - consult received for evaluation of skin breakdown.  - pt admitted for evaluation after a fall and AMS.  - pt is seen wearing a brief.  - continue Triad daily/prn to area.  - [place] surface being utilized. Waffle overlay delivered to bedside.  - nursing to maintain pressure injury prevention measures and continue scheduled wound care per orders.         Thank you for your consult. I will follow-up with patient. Please contact us if you have any additional questions.       Millie Macias NP  [facility]  [facility] ([place])

## 2022-05-12 NOTE — NURSING
[facility] ([place] [address])  Wound Care    Patient Name:  Temitope Suero   MRN:  [id number]  Date: 5/11/2022  Diagnosis: Toxic encephalopathy    History:     Past Medical History:   Diagnosis Date    Atrial flutter 8/12/2018    Cancer of left breast, stage 2 11/24/2015    Cataract     Cerebrovascular small vessel disease 4/11/2018    Bi-thalamic lacunar disease, mod/sev periventricular white matter disease, mixed pattern cerebral microbleeds    Cervicalgia 4/17/2018    Chronic anticoagulation - apixaban 4/17/2018    Previous on Xarelto but changed to apixaban 8-2018 due to recurrent embolic stroke.    Chronic systolic heart failure 4/17/2018    Echo 4-2018   1 - Moderately depressed left ventricular systolic function (EF 30-35%).    2 - Biatrial enlargement.    3 - Normal right ventricular systolic function .    4 - Mild mitral regurgitation.    5 - Mild tricuspid regurgitation.    6 - The estimated PA systolic pressure is 34 mmHg.    7 - Increased central venous pressure.    8 - Left pleural effusion.     CKD stage 3 due to type 2 diabetes mellitus 4/17/2018    Embolic stroke involving left cerebellar artery 8/13/2018    Embolic stroke involving right posterior cerebral artery 4/11/2018    Encephalopathy 8/13/2018    Essential hypertension 10/28/2013    Glaucoma     Glaucoma suspect of both eyes 12/9/2013    Hearing loss, sensorineural 12/16/2013    Malignant hypertension 8/12/2018    Mixed hyperlipidemia 10/28/2013    Obesity 1/16/2014    Paroxysmal atrial fibrillation 7/10/2012    Stage 3 chronic kidney disease 4/17/2018    Toxic multinodular goiter 10/28/2013    Type 2 diabetes mellitus with stage 3 chronic kidney disease, without long-term current use of insulin 7/10/2012    Diet controlled.    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    Vitamin D deficiency disease 10/28/2013       Social History     Socioeconomic History    Marital status: Single    Number of children: 1   Occupational History      Employer: [employer]   Tobacco Use    Smoking status: Never Smoker    Smokeless tobacco: Never Used   Substance and Sexual Activity    Alcohol use: No    Drug use: No    Sexual activity: Not Currently       Precautions:     Allergies as of 05/04/2022    (No Known Allergies)       WOC Assessment Details/Treatment      05/10/22 1300   WOCN Assessment   WOCN Total Time (mins) 15   Visit Date 05/10/22   Visit Time 1300   Consult Type New   WOCN Speciality Wound   Wound other   Number of Wounds 1   Intervention assessed;applied;chart review;coordination of care;team conference;orders   Teaching on-going   Skin Interventions   Pressure Reduction Techniques frequent weight shift encouraged;positioned off wounds;weight shift assistance provided   Skin Protection adhesive use limited;incontinence pads utilized;other (see comments)  (traid applied)   Positioning   Body Position left   Positioning/Transfer Devices pillows        Altered Skin Integrity 05/06/22 1500 Perineum Incontinence associated dermatitis Partial thickness tissue loss. Shallow open ulcer with a red or pink wound bed, without slough. Intact or Open/Ruptured Serum-filled blister.   Date First Assessed/Time First Assessed: 05/06/22 1500   Altered Skin Integrity Present on Admission: suspected hospital acquired  Location: Perineum  Primary Wound Type: Incontinence associated dermatitis  Description of Altered Skin Integrity: Parti...   Wound Image    Description of Altered Skin Integrity Partial thickness tissue loss. Shallow open ulcer with a red or pink wound bed, without slough. Intact or Open/Ruptured Serum-filled blister.   Dressing Appearance Dry   Drainage Amount None   Drainage Characteristics/Odor No odor   Appearance Pink;Dry;Smooth   Tissue loss description Partial thickness   Red (%), Wound Tissue Color 100 %   Periwound Area Dry;Other (see comments)  (darkened)   Wound Edges Defined   Wound Length (cm) 1 cm   Wound Width (cm) 0.5 cm   Wound  Depth (cm) 0.1 cm   Wound Volume (cm^3) 0.05 cm^3   Wound Surface Area (cm^2) 0.5 cm^2   Tunneling (depth (cm)/location) N/A   Undermining (depth (cm)/location) N/A   Care Cleansed with:;Sterile normal saline;Applied:;Skin Barrier;Moisturizing agent  (CLEANED WITH NORMAL SALINE, ALLOWED TO DRY AND APPLIED TRIAD)   Periwound Care Dry periwound area maintained      Wound care consult received from admit team for wound to sacral area.  Introduced myself to patient. Upon evaluation, noted open area to sacrum.  Pink dry wound bed. Dry periwound. Cleaned with normal saline and gauze. Applied triad to affected area.  Patient tolerated procedure without compliant.  Discussed assessment finds with patient and treatment team.  Recommendations made to primary team for wound care. Orders placed. Nursing to perform wound care and consult us as needed.     05/11/2022

## 2022-05-12 NOTE — PLAN OF CARE
Problem: Skin Injury Risk Increased  Goal: Skin Health and Integrity  Outcome: Ongoing, Progressing     Problem: Impaired Wound Healing  Goal: Optimal Wound Healing  Outcome: Ongoing, Progressing     Problem: Fall Injury Risk  Goal: Absence of Fall and Fall-Related Injury  Outcome: Ongoing, Progressing

## 2022-05-12 NOTE — PLAN OF CARE
05/12/22 0900   Discharge Reassessment   Assessment Type Discharge Planning Reassessment   Did the patient's condition or plan change since previous assessment? No   Discharge Plan discussed with: Caregiver   Name(s) and Number(s) Shelby Alfaro ([phone]   Communicated WALDEMAR with patient/caregiver Yes   Discharge Plan A Skilled Nursing Facility   Discharge Plan B Skilled Nursing Facility   DME Needed Upon Discharge  none   Discharge Barriers Identified None   Why the patient remains in the hospital Requires continued medical care   Post-Acute Status   Post-Acute Authorization Placement   Post-Acute Placement Status Referrals Sent   Coverage [facility] and medicare part A   Discharge Delays None known at this time     Bharat reviewing for SNF placement. Will continue to follow.    Emi Prado, [place]  [phone]

## 2022-05-12 NOTE — ASSESSMENT & PLAN NOTE
- consult received for evaluation of skin breakdown.  - pt admitted for evaluation after a fall and AMS.  - pt is seen wearing a brief.  - continue Triad daily/prn to area.  - [place] surface being utilized. Waffle overlay delivered to bedside.  - nursing to maintain pressure injury prevention measures and continue scheduled wound care per orders.

## 2022-05-12 NOTE — SUBJECTIVE & OBJECTIVE
Scheduled Meds:   amoxicillin-clavulanate  400 mg Oral Q12H    atorvastatin  40 mg Oral Daily    donepeziL  5 mg Oral QHS    furosemide  40 mg Oral BID    hydrALAZINE  50 mg Oral Q8H    metoprolol succinate  100 mg Oral Daily    mupirocin   Nasal BID    sacubitriL-valsartan  1 tablet Oral BID    warfarin  3 mg Oral Daily     Continuous Infusions:  PRN Meds:acetaminophen, acetaminophen, dextrose 10%, dextrose 10%, glucagon (human recombinant), glucose, glucose, hydrALAZINE, insulin aspart U-100, melatonin, ondansetron, polyethylene glycol, sodium chloride 0.9%    Review of patient's allergies indicates:  No Known Allergies     Past Medical History:   Diagnosis Date    Atrial flutter 8/12/2018    Cancer of left breast, stage 2 11/24/2015    Cataract     Cerebrovascular small vessel disease 4/11/2018    Bi-thalamic lacunar disease, mod/sev periventricular white matter disease, mixed pattern cerebral microbleeds    Cervicalgia 4/17/2018    Chronic anticoagulation - apixaban 4/17/2018    Previous on Xarelto but changed to apixaban 8-2018 due to recurrent embolic stroke.    Chronic systolic heart failure 4/17/2018    Echo 4-2018   1 - Moderately depressed left ventricular systolic function (EF 30-35%).    2 - Biatrial enlargement.    3 - Normal right ventricular systolic function .    4 - Mild mitral regurgitation.    5 - Mild tricuspid regurgitation.    6 - The estimated PA systolic pressure is 34 mmHg.    7 - Increased central venous pressure.    8 - Left pleural effusion.     CKD stage 3 due to type 2 diabetes mellitus 4/17/2018    Embolic stroke involving left cerebellar artery 8/13/2018    Embolic stroke involving right posterior cerebral artery 4/11/2018    Encephalopathy 8/13/2018    Essential hypertension 10/28/2013    Glaucoma     Glaucoma suspect of both eyes 12/9/2013    Hearing loss, sensorineural 12/16/2013    Malignant hypertension 8/12/2018    Mixed hyperlipidemia 10/28/2013    Obesity 1/16/2014     Paroxysmal atrial fibrillation 7/10/2012    Stage 3 chronic kidney disease 4/17/2018    Toxic multinodular goiter 10/28/2013    Type 2 diabetes mellitus with stage 3 chronic kidney disease, without long-term current use of insulin 7/10/2012    Diet controlled.    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    Vitamin D deficiency disease 10/28/2013     Past Surgical History:   Procedure Laterality Date    BREAST BIOPSY      BREAST SURGERY      CHOLECYSTECTOMY         Family History       Problem Relation (Age of Onset)    Asthma Son    Cancer Sister    Diabetes Paternal Aunt    Glaucoma Father    Heart disease Father    Hypertension Mother, Father          Tobacco Use    Smoking status: Never Smoker    Smokeless tobacco: Never Used   Substance and Sexual Activity    Alcohol use: No    Drug use: No    Sexual activity: Not Currently     Review of Systems    Objective:     Vital Signs (Most Recent):  Temp: 98.2 °F (36.8 °C) (05/12/22 1153)  Pulse: 84 (05/12/22 1528)  Resp: (!) 21 (05/12/22 1153)  BP: 109/78 (05/12/22 1153)  SpO2: 99 % (05/12/22 0400)   Vital Signs (24h Range):  Temp:  [97.7 °F (36.5 °C)-99.6 °F (37.6 °C)] 98.2 °F (36.8 °C)  Pulse:  [] 84  Resp:  [20-22] 21  SpO2:  [96 %-99 %] 99 %  BP: (109-185)/() 109/78     Weight: 64 kg (141 lb 1.5 oz)  Body mass index is 22.1 kg/m².  Physical Exam  Constitutional:       Appearance: Normal appearance.   Skin:     General: Skin is warm and dry.      Findings: Lesion present.   Neurological:      Mental Status: She is alert.       Laboratory:  All pertinent labs reviewed within the last 24 hours.    Diagnostic Results:  None

## 2022-05-12 NOTE — PLAN OF CARE
AMARILIS Fine       Care Management   Plan of Care      Signed   Creation Time:  5/12/2022  9:01 AM                       []Hide copied text    []Joeyver for details         05/12/22 0900   Discharge Reassessment   Assessment Type Discharge Planning Reassessment   Did the patient's condition or plan change since previous assessment? No   Discharge Plan discussed with: Caregiver   Name(s) and Number(s) Shelby Alfaro ([phone]   Communicated WALDEMAR with patient/caregiver Yes   Discharge Plan A Skilled Nursing Facility   Discharge Plan B Skilled Nursing Facility   DME Needed Upon Discharge  none   Discharge Barriers Identified None   Why the patient remains in the hospital Requires continued medical care   Post-Acute Status   Post-Acute Authorization Placement   Post-Acute Placement Status Referrals Sent   Coverage [facility] and medicare part A   Discharge Delays None known at this time      Bharat reviewing for SNF placement. Will continue to follow.     Emi Prado LMSW  [phone]

## 2022-05-13 LAB
BASOPHILS # BLD AUTO: 0.03 K/UL (ref 0–0.2)
BASOPHILS NFR BLD: 0.6 % (ref 0–1.9)
DIFFERENTIAL METHOD: ABNORMAL
EOSINOPHIL # BLD AUTO: 0.1 K/UL (ref 0–0.5)
EOSINOPHIL NFR BLD: 1.6 % (ref 0–8)
ERYTHROCYTE [DISTWIDTH] IN BLOOD BY AUTOMATED COUNT: 18.5 % (ref 11.5–14.5)
HCT VFR BLD AUTO: 50 % (ref 37–48.5)
HGB BLD-MCNC: 16 G/DL (ref 12–16)
IMM GRANULOCYTES # BLD AUTO: 0.05 K/UL (ref 0–0.04)
IMM GRANULOCYTES NFR BLD AUTO: 1 % (ref 0–0.5)
INR PPP: 3.2 (ref 0.8–1.2)
LYMPHOCYTES # BLD AUTO: 1.2 K/UL (ref 1–4.8)
LYMPHOCYTES NFR BLD: 23.4 % (ref 18–48)
MCH RBC QN AUTO: 25 PG (ref 27–31)
MCHC RBC AUTO-ENTMCNC: 32 G/DL (ref 32–36)
MCV RBC AUTO: 78 FL (ref 82–98)
MONOCYTES # BLD AUTO: 0.4 K/UL (ref 0.3–1)
MONOCYTES NFR BLD: 7.8 % (ref 4–15)
NEUTROPHILS # BLD AUTO: 3.4 K/UL (ref 1.8–7.7)
NEUTROPHILS NFR BLD: 65.6 % (ref 38–73)
NRBC BLD-RTO: 0 /100 WBC
PLATELET # BLD AUTO: 262 K/UL (ref 150–450)
PMV BLD AUTO: 12.1 FL (ref 9.2–12.9)
POCT GLUCOSE: 129 MG/DL (ref 70–110)
POCT GLUCOSE: 131 MG/DL (ref 70–110)
POCT GLUCOSE: 131 MG/DL (ref 70–110)
POCT GLUCOSE: 138 MG/DL (ref 70–110)
PROTHROMBIN TIME: 31.3 SEC (ref 9–12.5)
RBC # BLD AUTO: 6.4 M/UL (ref 4–5.4)
WBC # BLD AUTO: 5.13 K/UL (ref 3.9–12.7)

## 2022-05-13 PROCEDURE — 99232 PR SUBSEQUENT HOSPITAL CARE,LEVL II: ICD-10-PCS | Mod: ,,, | Performed by: STUDENT IN AN ORGANIZED HEALTH CARE EDUCATION/TRAINING PROGRAM

## 2022-05-13 PROCEDURE — 25000003 PHARM REV CODE 250: Performed by: INTERNAL MEDICINE

## 2022-05-13 PROCEDURE — 94761 N-INVAS EAR/PLS OXIMETRY MLT: CPT

## 2022-05-13 PROCEDURE — 99232 SBSQ HOSP IP/OBS MODERATE 35: CPT | Mod: ,,, | Performed by: STUDENT IN AN ORGANIZED HEALTH CARE EDUCATION/TRAINING PROGRAM

## 2022-05-13 PROCEDURE — 25000003 PHARM REV CODE 250: Performed by: HOSPITALIST

## 2022-05-13 PROCEDURE — 97530 THERAPEUTIC ACTIVITIES: CPT

## 2022-05-13 PROCEDURE — 36415 COLL VENOUS BLD VENIPUNCTURE: CPT | Performed by: HOSPITALIST

## 2022-05-13 PROCEDURE — 92526 ORAL FUNCTION THERAPY: CPT

## 2022-05-13 PROCEDURE — 20600001 HC STEP DOWN PRIVATE ROOM

## 2022-05-13 PROCEDURE — 85610 PROTHROMBIN TIME: CPT | Performed by: HOSPITALIST

## 2022-05-13 PROCEDURE — 85025 COMPLETE CBC W/AUTO DIFF WBC: CPT | Performed by: INTERNAL MEDICINE

## 2022-05-13 PROCEDURE — 25000003 PHARM REV CODE 250: Performed by: STUDENT IN AN ORGANIZED HEALTH CARE EDUCATION/TRAINING PROGRAM

## 2022-05-13 PROCEDURE — 92523 SPEECH SOUND LANG COMPREHEN: CPT

## 2022-05-13 RX ADMIN — ACETAMINOPHEN 1000 MG: 500 TABLET ORAL at 09:05

## 2022-05-13 RX ADMIN — SACUBITRIL AND VALSARTAN 1 TABLET: 24; 26 TABLET, FILM COATED ORAL at 09:05

## 2022-05-13 RX ADMIN — AMOXICILLIN AND CLAVULANATE POTASSIUM 400 MG: 400; 57 POWDER, FOR SUSPENSION ORAL at 09:05

## 2022-05-13 RX ADMIN — DONEPEZIL HYDROCHLORIDE 5 MG: 5 TABLET ORAL at 09:05

## 2022-05-13 RX ADMIN — HYDRALAZINE HYDROCHLORIDE 50 MG: 50 TABLET ORAL at 02:05

## 2022-05-13 RX ADMIN — HYDRALAZINE HYDROCHLORIDE 50 MG: 50 TABLET ORAL at 09:05

## 2022-05-13 RX ADMIN — HYDRALAZINE HYDROCHLORIDE 50 MG: 50 TABLET ORAL at 06:05

## 2022-05-13 RX ADMIN — FUROSEMIDE 40 MG: 40 TABLET ORAL at 09:05

## 2022-05-13 RX ADMIN — ATORVASTATIN CALCIUM 40 MG: 40 TABLET, FILM COATED ORAL at 09:05

## 2022-05-13 RX ADMIN — METOPROLOL SUCCINATE 100 MG: 100 TABLET, EXTENDED RELEASE ORAL at 09:05

## 2022-05-13 RX ADMIN — Medication 6 MG: at 09:05

## 2022-05-13 NOTE — PT/OT/SLP EVAL
Speech Language Pathology Evaluation  Cognitive Communication    Patient Name:  Temitope Suero   MRN:  [id number]  Admitting Diagnosis: Toxic encephalopathy    Recommendations:     Recommendations:                General Recommendations:  Dysphagia therapy  Diet recommendations:  Mechanical soft, Thin   Aspiration Precautions: Feed only when awake/alert, HOB to 90 degrees, Small bites/sips and Standard aspiration precautions   General Precautions: Standard, fall  Communication strategies:  provide increased time to answer    History:     Past Medical History:   Diagnosis Date    Atrial flutter 8/12/2018    Cancer of left breast, stage 2 11/24/2015    Cataract     Cerebrovascular small vessel disease 4/11/2018    Bi-thalamic lacunar disease, mod/sev periventricular white matter disease, mixed pattern cerebral microbleeds    Cervicalgia 4/17/2018    Chronic anticoagulation - apixaban 4/17/2018    Previous on Xarelto but changed to apixaban 8-2018 due to recurrent embolic stroke.    Chronic systolic heart failure 4/17/2018    Echo 4-2018   1 - Moderately depressed left ventricular systolic function (EF 30-35%).    2 - Biatrial enlargement.    3 - Normal right ventricular systolic function .    4 - Mild mitral regurgitation.    5 - Mild tricuspid regurgitation.    6 - The estimated PA systolic pressure is 34 mmHg.    7 - Increased central venous pressure.    8 - Left pleural effusion.     CKD stage 3 due to type 2 diabetes mellitus 4/17/2018    Embolic stroke involving left cerebellar artery 8/13/2018    Embolic stroke involving right posterior cerebral artery 4/11/2018    Encephalopathy 8/13/2018    Essential hypertension 10/28/2013    Glaucoma     Glaucoma suspect of both eyes 12/9/2013    Hearing loss, sensorineural 12/16/2013    Malignant hypertension 8/12/2018    Mixed hyperlipidemia 10/28/2013    Obesity 1/16/2014    Paroxysmal atrial fibrillation 7/10/2012    Stage 3 chronic kidney disease  4/17/2018    Toxic multinodular goiter 10/28/2013    Type 2 diabetes mellitus with stage 3 chronic kidney disease, without long-term current use of insulin 7/10/2012    Diet controlled.   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        Subjective     Awake/confused    Pain/Comfort:  · Pain Rating 1: 0/10  · Pain Rating Post-Intervention 1: 0/10    Respiratory Status: Room air    Objective:   Cognitive Status:    Attention Divided attention deficit    Orientation Person and Place  Problem Solving Conclusions 100%, categories 1/3      Receptive Language:   Comprehension:   Questions Complex yes/no 100%  Commands  complex/abstract commands TBA    Expressive Language:  Verbal:    Verbal language skills were wfl with no evidence of aphasia.  Pt. Expressed their thoughts coherently in conversation with no evidence of confusion or word finding deficits  Nonverbal:   WFL      Motor Speech:  WFL    Voice:   WFL    Visual-Spatial:  TBA    Reading:   TBA     Written Expression:   TBA    Treatment: Pt repositioned upright in bed for PO trials. She tolerated nisha cracker x3 and thin liquids x4 with timely swallow initiation, mild prolonged mastication and no overt clinical s/s of airway compromise. Recommend mechanical soft diet/thin liquids at this time.     Assessment:   Temitope Suero is a 82 y.o. female with an SLP diagnosis of Dysphagia and Cognitive-Linguistic Impairment.      Goals:   Multidisciplinary Problems     SLP Goals        Problem: SLP    Goal Priority Disciplines Outcome   SLP Goal     SLP Ongoing, Progressing   Description: Speech-Language Pathology Goals  Goals to be met by 5/13/22  1. Patient will participate in ongoing swallow assessment in order to determine safest least restrictive diet  2. Patient will  participate in a speech/language/cog eval.   3. Pt will complete problem solving tasks with 90% accy                    Plan:   · Patient to be seen:  3 x/week   · Plan of Care expires:  06/05/22  · Plan of Care reviewed with:  patient, family   · SLP Follow-Up:  Yes       Discharge recommendations:  Discharge Facility/Level of Care Needs: nursing facility, skilled       Time Tracking:   SLP Treatment Date:   05/13/22  Speech Start Time:  0811  Speech Stop Time:  0824     Speech Total Time (min):  13 min    Billable Minutes: Eval 6  and Treatment Swallowing Dysfunction 7    05/13/2022

## 2022-05-13 NOTE — CONSULTS
PHARMACY CONSULT NOTE: WARFARIN  Temitope Suero is a 82 y.o. female on warfarin therapy for left ventricular mural thrombus. PharmD has been consulted for warfarin dosing.    Assessment/Plan    Lab Results   Component Value Date    INR 3.2 (H) 05/13/2022    INR 2.3 (H) 05/12/2022    INR 1.8 (H) 05/11/2022     Likely Augmentin driving rapid INR increase    1. Hold warfarin today, restart 2.5 mg PO daily once INR < 3  2. Goal INR 2-3  3. I have sent referral to [facility].    Thank you for the consult, will continue to follow  Reddy Stauffer Pharm.D., BCPS  [id number]      **Note: Consults are reviewed Monday-Friday 7:00am-3:30pm. THE ABOVE RECOMMENDATIONS ARE ONLY SUGGESTED.THE RECOMMENDATIONS SHOULD BE CONSIDERED IN CONJUNCTION WITH ALL PATIENT FACTORS. **

## 2022-05-13 NOTE — PLAN OF CARE
Pt alert to self, follows command,improve mentation.  Medicated for pain effective. Safety measures telesitter and bed alarm in place, call light in reach.          Problem: Adult Inpatient Plan of Care  Goal: Plan of Care Review  Outcome: Ongoing, Progressing  Goal: Patient-Specific Goal (Individualized)  Outcome: Ongoing, Progressing  Goal: Absence of Hospital-Acquired Illness or Injury  Outcome: Ongoing, Progressing  Goal: Optimal Comfort and Wellbeing  Outcome: Ongoing, Progressing  Goal: Readiness for Transition of Care  Outcome: Ongoing, Progressing

## 2022-05-13 NOTE — PT/OT/SLP PROGRESS
"Occupational Therapy   Treatment    Name: Temitope Suero  MRN: [id number]  Admitting Diagnosis:  Toxic encephalopathy       Recommendations:     Discharge Recommendations: nursing facility, skilled  Discharge Equipment Recommendations:  other (see comments) (TBD)  Barriers to discharge:  Other (Comment) (increased assistance required)    Assessment:     Temitope Suero is a 82 y.o. female with a medical diagnosis of Toxic encephalopathy.  She presents with the following performance deficits affecting function:  impaired balance, weakness, impaired endurance, impaired self care skills, impaired functional mobilty, gait instability, impaired cognition, decreased upper extremity function, decreased lower extremity function, decreased safety awareness, decreased coordination.     Pt agreeable to therapy, and presenting as pleasantly confused throughout the session. Pt trying to return to supine while sitting EOB and required multiple Vcs to participate in EOB activities. Pt still requires Max A to stand. Pt would benefit from continued skilled acute OT services in order to maximize independence and safety with ADLs and functional mobility to ensure safe return to PLOF in the least restrictive environment. OT recommending SNF once pt is medically appropriate for d/c.       Rehab Prognosis:  Fair; patient would benefit from acute skilled OT services to address these deficits and reach maximum level of function.       Plan:     Patient to be seen 4 x/week to address the above listed problems via therapeutic activities, self-care/home management, therapeutic exercises  Plan of Care Expires: 06/05/22  Plan of Care Reviewed with: patient    Subjective     "I am ok"     Pain/Comfort:  Pain Rating 1: 0/10    Objective:     Communicated with: RN prior to session.  Patient found HOB elevated with bed alarm, blood pressure cuff, pulse ox (continuous), telemetry, peripheral IV, Other (comments) ([employer]) upon OT entry to room.    General " Precautions: Standard, fall   Orthopedic Precautions:N/A   Braces: N/A  Respiratory Status: Room air     Occupational Performance:     Bed Mobility:    Patient completed Rolling/Turning to Right with maximal assistance  Patient completed Scooting/Bridging with maximal assistance  Patient completed Supine to Sit with moderate assistance  Patient completed Sit to Supine with maximal assistance     Functional Mobility/Transfers:  Patient completed Sit <> Stand Transfer with maximal assistance  with  hand-held assist   Pt unable to come to complete stand     Activities of Daily Living:  Feeding:  moderate assistance : To take a sip of water. Pt required [place] A to bring to mouth.     Therapeutic Exercise:   Performed alternating punches for 2x10 with max verbal cues required to participate       [facility] 6 Click ADL: 12    Treatment & Education:  Therapist provided facilitation and instruction of proper body mechanics and fall prevention strategies during tasks listed above.  Instructed patient to sit in bedside chair daily to increase OOB/activity tolerance.  Instructed patient to use call light to have nursing staff assist with needs/transfers.  Discussed OT POC and answered all questions within OT scope of practice.  Whiteboard updated       Patient left HOB elevated with all lines intact and call button in reach    GOALS:   Multidisciplinary Problems       Occupational Therapy Goals          Problem: Occupational Therapy    Goal Priority Disciplines Outcome Interventions   Occupational Therapy Goal     OT, PT/OT Ongoing, Progressing    Description: Goals to be met by: 5/23/22     Patient will increase functional independence with ADLs by performing:    Feeding with [place].  UE Dressing with Minimal Assistance.  LE Dressing with Minimal Assistance.  Grooming while standing with Minimal Assistance.  Toileting from toilet with Minimal Assistance for hygiene and clothing management.   Toilet transfer to toilet with  Minimal Assistance.  Increased functional strength to WFL for increased participation in ADLs.                         Time Tracking:     OT Date of Treatment: 05/13/22  OT Start Time: 1148  OT Stop Time: 1205  OT Total Time (min): 17 min    Billable Minutes:Therapeutic Activity 17    OT/SILVIA: OT          5/13/2022

## 2022-05-14 LAB
ALBUMIN SERPL BCP-MCNC: 2.8 G/DL (ref 3.5–5.2)
ALP SERPL-CCNC: 124 U/L (ref 55–135)
ALT SERPL W/O P-5'-P-CCNC: 34 U/L (ref 10–44)
ANION GAP SERPL CALC-SCNC: 11 MMOL/L (ref 8–16)
AST SERPL-CCNC: 41 U/L (ref 10–40)
BASOPHILS # BLD AUTO: 0.03 K/UL (ref 0–0.2)
BASOPHILS NFR BLD: 0.5 % (ref 0–1.9)
BILIRUB SERPL-MCNC: 0.5 MG/DL (ref 0.1–1)
BUN SERPL-MCNC: 21 MG/DL (ref 8–23)
CALCIUM SERPL-MCNC: 8.9 MG/DL (ref 8.7–10.5)
CHLORIDE SERPL-SCNC: 102 MMOL/L (ref 95–110)
CO2 SERPL-SCNC: 24 MMOL/L (ref 23–29)
CREAT SERPL-MCNC: 1.6 MG/DL (ref 0.5–1.4)
DIFFERENTIAL METHOD: ABNORMAL
EOSINOPHIL # BLD AUTO: 0.1 K/UL (ref 0–0.5)
EOSINOPHIL NFR BLD: 0.9 % (ref 0–8)
ERYTHROCYTE [DISTWIDTH] IN BLOOD BY AUTOMATED COUNT: 17 % (ref 11.5–14.5)
EST. GFR  (AFRICAN AMERICAN): 34.6 ML/MIN/1.73 M^2
EST. GFR  (NON AFRICAN AMERICAN): 30 ML/MIN/1.73 M^2
GLUCOSE SERPL-MCNC: 177 MG/DL (ref 70–110)
HCT VFR BLD AUTO: 44.9 % (ref 37–48.5)
HGB BLD-MCNC: 13.5 G/DL (ref 12–16)
IMM GRANULOCYTES # BLD AUTO: 0.02 K/UL (ref 0–0.04)
IMM GRANULOCYTES NFR BLD AUTO: 0.3 % (ref 0–0.5)
INR PPP: 2.7 (ref 0.8–1.2)
LYMPHOCYTES # BLD AUTO: 1 K/UL (ref 1–4.8)
LYMPHOCYTES NFR BLD: 15.5 % (ref 18–48)
MCH RBC QN AUTO: 24.6 PG (ref 27–31)
MCHC RBC AUTO-ENTMCNC: 30.1 G/DL (ref 32–36)
MCV RBC AUTO: 82 FL (ref 82–98)
MONOCYTES # BLD AUTO: 0.6 K/UL (ref 0.3–1)
MONOCYTES NFR BLD: 8.8 % (ref 4–15)
NEUTROPHILS # BLD AUTO: 4.8 K/UL (ref 1.8–7.7)
NEUTROPHILS NFR BLD: 74 % (ref 38–73)
NRBC BLD-RTO: 0 /100 WBC
PLATELET # BLD AUTO: 306 K/UL (ref 150–450)
PMV BLD AUTO: 12.1 FL (ref 9.2–12.9)
POCT GLUCOSE: 130 MG/DL (ref 70–110)
POCT GLUCOSE: 162 MG/DL (ref 70–110)
POCT GLUCOSE: 179 MG/DL (ref 70–110)
POCT GLUCOSE: 95 MG/DL (ref 70–110)
POTASSIUM SERPL-SCNC: 3.7 MMOL/L (ref 3.5–5.1)
PROT SERPL-MCNC: 5.7 G/DL (ref 6–8.4)
PROTHROMBIN TIME: 26.6 SEC (ref 9–12.5)
RBC # BLD AUTO: 5.48 M/UL (ref 4–5.4)
SODIUM SERPL-SCNC: 137 MMOL/L (ref 136–145)
WBC # BLD AUTO: 6.51 K/UL (ref 3.9–12.7)

## 2022-05-14 PROCEDURE — 25000003 PHARM REV CODE 250: Performed by: HOSPITALIST

## 2022-05-14 PROCEDURE — 85025 COMPLETE CBC W/AUTO DIFF WBC: CPT | Performed by: INTERNAL MEDICINE

## 2022-05-14 PROCEDURE — 25000003 PHARM REV CODE 250: Performed by: STUDENT IN AN ORGANIZED HEALTH CARE EDUCATION/TRAINING PROGRAM

## 2022-05-14 PROCEDURE — 85610 PROTHROMBIN TIME: CPT | Performed by: HOSPITALIST

## 2022-05-14 PROCEDURE — 25000003 PHARM REV CODE 250: Performed by: INTERNAL MEDICINE

## 2022-05-14 PROCEDURE — 36415 COLL VENOUS BLD VENIPUNCTURE: CPT | Performed by: HOSPITALIST

## 2022-05-14 PROCEDURE — 80053 COMPREHEN METABOLIC PANEL: CPT | Performed by: STUDENT IN AN ORGANIZED HEALTH CARE EDUCATION/TRAINING PROGRAM

## 2022-05-14 PROCEDURE — 93010 ELECTROCARDIOGRAM REPORT: CPT | Mod: ,,, | Performed by: INTERNAL MEDICINE

## 2022-05-14 PROCEDURE — 99233 PR SUBSEQUENT HOSPITAL CARE,LEVL III: ICD-10-PCS | Mod: ,,, | Performed by: STUDENT IN AN ORGANIZED HEALTH CARE EDUCATION/TRAINING PROGRAM

## 2022-05-14 PROCEDURE — 93010 EKG 12-LEAD: ICD-10-PCS | Mod: ,,, | Performed by: INTERNAL MEDICINE

## 2022-05-14 PROCEDURE — 93005 ELECTROCARDIOGRAM TRACING: CPT

## 2022-05-14 PROCEDURE — 63600175 PHARM REV CODE 636 W HCPCS: Performed by: STUDENT IN AN ORGANIZED HEALTH CARE EDUCATION/TRAINING PROGRAM

## 2022-05-14 PROCEDURE — 99233 SBSQ HOSP IP/OBS HIGH 50: CPT | Mod: ,,, | Performed by: STUDENT IN AN ORGANIZED HEALTH CARE EDUCATION/TRAINING PROGRAM

## 2022-05-14 PROCEDURE — 20600001 HC STEP DOWN PRIVATE ROOM

## 2022-05-14 RX ORDER — SODIUM CHLORIDE 9 MG/ML
INJECTION, SOLUTION INTRAVENOUS CONTINUOUS
Status: ACTIVE | OUTPATIENT
Start: 2022-05-14 | End: 2022-05-14

## 2022-05-14 RX ORDER — HALOPERIDOL 5 MG/ML
5 INJECTION INTRAMUSCULAR ONCE
Status: DISCONTINUED | OUTPATIENT
Start: 2022-05-14 | End: 2022-05-14

## 2022-05-14 RX ORDER — HALOPERIDOL 5 MG/ML
5 INJECTION INTRAMUSCULAR ONCE
Status: COMPLETED | OUTPATIENT
Start: 2022-05-14 | End: 2022-05-14

## 2022-05-14 RX ORDER — SODIUM CHLORIDE 9 MG/ML
INJECTION, SOLUTION INTRAVENOUS CONTINUOUS
Status: DISCONTINUED | OUTPATIENT
Start: 2022-05-14 | End: 2022-05-14

## 2022-05-14 RX ORDER — TRAZODONE HYDROCHLORIDE 50 MG/1
50 TABLET ORAL NIGHTLY PRN
Status: DISCONTINUED | OUTPATIENT
Start: 2022-05-14 | End: 2022-05-15

## 2022-05-14 RX ORDER — WARFARIN 2.5 MG/1
2.5 TABLET ORAL DAILY
Status: DISCONTINUED | OUTPATIENT
Start: 2022-05-14 | End: 2022-05-16

## 2022-05-14 RX ADMIN — METOPROLOL SUCCINATE 100 MG: 100 TABLET, EXTENDED RELEASE ORAL at 08:05

## 2022-05-14 RX ADMIN — FUROSEMIDE 40 MG: 40 TABLET ORAL at 08:05

## 2022-05-14 RX ADMIN — ATORVASTATIN CALCIUM 40 MG: 40 TABLET, FILM COATED ORAL at 08:05

## 2022-05-14 RX ADMIN — HYDRALAZINE HYDROCHLORIDE 50 MG: 50 TABLET ORAL at 01:05

## 2022-05-14 RX ADMIN — WARFARIN SODIUM 2.5 MG: 2.5 TABLET ORAL at 05:05

## 2022-05-14 RX ADMIN — SODIUM CHLORIDE: 0.9 INJECTION, SOLUTION INTRAVENOUS at 02:05

## 2022-05-14 RX ADMIN — ACETAMINOPHEN 1000 MG: 500 TABLET ORAL at 05:05

## 2022-05-14 RX ADMIN — HYDRALAZINE HYDROCHLORIDE 50 MG: 50 TABLET ORAL at 09:05

## 2022-05-14 RX ADMIN — Medication 6 MG: at 08:05

## 2022-05-14 RX ADMIN — AMOXICILLIN AND CLAVULANATE POTASSIUM 400 MG: 400; 57 POWDER, FOR SUSPENSION ORAL at 08:05

## 2022-05-14 RX ADMIN — HALOPERIDOL LACTATE 5 MG: 5 INJECTION, SOLUTION INTRAMUSCULAR at 02:05

## 2022-05-14 RX ADMIN — DONEPEZIL HYDROCHLORIDE 5 MG: 5 TABLET ORAL at 08:05

## 2022-05-14 RX ADMIN — SACUBITRIL AND VALSARTAN 1 TABLET: 24; 26 TABLET, FILM COATED ORAL at 08:05

## 2022-05-14 RX ADMIN — HYDRALAZINE HYDROCHLORIDE 50 MG: 50 TABLET ORAL at 06:05

## 2022-05-14 RX ADMIN — TRAZODONE HYDROCHLORIDE 50 MG: 50 TABLET ORAL at 08:05

## 2022-05-14 NOTE — ASSESSMENT & PLAN NOTE
· Creatinine 2.2 on admit, baseline closer to 1.3 - 1.6 today  · Possibly infection induced and Lasix/Benazepril  · Caution with IVF given CHF  · Renal US notable for chronic medical renal disease and 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  · -given 250 cc IV fluid bolus considering that patient appears dry on exam however will continue Lasix

## 2022-05-14 NOTE — PLAN OF CARE
Pt alert to self mentation continue to improve, medicated for pain this shift effective. Telesitter and bed alarm in place. Call light within reach.          Problem: Adult Inpatient Plan of Care  Goal: Plan of Care Review  Outcome: Ongoing, Progressing  Goal: Patient-Specific Goal (Individualized)  Outcome: Ongoing, Progressing  Goal: Absence of Hospital-Acquired Illness or Injury  Outcome: Ongoing, Progressing  Goal: Optimal Comfort and Wellbeing  Outcome: Ongoing, Progressing  Goal: Readiness for Transition of Care  Outcome: Ongoing, Progressing     Problem: Diabetes Comorbidity  Goal: Blood Glucose Level Within Targeted Range  Outcome: Ongoing, Progressing     Problem: Fluid and Electrolyte Imbalance (Acute Kidney Injury/Impairment)  Goal: Fluid and Electrolyte Balance  Outcome: Ongoing, Progressing

## 2022-05-14 NOTE — PROGRESS NOTES
[facility] - Int[facility] Care ([place] [id number])  [facility]  Progress Note    Patient Name: Temitope Suero  MRN: [id number]  Patient Class: IP- Inpatient   Admission Date: 5/5/2022  Length of Stay: 8 days  Attending Physician: Mirella Delgado MD  Primary Care Provider: Gm Zambrano MD        Subjective:     Principal Problem:Toxic encephalopathy        HPI:  Ms. Temitope Suero is a 82 y.o. female who presents to the ER by family for evaluation of a fall, AMS, and increased work of breath.  History is obtained from family Shelby, as patient is confused.  She reports that over the last 2 does - when she does talk, she wouldn't make sense.  She normally talks well and has fluent speech, but she does have some memory loss from an old stroke.  She has been having a dry cough, no fevers or chills - but has reported foul smelling urine.  She reports decreased PO intake the last week and that her mouth has been dry.  She did have a fall, but didn't hit her head.  She normally ambulates without DME.    Upon arrival to the ER, vitals were temp 98.4F, , /70 satting 96% on room air.  CXR showed a LLL PNA.  Head CT showed chronic ischemic changes.  Labs were notable for Trop 0.322, Creatinine 2.2, and Lactic 2.3.  She was given Vanc and Ceftriaxone and was admitted to Hospital Medicine.      Overview/Hospital Course:  Ms. Suero was admitted to Hospital Medicine for management of a toxic encephalopathy 2/2 LLL pneumonia.  She was started on Ceftriaxone/Azithro.  Her mentation was slow to improve.  SLP evaluated and cleared for diet.  She was given small IVF boluses for her MAC.  On the morning of 5/6, she was found to not be swallowing her food and chewing for a prolonged time.  She was made NPO.  She had a rapid response for diaphoresis and increased work of breathing.  Antibiotics were changed to Zosyn with concern for aspiration.  On 5/8, her mentation worsened again and she was not speaking or following commands.  2D  echo was ordered that showed a large, solid LV thrombus.  Head CT was ordered to r/o a bleed prior to starting Heparin which was negative.  She was started on Heparin.  The following morning, she found to be in new onset atrial flutter with HR 120s.  Cardiology was consulted.  Coreg was changed to Metoprolol and Entresto/Hydralazine were added.  Vascular Neurology was consulted given persistent mentation changes and new LV thrombus with concern for an acute ischemia event.  She was started on Warfarin, goal INR 2-3.  MRI brain was negative for a stroke.  Her mentation continued to improve.  PT/OT say SNF.      Interval History: No acute events overnight.  Patient doing well and HD stable.       Objective:     Vital Signs (Most Recent):  Temp: 97 °F (36.1 °C) (05/13/22 1648)  Pulse: 95 (05/13/22 1648)  Resp: 18 (05/13/22 1648)  BP: (!) 132/90 (05/13/22 1648)  SpO2: 97 % (05/13/22 1648)   Vital Signs (24h Range):  Temp:  [96 °F (35.6 °C)-98.2 °F (36.8 °C)] 97 °F (36.1 °C)  Pulse:  [] 95  Resp:  [18] 18  SpO2:  [94 %-100 %] 97 %  BP: (115-155)/() 132/90     Weight: 64 kg (141 lb 1.5 oz)  Body mass index is 22.1 kg/m².  No intake or output data in the 24 hours ending 05/13/22 1910     Physical Exam  Constitutional:       Appearance: Normal appearance. She is well-developed.   HENT:      Head: Normocephalic and atraumatic.   Cardiovascular:      Rate and Rhythm: Normal rate and regular rhythm.      Heart sounds: No murmur heard.  Pulmonary:      Effort: Pulmonary effort is normal. No respiratory distress.      Breath sounds: Normal breath sounds. No wheezing or rales.   Abdominal:      General: There is no distension.      Palpations: Abdomen is soft.      Tenderness: There is no abdominal tenderness.   Musculoskeletal:         General: No deformity.   Skin:     General: Skin is warm.       Significant Labs: All pertinent labs within the past 24 hours have been reviewed.      Significant Imaging: I have  reviewed all pertinent imaging results/findings within the past 24 hours.      Assessment/Plan:      * Toxic encephalopathy  · Likely 2/2 pneumonia, more suspicious for CVA given finding of LV thrombus  · Head CT and MRI brain both negative for acute ischemic event  · Vascular Neurology consulted  · SLP cleared for diet  · EEG negative for seizures  · Mentation continues to improve    Moderate malnutrition  Nutrition consulted. Most recent weight and BMI monitored-          Malnutrition (Moderate to Severe)  Weight Loss (Malnutrition): 7.5% in 3 months              Measurements:  Wt Readings from Last 1 Encounters:   05/09/22 64 kg (141 lb 1.5 oz)   Body mass index is 22.1 kg/m².    Recommendations: Recommendation/Intervention: 1.  Goals: Meet % EEN, EPN by RD f/u date    Patient has been screened and assessed by RD. RD will follow patient.      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  · As above      Left lower lobe pneumonia  · CXR with LLL PNA  · Satting in the 90s on room air  · Afebrile and without leukocytosis but Lactic 2.3 on admission that trended down  · Started on Ceftriaxone and Azithro, switched to Zosyn on 5/6 and then to Augmentin to complete a 10 day course      NSTEMI (non-ST elevated myocardial infarction)  · Trop 0.322 on admit and flat  · No reports of chest pain  · EKG not ischemic  · Likely demand 2/2 infection  · Monitor      Acute kidney injury superimposed on CKD  · Creatinine 2.2 on admit, baseline closer to 1.3 - 1.7 today  · Possibly infection induced and Lasix/Benazepril  · Caution with IVF given CHF  · Renal US notable for chronic medical renal disease and probable  angiomyolipoma    Type 2 diabetes mellitus with stage 3 chronic kidney disease, without long-term current use of insulin  · HbA1c 7  · Home DM regimen:  Diet controlled  · SSI with POCT accuchecks to achieve blood glucose of 140-180 while hospitalized  · Diabetic diet, purred and nectar thick per SLP        Deterioration of memory  · Chronic issue  · Continue Aricept      Chronic systolic heart failure  · TTE on 5/6 shows EF 25-30%, G3DD, severe LAE, normal CVP, large solid LV thrombus  · Continue BB  · Cards started Entresto  · Monitor volume status closely    Chronic anticoagulation  · Was on Xarelto and changed to Eliquis in 2018 due to recurrent embolic strokes  · Now with a new large solid LV thrombus  · Continue Heparin with Warfarin bridge  · Pharmacy consulted for dosing      Stage 3 chronic kidney disease  · Chronic issue  · See MAC below      History of stroke  · Chronic and stable  · Head CT and MRI without acute infarct  · Continue Lipitor  · Vascular Neuro consult as above      Mixed hyperlipidemia  · Chronic and stable  · Continue Lipitor 40mg PO daily      Paroxysmal atrial fibrillation  · Chronic issue but now with new onset atrial flutter  · Anticoagulation as above  · Continue BB        VTE Risk Mitigation (From admission, onward)    None          Discharge Planning   WALDEMAR: 5/17/2022     Code Status: Full Code   Is the patient medically ready for discharge?: Yes    Reason for patient still in hospital (select all that apply): Pending disposition  Discharge Plan A: Skilled Nursing Facility   Discharge Delays: None known at this time              Mirella Delgado MD  Department of Hospital Medicine   [facility] - Intensive Care ([place]-14)

## 2022-05-14 NOTE — SUBJECTIVE & OBJECTIVE
Interval History: No acute events overnight.  Patient doing well and HD stable.       Objective:     Vital Signs (Most Recent):  Temp: 97 °F (36.1 °C) (05/13/22 1648)  Pulse: 95 (05/13/22 1648)  Resp: 18 (05/13/22 1648)  BP: (!) 132/90 (05/13/22 1648)  SpO2: 97 % (05/13/22 1648)   Vital Signs (24h Range):  Temp:  [96 °F (35.6 °C)-98.2 °F (36.8 °C)] 97 °F (36.1 °C)  Pulse:  [] 95  Resp:  [18] 18  SpO2:  [94 %-100 %] 97 %  BP: (115-155)/() 132/90     Weight: 64 kg (141 lb 1.5 oz)  Body mass index is 22.1 kg/m².  No intake or output data in the 24 hours ending 05/13/22 1910     Physical Exam  Constitutional:       Appearance: Normal appearance. She is well-developed.   HENT:      Head: Normocephalic and atraumatic.   Cardiovascular:      Rate and Rhythm: Normal rate and regular rhythm.      Heart sounds: No murmur heard.  Pulmonary:      Effort: Pulmonary effort is normal. No respiratory distress.      Breath sounds: Normal breath sounds. No wheezing or rales.   Abdominal:      General: There is no distension.      Palpations: Abdomen is soft.      Tenderness: There is no abdominal tenderness.   Musculoskeletal:         General: No deformity.   Skin:     General: Skin is warm.       Significant Labs: All pertinent labs within the past 24 hours have been reviewed.      Significant Imaging: I have reviewed all pertinent imaging results/findings within the past 24 hours.

## 2022-05-14 NOTE — NURSING
Patient very agitated today, trying to get out of bed. ADL's care provided. MD made aware, new orders obtained and IV fluid to be started.

## 2022-05-14 NOTE — NURSING
Haldol given for agitation, not successful. Patient keep trying to get out of bed several times, even after re-direction. Patient seems to be very confused. Continues to be monitor by  Tele sitter. safety precautions in place. Call light within reach.

## 2022-05-14 NOTE — SUBJECTIVE & OBJECTIVE
Interval History:  Patient is slightly agitated today.  Trying to get out of bed, trying to pull off leads.  Nurse assessment felt like was dry on exam.  She is still receiving Lasix.  Will give gentle IV fluids 250 cc bolus.  One time dose of Haldol given for agitation.  Follow-up EKG for QTC      Objective:     Vital Signs (Most Recent):  Temp: 97.7 °F (36.5 °C) (05/14/22 1115)  Pulse: 99 (05/14/22 0832)  Resp: 18 (05/13/22 1648)  BP: 138/87 (05/14/22 1345)  SpO2: 100 % (05/13/22 2301)   Vital Signs (24h Range):  Temp:  [97 °F (36.1 °C)-97.8 °F (36.6 °C)] 97.7 °F (36.5 °C)  Pulse:  [87-99] 99  Resp:  [18] 18  SpO2:  [97 %-100 %] 100 %  BP: (119-152)/(76-98) 138/87     Weight: 64 kg (141 lb 1.5 oz)  Body mass index is 22.1 kg/m².  No intake or output data in the 24 hours ending 05/14/22 1429     Physical Exam  Constitutional:       Appearance: Normal appearance. She is well-developed. Slightly dehydrated appearing and agitated  HENT:      Head: Normocephalic and atraumatic.   Cardiovascular:      Rate and Rhythm: Normal rate and regular rhythm.      Heart sounds: No murmur heard.  Pulmonary:      Effort: Pulmonary effort is normal. No respiratory distress.      Breath sounds: Normal breath sounds. No wheezing or rales.   Abdominal:      General: There is no distension.      Palpations: Abdomen is soft.      Tenderness: There is no abdominal tenderness.   Musculoskeletal:         General: No deformity.   Skin:     General: Skin is warm.       Significant Labs: All pertinent labs within the past 24 hours have been reviewed.      Significant Imaging: I have reviewed all pertinent imaging results/findings within the past 24 hours.

## 2022-05-14 NOTE — PROGRESS NOTES
[facility] - Intensive Care ([place] [address])  [facility] Medicine  Progress Note    Patient Name: Temitope Suero  MRN: [id number]  Patient Class: IP- Inpatient   Admission Date: 5/5/2022  Length of Stay: 9 days  Attending Physician: Mirella Delgado MD  Primary Care Provider: Gm Zambrano MD        Subjective:     Principal Problem:Toxic encephalopathy        HPI:  Ms. Temitope Suero is a 82 y.o. female who presents to the ER by family for evaluation of a fall, AMS, and increased work of breath.  History is obtained from family Shelby, as patient is confused.  She reports that over the last 2 does - when she does talk, she wouldn't make sense.  She normally talks well and has fluent speech, but she does have some memory loss from an old stroke.  She has been having a dry cough, no fevers or chills - but has reported foul smelling urine.  She reports decreased PO intake the last week and that her mouth has been dry.  She did have a fall, but didn't hit her head.  She normally ambulates without DME.    Upon arrival to the ER, vitals were temp 98.4F, , /70 satting 96% on room air.  CXR showed a LLL PNA.  Head CT showed chronic ischemic changes.  Labs were notable for Trop 0.322, Creatinine 2.2, and Lactic 2.3.  She was given Vanc and Ceftriaxone and was admitted to Hospital Medicine.      Overview/Hospital Course:  Ms. Suero was admitted to Hospital Medicine for management of a toxic encephalopathy 2/2 LLL pneumonia.  She was started on Ceftriaxone/Azithro.  Her mentation was slow to improve.  SLP evaluated and cleared for diet.  She was given small IVF boluses for her MAC.  On the morning of 5/6, she was found to not be swallowing her food and chewing for a prolonged time.  She was made NPO.  She had a rapid response for diaphoresis and increased work of breathing.  Antibiotics were changed to Zosyn with concern for aspiration.  On 5/8, her mentation worsened again and she was not speaking or following commands.  2D  echo was ordered that showed a large, solid LV thrombus.  Head CT was ordered to r/o a bleed prior to starting Heparin which was negative.  She was started on Heparin.  The following morning, she found to be in new onset atrial flutter with HR 120s.  Cardiology was consulted.  Coreg was changed to Metoprolol and Entresto/Hydralazine were added.  Vascular Neurology was consulted given persistent mentation changes and new LV thrombus with concern for an acute ischemia event.  She was started on Warfarin, goal INR 2-3.  MRI brain was negative for a stroke.  Her mentation continued to improve.  PT/OT say SNF.      Interval History:  Patient is slightly agitated today.  Trying to get out of bed, trying to pull off leads.  Nurse assessment felt like was dry on exam.  She is still receiving Lasix.  Will give gentle IV fluids 250 cc bolus.  One time dose of Haldol given for agitation.  Follow-up EKG for QTC      Objective:     Vital Signs (Most Recent):  Temp: 97.7 °F (36.5 °C) (05/14/22 1115)  Pulse: 99 (05/14/22 0832)  Resp: 18 (05/13/22 1648)  BP: 138/87 (05/14/22 1345)  SpO2: 100 % (05/13/22 2301)   Vital Signs (24h Range):  Temp:  [97 °F (36.1 °C)-97.8 °F (36.6 °C)] 97.7 °F (36.5 °C)  Pulse:  [87-99] 99  Resp:  [18] 18  SpO2:  [97 %-100 %] 100 %  BP: (119-152)/(76-98) 138/87     Weight: 64 kg (141 lb 1.5 oz)  Body mass index is 22.1 kg/m².  No intake or output data in the 24 hours ending 05/14/22 1429     Physical Exam  Constitutional:       Appearance: Normal appearance. She is well-developed. Slightly dehydrated appearing and agitated  HENT:      Head: Normocephalic and atraumatic.   Cardiovascular:      Rate and Rhythm: Normal rate and regular rhythm.      Heart sounds: No murmur heard.  Pulmonary:      Effort: Pulmonary effort is normal. No respiratory distress.      Breath sounds: Normal breath sounds. No wheezing or rales.   Abdominal:      General: There is no distension.      Palpations: Abdomen is soft.       Tenderness: There is no abdominal tenderness.   Musculoskeletal:         General: No deformity.   Skin:     General: Skin is warm.       Significant Labs: All pertinent labs within the past 24 hours have been reviewed.      Significant Imaging: I have reviewed all pertinent imaging results/findings within the past 24 hours.      Assessment/Plan:      * Toxic encephalopathy  · Likely 2/2 pneumonia, more suspicious for CVA given finding of LV thrombus  · Head CT and MRI brain both negative for acute ischemic event  · Vascular Neurology consulted  · SLP cleared for diet  · EEG negative for seizures  · Mentation continues to improve, however today she is more agitated and renal function is slightly worse, given 1 time dose of IV Haldol    Moderate malnutrition  Nutrition consulted. Most recent weight and BMI monitored-          Malnutrition (Moderate to Severe)  Weight Loss (Malnutrition): 7.5% in 3 months              Measurements:  Wt Readings from Last 1 Encounters:   05/09/22 64 kg (141 lb 1.5 oz)   Body mass index is 22.1 kg/m².    Recommendations: Recommendation/Intervention: 1.  Goals: Meet % EEN, EPN by RD f/u date    Patient has been screened and assessed by RD. RD will follow patient.      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  · As above      Left lower lobe pneumonia  · CXR with LLL PNA  · Satting in the 90s on room air  · Afebrile and without leukocytosis but Lactic 2.3 on admission that trended down  · Started on Ceftriaxone and Azithro, switched to Zosyn on 5/6 and then to Augmentin to complete a 10 day course, end date on 5/15/22      NSTEMI (non-ST elevated myocardial infarction)  · Trop 0.322  on admit and flat  · No reports of chest pain  · EKG not ischemic  · Likely demand 2/2 infection  · Monitor      Acute kidney injury superimposed on CKD  · Creatinine 2.2 on admit, baseline closer to 1.3 - 1.6 today  · Possibly infection induced and Lasix/Benazepril  · Caution with IVF given CHF  · Renal US notable for chronic medical renal disease and 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  · -given 250 cc IV fluid bolus considering that patient appears dry on exam however will continue Lasix    Type 2 diabetes mellitus with stage 3 chronic kidney disease, without long-term current use of insulin  · HbA1c 7  · Home DM regimen:  Diet controlled  · SSI with POCT accuchecks to achieve blood glucose of 140-180 while hospitalized  · Diabetic diet, purred and nectar thick per SLP        Deterioration of memory  · Chronic issue  · Continue Aricept      Chronic systolic heart failure  · TTE on 5/6 shows EF 25-30%, G3DD, severe LAE, normal CVP, large solid LV thrombus  · Continue BB  · Cards started Entresto  · Monitor volume status closely    Chronic anticoagulation  · Was on Xarelto and changed to Eliquis in 2018 due to recurrent embolic strokes  · Now with a new large solid LV thrombus  · Continue Heparin with Warfarin bridge  · Pharmacy consulted for dosing      Stage 3 chronic kidney disease  · Chronic issue  · See MAC below      History of stroke  · Chronic and stable  · Head CT and MRI without acute infarct  · Continue Lipitor  · Vascular Neuro consult as above      Mixed hyperlipidemia  · Chronic and stable  · Continue Lipitor 40mg PO daily      Paroxysmal atrial fibrillation  · Chronic issue but now with new onset atrial flutter  · Anticoagulation as above  · Continue BB        VTE Risk Mitigation (From admission, onward)         Ordered     warfarin (COUMADIN) tablet 2.5 mg  Daily         05/14/22 1306                Discharge Planning   WALDEMAR: 5/17/2022     Code Status: Full Code   Is the patient medically ready for discharge?:  Yes    Reason for patient still in hospital (select all that apply): Treatment  Discharge Plan A: Skilled Nursing Facility   Discharge Delays: None known at this time              Mirella Delgado MD  Department of Hospital Medicine   [facility] - Intensive Care ([place])

## 2022-05-14 NOTE — NURSING
After multiple sticks,  was unable to draw lab, stated pt was dry could not get any blood. Dr Delgado notified via secure chat no new order.

## 2022-05-14 NOTE — ASSESSMENT & PLAN NOTE
· Likely 2/2 pneumonia, more suspicious for CVA given finding of LV thrombus  · Head CT and MRI brain both negative for acute ischemic event  · Vascular Neurology consulted  · SLP cleared for diet  · EEG negative for seizures  · Mentation continues to improve, however today she is more agitated and renal function is slightly worse, given 1 time dose of IV Haldol

## 2022-05-14 NOTE — SUBJECTIVE & OBJECTIVE
Interval History: No acute events overnight.  Still not oriented to place.  Waiting on SNF, otherwise she is stable      Objective:     Vital Signs (Most Recent):  Temp: 97 °F (36.1 °C) (05/13/22 1648)  Pulse: 95 (05/13/22 1648)  Resp: 18 (05/13/22 1648)  BP: (!) 132/90 (05/13/22 1648)  SpO2: 97 % (05/13/22 1648)   Vital Signs (24h Range):  Temp:  [96 °F (35.6 °C)-98.2 °F (36.8 °C)] 97 °F (36.1 °C)  Pulse:  [] 95  Resp:  [18] 18  SpO2:  [94 %-100 %] 97 %  BP: (115-155)/() 132/90     Weight: 64 kg (141 lb 1.5 oz)  Body mass index is 22.1 kg/m².  No intake or output data in the 24 hours ending 05/13/22 1905     Physical Exam  Constitutional:       Appearance: Normal appearance. She is well-developed.   HENT:      Head: Normocephalic and atraumatic.   Cardiovascular:      Rate and Rhythm: Normal rate and regular rhythm.      Heart sounds: No murmur heard.  Pulmonary:      Effort: Pulmonary effort is normal. No respiratory distress.      Breath sounds: Normal breath sounds. No wheezing or rales.   Abdominal:      General: There is no distension.      Palpations: Abdomen is soft.      Tenderness: There is no abdominal tenderness.   Musculoskeletal:         General: No deformity.   Skin:     General: Skin is warm.       Significant Labs: All pertinent labs within the past 24 hours have been reviewed.      Significant Imaging: I have reviewed all pertinent imaging results/findings within the past 24 hours.

## 2022-05-14 NOTE — ASSESSMENT & PLAN NOTE
· CXR with LLL PNA  · Satting in the 90s on room air  · Afebrile and without leukocytosis but Lactic 2.3 on admission that trended down  · Started on Ceftriaxone and Azithro, switched to Zosyn on 5/6 and then to Augmentin to complete a 10 day course, end date on 5/15/22

## 2022-05-14 NOTE — PROGRESS NOTES
[facility] - Intensive Care ([place] [address])  [facility] Medicine  Progress Note    Patient Name: Temitope Suero  MRN: [id number]  Patient Class: IP- Inpatient   Admission Date: 5/5/2022  Length of Stay: 8 days  Attending Physician: Mirella Delgado MD  Primary Care Provider: Gm Zambrano MD        Subjective:     Principal Problem:Toxic encephalopathy        HPI:  Ms. Temitope Suero is a 82 y.o. female who presents to the ER by family for evaluation of a fall, AMS, and increased work of breath.  History is obtained from family Shelby, as patient is confused.  She reports that over the last 2 does - when she does talk, she wouldn't make sense.  She normally talks well and has fluent speech, but she does have some memory loss from an old stroke.  She has been having a dry cough, no fevers or chills - but has reported foul smelling urine.  She reports decreased PO intake the last week and that her mouth has been dry.  She did have a fall, but didn't hit her head.  She normally ambulates without DME.    Upon arrival to the ER, vitals were temp 98.4F, , /70 satting 96% on room air.  CXR showed a LLL PNA.  Head CT showed chronic ischemic changes.  Labs were notable for Trop 0.322, Creatinine 2.2, and Lactic 2.3.  She was given Vanc and Ceftriaxone and was admitted to Hospital Medicine.      Overview/Hospital Course:  Ms. Suero was admitted to Hospital Medicine for management of a toxic encephalopathy 2/2 LLL pneumonia.  She was started on Ceftriaxone/Azithro.  Her mentation was slow to improve.  SLP evaluated and cleared for diet.  She was given small IVF boluses for her MAC.  On the morning of 5/6, she was found to not be swallowing her food and chewing for a prolonged time.  She was made NPO.  She had a rapid response for diaphoresis and increased work of breathing.  Antibiotics were changed to Zosyn with concern for aspiration.  On 5/8, her mentation worsened again and she was not speaking or following commands.  2D  echo was ordered that showed a large, solid LV thrombus.  Head CT was ordered to r/o a bleed prior to starting Heparin which was negative.  She was started on Heparin.  The following morning, she found to be in new onset atrial flutter with HR 120s.  Cardiology was consulted.  Coreg was changed to Metoprolol and Entresto/Hydralazine were added.  Vascular Neurology was consulted given persistent mentation changes and new LV thrombus with concern for an acute ischemia event.  She was started on Warfarin, goal INR 2-3.  MRI brain was negative for a stroke.  Her mentation continued to improve.  PT/OT say SNF.      Interval History: No acute events overnight.  Still not oriented to place.  Waiting on SNF, otherwise she is stable      Objective:     Vital Signs (Most Recent):  Temp: 97 °F (36.1 °C) (05/13/22 1648)  Pulse: 95 (05/13/22 1648)  Resp: 18 (05/13/22 1648)  BP: (!) 132/90 (05/13/22 1648)  SpO2: 97 % (05/13/22 1648)   Vital Signs (24h Range):  Temp:  [96 °F (35.6 °C)-98.2 °F (36.8 °C)] 97 °F (36.1 °C)  Pulse:  [] 95  Resp:  [18] 18  SpO2:  [94 %-100 %] 97 %  BP: (115-155)/() 132/90     Weight: 64 kg (141 lb 1.5 oz)  Body mass index is 22.1 kg/m².  No intake or output data in the 24 hours ending 05/13/22 1905     Physical Exam  Constitutional:       Appearance: Normal appearance. She is well-developed.   HENT:      Head: Normocephalic and atraumatic.   Cardiovascular:      Rate and Rhythm: Normal rate and regular rhythm.      Heart sounds: No murmur heard.  Pulmonary:      Effort: Pulmonary effort is normal. No respiratory distress.      Breath sounds: Normal breath sounds. No wheezing or rales.   Abdominal:      General: There is no distension.      Palpations: Abdomen is soft.      Tenderness: There is no abdominal tenderness.   Musculoskeletal:         General: No deformity.   Skin:     General: Skin is warm.       Significant Labs: All pertinent labs within the past 24 hours have been  reviewed.      Significant Imaging: I have reviewed all pertinent imaging results/findings within the past 24 hours.      Assessment/Plan:      * Toxic encephalopathy  · Likely 2/2 pneumonia, more suspicious for CVA given finding of LV thrombus  · Head CT and MRI brain both negative for acute ischemic event  · Vascular Neurology consulted  · SLP cleared for diet  · EEG negative for seizures  · Mentation continues to improve    Moderate malnutrition  Nutrition consulted. Most recent weight and BMI monitored-          Malnutrition (Moderate to Severe)  Weight Loss (Malnutrition): 7.5% in 3 months              Measurements:  Wt Readings from Last 1 Encounters:   05/09/22 64 kg (141 lb 1.5 oz)   Body mass index is 22.1 kg/m².    Recommendations: Recommendation/Intervention: 1.  Goals: Meet % EEN, EPN by RD f/u date    Patient has been screened and assessed by RD. RD will follow patient.      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  · As above      Left lower lobe pneumonia  · CXR with LLL PNA  · Satting in the 90s on room air  · Afebrile and without leukocytosis but Lactic 2.3 on admission that trended down  · Started on Ceftriaxone and Azithro, switched to Zosyn on 5/6 and then to Augmentin to complete a 10 day course      NSTEMI (non-ST elevated myocardial infarction)  · Trop 0.322 on admit and flat  · No reports of chest pain  · EKG not ischemic  · Likely demand 2/2 infection  · Monitor      Acute kidney injury superimposed on CKD  · Creatinine 2.2 on admit, baseline closer to 1.3 - 1.7 today  · Possibly infection induced and Lasix/Benazepril  · Caution with IVF given CHF  · Renal US notable for chronic  medical renal disease and 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    Type 2 diabetes mellitus with stage 3 chronic kidney disease, without long-term current use of insulin  · HbA1c 7  · Home DM regimen:  Diet controlled  · SSI with POCT accuchecks to achieve blood glucose of 140-180 while hospitalized  · Diabetic diet, purred and nectar thick per SLP        Deterioration of memory  · Chronic issue  · Continue Aricept      Chronic systolic heart failure  · TTE on 5/6 shows EF 25-30%, G3DD, severe LAE, normal CVP, large solid LV thrombus  · Continue BB  · Cards started Entresto  · Monitor volume status closely    Chronic anticoagulation  · Was on Xarelto and changed to Eliquis in 2018 due to recurrent embolic strokes  · Now with a new large solid LV thrombus  · Continue Heparin with Warfarin bridge  · Pharmacy consulted for dosing      Stage 3 chronic kidney disease  · Chronic issue  · See MAC below      History of stroke  · Chronic and stable  · Head CT and MRI without acute infarct  · Continue Lipitor  · Vascular Neuro consult as above      Mixed hyperlipidemia  · Chronic and stable  · Continue Lipitor 40mg PO daily      Paroxysmal atrial fibrillation  · Chronic issue but now with new onset atrial flutter  · Anticoagulation as above  · Continue BB        VTE Risk Mitigation (From admission, onward)    None          Discharge Planning   WALDEMAR: 5/17/2022     Code Status: Full Code   Is the patient medically ready for discharge?: Yes    Reason for patient still in hospital (select all that apply): Treatment  Discharge Plan A: Skilled Nursing Facility   Discharge Delays: None known at this time              Mirella Delgado MD  Department of Hospital Medicine   [facility] - Intensive Care ([place])

## 2022-05-15 LAB
ALBUMIN SERPL BCP-MCNC: 2.7 G/DL (ref 3.5–5.2)
ALP SERPL-CCNC: 122 U/L (ref 55–135)
ALT SERPL W/O P-5'-P-CCNC: 31 U/L (ref 10–44)
ANION GAP SERPL CALC-SCNC: 14 MMOL/L (ref 8–16)
AST SERPL-CCNC: 36 U/L (ref 10–40)
BASOPHILS # BLD AUTO: 0.03 K/UL (ref 0–0.2)
BASOPHILS NFR BLD: 0.4 % (ref 0–1.9)
BILIRUB SERPL-MCNC: 0.5 MG/DL (ref 0.1–1)
BUN SERPL-MCNC: 26 MG/DL (ref 8–23)
CALCIUM SERPL-MCNC: 8.5 MG/DL (ref 8.7–10.5)
CHLORIDE SERPL-SCNC: 106 MMOL/L (ref 95–110)
CO2 SERPL-SCNC: 18 MMOL/L (ref 23–29)
CREAT SERPL-MCNC: 1.6 MG/DL (ref 0.5–1.4)
DIFFERENTIAL METHOD: ABNORMAL
EOSINOPHIL # BLD AUTO: 0.1 K/UL (ref 0–0.5)
EOSINOPHIL NFR BLD: 0.8 % (ref 0–8)
ERYTHROCYTE [DISTWIDTH] IN BLOOD BY AUTOMATED COUNT: 16.3 % (ref 11.5–14.5)
EST. GFR  (AFRICAN AMERICAN): 34.6 ML/MIN/1.73 M^2
EST. GFR  (NON AFRICAN AMERICAN): 30 ML/MIN/1.73 M^2
GLUCOSE SERPL-MCNC: 115 MG/DL (ref 70–110)
HCT VFR BLD AUTO: 39.6 % (ref 37–48.5)
HGB BLD-MCNC: 12.2 G/DL (ref 12–16)
IMM GRANULOCYTES # BLD AUTO: 0.02 K/UL (ref 0–0.04)
IMM GRANULOCYTES NFR BLD AUTO: 0.3 % (ref 0–0.5)
INR PPP: 2.4 (ref 0.8–1.2)
LYMPHOCYTES # BLD AUTO: 1.3 K/UL (ref 1–4.8)
LYMPHOCYTES NFR BLD: 17.4 % (ref 18–48)
MCH RBC QN AUTO: 24.4 PG (ref 27–31)
MCHC RBC AUTO-ENTMCNC: 30.8 G/DL (ref 32–36)
MCV RBC AUTO: 79 FL (ref 82–98)
MONOCYTES # BLD AUTO: 1 K/UL (ref 0.3–1)
MONOCYTES NFR BLD: 13.4 % (ref 4–15)
NEUTROPHILS # BLD AUTO: 5.2 K/UL (ref 1.8–7.7)
NEUTROPHILS NFR BLD: 67.7 % (ref 38–73)
NRBC BLD-RTO: 0 /100 WBC
PLATELET # BLD AUTO: 293 K/UL (ref 150–450)
PMV BLD AUTO: 11.9 FL (ref 9.2–12.9)
POCT GLUCOSE: 141 MG/DL (ref 70–110)
POCT GLUCOSE: 164 MG/DL (ref 70–110)
POCT GLUCOSE: 175 MG/DL (ref 70–110)
POCT GLUCOSE: 186 MG/DL (ref 70–110)
POTASSIUM SERPL-SCNC: 4 MMOL/L (ref 3.5–5.1)
PROT SERPL-MCNC: 5.8 G/DL (ref 6–8.4)
PROTHROMBIN TIME: 23.3 SEC (ref 9–12.5)
RBC # BLD AUTO: 4.99 M/UL (ref 4–5.4)
SODIUM SERPL-SCNC: 138 MMOL/L (ref 136–145)
WBC # BLD AUTO: 7.66 K/UL (ref 3.9–12.7)

## 2022-05-15 PROCEDURE — 99233 SBSQ HOSP IP/OBS HIGH 50: CPT | Mod: ,,, | Performed by: STUDENT IN AN ORGANIZED HEALTH CARE EDUCATION/TRAINING PROGRAM

## 2022-05-15 PROCEDURE — 20600001 HC STEP DOWN PRIVATE ROOM

## 2022-05-15 PROCEDURE — 25000003 PHARM REV CODE 250: Performed by: HOSPITALIST

## 2022-05-15 PROCEDURE — 25000003 PHARM REV CODE 250: Performed by: STUDENT IN AN ORGANIZED HEALTH CARE EDUCATION/TRAINING PROGRAM

## 2022-05-15 PROCEDURE — 85025 COMPLETE CBC W/AUTO DIFF WBC: CPT | Performed by: INTERNAL MEDICINE

## 2022-05-15 PROCEDURE — 85610 PROTHROMBIN TIME: CPT | Performed by: HOSPITALIST

## 2022-05-15 PROCEDURE — 99233 PR SUBSEQUENT HOSPITAL CARE,LEVL III: ICD-10-PCS | Mod: ,,, | Performed by: STUDENT IN AN ORGANIZED HEALTH CARE EDUCATION/TRAINING PROGRAM

## 2022-05-15 PROCEDURE — 36415 COLL VENOUS BLD VENIPUNCTURE: CPT | Performed by: HOSPITALIST

## 2022-05-15 PROCEDURE — 80053 COMPREHEN METABOLIC PANEL: CPT | Performed by: STUDENT IN AN ORGANIZED HEALTH CARE EDUCATION/TRAINING PROGRAM

## 2022-05-15 PROCEDURE — 25000003 PHARM REV CODE 250: Performed by: INTERNAL MEDICINE

## 2022-05-15 RX ORDER — TRAZODONE HYDROCHLORIDE 100 MG/1
100 TABLET ORAL NIGHTLY PRN
Status: DISCONTINUED | OUTPATIENT
Start: 2022-05-15 | End: 2022-05-19 | Stop reason: HOSPADM

## 2022-05-15 RX ADMIN — SACUBITRIL AND VALSARTAN 1 TABLET: 24; 26 TABLET, FILM COATED ORAL at 09:05

## 2022-05-15 RX ADMIN — AMOXICILLIN AND CLAVULANATE POTASSIUM 400 MG: 400; 57 POWDER, FOR SUSPENSION ORAL at 09:05

## 2022-05-15 RX ADMIN — HYDRALAZINE HYDROCHLORIDE 50 MG: 50 TABLET ORAL at 06:05

## 2022-05-15 RX ADMIN — ACETAMINOPHEN 1000 MG: 500 TABLET ORAL at 09:05

## 2022-05-15 RX ADMIN — SACUBITRIL AND VALSARTAN 1 TABLET: 24; 26 TABLET, FILM COATED ORAL at 08:05

## 2022-05-15 RX ADMIN — TRAZODONE HYDROCHLORIDE 100 MG: 100 TABLET ORAL at 09:05

## 2022-05-15 RX ADMIN — FUROSEMIDE 40 MG: 40 TABLET ORAL at 09:05

## 2022-05-15 RX ADMIN — ATORVASTATIN CALCIUM 40 MG: 40 TABLET, FILM COATED ORAL at 08:05

## 2022-05-15 RX ADMIN — METOPROLOL SUCCINATE 100 MG: 100 TABLET, EXTENDED RELEASE ORAL at 09:05

## 2022-05-15 RX ADMIN — WARFARIN SODIUM 2.5 MG: 2.5 TABLET ORAL at 05:05

## 2022-05-15 RX ADMIN — HYDRALAZINE HYDROCHLORIDE 50 MG: 50 TABLET ORAL at 09:05

## 2022-05-15 RX ADMIN — FUROSEMIDE 40 MG: 40 TABLET ORAL at 08:05

## 2022-05-15 RX ADMIN — AMOXICILLIN AND CLAVULANATE POTASSIUM 400 MG: 400; 57 POWDER, FOR SUSPENSION ORAL at 08:05

## 2022-05-15 RX ADMIN — DONEPEZIL HYDROCHLORIDE 5 MG: 5 TABLET ORAL at 09:05

## 2022-05-15 RX ADMIN — HYDRALAZINE HYDROCHLORIDE 50 MG: 50 TABLET ORAL at 01:05

## 2022-05-15 NOTE — NURSING
Patient in bed most the day and sllept most of the day.  Skilled nursing assessment completed. Vital stable. Sitter at bedside. All routine medication given, tolerated well. Patient refused to keep monitor leads. Safety precautions in place. Call light within reach.

## 2022-05-15 NOTE — SUBJECTIVE & OBJECTIVE
Interval History:  Patient was agitated overnight.  Received Haldol x1 without much improvement.  Also was placed on trazodone 50 mg q.h.s. however did stay up all night and was agitated.  Patient has limited options due to prolonged QT C. Now she is resting in bed comfortably.  Suspect potential sundowning.  Will increase trazodone to 100 mg q.h.s.      Objective:     Vital Signs (Most Recent):  Temp: 97.7 °F (36.5 °C) (05/15/22 1612)  Pulse: 85 (05/15/22 1612)  Resp: 16 (05/15/22 1612)  BP: 120/85 (05/15/22 1612)  SpO2: 99 % (05/15/22 1612)   Vital Signs (24h Range):  Temp:  [97.2 °F (36.2 °C)-98.1 °F (36.7 °C)] 97.7 °F (36.5 °C)  Pulse:  [] 85  Resp:  [16-36] 16  SpO2:  [87 %-99 %] 99 %  BP: (120-145)/(74-98) 120/85     Weight: 64 kg (141 lb 1.5 oz)  Body mass index is 22.1 kg/m².  No intake or output data in the 24 hours ending 05/15/22 1635     Physical Exam  Constitutional:       Appearance: Normal appearance. She is well-developed.   HENT:      Head: Normocephalic and atraumatic.   Cardiovascular:      Rate and Rhythm: Normal rate and regular rhythm.      Heart sounds: No murmur heard.  Pulmonary:      Effort: Pulmonary effort is normal. No respiratory distress.      Breath sounds: Normal breath sounds. No wheezing or rales.   Abdominal:      General: There is no distension.      Palpations: Abdomen is soft.      Tenderness: There is no abdominal tenderness.   Musculoskeletal:         General: No deformity.   Skin:     General: Skin is warm.       Significant Labs: All pertinent labs within the past 24 hours have been reviewed.      Significant Imaging: I have reviewed all pertinent imaging results/findings within the past 24 hours.I

## 2022-05-15 NOTE — ASSESSMENT & PLAN NOTE
· Creatinine 2.2 on admit, baseline closer to 1.3 - 1.6 today, remains stable  · Possibly infection induced and Lasix/Benazepril  · Caution with IVF given CHF  · Renal US notable for chronic medical renal disease and 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  · -given 250 cc IV fluid bolus considering that patient appeared dry on exam on 5/14 however will continue Lasix  · No IV fluids given today, patient had good p.o. intake

## 2022-05-15 NOTE — ASSESSMENT & PLAN NOTE
· Likely 2/2 pneumonia, more suspicious for CVA given finding of LV thrombus  · Head CT and MRI brain both negative for acute ischemic event  · Vascular Neurology consulted  · SLP cleared for diet  · EEG negative for seizures  · Mentation continues to improve, however today she is more agitated and renal function is slightly worse, given 1 time dose of IV Haldol with minimal improvement, will add trazodone 100 mg q.h.s.

## 2022-05-15 NOTE — NURSING
Pt alert to self, up all night, restless with some period of agitation. Melatonin and trazodone given with no effect. No pain observed, sitter at bedside, bed alarm on,call light within reach.

## 2022-05-15 NOTE — PROGRESS NOTES
[facility] - Intensive Care ([place] [address])  [facility] Medicine  Progress Note    Patient Name: Temitope Suero  MRN: [id number]  Patient Class: IP- Inpatient   Admission Date: 5/5/2022  Length of Stay: 10 days  Attending Physician: Mirella Delgado MD  Primary Care Provider: Gm Zambrano MD        Subjective:     Principal Problem:Toxic encephalopathy        HPI:  Ms. Temitope Suero is a 82 y.o. female who presents to the ER by family for evaluation of a fall, AMS, and increased work of breath.  History is obtained from family Shelby, as patient is confused.  She reports that over the last 2 does - when she does talk, she wouldn't make sense.  She normally talks well and has fluent speech, but she does have some memory loss from an old stroke.  She has been having a dry cough, no fevers or chills - but has reported foul smelling urine.  She reports decreased PO intake the last week and that her mouth has been dry.  She did have a fall, but didn't hit her head.  She normally ambulates without DME.    Upon arrival to the ER, vitals were temp 98.4F, , /70 satting 96% on room air.  CXR showed a LLL PNA.  Head CT showed chronic ischemic changes.  Labs were notable for Trop 0.322, Creatinine 2.2, and Lactic 2.3.  She was given Vanc and Ceftriaxone and was admitted to Hospital Medicine.      Overview/Hospital Course:  Ms. Suero was admitted to Hospital Medicine for management of a toxic encephalopathy 2/2 LLL pneumonia.  She was started on Ceftriaxone/Azithro.  Her mentation was slow to improve.  SLP evaluated and cleared for diet.  She was given small IVF boluses for her MAC.  On the morning of 5/6, she was found to not be swallowing her food and chewing for a prolonged time.  She was made NPO.  She had a rapid response for diaphoresis and increased work of breathing.  Antibiotics were changed to Zosyn with concern for aspiration.  On 5/8, her mentation worsened again and she was not speaking or following commands.  2D  echo was ordered that showed a large, solid LV thrombus.  Head CT was ordered to r/o a bleed prior to starting Heparin which was negative.  She was started on Heparin.  The following morning, she found to be in new onset atrial flutter with HR 120s.  Cardiology was consulted.  Coreg was changed to Metoprolol and Entresto/Hydralazine were added.  Vascular Neurology was consulted given persistent mentation changes and new LV thrombus with concern for an acute ischemia event.  She was started on Warfarin, goal INR 2-3.  MRI brain was negative for a stroke.  Her mentation continued to improve.  PT/OT say SNF.      Interval History:  Patient was agitated overnight.  Received Haldol x1 without much improvement.  Also was placed on trazodone 50 mg q.h.s. however did stay up all night and was agitated.  Patient has limited options due to prolonged QT C. Now she is resting in bed comfortably.  Suspect potential sundowning.  Will increase trazodone to 100 mg q.h.s.      Objective:     Vital Signs (Most Recent):  Temp: 97.7 °F (36.5 °C) (05/15/22 1612)  Pulse: 85 (05/15/22 1612)  Resp: 16 (05/15/22 1612)  BP: 120/85 (05/15/22 1612)  SpO2: 99 % (05/15/22 1612)   Vital Signs (24h Range):  Temp:  [97.2 °F (36.2 °C)-98.1 °F (36.7 °C)] 97.7 °F (36.5 °C)  Pulse:  [] 85  Resp:  [16-36] 16  SpO2:  [87 %-99 %] 99 %  BP: (120-145)/(74-98) 120/85     Weight: 64 kg (141 lb 1.5 oz)  Body mass index is 22.1 kg/m².  No intake or output data in the 24 hours ending 05/15/22 1635     Physical Exam  Constitutional:       Appearance: Normal appearance. She is well-developed.   HENT:      Head: Normocephalic and atraumatic.   Cardiovascular:      Rate and Rhythm: Normal rate and regular rhythm.      Heart sounds: No murmur heard.  Pulmonary:      Effort: Pulmonary effort is normal. No respiratory distress.      Breath sounds: Normal breath sounds. No wheezing or rales.   Abdominal:      General: There is no distension.      Palpations:  Abdomen is soft.      Tenderness: There is no abdominal tenderness.   Musculoskeletal:         General: No deformity.   Skin:     General: Skin is warm.       Significant Labs: All pertinent labs within the past 24 hours have been reviewed.      Significant Imaging: I have reviewed all pertinent imaging results/findings within the past 24 hours.I      Assessment/Plan:      * Toxic encephalopathy  · Likely 2/2 pneumonia, more suspicious for CVA given finding of LV thrombus  · Head CT and MRI brain both negative for acute ischemic event  · Vascular Neurology consulted  · SLP cleared for diet  · EEG negative for seizures  · Mentation continues to improve, however today she is more agitated and renal function is slightly worse, given 1 time dose of IV Haldol with minimal improvement, will add trazodone 100 mg q.h.s.    Moderate malnutrition  Nutrition consulted. Most recent weight and BMI monitored-          Malnutrition (Moderate to Severe)  Weight Loss (Malnutrition): 7.5% in 3 months              Measurements:  Wt Readings from Last 1 Encounters:   05/09/22 64 kg (141 lb 1.5 oz)   Body mass index is 22.1 kg/m².    Recommendations: Recommendation/Intervention: 1.  Goals: Meet % EEN, EPN by RD f/u date    Patient has been screened and assessed by RD. RD will follow patient.      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  · As above      Left lower lobe pneumonia  · CXR with LLL PNA  · Satting in the 90s on room air  · Afebrile and without leukocytosis but Lactic 2.3 on admission that trended down  · Started on Ceftriaxone and Azithro, switched to Zosyn on 5/6 and then to Augmentin to complete a 10 day course, end  date on 5/15/22      NSTEMI (non-ST elevated myocardial infarction)  · Trop 0.322 on admit and flat  · No reports of chest pain  · EKG not ischemic  · Likely demand 2/2 infection  · Monitor      Acute kidney injury superimposed on CKD  · Creatinine 2.2 on admit, baseline closer to 1.3 - 1.6 today, remains stable  · Possibly infection induced and Lasix/Benazepril  · Caution with IVF given CHF  · Renal US notable for chronic medical renal disease and 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  · -given 250 cc IV fluid bolus considering that patient appeared dry on exam on 5/14 however will continue Lasix  · No IV fluids given today, patient had good p.o. intake    Type 2 diabetes mellitus with stage 3 chronic kidney disease, without long-term current use of insulin  · HbA1c 7  · Home DM regimen:  Diet controlled  · SSI with POCT accuchecks to achieve blood glucose of 140-180 while hospitalized  · Diabetic diet, purred and nectar thick per SLP        Deterioration of memory  · Chronic issue  · Continue Aricept      Chronic systolic heart failure  · TTE on 5/6 shows EF 25-30%, G3DD, severe LAE, normal CVP, large solid LV thrombus  · Continue BB  · Cards started Entresto  · Monitor volume status closely    Chronic anticoagulation  · Was on Xarelto and changed to Eliquis in 2018 due to recurrent embolic strokes  · Now with a new large solid LV thrombus  · Continue Heparin with Warfarin bridge  · Pharmacy consulted for dosing      Stage 3 chronic kidney disease  · Chronic issue  · See MAC below      History of stroke  · Chronic and stable  · Head CT and MRI without acute infarct  · Continue Lipitor  · Vascular Neuro consult as above      Mixed hyperlipidemia  · Chronic and stable  · Continue Lipitor 40mg PO daily      Paroxysmal atrial fibrillation  · Chronic issue but now with new onset atrial flutter  · Anticoagulation as above  · Continue BB        VTE Risk Mitigation (From admission, onward)         Ordered     warfarin (COUMADIN) tablet  2.5 mg  Daily         05/14/22 1306                Discharge Planning   WALDEMAR: 5/17/2022     Code Status: Full Code   Is the patient medically ready for discharge?: Yes    Reason for patient still in hospital (select all that apply): dispo  Discharge Plan A: Skilled Nursing Facility   Discharge Delays: None known at this time              Mirella Delgado MD  Department of Hospital Medicine   [facility] - Intensive Care ([place]-[address])

## 2022-05-16 LAB
ANION GAP SERPL CALC-SCNC: 11 MMOL/L (ref 8–16)
BUN SERPL-MCNC: 29 MG/DL (ref 8–23)
CALCIUM SERPL-MCNC: 8.5 MG/DL (ref 8.7–10.5)
CHLORIDE SERPL-SCNC: 103 MMOL/L (ref 95–110)
CO2 SERPL-SCNC: 24 MMOL/L (ref 23–29)
CREAT SERPL-MCNC: 1.4 MG/DL (ref 0.5–1.4)
EST. GFR  (AFRICAN AMERICAN): 40.6 ML/MIN/1.73 M^2
EST. GFR  (NON AFRICAN AMERICAN): 35.3 ML/MIN/1.73 M^2
GLUCOSE SERPL-MCNC: 107 MG/DL (ref 70–110)
INR PPP: 2.3 (ref 0.8–1.2)
MAGNESIUM SERPL-MCNC: 1.6 MG/DL (ref 1.6–2.6)
POCT GLUCOSE: 122 MG/DL (ref 70–110)
POCT GLUCOSE: 131 MG/DL (ref 70–110)
POCT GLUCOSE: 143 MG/DL (ref 70–110)
POCT GLUCOSE: 150 MG/DL (ref 70–110)
POCT GLUCOSE: 160 MG/DL (ref 70–110)
POTASSIUM SERPL-SCNC: 3.6 MMOL/L (ref 3.5–5.1)
PROTHROMBIN TIME: 22.7 SEC (ref 9–12.5)
SODIUM SERPL-SCNC: 138 MMOL/L (ref 136–145)

## 2022-05-16 PROCEDURE — 25000003 PHARM REV CODE 250: Performed by: STUDENT IN AN ORGANIZED HEALTH CARE EDUCATION/TRAINING PROGRAM

## 2022-05-16 PROCEDURE — 97116 GAIT TRAINING THERAPY: CPT

## 2022-05-16 PROCEDURE — 36415 COLL VENOUS BLD VENIPUNCTURE: CPT | Performed by: HOSPITALIST

## 2022-05-16 PROCEDURE — 20600001 HC STEP DOWN PRIVATE ROOM

## 2022-05-16 PROCEDURE — 25000003 PHARM REV CODE 250: Performed by: INTERNAL MEDICINE

## 2022-05-16 PROCEDURE — 99232 PR SUBSEQUENT HOSPITAL CARE,LEVL II: ICD-10-PCS | Mod: ,,, | Performed by: STUDENT IN AN ORGANIZED HEALTH CARE EDUCATION/TRAINING PROGRAM

## 2022-05-16 PROCEDURE — 80048 BASIC METABOLIC PNL TOTAL CA: CPT | Performed by: STUDENT IN AN ORGANIZED HEALTH CARE EDUCATION/TRAINING PROGRAM

## 2022-05-16 PROCEDURE — 83735 ASSAY OF MAGNESIUM: CPT | Performed by: STUDENT IN AN ORGANIZED HEALTH CARE EDUCATION/TRAINING PROGRAM

## 2022-05-16 PROCEDURE — 99232 SBSQ HOSP IP/OBS MODERATE 35: CPT | Mod: ,,, | Performed by: STUDENT IN AN ORGANIZED HEALTH CARE EDUCATION/TRAINING PROGRAM

## 2022-05-16 PROCEDURE — 25000003 PHARM REV CODE 250: Performed by: HOSPITALIST

## 2022-05-16 PROCEDURE — 85610 PROTHROMBIN TIME: CPT | Performed by: HOSPITALIST

## 2022-05-16 RX ORDER — WARFARIN 1 MG/1
3 TABLET ORAL DAILY
Status: DISCONTINUED | OUTPATIENT
Start: 2022-05-16 | End: 2022-05-18

## 2022-05-16 RX ADMIN — FUROSEMIDE 40 MG: 40 TABLET ORAL at 08:05

## 2022-05-16 RX ADMIN — TRAZODONE HYDROCHLORIDE 100 MG: 100 TABLET ORAL at 09:05

## 2022-05-16 RX ADMIN — SACUBITRIL AND VALSARTAN 1 TABLET: 24; 26 TABLET, FILM COATED ORAL at 08:05

## 2022-05-16 RX ADMIN — HYDRALAZINE HYDROCHLORIDE 50 MG: 50 TABLET ORAL at 06:05

## 2022-05-16 RX ADMIN — HYDRALAZINE HYDROCHLORIDE 50 MG: 50 TABLET ORAL at 09:05

## 2022-05-16 RX ADMIN — ATORVASTATIN CALCIUM 40 MG: 40 TABLET, FILM COATED ORAL at 08:05

## 2022-05-16 RX ADMIN — WARFARIN SODIUM 3 MG: 5 TABLET ORAL at 07:05

## 2022-05-16 RX ADMIN — DONEPEZIL HYDROCHLORIDE 5 MG: 5 TABLET ORAL at 09:05

## 2022-05-16 RX ADMIN — SACUBITRIL AND VALSARTAN 1 TABLET: 24; 26 TABLET, FILM COATED ORAL at 09:05

## 2022-05-16 RX ADMIN — HYDRALAZINE HYDROCHLORIDE 50 MG: 50 TABLET ORAL at 02:05

## 2022-05-16 RX ADMIN — METOPROLOL SUCCINATE 100 MG: 100 TABLET, EXTENDED RELEASE ORAL at 08:05

## 2022-05-16 RX ADMIN — FUROSEMIDE 40 MG: 40 TABLET ORAL at 09:05

## 2022-05-16 NOTE — PROGRESS NOTES
[facility] - Intensive Care ([place] [address])  [facility] Medicine  Progress Note    Patient Name: Temitope Suero  MRN: [id number]  Patient Class: IP- Inpatient   Admission Date: 5/5/2022  Length of Stay: 11 days  Attending Physician: Mirella Delgado MD  Primary Care Provider: Gm Zambrano MD        Subjective:     Principal Problem:Toxic encephalopathy        HPI:  Ms. Temitope Suero is a 82 y.o. female who presents to the ER by family for evaluation of a fall, AMS, and increased work of breath.  History is obtained from family Shelby, as patient is confused.  She reports that over the last 2 does - when she does talk, she wouldn't make sense.  She normally talks well and has fluent speech, but she does have some memory loss from an old stroke.  She has been having a dry cough, no fevers or chills - but has reported foul smelling urine.  She reports decreased PO intake the last week and that her mouth has been dry.  She did have a fall, but didn't hit her head.  She normally ambulates without DME.    Upon arrival to the ER, vitals were temp 98.4F, , /70 satting 96% on room air.  CXR showed a LLL PNA.  Head CT showed chronic ischemic changes.  Labs were notable for Trop 0.322, Creatinine 2.2, and Lactic 2.3.  She was given Vanc and Ceftriaxone and was admitted to Hospital Medicine.      Overview/Hospital Course:  Ms. Suero was admitted to Hospital Medicine for management of a toxic encephalopathy 2/2 LLL pneumonia.  She was started on Ceftriaxone/Azithro.  Her mentation was slow to improve.  SLP evaluated and cleared for diet.  She was given small IVF boluses for her MAC.  On the morning of 5/6, she was found to not be swallowing her food and chewing for a prolonged time.  She was made NPO.  She had a rapid response for diaphoresis and increased work of breathing.  Antibiotics were changed to Zosyn with concern for aspiration.  On 5/8, her mentation worsened again and she was not speaking or following commands.  2D  echo was ordered that showed a large, solid LV thrombus.  Head CT was ordered to r/o a bleed prior to starting Heparin which was negative.  She was started on Heparin.  The following morning, she found to be in new onset atrial flutter with HR 120s.  Cardiology was consulted.  Coreg was changed to Metoprolol and Entresto/Hydralazine were added.  Vascular Neurology was consulted given persistent mentation changes and new LV thrombus with concern for an acute ischemia event.  She was started on Warfarin, goal INR 2-3.  MRI brain was negative for a stroke.  Her mentation continued to improve.  PT/OT say SNF.      Interval History:  Patient doing well resting in bed.  Rested well overnight no acute events.      Objective:     Vital Signs (Most Recent):  Temp: 97.3 °F (36.3 °C) (05/16/22 1645)  Pulse: 101 (05/16/22 1645)  Resp: 20 (05/16/22 1645)  BP: (!) 131/91 (05/16/22 1645)  SpO2: 98 % (05/16/22 1645)   Vital Signs (24h Range):  Temp:  [97 °F (36.1 °C)-98 °F (36.7 °C)] 97.3 °F (36.3 °C)  Pulse:  [] 101  Resp:  [12-20] 20  SpO2:  [97 %-100 %] 98 %  BP: (102-144)/(66-97) 131/91     Weight: 64 kg (141 lb 1.5 oz)  Body mass index is 22.1 kg/m².  No intake or output data in the 24 hours ending 05/16/22 1744     Physical Exam  Constitutional:       Appearance: Normal appearance. She is well-developed.   HENT:      Head: Normocephalic and atraumatic.   Cardiovascular:      Rate and Rhythm: Normal rate and regular rhythm.      Heart sounds: No murmur heard.  Pulmonary:      Effort: Pulmonary effort is normal. No respiratory distress.      Breath sounds: Normal breath sounds. No wheezing or rales.   Abdominal:      General: There is no distension.      Palpations: Abdomen is soft.      Tenderness: There is no abdominal tenderness.   Musculoskeletal:         General: No deformity.   Skin:     General: Skin is warm.       Significant Labs: All pertinent labs within the past 24 hours have been  reviewed.      Assessment/Plan:      * Toxic encephalopathy  · Likely 2/2 pneumonia, more suspicious for CVA given finding of LV thrombus  · Head CT and MRI brain both negative for acute ischemic event  · Vascular Neurology consulted  · SLP cleared for diet  · EEG negative for seizures  · Mentation continues to improve, however today she is more agitated and renal function is slightly worse, given 1 time dose of IV Haldol with minimal improvement, will add trazodone 100 mg q.h.s.    Moderate malnutrition  Nutrition consulted. Most recent weight and BMI monitored-          Malnutrition (Moderate to Severe)  Weight Loss (Malnutrition): 7.5% in 3 months              Measurements:  Wt Readings from Last 1 Encounters:   05/09/22 64 kg (141 lb 1.5 oz)   Body mass index is 22.1 kg/m².    Recommendations: Recommendation/Intervention: 1.  Goals: Meet % EEN, EPN by RD f/u date    Patient has been screened and assessed by RD. RD will follow patient.      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  · As above      Left lower lobe pneumonia  · CXR with LLL PNA  · Satting in the 90s on room air  · Afebrile and without leukocytosis but Lactic 2.3 on admission that trended down  · Started on Ceftriaxone and Azithro, switched to Zosyn on 5/6 and then to Augmentin to complete a 10 day course, end date on 5/15/22      NSTEMI (non-ST elevated myocardial infarction)  · Trop 0.322 on admit and flat  · No reports of chest pain  · EKG not ischemic  · Likely demand 2/2 infection  · Monitor      Acute kidney injury superimposed on CKD  · Creatinine 2.2 on admit, baseline closer to 1.3 - 1.6 today, remains stable  · Possibly infection  induced and Lasix/Benazepril  · Caution with IVF given CHF  · Renal US notable for chronic medical renal disease and 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  · -given 250 cc IV fluid bolus considering that patient appeared dry on exam on 5/14 however will continue Lasix  · No IV fluids given today, patient had good p.o. intake    Type 2 diabetes mellitus with stage 3 chronic kidney disease, without long-term current use of insulin  · HbA1c 7  · Home DM regimen:  Diet controlled  · SSI with POCT accuchecks to achieve blood glucose of 140-180 while hospitalized  · Diabetic diet, purred and nectar thick per SLP        Deterioration of memory  · Chronic issue  · Continue Aricept      Chronic systolic heart failure  · TTE on 5/6 shows EF 25-30%, G3DD, severe LAE, normal CVP, large solid LV thrombus  · Continue BB  · Cards started Entresto  · Monitor volume status closely    Chronic anticoagulation  · Was on Xarelto and changed to Eliquis in 2018 due to recurrent embolic strokes  · Now with a new large solid LV thrombus  · Continue Heparin with Warfarin bridge  · Pharmacy consulted for dosing      Stage 3 chronic kidney disease  · Chronic issue  · See MAC below      History of stroke  · Chronic and stable  · Head CT and MRI without acute infarct  · Continue Lipitor  · Vascular Neuro consult as above      Mixed hyperlipidemia  · Chronic and stable  · Continue Lipitor 40mg PO daily      Paroxysmal atrial fibrillation  · Chronic issue but now with new onset atrial flutter  · Anticoagulation as above  · Continue BB        VTE Risk Mitigation (From admission, onward)         Ordered     warfarin (COUMADIN) tablet 3 mg  Daily         05/16/22 0801                Discharge Planning   WALDEMAR: 5/17/2022     Code Status: Full Code   Is the patient medically ready for discharge?: Yes    Reason for patient still in hospital (select all that apply): Pending disposition  Discharge Plan A: Skilled Nursing Facility   Discharge Delays: None known at this  time              Mirella Delgado MD  Department of Hospital Medicine   [facility] - Intensive Care ([place]-[address])

## 2022-05-16 NOTE — PLAN OF CARE
Pt remains confused, sleep throughout this shift, stable, RA. No pain noted, safety measures in place, bed alarm and tele sitter in place, Call light in reach.              Problem: Adult Inpatient Plan of Care  Goal: Plan of Care Review  Outcome: Ongoing, Progressing  Goal: Patient-Specific Goal (Individualized)  Outcome: Ongoing, Progressing  Goal: Absence of Hospital-Acquired Illness or Injury  Outcome: Ongoing, Progressing  Goal: Optimal Comfort and Wellbeing  Outcome: Ongoing, Progressing  Goal: Readiness for Transition of Care  Outcome: Ongoing, Progressing     Problem: Diabetes Comorbidity  Goal: Blood Glucose Level Within Targeted Range  Outcome: Ongoing, Progressing     Problem: Fluid and Electrolyte Imbalance (Acute Kidney Injury/Impairment)  Goal: Fluid and Electrolyte Balance  Outcome: Ongoing, Progressing

## 2022-05-16 NOTE — PLAN OF CARE
Problem: Adult Inpatient Plan of Care  Goal: Plan of Care Review  5/16/2022 1542 by Chantal Garza RN  Outcome: Ongoing, Progressing  5/16/2022 1204 by Chantal Garza RN  Outcome: Ongoing, Progressing  Goal: Patient-Specific Goal (Individualized)  5/16/2022 1542 by Chantal Garza RN  Outcome: Ongoing, Progressing  5/16/2022 1204 by Chantal Garza RN  Outcome: Ongoing, Progressing  Goal: Absence of Hospital-Acquired Illness or Injury  5/16/2022 1542 by Chantal Garza RN  Outcome: Ongoing, Progressing  5/16/2022 1204 by Chantal Garza RN  Outcome: Ongoing, Progressing  Goal: Optimal Comfort and Wellbeing  5/16/2022 1542 by Chantal Garza RN  Outcome: Ongoing, Progressing  5/16/2022 1204 by Chantal Garza RN  Outcome: Ongoing, Progressing  Goal: Readiness for Transition of Care  5/16/2022 1542 by Chantal Garza RN  Outcome: Ongoing, Progressing  5/16/2022 1204 by Chantal Garza RN  Outcome: Ongoing, Progressing     Problem: Diabetes Comorbidity  Goal: Blood Glucose Level Within Targeted Range  5/16/2022 1542 by Chanatl Garza RN  Outcome: Ongoing, Progressing  5/16/2022 1204 by Chantal Garza RN  Outcome: Ongoing, Progressing     Problem: Fluid and Electrolyte Imbalance (Acute Kidney Injury/Impairment)  Goal: Fluid and Electrolyte Balance  5/16/2022 1542 by Chantal Garza RN  Outcome: Ongoing, Progressing  5/16/2022 1204 by Chantal Garza RN  Outcome: Ongoing, Progressing     Problem: Oral Intake Inadequate (Acute Kidney Injury/Impairment)  Goal: Optimal Nutrition Intake  5/16/2022 1542 by Chantal Garza RN  Outcome: Ongoing, Progressing  5/16/2022 1204 by Chantal Garza RN  Outcome: Ongoing, Progressing     Problem: Renal Function Impairment (Acute Kidney Injury/Impairment)  Goal: Effective Renal Function  5/16/2022 1542 by hCantal Garza RN  Outcome: Ongoing, Progressing  5/16/2022 1204 by Chantal Garza RN  Outcome: Ongoing, Progressing     Problem: Fluid  Imbalance (Pneumonia)  Goal: Fluid Balance  5/16/2022 1542 by Chantal Garza RN  Outcome: Ongoing, Progressing  5/16/2022 1204 by Chantal Garza RN  Outcome: Ongoing, Progressing     Problem: Infection (Pneumonia)  Goal: Resolution of Infection Signs and Symptoms  5/16/2022 1542 by Chantal Garza RN  Outcome: Ongoing, Progressing  5/16/2022 1204 by Chantal Garza RN  Outcome: Ongoing, Progressing     Problem: Respiratory Compromise (Pneumonia)  Goal: Effective Oxygenation and Ventilation  5/16/2022 1542 by Chantal Garza RN  Outcome: Ongoing, Progressing  5/16/2022 1204 by Chantal Garza RN  Outcome: Ongoing, Progressing     Problem: Skin Injury Risk Increased  Goal: Skin Health and Integrity  5/16/2022 1542 by Chantal Garza RN  Outcome: Ongoing, Progressing  5/16/2022 1204 by Chantal Garza RN  Outcome: Ongoing, Progressing     Problem: Impaired Wound Healing  Goal: Optimal Wound Healing  5/16/2022 1542 by Chantal Garza RN  Outcome: Ongoing, Progressing  5/16/2022 1204 by Chantal Garza RN  Outcome: Ongoing, Progressing     Problem: Fall Injury Risk  Goal: Absence of Fall and Fall-Related Injury  5/16/2022 1542 by Chantal Garza RN  Outcome: Ongoing, Progressing  5/16/2022 1204 by Chantal Garza RN  Outcome: Ongoing, Progressing

## 2022-05-16 NOTE — CONSULTS
PHARMACY CONSULT NOTE: WARFARIN  Temitope Suero is a 82 y.o. female on warfarin therapy for left ventricular mural thrombus. PharmD has been consulted for warfarin dosing.    Assessment/Plan    Lab Results   Component Value Date    INR 2.3 (H) 05/16/2022    INR 2.4 (H) 05/15/2022    INR 2.7 (H) 05/14/2022       Augmentin discontinued. Likely was driving prior increase in INR.    1. Increase to warfarin 3 mg PO daily  2. Goal INR 2-3  3. I have sent referral to [facility].    Thank you for the consult, will continue to follow  Reddy Stauffer Pharm.D., BCPS  [id number]      **Note: Consults are reviewed Monday-Friday 7:00am-3:30pm. THE ABOVE RECOMMENDATIONS ARE ONLY SUGGESTED.THE RECOMMENDATIONS SHOULD BE CONSIDERED IN CONJUNCTION WITH ALL PATIENT FACTORS. **

## 2022-05-16 NOTE — NURSING
Patient is alert,. In bed resting with eyes open. No signs of physical distress noted. Skilled nursing assessment completed. Vital stable. Lungs sound clear on auscultation. Abdomen soft and non-distended. Resp. Even and unlabored. Lower extremities swelling. All routine meds given, tolerated well. Participated in physical therapy. Family at bedside. Safety precautions in place. Call light within reach.

## 2022-05-16 NOTE — PROGRESS NOTES
[facility] - Intensive Care ([facility]-)  Adult Nutrition  Consult Note    SUMMARY     Recommendations    1. Liberalize diet to 2000 kcal ADA, Pureed (texture per SLP).   2. RD to monitor & follow-up.    Goals: Meet % EEN, EPN by RD f/u date  Nutrition Goal Status: goal met  Communication of RD Recs: reviewed with RN    Assessment and Plan    Moderate malnutrition    Nutrition Problem:  Moderate Protein-Calorie Malnutrition  Malnutrition in the context of Acute Illness/Injury    Related to (etiology):  Inability to consume sufficient energy    Signs and Symptoms (as evidenced by):  Body Fat Depletion: moderate depletion of orbitals and triceps   Muscle Mass Depletion: moderate depletion of temples, clavicle region and lower extremities   Weight Loss: 7.5% x 3 months    Interventions(treatment strategy):  Collaboration of nutrition care w/ other providers  ONS    Nutrition Diagnosis Status:  Continues    Malnutrition Assessment    Weight Loss (Malnutrition): 7.5% in 3 months   Orbital Region (Subcutaneous Fat Loss): moderate depletion  Upper Arm Region (Subcutaneous Fat Loss): moderate depletion   Jainism Region (Muscle Loss): moderate depletion  Clavicle Bone Region (Muscle Loss): moderate depletion  Dorsal Hand (Muscle Loss): moderate depletion  Anterior Thigh Region (Muscle Loss): moderate depletion     Reason for Assessment    Reason For Assessment: RD follow-up  Diagnosis: other (see comments) (Encephalopathy)  Relevant Medical History: HLD, HF, CKD, DM  Interdisciplinary Rounds: did not attend     General Information Comments: Per RN documentation, pt tolerating diet w/ 100% PO intake. Per chart review, pt weighed 153# x 3/2022 & 156# x 11/2020. NFPE reveals pt w/ moderate fat & muscle wasting. RD feels pt meets criteria for moderate malnutrition. Please see PES statment for details.  Nutrition Discharge Planning: Adequate nutrition    Nutrition/Diet History    Spiritual, Cultural Beliefs, Amish  "Practices, Values that Affect Care: no  Factors Affecting Nutritional Intake: None identified at this time    Anthropometrics    Temp: 97.6 °F (36.4 °C)  Height Method: Stated  Height: 5' 7" (170.2 cm)  Height (inches): 67 in  Weight Method: Bed Scale  Weight: 64 kg (141 lb 1.5 oz)  Weight (lb): 141.1 lb  Ideal Body Weight (IBW), Female: 135 lb  % Ideal Body Weight, Female (lb): 104.52 %  BMI (Calculated): 22.1  BMI Grade: 18.5-24.9 - normal  Usual Body Weight (UBW), k.5 kg  % Usual Body Weight: 92.28  % Weight Change From Usual Weight: -7.91 %    Lab/Procedures/Meds    Pertinent Labs Reviewed: reviewed  Pertinent Labs Comments: BUN 29, GFR 40.6  Pertinent Medications Reviewed: reviewed  Pertinent Medications Comments: Warfarin    Estimated/Assessed Needs    Weight Used For Calorie Calculations: 64 kg (141 lb 1.5 oz)     Energy Calorie Requirements (kcal): 1479 kcal/d  Energy Need Method: Hitchcock-St Jeor (1.3 PAL)     Protein Requirements: 77 g/d (1.2 g/kg)  Weight Used For Protein Calculations: 64 kg (141 lb 1.5 oz)     Estimated Fluid Requirement Method: other (see comments) (Per MD or 1 mL/kcal)  RDA Method (mL): 1479    Nutrition Prescription Ordered    Current Diet Order: 2000 kcal ADA, Cardiac, Pureed    Evaluation of Received Nutrient/Fluid Intake    I/O: +1.3L since admit    Comments: LBM:     Tolerance: tolerating    Nutrition Risk    Level of Risk/Frequency of Follow-up:  (1x/week)     Monitor and Evaluation    Food and Nutrient Intake: energy intake, food and beverage intake  Food and Nutrient Adminstration: diet order  Physical Activity and Function: nutrition-related ADLs and IADLs  Anthropometric Measurements: weight change, weight  Biochemical Data, Medical Tests and Procedures: inflammatory profile, lipid profile, glucose/endocrine profile, electrolyte and renal panel, gastrointestinal profile  Nutrition-Focused Physical Findings: overall appearance     Nutrition Follow-Up    RD Follow-up?: " Yes

## 2022-05-16 NOTE — SUBJECTIVE & OBJECTIVE
Interval History:  Patient doing well resting in bed.  Rested well overnight no acute events.      Objective:     Vital Signs (Most Recent):  Temp: 97.3 °F (36.3 °C) (05/16/22 1645)  Pulse: 101 (05/16/22 1645)  Resp: 20 (05/16/22 1645)  BP: (!) 131/91 (05/16/22 1645)  SpO2: 98 % (05/16/22 1645)   Vital Signs (24h Range):  Temp:  [97 °F (36.1 °C)-98 °F (36.7 °C)] 97.3 °F (36.3 °C)  Pulse:  [] 101  Resp:  [12-20] 20  SpO2:  [97 %-100 %] 98 %  BP: (102-144)/(66-97) 131/91     Weight: 64 kg (141 lb 1.5 oz)  Body mass index is 22.1 kg/m².  No intake or output data in the 24 hours ending 05/16/22 1744     Physical Exam  Constitutional:       Appearance: Normal appearance. She is well-developed.   HENT:      Head: Normocephalic and atraumatic.   Cardiovascular:      Rate and Rhythm: Normal rate and regular rhythm.      Heart sounds: No murmur heard.  Pulmonary:      Effort: Pulmonary effort is normal. No respiratory distress.      Breath sounds: Normal breath sounds. No wheezing or rales.   Abdominal:      General: There is no distension.      Palpations: Abdomen is soft.      Tenderness: There is no abdominal tenderness.   Musculoskeletal:         General: No deformity.   Skin:     General: Skin is warm.       Significant Labs: All pertinent labs within the past 24 hours have been reviewed.

## 2022-05-16 NOTE — PT/OT/SLP PROGRESS
Physical Therapy Treatment    Patient Name:  Temitope Suero   MRN:  [id number]    Recommendations:     Discharge Recommendations:  nursing facility, skilled   Discharge Equipment Recommendations:  (TBD)   Barriers to discharge: None    Assessment:     Temitope Suero is a 82 y.o. female admitted with a medical diagnosis of Toxic encephalopathy.  She presents with the following impairments/functional limitations:  impaired endurance, weakness, impaired self care skills, impaired functional mobilty, gait instability, impaired balance, impaired cognition, decreased coordination, decreased lower extremity function, decreased safety awareness Pt. cooperative and tolerated treatment well. Pt. progressing with mobility, but required significant assist for initial stand and first few steps.    Rehab Prognosis: Fair; patient would benefit from acute skilled PT services to address these deficits and reach maximum level of function.    Recent Surgery: * No surgery found *      Plan:     During this hospitalization, patient to be seen 3 x/week to address the identified rehab impairments via gait training, therapeutic activities, therapeutic exercises and progress toward the following goals:    · Plan of Care Expires:  06/09/22    Subjective     Chief Complaint: NAD  Patient/Family Comments/goals: pt. Agreeable to PT  Pain/Comfort:  · Pain Rating 1:  (pt. did not rate)      Objective:     Communicated with nursing prior to session.  Patient found supine with bed alarm, peripheral IV, pulse ox (continuous), telemetry upon PT entry to room.     General Precautions: Standard, fall   Orthopedic Precautions:N/A   Braces: N/A  Respiratory Status: Room air     Functional Mobility:  · Bed Mobility:     · Rolling Left:  minimum assistance  · Scooting: minimum assistance  · Supine to Sit: minimum assistance  · Sit to Supine: minimum assistance  · Transfers:     · Sit to Stand:  Moderate-Maximum assistance with rolling walker  · Gait: 150' with RW  and Min-Mod A for guidance/balance/safety. Pt. amb. with decreased step length/danae  · Balance: fair sitting and poor standing      AM-PAC 6 CLICK MOBILITY  Turning over in bed (including adjusting bedclothes, sheets and blankets)?: 3  Sitting down on and standing up from a chair with arms (e.g., wheelchair, bedside commode, etc.): 2  Moving from lying on back to sitting on the side of the bed?: 3  Moving to and from a bed to a chair (including a wheelchair)?: 2  Need to walk in hospital room?: 3  Climbing 3-5 steps with a railing?: 2  Basic Mobility Total Score: 15       Therapeutic Activities and Exercises:   Discussed pt.'s progress, goals, and POC.    Patient left supine with all lines intact and call button in reach..    GOALS:   Multidisciplinary Problems     Physical Therapy Goals        Problem: Physical Therapy    Goal Priority Disciplines Outcome Goal Variances Interventions   Physical Therapy Goal     PT, PT/OT Ongoing, Progressing     Description: Goals to be met by: 2022     Patient will increase functional independence with mobility by performin. Supine to sit with Stand-by Assistance  2. Sit to supine with Stand-by Assistance  3. Sit to stand transfer with Stand-by Assistance  4. Bed to chair transfer with Stand-by Assistance using Rolling Walker  5. Gait  x 150 feet with Stand-by Assistance using Rolling Walker.   6. Lower extremity exercise program x15 reps per handout, with supervision                     Time Tracking:     PT Received On: 22  PT Start Time: 1433     PT Stop Time: 1448  PT Total Time (min): 15 min     Billable Minutes: Gait Training 15    Treatment Type: Treatment  PT/PTA: PT     PTA Visit Number: 0     2022

## 2022-05-16 NOTE — PT/OT/SLP PROGRESS
Speech Language Pathology      Temitope Suero  MRN: [id number]   [id number] A    Patient not seen today secondary to upon attempt, MD present in room and PCT giving bath. ST will follow up next therapy date per POC.

## 2022-05-17 LAB
BASOPHILS # BLD AUTO: 0.03 K/UL (ref 0–0.2)
BASOPHILS NFR BLD: 0.6 % (ref 0–1.9)
DIFFERENTIAL METHOD: ABNORMAL
EOSINOPHIL # BLD AUTO: 0 K/UL (ref 0–0.5)
EOSINOPHIL NFR BLD: 0.8 % (ref 0–8)
ERYTHROCYTE [DISTWIDTH] IN BLOOD BY AUTOMATED COUNT: 17.4 % (ref 11.5–14.5)
HCT VFR BLD AUTO: 44.6 % (ref 37–48.5)
HGB BLD-MCNC: 13.9 G/DL (ref 12–16)
IMM GRANULOCYTES # BLD AUTO: 0.02 K/UL (ref 0–0.04)
IMM GRANULOCYTES NFR BLD AUTO: 0.4 % (ref 0–0.5)
INR PPP: 2.2 (ref 0.8–1.2)
LYMPHOCYTES # BLD AUTO: 0.9 K/UL (ref 1–4.8)
LYMPHOCYTES NFR BLD: 17.5 % (ref 18–48)
MCH RBC QN AUTO: 24.6 PG (ref 27–31)
MCHC RBC AUTO-ENTMCNC: 31.2 G/DL (ref 32–36)
MCV RBC AUTO: 79 FL (ref 82–98)
MONOCYTES # BLD AUTO: 0.5 K/UL (ref 0.3–1)
MONOCYTES NFR BLD: 10.5 % (ref 4–15)
NEUTROPHILS # BLD AUTO: 3.5 K/UL (ref 1.8–7.7)
NEUTROPHILS NFR BLD: 70.2 % (ref 38–73)
NRBC BLD-RTO: 0 /100 WBC
PLATELET # BLD AUTO: 221 K/UL (ref 150–450)
PLATELET BLD QL SMEAR: ABNORMAL
PMV BLD AUTO: 12.7 FL (ref 9.2–12.9)
POCT GLUCOSE: 142 MG/DL (ref 70–110)
POCT GLUCOSE: 143 MG/DL (ref 70–110)
POCT GLUCOSE: 157 MG/DL (ref 70–110)
POCT GLUCOSE: 173 MG/DL (ref 70–110)
PROTHROMBIN TIME: 21.6 SEC (ref 9–12.5)
RBC # BLD AUTO: 5.66 M/UL (ref 4–5.4)
WBC # BLD AUTO: 5.03 K/UL (ref 3.9–12.7)

## 2022-05-17 PROCEDURE — 92526 ORAL FUNCTION THERAPY: CPT

## 2022-05-17 PROCEDURE — 97530 THERAPEUTIC ACTIVITIES: CPT

## 2022-05-17 PROCEDURE — 99232 PR SUBSEQUENT HOSPITAL CARE,LEVL II: ICD-10-PCS | Mod: ,,, | Performed by: STUDENT IN AN ORGANIZED HEALTH CARE EDUCATION/TRAINING PROGRAM

## 2022-05-17 PROCEDURE — 25000003 PHARM REV CODE 250: Performed by: STUDENT IN AN ORGANIZED HEALTH CARE EDUCATION/TRAINING PROGRAM

## 2022-05-17 PROCEDURE — 97535 SELF CARE MNGMENT TRAINING: CPT

## 2022-05-17 PROCEDURE — 85610 PROTHROMBIN TIME: CPT | Performed by: STUDENT IN AN ORGANIZED HEALTH CARE EDUCATION/TRAINING PROGRAM

## 2022-05-17 PROCEDURE — 36415 COLL VENOUS BLD VENIPUNCTURE: CPT | Performed by: STUDENT IN AN ORGANIZED HEALTH CARE EDUCATION/TRAINING PROGRAM

## 2022-05-17 PROCEDURE — 99232 SBSQ HOSP IP/OBS MODERATE 35: CPT | Mod: ,,, | Performed by: STUDENT IN AN ORGANIZED HEALTH CARE EDUCATION/TRAINING PROGRAM

## 2022-05-17 PROCEDURE — 25000003 PHARM REV CODE 250: Performed by: INTERNAL MEDICINE

## 2022-05-17 PROCEDURE — 25000003 PHARM REV CODE 250: Performed by: HOSPITALIST

## 2022-05-17 PROCEDURE — 20600001 HC STEP DOWN PRIVATE ROOM

## 2022-05-17 PROCEDURE — 85025 COMPLETE CBC W/AUTO DIFF WBC: CPT | Performed by: STUDENT IN AN ORGANIZED HEALTH CARE EDUCATION/TRAINING PROGRAM

## 2022-05-17 RX ADMIN — FUROSEMIDE 40 MG: 40 TABLET ORAL at 09:05

## 2022-05-17 RX ADMIN — METOPROLOL SUCCINATE 100 MG: 100 TABLET, EXTENDED RELEASE ORAL at 10:05

## 2022-05-17 RX ADMIN — HYDRALAZINE HYDROCHLORIDE 50 MG: 50 TABLET ORAL at 06:05

## 2022-05-17 RX ADMIN — ATORVASTATIN CALCIUM 40 MG: 40 TABLET, FILM COATED ORAL at 10:05

## 2022-05-17 RX ADMIN — SACUBITRIL AND VALSARTAN 1 TABLET: 24; 26 TABLET, FILM COATED ORAL at 09:05

## 2022-05-17 RX ADMIN — DONEPEZIL HYDROCHLORIDE 5 MG: 5 TABLET ORAL at 09:05

## 2022-05-17 RX ADMIN — SACUBITRIL AND VALSARTAN 1 TABLET: 24; 26 TABLET, FILM COATED ORAL at 10:05

## 2022-05-17 RX ADMIN — WARFARIN SODIUM 3 MG: 5 TABLET ORAL at 05:05

## 2022-05-17 RX ADMIN — HYDRALAZINE HYDROCHLORIDE 50 MG: 50 TABLET ORAL at 09:05

## 2022-05-17 RX ADMIN — HYDRALAZINE HYDROCHLORIDE 50 MG: 50 TABLET ORAL at 03:05

## 2022-05-17 RX ADMIN — FUROSEMIDE 40 MG: 40 TABLET ORAL at 10:05

## 2022-05-17 RX ADMIN — TRAZODONE HYDROCHLORIDE 100 MG: 100 TABLET ORAL at 09:05

## 2022-05-17 NOTE — CONSULTS
PHARMACY CONSULT NOTE: WARFARIN  Temitope Suero is a 82 y.o. female on warfarin therapy for left ventricular mural thrombus. PharmD has been consulted for warfarin dosing.    Assessment/Plan    Lab Results   Component Value Date    INR 2.2 (H) 05/17/2022    INR 2.3 (H) 05/16/2022    INR 2.4 (H) 05/15/2022       Augmentin discontinued. Likely was driving prior increase in INR.    1. Continue warfarin 3 mg PO daily  2. Goal INR 2-3  3. I have sent referral to [facility].    Thank you for the consult, will continue to follow  Reddy Stauffer Pharm.D., BCPS  [id number]      **Note: Consults are reviewed Monday-Friday 7:00am-3:30pm. THE ABOVE RECOMMENDATIONS ARE ONLY SUGGESTED.THE RECOMMENDATIONS SHOULD BE CONSIDERED IN CONJUNCTION WITH ALL PATIENT FACTORS. **

## 2022-05-17 NOTE — PLAN OF CARE
Problem: Adult Inpatient Plan of Care  Goal: Plan of Care Review  Outcome: Ongoing, Progressing  Goal: Patient-Specific Goal (Individualized)  Outcome: Ongoing, Progressing  Goal: Absence of Hospital-Acquired Illness or Injury  Outcome: Ongoing, Progressing  Goal: Optimal Comfort and Wellbeing  Outcome: Ongoing, Progressing  Goal: Readiness for Transition of Care  Outcome: Ongoing, Progressing     Problem: Diabetes Comorbidity  Goal: Blood Glucose Level Within Targeted Range  Outcome: Ongoing, Progressing     Problem: Fluid and Electrolyte Imbalance (Acute Kidney Injury/Impairment)  Goal: Fluid and Electrolyte Balance  Outcome: Ongoing, Progressing

## 2022-05-17 NOTE — NURSING
Pt remains confused, sleeps throughout this shift, stable, in RA. No pain noted, safety measures in place, bed alarm and tele sitter in place, Call light in reach.

## 2022-05-17 NOTE — SUBJECTIVE & OBJECTIVE
Interval History:  Patient doing well resting in bed.  Rested well overnight. Now stable and awake      Objective:     Vital Signs (Most Recent):  Temp: 97.9 °F (36.6 °C) (05/17/22 1626)  Pulse: 87 (05/17/22 1554)  Resp: 20 (05/17/22 1030)  BP: 113/67 (05/17/22 1528)  SpO2: 98 % (05/17/22 1030)   Vital Signs (24h Range):  Temp:  [97.5 °F (36.4 °C)-98.5 °F (36.9 °C)] 97.9 °F (36.6 °C)  Pulse:  [] 87  Resp:  [18-20] 20  SpO2:  [88 %-100 %] 98 %  BP: ()/(62-77) 113/67     Weight: 64 kg (141 lb 1.5 oz)  Body mass index is 22.1 kg/m².    Intake/Output Summary (Last 24 hours) at 5/17/2022 1806  Last data filed at 5/16/2022 2000  Gross per 24 hour   Intake --   Output 120 ml   Net -120 ml        Physical Exam  Constitutional:       Appearance: Normal appearance. She is well-developed.   HENT:      Head: Normocephalic and atraumatic.   Cardiovascular:      Rate and Rhythm: Normal rate and regular rhythm.      Heart sounds: No murmur heard.  Pulmonary:      Effort: Pulmonary effort is normal. No respiratory distress.      Breath sounds: Normal breath sounds. No wheezing or rales.   Abdominal:      General: There is no distension.      Palpations: Abdomen is soft.      Tenderness: There is no abdominal tenderness.   Musculoskeletal:         General: No deformity.   Skin:     General: Skin is warm.       Significant Labs: All pertinent labs within the past 24 hours have been reviewed.

## 2022-05-17 NOTE — PT/OT/SLP PROGRESS
"  Speech Language Pathology Treatment    Patient Name:  Temitope Suero   MRN:  [id number]  Admitting Diagnosis: Toxic encephalopathy    Recommendations:                 General Recommendations:  Dysphagia therapy   Diet recommendations:  Mechanical soft, Liquid Diet Level: Thin   Aspiration Precautions: 1 bite/sip at a time, Alternate means of nutrition/hydration, Assistance with meals, HOB to 90 degrees, Meds crushed in puree, Small bites/sips and Strict aspiration precautions   General Precautions: Standard, fall    Subjective     Patient asleep, roused given min A per SLP.  Cleared with RN prior to session.   "I ate already"    Pain/Comfort:   no c/o pain.     Respiratory Status: Room air    Objective:     Has the patient been evaluated by SLP for swallowing?   Yes  Keep patient NPO? No     Pt repositioned to upright with help of NSG. Patient with puree breakfast tray present, untouched. Though patient stated "I ate already".  Patient tolerated thin liquids via straw sips x8 along with regular solids x3 with no overt signs of airway compromise.  Patient with prolonged mastication, though adequate bolus formation.  Recommend continued mechanical soft (level 5) diet and thin liquids.  Skilled education was provided to patient  re: diet recs, standard aspiration precautions of which to follow, and ongoing ST plan of care.    Assessment:     Temitope Suero is a 82 y.o. female with an SLP diagnosis of Dysphagia and Cognitive-Linguistic Impairment    Goals:   Multidisciplinary Problems     SLP Goals        Problem: SLP    Goal Priority Disciplines Outcome   SLP Goal     SLP Ongoing, Progressing   Description: Speech-Language Pathology Goals  Goals to be met by 5/13/22  1. Patient will participate in ongoing swallow assessment in order to determine safest least restrictive diet  2. Patient will participate in a speech/language/cog eval.   3. Pt will complete problem solving tasks with 90% accy                    Plan: "     · Patient to be seen:  3 x/week   · Plan of Care expires:  06/05/22  · Plan of Care reviewed with:  patient   · SLP Follow-Up:  Yes       Discharge recommendations:  nursing facility, skilled       Time Tracking:     SLP Treatment Date:   05/17/22  Speech Start Time:  0813  Speech Stop Time:  0823     Speech Total Time (min):  10 min    Billable Minutes: Treatment Swallowing Dysfunction 10    05/17/2022

## 2022-05-17 NOTE — PROGRESS NOTES
[facility] - Intensive Care ([place] [id number])  [facility]  Progress Note    Patient Name: Temitope Suero  MRN: [id number]  Patient Class: IP- Inpatient   Admission Date: 5/5/2022  Length of Stay: 12 days  Attending Physician: Mirella Delgado MD  Primary Care Provider: Gm Zambrano MD        Subjective:     Principal Problem:Toxic encephalopathy        HPI:  Ms. Temitope Suero is a 82 y.o. female who presents to the ER by family for evaluation of a fall, AMS, and increased work of breath.  History is obtained from family Shelby, as patient is confused.  She reports that over the last 2 does - when she does talk, she wouldn't make sense.  She normally talks well and has fluent speech, but she does have some memory loss from an old stroke.  She has been having a dry cough, no fevers or chills - but has reported foul smelling urine.  She reports decreased PO intake the last week and that her mouth has been dry.  She did have a fall, but didn't hit her head.  She normally ambulates without DME.    Upon arrival to the ER, vitals were temp 98.4F, , /70 satting 96% on room air.  CXR showed a LLL PNA.  Head CT showed chronic ischemic changes.  Labs were notable for Trop 0.322, Creatinine 2.2, and Lactic 2.3.  She was given Vanc and Ceftriaxone and was admitted to Hospital Medicine.      Overview/Hospital Course:  Ms. Suero was admitted to Hospital Medicine for management of a toxic encephalopathy 2/2 LLL pneumonia.  She was started on Ceftriaxone/Azithro.  Her mentation was slow to improve.  SLP evaluated and cleared for diet.  She was given small IVF boluses for her MAC.  On the morning of 5/6, she was found to not be swallowing her food and chewing for a prolonged time.  She was made NPO.  She had a rapid response for diaphoresis and increased work of breathing.  Antibiotics were changed to Zosyn with concern for aspiration.  On 5/8, her mentation worsened again and she was not speaking or following commands.  2D  echo was ordered that showed a large, solid LV thrombus.  Head CT was ordered to r/o a bleed prior to starting Heparin which was negative.  She was started on Heparin.  The following morning, she found to be in new onset atrial flutter with HR 120s.  Cardiology was consulted.  Coreg was changed to Metoprolol and Entresto/Hydralazine were added.  Vascular Neurology was consulted given persistent mentation changes and new LV thrombus with concern for an acute ischemia event.  She was started on Warfarin, goal INR 2-3.  MRI brain was negative for a stroke.  Her mentation continued to improve.  PT/OT say SNF.      Interval History:  Patient doing well resting in bed.  Rested well overnight. Now stable and awake      Objective:     Vital Signs (Most Recent):  Temp: 97.9 °F (36.6 °C) (05/17/22 1626)  Pulse: 87 (05/17/22 1554)  Resp: 20 (05/17/22 1030)  BP: 113/67 (05/17/22 1528)  SpO2: 98 % (05/17/22 1030)   Vital Signs (24h Range):  Temp:  [97.5 °F (36.4 °C)-98.5 °F (36.9 °C)] 97.9 °F (36.6 °C)  Pulse:  [] 87  Resp:  [18-20] 20  SpO2:  [88 %-100 %] 98 %  BP: ()/(62-77) 113/67     Weight: 64 kg (141 lb 1.5 oz)  Body mass index is 22.1 kg/m².    Intake/Output Summary (Last 24 hours) at 5/17/2022 1806  Last data filed at 5/16/2022 2000  Gross per 24 hour   Intake --   Output 120 ml   Net -120 ml        Physical Exam  Constitutional:       Appearance: Normal appearance. She is well-developed.   HENT:      Head: Normocephalic and atraumatic.   Cardiovascular:      Rate and Rhythm: Normal rate and regular rhythm.      Heart sounds: No murmur heard.  Pulmonary:      Effort: Pulmonary effort is normal. No respiratory distress.      Breath sounds: Normal breath sounds. No wheezing or rales.   Abdominal:      General: There is no distension.      Palpations: Abdomen is soft.      Tenderness: There is no abdominal tenderness.   Musculoskeletal:         General: No deformity.   Skin:     General: Skin is warm.        Significant Labs: All pertinent labs within the past 24 hours have been reviewed.      Assessment/Plan:      * Toxic encephalopathy  · Likely 2/2 pneumonia, more suspicious for CVA given finding of LV thrombus  · Head CT and MRI brain both negative for acute ischemic event  · Vascular Neurology consulted  · SLP cleared for diet  · EEG negative for seizures  · Mentation continues to improve, however today she is more agitated and renal function is slightly worse, given 1 time dose of IV Haldol with minimal improvement, will add trazodone 100 mg q.h.s.    Moderate malnutrition  Nutrition consulted. Most recent weight and BMI monitored-          Malnutrition (Moderate to Severe)  Weight Loss (Malnutrition): 7.5% in 3 months              Measurements:  Wt Readings from Last 1 Encounters:   05/09/22 64 kg (141 lb 1.5 oz)   Body mass index is 22.1 kg/m².    Recommendations: Recommendation/Intervention: 1.  Goals: Meet % EEN, EPN by RD f/u date    Patient has been screened and assessed by RD. RD will follow patient.      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  · As above      Left lower lobe pneumonia  · CXR with LLL PNA  · Satting in the 90s on room air  · Afebrile and without leukocytosis but Lactic 2.3 on admission that trended down  · Started on Ceftriaxone and Azithro, switched to Zosyn on 5/6 and then to Augmentin to complete a 10 day course, end date on 5/15/22      NSTEMI (non-ST elevated myocardial infarction)  · Trop 0.322 on admit and flat  · No reports of chest pain  · EKG not ischemic  · Likely demand 2/2 infection  · Monitor      Acute kidney injury superimposed on CKD  · Creatinine 2.2 on  admit, baseline closer to 1.3 - 1.6 today, remains stable  · Possibly infection induced and Lasix/Benazepril  · Caution with IVF given CHF  · Renal US notable for chronic medical renal disease and 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  · -given 250 cc IV fluid bolus considering that patient appeared dry on exam on 5/14 however will continue Lasix  · No IV fluids given today, patient had good p.o. intake    Type 2 diabetes mellitus with stage 3 chronic kidney disease, without long-term current use of insulin  · HbA1c 7  · Home DM regimen:  Diet controlled  · SSI with POCT accuchecks to achieve blood glucose of 140-180 while hospitalized  · Diabetic diet, purred and nectar thick per SLP        Deterioration of memory  · Chronic issue  · Continue Aricept      Chronic systolic heart failure  · TTE on 5/6 shows EF 25-30%, G3DD, severe LAE, normal CVP, large solid LV thrombus  · Continue BB  · Cards started Entresto  · Monitor volume status closely    Chronic anticoagulation  · Was on Xarelto and changed to Eliquis in 2018 due to recurrent embolic strokes  · Now with a new large solid LV thrombus  · Continue Heparin with Warfarin bridge  · Pharmacy consulted for dosing      Stage 3 chronic kidney disease  · Chronic issue  · See MAC below      History of stroke  · Chronic and stable  · Head CT and MRI without acute infarct  · Continue Lipitor  · Vascular Neuro consult as above      Mixed hyperlipidemia  · Chronic and stable  · Continue Lipitor 40mg PO daily      Paroxysmal atrial fibrillation  · Chronic issue but now with new onset atrial flutter  · Anticoagulation as above  · Continue BB        VTE Risk Mitigation (From admission, onward)         Ordered     warfarin (COUMADIN) tablet 3 mg  Daily         05/16/22 0801                Discharge Planning   WALDEMAR: 5/17/2022     Code Status: Full Code   Is the patient medically ready for discharge?: Yes    Reason for patient still in hospital (select all that apply): Pending  disposition  Discharge Plan A: Skilled Nursing Facility   Discharge Delays: None known at this time              Mirella Delgado MD  Department of Hospital Medicine   [facility] - Intensive Care ([place])

## 2022-05-17 NOTE — PT/OT/SLP PROGRESS
Occupational Therapy   Treatment    Name: Temitope Suero  MRN: [id number]  Admitting Diagnosis:  Toxic encephalopathy       Recommendations:     Discharge Recommendations: nursing facility, skilled  Discharge Equipment Recommendations:  other (see comments) (TBD)  Barriers to discharge:  Other (Comment) (increased assistance with ADLs and functional mobility/transfers, high fall risk)    Assessment:     Temitope Sureo is a 82 y.o. female with a medical diagnosis of Toxic encephalopathy.  She presents with weakness, impaired endurance, impaired self care skills, impaired functional mobilty, impaired balance, impaired cognition, decreased coordination, decreased upper extremity function, decreased lower extremity function, decreased safety awareness, pain, impaired coordination, impaired fine motor.     Pt limited by confusion and easily distracted by lines and environment. Pt with 25-50% command following during session and required frequent redirection. Pt also limited by hearing impairment. Pt sat EOB with tactile cues and min A. Pt unable to sequence standing or follow commands to use RW to stand. Pt performed grooming at EOB. Pt returned self to supine and unable to appropriately participate in further skilled intervention.     Rehab Prognosis:  Fair; patient would benefit from acute skilled OT services to address these deficits and reach maximum level of function.       Plan:     Patient to be seen 4 x/week to address the above listed problems via self-care/home management, therapeutic activities, therapeutic exercises  · Plan of Care Expires: 06/05/22  · Plan of Care Reviewed with: patient    Subjective     Pain/Comfort:  · Pain Rating 1: other (see comments) (unrated pain to L foot)  · Pain Addressed 2: Cessation of Activity, Nurse notified    Objective:     Communicated with: RN prior to session.  Patient found HOB elevated with bed alarm, peripheral IV, pulse ox (continuous), telemetry, blood pressure cuff upon OT  entry to room.    General Precautions: Standard, fall   Orthopedic Precautions:N/A   Braces: N/A  Respiratory Status: Room air     Occupational Performance:     Bed Mobility:    · Supine to sit: Min A with tactile cues for initiation of bed mobility. Pt unable to follow verbal cues for sequencing of bed mobility tasks  · Sit to supine: SBA with assist for lines.   · Pt sat EOB with SBA to perform grooming task and attempt standing      Functional Mobility/Transfers:  · Pt unable to stand despite max verbal and tactile cues. Pt limited by confusion and distracted by environment. Pt given multiple opportunities to stand but unable. RW utilized as visual cue but pt did not benefit. Pt sat EOB and palpated mattress or arranged lines. Pt sat EOB to perform ADLs and for RN assessment.     Activities of Daily Living:  · Grooming: SBA and set up for face washing at EOB  · LBD: Max A to don socks in supine       [facility] 6 Click ADL: 14    Treatment & Education:  OT role, plan of care, progression of goals, role of OT, ADL/functional mobility and transfer retraining, safety precautions, fall prevention    Patient left HOB elevated with all lines intact, call button in reach, bed alarm on, RN notified and RN presentEducation:      GOALS:   Multidisciplinary Problems     Occupational Therapy Goals        Problem: Occupational Therapy    Goal Priority Disciplines Outcome Interventions   Occupational Therapy Goal     OT, PT/OT Ongoing, Progressing    Description: Goals to be met by: 5/23/22     Patient will increase functional independence with ADLs by performing:    Feeding with [place].  UE Dressing with Minimal Assistance.  LE Dressing with Minimal Assistance.  Grooming while standing with Minimal Assistance.  Toileting from toilet with Minimal Assistance for hygiene and clothing management.   Toilet transfer to toilet with Minimal Assistance.  Increased functional strength to WFL for increased participation in ADLs.                      Time Tracking:     OT Date of Treatment: 05/17/22  OT Start Time: 1007  OT Stop Time: 1032  OT Total Time (min): 25 min    Billable Minutes:Self Care/Home Management 15 minutes  Therapeutic Activity 10 minutes    OT/SILVIA: OT          5/17/2022

## 2022-05-18 VITALS
BODY MASS INDEX: 22.15 KG/M2 | DIASTOLIC BLOOD PRESSURE: 83 MMHG | SYSTOLIC BLOOD PRESSURE: 106 MMHG | RESPIRATION RATE: 24 BRPM | WEIGHT: 141.13 LBS | TEMPERATURE: 98 F | HEIGHT: 67 IN | OXYGEN SATURATION: 97 % | HEART RATE: 93 BPM

## 2022-05-18 LAB
INR PPP: 2.1 (ref 0.8–1.2)
POCT GLUCOSE: 125 MG/DL (ref 70–110)
PROTHROMBIN TIME: 20.9 SEC (ref 9–12.5)
SARS-COV-2 RNA RESP QL NAA+PROBE: NOT DETECTED

## 2022-05-18 PROCEDURE — U0005 INFEC AGEN DETEC AMPLI PROBE: HCPCS | Performed by: STUDENT IN AN ORGANIZED HEALTH CARE EDUCATION/TRAINING PROGRAM

## 2022-05-18 PROCEDURE — 97530 THERAPEUTIC ACTIVITIES: CPT | Mod: CQ

## 2022-05-18 PROCEDURE — 25000003 PHARM REV CODE 250: Performed by: INTERNAL MEDICINE

## 2022-05-18 PROCEDURE — U0003 INFECTIOUS AGENT DETECTION BY NUCLEIC ACID (DNA OR RNA); SEVERE ACUTE RESPIRATORY SYNDROME CORONAVIRUS 2 (SARS-COV-2) (CORONAVIRUS DISEASE [COVID-19]), AMPLIFIED PROBE TECHNIQUE, MAKING USE OF HIGH THROUGHPUT TECHNOLOGIES AS DESCRIBED BY CMS-2020-01-R: HCPCS | Performed by: STUDENT IN AN ORGANIZED HEALTH CARE EDUCATION/TRAINING PROGRAM

## 2022-05-18 PROCEDURE — 85610 PROTHROMBIN TIME: CPT | Performed by: STUDENT IN AN ORGANIZED HEALTH CARE EDUCATION/TRAINING PROGRAM

## 2022-05-18 PROCEDURE — 20600001 HC STEP DOWN PRIVATE ROOM

## 2022-05-18 PROCEDURE — 30200315 PPD INTRADERMAL TEST REV CODE 302: Performed by: STUDENT IN AN ORGANIZED HEALTH CARE EDUCATION/TRAINING PROGRAM

## 2022-05-18 PROCEDURE — 99239 PR HOSPITAL DISCHARGE DAY,>30 MIN: ICD-10-PCS | Mod: ,,, | Performed by: STUDENT IN AN ORGANIZED HEALTH CARE EDUCATION/TRAINING PROGRAM

## 2022-05-18 PROCEDURE — 99239 HOSP IP/OBS DSCHRG MGMT >30: CPT | Mod: ,,, | Performed by: STUDENT IN AN ORGANIZED HEALTH CARE EDUCATION/TRAINING PROGRAM

## 2022-05-18 PROCEDURE — 86580 TB INTRADERMAL TEST: CPT | Performed by: STUDENT IN AN ORGANIZED HEALTH CARE EDUCATION/TRAINING PROGRAM

## 2022-05-18 PROCEDURE — 25000003 PHARM REV CODE 250: Performed by: STUDENT IN AN ORGANIZED HEALTH CARE EDUCATION/TRAINING PROGRAM

## 2022-05-18 PROCEDURE — 25000003 PHARM REV CODE 250: Performed by: HOSPITALIST

## 2022-05-18 PROCEDURE — 97535 SELF CARE MNGMENT TRAINING: CPT

## 2022-05-18 PROCEDURE — 36415 COLL VENOUS BLD VENIPUNCTURE: CPT | Performed by: STUDENT IN AN ORGANIZED HEALTH CARE EDUCATION/TRAINING PROGRAM

## 2022-05-18 PROCEDURE — 97110 THERAPEUTIC EXERCISES: CPT | Mod: CQ

## 2022-05-18 RX ORDER — WARFARIN 4 MG/1
4 TABLET ORAL DAILY
Qty: 30 TABLET | Refills: 11 | Status: ON HOLD
Start: 2022-05-18 | End: 2022-01-01 | Stop reason: SDUPTHER

## 2022-05-18 RX ORDER — METOPROLOL SUCCINATE 100 MG/1
100 TABLET, EXTENDED RELEASE ORAL DAILY
Qty: 90 TABLET | Refills: 0 | Status: ON HOLD
Start: 2022-05-18 | End: 2022-01-01 | Stop reason: SDUPTHER

## 2022-05-18 RX ORDER — TRAZODONE HYDROCHLORIDE 100 MG/1
100 TABLET ORAL NIGHTLY PRN
Qty: 30 TABLET | Refills: 11 | Status: ON HOLD
Start: 2022-05-18 | End: 2022-01-01 | Stop reason: SDUPTHER

## 2022-05-18 RX ORDER — HYDRALAZINE HYDROCHLORIDE 50 MG/1
50 TABLET, FILM COATED ORAL EVERY 8 HOURS
Qty: 270 TABLET | Refills: 0 | Status: ON HOLD
Start: 2022-05-18 | End: 2022-01-01 | Stop reason: SDUPTHER

## 2022-05-18 RX ORDER — WARFARIN 1 MG/1
4 TABLET ORAL DAILY
Status: DISCONTINUED | OUTPATIENT
Start: 2022-05-18 | End: 2022-05-19 | Stop reason: HOSPADM

## 2022-05-18 RX ADMIN — SACUBITRIL AND VALSARTAN 1 TABLET: 24; 26 TABLET, FILM COATED ORAL at 09:05

## 2022-05-18 RX ADMIN — HYDRALAZINE HYDROCHLORIDE 50 MG: 50 TABLET ORAL at 06:05

## 2022-05-18 RX ADMIN — TUBERCULIN PURIFIED PROTEIN DERIVATIVE 5 UNITS: 5 INJECTION, SOLUTION INTRADERMAL at 03:05

## 2022-05-18 RX ADMIN — HYDRALAZINE HYDROCHLORIDE 50 MG: 50 TABLET ORAL at 03:05

## 2022-05-18 RX ADMIN — FUROSEMIDE 40 MG: 40 TABLET ORAL at 09:05

## 2022-05-18 RX ADMIN — METOPROLOL SUCCINATE 100 MG: 100 TABLET, EXTENDED RELEASE ORAL at 09:05

## 2022-05-18 RX ADMIN — ATORVASTATIN CALCIUM 40 MG: 40 TABLET, FILM COATED ORAL at 09:05

## 2022-05-18 NOTE — DISCHARGE SUMMARY
Discharge Summary  Department of Hospital Medicine      Date of Admit:  5/5/2022  Date of Discharge:  5/18/22    Chief Complaint/Reason for Admission:  Toxic encephalopathy    Discharge Diagnoses:   Principal Problem:  Toxic encephalopathy  Secondary Diagnoses:    Active Hospital Problems    Diagnosis  POA    *Toxic encephalopathy [G92.9]  Yes    Dermatitis associated with moisture [L30.8]  No    Moderate malnutrition [E44.0]  Unknown    Atrial flutter [I48.92]  No    LV (left ventricular) mural thrombus [I51.3]  Yes    Acute kidney injury superimposed on CKD [N17.9, N18.9]  Yes    NSTEMI (non-ST elevated myocardial infarction) [I21.4]  Yes    Left lower lobe pneumonia [J18.9]  Yes    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      Stage 3 chronic kidney disease [N18.30]  Yes    Chronic systolic heart failure [I50.22]  Yes     Echo 4-2018    1 - Moderately depressed left ventricular systolic function (EF 30-35%).     2 - Biatrial enlargement.     3 - Normal right ventricular systolic function .     4 - Mild mitral regurgitation.     5 - Mild tricuspid regurgitation.     6 - The estimated PA systolic pressure is 34 mmHg.     7 - Increased central venous pressure.     8 - Left pleural effusion.       History of stroke [Z86.73]  Not Applicable     R SCA remote infarct on imaging (unknown timing)      Mixed hyperlipidemia [E78.2]  Yes     Chronic    Type 2 diabetes mellitus with stage 3 chronic kidney disease, without long-term current use of insulin [E11.22, N18.30]  Yes     Chronic     Diet controlled.      Paroxysmal atrial fibrillation [I48.0]  Yes      Resolved Hospital Problems   No resolved problems to display.       Hospital Course:  HPI:  Ms. Temitope GRADY  Poi is a 82 y.o. female who presents to the ER by family for evaluation of a fall, AMS, and increased work of breath.  History is obtained from family [place], as patient is confused.  She reports that over the last 2 does - when she does talk, she wouldn't make sense.  She normally talks well and has fluent speech, but she does have some memory loss from an old stroke.  She has been having a dry cough, no fevers or chills - but has reported foul smelling urine.  She reports decreased PO intake the last week and that her mouth has been dry.  She did have a fall, but didn't hit her head.  She normally ambulates without DME.     Upon arrival to the ER, vitals were temp 98.4F, , /70 satting 96% on room air.  CXR showed a LLL PNA.  Head CT showed chronic ischemic changes.  Labs were notable for Trop 0.322, Creatinine 2.2, and Lactic 2.3.  She was given Vanc and Ceftriaxone and was admitted to Hospital Medicine.        Overview/Hospital Course:  Ms. Suero was admitted to Hospital Medicine for management of a toxic encephalopathy 2/2 LLL pneumonia.  She was started on Ceftriaxone/Azithro.  Her mentation was slow to improve.  SLP evaluated and cleared for diet.  She was given small IVF boluses for her MAC.  On the morning of 5/6, she was found to not be swallowing her food and chewing for a prolonged time.  She was made NPO.  She had a rapid response for diaphoresis and increased work of breathing.  Antibiotics were changed to Zosyn with concern for aspiration. She was treated for 5 days for PNA (transitioned to augmentin when more stable)  On 5/8, her mentation worsened again and she was not speaking or following commands.  2D echo was ordered that showed a large, solid LV thrombus.  Head CT was ordered to r/o a bleed prior to starting Heparin which was negative.  She was started on Heparin.  The following morning, she found to be in new onset atrial flutter with HR 120s.  Cardiology was consulted.  Coreg was  changed to Metoprolol and Entresto/Hydralazine were added.  Vascular Neurology was consulted given persistent mentation changes and new LV thrombus with concern for an acute ischemia event.  She was started on Warfarin, goal INR 2-3.  MRI brain was negative for a stroke.  Her mentation continued to improve, however she did have intermittent agitatioin and confusion which was attributed to insomnia/sundowning and she was started on prn trazodone which helped. PT/OT recommended discharge to SNF.    Consults:  Vascular neurology, cardiology  Procedures:  * No surgery found *  Significant Diagnostic Studies:  I have reviewed all pertinent lab results within the past 24 hours.  Imaging Results          X-Ray Chest AP Portable (Final result)  Result time 05/05/22 02:22:15    Final result by Ralph Shanks MD (05/05/22 02:22:15)                 Impression:      Cardiomegaly with mild interstitial edema.    Retrocardiac airspace opacity left base with obscuration of the left hemidiaphragm.  Finding may represent left basilar pneumonia or atelectasis.  No significant effusion identified.      Electronically signed by: Ralph Shanks MD  Date:    05/05/2022  Time:    02:22             Narrative:    EXAMINATION:  XR CHEST AP PORTABLE    CLINICAL HISTORY:  Altered mental status, unspecified    TECHNIQUE:  Single frontal view of the chest was performed.    COMPARISON:  08/12/2018.    FINDINGS:  Left basilar retrocardiac opacity with obscuration of the left hemidiaphragm.  Left CP angle appears sharp.    Minimal scarring right base.  Right CP angle appears sharp.  No airspace consolidation on the right.  Mild interstitial edema present.  No pneumothorax on either side.  Surgical clips overlie the axillary regions bilaterally.    Cardiomediastinal silhouette is enlarged, similar to prior.                               CT Cervical Spine Without Contrast (Final result)  Result time 05/05/22 01:28:58    Final result by Laura EVANS  MD Melo (05/05/22 01:28:58)                 Impression:      1. No CT evidence of acute cervical spine fracture or traumatic subluxation.  Clinical correlation and further evaluation as warranted.  2. Multilevel degenerative change of the cervical spine as discussed above.  3. Moderate to large volume of left pleural fluid.  Bilateral ground-glass attenuation of the visualized pulmonary parenchyma which can be seen with edema versus infectious or non infectious inflammatory etiologies.  Clinical correlation advised.  4. Enlarged thyroid suggestive of multinodular goiter with large bilateral thyroid lobe nodules.  Further evaluation with nonemergent ultrasound advised.      Electronically signed by: Laura Alejo MD  Date:    05/05/2022  Time:    01:28             Narrative:    EXAMINATION:  CT CERVICAL SPINE WITHOUT CONTRAST    CLINICAL HISTORY:  AMS;    TECHNIQUE:  Low dose axial images, sagittal and coronal reformations were performed though the cervical spine.  Contrast was not administered.    COMPARISON:  None    FINDINGS:  There is straightening and slight reversal of normal cervical lordosis.  There is minimal retrolisthesis of C3 on C4, presumably relating to significant degenerative change at this level.  Otherwise, cervical vertebral body alignment is within normal limits.  Vertebral body heights appear maintained.  The facet joints articulate appropriately.  No significant prevertebral soft tissue swelling.  There is multilevel intervertebral disc height loss most pronounced at the C3-C4 level.  There is multilevel degenerative change of the cervical spine consisting of posterior disc osteophyte complexes, uncovertebral joint DJD and facet arthropathy.  For example there is mild to moderate spinal canal stenosis at the C3-C4 level with bilateral moderate/severe neural foraminal narrowing at this level.    There is diffuse enlargement of the thyroid gland (left lobe slightly greater than left).  There  are multiple hypodense nodules throughout the thyroid suggestive of multinodular goiter.  There is a 2.4 cm left thyroid lobe nodule and 2.0 cm partially calcified right thyroid lobe nodule.  Further evaluation with nonemergent thyroid ultrasound is advised.  There is a moderate/large volume of incompletely visualized left pleural fluid present.  There is ground-glass attenuation of the visualized pulmonary parenchyma which can be seen with edema versus infectious or non infectious inflammatory etiologies.                               CT Head Without Contrast (Final result)  Result time 05/05/22 01:38:08    Final result by Laura Alejo MD (05/05/22 01:38:08)                 Impression:      Redemonstration of multifocal remote infarcts throughout the bilateral cerebral hemispheres and right cerebellum as discussed above.  Findings superimposed upon a background of chronic microvascular ischemic change.  If there is clinical concern for acute ischemia, further evaluation with MRI is advised if there are no clinical contraindications.    Allowing for patient motion/streak artifact.  No definite CT evidence of acute intracranial abnormality.  Further evaluation as clinically warranted.      Electronically signed by: Laura Alejo MD  Date:    05/05/2022  Time:    01:38             Narrative:    EXAMINATION:  CT HEAD WITHOUT CONTRAST    CLINICAL HISTORY:  AMS;    TECHNIQUE:  Low dose axial images were obtained through the head.  Coronal and sagittal reformations were also performed. Contrast was not administered.    COMPARISON:  Head CT and MRI brain 08/13/2018    FINDINGS:  Head CT:    Please note image quality is degraded by patient motion/streak artifact.  This limits evaluation of posterior fossa structures in particular.  Allowing for this, there is no definite evidence of acute intracranial hemorrhage, hydrocephalus, midline shift or mass effect.  Brain parenchyma appears similar to prior exam with several  regions of encephalomalacia in keeping with remote infarcts in the right frontal lobe, left parietal lobe, paramedian right occipital lobe, and right cerebellum.  There is additional hypoattenuation in the supratentorial white matter which is nonspecific but likely reflect sequela of chronic microvascular ischemic change.  The basal cisterns are patent. The mastoid air cells and visualized paranasal sinuses are clear of acute process.  The visualized bones of the calvarium demonstrate no acute osseous abnormality noting hyperostosis frontalis interna.                                Discharge Medications:  Reconciled Home Medications:      Medication List      START taking these medications    hydrALAZINE 50 MG tablet  Commonly known as: APRESOLINE  Take 1 tablet (50 mg total) by mouth every 8 (eight) hours.     metoprolol succinate 100 MG 24 hr tablet  Commonly known as: TOPROL-XL  Take 1 tablet (100 mg total) by mouth once daily.     sacubitriL-valsartan 24-26 mg per tablet  Commonly known as: ENTRESTO  Take 1 tablet by mouth 2 (two) times daily.     traZODone 100 MG tablet  Commonly known as: DESYREL  Take 1 tablet (100 mg total) by mouth nightly as needed for Insomnia.     warfarin 4 MG tablet  Commonly known as: COUMADIN  Take 1 tablet (4 mg total) by mouth Daily.        CONTINUE taking these medications    atorvastatin 40 MG tablet  Commonly known as: LIPITOR  Take 1 tablet (40 mg total) by mouth once daily.     donepeziL 5 MG tablet  Commonly known as: ARICEPT  Take 1 tablet (5 mg total) by mouth every evening.     furosemide 40 MG tablet  Commonly known as: LASIX  TAKE 1 TABLET BY MOUTH TWICE A DAY     latanoprost 0.005 % ophthalmic solution  Place 1 drop into both eyes every evening.        STOP taking these medications    apixaban 2.5 mg Tab  Commonly known as: ELIQUIS     benazepriL 20 MG tablet  Commonly known as: LOTENSIN     carvediloL 25 MG tablet  Commonly known as: COREG          Discharge Procedure  Orders   Ambulatory referral/consult to Anticoagulation Monitoring   Standing Status: Future   Referral Priority: Routine Referral Type: Consultation   Referral Reason: Specialty Services Required   Requested Specialty: Cardiology         Pending Studies:  None  Condition: stable  Disposition: Nursing Facility  Time Spent Coordinating Care for Discharge:  Greater than 30 minutes  Last Recorded LACE+ Scores     None

## 2022-05-18 NOTE — PT/OT/SLP PROGRESS
Speech Language Pathology      Temitope Suero  MRN: [id number]   [id number] A    Patient not seen today secondary to patient unable to adequately rouse for ST. Attempted from [id number]. Lights on and HOB elevated. Patient opened eyes to sternal rub and auditory cues, however, immediately closed them. She did not answer SLP questions, verbalize, or follow simple directions. She did not attempt to accept sips of water. ST session ended 2/2 poor participation.

## 2022-05-18 NOTE — PT/OT/SLP PROGRESS
"Occupational Therapy   Treatment    Name: Temitope Suero  MRN: [id number]  Admitting Diagnosis:  Toxic encephalopathy       Recommendations:     Discharge Recommendations: nursing facility, skilled  Discharge Equipment Recommendations:  other (see comments) (TBD)  Barriers to discharge:  Other (Comment) (increased assistance required with ADLs)    Assessment:     Temitope Suero is a 82 y.o. female with a medical diagnosis of Toxic encephalopathy.  She presents with the following performance deficits affecting function:  weakness, impaired endurance, impaired self care skills, impaired functional mobilty, gait instability, impaired balance, impaired cognition, decreased upper extremity function, decreased lower extremity function.     Pt agreeable to therapy, but remains limited by her decreased cognition. Pt sat EOB this date and participated in feeding with Min A due to frequent verbal and physical cues needed to complete the task. Pt also attempting to use her fork as a hair brush and scoop up food that did not exist on the sheets. Pt constantly appearing as if she is searching for something and fidgeting with her sheets. Pt would benefit from continued skilled acute OT services in order to maximize independence and safety with ADLs and functional mobility to ensure safe return to PLOF in the least restrictive environment. OT recommending SNF once pt is medically appropriate for d/c.     Rehab Prognosis:  Fair; patient would benefit from acute skilled OT services to address these deficits and reach maximum level of function.       Plan:     Patient to be seen 4 x/week to address the above listed problems via self-care/home management, therapeutic activities, therapeutic exercises  · Plan of Care Expires: 06/05/22  · Plan of Care Reviewed with: patient    Subjective     "I'm hungry"     Pain/Comfort:  · Pain Rating 1: 0/10    Objective:     Communicated with: RN prior to session.  Patient found HOB elevated with bed " alarm, peripheral IV, pulse ox (continuous), telemetry, blood pressure cuff upon OT entry to room.    General Precautions: Standard, fall   Orthopedic Precautions:N/A   Braces: N/A  Respiratory Status: Room air     Occupational Performance:     Bed Mobility:    · Patient completed Rolling/Turning to Right with maximal assistance  · Patient completed Scooting/Bridging with maximal assistance  · Patient completed Supine to Sit with maximal assistance  · Patient completed Sit to Supine with maximal assistance   · Pt sat EOB ~30 minutes with CGA-SBA that progressed over time     Functional Mobility/Transfers:  · Not performed this date     Activities of Daily Living:  · Feeding:  minimum assistance : To eat lunch sitting unsupported EOB. Pt required frequent verbal and physical cues to perform the task. Therapist had to assist in initating and carrying out the task. Once Pt recognized the familiar task and got into a rhythm, She was able to eat ~50% of her meal and set up to finish the rest in bed. RN notified.       [facility] 6 Click ADL: 14    Treatment & Education:   Therapist provided facilitation and instruction of proper body mechanics and fall prevention strategies during tasks listed above.   Instructed patient to use call light to have nursing staff assist with needs/transfers.   Discussed OT POC and answered all questions within OT scope of practice.   Whiteboard updated       Patient left HOB elevated with all lines intact, call button in reach and RN notifiedEducation:      GOALS:   Multidisciplinary Problems     Occupational Therapy Goals        Problem: Occupational Therapy    Goal Priority Disciplines Outcome Interventions   Occupational Therapy Goal     OT, PT/OT Ongoing, Progressing    Description: Goals to be met by: 5/23/22     Patient will increase functional independence with ADLs by performing:    Feeding with [place].  UE Dressing with Minimal Assistance.  LE Dressing with Minimal  Assistance.  Grooming while standing with Minimal Assistance.  Toileting from toilet with Minimal Assistance for hygiene and clothing management.   Toilet transfer to toilet with Minimal Assistance.  Increased functional strength to WFL for increased participation in ADLs.                     Time Tracking:     OT Date of Treatment: 05/18/22  OT Start Time: 1351  OT Stop Time: 1429  OT Total Time (min): 38 min    Billable Minutes:Self Care/Home Management 38    OT/SILVIA: OT          5/18/2022

## 2022-05-18 NOTE — PLAN OF CARE
CAROLINA in communication with Connie with [facility]. Patient expected to dc there tomorrow morning.           Rocío Saavedra LMSW  [facility]   [phone]

## 2022-05-18 NOTE — CONSULTS
PHARMACY CONSULT NOTE: WARFARIN  Temitope Suero is a 82 y.o. female on warfarin therapy for left ventricular mural thrombus. PharmD has been consulted for warfarin dosing.    Assessment/Plan    Lab Results   Component Value Date    INR 2.1 (H) 05/18/2022    INR 2.2 (H) 05/17/2022    INR 2.3 (H) 05/16/2022       Augmentin discontinued. Likely was driving prior increase in INR.    1. Increase to warfarin 4 mg PO daily today  2. Goal INR 2-3; obtain INR daily while inpatient  3. I have sent referral to [facility].    Thank you for the consult, will continue to follow  Reddy Stauffer PharmRumaD., BCPS  [id number]      **Note: Consults are reviewed Monday-Friday 7:00am-3:30pm. THE ABOVE RECOMMENDATIONS ARE ONLY SUGGESTED.THE RECOMMENDATIONS SHOULD BE CONSIDERED IN CONJUNCTION WITH ALL PATIENT FACTORS. **

## 2022-05-18 NOTE — PT/OT/SLP PROGRESS
Physical Therapy Treatment    Patient Name:  Temitope Suero   MRN:  [id number]    Recommendations:     Discharge Recommendations:  nursing facility, skilled   Discharge Equipment Recommendations: other (see comments) (Pt confusion unable to accurately assess)   Barriers to discharge: confused state    Assessment:     Temitope Suero is a 82 y.o. female admitted with a medical diagnosis of Toxic encephalopathy.  She presents with the following impairments/functional limitations:  weakness, impaired endurance, impaired self care skills, impaired functional mobilty, gait instability, impaired balance, impaired cognition, decreased coordination, decreased upper extremity function, decreased lower extremity function, decreased safety awareness, pain, impaired coordination, impaired fine motor .Pt did not tolerate session well today secondary to confusion and distracted by environment. Pt did not attempt to assist with sit to stand activities and could not perform consecutive reps of exercises. Pt was only alert to name. Pt would benefit from PT to ensure Pt is safe for ambulatory and functional activity upon discharge. PT goals remain appropriate.    Rehab Prognosis: Fair; patient would benefit from acute skilled PT services to address these deficits and reach maximum level of function.    Recent Surgery: * No surgery found *      Plan:     During this hospitalization, patient to be seen 3 x/week to address the identified rehab impairments via gait training, therapeutic activities, therapeutic exercises and progress toward the following goals:    · Plan of Care Expires:  06/09/22    Subjective     Chief Complaint: Pt alert and oriented x1  Patient/Family Comments/goals: None  Pain/Comfort:  · Pain Rating 1:  (not rated)  · Pain Rating Post-Intervention 1:  (not rated)  · Pain Addressed 2: Cessation of Activity      Objective:     Communicated with Nurse prior to session.  Patient found HOB elevated with bed alarm, peripheral IV,  pulse ox (continuous), telemetry, blood pressure cuff upon PT entry to room.     General Precautions: Standard, fall   Orthopedic Precautions:N/A   Braces: N/A  Respiratory Status: Room air     Functional Mobility:  · Bed Mobility:     · Rolling Left:  minimum assistance for LE management and total assist for hand placement  · Supine to Sit: minimum assistance and Max asssit for hand and feet placement.  · Sit to Supine:  moderate assistnace for hand placement and handling LE  · Transfers:     · Sit to Stand:  total assistance, of 2 persons and Pt unable to enage in activity but tolerated standing fairly with rolling walker  X 3 trials    AM-PAC 6 CLICK MOBILITY  Turning over in bed (including adjusting bedclothes, sheets and blankets)?: 3  Sitting down on and standing up from a chair with arms (e.g., wheelchair, bedside commode, etc.): 2  Moving from lying on back to sitting on the side of the bed?: 3  Moving to and from a bed to a chair (including a wheelchair)?: 2  Need to walk in hospital room?: 3  Climbing 3-5 steps with a railing?: 2  Basic Mobility Total Score: 15       Therapeutic Activities and Exercises:  Downed back of gown  Updated white board  Pt bedside table was set up for convenience and safety.  Pt attempted to perform LE exercises but was unable to continue despite maximal cueing and direction secondary to confused and distracted by environment.  Sit to stand attempted 3x and was only successful with 1 total assist of 2 secondary to Pt unable to actively participate with sit<> stand attempt due to confusion .    Patient left HOB elevated with all lines intact, call button in reach and bed alarm on..    GOALS:   Multidisciplinary Problems     Physical Therapy Goals        Problem: Physical Therapy    Goal Priority Disciplines Outcome Goal Variances Interventions   Physical Therapy Goal     PT, PT/OT Ongoing, Progressing     Description: Goals to be met by: 5/24/2022     Patient will increase  functional independence with mobility by performin. Supine to sit with Stand-by Assistance  2. Sit to supine with Stand-by Assistance  3. Sit to stand transfer with Stand-by Assistance  4. Bed to chair transfer with Stand-by Assistance using Rolling Walker  5. Gait  x 150 feet with Stand-by Assistance using Rolling Walker.   6. Lower extremity exercise program x15 reps per handout, with supervision                     Time Tracking:     PT Received On: 22  PT Start Time: 1028     PT Stop Time: 1054  PT Total Time (min): 26 min     Billable Minutes: Therapeutic Activity 13 and Therapeutic Exercise 13    Treatment Type: Treatment  PT/PTA: PTA     PTA Visit Number: 1     2022

## 2022-05-18 NOTE — PLAN OF CARE
Pt alert to name and date of birth, engages in conversation and cooperative with care. Safety maintained, call light within reach.  Problem: Adult Inpatient Plan of Care  Goal: Plan of Care Review  Outcome: Ongoing, Progressing  Goal: Patient-Specific Goal (Individualized)  Outcome: Ongoing, Progressing  Goal: Absence of Hospital-Acquired Illness or Injury  Outcome: Ongoing, Progressing  Goal: Optimal Comfort and Wellbeing  Outcome: Ongoing, Progressing  Goal: Readiness for Transition of Care  Outcome: Ongoing, Progressing     Problem: Diabetes Comorbidity  Goal: Blood Glucose Level Within Targeted Range  Outcome: Ongoing, Progressing     Problem: Fluid and Electrolyte Imbalance (Acute Kidney Injury/Impairment)  Goal: Fluid and Electrolyte Balance  Outcome: Ongoing, Progressing     Problem: Oral Intake Inadequate (Acute Kidney Injury/Impairment)  Goal: Optimal Nutrition Intake  Outcome: Ongoing, Progressing     Problem: Renal Function Impairment (Acute Kidney Injury/Impairment)  Goal: Effective Renal Function  Outcome: Ongoing, Progressing     Problem: Fluid Imbalance (Pneumonia)  Goal: Fluid Balance  Outcome: Ongoing, Progressing     Problem: Infection (Pneumonia)  Goal: Resolution of Infection Signs and Symptoms  Outcome: Ongoing, Progressing     Problem: Respiratory Compromise (Pneumonia)  Goal: Effective Oxygenation and Ventilation  Outcome: Ongoing, Progressing     Problem: Skin Injury Risk Increased  Goal: Skin Health and Integrity  Outcome: Ongoing, Progressing     Problem: Impaired Wound Healing  Goal: Optimal Wound Healing  Outcome: Ongoing, Progressing     Problem: Fall Injury Risk  Goal: Absence of Fall and Fall-Related Injury  Outcome: Ongoing, Progressing

## 2022-05-18 NOTE — PLAN OF CARE
05/18/22 1648   Post-Acute Status   Post-Acute Authorization Placement   Post-Acute Placement Status Set-up Complete/Auth obtained     Patient will dc to [facility] today . [facility] has arranged transport for 5:30pm. Nurse call report to [phone]. Patient will admit to room 27a        Rocío Saavedra LMSW  [facility]   [id number]

## 2022-05-18 NOTE — PLAN OF CARE
NURSING HOME ORDERS    05/18/2022  [facility]  [facility] INT[facility] ([place])  [address]  [facility] [address]  Dept: [phone]  Loc: [phone]     Admit to Nursing Home:  Skilled bed    Diagnoses:  Active Hospital Problems    Diagnosis  POA    *Toxic encephalopathy [G92.9]  Yes    Dermatitis associated with moisture [L30.8]  No    Moderate malnutrition [E44.0]  Unknown    Atrial flutter [I48.92]  No    LV (left ventricular) mural thrombus [I51.3]  Yes    Acute kidney injury superimposed on CKD [N17.9, N18.9]  Yes    NSTEMI (non-ST elevated myocardial infarction) [I21.4]  Yes    Left lower lobe pneumonia [J18.9]  Yes    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      Stage 3 chronic kidney disease [N18.30]  Yes    Chronic systolic heart failure [I50.22]  Yes     Echo 4-2018    1 - Moderately depressed left ventricular systolic function (EF 30-35%).     2 - Biatrial enlargement.     3 - Normal right ventricular systolic function .     4 - Mild mitral regurgitation.     5 - Mild tricuspid regurgitation.     6 - The estimated PA systolic pressure is 34 mmHg.     7 - Increased central venous pressure.     8 - Left pleural effusion.       History of stroke [Z86.73]  Not Applicable     R SCA remote infarct on imaging (unknown timing)      Mixed hyperlipidemia [E78.2]  Yes     Chronic    Type 2 diabetes mellitus with stage 3 chronic kidney disease, without long-term current use of insulin [E11.22, N18.30]  Yes     Chronic     Diet controlled.      Paroxysmal atrial fibrillation [I48.0]  Yes      Resolved Hospital Problems   No resolved problems to display.       Code Status: FULL    Patient is homebound due to:   Toxic encephalopathy    Allergies:Review of patient's allergies indicates:  No Known Allergies    Vitals:  Routine    Diet: soft diet (diabetic)    Activities:   Activity as tolerated    Labs:  INR weekly    Nursing Precautions:  Aspiration , Fall and Pressure ulcer prevention    Consults:   PT to evaluate and treat- 3 times a week, OT to evaluate and treat- 3 times a week, ST to evaluate and treat- 3 times a week and Nutrition to evaluate and recommend diet     Miscellaneous Care: Routine Skin for Bedridden Patients:  Apply moisture barrier cream to all  Diabetes Care: Diabetes: Check blood sugar. Fingerstick blood sugar AC and HS                   Diabetes Care:  SN to perform and educate Diabetic management with blood glucose monitoring:      Medications: Discontinue all previous medication orders, if any. See new list below.     Medication List      START taking these medications    hydrALAZINE 50 MG tablet  Commonly known as: APRESOLINE  Take 1 tablet (50 mg total) by mouth every 8 (eight) hours.     metoprolol succinate 100 MG 24 hr tablet  Commonly known as: TOPROL-XL  Take 1 tablet (100 mg total) by mouth once daily.     sacubitriL-valsartan 24-26 mg per tablet  Commonly known as: ENTRESTO  Take 1 tablet by mouth 2 (two) times daily.     traZODone 100 MG tablet  Commonly known as: DESYREL  Take 1 tablet (100 mg total) by mouth nightly as needed for Insomnia.     warfarin 4 MG tablet  Commonly known as: COUMADIN  Take 1 tablet (4 mg total) by mouth Daily.        CONTINUE taking these medications    atorvastatin 40 MG tablet  Commonly known as: LIPITOR  Take 1 tablet (40 mg total) by mouth once daily.     donepeziL 5 MG tablet  Commonly known as: ARICEPT  Take 1 tablet (5 mg total) by mouth every evening.     furosemide 40 MG tablet  Commonly known as: LASIX  TAKE 1 TABLET BY MOUTH TWICE A DAY     latanoprost 0.005 % ophthalmic solution  Place 1 drop into both eyes every evening.        STOP taking these  medications    apixaban 2.5 mg Tab  Commonly known as: ELIQUIS     benazepriL 20 MG tablet  Commonly known as: LOTENSIN     carvediloL 25 MG tablet  Commonly known as: COREG              Immunizations Administered as of 5/18/2022     Name Date Dose VIS Date Route Exp Date    COVID-19, MRNA, LN-S, PF (Moderna) 4/19/2021 100 mcg -- Intramuscular --    Site: Left deltoid     Lot: [id number]     COVID-19, MRNA, LN-S, PF (Moderna) 3/22/2021 100 mcg -- Intramuscular --    Site: Right deltoid     Lot: [id number]           This patient has had both covid vaccinations    Some patients may experience side effects after vaccination.  These may include fever, headache, muscle or joint aches.  Most symptoms resolve with 24-48 hours and do not require urgent medical evaluation unless they persist for more than 72 hours or symptoms are concerning for an unrelated medical condition.          _________________________________  Mirella Delgado MD  05/18/2022

## 2022-05-18 NOTE — PLAN OF CARE
[facility] ([facility]  Discharge Final Note    Primary Care Provider: Gm Zambrano MD    Expected Discharge Date: 5/19/2022    Final Discharge Note (most recent)       Final Note - 05/18/22 1649          Final Note    Assessment Type Final Discharge Note (P)      Anticipated Discharge Disposition Skilled Nursing Facility (P)      Hospital Resources/Appts/Education Provided Provided patient/caregiver with written discharge plan information;Appointments scheduled and added to AVS (P)                      Important Message from Medicare           Patient discharging to [facility] today. Transport scheduled for 5:30pm.      Future Appointments   Date Time Provider Department Center   9/8/2022  9:20 AM Gm Zambrano Jr., MD [facility] PCMIGUEL A Saavedra LMSW  [facility]   [id number]

## 2022-05-19 NOTE — PHYSICIAN QUERY
PT Name: Temitope Suero  MR #: [id number]     Documentation Clarification      CDS/:  Donaldo Rowell RN, CDS                   Contact Information:  [email or web address]    This form is a permanent document in the medical record.     Query Date: May 19, 2022    By submitting this query, we are merely seeking further clarification of documentation. Please utilize your independent clinical judgment when addressing the question(s) below.    The Medical Record reflects the following:    Supporting Clinical Findings Location in Medical Record   Left lower lobe pneumonia  ·CXR with LLL PNA  ·Satting in the 90s on room air  ·Afebrile and without leukocytosis but Lactic 2.3 on admission that trended down  ·Started on Ceftriaxone and Azithro, switched to Zosyn on 5/6 and then to Augmentin to complete a 10 day course, end date on 5/15/22    Dr. Nelson suspecting aspiration and antibiotics adjusted   HM PN 5/17              Rapid Response NN 5/6     Retrocardiac airspace opacity left base with obscuration of the left hemidiaphragm.  Finding may represent left basilar pneumonia or atelectasis.    Moderate to large volume of left pleural fluid.  Bilateral ground-glass attenuation of the visualized pulmonary parenchyma which can be seen with edema versus infectious or non infectious inflammatory etiologies   CXR 5/5      CT Cervical Spine 5/5   Ms. Suero was admitted to Hospital Medicine for management of a toxic encephalopathy 2/2 LLL pneumonia.  She was started on Ceftriaxone/Azithro.  Her mentation was slow to improve.  SLP evaluated and cleared for diet.  She was given small IVF boluses for her MAC.     On the morning of 5/6, she was found to not be swallowing her food and chewing for a prolonged time.  She was made NPO.      She had a rapid response for diaphoresis and increased work of breathing.  Antibiotics were changed to Zosyn with concern for aspiration.   DCS 5/18                                                                             Provider, please clarify diagnosis of Pneumonia associated with above clinical findings.    [  x ] Aspiration PNA     [   ] LLL PNA      [   ] Other (please specify): ____________     [   ] Clinically undetermined

## 2022-05-19 NOTE — NURSING
Transport picked up patient around 1930 to SNF, Pt stable no s/s distress, IV, Tele leads removed, dentures and other belongings handed to transporter.

## 2022-05-19 NOTE — PLAN OF CARE
Patient completed all meals, tolerated meds, interacted with staff. Tolerated PT/OT, safety maintained.  Problem: Adult Inpatient Plan of Care  Goal: Plan of Care Review  Outcome: Adequate for Care Transition  Goal: Patient-Specific Goal (Individualized)  Outcome: Adequate for Care Transition  Goal: Absence of Hospital-Acquired Illness or Injury  Outcome: Adequate for Care Transition  Goal: Optimal Comfort and Wellbeing  Outcome: Adequate for Care Transition  Goal: Readiness for Transition of Care  Outcome: Adequate for Care Transition     Problem: Diabetes Comorbidity  Goal: Blood Glucose Level Within Targeted Range  Outcome: Adequate for Care Transition     Problem: Fluid and Electrolyte Imbalance (Acute Kidney Injury/Impairment)  Goal: Fluid and Electrolyte Balance  Outcome: Adequate for Care Transition     Problem: Oral Intake Inadequate (Acute Kidney Injury/Impairment)  Goal: Optimal Nutrition Intake  Outcome: Adequate for Care Transition     Problem: Renal Function Impairment (Acute Kidney Injury/Impairment)  Goal: Effective Renal Function  Outcome: Adequate for Care Transition     Problem: Fluid Imbalance (Pneumonia)  Goal: Fluid Balance  Outcome: Adequate for Care Transition     Problem: Infection (Pneumonia)  Goal: Resolution of Infection Signs and Symptoms  Outcome: Adequate for Care Transition     Problem: Respiratory Compromise (Pneumonia)  Goal: Effective Oxygenation and Ventilation  Outcome: Adequate for Care Transition     Problem: Skin Injury Risk Increased  Goal: Skin Health and Integrity  Outcome: Adequate for Care Transition     Problem: Impaired Wound Healing  Goal: Optimal Wound Healing  Outcome: Adequate for Care Transition     Problem: Fall Injury Risk  Goal: Absence of Fall and Fall-Related Injury  Outcome: Adequate for Care Transition      Known

## 2022-05-20 ENCOUNTER — TELEPHONE (OUTPATIENT)
Dept: INTERNAL MEDICINE | Facility: CLINIC | Age: 82
End: 2022-05-20
Payer: COMMERCIAL

## 2022-05-20 NOTE — TELEPHONE ENCOUNTER
Notified bryon that she would have to contact the insurance to see which nursing homes are covered. If anything needed to be signed off by Dr. Zambrano, she would have to let nursing home staff know to fax over forms so that it can be signed and faxed back over.     Celena DIAS

## 2022-05-20 NOTE — TELEPHONE ENCOUNTER
----- Message from Simran Mabry sent at 5/20/2022  3:16 PM CDT -----  Contact: Magdalene care giver   Pt's niece called in regards to the patient was released from  the hospital on 05/18/22 and is now in a nursing home she would like to know if she can be transferred to another she is not please advise with this one please advise

## 2022-05-31 PROBLEM — I70.229 CRITICAL LOWER LIMB ISCHEMIA: Status: ACTIVE | Noted: 2022-01-01

## 2022-05-31 PROBLEM — N18.9 ACUTE KIDNEY INJURY SUPERIMPOSED ON CKD: Status: RESOLVED | Noted: 2022-05-05 | Resolved: 2022-01-01

## 2022-05-31 PROBLEM — J18.9 LEFT LOWER LOBE PNEUMONIA: Status: RESOLVED | Noted: 2022-05-05 | Resolved: 2022-01-01

## 2022-05-31 PROBLEM — I21.4 NSTEMI (NON-ST ELEVATED MYOCARDIAL INFARCTION): Status: RESOLVED | Noted: 2022-05-05 | Resolved: 2022-01-01
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
## 2022-05-31 PROBLEM — G92.9 TOXIC ENCEPHALOPATHY: Status: RESOLVED | Noted: 2022-05-05 | Resolved: 2022-01-01

## 2022-05-31 PROBLEM — N17.9 ACUTE KIDNEY INJURY SUPERIMPOSED ON CKD: Status: RESOLVED | Noted: 2022-05-05 | Resolved: 2022-01-01

## 2022-05-31 NOTE — PT/OT/SLP EVAL
Speech Language Pathology Evaluation  Cognitive/Bedside Swallow    Patient Name:  Temitope Suero   MRN:  [id number]  Admitting Diagnosis: Critical lower limb ischemia    Recommendations:                  General Recommendations:  Dysphagia therapy and Cognitive-linguistic therapy  Diet recommendations:  Regular, Thin   Aspiration Precautions: Small bites/sips and Standard aspiration precautions   General Precautions: Standard, fall  Communication strategies:  provide increased time to answer    History:     Past Medical History:   Diagnosis Date    Cancer of left breast, stage 2 11/24/2015    Cataract     Cerebrovascular small vessel disease 04/11/2018    Bi-thalamic lacunar disease, mod/sev periventricular white matter disease, mixed pattern cerebral microbleeds    Cervicalgia 04/17/2018    Chronic anticoagulation - apixaban 04/17/2018    Previous on Xarelto but changed to apixaban 8-2018 due to recurrent embolic stroke.    Chronic systolic heart failure 04/17/2018    Echo 4-2018   1 - Moderately depressed left ventricular systolic function (EF 30-35%).    2 - Biatrial enlargement.    3 - Normal right ventricular systolic function .    4 - Mild mitral regurgitation.    5 - Mild tricuspid regurgitation.    6 - The estimated PA systolic pressure is 34 mmHg.    7 - Increased central venous pressure.    8 - Left pleural effusion.     CKD stage 3 due to type 2 diabetes mellitus 04/17/2018    Embolic stroke involving left cerebellar artery 08/13/2018    Embolic stroke involving right posterior cerebral artery 04/11/2018    Essential hypertension 10/28/2013    Glaucoma suspect of both eyes 12/09/2013    Hearing loss, sensorineural 12/16/2013    LV (left ventricular) mural thrombus 05/07/2022    Malignant hypertension 08/12/2018    Mixed hyperlipidemia 10/28/2013    Obesity 01/16/2014    Paroxysmal atrial fibrillation 07/10/2012    Stage 3 chronic kidney disease 04/17/2018    Toxic multinodular goiter  "10/28/2013    Type 2 diabetes mellitus with stage 3 chronic kidney disease, without long-term current use of insulin 07/10/2012    Diet controlled.   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    Subjective     Awake/alert  "I am a music therapist."     Pain/Comfort:  · Pain Rating 1: 0/10  · Pain Rating Post-Intervention 1: 0/10    Respiratory Status: Room air    Objective:     Cognitive Status:    Orientation Person and Place  Problem Solving Conclusions 50%  and Categories 3/4 cued. compare/contrast 0%      Receptive Language:   Comprehension:   Questions Complex yes/no 100%  Commands  multistep basic commands 1/3 accy     Expressive Language:  Verbal:    Verbal language skills were wfl with no evidence of aphasia.  Pt. Expressed their thoughts coherently in conversation with no evidence of confusion or word finding deficits  Nonverbal:   WFL      Motor Speech:  WFL    Voice:   WFL    Visual-Spatial:  TBA    Reading:   TBA     Written Expression:   TBA    Oral Musculature Evaluation  · Oral Musculature: WFL  · Dentition: present and adequate  · Oral Labial Strength and Mobility: WFL  · Lingual Strength and Mobility: WFL  · Voice Prior to PO Intake: clear    Bedside Swallow Eval:   Consistencies Assessed:  · Thin liquids x6 oz straw  · Solids x4     Oral Phase:   · WFL    Pharyngeal Phase:   · no overt clinical signs/symptoms of aspiration    Assessment:     Temitope Suero is a 82 y.o. female with an SLP diagnosis of Cognitive-Linguistic Impairment.      Goals:   Multidisciplinary Problems     SLP Goals        Problem: SLP    Goal Priority Disciplines Outcome   SLP Goal     SLP  "   Description: Speech Language Pathology Goals  Goals expected to be met by 6/8    1. Pt will tolerate least restrictive diet   2. Pt will complete problem solving tasks with 70% accy and mod cues   3. Pt will follow multi-step commands with 50% accy and mod cues                           Plan:     · Patient to be seen:  3 x/week   · Plan of Care reviewed with:  patient   · SLP Follow-Up:  Yes       Discharge recommendations:    TBD    Time Tracking:     SLP Treatment Date:   05/31/22  Speech Start Time:  0934  Speech Stop Time:  0948     Speech Total Time (min):  14 min    Billable Minutes: Eval 7  and Eval Swallow and Oral Function 7    05/31/2022

## 2022-05-31 NOTE — PLAN OF CARE
Problem: Occupational Therapy  Goal: Occupational Therapy Goal  Description: Goals to be met by: 6/14/2022     Patient will increase functional independence with ADLs by performing:    UE Dressing with Stand-by Assistance.  LE Dressing with Stand-by Assistance.  Grooming while EOB with Supervision.  Toileting from bedside commode with Stand-by Assistance for hygiene and clothing management.   Supine to sit with Supervision.  Sit to stand transfer with Minimal Assistance with RW.  Toilet transfer to bedside commode with Minimal Assistance with RW.    Outcome: Ongoing, Progressing     Pt evaluated and OT goals established.

## 2022-05-31 NOTE — PHARMACY MED REC
"Admission Medication History     The home medication history was taken by Mary Alice Mendez.    You may go to "Admission" then "Reconcile Home Medications" tabs to review and/or act upon these items.      The home medication list has been updated by the Pharmacy department.    Please read ALL comments highlighted in yellow.    Please address this information as you see fit.     Feel free to contact us if you have any questions or require assistance.      Medications listed below were obtained from: SNF/Rehab/LTAC   PTA Medications   Medication Sig    atorvastatin (LIPITOR) 40 MG tablet Take 1 tablet (40 mg total) by mouth once daily.    donepeziL (ARICEPT) 5 MG tablet Take 1 tablet (5 mg total) by mouth every evening.    furosemide (LASIX) 40 MG tablet TAKE 1 TABLET BY MOUTH TWICE A DAY    hydrALAZINE (APRESOLINE) 50 MG tablet Take 1 tablet (50 mg total) by mouth every 8 (eight) hours.    latanoprost 0.005 % ophthalmic solution Place 1 drop into both eyes every evening.    metoprolol succinate (TOPROL-XL) 100 MG 24 hr tablet Take 1 tablet (100 mg total) by mouth once daily.    sacubitriL-valsartan (ENTRESTO) 24-26 mg per tablet Take 1 tablet by mouth 2 (two) times daily.    traZODone (DESYREL) 100 MG tablet Take 1 tablet (100 mg total) by mouth nightly as needed for Insomnia.    warfarin (COUMADIN) 4 MG tablet Take 1 tablet (4 mg total) by mouth Daily.       Potential issues to be addressed PRIOR TO DISCHARGE  The listed medications were obtained from another facility ([facility]). The patient may not have been able to fill these prescriptions prior to this admission and may require new scripts upon discharge.     Mary Alice Mendez  [id number]                  .        "

## 2022-05-31 NOTE — ED PROVIDER NOTES
Encounter Date: 5/30/2022       History     Chief Complaint   Patient presents with    Leg Swelling     Left leg swelling. Pt also reports diabetic foot wounds to the area. Denies fever or drainage     HPI     82 yr old female with hx of HTN, DM, CHF, A fib, prior stroke presenting by EMS from rehab facility for leg swelling, color change, and pain for 3 days. Patient has dementia and ROS is limited due to baseline altered mental status.     Per niece at bedside, patient has reported pain to left leg for the past several days. Patient without fever or chills, No lesions on leg with blisters and discoloration of toes.     Review of patient's allergies indicates:  No Known Allergies  Past Medical History:   Diagnosis Date    Atrial flutter 8/12/2018    Cancer of left breast, stage 2 11/24/2015    Cataract     Cerebrovascular small vessel disease 4/11/2018    Bi-thalamic lacunar disease, mod/sev periventricular white matter disease, mixed pattern cerebral microbleeds    Cervicalgia 4/17/2018    Chronic anticoagulation - apixaban 4/17/2018    Previous on Xarelto but changed to apixaban 8-2018 due to recurrent embolic stroke.    Chronic systolic heart failure 4/17/2018    Echo 4-2018   1 - Moderately depressed left ventricular systolic function (EF 30-35%).    2 - Biatrial enlargement.    3 - Normal right ventricular systolic function .    4 - Mild mitral regurgitation.    5 - Mild tricuspid regurgitation.    6 - The estimated PA systolic pressure is 34 mmHg.    7 - Increased central venous pressure.    8 - Left pleural effusion.     CKD stage 3 due to type 2 diabetes mellitus 4/17/2018    Embolic stroke involving left cerebellar artery 8/13/2018    Embolic stroke involving right posterior cerebral artery 4/11/2018    Encephalopathy 8/13/2018    Essential hypertension 10/28/2013    Glaucoma     Glaucoma suspect of both eyes 12/9/2013    Hearing loss, sensorineural 12/16/2013    Malignant hypertension  8/12/2018    Mixed hyperlipidemia 10/28/2013    Obesity 1/16/2014    Paroxysmal atrial fibrillation 7/10/2012    Stage 3 chronic kidney disease 4/17/2018    Toxic multinodular goiter 10/28/2013    Type 2 diabetes mellitus with stage 3 chronic kidney disease, without long-term current use of insulin 7/10/2012    Diet controlled.   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    Vitamin D deficiency disease 10/28/2013     Past Surgical History:   Procedure Laterality Date    BREAST BIOPSY      BREAST SURGERY      CHOLECYSTECTOMY       Family History   Problem Relation Age of Onset    Hypertension Mother     Hypertension Father     Glaucoma Father     Heart disease Father     Cancer Sister     Asthma Son     Diabetes Paternal Aunt     Thyroid cancer Neg Hx     Amblyopia Neg Hx     Blindness Neg Hx     Cataracts Neg Hx     Macular degeneration Neg Hx     Retinal detachment Neg Hx     Strabismus Neg Hx      Social History     Tobacco Use    Smoking status: Never Smoker    Smokeless tobacco: Never Used   Substance Use Topics    Alcohol use: No    Drug use: No     Review of Systems   Unable to perform ROS: Dementia       Physical Exam     Initial Vitals   BP Pulse Resp Temp SpO2   05/30/22 2047 05/30/22 2047 05/30/22 2047 05/30/22 2048 05/30/22 2047   118/64 98 18 98.1 °F (36.7 °C) 100 %      MAP       --                Physical Exam    Constitutional: She appears well-developed. No distress.   HENT:   Head: Normocephalic and atraumatic.   Eyes: Conjunctivae are normal. Pupils are equal, round, and reactive to light.   Neck:   Normal range of motion.  Cardiovascular: Normal rate.   No murmur heard.  Irregularly irregular, no murmur   Pulmonary/Chest: No respiratory distress. She has no wheezes. She has rales.   Scattered rales in bases   Abdominal: Abdomen is soft. She exhibits no distension. There is no abdominal tenderness.   Genitourinary:    Genitourinary Comments: No rash     Musculoskeletal:          General: Edema present. Normal range of motion.      Cervical back: Normal range of motion.      Comments: Mild swelling L>R with pitting edema     Neurological: She is alert.   Skin: Skin is warm and dry. Rash noted.   Splotchy areas of blanching erythema to left lower extremity from knee down, small blisters are present. Color change to tips of 2nd and 3rd digit without drainage.      Palpable pulse on right, doppler signal pulse on left.             ED Course   Procedures  Labs Reviewed   CBC W/ AUTO DIFFERENTIAL - Abnormal; Notable for the following components:       Result Value    Hemoglobin 11.4 (*)     Hematocrit 36.2 (*)     MCV 78 (*)     MCH 24.7 (*)     MCHC 31.5 (*)     RDW 16.2 (*)     All other components within normal limits   COMPREHENSIVE METABOLIC PANEL - Abnormal; Notable for the following components:    Glucose 140 (*)     BUN 26 (*)     Creatinine 1.5 (*)     Albumin 2.7 (*)     Alkaline Phosphatase 145 (*)     eGFR if  37.4 (*)     eGFR if non  32.4 (*)     All other components within normal limits   PROTIME-INR - Abnormal; Notable for the following components:    Prothrombin Time 29.8 (*)     INR 3.1 (*)     All other components within normal limits          Imaging Results          US Lower Extremity Veins Left (Final result)  Result time 05/30/22 23:03:43    Final result by Kenny Flowers MD (05/30/22 23:03:43)                 Impression:      No DVT in the left lower extremity.    Electronically signed by resident: Jaylon Medrano  Date:    05/30/2022  Time:    22:44    Electronically signed by: Kenny Flowers MD  Date:    05/30/2022  Time:    23:03             Narrative:    EXAMINATION:  US LOWER EXTREMITY VEINS LEFT    CLINICAL HISTORY:  Other specified soft tissue disorders.    TECHNIQUE:  Duplex and color flow Doppler evaluation and graded compression of the left lower extremity veins was performed.    COMPARISON:  None.    FINDINGS:  Left  thigh veins: The common femoral, femoral, popliteal, upper greater saphenous, and deep femoral veins are patent and free of thrombus. The veins are normally compressible and have normal phasic flow and augmentation response.    Left calf veins: The visualized calf veins are patent.    Contralateral CFV: The contralateral (right) common femoral vein is patent and free of thrombus.    Miscellaneous: None.                                US Lower Extremity Arteries Left (Final result)  Result time 05/30/22 23:05:37    Final result by Kenny Flowers MD (05/30/22 23:05:37)                 Impression:      Focal occlusion of the proximal left JASKARAN with reconstitution distally.    Greater than 75% stenosis within the mid left popliteal artery with greater than tripling of velocities.    Diffuse monophasic waveforms of the left calf vasculature suggesting atherosclerotic disease/peripheral arterial disease.    This report was flagged in Epic as abnormal.    Electronically signed by resident: Jaylon Medrano  Date:    05/30/2022  Time:    22:48    Electronically signed by: Kenny Flowers MD  Date:    05/30/2022  Time:    23:05             Narrative:    EXAMINATION:  US LOWER EXTREMITY ARTERIES LEFT    CLINICAL HISTORY:  Other specified soft tissue disorders    TECHNIQUE:  Bilateral spectral, color and grayscale images of the large arteries of the left lower extremities were performed.    COMPARISON:  None    FINDINGS:  Peak systolic velocities were obtained as follows (in cm/sec):    Left common femoral:  65    Left deep femoral:  31    Left superficial femoral proximal: 60    Left superficial femoral mid: 42    Left superficial femoral distal: 47    Left proximal popliteal: 51    Left mid popliteal: 188    Left distal popliteal: 9.0    Left anterior tibial:  5.0.  There is focal occlusion of the proximal left JASKARAN with reconstitution distally.    Left posterior tibial: 25    Triphasic waveforms within the left 5.   Monophasic waveforms within the left calf.                              EKG with atrial fibrillation. Similar to prior. No evidence of ischemia.        Medications - No data to display  Medical Decision Making:   History:   I obtained history from: someone other than patient.       <> Summary of History: Niece at bedside  Old Medical Records: I decided to obtain old medical records.  Old Records Summarized: records from previous admission(s).  Initial Assessment:   82 yr old female with complex med hx of A fib, CHF, HTN, DM presenting with leg swelling, color change, and pain to right lower extemity for 3 days.  Differential Diagnosis:   Arterial thrombosis, peripheral artery disease, DVT. Low suspicion for cellulitis or diabetic foot ulcer on exam.   Clinical Tests:   Lab Tests: Ordered  Radiological Study: Ordered and Reviewed  ED Management:  Ultrasound of the artery shows occlusion of the anterior tibial artery.  Discussed with vascular surgery who recommends admission to Medicine for further evaluation workup tomorrow.  Will admit patient to medicine with vascular surgery on board.     Pb Angelo MD  Internal/Emergency Medicine, PGY-IV                        Clinical Impression:   Final diagnoses:  [I48.91] Atrial fibrillation  [M79.89] Leg swelling  [I70.90] Arterial occlusion (Primary)                 Pb Angelo MD  Resident  05/30/22 1994

## 2022-05-31 NOTE — PT/OT/SLP EVAL
"Physical Therapy  Evaluation and Treatment    Temitope Suero   [id number]    Time Tracking:     PT Received On: 05/31/22   PT Start Time: 1312   PT Stop Time: 1340   PT Total Time (min): 28 min    Billable Minutes: Evaluation 1 procedure and Gait Training 10 minutes      Recommendations:     Discharge recommendations: Return to Skilled Nursing Facility     Equipment recommendations: Bedside Commode and Wheelchair    Barriers to Discharge: Not ready to discharge home from a mobility standpoint, needs further therapy.    Patient Information:     Recent Surgery: * No surgery found *      Diagnosis: Critical lower limb ischemia    Length of Stay: 1 day    General Precautions: Standard, fall  Orthopedic Precautions: None  Brace: None    Assessment:     Temitope Suero is a 82 y.o. female admitted to [facility] on 5/30/2022 for Critical lower limb ischemia. Temitope Suero tolerated evaluation fair today. She was resting in bed with no family/friends present upon PT/OT entry to room, agreeable to evaluation. She is very pleasant throughout session, oriented x 2 (name, place). She does struggle to answer some basic questions, I'd estimate she answers ~50% of basic "yes/no" questions today, will get distracted at times. Tearful 3x during session, stating "I wish you could have met my mom but she passed away" so requiring comforting and re-direction. Worked on self-feeding at edge of bed, which she performed with stand-by assistance. Reports "a little" pain at L lower leg with activity, unable to numerically rate 2* cognition. Stood with max (A) to a rolling walker, obvious weightshift to the R but with cueing she's able to walk 5-6 steps (fwd/back and side-stepping) with rolling walker and min (A). Antalgic gait at LLE noted, poor safety with rolling walker (she typically walks without device, unaccustomed to using). Back into bed with alarm on at end of session. Discussed PT role, POC, goals and recommendations (Skilled Nursing Facility) " "with patient; verbalized understanding. Temitope Suero would benefit from acute PT services to promote mobility during this admission and improve return to PLOF.    Problem List: weakness, decreased endurance, impaired self-care skills, impaired mobility, decreased sitting or standing balance, gait instability, decreased cognition and pain    Rehab Prognosis: Good; patient would benefit from acute skilled PT services to address these deficits and reach maximum level of function.    Plan:     Patient to be seen 3 x/week to address the above listed problems via gait training, therapeutic activities, therapeutic exercises, neuromuscular re-education    Plan of Care Expires: 06/30/22  Plan of Care reviewed with: patient    Subjective:     Communicated with RN prior to evaluation, appropriate to see for evaluation.    Pt found supine in bed (HOB elevated) upon PT entry to room, agreeable to evaluation.    Patient commenting: "That leg (left) hurts a little bit, not a lot though."    Does this patient have any cultural, spiritual, Yarsani conflicts given the current situation? Patient has no barriers to learning. Patient verbalizes understanding of his/her program and goals and demonstrates them correctly. No cultural, spiritual, or educational needs identified.    Past Medical History:   Diagnosis Date    Cancer of left breast, stage 2 11/24/2015    Cataract     Cerebrovascular small vessel disease 04/11/2018    Bi-thalamic lacunar disease, mod/sev periventricular white matter disease, mixed pattern cerebral microbleeds    Cervicalgia 04/17/2018    Chronic anticoagulation - apixaban 04/17/2018    Previous on Xarelto but changed to apixaban 8-2018 due to recurrent embolic stroke.    Chronic systolic heart failure 04/17/2018    Echo 4-2018   1 - Moderately depressed left ventricular systolic function (EF 30-35%).    2 - Biatrial enlargement.    3 - Normal right ventricular systolic function .    4 - Mild mitral " "regurgitation.    5 - Mild tricuspid regurgitation.    6 - The estimated PA systolic pressure is 34 mmHg.    7 - Increased central venous pressure.    8 - Left pleural effusion.     CKD stage 3 due to type 2 diabetes mellitus 04/17/2018    Embolic stroke involving left cerebellar artery 08/13/2018    Embolic stroke involving right posterior cerebral artery 04/11/2018    Essential hypertension 10/28/2013    Glaucoma suspect of both eyes 12/09/2013    Hearing loss, sensorineural 12/16/2013    LV (left ventricular) mural thrombus 05/07/2022    Malignant hypertension 08/12/2018    Mixed hyperlipidemia 10/28/2013    Obesity 01/16/2014    Paroxysmal atrial fibrillation 07/10/2012    Stage 3 chronic kidney disease 04/17/2018    Toxic multinodular goiter 10/28/2013    Type 2 diabetes mellitus with stage 3 chronic kidney disease, without long-term current use of insulin 07/10/2012    Diet controlled.   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 Per chart review, she was [facility] (anticipate she will d/c to this SNF at end of admission).    PLOF:  Pt unable to provide accurate prior level of function. Per chart review, she was ambulatory without device prior to May 2022. She [facility] from 5/5-5/18 and discharged to SNF. Has been able to ambulate 1x at SNF with rolling walker and assistance.    DME:  Patient owns or has access to the following DME: Unknown (pt unable to report)    Objective:     Patient found with: telemetry, bed alarm, PureWick    Pain:  Pain Rating 1: reported "a little" when asked, unable to numerically " "rate 2* cognition  Location: Left lower leg    Cognitive Exam:  Patient is oriented to Person and Place (knows she's in hospital but unsure which one)  Patient follows ~50% of single-step commands.    Sensation:   Difficult to formally assess 2* cognition    Lower Extremity Range of Motion:  Right Lower Extremity: WFL actively  Left Lower Extremity: WFL actively    Lower Extremity Strength:  Right Lower Extremity: grossly 4-/5 via MMT  Left Lower Extremity: grossly 3/5 via MMT    Functional Mobility:    · Bed Mobility:  · Supine to Sitting: stand-by assistance, HOB elevated  · Sitting to Supine: stand-by assistance, HOB elevated    · Transfers:  · Sit to Stand: max (A) from EOB with RW x 1 trial(s)    · Gait:  · ~5-6 steps (fwd/back and side-stepping) with rolling walker and min (A). Antalgic gait at LLE noted, poor safety with rolling walker (she typically walks without device, unaccustomed to using)    · Assist level: Min Assist  · Device: Rolling walker    · Balance:  · Static Sit: Stand-By Assist at EOB for ~15 minutes    · Static Stand: Contact-Guard Assist with Rolling walker    Additional Therapeutic Activity/Exercises:     1. She was resting in bed with no family/friends present upon PT/OT entry to room, agreeable to evaluation. She is very pleasant throughout session, oriented x 2 (name, place). She does struggle to answer some basic questions, I'd estimate she answers ~50% of basic "yes/no" questions today, will get distracted at times. Tearful 3x during session, stating "I wish you could have met my mom but she passed away" so requiring comforting and re-direction.    2. Worked on self-feeding at edge of bed, which she performed with stand-by assistance. Reports "a little" pain at L lower leg with activity, unable to numerically rate 2* cognition. Stood with max (A) to a rolling walker, obvious weightshift to the R but with cueing she's able to walk 5-6 steps (fwd/back and side-stepping) with rolling " walker and min (A). Antalgic gait at E noted, poor safety with rolling walker (she typically walks without device, unaccustomed to using).    3. Back into bed with alarm on at end of session. Discussed PT role, POC, goals and recommendations (Skilled Nursing Facility) with patient; verbalized understanding.    AM-PAC 6 CLICK MOBILITY  Turning over in bed (including adjusting bedclothes, sheets and blankets)?: 4  Sitting down on and standing up from a chair with arms (e.g., wheelchair, bedside commode, etc.): 2  Moving from lying on back to sitting on the side of the bed?: 4  Moving to and from a bed to a chair (including a wheelchair)?: 2  Need to walk in hospital room?: 2  Climbing 3-5 steps with a railing?: 1  Basic Mobility Total Score: 15    Patient was left supine in bed (HOB elevated) with all lines intact, call button in reach and bed alarm on.    Clinical Decision Making for Evaluation Complexity:  1. Body System(s) Examination: 1-2  2. Clinical Presentation: Evolving  3. Evaluation Complexity: Low    GOALS:   Multidisciplinary Problems     Physical Therapy Goals        Problem: Physical Therapy    Goal Priority Disciplines Outcome Goal Variances Interventions   Physical Therapy Goal     PT, PT/OT      Description: Goals to be met by: 22     Patient will increase functional independence with mobility by performin. Supine to sit with Modified [place] - Not met  2. Sit to stand transfer with CGA using RW - Not met  3. Bed to chair transfer with Contact Guard Assistance using Rolling Walker - Not met  4. Gait  x 50 feet with Contact Guard Assistance using Rolling Walker - Not met  5. Lower extremity exercise program x 15 reps per handout, with supervision - Not met                 Rick Calvin, PT  2022

## 2022-05-31 NOTE — PT/OT/SLP EVAL
"Occupational Therapy   Evaluation and Treatment    Name: Temitope Suero  MRN: [id number]  Admitting Diagnosis:  Critical lower limb ischemia  Recent Surgery: * No surgery found *       Co-eval with PT to have 2 skilled therapists present to safely assess pt's functional mobility.    Recommendations:     Discharge Recommendations: nursing facility, skilled  Discharge Equipment Recommendations:  other (see comments) (TBD)  Barriers to discharge:  Decreased caregiver support (fall risk)    Assessment:     Temitope Suero is a 82 y.o. female with a medical diagnosis of Critical lower limb ischemia.  Pt had good tolerance of session, feeding EOB with SBA and ambulating near EOB with Min A with RW.  She is performing below her functional baseline.  Due to her current level of function compared to her PLOF, SNF recommended at d/c for maximal pt gains in functional independence and to ensure her safety upon returning home.  She presents with the following. Performance deficits affecting function: weakness, impaired endurance, impaired self care skills, impaired functional mobilty, gait instability, impaired balance, impaired cognition, decreased safety awareness, pain, impaired skin.      Rehab Prognosis: Good; patient would benefit from acute skilled OT services to address these deficits and reach maximum level of function.       Plan:     Patient to be seen 3 x/week to address the above listed problems via self-care/home management, therapeutic exercises, therapeutic activities  · Plan of Care Expires: 06/30/22  · Plan of Care Reviewed with: patient    Subjective     Chief Complaint: LLE pain  Patient/Family Comments/goals: "Thank y'all for stopping by."    Occupational Profile:  Living Environment: PTA, pt was at [facility].   Previous level of function: Prior to any hospitalization in May, pt was independent with ADLs and mobility.  Since then, she has required assistance.      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    Equipment Used at Home:  none  Assistance upon Discharge: SNF staff     Pain/Comfort:  · Pain Rating 1: other (see comments) (not rated, "a little" during mobility)  · Location - Side 1: Left  · Location - Orientation 1: generalized  · Location 1: leg  · Pain Addressed 1: Reposition, Distraction  · Pain Rating Post-Intervention 1: 0/10    Patients cultural, spiritual, Religion conflicts given the current situation: no    Objective:     Communicated with: nursing and PT prior to session.  Patient found L side-lying with HOB elevated with telemetry, PureWick upon OT entry to room.    General Precautions: Standard, fall   Orthopedic Precautions:N/A   Braces: N/A  Respiratory Status: Room air    Occupational Performance:    Bed Mobility:    · Patient completed Rolling/Turning to Left with  stand by assistance  · Patient completed Scooting/Bridging with stand by assistance  · Patient completed Supine to Sit with stand by assistance  · Patient completed Sit to Supine with stand by assistance    Functional Mobility/Transfers:  · Patient completed Sit <> Stand Transfer from EOB with maximal assistance  with  rolling walker, requiring cues to achieve an erect stance   · Functional Mobility: Pt took ~4 steps near EOB in all directions with Min A with RW.    Activities of Daily Living:  · Feeding:  stand by assistance with tray table setup to eat some of her lunch while sitting EOB.  She was able to open her pudding container without assistance.    · Lower Body Dressing: Pt spotaneously leaned forward to adjust her non-skid sock on her R foot with SBA while sitting EOB.     Cognitive/Visual Perceptual:  Cognitive/Psychosocial Skills:     -       Oriented to: Person and place (knew she was in the hospital, but she identified a different one)  -       Follows Commands/attention:Easily distracted and Follows some one-step commands  -       Communication: clear/fluent  -       Mood/Affect/Coping " skills/emotional control: Tearful and Pleasant    Physical Exam:  Upper Extremity Range of Motion:     -       Right Upper Extremity: WFL  -       Left Upper Extremity: WFL  Upper Extremity Strength:    -       Right Upper Extremity: WFL  -       Left Upper Extremity: WFL   Strength:    -       Right Upper Extremity: WFL  -       Left Upper Extremity: WFL  Fine Motor Coordination:    -       Intact    AMPAC 6 Click ADL:  AMPAC Total Score: 16    Treatment & Education:  - Pt required Min A initially for static standing balance due to R lateral lean, but she progressed to CGA.      Pt edu on role of OT, POC, safety when performing self care tasks, benefit of performing OOB activity, and safety when performing functional transfers and mobility.  - White board updated  - Self care tasks completed-- as noted above     Education:    Patient left left sidelying with all lines intact and call button in reach    GOALS:   Multidisciplinary Problems     Occupational Therapy Goals        Problem: Occupational Therapy    Goal Priority Disciplines Outcome Interventions   Occupational Therapy Goal     OT, PT/OT Ongoing, Progressing    Description: Goals to be met by: 6/14/2022     Patient will increase functional independence with ADLs by performing:    UE Dressing with Stand-by Assistance.  LE Dressing with Stand-by Assistance.  Grooming while EOB with Supervision.  Toileting from bedside commode with Stand-by Assistance for hygiene and clothing management.   Supine to sit with Supervision.  Sit to stand transfer with Minimal Assistance with RW.  Toilet transfer to bedside commode with Minimal Assistance with RW.                     History:     Past Medical History:   Diagnosis Date    Cancer of left breast, stage 2 11/24/2015    Cataract     Cerebrovascular small vessel disease 04/11/2018    Bi-thalamic lacunar disease, mod/sev periventricular white matter disease, mixed pattern cerebral microbleeds    Cervicalgia  04/17/2018    Chronic anticoagulation - apixaban 04/17/2018    Previous on Xarelto but changed to apixaban 8-2018 due to recurrent embolic stroke.    Chronic systolic heart failure 04/17/2018    Echo 4-2018   1 - Moderately depressed left ventricular systolic function (EF 30-35%).    2 - Biatrial enlargement.    3 - Normal right ventricular systolic function .    4 - Mild mitral regurgitation.    5 - Mild tricuspid regurgitation.    6 - The estimated PA systolic pressure is 34 mmHg.    7 - Increased central venous pressure.    8 - Left pleural effusion.     CKD stage 3 due to type 2 diabetes mellitus 04/17/2018    Embolic stroke involving left cerebellar artery 08/13/2018    Embolic stroke involving right posterior cerebral artery 04/11/2018    Essential hypertension 10/28/2013    Glaucoma suspect of both eyes 12/09/2013    Hearing loss, sensorineural 12/16/2013    LV (left ventricular) mural thrombus 05/07/2022    Malignant hypertension 08/12/2018    Mixed hyperlipidemia 10/28/2013    Obesity 01/16/2014    Paroxysmal atrial fibrillation 07/10/2012    Stage 3 chronic kidney disease 04/17/2018    Toxic multinodular goiter 10/28/2013    Type 2 diabetes mellitus with stage 3 chronic kidney disease, without long-term current use of insulin 07/10/2012    Diet controlled.   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

## 2022-05-31 NOTE — CONSULTS
[facility] - Emergency Dept  Vascular Surgery  Consult Note    Inpatient consult to Vascular Surgery  Consult performed by: Ramses Guerrero MD  Consult ordered by: Vishnu Greco MD        Subjective:     Chief Complaint/Reason for Admission: Left leg edema, small wound    History of Present Illness: Temitope Suero is an 83 yo F with h/o HFrEF (EF 25-30% 5/2022), Dementia, Type 2 Dm, Aflutter/Afib and LV Thrombus on warfarin (INR today 2.9) who presented to ED with with LLE swelling and pain. She was recently admitted earlier this month with pneumonia and acute encephalopathy. She deveolped Aflutter, LV thrombus was seen, stroke work up negative. US in the ED revealed no DVT bilaterally, proximal L JASKARAN occlusion with reconstitution distally, 75% stenosis of mid L popliteal and monophasic waveforms. Vascular surgery consulted to evaluate limb ischemia.      No current facility-administered medications on file prior to encounter.     Current Outpatient Medications on File Prior to Encounter   Medication Sig    atorvastatin (LIPITOR) 40 MG tablet Take 1 tablet (40 mg total) by mouth once daily.    donepeziL (ARICEPT) 5 MG tablet Take 1 tablet (5 mg total) by mouth every evening.    furosemide (LASIX) 40 MG tablet TAKE 1 TABLET BY MOUTH TWICE A DAY    hydrALAZINE (APRESOLINE) 50 MG tablet Take 1 tablet (50 mg total) by mouth every 8 (eight) hours.    latanoprost 0.005 % ophthalmic solution Place 1 drop into both eyes every evening.    metoprolol succinate (TOPROL-XL) 100 MG 24 hr tablet Take 1 tablet (100 mg total) by mouth once daily.    sacubitriL-valsartan (ENTRESTO) 24-26 mg per tablet Take 1 tablet by mouth 2 (two) times daily.    traZODone (DESYREL) 100 MG tablet Take 1 tablet (100 mg total) by mouth nightly as needed for Insomnia.    warfarin (COUMADIN) 4 MG tablet Take 1 tablet (4 mg total) by mouth Daily.    [DISCONTINUED] benazepriL (LOTENSIN) 20 MG tablet Take 1 tablet (20 mg total) by mouth once daily.     [DISCONTINUED] carvediloL (COREG) 25 MG tablet Take 1 tablet (25 mg total) by mouth 2 (two) times daily with meals.    [DISCONTINUED] travoprost, benzalkonium, (TRAVATAN) 0.004 % ophthalmic solution Place 1 drop into both eyes every evening.       Review of patient's allergies indicates:  No Known Allergies    Past Medical History:   Diagnosis Date    Atrial flutter 8/12/2018    Cancer of left breast, stage 2 11/24/2015    Cataract     Cerebrovascular small vessel disease 4/11/2018    Bi-thalamic lacunar disease, mod/sev periventricular white matter disease, mixed pattern cerebral microbleeds    Cervicalgia 4/17/2018    Chronic anticoagulation - apixaban 4/17/2018    Previous on Xarelto but changed to apixaban 8-2018 due to recurrent embolic stroke.    Chronic systolic heart failure 4/17/2018    Echo 4-2018   1 - Moderately depressed left ventricular systolic function (EF 30-35%).    2 - Biatrial enlargement.    3 - Normal right ventricular systolic function .    4 - Mild mitral regurgitation.    5 - Mild tricuspid regurgitation.    6 - The estimated PA systolic pressure is 34 mmHg.    7 - Increased central venous pressure.    8 - Left pleural effusion.     CKD stage 3 due to type 2 diabetes mellitus 4/17/2018    Embolic stroke involving left cerebellar artery 8/13/2018    Embolic stroke involving right posterior cerebral artery 4/11/2018    Encephalopathy 8/13/2018    Essential hypertension 10/28/2013    Glaucoma     Glaucoma suspect of both eyes 12/9/2013    Hearing loss, sensorineural 12/16/2013    Malignant hypertension 8/12/2018    Mixed hyperlipidemia 10/28/2013    Obesity 1/16/2014    Paroxysmal atrial fibrillation 7/10/2012    Stage 3 chronic kidney disease 4/17/2018    Toxic multinodular goiter 10/28/2013    Type 2 diabetes mellitus with stage 3 chronic kidney disease, without long-term current use of insulin 7/10/2012    Diet controlled.    Vertebrobasilar dolichoectasia  4/11/2018    Vitamin D deficiency disease 10/28/2013     Past Surgical History:   Procedure Laterality Date    BREAST BIOPSY      BREAST SURGERY      CHOLECYSTECTOMY       Family History     Problem Relation (Age of Onset)    Asthma Son    Cancer Sister    Diabetes Paternal Aunt    Glaucoma Father    Heart disease Father    Hypertension Mother, Father        Tobacco Use    Smoking status: Never Smoker    Smokeless tobacco: Never Used   Substance and Sexual Activity    Alcohol use: No    Drug use: No    Sexual activity: Not Currently     Review of Systems   Not adequately performed d/t patients mental status (dementia)   Objective:     Vital Signs (Most Recent):  Temp: 97.7 °F (36.5 °C) (05/31/22 0236)  Pulse: 79 (05/31/22 0703)  Resp: 18 (05/31/22 0703)  BP: (!) 151/83 (05/31/22 0703)  SpO2: 97 % (05/31/22 0703) Vital Signs (24h Range):  Temp:  [97.7 °F (36.5 °C)-98.1 °F (36.7 °C)] 97.7 °F (36.5 °C)  Pulse:  [79-98] 79  Resp:  [15-20] 18  SpO2:  [96 %-100 %] 97 %  BP: (118-151)/(64-87) 151/83        There is no height or weight on file to calculate BMI.    No intake or output data in the 24 hours ending 05/31/22 0730    Physical Exam  Constitutional:       General: She is not in acute distress.     Appearance: Normal appearance. She is not diaphoretic.   HENT:      Head: Normocephalic and atraumatic.   Eyes:      Pupils: Pupils are equal, round, and reactive to light.   Cardiovascular:      Comments: Bilateral 2+ femoral pulses, triphasic DP/PT on the right. Weak biphasic/monophasic PT/DP on the left  Pulmonary:      Effort: Pulmonary effort is normal. No respiratory distress.   Abdominal:      General: There is no distension.      Palpations: Abdomen is soft.   Musculoskeletal:      Comments: 2 small 1-2cm superficial wounds the mid L lower leg posterolaterally and posteromedially. LLE edema to mid shin   Skin:     General: Skin is warm and dry.   Neurological:      General: No focal deficit present.       Mental Status: She is alert. She is disoriented.   Psychiatric:         Mood and Affect: Mood normal.         Behavior: Behavior normal.         Thought Content: Thought content normal.         Judgment: Judgment normal.         Significant Labs:  CBC:   Recent Labs   Lab 05/31/22  0248   WBC 4.89   RBC 4.50   HGB 11.2*   HCT 37.4      MCV 83   MCH 24.9*   MCHC 29.9*     CMP:   Recent Labs   Lab 05/31/22  0330      CALCIUM 8.7   ALBUMIN 2.5*   PROT 5.7*      K 4.0   CO2 27      BUN 24*   CREATININE 1.3   ALKPHOS 135   ALT 17   AST 19   BILITOT 0.3       Significant Diagnostics:  I have reviewed all pertinent imaging results/findings within the past 24 hours.    Assessment/Plan:   83 yo F with multiple comorbidities who presents with concerns for LLE ischemia. US shows likely popliteal disease and L JASKARAN occlusion.   - will obtain OLEGARIO and toe pressures  - depending on results, may benefit for LLE angiogram will CTM  - no cathlab availability today, okay for diet  - will update primary team if planning for intervention   - hold warfarin   - rest of care per primary    Thank you for your consult. I will follow-up with patient. Please contact us if you have any additional questions.    Ramses Guerrero MD  Vascular Surgery  [facility] - Emergency Dept

## 2022-05-31 NOTE — HPI
83 y/o AAF hx of HFrEF, Dementia, Type 2 Dm, Aflutter/Afib and LV Thrombus on warfarin anticoagulation who presents from [facility] with c/o of Left lower leg swelling/pain and new blistering, referred for admission due to concern of critical limb ischemia.     Patient was admitted to [facility] [place], [place] recently prior to her SNF admission, here from 5/5-5/18 this month, for initially Pneumonia with Acute Encephalopathy.  During her hospital course, she developed Aflutter with RVR, had repeated or intermittent bouts of mental status changes, underwent MRI brain to r/o stroke which was negative.   ECHO revealed the LV Thrombus     Tonigh she is accompanied by her niece - Shelby Alfaro, also [facility] ([phone])  Shelby reports that patient prior to any hospitalization in May was walking independently, since her 1st admision during the hospital course was not ambulating and since discharge to SNF has not been able to, she walked a short distance once with PT @ the SNF.  Shelby noted patient had a bandage on her left shin this week, were patient had developed a serous blister, more recently patient with LLE discoloration below the knee, new blistering, swelling and changes in toe color.      On arrival to the ED and in workup - labwork shows patient is Therapuetic on warfarin - INR is 3.1,  Stable metabolic panel and CBC.  Her arterial u/s of the LLE - demonstrated Focal Occlusion of the Left anterior tibial artery.     ED staff contacted on-call vascular surgery who will evaluate pt during daytime.

## 2022-05-31 NOTE — H&P
[facility] - Emergency Dept  Hospital Medicine  History & Physical    Patient Name: Temitope Suero  MRN: [id number]  Patient Class: OP- Observation  Admission Date: 5/30/2022  Attending Physician: Teddy Ramos MD [facility]  Admitting Physician: Vishnu Greco MD  Primary Care Provider: Gm Zambrano MD      Patient information was obtained from relative(s), past medical records and ER records.     Subjective:     Principal Problem:Critical lower limb ischemia    Chief Complaint:   Chief Complaint   Patient presents with    Leg Swelling     Left leg swelling. Pt also reports diabetic foot wounds to the area. Denies fever or drainage        HPI: 81 y/o AAF hx of HFrEF, Dementia, Type 2 Dm, Aflutter/Afib and LV Thrombus on warfarin anticoagulation who presents from [facility] with c/o of Left lower leg swelling/pain and new blistering, referred for admission due to concern of critical limb ischemia.     Patient was admitted to [facility] [place] prior to her SNF admission, here from 5/5-5/18 this month, for initially Pneumonia with Acute Encephalopathy.  During her hospital course, she developed Aflutter with RVR, had repeated or intermittent bouts of mental status changes, underwent MRI brain to r/o stroke which was negative.   ECHO revealed the LV Thrombus     Tonigh she is accompanied by her niece - Shelby Alfaro, also [facility] [place] ([phone])  Shelby reports that patient prior to any hospitalization in May was walking independently, since her 1st admision during the hospital course was not ambulating and since discharge to SNF has not been able to, she walked a short distance once with PT @ the SNF.  Shelby noted patient had a bandace on her left shin this week, were patient had developed a serous blister, more recently patient with LLE discoloration below the knee, new blistering, swelling and changes in toe color.      On arrival to the ED and in workup - labwork shows patient is Therapuetic on warfarin -  INR is 3.1,  Stable metabolic panel and CBC.  Her arterial u/s of the LLE - demonstrated Focal Occlusion of the Left anterior tibial artery.     ED staff contacted on-call vascular surgery who will evaluate pt during daytime.       Past Medical History:   Diagnosis Date    Atrial flutter 8/12/2018    Cancer of left breast, stage 2 11/24/2015    Cataract     Cerebrovascular small vessel disease 4/11/2018    Bi-thalamic lacunar disease, mod/sev periventricular white matter disease, mixed pattern cerebral microbleeds    Cervicalgia 4/17/2018    Chronic anticoagulation - apixaban 4/17/2018    Previous on Xarelto but changed to apixaban 8-2018 due to recurrent embolic stroke.    Chronic systolic heart failure 4/17/2018    Echo 4-2018   1 - Moderately depressed left ventricular systolic function (EF 30-35%).    2 - Biatrial enlargement.    3 - Normal right ventricular systolic function .    4 - Mild mitral regurgitation.    5 - Mild tricuspid regurgitation.    6 - The estimated PA systolic pressure is 34 mmHg.    7 - Increased central venous pressure.    8 - Left pleural effusion.     CKD stage 3 due to type 2 diabetes mellitus 4/17/2018    Embolic stroke involving left cerebellar artery 8/13/2018    Embolic stroke involving right posterior cerebral artery 4/11/2018    Encephalopathy 8/13/2018    Essential hypertension 10/28/2013    Glaucoma     Glaucoma suspect of both eyes 12/9/2013    Hearing loss, sensorineural 12/16/2013    Malignant hypertension 8/12/2018    Mixed hyperlipidemia 10/28/2013    Obesity 1/16/2014    Paroxysmal atrial fibrillation 7/10/2012    Stage 3 chronic kidney disease 4/17/2018    Toxic multinodular goiter 10/28/2013    Type 2 diabetes mellitus with stage 3 chronic kidney disease, without long-term current use of insulin 7/10/2012    Diet controlled.   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     Current Outpatient Medications on File Prior to Encounter   Medication Sig    atorvastatin (LIPITOR) 40 MG tablet Take 1 tablet (40 mg total) by mouth once daily.    donepeziL (ARICEPT) 5 MG tablet Take 1 tablet (5 mg total) by mouth every evening.    furosemide (LASIX) 40 MG tablet TAKE 1 TABLET BY MOUTH TWICE A DAY    hydrALAZINE (APRESOLINE) 50 MG tablet Take 1 tablet (50 mg total) by mouth every 8 (eight) hours.    latanoprost 0.005 % ophthalmic solution Place 1 drop into both eyes every evening.    metoprolol succinate (TOPROL-XL) 100 MG 24 hr tablet Take 1 tablet (100 mg total) by mouth once daily.    sacubitriL-valsartan (ENTRESTO) 24-26 mg per tablet Take 1 tablet by mouth 2 (two) times daily.    traZODone (DESYREL) 100 MG tablet Take 1 tablet (100 mg total) by mouth nightly as needed for Insomnia.    warfarin (COUMADIN) 4 MG tablet Take 1 tablet (4 mg total) by mouth Daily.    [DISCONTINUED] benazepriL (LOTENSIN) 20 MG tablet Take 1 tablet (20 mg total) by mouth once daily.    [DISCONTINUED] carvediloL (COREG) 25 MG tablet Take 1 tablet (25 mg total) by mouth 2 (two) times daily with meals.    [DISCONTINUED] travoprost, benzalkonium, (TRAVATAN) 0.004 % ophthalmic solution Place 1 drop into both eyes every evening.     Family History       Problem Relation (Age of Onset)    Asthma Son    Cancer Sister    Diabetes Paternal Aunt    Glaucoma Father    Heart disease Father    Hypertension Mother, Father          Tobacco Use    Smoking status: Never Smoker    Smokeless tobacco: Never Used   Substance and Sexual Activity    Alcohol use: No    Drug use: No    Sexual activity: Not Currently     Review of Systems   Unable to perform ROS: Dementia (patient sleeping)    Objective:     Vital Signs (Most Recent):  Temp: 98.1 °F (36.7 °C) (05/30/22 2048)  Pulse: 94 (05/30/22 2247)  Resp: 17 (05/30/22 2247)  BP: 121/83 (05/30/22 2247)  SpO2: 100 % (05/30/22 2247) Vital Signs (24h Range):  Temp:  [98.1 °F (36.7 °C)] 98.1 °F (36.7 °C)  Pulse:  [94-98] 94  Resp:  [17-18] 17  SpO2:  [100 %] 100 %  BP: (118-121)/(64-83) 121/83        There is no height or weight on file to calculate BMI.    Physical Exam  Vitals and nursing note reviewed.   Constitutional:       Appearance: She is ill-appearing.      Comments: sleeping   HENT:      Head: Normocephalic and atraumatic.   Eyes:      General: No scleral icterus.  Cardiovascular:      Rate and Rhythm: Normal rate. Rhythm irregular.      Heart sounds: No murmur heard.    No gallop.      Comments: Right DP pulse 2+,  Bilateral radial pulses 2+.  Left foot DP dopplerable per ED  Pulmonary:      Effort: No respiratory distress.      Breath sounds: No wheezing.   Abdominal:      General: There is no distension.      Palpations: Abdomen is soft.      Tenderness: There is no abdominal tenderness. There is no guarding.   Skin:     Comments: LLE below the knee, anterior shin erythema, warmth, edema, some bullous rash present, also some developing bullous lesions,  the 2nd toe distal tip with dusky/ischemic appearance.                  Significant Labs: All pertinent labs within the past 24 hours have been reviewed.  CBC:   Recent Labs   Lab 05/30/22 2137   WBC 5.90   HGB 11.4*   HCT 36.2*        CMP:   Recent Labs   Lab 05/30/22 2137      K 4.2      CO2 26   *   BUN 26*   CREATININE 1.5*   CALCIUM 9.0   PROT 6.2   ALBUMIN 2.7*   BILITOT 0.3   ALKPHOS 145*   AST 22   ALT 18   ANIONGAP 11   EGFRNONAA 32.4*     Cardiac Markers: No results for input(s): CKMB, MYOGLOBIN, BNP, TROPISTAT in the last 48 hours.  Coagulation:   Recent Labs   Lab 05/30/22  2137   INR 3.1*     Lactic Acid: No results for input(s): LACTATE in the last 48  hours.    Significant Imaging: I have reviewed all pertinent imaging results/findings within the past 24 hours.    US LOWER EXTREMITY ARTERIES LEFT     CLINICAL HISTORY:  Other specified soft tissue disorders     TECHNIQUE:  Bilateral spectral, color and grayscale images of the large arteries of the left lower extremities were performed.     COMPARISON:  None     FINDINGS:  Peak systolic velocities were obtained as follows (in cm/sec):     Left common femoral:  65     Left deep femoral:  31     Left superficial femoral proximal: 60     Left superficial femoral mid: 42     Left superficial femoral distal: 47     Left proximal popliteal: 51     Left mid popliteal: 188     Left distal popliteal: 9.0     Left anterior tibial:  5.0.  There is focal occlusion of the proximal left JASKARAN with reconstitution distally.     Left posterior tibial: 25     Triphasic waveforms within the left 5.  Monophasic waveforms within the left calf.     Impression:     Focal occlusion of the proximal left JASKARAN with reconstitution distally.     Greater than 75% stenosis within the mid left popliteal artery with greater than tripling of velocities.     Diffuse monophasic waveforms of the left calf vasculature suggesting atherosclerotic disease/peripheral arterial disease.     This report was flagged in Epic as abnormal.     Electronically signed by resident: Jaylon Medrano  Date:                                            05/30/2022  Time:                                           22:48     Electronically signed by: Kenny Flowers MD  Date:                                            05/30/2022  Time:                                           23:05    Assessment/Plan:     * Critical lower limb ischemia  - arterial U/s with Left anterior tibial artery focal occlusion, patient with erythema/swelling/pain/skin changes in the affected limb  -she is on anticoagulation for LV thrombus/  - Aflutter/fib diagnosis.  Not on anti-platelets,  Continue  statin  -Start on Aspirin       LV (left ventricular) mural thrombus  Continue warfarin   -pharmD consult to assist with dosing  -goal INR 2-3.0       Atrial flutter  Patient is in rate controlled aflutter currently  -continue Toprol XL for beta blockade  -Telemetry monitoring      Type 2 diabetes mellitus with stage 3 chronic kidney disease, without long-term current use of insulin  Resume sliding scale insulin and POCT glucose checks       Deterioration of memory  Delirium precautions  -given recent admit, encephalopthy she's had remains high risk for delirium  -Niece reports she has Good and bad days since her discharge to SNF,  Today most recently ascribed as a good day.       Chronic systolic heart failure  Continue entresto, toprol XL  And PO lasix BID  -100% on Room air,   -compensated      VTE Risk Mitigation (From admission, onward)         Ordered     warfarin (COUMADIN) tablet 4 mg  Daily        Question Answer Comment   Is the patient competent? No    Is the care giver competent? Yes        05/31/22 0128     IP VTE HIGH RISK PATIENT  Once         05/31/22 0128     Reason for No Pharmacological VTE Prophylaxis  Once        Question:  Reasons:  Answer:  Already adequately anticoagulated on oral Anticoagulants    05/31/22 0128                   Vishnu Greco MD  Department of Hospital Medicine   [facility] - Emergency Dept

## 2022-05-31 NOTE — ED TRIAGE NOTES
Temitope Suero, a 82 y.o. female presents to the ED w/ complaint of L leg swelling. Pt is from [facility]. Pt denies any pain. Pt does have wound to L shin. Pt is oriented to self.     Triage note:  Chief Complaint   Patient presents with    Leg Swelling     Left leg swelling. Pt also reports diabetic foot wounds to the area. Denies fever or drainage     Review of patient's allergies indicates:  No Known Allergies  Past Medical History:   Diagnosis Date    Atrial flutter 8/12/2018    Cancer of left breast, stage 2 11/24/2015    Cataract     Cerebrovascular small vessel disease 4/11/2018    Bi-thalamic lacunar disease, mod/sev periventricular white matter disease, mixed pattern cerebral microbleeds    Cervicalgia 4/17/2018    Chronic anticoagulation - apixaban 4/17/2018    Previous on Xarelto but changed to apixaban 8-2018 due to recurrent embolic stroke.    Chronic systolic heart failure 4/17/2018    Echo 4-2018   1 - Moderately depressed left ventricular systolic function (EF 30-35%).    2 - Biatrial enlargement.    3 - Normal right ventricular systolic function .    4 - Mild mitral regurgitation.    5 - Mild tricuspid regurgitation.    6 - The estimated PA systolic pressure is 34 mmHg.    7 - Increased central venous pressure.    8 - Left pleural effusion.     CKD stage 3 due to type 2 diabetes mellitus 4/17/2018    Embolic stroke involving left cerebellar artery 8/13/2018    Embolic stroke involving right posterior cerebral artery 4/11/2018    Encephalopathy 8/13/2018    Essential hypertension 10/28/2013    Glaucoma     Glaucoma suspect of both eyes 12/9/2013    Hearing loss, sensorineural 12/16/2013    Malignant hypertension 8/12/2018    Mixed hyperlipidemia 10/28/2013    Obesity 1/16/2014    Paroxysmal atrial fibrillation 7/10/2012    Stage 3 chronic kidney disease 4/17/2018    Toxic multinodular goiter 10/28/2013    Type 2 diabetes mellitus with stage 3 chronic kidney disease,  without long-term current use of insulin 7/10/2012    Diet controlled.   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

## 2022-05-31 NOTE — PLAN OF CARE
Bedside swallow study and speech language cognitive eval completed. Recommend regular diet/thin liquids at this time.   5/31/2022

## 2022-05-31 NOTE — ASSESSMENT & PLAN NOTE
- arterial U/s with Left anterior tibial artery focal occlusion, patient with erythema/swelling/pain/skin changes in the affected limb  -she is on anticoagulation for LV thrombus/  - Aflutter/fib diagnosis.  Not on anti-platelets,  Continue statin  -Start on Aspirin

## 2022-05-31 NOTE — ED NOTES
Received report.  Pt sleeping in bed.  Resp even/non-labored, VVS.  Siderails up x 2/call light in reach.  Will continue to monitor.

## 2022-05-31 NOTE — ED NOTES
Family member Linda asks to be contacted if any assistance is needed with patient/with updates. [phone]

## 2022-05-31 NOTE — SUBJECTIVE & OBJECTIVE
Past Medical History:   Diagnosis Date    Atrial flutter 8/12/2018    Cancer of left breast, stage 2 11/24/2015    Cataract     Cerebrovascular small vessel disease 4/11/2018    Bi-thalamic lacunar disease, mod/sev periventricular white matter disease, mixed pattern cerebral microbleeds    Cervicalgia 4/17/2018    Chronic anticoagulation - apixaban 4/17/2018    Previous on Xarelto but changed to apixaban 8-2018 due to recurrent embolic stroke.    Chronic systolic heart failure 4/17/2018    Echo 4-2018   1 - Moderately depressed left ventricular systolic function (EF 30-35%).    2 - Biatrial enlargement.    3 - Normal right ventricular systolic function .    4 - Mild mitral regurgitation.    5 - Mild tricuspid regurgitation.    6 - The estimated PA systolic pressure is 34 mmHg.    7 - Increased central venous pressure.    8 - Left pleural effusion.     CKD stage 3 due to type 2 diabetes mellitus 4/17/2018    Embolic stroke involving left cerebellar artery 8/13/2018    Embolic stroke involving right posterior cerebral artery 4/11/2018    Encephalopathy 8/13/2018    Essential hypertension 10/28/2013    Glaucoma     Glaucoma suspect of both eyes 12/9/2013    Hearing loss, sensorineural 12/16/2013    Malignant hypertension 8/12/2018    Mixed hyperlipidemia 10/28/2013    Obesity 1/16/2014    Paroxysmal atrial fibrillation 7/10/2012    Stage 3 chronic kidney disease 4/17/2018    Toxic multinodular goiter 10/28/2013    Type 2 diabetes mellitus with stage 3 chronic kidney disease, without long-term current use of insulin 7/10/2012    Diet controlled.    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     Current Outpatient Medications  on File Prior to Encounter   Medication Sig    atorvastatin (LIPITOR) 40 MG tablet Take 1 tablet (40 mg total) by mouth once daily.    donepeziL (ARICEPT) 5 MG tablet Take 1 tablet (5 mg total) by mouth every evening.    furosemide (LASIX) 40 MG tablet TAKE 1 TABLET BY MOUTH TWICE A DAY    hydrALAZINE (APRESOLINE) 50 MG tablet Take 1 tablet (50 mg total) by mouth every 8 (eight) hours.    latanoprost 0.005 % ophthalmic solution Place 1 drop into both eyes every evening.    metoprolol succinate (TOPROL-XL) 100 MG 24 hr tablet Take 1 tablet (100 mg total) by mouth once daily.    sacubitriL-valsartan (ENTRESTO) 24-26 mg per tablet Take 1 tablet by mouth 2 (two) times daily.    traZODone (DESYREL) 100 MG tablet Take 1 tablet (100 mg total) by mouth nightly as needed for Insomnia.    warfarin (COUMADIN) 4 MG tablet Take 1 tablet (4 mg total) by mouth Daily.    [DISCONTINUED] benazepriL (LOTENSIN) 20 MG tablet Take 1 tablet (20 mg total) by mouth once daily.    [DISCONTINUED] carvediloL (COREG) 25 MG tablet Take 1 tablet (25 mg total) by mouth 2 (two) times daily with meals.    [DISCONTINUED] travoprost, benzalkonium, (TRAVATAN) 0.004 % ophthalmic solution Place 1 drop into both eyes every evening.     Family History       Problem Relation (Age of Onset)    Asthma Son    Cancer Sister    Diabetes Paternal Aunt    Glaucoma Father    Heart disease Father    Hypertension Mother, Father          Tobacco Use    Smoking status: Never Smoker    Smokeless tobacco: Never Used   Substance and Sexual Activity    Alcohol use: No    Drug use: No    Sexual activity: Not Currently     Review of Systems   Unable to perform ROS: Dementia (patient sleeping)   Objective:     Vital Signs (Most Recent):  Temp: 98.1 °F (36.7 °C) (05/30/22 2048)  Pulse: 94 (05/30/22 2247)  Resp: 17 (05/30/22 2247)  BP: 121/83 (05/30/22 2247)  SpO2: 100 % (05/30/22 2247) Vital Signs (24h Range):  Temp:  [98.1 °F (36.7 °C)] 98.1 °F (36.7 °C)  Pulse:  [94-98]  94  Resp:  [17-18] 17  SpO2:  [100 %] 100 %  BP: (118-121)/(64-83) 121/83        There is no height or weight on file to calculate BMI.    Physical Exam  Vitals and nursing note reviewed.   Constitutional:       Appearance: She is ill-appearing.      Comments: sleeping   HENT:      Head: Normocephalic and atraumatic.   Eyes:      General: No scleral icterus.  Cardiovascular:      Rate and Rhythm: Normal rate. Rhythm irregular.      Heart sounds: No murmur heard.    No gallop.      Comments: Right DP pulse 2+,  Bilateral radial pulses 2+.  Left foot DP dopplerable per ED  Pulmonary:      Effort: No respiratory distress.      Breath sounds: No wheezing.   Abdominal:      General: There is no distension.      Palpations: Abdomen is soft.      Tenderness: There is no abdominal tenderness. There is no guarding.   Skin:     Comments: LLE below the knee, anterior shin erythema, warmth, edema, some bullous rash present, also some developing bullous lesions,  the 2nd toe distal tip with dusky/ischemic appearance.                  Significant Labs: All pertinent labs within the past 24 hours have been reviewed.  CBC:   Recent Labs   Lab 05/30/22 2137   WBC 5.90   HGB 11.4*   HCT 36.2*        CMP:   Recent Labs   Lab 05/30/22 2137      K 4.2      CO2 26   *   BUN 26*   CREATININE 1.5*   CALCIUM 9.0   PROT 6.2   ALBUMIN 2.7*   BILITOT 0.3   ALKPHOS 145*   AST 22   ALT 18   ANIONGAP 11   EGFRNONAA 32.4*     Cardiac Markers: No results for input(s): CKMB, MYOGLOBIN, BNP, TROPISTAT in the last 48 hours.  Coagulation:   Recent Labs   Lab 05/30/22 2137   INR 3.1*     Lactic Acid: No results for input(s): LACTATE in the last 48 hours.    Significant Imaging: I have reviewed all pertinent imaging results/findings within the past 24 hours.    US LOWER EXTREMITY ARTERIES LEFT     CLINICAL HISTORY:  Other specified soft tissue disorders     TECHNIQUE:  Bilateral spectral, color and grayscale images of the  large arteries of the left lower extremities were performed.     COMPARISON:  None     FINDINGS:  Peak systolic velocities were obtained as follows (in cm/sec):     Left common femoral:  65     Left deep femoral:  31     Left superficial femoral proximal: 60     Left superficial femoral mid: 42     Left superficial femoral distal: 47     Left proximal popliteal: 51     Left mid popliteal: 188     Left distal popliteal: 9.0     Left anterior tibial:  5.0.  There is focal occlusion of the proximal left JASKARAN with reconstitution distally.     Left posterior tibial: 25     Triphasic waveforms within the left 5.  Monophasic waveforms within the left calf.     Impression:     Focal occlusion of the proximal left JASKARAN with reconstitution distally.     Greater than 75% stenosis within the mid left popliteal artery with greater than tripling of velocities.     Diffuse monophasic waveforms of the left calf vasculature suggesting atherosclerotic disease/peripheral arterial disease.     This report was flagged in Epic as abnormal.     Electronically signed by resident: Jaylon Medrano  Date:                                            05/30/2022  Time:                                           22:48     Electronically signed by: Kenny Flowers MD  Date:                                            05/30/2022  Time:                                           23:05

## 2022-05-31 NOTE — PLAN OF CARE
"Temitope Suero is a 82 y.o. female admitted to [facility] on 2022 for Critical lower limb ischemia. Temitope Suero tolerated evaluation fair today. She was resting in bed with no family/friends present upon PT/OT entry to room, agreeable to evaluation. She is very pleasant throughout session, oriented x 2 (name, place). She does struggle to answer some basic questions, I'd estimate she answers ~50% of basic "yes/no" questions today, will get distracted at times. Tearful 3x during session, stating "I wish you could have met my mom but she passed away" so requiring comforting and re-direction. Worked on self-feeding at edge of bed, which she performed with stand-by assistance. Reports "a little" pain at L lower leg with activity, unable to numerically rate 2* cognition. Stood with max (A) to a rolling walker, obvious weightshift to the R but with cueing she's able to walk 5-6 steps (fwd/back and side-stepping) with rolling walker and min (A). Antalgic gait at LLE noted, poor safety with rolling walker (she typically walks without device, unaccustomed to using). Back into bed with alarm on at end of session. Discussed PT role, POC, goals and recommendations (Skilled Nursing Facility) with patient; verbalized understanding. Temitope Suero would benefit from acute PT services to promote mobility during this admission and improve return to PLOF.    Problem: Physical Therapy  Goal: Physical Therapy Goal  Description: Goals to be met by: 22     Patient will increase functional independence with mobility by performin. Supine to sit with Modified [place] - Not met  2. Sit to stand transfer with CGA using RW - Not met  3. Bed to chair transfer with Contact Guard Assistance using Rolling Walker - Not met  4. Gait  x 50 feet with Contact Guard Assistance using Rolling Walker - Not met  5. Lower extremity exercise program x 15 reps per handout, with supervision - Not met  Outcome: Ongoing, Progressing    Rick Calvin, " PT  5/31/2022

## 2022-05-31 NOTE — ED NOTES
Assumed care of patient. Patient is resting comfortably in bed in NAD, chest rise and fall noted. BP, cardiac, and O2 monitoring continued. Family member at bedside. Bed locked in lowest position, side rails up x2, call light within reach. VSS. Will continue to monitor.

## 2022-05-31 NOTE — NURSING
Pts niece is at the bedside. [facility] [phone]. She is requesting that if she is not present to give her a call with updates with pt.

## 2022-05-31 NOTE — ASSESSMENT & PLAN NOTE
Patient is in rate controlled aflutter currently  -continue Toprol XL for beta blockade  -Telemetry monitoring

## 2022-05-31 NOTE — ASSESSMENT & PLAN NOTE
Delirium precautions  -given recent admit, encephalopthy she's had remains high risk for delirium  -Niece reports she has Good and bad days since her discharge to SNF,  Today most recently ascribed as a good day.

## 2022-05-31 NOTE — CARE UPDATE
Changed patient to NPO diet - pending vascular surgery evaluation       Patient was on mechanical soft diabetic diet prior to discharge

## 2022-05-31 NOTE — PROGRESS NOTES
Ms. Suero is Inpatient status through ER for arterial leg occlusion was discharged on 5/18/22 after stroke encounter on 5/4/22. Will contact in a few weeks to schedule initial Stroke Mobile encounter.

## 2022-06-01 NOTE — PLAN OF CARE
Problem: Adult Inpatient Plan of Care  Goal: Plan of Care Review  Outcome: Ongoing, Progressing  Goal: Patient-Specific Goal (Individualized)  Outcome: Ongoing, Progressing  Goal: Absence of Hospital-Acquired Illness or Injury  Outcome: Ongoing, Progressing  Goal: Optimal Comfort and Wellbeing  Outcome: Ongoing, Progressing  Goal: Readiness for Transition of Care  Outcome: Ongoing, Progressing     Pt aao x 3. VS stable. Pt awaiting transportation for discharge. Pt sitting up in bed with head of the bed elevated. Side rails up x 2. Bed low, wheels locked, bed alarm set, call light within reach. Will continue to monitor.

## 2022-06-01 NOTE — HOSPITAL COURSE
Mrs. Temitope Suero was admitted to [facility] Medicine with concerns for LLE limb ischemia.  Workup revealed no DVT in LLE, proximal L JASKARAN occlusion with reconstitution distally, 75% stenosis of mid L popliteal artery and diffuse monophasic waveforms in left calf vasculature. After evaluation, Vascular Surgery decided no surgical intervention was needed at this time as although studies showed evidence of PAD, patient had adequate perfusion for wound healing. They recommended woundcare, compression/elevation for edema, and initiation of ASA/Plavix and high intensity statin, which patient was started and discharged on. Of note, patient and family requested patient not be discharged back to her SNF and to be taken home. As such she was discharged home with HH PT/OT and rolling walker to aid with ambulation. Has close f/u with PCP on 6/6/22.

## 2022-06-01 NOTE — PT/OT/SLP PROGRESS
"Speech Language Pathology Treatment    Patient Name:  Temitope Suero   MRN:  [id number]  Admitting Diagnosis: Critical lower limb ischemia    Recommendations:                 General Recommendations:  Cognitive-linguistic therapy  Diet recommendations:  Regular, Liquid Diet Level: Thin   Aspiration Precautions: 1 bite/sip at a time, Assistance with meals, Eliminate distractions, Meds whole 1 at a time and Standard aspiration precautions   General Precautions: Standard, fall  Communication strategies:  go to room if call light pushed    Subjective     SLP reviewed Patient with RN  Pt presents calm  She explains, "Well it would depend"     Pain/Comfort:  · Pain Rating 1: other (see comments) (difficult to assess 2/2 cognitive status)    Respiratory Status: Room air    Objective:     Has the patient been evaluated by SLP for swallowing?   Yes  Keep patient NPO? No   Current Respiratory Status:  room air       Patient found awake and alert in bed with partially completed lunch meal tray on bedside table. She declined additional bites/sips. RN in room during session to review Patient. She was oriented to person only. She provided appropriate solutions to general reasoning questions 40% of attempts provided min-mod cues for expansion/clarification. She completed fx sequencing tasks (4+ steps) 50% of attempts with mod verbal cues from SLP. She was DEP For fx math reasoning tasks for time management. SLP educated Pt on SLP Role,safety precautions and compensatory strategies for  time/money/medication management tasks. Pt with minimal verbal understanding 2/2 underlying cognitive status. No family present. Whiteboard current. Pt remained in bed in position as found with call light in reach upon SLP exit.     Assessment:     Temitope Suero is a 82 y.o. female with an SLP diagnosis of Cognitive-Linguistic Impairment.  Upon d/c from acute, she will require ongoing assistance with memory and medication/time/money management tasks for " safety.     Goals:   Multidisciplinary Problems     SLP Goals        Problem: SLP    Goal Priority Disciplines Outcome   SLP Goal     SLP    Description: Speech Language Pathology Goals  Goals expected to be met by 6/8    1. Pt will tolerate least restrictive diet   2. Pt will complete problem solving tasks with 70% accy and mod cues   3. Pt will follow multi-step commands with 50% accy and mod cues                           Plan:     · Patient to be seen:  3 x/week   · Plan of Care expires:     · Plan of Care reviewed with:  patient   · SLP Follow-Up:  Yes       Discharge recommendations:   skilled nursing facility      Time Tracking:     SLP Treatment Date:   06/01/22  Speech Start Time:  1314  Speech Stop Time:  1324     Speech Total Time (min):  10 min    Billable Minutes: Speech Therapy Individual 9    06/01/2022

## 2022-06-01 NOTE — NURSING
Patient in and out a state of confusion. Alert and oriented to self. Bed alarm activated. Will continue to monitor.

## 2022-06-01 NOTE — PLAN OF CARE
[facility] - Med Surg  Initial Discharge Assessment       Primary Care Provider: Gm Zambrano MD    Admission Diagnosis: Atrial fibrillation [I48.91]  PVD (peripheral vascular disease) [I73.9]  Leg swelling [M79.89]  Arterial occlusion [I70.90]  Chest pain [R07.9]    Admission Date: 5/30/2022  Expected Discharge Date: 6/2/2022    Discharge Barriers Identified: None    Payor: BLUE CROSS BLUE SHIELD / Plan: BCBS OF [facility] LOCAL PLUS / Product Type: Commercial /     Extended Emergency Contact Information  Primary Emergency Contact: DominicLinda  Address: [address] [place] [address] .           [place] [address] [facility]  Home Phone: [phone]  Relation: Relative    Discharge Plan A: Home Health  Discharge Plan B: Skilled Nursing Facility      [id number] - [place], [place] - [address]  [address]  [place] [address]  Phone: [phone] Fax: [phone]  CM spoke with linda guy, pt is from [facility]/ snf, but the family wants her to return home with home health PT.  No preferences given.  Pt usually walks without assistance, but she will probably need a rolling walker at [place].  Will put in orders.  Unsure if she will need transport at home.   Cynthia Brewster RN [facility] [phone]    Initial Assessment (most recent)     Adult Discharge Assessment - 06/01/22 1017        Discharge Assessment    Assessment Type Discharge Planning Assessment     Confirmed/corrected address, phone number and insurance Yes     Confirmed Demographics Correct on Facesheet     Source of Information family     Reason For Admission lower limb ischemia     Lives With child(rubina), adult     Facility Arrived From: [facility]/     Do you expect to return to your current living situation? No     Do you have help at home or someone to help you manage your care at home? Yes     Who are your caregiver(s) and their phone number(s)? linda guy (relative) [phone]     Prior to hospitilization cognitive  status: Not Oriented to Time     Current cognitive status: Not Oriented to Time     Walking or Climbing Stairs Difficulty ambulation difficulty, requires equipment     Mobility Management needs a walker     Dressing/Bathing Difficulty bathing difficulty, requires equipment     Home Layout Able to live on 1st floor     Equipment Currently Used at Home none     Readmission within 30 days? Yes     Patient currently being followed by outpatient case management? No     Do you currently have service(s) that help you manage your care at home? No     Do you take prescription medications? Yes     Do you have prescription coverage? Yes     Coverage bcbs     Do you have any problems affording any of your prescribed medications? No     Is the patient taking medications as prescribed? yes     Who is going to help you get home at discharge? jadekeren guy (relative) [phone]     How do you get to doctors appointments? family or friend will provide     Are you on dialysis? No     Do you take coumadin? No     Discharge Plan A Home Health     Discharge Plan B Skilled Nursing Facility     DME Needed Upon Discharge  walker, rolling     Discharge Plan discussed with: Adult children     Discharge Barriers Identified None        Relationship/Environment    Name(s) of Who Lives With Patient jadekeren guy (relative) [phone]                 Readmission Assessment (most recent)     Readmission Assessment - 06/01/22 1020        Readmission    Why were you hospitalized in the last 30 days? aflutter     Why were you readmitted? New medical problem     When you left the hospital where did you go? SNF     Did patient/caregiver refused recommended DC plan? No     Did you try to manage your symptoms your self? No     Was this a planned readmission? No

## 2022-06-01 NOTE — DISCHARGE SUMMARY
[facility] Surg  [facility] Medicine  Discharge Summary      Patient Name: Temitope Suero  MRN: [id number]  Patient Class: IP- Inpatient  Admission Date: 5/30/2022  Hospital Length of Stay: 1 days  Discharge Date and Time:  06/01/2022 6:47 PM  Attending Physician: Tyler Hewitt MD   Discharging Provider: Selma De Leon MD  Primary Care Provider: Gm Zambrano MD  Hospital Medicine Team: [facility] HOSP MED 1 Selma De Leon MD    HPI:   83 y/o AAF hx of HFrEF, Dementia, Type 2 Dm, Aflutter/Afib and LV Thrombus on warfarin anticoagulation who presents from [facility] with c/o of Left lower leg swelling/pain and new blistering, referred for admission due to concern of critical limb ischemia.     Patient was admitted to [facility] recently prior to her SNF admission, here from 5/5-5/18 this month, for initially Pneumonia with Acute Encephalopathy.  During her hospital course, she developed Aflutter with RVR, had repeated or intermittent bouts of mental status changes, underwent MRI brain to r/o stroke which was negative.   ECHO revealed the LV Thrombus     Tonigh she is accompanied by her niece - Shelby Alfaro, also [facility] ([phone])  Shelby reports that patient prior to any hospitalization in May was walking independently, since her 1st admision during the hospital course was not ambulating and since discharge to SNF has not been able to, she walked a short distance once with PT @ the SNF.  Shelby noted patient had a bandage on her left shin this week, were patient had developed a serous blister, more recently patient with LLE discoloration below the knee, new blistering, swelling and changes in toe color.      On arrival to the ED and in workup - labwork shows patient is Therapuetic on warfarin - INR is 3.1,  Stable metabolic panel and CBC.  Her arterial u/s of the LLE - demonstrated Focal Occlusion of the Left anterior tibial artery.     ED staff contacted on-call vascular surgery who will evaluate pt during daytime.        * No surgery found *      Hospital Course:   Mrs. Temitope Suero was admitted to Hospital Medicine with concerns for LLE limb ischemia.  Workup revealed no DVT in LLE, proximal L JASKARAN occlusion with reconstitution distally, 75% stenosis of mid L popliteal artery and diffuse monophasic waveforms in left calf vasculature. After evaluation, Vascular Surgery decided no surgical intervention was needed at this time as although studies showed evidence of PAD, patient had adequate perfusion for wound healing. They recommended woundcare, compression/elevation for edema, and initiation of ASA/Plavix and high intensity statin, which patient was started and discharged on. Of note, patient and family requested patient not be discharged back to her SNF and to be taken home. As such she was discharged home with HH PT/OT and rolling walker to aid with ambulation. Has close f/u with PCP on 6/6/22.    Physical Exam  Constitutional:       General: She is not in acute distress.     Appearance: Normal appearance. She is not diaphoretic.   HENT:      Head: Normocephalic and atraumatic.   Eyes:      Pupils: Pupils are equal, round, and reactive to light.   Cardiovascular: Normal rate but irregular rhythm. 2+ R DP pulse, L DP pulse barely palpable.     Comments:   Pulmonary:      Effort: Pulmonary effort is normal. No respiratory distress.   Abdominal:      General: There is no distension.      Palpations: Abdomen is soft.   Musculoskeletal:      Comments: 2 small 1-2cm superficial wounds the mid L lower leg posterolaterally and posteromedially. LLE edema to mid shin.   Skin:     General: Skin is warm and dry.   Neurological:      General: No focal deficit present.      Mental Status: She is alert. She is disoriented.   Psychiatric:         Mood and Affect: Mood normal.         Behavior: Behavior normal.         Thought Content: Thought content normal.         Judgment: Judgment normal.      Goals of Care Treatment Preferences:  Code  "Status: Full Code      Consults:   Consults (From admission, onward)        Status Ordering Provider     Inpatient consult to Vascular Surgery  Once        Provider:  (Not yet assigned)    Completed ED BUSH          No new Assessment & Plan notes have been filed under this hospital service since the last note was generated.  Service: Hospital Medicine    Final Active Diagnoses:    Diagnosis Date Noted POA    PRINCIPAL PROBLEM:  Critical lower limb ischemia [I70.229] 05/31/2022 Yes    PAD (peripheral artery disease) [I73.9]  Yes    Atrial flutter [I48.92] 05/08/2022 Yes    LV (left ventricular) mural thrombus [I51.3] 05/07/2022 Yes    Deterioration of memory [R41.3] 08/13/2018 Yes    Chronic systolic heart failure [I50.22] 04/17/2018 Yes    Stage 3 chronic kidney disease [N18.30] 04/17/2018 Yes    Type 2 diabetes mellitus with stage 3 chronic kidney disease, without long-term current use of insulin [E11.22, N18.30] 07/10/2012 Yes     Chronic      Problems Resolved During this Admission:       Discharged Condition: stable    Disposition: Home-Health Care [facility]    Follow Up:   Follow-up Information     Gm Zambrano MD Follow up on 6/6/2022.    Specialty: Internal Medicine  Why: june 6, with arcadio sheffield nurse practitioner at 10:00am.  Thanks  Contact information:  [address]  [facility] [address] [phone]             [facility] Follow up.    Specialty: Home Health Services  Why: home health, please call within 2 days if you have not heard from the agency.  thanks  Contact information:  [address] [place] [address].  [address]  [facility] [address] [phone]                       Patient Instructions:      WALKER FOR HOME USE     Order Specific Question Answer Comments   Type of Walker: Adult (5'4"-6'6")    With wheels? Yes    Height: 5'6    Weight: 64 kg (141 lb 1.5 oz)    Length of need (1-99 months): 99    Does patient have medical equipment at home? none    Please check all that " "apply: Patient's condition impairs ambulation.    Please check all that apply: Patient is unable to safely ambulate without equipment.      BATH/SHOWER CHAIR FOR HOME USE     Order Specific Question Answer Comments   Height: 5'7"    Weight: 64 kg (141 lb 1.5 oz)    Does patient have medical equipment at home? none    Length of need (1-99 months): 99    Type: With back        Significant Diagnostic Studies: Labs:   CMP   Recent Labs   Lab 05/30/22  2137 05/31/22  0330 06/01/22  0837    144 136   K 4.2 4.0 4.7    108 104   CO2 26 27 22*   * 102 103   BUN 26* 24* 22   CREATININE 1.5* 1.3 1.3   CALCIUM 9.0 8.7 8.6*   PROT 6.2 5.7* 6.1   ALBUMIN 2.7* 2.5* 2.5*   BILITOT 0.3 0.3 0.4   ALKPHOS 145* 135 131   AST 22 19 26   ALT 18 17 15   ANIONGAP 11 9 10   ESTGFRAFRICA 37.4* 44.5* 44.5*   EGFRNONAA 32.4* 38.6* 38.6*    and CBC   Recent Labs   Lab 05/31/22  0248 05/31/22  1707 06/01/22  0837   WBC 4.89 5.08 4.51   HGB 11.2* 11.8* 11.7*   HCT 37.4 38.1 37.7    306 291       Pending Diagnostic Studies:     None         Medications:  Reconciled Home Medications:      Medication List      START taking these medications    aspirin 81 MG Chew  Take 1 tablet (81 mg total) by mouth once daily.  Start taking on: June 2, 2022     clopidogreL 75 mg tablet  Commonly known as: PLAVIX  Take 1 tablet (75 mg total) by mouth once daily.  Start taking on: June 2, 2022        CHANGE how you take these medications    atorvastatin 80 MG tablet  Commonly known as: LIPITOR  Take 1 tablet (80 mg total) by mouth once daily.  What changed:   · medication strength  · how much to take     warfarin 4 MG tablet  Commonly known as: COUMADIN  Take 1 tablet (4 mg total) by mouth Daily. Or as directed by coumadin clinic  What changed: additional instructions        CONTINUE taking these medications    donepeziL 5 MG tablet  Commonly known as: ARICEPT  Take 1 tablet (5 mg total) by mouth every evening.     ENTRESTO 24-26 mg per " tablet  Generic drug: sacubitriL-valsartan  Take 1 tablet by mouth 2 (two) times daily.     furosemide 40 MG tablet  Commonly known as: LASIX  TAKE 1 TABLET BY MOUTH TWICE A DAY     hydrALAZINE 50 MG tablet  Commonly known as: APRESOLINE  Take 1 tablet (50 mg total) by mouth every 8 (eight) hours.     latanoprost 0.005 % ophthalmic solution  Place 1 drop into both eyes every evening.     metoprolol succinate 100 MG 24 hr tablet  Commonly known as: TOPROL-XL  Take 1 tablet (100 mg total) by mouth once daily.     traZODone 100 MG tablet  Commonly known as: DESYREL  Take 1 tablet (100 mg total) by mouth nightly as needed for Insomnia.        STOP taking these medications    benazepriL 20 MG tablet  Commonly known as: LOTENSIN     carvediloL 25 MG tablet  Commonly known as: COREG            Indwelling Lines/Drains at time of discharge:   Lines/Drains/Airways     None                 Time spent on the discharge of patient: 35 minutes         Selma De Leon MD  Department of Hospital Medicine  [facility] Surg

## 2022-06-01 NOTE — CONSULTS
[facility] Surg  Wound Care    Patient Name:  Temitope Suero   MRN:  [id number]  Date: 6/1/2022  Diagnosis: Critical lower limb ischemia    History:     Past Medical History:   Diagnosis Date    Cancer of left breast, stage 2 11/24/2015    Cataract     Cerebrovascular small vessel disease 04/11/2018    Bi-thalamic lacunar disease, mod/sev periventricular white matter disease, mixed pattern cerebral microbleeds    Cervicalgia 04/17/2018    Chronic anticoagulation - apixaban 04/17/2018    Previous on Xarelto but changed to apixaban 8-2018 due to recurrent embolic stroke.    Chronic systolic heart failure 04/17/2018    Echo 4-2018   1 - Moderately depressed left ventricular systolic function (EF 30-35%).    2 - Biatrial enlargement.    3 - Normal right ventricular systolic function .    4 - Mild mitral regurgitation.    5 - Mild tricuspid regurgitation.    6 - The estimated PA systolic pressure is 34 mmHg.    7 - Increased central venous pressure.    8 - Left pleural effusion.     CKD stage 3 due to type 2 diabetes mellitus 04/17/2018    Embolic stroke involving left cerebellar artery 08/13/2018    Embolic stroke involving right posterior cerebral artery 04/11/2018    Essential hypertension 10/28/2013    Glaucoma suspect of both eyes 12/09/2013    Hearing loss, sensorineural 12/16/2013    LV (left ventricular) mural thrombus 05/07/2022    Malignant hypertension 08/12/2018    Mixed hyperlipidemia 10/28/2013    Obesity 01/16/2014    Paroxysmal atrial fibrillation 07/10/2012    Stage 3 chronic kidney disease 04/17/2018    Toxic multinodular goiter 10/28/2013    Type 2 diabetes mellitus with stage 3 chronic kidney disease, without long-term current use of insulin 07/10/2012    Diet controlled.   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  Mrs. Suero has an ulcer on her left, lateral leg that is producing moderate amounts of serosanguinous, creamy drainage with no odor. The wound was cleansed with wound cleanser and Aquacel Ag applied and covered with a foam dressing. She has an intact blister on her left anterior shin. A foam was applied to the blister. She has a skin tear on her left, posterior calf. The tear was cleansed with wound cleanser and covered by foam. She complained of a lot of pain during the assessment and treatment.     Plan:  Left lateral, lower shin - nursing to cleanse with NS or wound cleanser, pat dry and apply Aquacel Ag and cover with foam every 3 days    Left upper anterior shin - nursing to cover with foam and change every 3 days, if blister opens - cleanse with NS, pat dry, and cover with foam every 3 days     Left calf - nursing to cleanse with NS or wound cleanser, pat dry, and cover with foam every 3 days     Waffle overlay  Heel Protecting Boots     Recommendations made to primary team for above plan via secure chat. Orders placed. Wound care signing off, re-consult as needed.      06/01/22 0940        Altered Skin Integrity 05/30/22 2210 Left lower Leg    Date First Assessed/Time First Assessed: 05/30/22 2210   Altered Skin Integrity Present on Admission: yes  Side: Left  Orientation: lower  Location: Leg  Primary Wound Type: (c)    Wound Image    Dressing Appearance Intact;Moist drainage   Drainage Amount Small   Drainage Characteristics/Odor Creamy;Serosanguineous   Appearance " Pink;White;Red;Yellow;Moist   Tissue loss description Partial thickness   Periwound Area Intact;Dry;Pink   Care Cleansed with:;Wound cleanser   Dressing Removed;Gauze;Applied;Silver;Foam   Periwound Care Skin barrier film applied;Dry periwound area maintained        Altered Skin Integrity 06/01/22 0940 Left anterior;lower;proximal Leg Blister(s)   Date First Assessed/Time First Assessed: 06/01/22 0940   Altered Skin Integrity Present on Admission: yes  Side: Left  Orientation: anterior;lower;proximal  Location: Leg  Is this injury device related?: No  Primary Wound Type: Blister(s)   Wound Image    Dressing Appearance Open to air   Drainage Amount None   Appearance Intact  (fluid filled blister)   Tissue loss description Not applicable   Periwound Area Intact;Pink;Warm;Redness   Wound Edges Defined   Dressing Applied;Foam   Periwound Care Skin barrier film applied;Dry periwound area maintained        Altered Skin Integrity 06/01/22 0940 Left Calf Skin Tear   Date First Assessed/Time First Assessed: 06/01/22 0940   Altered Skin Integrity Present on Admission: yes  Side: Left  Location: Calf  Is this injury device related?: No  Primary Wound Type: Skin Tear   Wound Image    Dressing Appearance Open to air   Drainage Amount None   Appearance Pink;Maroon;Dry   Tissue loss description Partial thickness   Periwound Area Intact;Pink;Redness;Warm   Care Cleansed with:;Wound cleanser   Dressing Applied;Foam   Periwound Care Skin barrier film applied;Dry periwound area maintained     06/01/2022

## 2022-06-01 NOTE — PLAN OF CARE
[facility] Surg      HOME HEALTH ORDERS  FACE TO FACE ENCOUNTER    Patient Name: Temitope Suero  YOB: 1940    PCP: Gm Zambrano MD   PCP Address: [address] / [place] [address]  PCP Phone Number: [phone]  PCP Fax: [phone]    Encounter Date: 5/30/22    Admit to Home Health    Diagnoses:  Active Hospital Problems    Diagnosis  POA    *Critical lower limb ischemia [I70.229]  Yes    PAD (peripheral artery disease) [I73.9]  Yes    Atrial flutter [I48.92]  Yes    LV (left ventricular) mural thrombus [I51.3]  Yes    Deterioration of memory [R41.3]  Yes    Chronic systolic heart failure [I50.22]  Yes    Stage 3 chronic kidney disease [N18.30]  Yes    Type 2 diabetes mellitus with stage 3 chronic kidney disease, without long-term current use of insulin [E11.22, N18.30]  Yes     Chronic     Diet controlled.        Resolved Hospital Problems   No resolved problems to display.       Follow Up Appointments:  Future Appointments   Date Time Provider Department Center   9/8/2022  9:20 AM Gm Zambrano Jr., MD [facility]       Allergies:Review of patient's allergies indicates:  No Known Allergies    Medications: Review discharge medications with patient and family and provide education.    Current Facility-Administered Medications   Medication Dose Route Frequency Provider Last Rate Last Admin    acetaminophen tablet 650 mg  650 mg Oral Q4H PRN Vishnu Greco MD        aspirin chewable tablet 81 mg  81 mg Oral Daily Selma De Leon MD   81 mg at 06/01/22 0824    atorvastatin tablet 80 mg  80 mg Oral Daily Selma De Leon MD   80 mg at 06/01/22 0824    clopidogreL tablet 75 mg  75 mg Oral Daily Selma De Leon MD   75 mg at 06/01/22 0824    dextrose 10% bolus 125 mL  12.5 g Intravenous PRN Vishnu Greco MD        dextrose 10% bolus 250 mL  25 g Intravenous PRN Vishnu Greco MD        donepeziL tablet 5 mg  5 mg Oral QHS Vishnu Greco MD   5 mg at 05/31/22  2037    furosemide tablet 40 mg  40 mg Oral BID Vishnu Greco MD   40 mg at 06/01/22 0824    glucagon (human recombinant) injection 1 mg  1 mg Intramuscular PRN Vishnu Greco MD        glucose chewable tablet 16 g  16 g Oral PRN Vishnu Greco MD        glucose chewable tablet 24 g  24 g Oral PRN Vishnu Greco MD        heparin 25,000 units in dextrose 5% (100 units/ml) IV bolus from bag - ADDITIONAL PRN BOLUS - 30 units/kg  30 Units/kg (Adjusted) Intravenous PRN Selma De Leon MD        heparin 25,000 units in dextrose 5% (100 units/ml) IV bolus from bag - ADDITIONAL PRN BOLUS - 60 units/kg  60 Units/kg (Adjusted) Intravenous PRN Selma De Leon MD        heparin 25,000 units in dextrose 5% 250 mL (100 units/mL) infusion HIGH INTENSITY nomogram Anti- Xa  0-40 Units/kg/hr (Adjusted) Intravenous Continuous Selma De Leon MD 8.8 mL/hr at 06/01/22 0421 14 Units/kg/hr at 06/01/22 0421    hydrALAZINE tablet 50 mg  50 mg Oral Q8H Vishnu Greco MD   50 mg at 06/01/22 0548    HYDROcodone-acetaminophen 5-325 mg per tablet 1 tablet  1 tablet Oral Q6H PRN Selma De Leon MD        HYDROmorphone injection 0.2 mg  0.2 mg Intravenous Q6H PRN Selma De Leon MD        insulin aspart U-100 pen 0-5 Units  0-5 Units Subcutaneous QID (AC + HS) PRN Vishnu Greco MD        latanoprost 0.005 % ophthalmic solution 1 drop  1 drop Both Eyes QHS Vishnu Greco MD   1 drop at 05/31/22 2038    melatonin tablet 6 mg  6 mg Oral Nightly PRN Jeremías Madden DO   6 mg at 06/01/22 0053    metoprolol succinate (TOPROL-XL) 24 hr tablet 100 mg  100 mg Oral Daily Vishnu Greco MD   100 mg at 06/01/22 0824    naloxone 0.4 mg/mL injection 0.02 mg  0.02 mg Intravenous PRN Vishnu Greco MD        ondansetron injection 4 mg  4 mg Intravenous Q8H PRN Vishnu Greco MD        polyethylene glycol packet 17 g  17 g Oral BID PRN Vishnu Greco MD        prochlorperazine injection Soln 5 mg  5 mg Intravenous Q6H PRN  Vishnu Greco MD        sacubitriL-valsartan 24-26 mg per tablet 1 tablet  1 tablet Oral BID Vishnu Greco MD   1 tablet at 06/01/22 0824    senna-docusate 8.6-50 mg per tablet 1 tablet  1 tablet Oral Daily Vishnu Greco MD   1 tablet at 06/01/22 0824    sodium chloride 0.9% flush 10 mL  10 mL Intravenous Q6H PRN Vishnu Greco MD        traZODone tablet 100 mg  100 mg Oral Nightly PRN Vishnu Greco MD   100 mg at 05/31/22 2037     Current Discharge Medication List      START taking these medications    Details   aspirin 81 MG Chew Take 1 tablet (81 mg total) by mouth once daily.  Refills: 0      clopidogreL (PLAVIX) 75 mg tablet Take 1 tablet (75 mg total) by mouth once daily.  Qty: 30 tablet, Refills: 11         CONTINUE these medications which have CHANGED    Details   atorvastatin (LIPITOR) 80 MG tablet Take 1 tablet (80 mg total) by mouth once daily.    Associated Diagnoses: Cerebrovascular small vessel disease; Embolic stroke involving right posterior cerebral artery         CONTINUE these medications which have NOT CHANGED    Details   donepeziL (ARICEPT) 5 MG tablet Take 1 tablet (5 mg total) by mouth every evening.  Qty: 90 tablet, Refills: 3      furosemide (LASIX) 40 MG tablet TAKE 1 TABLET BY MOUTH TWICE A DAY  Qty: 180 tablet, Refills: 1      hydrALAZINE (APRESOLINE) 50 MG tablet Take 1 tablet (50 mg total) by mouth every 8 (eight) hours.  Qty: 270 tablet, Refills: 0    Comments: .      latanoprost 0.005 % ophthalmic solution Place 1 drop into both eyes every evening.  Qty: 2.5 mL, Refills: 12      metoprolol succinate (TOPROL-XL) 100 MG 24 hr tablet Take 1 tablet (100 mg total) by mouth once daily.  Qty: 90 tablet, Refills: 0    Comments: .      sacubitriL-valsartan (ENTRESTO) 24-26 mg per tablet Take 1 tablet by mouth 2 (two) times daily.  Qty: 180 tablet, Refills: 0      traZODone (DESYREL) 100 MG tablet Take 1 tablet (100 mg total) by mouth nightly as needed for  Insomnia.  Qty: 30 tablet, Refills: 11      warfarin (COUMADIN) 4 MG tablet Take 1 tablet (4 mg total) by mouth Daily.  Qty: 30 tablet, Refills: 11         STOP taking these medications       benazepriL (LOTENSIN) 20 MG tablet Comments:   Reason for Stopping:         carvediloL (COREG) 25 MG tablet Comments:   Reason for Stopping:                 I have seen and examined this patient within the last 30 days. My clinical findings that support the need for the home health skilled services and home bound status are the following:no   Weakness/numbness causing balance and gait disturbance due to Weakness/Debility making it taxing to leave home.  Requiring assistive device to leave home due to unsteady gait caused by  Weakness/Debility.     Diet:   cardiac diet    Labs:  SN to perform labs:  INR: Weekly on Fridays; indefinitely and fax to [facility] [phone]    Referrals/ Consults  Physical Therapy to evaluate and treat. Evaluate for home safety and equipment needs; Establish/upgrade home exercise program. Perform / instruct on therapeutic exercises, gait training, transfer training, and Range of Motion.  Occupational Therapy to evaluate and treat. Evaluate home environment for safety and equipment needs. Perform/Instruct on transfers, ADL training, ROM, and therapeutic exercises.    Activities:   activity as tolerated    Nursing:   Agency to admit patient within 24 hours of hospital discharge unless specified on physician order or at patient request    SN to complete comprehensive assessment including routine vital signs. Instruct on disease process and s/s of complications to report to MD. Review/verify medication list sent home with the patient at time of discharge  and instruct patient/caregiver as needed. Frequency may be adjusted depending on start of care date.     Skilled nurse to perform up to 3 visits PRN for symptoms related to diagnosis    Notify MD if SBP > 160 or < 90; DBP > 90 or < 50; HR  > 120 or < 50; Temp > 101; O2 < 88%    Ok to schedule additional visits based on staff availability and patient request on consecutive days within the home health episode.    When multiple disciplines ordered:    Start of Care occurs on Sunday - Wednesday schedule remaining discipline evaluations as ordered on separate consecutive days following the start of care.    Thursday SOC -schedule subsequent evaluations Friday and Monday the following week.     Friday - Saturday SOC - schedule subsequent discipline evaluations on consecutive days starting Monday of the following week.    For all post-discharge communication and subsequent orders please contact patient's primary care physician. If unable to reach primary care physician or do not receive response within 30 minutes, please contact Dr Hewitt for clinical staff order clarification    Miscellaneous   n/a    Home Health Aide:  Physical Therapy Other: per PT rec and Occupational Therapy Other: per OT rec    Wound Care Orders  no    I certify that this patient is confined to her home and needs physical therapy and occupational therapy.

## 2022-06-01 NOTE — NURSING
Patient's Anti-Xa level resulted at 1.10 per the nomogram her heparin was stopped. Nomogram stated for it to be stopped for 2 hours and dropped 4 units once restarted. Will continue to monitor

## 2022-06-02 NOTE — PROGRESS NOTES
"83 yo female on warfarin for LV thrombus (diagnosed 5/7/22) -previously on eliquis for stroke prevention in the setting of Afib w/ prior stroke history. PMHx includes Aflutter/Afib, PAD, malnutrition, embolic stroke, CKD3, breast cancer (2015), HLD, toxic multinodular goiter, Type 2 DM.    Of note, recently admitted for critical limb ischemia. Per discharge note: "Vascular Surgery decided no surgical intervention was needed at this time as although studies showed evidence of PAD, patient had adequate perfusion for wound healing. They recommended woundcare, compression/elevation for edema, and initiation of ASA/Plavix and high intensity statin, which patient was started and discharged on"  "

## 2022-06-02 NOTE — PROGRESS NOTES
Spoke to Linda Alfaro, patient's caregiver, regarding enrollment to the [facility]madin Clinic.  Ms. Alfaro verified that the patient has a 4mg strength warfarin tablet, and she was advised on a medication regimen of 4mg (1 tablet) of warfarin daily.  Ms. Alfaro expressed understanding. Next INR scheduled, for 6/3/22, confirmed with Nurse Gilbert at [facility]. Orders faxed.    Ms. Alfaro educated on diet and when to call. All questions and concerns addressed at this time, and Ms. Alfaro expressed understanding. Education sheets sent via patient portal to reinforce teaching.    Patient will eat greens 3 times per week, drink cranberry juice once per week, and avoid ETOH. The importance of a consistent diet discussed with Ms. Alfaro.

## 2022-06-02 NOTE — PLAN OF CARE
[facility] - Med Surg  Discharge Final Note    Primary Care Provider: Gm Zambrano MD    Expected Discharge Date: 6/1/2022    Final Discharge Note (most recent)     Final Note - 06/02/22 0810        Final Note    Assessment Type Final Discharge Note     Anticipated Discharge Disposition Home-Health Care [facility]     Hospital Resources/Appts/Education Provided Provided patient/caregiver with written discharge plan information;Appointments scheduled and added to AVS        Post-Acute Status    Post-Acute Authorization Home Health     Home Health Status Set-up Complete/Auth obtained     Coverage [facility]     Discharge Delays PFC Arranged Transportation               Pt went home with home health PT.  Rolling walker was delivered to bedside.  Followup appointments were made.  W/c van transport was setup to take patient home.  No other anticipated needs at this time.   Cynthia Brewster RN [facility] [phone]  Important Message from Medicare             Contact Info     Gm Zambrano MD   Specialty: Internal Medicine   Relationship: PCP - General    [facility] [address]  [facility] [address]   Phone: [phone]       Next Steps: Follow up on 6/6/2022    Instructions: june 6, with arcadio sheffield nurse practitioner at 10:00am.  Thanks    [facility]   Specialty: Home Health Services    [address]  [address]  [facility] [address]   Phone: [phone]       Next Steps: Follow up    Instructions: home health, please call within 2 days if you have not heard from the agency.  thanks

## 2022-06-03 NOTE — PROGRESS NOTES
C3 [facility] post hospital discharge follow up [facility]. The patient has a scheduled HOSFU appointment with Gm Zambrano MD on 06/06/2022 @ 1000.

## 2022-06-06 NOTE — PROGRESS NOTES
Verbal result taken from Lolita/[facility]_________. PT/INR __3.4_____ Date drawn_06/06/22_______ Hardcopy to be faxed.

## 2022-06-09 NOTE — PROGRESS NOTES
Lolita with [facility]  called in a verbal result as: INR -5.1 dated today 6/09/22 drawn via fingerstick machine

## 2022-06-13 NOTE — PROGRESS NOTES
INR now at goal. Medications and chart reviewed. Will start new decreased dose. See calendar.

## 2022-06-13 NOTE — PROGRESS NOTES
Verbal result taken from Jessica/[facility]________. PT/INR _2.0______ Date drawn_06/13/22_______ Hardcopy to be faxed.

## 2022-06-16 NOTE — PROGRESS NOTES
Verbal result taken from Ofelia/[facility]_________. PT/INR __2.6_____ Date drawn_06/16/22_______ Hardcopy to be faxed.

## 2022-06-20 NOTE — PROGRESS NOTES
Lolita with [facility]  called in a verbal result as: INR -3.4 drawn today 6/20/22 via fingerstick machine

## 2022-06-23 NOTE — PROGRESS NOTES
Verbal result taken from Lolita/[facility]_________. PT/INR __2.6_____ Date drawn__06/23/22______ Hardcopy to be faxed.

## 2022-06-27 NOTE — PROGRESS NOTES
Ofelia with [facility] called in a verbal INR as: INR -7.4 x 2 times drawn today 6/27/22 via fingerstick,Patient reported to nurse -no bruising/bleeding

## 2022-06-29 NOTE — PROGRESS NOTES
INR remains elevated but has trended down a bit. Medications, chart, and patient findings reviewed. See calendar for adjustments to dose and follow up plan.

## 2022-07-01 NOTE — PROGRESS NOTES
INR not at goal but continues to trend down. Medications, chart, and patient findings reviewed. See calendar for adjustments to dose and follow up plan.

## 2022-07-01 NOTE — PROGRESS NOTES
Verbal result taken from Lolita/[facility] _________. PT/INR __5.3_____ Date drawn_07/01/22_______ Hardcopy to be faxed.

## 2022-07-05 NOTE — PROGRESS NOTES
Verbal result taken from [facility]_________. PT/INR __2.5_____ Date drawn__07/05/22______ Hardcopy to be faxed.

## 2022-07-05 NOTE — PROGRESS NOTES
Ms. Suero has PMH of stroke, Afib, HLP, and DM. PV completed with caregiver (donato-niece). LHE educated on purpose of stroke mobile program, and stroke RF present. Patient is receiving home care with therapy.

## 2022-07-12 NOTE — PROGRESS NOTES
Verbal result taken from Lolita/[facility]_________. PT/INR __1.7_____ Date drawn__07/12/22______ Hardcopy to be faxed.

## 2022-07-19 NOTE — PROGRESS NOTES
Verbal result taken from Lolita/[facility]_________. PT/INR ___3.2____ Date drawn__07/19/22______ Hardcopy to be faxed.

## 2022-07-27 NOTE — TELEPHONE ENCOUNTER
----- Message from Nithya Zhu sent at 7/27/2022 10:00 AM CDT -----  Contact: [facility] [phone]  Home health is discharging the patient today. Needs instruction on getting future INR testing.    Please call and advise.    Thank You

## 2022-07-27 NOTE — TELEPHONE ENCOUNTER
Ms. Burns stated that pt daughter is wondering about the INR lab. She wants to know how often she'll get them because it now has to be ordered by you since she's being discharged from home health?? Pt is also requesting an order for a wheelchair so that she can get to the car easier for her future appointments. Please advise.     Celena DIAS

## 2022-07-28 NOTE — TELEPHONE ENCOUNTER
Patient: Temitope Suero  Date of Birth: [birth year]    Hi, I received the order for a wheelchair on patient however I reviewed the patients chart and theres nothing documented as to why the patient would need a wheelchair by the Doctor. She just received a rolling walker from us so we would need notes stating why the patient now requires use of a wheelchair.   If you have any questions please let me know. My direct number is [phone]    Thanks, Mari Graves     CLIVE SIGALA [id number]     Study Date: 		09-29-19 14:20 to 9-30-19 08:00    CONTINUOUS EEG    CSA Technical Component:  Quantitative EEG analysis using a separate Compressed Spectral Array (CSA) software package was conducted in real-time and run at bedside after set up by the technician, digitally displaying the power of electrographic frequencies included in the 1-30Hz band using a graded color map.  This data was reviewed and interpreted independently, and is reported in a separate section below.    --------------------------------------------------------------------------------------------------  History:  CC/ HPI Patient is a 77y old  Female who presents with a chief complaint of syncope with b/l SDH (29 Sep 2019 13:46)    MEDICATIONS  (STANDING):  ALBUTerol/ipratropium for Nebulization 3 milliLiter(s) Nebulizer every 6 hours  calcium gluconate IVPB 2 Gram(s) IV Intermittent every 6 hours  chlorhexidine 4% Liquid 1 Application(s) Topical <User Schedule>  CRRT Treatment    <Continuous>  dexmedetomidine Infusion 0.5 MICROgram(s)/kG/Hr (6.525 mL/Hr) IV Continuous <Continuous>  dextrose 5%. 1000 milliLiter(s) (50 mL/Hr) IV Continuous <Continuous>  levETIRAcetam  IVPB 750 milliGRAM(s) IV Intermittent every 12 hours  meropenem  IVPB 1000 milliGRAM(s) IV Intermittent every 8 hours  Phoxillum Filtration BK 4 / 2.5 5000 milliLiter(s) (2000 mL/Hr) CRRT <Continuous>  PrismaSOL Filtration BGK 0 / 2.5 5000 milliLiter(s) (200 mL/Hr) CRRT <Continuous>  PrismaSOL Filtration BGK 4 / 2.5 5000 milliLiter(s) (1200 mL/Hr) CRRT <Continuous>      --------------------------------------------------------------------------------------------------  Study Interpretation:    [[[Abbreviation Key:  PDR=alpha rhythm/posterior dominant rhythm. A-P=anterior posterior gradient.  Amplitude: ‘very low’:<20; ‘low’:20-50; ‘medium’:; ‘high’:>200uV.  Persistence for periodic/rhythmic patterns (% of epoch) ‘rare’:<1%; ‘occasional’:1-10%; ‘frequent’:10-50%; ‘abundant’:50-90%; ‘continuous’:>90%.  Persistence for sporadic discharges: ‘rare’:<1/hr; ‘occasional’:1/min-1/hr; ‘frequent’:>1/min; ‘abundant’:>1/10 sec.  GRDA=generalized rhythmic delta activity, LRDA=lateralized rhythmic delta activity, TIRDA=temporal intermittent rhythmic delta activity, FIRDA=frontal intermittent rhythmic activity. LPD=PLED=lateralized periodic discharges, GPD=generalized periodic discharges, BiPDs=BiPLEDs=bilateral independent periodic epileptiform discharges, SIRPID=stimulus induced rhythmic, periodic, or ictal appearing discharges.  Modifiers: +F=with fast component, +S=with spike component, +R=with rhythmic component.  S-B=burst suppression pattern.  Max=maximal. N1-drowsy, N2-stage II sleep, N3-slow wave sleep.  HV=hyperventilation, PS=photic stimulation]]]    FINDINGS:  The background was continuous, spontaneously variable and reactive.  During wakefulness, the posteriorly dominant rhythm was poorly modulated.    There was more diffuse irregular theta and delta activity present.    Sleep Background:  Stage II sleep transients were not recorded.    Epileptiform Activity:   Occasional bifrontal at times independent right frontal (max Fp2/F4) sharp wave discharges.     Events:  No clinical events were recorded.  No seizures were recorded.    Activation Procedures:   -Hyperventilation was not performed.    -Photic stimulation was not performed.    Artifacts:  Intermittent myogenic and movement artifacts were noted.    ECG:  The heart rate on single channel ECG at baseline was predominantly near BPM = 60-70  -----------------------------------------------------------------------------------------------------    EEG Classification / Summary:  Abnormal EEG study  Occasional bifrontal at times independent right frontal (max Fp2/F4) sharp wave discharges.   Moderate background slowing    -----------------------------------------------------------------------------------------------------    Clinical Impression:  Epileptogenic foci in the bifrontal region.   Moderate diffuse or multifocal cerebral dysfunction, not specific as to etiology.      -------------------------------------------------------------------------------------------------------  Joaquin Chaudhry DO  Epilepsy Fellow, [facility]    Jose Can MD  Attending Physician, [facility] [place] CLIVE SIGALA [id number]     Study Date: 		09-29-19 14:20 to 9-30-19 08:00    CONTINUOUS EEG    CSA Technical Component:  Quantitative EEG analysis using a separate Compressed Spectral Array (CSA) software package was conducted in real-time and run at bedside after set up by the technician, digitally displaying the power of electrographic frequencies included in the 1-30Hz band using a graded color map.  This data was reviewed and interpreted independently, and is reported in a separate section below.    --------------------------------------------------------------------------------------------------  History:  CC/ HPI Patient is a 77y old  Female who presents with a chief complaint of syncope with b/l SDH (29 Sep 2019 13:46)    MEDICATIONS  (STANDING):  ALBUTerol/ipratropium for Nebulization 3 milliLiter(s) Nebulizer every 6 hours  calcium gluconate IVPB 2 Gram(s) IV Intermittent every 6 hours  chlorhexidine 4% Liquid 1 Application(s) Topical <User Schedule>  CRRT Treatment    <Continuous>  dexmedetomidine Infusion 0.5 MICROgram(s)/kG/Hr (6.525 mL/Hr) IV Continuous <Continuous>  dextrose 5%. 1000 milliLiter(s) (50 mL/Hr) IV Continuous <Continuous>  levETIRAcetam  IVPB 750 milliGRAM(s) IV Intermittent every 12 hours  meropenem  IVPB 1000 milliGRAM(s) IV Intermittent every 8 hours  Phoxillum Filtration BK 4 / 2.5 5000 milliLiter(s) (2000 mL/Hr) CRRT <Continuous>  PrismaSOL Filtration BGK 0 / 2.5 5000 milliLiter(s) (200 mL/Hr) CRRT <Continuous>  PrismaSOL Filtration BGK 4 / 2.5 5000 milliLiter(s) (1200 mL/Hr) CRRT <Continuous>      --------------------------------------------------------------------------------------------------  Study Interpretation:    FINDINGS:  The background was continuous, spontaneously variable and reactive.  During wakefulness, the posteriorly dominant rhythm was poorly modulated.    There was more diffuse irregular theta and delta activity present.    Sleep Background:  Stage II sleep transients were not recorded.    Epileptiform Activity:   Occasional left frontal sharp waves, maximum Fp1/F3  Occasional left parieto-occipital sharp waves, maximum P3/O1    Events:  No clinical events were recorded.  No seizures were recorded.    Activation Procedures:   -Hyperventilation was not performed.    -Photic stimulation was not performed.    Artifacts:  Intermittent myogenic and movement artifacts were noted.    ECG:  The heart rate on single channel ECG at baseline was predominantly near BPM = 60-70  -----------------------------------------------------------------------------------------------------    EEG Classification / Summary:  Abnormal EEG study  Occasional left frontal sharp waves, maximum Fp1/F3  Occasional left parieto-occipital sharp waves, maximum P3/O1  Moderate background slowing    -----------------------------------------------------------------------------------------------------    Clinical Impression:  Potential seizure foci in the left frontal and parieto-occipital regions.  Moderate diffuse or multifocal cerebral dysfunction, not specific as to etiology.      -------------------------------------------------------------------------------------------------------  Joaquin Chaudhry DO  Epilepsy Fellow, [facility] [place]    Jose Can MD  Attending Physician, [facility] [place] CLIVE SIGALA [id number]     Study Date: 		09-29-19 14:20 to 9-30-19 08:00    CONTINUOUS EEG    CSA Technical Component:  Quantitative EEG analysis using a separate Compressed Spectral Array (CSA) software package was conducted in real-time and run at bedside after set up by the technician, digitally displaying the power of electrographic frequencies included in the 1-30Hz band using a graded color map.  This data was reviewed and interpreted independently, and is reported in a separate section below.    --------------------------------------------------------------------------------------------------  History:  CC/ HPI Patient is a 77y old  Female who presents with a chief complaint of syncope with b/l SDH (29 Sep 2019 13:46)    MEDICATIONS  (STANDING):  ALBUTerol/ipratropium for Nebulization 3 milliLiter(s) Nebulizer every 6 hours  calcium gluconate IVPB 2 Gram(s) IV Intermittent every 6 hours  chlorhexidine 4% Liquid 1 Application(s) Topical <User Schedule>  CRRT Treatment    <Continuous>  dexmedetomidine Infusion 0.5 MICROgram(s)/kG/Hr (6.525 mL/Hr) IV Continuous <Continuous>  dextrose 5%. 1000 milliLiter(s) (50 mL/Hr) IV Continuous <Continuous>  levETIRAcetam  IVPB 750 milliGRAM(s) IV Intermittent every 12 hours  meropenem  IVPB 1000 milliGRAM(s) IV Intermittent every 8 hours  Phoxillum Filtration BK 4 / 2.5 5000 milliLiter(s) (2000 mL/Hr) CRRT <Continuous>  PrismaSOL Filtration BGK 0 / 2.5 5000 milliLiter(s) (200 mL/Hr) CRRT <Continuous>  PrismaSOL Filtration BGK 4 / 2.5 5000 milliLiter(s) (1200 mL/Hr) CRRT <Continuous>      --------------------------------------------------------------------------------------------------  Study Interpretation:    FINDINGS:  The background was continuous, spontaneously variable and reactive.  During wakefulness, the posteriorly dominant rhythm was poorly modulated.    There was more diffuse irregular theta and delta activity present.    Sleep Background:  Stage II sleep transients were not recorded.    Epileptiform Activity:   Occasional left frontal sharp waves, maximum Fp1/F3  Occasional right frontal sharp waves, maximum Fp2/F4  Occasional left parieto-occipital sharp waves, maximum P3/O1    Events:  No clinical events were recorded.  No seizures were recorded.    Activation Procedures:   -Hyperventilation was not performed.    -Photic stimulation was not performed.    Artifacts:  Intermittent myogenic and movement artifacts were noted.    ECG:  The heart rate on single channel ECG at baseline was predominantly near BPM = 60-70  -----------------------------------------------------------------------------------------------------    EEG Classification / Summary:  Abnormal EEG study  Occasional left frontal sharp waves, maximum Fp1/F3  Occasional right frontal sharp waves, maximum Fp2/F4  Occasional left parieto-occipital sharp waves, maximum P3/O1  Moderate background slowing    -----------------------------------------------------------------------------------------------------    Clinical Impression:  Potential seizure foci in the left frontal, left parieto-occipital, and right frontal regions.  Moderate diffuse or multifocal cerebral dysfunction, not specific as to etiology.      -------------------------------------------------------------------------------------------------------  Joaquin Chaudhry DO  Epilepsy Fellow, [facility]    Jose Can MD  Attending Physician, [facility] [place]

## 2022-07-28 NOTE — TELEPHONE ENCOUNTER
----- Message from Benji Soriano sent at 7/28/2022  2:05 PM CDT -----  Regarding: Patient advice  Contact: Pt  Pt called in regards to being discharged from [facility] , Pt has question/concerns about being seen at the coumadin clinic       Pt can be reached at [phone]

## 2022-07-29 NOTE — TELEPHONE ENCOUNTER
I spoke to pt's caregiver, Linda, to inform her that Dr. Zambrano advised for her to get a Transfer Chair, at [facility]. I explained to her that due to the fact that pt recently got the rolling walker that she is unable to get a wheelchair at this time. She verbalized understanding.

## 2022-08-03 NOTE — PROGRESS NOTES
Caregiver Magdalene states patient is unavailable to complete visit this month try again next month.

## 2022-08-30 NOTE — PROGRESS NOTES
8/30/22  [facility] and [facility]cheduled today's lab to tomorrow 8/31/22

## 2022-09-01 NOTE — PROGRESS NOTES
Linda (relative) called and was given lab result, verified correct coumadin dose, reports no changes, Linda was given coumadin instructions and next lab date, verbalized understanding

## 2022-09-07 NOTE — PROGRESS NOTES
Ms. Suero VS are WNL on today's visit. She denies any ER visits or hospital stays. Caregiver Linda present. Educated patient and caregiver on medication compliance, stroke RF present, and stroke recovery time frame.

## 2022-09-08 NOTE — PROGRESS NOTES
Chief Complaint: 6 month follow up.       History of Present Illness:  Ms. Temitope Suero is a 82 y.o. female with history of stroke, afib on eliquis, HFrEF, DM, CKD3, HTN, HLD, vit D deficiency who presents for annual exam.  Denies any new complaints today.  I last saw her in 3/07/2022.       HTN: Does not take blood pressure at home.  Denies any headaches, vision changes, dizziness, lightheadedness.  Currently on benazapril 20 mg qd, coreg 25 mg BID, niece who accompanied patient today to clinic and is primary caregiver at home states that she takes all medications as prescribed and has not missed any doses.  BP in clinic today  108/68.               Afib: LAST VISIT SHE WAS On eliquis s/p failing coumadin due to noncompliance.  She is back on coumadin and is being follwoed in the coumadin clinic.  Her rate is well controlled.  Denies any falls, melena, hematochezia, bleeding.     Denies any chest pain, shortness of breath.  Not followed by cardiology.     She was in hospital in May 2022 for leg ischemia -- From discharge no surgical intervention was needed at this time as although studies showed evidence of PAD, patient had adequate perfusion for wound healing. They recommended woundcare, compression/elevation for edema, and initiation of ASA/Plavix and high intensity statin,  SHe had ot/pt and she has finished this.  She can walk while holding on to something.  NO leg pain.      She was in hospital in May 2022 also for LLL pneumonia and toxic encephalopathy.  NO SOB.       DM: Does not take blood sugars at home.  Patient eats a varied diet, including plants/vegetables, fruits, meats.  States that she occasionally has sweets.  Denies any polyuria/polydipsia.  Last a1c  7.0  3/7/2022       SHe has CKD with GFR of   44.5 ml/min.   In -- creatine was 1.3.       Memory problems: Niece states that she continues to have memory difficulty, however this is stable since last visit.  No new episodes of CVA noted.   He does  "have some hallucinations at ties.  She is sleeping well.       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  Followed by Dr Lazaro, last visit 9/2019.  Was previously on letrozole adjuvant therapy which was discontinued at last onc visit.  Last mammogram in 2019 negative.       Stroke: History of hospitalization twice in 2018 due to noncompliance with Afib anticoagulation.  Has been on eliquis for the past year, taking medication as prescribed per niece.  Denies any episodes of dysarthria, focal neurological weakness.       Review of Systems   Constitutional: Negative for fever.   HENT: Negative for sore throat.    Eyes: Negative for blurred vision.   Respiratory: Negative for cough.    Cardiovascular: Negative for chest pain.   Gastrointestinal: Negative for abdominal pain.   Genitourinary: Negative for dysuria.   Musculoskeletal: Negative for back pain.   Neurological: Negative for headaches.   Endo/Heme/Allergies: Does not bruise/bleed easily.   Psychiatric/Behavioral: Positive for memory loss. Negative for depression.            Review of patient's allergies indicates:  No Known Allergies     OBJECTIVE:       /68 (BP Location: Right arm, Patient Position: Sitting, BP Method: Medium (Manual))   Pulse 77   Ht 5' 7" (1.702 m)   Wt 57.9 kg (127 lb 10.3 oz)   SpO2 99%   BMI 19.99 kg/m²        BP (!) 144/104 (BP Location: Left arm, Patient Position: Sitting, BP Method: Large (Manual))   Pulse 106   Ht 5' 7" (1.702 m)   Wt 69.4 kg (153 lb)   SpO2 99%   BMI 23.96 kg/m²                    Physical Exam:  General:  Well developed,  no acute distress.  .she answers questions but is wrong often.  hearing is poor. Niece corrects her.   sitting in wheel chair.    Head: Normocephalic, atraumatic  Eyes: PERRL, EOMI, clear sclera  Throat: No posterior pharyngeal erythema or exudate, no tonsillar exudate  Neck: supple, normal ROM, no thyromegaly   CVS:  RRR, S1 and S2 " normal, no murmurs, rubs, gallops, 2+ peripheral pulses  Resp:  Lungs clear to auscultation, no wheezes, rales, rhonchi, cough  GI:  Abdomen soft, non-tender, non-distended, normoactive bowel sounds  MSK:  No muscle atrophy, cyanosis, peripheral edema   Skin:  No rashes, ulcers, erythema  Neuro:  CNII-XII grossly intact, no focal deficits noted  Psych:  disoriented -- confused at times     1 small 1-2cm superficial wound   L lower leg  laterally  - no edema                 ASSESSMENT & PLAN:   Ms. Temitope Suero is a 82 y.o. 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  We discussed ACP.  SHe has a son in [place]-- I suggest she talk to him also.  WIll check  labs today.  Meds refilled.  Follow up in 6 monhts

## 2022-09-08 NOTE — PROGRESS NOTES
Karlie with [facility] called in a verbal reesult as: INR -5.17 / PT -45.9 dated today 9/08/22

## 2022-09-14 NOTE — PROGRESS NOTES
INR not at goal but has improved. Medications, chart, and patient findings reviewed. See calendar for adjustments to dose and follow up plan.

## 2022-09-29 NOTE — PROGRESS NOTES
INR now at higher end of goal. Medications and chart reviewed. No changes noted to necessitate adjustment of warfarin or follow-up plan. See calendar.

## 2022-10-11 NOTE — TELEPHONE ENCOUNTER
No new care gaps identified.  [facility] Embedded Care Gaps. Reference number: [id number]. 10/11/2022   1:22:21 PM CDT

## 2022-11-10 NOTE — ED TRIAGE NOTES
Temitope Seuro, a 82 y.o. female presents to the ED     Triage note:  Chief Complaint   Patient presents with    Fall     Pt had trip and fall, hematoma to the forehead, no LOC. Pt denies pain. Pt takes blood thinners daily, plavix and warfarin.      Pt presents to the ED with niece. Niece reports patient was in the kitchen and had a trip and fall. She has been having difficulty with gait recently. She had a head injury upon fall but no LOC. She does take blood thinner daily. Hx of memory loss over the past year. Disoriented to time which is baseline. She c/o left shoulder pain. Denies CP, SOB, dizziness, abd pain, N/V/D, urinary changes, vision changes.     Review of patient's allergies indicates:  No Known Allergies  Past Medical History:   Diagnosis Date    Cancer of left breast, stage 2 11/24/2015    Cataract     Cerebrovascular small vessel disease 04/11/2018    Bi-thalamic lacunar disease, mod/sev periventricular white matter disease, mixed pattern cerebral microbleeds    Cervicalgia 04/17/2018    Chronic anticoagulation - apixaban 04/17/2018    Previous on Xarelto but changed to apixaban 8-2018 due to recurrent embolic stroke.    Chronic systolic heart failure 04/17/2018    Echo 4-2018   1 - Moderately depressed left ventricular systolic function (EF 30-35%).    2 - Biatrial enlargement.    3 - Normal right ventricular systolic function .    4 - Mild mitral regurgitation.    5 - Mild tricuspid regurgitation.    6 - The estimated PA systolic pressure is 34 mmHg.    7 - Increased central venous pressure.    8 - Left pleural effusion.     CKD stage 3 due to type 2 diabetes mellitus 04/17/2018    Embolic stroke involving left cerebellar artery 08/13/2018    Embolic stroke involving right posterior cerebral artery 04/11/2018    Essential hypertension 10/28/2013    Glaucoma suspect of both eyes 12/09/2013    Hearing loss, sensorineural 12/16/2013    LV (left ventricular) mural thrombus 05/07/2022    Malignant  hypertension 08/12/2018    Mixed hyperlipidemia 10/28/2013    Obesity 01/16/2014    Paroxysmal atrial fibrillation 07/10/2012    Stage 3 chronic kidney disease 04/17/2018    Toxic multinodular goiter 10/28/2013    Type 2 diabetes mellitus with stage 3 chronic kidney disease, without long-term current use of insulin 07/10/2012    Diet controlled.    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

## 2022-11-10 NOTE — ED PROVIDER NOTES
Encounter Date: 11/9/2022       History     Chief Complaint   Patient presents with    Fall     Pt had trip and fall, hematoma to the forehead, no LOC. Pt denies pain. Pt takes blood thinners daily, plavix and warfarin.      Temitope Suero is a 82 y.o. female who  has a past medical history of Cancer of left breast, stage 2 (11/24/2015), Cataract, Cerebrovascular small vessel disease (04/11/2018), Cervicalgia (04/17/2018), Chronic anticoagulation - apixaban (04/17/2018), Chronic systolic heart failure (04/17/2018), CKD stage 3 due to type 2 diabetes mellitus (04/17/2018), Embolic stroke involving left cerebellar artery (08/13/2018), Embolic stroke involving right posterior cerebral artery (04/11/2018), Essential hypertension (10/28/2013), Glaucoma suspect of both eyes (12/09/2013), Hearing loss, sensorineural (12/16/2013), LV (left ventricular) mural thrombus (05/07/2022), Malignant hypertension (08/12/2018), Mixed hyperlipidemia (10/28/2013), Obesity (01/16/2014), Paroxysmal atrial fibrillation (07/10/2012), Stage 3 chronic kidney disease (04/17/2018), Toxic multinodular goiter (10/28/2013), Type 2 diabetes mellitus with stage 3 chronic kidney disease, without long-term current use of insulin (07/10/2012), Unspecified dementia without behavioral disturbance, 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    The patient presents to the ED for evaluation after fall.   She lost her balance and fell, hitting the side of her forehead on the ground. She denies any LOC or neck/back pain. She does endorse some mild L shoulder pain. Per family member, she was ambulatory and able to stand since the event. She denies any vision changes, focal weakness/numbness, LE pain/injury, or any other concerns.  She is currently on warfarin.          Review of patient's allergies indicates:  No Known Allergies  Past Medical History:   Diagnosis Date    Cancer of left breast, stage 2 11/24/2015     Cataract     Cerebrovascular small vessel disease 04/11/2018    Bi-thalamic lacunar disease, mod/sev periventricular white matter disease, mixed pattern cerebral microbleeds    Cervicalgia 04/17/2018    Chronic anticoagulation - apixaban 04/17/2018    Previous on Xarelto but changed to apixaban 8-2018 due to recurrent embolic stroke.    Chronic systolic heart failure 04/17/2018    Echo 4-2018   1 - Moderately depressed left ventricular systolic function (EF 30-35%).    2 - Biatrial enlargement.    3 - Normal right ventricular systolic function .    4 - Mild mitral regurgitation.    5 - Mild tricuspid regurgitation.    6 - The estimated PA systolic pressure is 34 mmHg.    7 - Increased central venous pressure.    8 - Left pleural effusion.     CKD stage 3 due to type 2 diabetes mellitus 04/17/2018    Embolic stroke involving left cerebellar artery 08/13/2018    Embolic stroke involving right posterior cerebral artery 04/11/2018    Essential hypertension 10/28/2013    Glaucoma suspect of both eyes 12/09/2013    Hearing loss, sensorineural 12/16/2013    LV (left ventricular) mural thrombus 05/07/2022    Malignant hypertension 08/12/2018    Mixed hyperlipidemia 10/28/2013    Obesity 01/16/2014    Paroxysmal atrial fibrillation 07/10/2012    Stage 3 chronic kidney disease 04/17/2018    Toxic multinodular goiter 10/28/2013    Type 2 diabetes mellitus with stage 3 chronic kidney disease, without long-term current use of insulin 07/10/2012    Diet controlled.    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    Vitamin D deficiency disease 10/28/2013     Past Surgical History:   Procedure Laterality Date    BREAST BIOPSY      BREAST SURGERY      CHOLECYSTECTOMY       Family History   Problem Relation Age of Onset    Hypertension Mother     Hypertension Father     Glaucoma Father     Heart disease Father     Cancer Sister     Asthma Son     Diabetes Paternal Aunt      Thyroid cancer Neg Hx     Amblyopia Neg Hx     Blindness Neg Hx     Cataracts Neg Hx     Macular degeneration Neg Hx     Retinal detachment Neg Hx     Strabismus Neg Hx      Social History     Tobacco Use    Smoking status: Never    Smokeless tobacco: Never   Substance Use Topics    Alcohol use: No    Drug use: No     Review of Systems   Constitutional:  Negative for chills and fever.   HENT:  Negative for sore throat.    Respiratory:  Negative for cough and shortness of breath.    Cardiovascular:  Negative for chest pain.   Gastrointestinal:  Negative for nausea and vomiting.   Genitourinary:  Negative for dysuria, frequency and urgency.   Musculoskeletal:  Negative for back pain.   Skin:  Negative for rash and wound.   Neurological:  Positive for headaches. Negative for syncope and weakness.   Hematological:  Does not bruise/bleed easily.   Psychiatric/Behavioral:  Negative for agitation, behavioral problems and confusion.      Physical Exam     Initial Vitals [11/09/22 1848]   BP Pulse Resp Temp SpO2   128/76 82 18 98 °F (36.7 °C) 99 %      MAP       --         Physical Exam    Nursing note and vitals reviewed.  Constitutional: She appears well-developed and well-nourished. She is not diaphoretic. No distress.   Well-appearing, pleasant, in no distress.    HENT:   Head: Normocephalic. Head is with contusion.       Mouth/Throat: Oropharynx is clear and moist.   Contusion to L forehead, no laceration or active bleeding.    Eyes: EOM are normal. Pupils are equal, round, and reactive to light.   Neck: No tracheal deviation present.   Cardiovascular:  Normal rate, regular rhythm, normal heart sounds and intact distal pulses.           Pulmonary/Chest: Breath sounds normal. No stridor. No respiratory distress. She has no wheezes.   Abdominal: Abdomen is soft. Bowel sounds are normal. She exhibits no distension and no mass. There is no abdominal tenderness.   Musculoskeletal:         General: No edema. Normal range of  motion.      Comments: Full ROM L hand, wrist, elbow. TTP L anterior shoulder.  No deformity.      Neurological: She is alert and oriented to person, place, and time. She has normal strength. No cranial nerve deficit or sensory deficit. GCS eye subscore is 4. GCS verbal subscore is 5. GCS motor subscore is 6.   Symmetric strength in all extremities.    Skin: Skin is warm and dry. Capillary refill takes less than 2 seconds. No pallor.   Psychiatric: She has a normal mood and affect. Her behavior is normal. Thought content normal.       ED Course   Procedures  Labs Reviewed - No data to display       Imaging Results              X-Ray Shoulder Trauma Left (Final result)  Result time 11/09/22 20:35:02      Final result by Kenny Flowers MD (11/09/22 20:35:02)                   Impression:      Minimally displaced acute fracture of the left distal clavicle.    Significant degenerative changes in the left shoulder.      Electronically signed by: Kenny Flowers MD  Date:    11/09/2022  Time:    20:35               Narrative:    EXAMINATION:  XR SHOULDER TRAUMA 3 VIEW LEFT    CLINICAL HISTORY:  Pain in left shoulder    TECHNIQUE:  Three views of the left shoulder were performed.    COMPARISON  Chest radiograph, 05/06/2022.    FINDINGS:  Subtle minimally displaced acute appearing fracture of the left distal clavicle.  Moderate degenerative changes at the acromioclavicular joint, which maintains satisfactory alignment.  Severe degenerative changes in the glenohumeral joint with significant cartilage space loss and large inferior projecting humeral head/neck spur.  No additional acute displaced fracture.  Grossly normal alignment of the glenohumeral joint.  Multiple surgical clips overlie the left axillary region.                                       CT Head Without Contrast (Final result)  Result time 11/09/22 19:50:34      Final result by Rashaun Ferraro MD (11/09/22 19:50:34)                   Impression:      1.  Allowing for extensive motion artifact, no convincing acute intracranial abnormalities noting sequela of chronic microvascular ischemic change, senescent change, and multifocal regions of encephalomalacia.  2. Soft tissue induration/hematoma overlies the left frontal calvarium, no underlying osseous fracture.      Electronically signed by: Rashaun Ferraro MD  Date:    11/09/2022  Time:    19:50               Narrative:    EXAMINATION:  CT HEAD WITHOUT CONTRAST    CLINICAL HISTORY:  Head trauma, minor (Age >= 65y);    TECHNIQUE:  Low dose axial images were obtained through the head.  Coronal and sagittal reformations were also performed. Contrast was not administered.    COMPARISON:  MRI 05/08/2022, CT head 05/08/2022    FINDINGS:  There is extensive motion artifact.    There is generalized cerebral volume loss.  There is hypoattenuation in a periventricular fashion, likely sequela of chronic microvascular ischemic change.  there is encephalomalacia involving the posterior left parietal lobe and anterior right frontoparietal lobe, stable.  Additional encephalomalacia is noted within the left cerebellum and right cerebellum. There is no evidence of acute major vascular territory infarct, hemorrhage, or mass.  There is no hydrocephalus.  There are no abnormal extra-axial fluid collections.  The paranasal sinuses and mastoid air cells are clear, and there is no evidence of calvarial fracture.  The visualized soft tissues are remarkable for induration/hematoma overlying the left frontal calvarium..                                       Medications - No data to display  Medical Decision Making:   History:   Old Medical Records: I decided to obtain old medical records.  Old Records Summarized: records from clinic visits and other records.       <> Summary of Records: On coumadin for LV mural thrombus.   Initial Assessment:   83 yo F with forehead contusion and L shoulder pain after mechanical fall. No LOC.  Will obtain CT  head, L shoulder x-ray, and reassess.   Differential Diagnosis:   Differential Diagnosis includes, but is not limited to:  Polytrauma, fall/syncope, traumatic SAH/intracranial bleed, skull/c-spine/facial fracture, concussion, neck injury, chest trauma, intraabdominal bleed, solid organ injury, pelvic fracture, long bone fracture/dislocation, nerve injury/palsy, vascular injury, hemarthrosis, septic joint, osteoarthritis, compartment syndrome, rhabdomyolysis, soft tissue contusion, muscle strain, ligament tear/sprain, foreign body, laceration, abrasion.    Clinical Tests:   Radiological Study: Ordered and Reviewed  ED Management:  CT head without intracranial hemorrhage or acute injury.  L shoulder x-ray reveals distal clavicle fracture.  Will place in shoulder sling for comfort. Recommend PCP f/u.       On re-evaluation, the patient's status has improved.  After complete ED evaluation, clinical impression is most consistent with fall, clavicle fracture.  PCP follow-up within 2-3 days was recommended.    After taking into careful account the patient's history, physical exam findings, as well as empirical and objective data obtained throughout ED workup, I feel no emergent medical condition has been identified. No further evaluation or admission was felt to be required, and the patient is stable for discharge from the ED. The patient and any additional family present were updated with test results, overall clinical impression, and recommended further plan of care, including discharge instructions as provided and outpatient follow-up for continued evaluation and management as needed. All questions were answered. The patient expressed understanding and agreed with current plan for discharge and follow-up plan of care. Strict ED return precautions were provided, including return/worsening of current symptoms, new symptoms, or any other concerns.                            Clinical Impression:   Final  diagnoses:  [M25.512] Left shoulder pain  [W19.XXXA] Fall, initial encounter (Primary)  [S42.035A] Nondisplaced fracture of lateral end of left clavicle, initial encounter for closed fracture  [S00.83XA] Contusion of forehead, initial encounter      ED Disposition Condition    Discharge Stable          ED Prescriptions       Medication Sig Dispense Start Date End Date Auth. Provider    oxyCODONE-acetaminophen (PERCOCET) 5-325 mg per tablet Take 1 tablet by mouth every 6 (six) hours as needed for Pain. 6 tablet 11/9/2022 11/11/2022 Michael Nogueira MD          Follow-up Information       Follow up With Specialties Details Why Contact Info    Gm Zambrano Jr., MD Internal Medicine Schedule an appointment as soon as possible for a visit   [address]  [place] [address]  [phone]               Michael Nogueira MD  11/09/22 2051

## 2022-11-10 NOTE — DISCHARGE INSTRUCTIONS
Keep your shoulder in the arm sling as needed for comfort. Make sure you remove it at least once per day to move your shoulder and make sure it does not become too stiff.  You may take Tylenol as packaging indicates for continued pain/swelling.  You may take Percocet as directed for severe pain.  Follow up with your primary doctor as soon as possible.    Thank you for choosing [facility]!     Our goal in the Emergency Department is to always provide outstanding medical care. You may receive a survey by mail or e-mail in the next week regarding your experience today. We would greatly appreciate you completing and returning the survey. Your feedback provides us with a way to recognize our staff who provide very good care, and it helps us learn how to improve when your experience was below our aspiration of excellence.      It is important to remember that some problems are difficult to diagnose and may not be found during your first visit. Be sure to follow up with your primary care doctor and review any labs/imaging that was performed during your visit with them. If you do not have a primary care doctor, you may contact the one listed on your discharge paperwork, or you may also call the [facility] Appointment Desk at [phone] to schedule an appointment.     All medications may potentially have side effects and it is impossible to predict which medications may give you side effects. If you feel that you are having a negative effect of any medication you should immediately stop taking them and seek medical attention.  Do not drive or make any important decisions for 24 hours if you have received any pain medications, sedatives or mood altering drugs during your ER visit.    We appreciate you trusting us with your medical care. We will be happy to take care of you for all of your future medical needs. You may return to the ER at any time for any new/concerning symptoms, worsening condition, or  failure to improve. We hope you feel better soon.     Sincerely,    Michael Nogueira Jr., MD  Board-Certified Emergency Medicine Physician  [facility]

## 2022-12-03 NOTE — PLAN OF CARE
Problem: Adult Inpatient Plan of Care  Goal: Plan of Care Review  Outcome: Ongoing, Progressing  Goal: Patient-Specific Goal (Individualized)  Outcome: Ongoing, Progressing  Goal: Absence of Hospital-Acquired Illness or Injury  Outcome: Ongoing, Progressing  Goal: Optimal Comfort and Wellbeing  Outcome: Ongoing, Progressing  Goal: Readiness for Transition of Care  Outcome: Ongoing, Progressing     Pt alert to self. VS stable. Pt lying supine with head of the bed slightly elevated. Bed low, wheels locked, bed alarm set, call light within reach, side rails up x 3. Pt started on heparin, new aPTT ordered. Will continue to monitor.    Cardiac Tele

## 2023-01-01 ENCOUNTER — HOSPITAL ENCOUNTER (INPATIENT)
Facility: HOSPITAL | Age: 83
LOS: 7 days | DRG: 283 | End: 2023-05-28
Attending: EMERGENCY MEDICINE | Admitting: INTERNAL MEDICINE
Payer: MEDICARE

## 2023-01-01 ENCOUNTER — LAB VISIT (OUTPATIENT)
Dept: LAB | Facility: HOSPITAL | Age: 83
End: 2023-01-01
Attending: INTERNAL MEDICINE
Payer: COMMERCIAL

## 2023-01-01 ENCOUNTER — OFFICE VISIT (OUTPATIENT)
Dept: INTERNAL MEDICINE | Facility: CLINIC | Age: 83
End: 2023-01-01
Payer: COMMERCIAL

## 2023-01-01 VITALS
DIASTOLIC BLOOD PRESSURE: 84 MMHG | WEIGHT: 101.19 LBS | OXYGEN SATURATION: 96 % | SYSTOLIC BLOOD PRESSURE: 136 MMHG | HEIGHT: 67 IN | HEART RATE: 109 BPM | BODY MASS INDEX: 15.88 KG/M2

## 2023-01-01 VITALS
TEMPERATURE: 97 F | BODY MASS INDEX: 17.65 KG/M2 | DIASTOLIC BLOOD PRESSURE: 51 MMHG | SYSTOLIC BLOOD PRESSURE: 94 MMHG | WEIGHT: 112.44 LBS | HEIGHT: 67 IN

## 2023-01-01 DIAGNOSIS — I24.9 ACS (ACUTE CORONARY SYNDROME): ICD-10-CM

## 2023-01-01 DIAGNOSIS — I48.0 PAROXYSMAL ATRIAL FIBRILLATION: ICD-10-CM

## 2023-01-01 DIAGNOSIS — I63.431 EMBOLIC STROKE INVOLVING RIGHT POSTERIOR CEREBRAL ARTERY: ICD-10-CM

## 2023-01-01 DIAGNOSIS — I63.442 EMBOLIC STROKE INVOLVING LEFT CEREBELLAR ARTERY: ICD-10-CM

## 2023-01-01 DIAGNOSIS — R00.0 TACHYCARDIA: ICD-10-CM

## 2023-01-01 DIAGNOSIS — N18.32 TYPE 2 DIABETES MELLITUS WITH STAGE 3B CHRONIC KIDNEY DISEASE, WITHOUT LONG-TERM CURRENT USE OF INSULIN: Chronic | ICD-10-CM

## 2023-01-01 DIAGNOSIS — E11.22 TYPE 2 DIABETES MELLITUS WITH STAGE 3B CHRONIC KIDNEY DISEASE, WITHOUT LONG-TERM CURRENT USE OF INSULIN: Chronic | ICD-10-CM

## 2023-01-01 DIAGNOSIS — E78.2 MIXED HYPERLIPIDEMIA: Chronic | ICD-10-CM

## 2023-01-01 DIAGNOSIS — I21.4 NSTEMI (NON-ST ELEVATED MYOCARDIAL INFARCTION): Primary | ICD-10-CM

## 2023-01-01 DIAGNOSIS — Z45.2 ENCOUNTER FOR CENTRAL LINE PLACEMENT: ICD-10-CM

## 2023-01-01 DIAGNOSIS — E87.0 HYPERNATREMIA: ICD-10-CM

## 2023-01-01 DIAGNOSIS — I51.3 LV (LEFT VENTRICULAR) MURAL THROMBUS: ICD-10-CM

## 2023-01-01 DIAGNOSIS — N18.30 STAGE 3 CHRONIC KIDNEY DISEASE, UNSPECIFIED WHETHER STAGE 3A OR 3B CKD: ICD-10-CM

## 2023-01-01 DIAGNOSIS — I48.91 ATRIAL FIBRILLATION, UNSPECIFIED TYPE: ICD-10-CM

## 2023-01-01 DIAGNOSIS — I48.92 ATRIAL FLUTTER, UNSPECIFIED TYPE: Primary | ICD-10-CM

## 2023-01-01 DIAGNOSIS — R57.0 CARDIOGENIC SHOCK: ICD-10-CM

## 2023-01-01 DIAGNOSIS — I67.9 CEREBROVASCULAR SMALL VESSEL DISEASE: ICD-10-CM

## 2023-01-01 DIAGNOSIS — N17.9 ACUTE RENAL FAILURE, UNSPECIFIED ACUTE RENAL FAILURE TYPE: ICD-10-CM

## 2023-01-01 DIAGNOSIS — Z91.89 AT HIGH RISK FOR SKIN BREAKDOWN: ICD-10-CM

## 2023-01-01 DIAGNOSIS — E44.0 MODERATE MALNUTRITION: ICD-10-CM

## 2023-01-01 DIAGNOSIS — R00.2 PALPITATIONS: ICD-10-CM

## 2023-01-01 DIAGNOSIS — I48.91 A-FIB: ICD-10-CM

## 2023-01-01 LAB
ADENOVIRUS: NOT DETECTED
ALBUMIN SERPL BCP-MCNC: 2.9 G/DL (ref 3.5–5.2)
ALBUMIN SERPL BCP-MCNC: 3.1 G/DL (ref 3.5–5.2)
ALBUMIN SERPL BCP-MCNC: 3.1 G/DL (ref 3.5–5.2)
ALBUMIN SERPL BCP-MCNC: 3.2 G/DL (ref 3.5–5.2)
ALBUMIN SERPL BCP-MCNC: 3.2 G/DL (ref 3.5–5.2)
ALBUMIN SERPL BCP-MCNC: 3.3 G/DL (ref 3.5–5.2)
ALBUMIN SERPL BCP-MCNC: 3.4 G/DL (ref 3.5–5.2)
ALLENS TEST: ABNORMAL
ALLENS TEST: NORMAL
ALP SERPL-CCNC: 105 U/L (ref 55–135)
ALP SERPL-CCNC: 113 U/L (ref 55–135)
ALP SERPL-CCNC: 120 U/L (ref 55–135)
ALP SERPL-CCNC: 121 U/L (ref 55–135)
ALP SERPL-CCNC: 123 U/L (ref 55–135)
ALP SERPL-CCNC: 123 U/L (ref 55–135)
ALP SERPL-CCNC: 129 U/L (ref 55–135)
ALP SERPL-CCNC: 130 U/L (ref 55–135)
ALP SERPL-CCNC: 134 U/L (ref 55–135)
ALP SERPL-CCNC: 155 U/L (ref 55–135)
ALT SERPL W/O P-5'-P-CCNC: 14 U/L (ref 10–44)
ALT SERPL W/O P-5'-P-CCNC: 15 U/L (ref 10–44)
ALT SERPL W/O P-5'-P-CCNC: 15 U/L (ref 10–44)
ALT SERPL W/O P-5'-P-CCNC: 16 U/L (ref 10–44)
ALT SERPL W/O P-5'-P-CCNC: 17 U/L (ref 10–44)
ALT SERPL W/O P-5'-P-CCNC: 18 U/L (ref 10–44)
ALT SERPL W/O P-5'-P-CCNC: 22 U/L (ref 10–44)
ALT SERPL W/O P-5'-P-CCNC: 8 U/L (ref 10–44)
ANION GAP SERPL CALC-SCNC: 10 MMOL/L (ref 8–16)
ANION GAP SERPL CALC-SCNC: 10 MMOL/L (ref 8–16)
ANION GAP SERPL CALC-SCNC: 11 MMOL/L (ref 8–16)
ANION GAP SERPL CALC-SCNC: 12 MMOL/L (ref 8–16)
ANION GAP SERPL CALC-SCNC: 13 MMOL/L (ref 8–16)
ANION GAP SERPL CALC-SCNC: 15 MMOL/L (ref 8–16)
ANION GAP SERPL CALC-SCNC: 16 MMOL/L (ref 8–16)
ANION GAP SERPL CALC-SCNC: 16 MMOL/L (ref 8–16)
APTT PPP: 22.8 SEC (ref 21–32)
APTT PPP: 26.8 SEC (ref 21–32)
APTT PPP: 37.6 SEC (ref 21–32)
APTT PPP: 38.4 SEC (ref 21–32)
APTT PPP: 38.8 SEC (ref 21–32)
APTT PPP: 41.3 SEC (ref 21–32)
APTT PPP: 42.6 SEC (ref 21–32)
APTT PPP: 43.9 SEC (ref 21–32)
APTT PPP: 50.5 SEC (ref 21–32)
APTT PPP: 51.2 SEC (ref 21–32)
APTT PPP: 64.3 SEC (ref 21–32)
ASCENDING AORTA: 3.4 CM
AST SERPL-CCNC: 14 U/L (ref 10–40)
AST SERPL-CCNC: 17 U/L (ref 10–40)
AST SERPL-CCNC: 17 U/L (ref 10–40)
AST SERPL-CCNC: 18 U/L (ref 10–40)
AST SERPL-CCNC: 19 U/L (ref 10–40)
AST SERPL-CCNC: 25 U/L (ref 10–40)
AST SERPL-CCNC: 28 U/L (ref 10–40)
AST SERPL-CCNC: 34 U/L (ref 10–40)
AST SERPL-CCNC: 36 U/L (ref 10–40)
AST SERPL-CCNC: 40 U/L (ref 10–40)
AV INDEX (PROSTH): 0.5
AV MEAN GRADIENT: 2 MMHG
AV PEAK GRADIENT: 3 MMHG
AV VALVE AREA: 1.66 CM2
AV VELOCITY RATIO: 0.51
BACTERIA #/AREA URNS AUTO: ABNORMAL /HPF
BACTERIA BLD CULT: NORMAL
BACTERIA BLD CULT: NORMAL
BASOPHILS # BLD AUTO: 0.01 K/UL (ref 0–0.2)
BASOPHILS # BLD AUTO: 0.02 K/UL (ref 0–0.2)
BASOPHILS # BLD AUTO: 0.02 K/UL (ref 0–0.2)
BASOPHILS # BLD AUTO: 0.03 K/UL (ref 0–0.2)
BASOPHILS # BLD AUTO: 0.05 K/UL (ref 0–0.2)
BASOPHILS # BLD AUTO: 0.06 K/UL (ref 0–0.2)
BASOPHILS # BLD AUTO: 0.06 K/UL (ref 0–0.2)
BASOPHILS NFR BLD: 0.1 % (ref 0–1.9)
BASOPHILS NFR BLD: 0.1 % (ref 0–1.9)
BASOPHILS NFR BLD: 0.2 % (ref 0–1.9)
BASOPHILS NFR BLD: 0.4 % (ref 0–1.9)
BASOPHILS NFR BLD: 0.7 % (ref 0–1.9)
BASOPHILS NFR BLD: 0.7 % (ref 0–1.9)
BASOPHILS NFR BLD: 0.8 % (ref 0–1.9)
BILIRUB SERPL-MCNC: 0.4 MG/DL (ref 0.1–1)
BILIRUB SERPL-MCNC: 0.4 MG/DL (ref 0.1–1)
BILIRUB SERPL-MCNC: 0.5 MG/DL (ref 0.1–1)
BILIRUB SERPL-MCNC: 0.6 MG/DL (ref 0.1–1)
BILIRUB SERPL-MCNC: 0.7 MG/DL (ref 0.1–1)
BILIRUB SERPL-MCNC: 0.9 MG/DL (ref 0.1–1)
BILIRUB SERPL-MCNC: 1 MG/DL (ref 0.1–1)
BILIRUB SERPL-MCNC: 1.3 MG/DL (ref 0.1–1)
BILIRUB UR QL STRIP: NEGATIVE
BIPAP: 0
BNP SERPL-MCNC: 2554 PG/ML (ref 0–99)
BORDETELLA PARAPERTUSSIS (IS1001): NOT DETECTED
BORDETELLA PERTUSSIS (PTXP): NOT DETECTED
BSA FOR ECHO PROCEDURE: 1.6 M2
BUN SERPL-MCNC: 16 MG/DL (ref 8–23)
BUN SERPL-MCNC: 37 MG/DL (ref 8–23)
BUN SERPL-MCNC: 38 MG/DL (ref 8–23)
BUN SERPL-MCNC: 39 MG/DL (ref 8–23)
BUN SERPL-MCNC: 40 MG/DL (ref 8–23)
BUN SERPL-MCNC: 40 MG/DL (ref 8–23)
BUN SERPL-MCNC: 41 MG/DL (ref 8–23)
BUN SERPL-MCNC: 42 MG/DL (ref 8–23)
BUN SERPL-MCNC: 43 MG/DL (ref 8–23)
BUN SERPL-MCNC: 46 MG/DL (ref 8–23)
BUN SERPL-MCNC: 47 MG/DL (ref 8–23)
BUN SERPL-MCNC: 47 MG/DL (ref 8–23)
BUN SERPL-MCNC: 48 MG/DL (ref 8–23)
CA-I BLDV-SCNC: 1.08 MMOL/L (ref 1.06–1.42)
CA-I BLDV-SCNC: 1.1 MMOL/L (ref 1.06–1.42)
CALCIUM SERPL-MCNC: 8.5 MG/DL (ref 8.7–10.5)
CALCIUM SERPL-MCNC: 8.6 MG/DL (ref 8.7–10.5)
CALCIUM SERPL-MCNC: 8.8 MG/DL (ref 8.7–10.5)
CALCIUM SERPL-MCNC: 8.9 MG/DL (ref 8.7–10.5)
CALCIUM SERPL-MCNC: 8.9 MG/DL (ref 8.7–10.5)
CALCIUM SERPL-MCNC: 9 MG/DL (ref 8.7–10.5)
CALCIUM SERPL-MCNC: 9.1 MG/DL (ref 8.7–10.5)
CALCIUM SERPL-MCNC: 9.1 MG/DL (ref 8.7–10.5)
CALCIUM SERPL-MCNC: 9.2 MG/DL (ref 8.7–10.5)
CALCIUM SERPL-MCNC: 9.2 MG/DL (ref 8.7–10.5)
CALCIUM SERPL-MCNC: 9.5 MG/DL (ref 8.7–10.5)
CALCIUM SERPL-MCNC: 9.7 MG/DL (ref 8.7–10.5)
CHLAMYDIA PNEUMONIAE: NOT DETECTED
CHLORIDE SERPL-SCNC: 105 MMOL/L (ref 95–110)
CHLORIDE SERPL-SCNC: 106 MMOL/L (ref 95–110)
CHLORIDE SERPL-SCNC: 107 MMOL/L (ref 95–110)
CHLORIDE SERPL-SCNC: 108 MMOL/L (ref 95–110)
CHLORIDE SERPL-SCNC: 109 MMOL/L (ref 95–110)
CHLORIDE SERPL-SCNC: 110 MMOL/L (ref 95–110)
CHLORIDE SERPL-SCNC: 111 MMOL/L (ref 95–110)
CHLORIDE SERPL-SCNC: 116 MMOL/L (ref 95–110)
CHLORIDE SERPL-SCNC: 118 MMOL/L (ref 95–110)
CHLORIDE SERPL-SCNC: 120 MMOL/L (ref 95–110)
CHOLEST SERPL-MCNC: 195 MG/DL (ref 120–199)
CHOLEST/HDLC SERPL: 4 {RATIO} (ref 2–5)
CLARITY UR REFRACT.AUTO: CLEAR
CO2 SERPL-SCNC: 17 MMOL/L (ref 23–29)
CO2 SERPL-SCNC: 20 MMOL/L (ref 23–29)
CO2 SERPL-SCNC: 20 MMOL/L (ref 23–29)
CO2 SERPL-SCNC: 22 MMOL/L (ref 23–29)
CO2 SERPL-SCNC: 23 MMOL/L (ref 23–29)
CO2 SERPL-SCNC: 24 MMOL/L (ref 23–29)
CO2 SERPL-SCNC: 25 MMOL/L (ref 23–29)
CO2 SERPL-SCNC: 27 MMOL/L (ref 23–29)
CO2 SERPL-SCNC: 27 MMOL/L (ref 23–29)
CO2 SERPL-SCNC: 28 MMOL/L (ref 23–29)
CO2 SERPL-SCNC: 28 MMOL/L (ref 23–29)
CO2 SERPL-SCNC: 29 MMOL/L (ref 23–29)
COLOR UR AUTO: YELLOW
CORONAVIRUS 229E, COMMON COLD VIRUS: NOT DETECTED
CORONAVIRUS HKU1, COMMON COLD VIRUS: NOT DETECTED
CORONAVIRUS NL63, COMMON COLD VIRUS: NOT DETECTED
CORONAVIRUS OC43, COMMON COLD VIRUS: NOT DETECTED
CREAT SERPL-MCNC: 1.2 MG/DL (ref 0.5–1.4)
CREAT SERPL-MCNC: 1.6 MG/DL (ref 0.5–1.4)
CREAT SERPL-MCNC: 1.8 MG/DL (ref 0.5–1.4)
CREAT SERPL-MCNC: 1.9 MG/DL (ref 0.5–1.4)
CREAT SERPL-MCNC: 2 MG/DL (ref 0.5–1.4)
CREAT SERPL-MCNC: 2 MG/DL (ref 0.5–1.4)
CREAT SERPL-MCNC: 2.1 MG/DL (ref 0.5–1.4)
CV ECHO LV RWT: 0.37 CM
DELSYS: ABNORMAL
DELSYS: NORMAL
DIFFERENTIAL METHOD: ABNORMAL
DOP CALC AO PEAK VEL: 0.82 M/S
DOP CALC AO VTI: 12.07 CM
DOP CALC LVOT AREA: 3.3 CM2
DOP CALC LVOT DIAMETER: 2.06 CM
DOP CALC LVOT PEAK VEL: 0.42 M/S
DOP CALC LVOT STROKE VOLUME: 19.99 CM3
DOP CALCLVOT PEAK VEL VTI: 6 CM
E WAVE DECELERATION TIME: 124.41 MSEC
E/A RATIO: 1.77
E/E' RATIO: 26.29 M/S
ECHO LV POSTERIOR WALL: 0.95 CM (ref 0.6–1.1)
EJECTION FRACTION: 20 %
EOSINOPHIL # BLD AUTO: 0 K/UL (ref 0–0.5)
EOSINOPHIL # BLD AUTO: 0.1 K/UL (ref 0–0.5)
EOSINOPHIL NFR BLD: 0 % (ref 0–8)
EOSINOPHIL NFR BLD: 0 % (ref 0–8)
EOSINOPHIL NFR BLD: 0.1 % (ref 0–8)
EOSINOPHIL NFR BLD: 0.2 % (ref 0–8)
EOSINOPHIL NFR BLD: 0.4 % (ref 0–8)
EOSINOPHIL NFR BLD: 1.2 % (ref 0–8)
EOSINOPHIL NFR BLD: 1.3 % (ref 0–8)
EOSINOPHIL NFR BLD: 1.8 % (ref 0–8)
EOSINOPHIL NFR BLD: 2.2 % (ref 0–8)
ERYTHROCYTE [DISTWIDTH] IN BLOOD BY AUTOMATED COUNT: 17.8 % (ref 11.5–14.5)
ERYTHROCYTE [DISTWIDTH] IN BLOOD BY AUTOMATED COUNT: 17.9 % (ref 11.5–14.5)
ERYTHROCYTE [DISTWIDTH] IN BLOOD BY AUTOMATED COUNT: 18.2 % (ref 11.5–14.5)
ERYTHROCYTE [DISTWIDTH] IN BLOOD BY AUTOMATED COUNT: 18.2 % (ref 11.5–14.5)
ERYTHROCYTE [DISTWIDTH] IN BLOOD BY AUTOMATED COUNT: 18.4 % (ref 11.5–14.5)
ERYTHROCYTE [DISTWIDTH] IN BLOOD BY AUTOMATED COUNT: 18.4 % (ref 11.5–14.5)
ERYTHROCYTE [DISTWIDTH] IN BLOOD BY AUTOMATED COUNT: 18.6 % (ref 11.5–14.5)
ERYTHROCYTE [DISTWIDTH] IN BLOOD BY AUTOMATED COUNT: 19.6 % (ref 11.5–14.5)
ERYTHROCYTE [DISTWIDTH] IN BLOOD BY AUTOMATED COUNT: 19.7 % (ref 11.5–14.5)
ERYTHROCYTE [SEDIMENTATION RATE] IN BLOOD BY WESTERGREN METHOD: 4 MM/H
EST. GFR  (NO RACE VARIABLE): 23.1 ML/MIN/1.73 M^2
EST. GFR  (NO RACE VARIABLE): 24.5 ML/MIN/1.73 M^2
EST. GFR  (NO RACE VARIABLE): 24.5 ML/MIN/1.73 M^2
EST. GFR  (NO RACE VARIABLE): 26 ML/MIN/1.73 M^2
EST. GFR  (NO RACE VARIABLE): 27.8 ML/MIN/1.73 M^2
EST. GFR  (NO RACE VARIABLE): 32 ML/MIN/1.73 M^2
EST. GFR  (NO RACE VARIABLE): 45.2 ML/MIN/1.73 M^2
ESTIMATED AVG GLUCOSE: 140 MG/DL (ref 68–131)
FIO2: 32
FIO2: 36
FIO2: 36 %
FIO2: 40
FIO2: 40 %
FLOW: 3
FLOW: 4
FLOW: 5
FLUBV RNA NPH QL NAA+NON-PROBE: NOT DETECTED
FRACTIONAL SHORTENING: 12 % (ref 28–44)
GLUCOSE SERPL-MCNC: 101 MG/DL (ref 70–110)
GLUCOSE SERPL-MCNC: 101 MG/DL (ref 70–110)
GLUCOSE SERPL-MCNC: 106 MG/DL (ref 70–110)
GLUCOSE SERPL-MCNC: 108 MG/DL (ref 70–110)
GLUCOSE SERPL-MCNC: 111 MG/DL (ref 70–110)
GLUCOSE SERPL-MCNC: 111 MG/DL (ref 70–110)
GLUCOSE SERPL-MCNC: 116 MG/DL (ref 70–110)
GLUCOSE SERPL-MCNC: 125 MG/DL (ref 70–110)
GLUCOSE SERPL-MCNC: 145 MG/DL (ref 70–110)
GLUCOSE SERPL-MCNC: 147 MG/DL (ref 70–110)
GLUCOSE SERPL-MCNC: 149 MG/DL (ref 70–110)
GLUCOSE SERPL-MCNC: 157 MG/DL (ref 70–110)
GLUCOSE SERPL-MCNC: 157 MG/DL (ref 70–110)
GLUCOSE SERPL-MCNC: 207 MG/DL (ref 70–110)
GLUCOSE SERPL-MCNC: 97 MG/DL (ref 70–110)
GLUCOSE UR QL STRIP: NEGATIVE
HBA1C MFR BLD: 6.5 % (ref 4–5.6)
HCO3 UR-SCNC: 19.4 MMOL/L (ref 24–28)
HCO3 UR-SCNC: 20.9 MMOL/L (ref 24–28)
HCO3 UR-SCNC: 20.9 MMOL/L (ref 24–28)
HCO3 UR-SCNC: 21 MMOL/L (ref 24–28)
HCO3 UR-SCNC: 22.8 MMOL/L (ref 24–28)
HCO3 UR-SCNC: 23 MMOL/L (ref 24–28)
HCO3 UR-SCNC: 23 MMOL/L (ref 24–28)
HCO3 UR-SCNC: 23.2 MMOL/L (ref 24–28)
HCO3 UR-SCNC: 23.8 MMOL/L (ref 24–28)
HCO3 UR-SCNC: 23.8 MMOL/L (ref 24–28)
HCO3 UR-SCNC: 24.8 MMOL/L (ref 24–28)
HCO3 UR-SCNC: 25.8 MMOL/L (ref 24–28)
HCO3 UR-SCNC: 26.8 MMOL/L (ref 24–28)
HCO3 UR-SCNC: 26.9 MMOL/L (ref 24–28)
HCO3 UR-SCNC: 27 MMOL/L (ref 24–28)
HCO3 UR-SCNC: 27.2 MMOL/L (ref 24–28)
HCO3 UR-SCNC: 27.3 MMOL/L (ref 24–28)
HCO3 UR-SCNC: 27.4 MMOL/L (ref 24–28)
HCO3 UR-SCNC: 27.6 MMOL/L (ref 24–28)
HCO3 UR-SCNC: 27.6 MMOL/L (ref 24–28)
HCO3 UR-SCNC: 27.9 MMOL/L (ref 24–28)
HCO3 UR-SCNC: 28.6 MMOL/L (ref 24–28)
HCO3 UR-SCNC: 28.7 MMOL/L (ref 24–28)
HCO3 UR-SCNC: 28.8 MMOL/L (ref 24–28)
HCO3 UR-SCNC: 29.1 MMOL/L (ref 24–28)
HCO3 UR-SCNC: 29.4 MMOL/L (ref 24–28)
HCO3 UR-SCNC: 29.5 MMOL/L (ref 24–28)
HCO3 UR-SCNC: 29.6 MMOL/L (ref 24–28)
HCO3 UR-SCNC: 29.8 MMOL/L (ref 24–28)
HCO3 UR-SCNC: 29.9 MMOL/L (ref 24–28)
HCO3 UR-SCNC: 30.2 MMOL/L (ref 24–28)
HCO3 UR-SCNC: 32 MMOL/L (ref 24–28)
HCO3 UR-SCNC: 32 MMOL/L (ref 24–28)
HCT VFR BLD AUTO: 32.3 % (ref 37–48.5)
HCT VFR BLD AUTO: 32.6 % (ref 37–48.5)
HCT VFR BLD AUTO: 33.5 % (ref 37–48.5)
HCT VFR BLD AUTO: 33.9 % (ref 37–48.5)
HCT VFR BLD AUTO: 34.5 % (ref 37–48.5)
HCT VFR BLD AUTO: 34.6 % (ref 37–48.5)
HCT VFR BLD AUTO: 35.3 % (ref 37–48.5)
HCT VFR BLD AUTO: 44.2 % (ref 37–48.5)
HCT VFR BLD AUTO: 44.5 % (ref 37–48.5)
HCT VFR BLD CALC: 26 %PCV (ref 36–54)
HCV AB SERPL QL IA: NORMAL
HDLC SERPL-MCNC: 49 MG/DL (ref 40–75)
HDLC SERPL: 25.1 % (ref 20–50)
HGB BLD-MCNC: 10.1 G/DL (ref 12–16)
HGB BLD-MCNC: 10.1 G/DL (ref 12–16)
HGB BLD-MCNC: 10.2 G/DL (ref 12–16)
HGB BLD-MCNC: 10.3 G/DL (ref 12–16)
HGB BLD-MCNC: 10.5 G/DL (ref 12–16)
HGB BLD-MCNC: 10.6 G/DL (ref 12–16)
HGB BLD-MCNC: 10.7 G/DL (ref 12–16)
HGB BLD-MCNC: 12.8 G/DL (ref 12–16)
HGB BLD-MCNC: 13.4 G/DL (ref 12–16)
HGB UR QL STRIP: ABNORMAL
HIV 1+2 AB+HIV1 P24 AG SERPL QL IA: NORMAL
HPIV1 RNA NPH QL NAA+NON-PROBE: NOT DETECTED
HPIV2 RNA NPH QL NAA+NON-PROBE: NOT DETECTED
HPIV3 RNA NPH QL NAA+NON-PROBE: NOT DETECTED
HPIV4 RNA NPH QL NAA+NON-PROBE: NOT DETECTED
HUMAN METAPNEUMOVIRUS: NOT DETECTED
HYALINE CASTS UR QL AUTO: 0 /LPF
IMM GRANULOCYTES # BLD AUTO: 0.02 K/UL (ref 0–0.04)
IMM GRANULOCYTES # BLD AUTO: 0.03 K/UL (ref 0–0.04)
IMM GRANULOCYTES # BLD AUTO: 0.03 K/UL (ref 0–0.04)
IMM GRANULOCYTES # BLD AUTO: 0.05 K/UL (ref 0–0.04)
IMM GRANULOCYTES # BLD AUTO: 0.06 K/UL (ref 0–0.04)
IMM GRANULOCYTES NFR BLD AUTO: 0.2 % (ref 0–0.5)
IMM GRANULOCYTES NFR BLD AUTO: 0.3 % (ref 0–0.5)
IMM GRANULOCYTES NFR BLD AUTO: 0.4 % (ref 0–0.5)
IMM GRANULOCYTES NFR BLD AUTO: 0.5 % (ref 0–0.5)
INFLUENZA A (SUBTYPES H1,H1-2009,H3): NOT DETECTED
INR PPP: 1.1 (ref 0.8–1.2)
INR PPP: 1.2 (ref 0.8–1.2)
INTERVENTRICULAR SEPTUM: 0.85 CM (ref 0.6–1.1)
KETONES UR QL STRIP: ABNORMAL
LA MAJOR: 6.87 CM
LA MINOR: 6.46 CM
LA WIDTH: 4.26 CM
LACTATE SERPL-SCNC: 2.3 MMOL/L (ref 0.5–2.2)
LACTATE SERPL-SCNC: 2.4 MMOL/L (ref 0.5–2.2)
LACTATE SERPL-SCNC: 2.7 MMOL/L (ref 0.5–2.2)
LACTATE SERPL-SCNC: 2.8 MMOL/L (ref 0.5–2.2)
LACTATE SERPL-SCNC: 2.9 MMOL/L (ref 0.5–2.2)
LACTATE SERPL-SCNC: 3.4 MMOL/L (ref 0.5–2.2)
LACTATE SERPL-SCNC: 3.5 MMOL/L (ref 0.5–2.2)
LACTATE SERPL-SCNC: 3.6 MMOL/L (ref 0.5–2.2)
LACTATE SERPL-SCNC: 4.2 MMOL/L (ref 0.5–2.2)
LDH SERPL L TO P-CCNC: 0.54 MMOL/L (ref 0.5–2.2)
LDH SERPL L TO P-CCNC: 0.64 MMOL/L (ref 0.5–2.2)
LDH SERPL L TO P-CCNC: 0.67 MMOL/L (ref 0.5–2.2)
LDH SERPL L TO P-CCNC: 0.77 MMOL/L (ref 0.5–2.2)
LDH SERPL L TO P-CCNC: 1.15 MMOL/L (ref 0.5–2.2)
LDH SERPL L TO P-CCNC: 2.17 MMOL/L (ref 0.5–2.2)
LDH SERPL L TO P-CCNC: 2.19 MMOL/L (ref 0.5–2.2)
LDH SERPL L TO P-CCNC: 2.63 MMOL/L (ref 0.5–2.2)
LDH SERPL L TO P-CCNC: 3.1 MMOL/L (ref 0.5–2.2)
LDH SERPL L TO P-CCNC: 4 MMOL/L (ref 0.5–2.2)
LDH SERPL L TO P-CCNC: 4.19 MMOL/L (ref 0.5–2.2)
LDH SERPL L TO P-CCNC: 4.21 MMOL/L (ref 0.5–2.2)
LDLC SERPL CALC-MCNC: 126.6 MG/DL (ref 63–159)
LEFT ATRIUM SIZE: 4.92 CM
LEFT ATRIUM VOLUME INDEX MOD: 54.3 ML/M2
LEFT ATRIUM VOLUME INDEX: 73.2 ML/M2
LEFT ATRIUM VOLUME MOD: 88 CM3
LEFT ATRIUM VOLUME: 118.63 CM3
LEFT INTERNAL DIMENSION IN SYSTOLE: 4.49 CM (ref 2.1–4)
LEFT VENTRICLE DIASTOLIC VOLUME INDEX: 74.17 ML/M2
LEFT VENTRICLE DIASTOLIC VOLUME: 120.16 ML
LEFT VENTRICLE MASS INDEX: 101 G/M2
LEFT VENTRICLE SYSTOLIC VOLUME INDEX: 56.9 ML/M2
LEFT VENTRICLE SYSTOLIC VOLUME: 92.1 ML
LEFT VENTRICULAR INTERNAL DIMENSION IN DIASTOLE: 5.1 CM (ref 3.5–6)
LEFT VENTRICULAR MASS: 163.55 G
LEUKOCYTE ESTERASE UR QL STRIP: NEGATIVE
LV LATERAL E/E' RATIO: 30.67 M/S
LV SEPTAL E/E' RATIO: 23 M/S
LYMPHOCYTES # BLD AUTO: 0.3 K/UL (ref 1–4.8)
LYMPHOCYTES # BLD AUTO: 0.4 K/UL (ref 1–4.8)
LYMPHOCYTES # BLD AUTO: 0.7 K/UL (ref 1–4.8)
LYMPHOCYTES # BLD AUTO: 0.8 K/UL (ref 1–4.8)
LYMPHOCYTES # BLD AUTO: 0.9 K/UL (ref 1–4.8)
LYMPHOCYTES # BLD AUTO: 0.9 K/UL (ref 1–4.8)
LYMPHOCYTES # BLD AUTO: 1.1 K/UL (ref 1–4.8)
LYMPHOCYTES # BLD AUTO: 1.2 K/UL (ref 1–4.8)
LYMPHOCYTES # BLD AUTO: 1.3 K/UL (ref 1–4.8)
LYMPHOCYTES NFR BLD: 11.9 % (ref 18–48)
LYMPHOCYTES NFR BLD: 14.2 % (ref 18–48)
LYMPHOCYTES NFR BLD: 14.7 % (ref 18–48)
LYMPHOCYTES NFR BLD: 15 % (ref 18–48)
LYMPHOCYTES NFR BLD: 15.7 % (ref 18–48)
LYMPHOCYTES NFR BLD: 17.5 % (ref 18–48)
LYMPHOCYTES NFR BLD: 19.9 % (ref 18–48)
LYMPHOCYTES NFR BLD: 2.5 % (ref 18–48)
LYMPHOCYTES NFR BLD: 3.2 % (ref 18–48)
MAGNESIUM SERPL-MCNC: 1.9 MG/DL (ref 1.6–2.6)
MAGNESIUM SERPL-MCNC: 2 MG/DL (ref 1.6–2.6)
MAGNESIUM SERPL-MCNC: 2.2 MG/DL (ref 1.6–2.6)
MAGNESIUM SERPL-MCNC: 2.3 MG/DL (ref 1.6–2.6)
MAGNESIUM SERPL-MCNC: 2.3 MG/DL (ref 1.6–2.6)
MAGNESIUM SERPL-MCNC: 2.4 MG/DL (ref 1.6–2.6)
MAGNESIUM SERPL-MCNC: 2.4 MG/DL (ref 1.6–2.6)
MCH RBC QN AUTO: 23.3 PG (ref 27–31)
MCH RBC QN AUTO: 23.4 PG (ref 27–31)
MCH RBC QN AUTO: 23.5 PG (ref 27–31)
MCH RBC QN AUTO: 23.6 PG (ref 27–31)
MCH RBC QN AUTO: 23.7 PG (ref 27–31)
MCH RBC QN AUTO: 23.8 PG (ref 27–31)
MCH RBC QN AUTO: 24.2 PG (ref 27–31)
MCH RBC QN AUTO: 24.2 PG (ref 27–31)
MCH RBC QN AUTO: 24.4 PG (ref 27–31)
MCHC RBC AUTO-ENTMCNC: 28.8 G/DL (ref 32–36)
MCHC RBC AUTO-ENTMCNC: 30 G/DL (ref 32–36)
MCHC RBC AUTO-ENTMCNC: 30.3 G/DL (ref 32–36)
MCHC RBC AUTO-ENTMCNC: 30.3 G/DL (ref 32–36)
MCHC RBC AUTO-ENTMCNC: 30.4 G/DL (ref 32–36)
MCHC RBC AUTO-ENTMCNC: 30.4 G/DL (ref 32–36)
MCHC RBC AUTO-ENTMCNC: 31 G/DL (ref 32–36)
MCHC RBC AUTO-ENTMCNC: 31 G/DL (ref 32–36)
MCHC RBC AUTO-ENTMCNC: 31.3 G/DL (ref 32–36)
MCV RBC AUTO: 77 FL (ref 82–98)
MCV RBC AUTO: 78 FL (ref 82–98)
MCV RBC AUTO: 79 FL (ref 82–98)
MCV RBC AUTO: 80 FL (ref 82–98)
MCV RBC AUTO: 83 FL (ref 82–98)
METHEMOGLOBIN: 0.9 % (ref 0–3)
METHEMOGLOBIN: 1 % (ref 0–3)
MICROSCOPIC COMMENT: ABNORMAL
MODE: ABNORMAL
MODE: NORMAL
MONOCYTES # BLD AUTO: 0.4 K/UL (ref 0.3–1)
MONOCYTES # BLD AUTO: 0.4 K/UL (ref 0.3–1)
MONOCYTES # BLD AUTO: 0.5 K/UL (ref 0.3–1)
MONOCYTES # BLD AUTO: 0.8 K/UL (ref 0.3–1)
MONOCYTES NFR BLD: 4.6 % (ref 4–15)
MONOCYTES NFR BLD: 5.6 % (ref 4–15)
MONOCYTES NFR BLD: 6 % (ref 4–15)
MONOCYTES NFR BLD: 6.7 % (ref 4–15)
MONOCYTES NFR BLD: 6.7 % (ref 4–15)
MONOCYTES NFR BLD: 7 % (ref 4–15)
MONOCYTES NFR BLD: 8.2 % (ref 4–15)
MONOCYTES NFR BLD: 8.3 % (ref 4–15)
MONOCYTES NFR BLD: 8.6 % (ref 4–15)
MV PEAK A VEL: 0.52 M/S
MV PEAK E VEL: 0.92 M/S
MV STENOSIS PRESSURE HALF TIME: 36.08 MS
MV VALVE AREA P 1/2 METHOD: 6.1 CM2
MYCOPLASMA PNEUMONIAE: NOT DETECTED
NEUTROPHILS # BLD AUTO: 10.6 K/UL (ref 1.8–7.7)
NEUTROPHILS # BLD AUTO: 10.8 K/UL (ref 1.8–7.7)
NEUTROPHILS # BLD AUTO: 3.7 K/UL (ref 1.8–7.7)
NEUTROPHILS # BLD AUTO: 3.8 K/UL (ref 1.8–7.7)
NEUTROPHILS # BLD AUTO: 4.2 K/UL (ref 1.8–7.7)
NEUTROPHILS # BLD AUTO: 4.8 K/UL (ref 1.8–7.7)
NEUTROPHILS # BLD AUTO: 5.6 K/UL (ref 1.8–7.7)
NEUTROPHILS # BLD AUTO: 6.2 K/UL (ref 1.8–7.7)
NEUTROPHILS # BLD AUTO: 6.3 K/UL (ref 1.8–7.7)
NEUTROPHILS NFR BLD: 71.7 % (ref 38–73)
NEUTROPHILS NFR BLD: 71.9 % (ref 38–73)
NEUTROPHILS NFR BLD: 73.6 % (ref 38–73)
NEUTROPHILS NFR BLD: 74.1 % (ref 38–73)
NEUTROPHILS NFR BLD: 77.2 % (ref 38–73)
NEUTROPHILS NFR BLD: 78.5 % (ref 38–73)
NEUTROPHILS NFR BLD: 80.4 % (ref 38–73)
NEUTROPHILS NFR BLD: 89.5 % (ref 38–73)
NEUTROPHILS NFR BLD: 92.4 % (ref 38–73)
NITRITE UR QL STRIP: NEGATIVE
NONHDLC SERPL-MCNC: 146 MG/DL
NRBC BLD-RTO: 0 /100 WBC
PCO2 BLDA: 29.4 MMHG (ref 35–45)
PCO2 BLDA: 32.1 MMHG (ref 35–45)
PCO2 BLDA: 32.8 MMHG (ref 35–45)
PCO2 BLDA: 33.3 MMHG (ref 35–45)
PCO2 BLDA: 33.9 MMHG (ref 35–45)
PCO2 BLDA: 33.9 MMHG (ref 35–45)
PCO2 BLDA: 34.1 MMHG (ref 35–45)
PCO2 BLDA: 34.8 MMHG (ref 35–45)
PCO2 BLDA: 36.8 MMHG (ref 35–45)
PCO2 BLDA: 38.3 MMHG (ref 35–45)
PCO2 BLDA: 38.6 MMHG (ref 35–45)
PCO2 BLDA: 38.6 MMHG (ref 35–45)
PCO2 BLDA: 38.9 MMHG (ref 35–45)
PCO2 BLDA: 39.7 MMHG (ref 35–45)
PCO2 BLDA: 39.7 MMHG (ref 35–45)
PCO2 BLDA: 40.8 MMHG (ref 35–45)
PCO2 BLDA: 41.7 MMHG (ref 35–45)
PCO2 BLDA: 42.1 MMHG (ref 35–45)
PCO2 BLDA: 42.3 MMHG (ref 35–45)
PCO2 BLDA: 43.3 MMHG (ref 35–45)
PCO2 BLDA: 43.5 MMHG (ref 35–45)
PCO2 BLDA: 44.4 MMHG (ref 35–45)
PCO2 BLDA: 45.5 MMHG (ref 35–45)
PCO2 BLDA: 46 MMHG (ref 35–45)
PCO2 BLDA: 46.1 MMHG (ref 35–45)
PCO2 BLDA: 46.7 MMHG (ref 35–45)
PCO2 BLDA: 47.2 MMHG (ref 35–45)
PCO2 BLDA: 47.3 MMHG (ref 35–45)
PCO2 BLDA: 47.6 MMHG (ref 35–45)
PCO2 BLDA: 47.7 MMHG (ref 35–45)
PCO2 BLDA: 47.9 MMHG (ref 35–45)
PCO2 BLDA: 50.1 MMHG (ref 35–45)
PCO2 BLDA: 50.3 MMHG (ref 35–45)
PH SMN: 7.37 [PH] (ref 7.35–7.45)
PH SMN: 7.38 [PH] (ref 7.35–7.45)
PH SMN: 7.38 [PH] (ref 7.35–7.45)
PH SMN: 7.39 [PH] (ref 7.35–7.45)
PH SMN: 7.4 [PH] (ref 7.35–7.45)
PH SMN: 7.41 [PH] (ref 7.35–7.45)
PH SMN: 7.42 [PH] (ref 7.35–7.45)
PH SMN: 7.43 [PH] (ref 7.35–7.45)
PH SMN: 7.44 [PH] (ref 7.35–7.45)
PH SMN: 7.45 [PH] (ref 7.35–7.45)
PH SMN: 7.47 [PH] (ref 7.35–7.45)
PH SMN: 7.49 [PH] (ref 7.35–7.45)
PH SMN: 7.5 [PH] (ref 7.35–7.45)
PH UR STRIP: 5 [PH] (ref 5–8)
PHOSPHATE SERPL-MCNC: 2.4 MG/DL (ref 2.7–4.5)
PHOSPHATE SERPL-MCNC: 3.1 MG/DL (ref 2.7–4.5)
PHOSPHATE SERPL-MCNC: 3.5 MG/DL (ref 2.7–4.5)
PHOSPHATE SERPL-MCNC: 3.9 MG/DL (ref 2.7–4.5)
PHOSPHATE SERPL-MCNC: 4 MG/DL (ref 2.7–4.5)
PHOSPHATE SERPL-MCNC: 4.1 MG/DL (ref 2.7–4.5)
PISA MRMAX VEL: 0.05 M/S
PISA TR MAX VEL: 2.84 M/S
PLATELET # BLD AUTO: 181 K/UL (ref 150–450)
PLATELET # BLD AUTO: 183 K/UL (ref 150–450)
PLATELET # BLD AUTO: 187 K/UL (ref 150–450)
PLATELET # BLD AUTO: 211 K/UL (ref 150–450)
PLATELET # BLD AUTO: 213 K/UL (ref 150–450)
PLATELET # BLD AUTO: 220 K/UL (ref 150–450)
PLATELET # BLD AUTO: 223 K/UL (ref 150–450)
PLATELET # BLD AUTO: 228 K/UL (ref 150–450)
PLATELET # BLD AUTO: 242 K/UL (ref 150–450)
PMV BLD AUTO: 11.1 FL (ref 9.2–12.9)
PMV BLD AUTO: 11.3 FL (ref 9.2–12.9)
PMV BLD AUTO: 11.6 FL (ref 9.2–12.9)
PMV BLD AUTO: 11.7 FL (ref 9.2–12.9)
PMV BLD AUTO: 12.1 FL (ref 9.2–12.9)
PMV BLD AUTO: 12.4 FL (ref 9.2–12.9)
PMV BLD AUTO: ABNORMAL FL (ref 9.2–12.9)
PO2 BLDA: 21 MMHG (ref 40–60)
PO2 BLDA: 23 MMHG (ref 40–60)
PO2 BLDA: 24 MMHG (ref 40–60)
PO2 BLDA: 25 MMHG (ref 40–60)
PO2 BLDA: 26 MMHG (ref 40–60)
PO2 BLDA: 26 MMHG (ref 40–60)
PO2 BLDA: 27 MMHG (ref 40–60)
PO2 BLDA: 28 MMHG (ref 40–60)
PO2 BLDA: 29 MMHG (ref 40–60)
PO2 BLDA: 31 MMHG (ref 40–60)
PO2 BLDA: 32 MMHG (ref 40–60)
PO2 BLDA: 33 MMHG (ref 40–60)
PO2 BLDA: 34 MMHG (ref 40–60)
PO2 BLDA: 34 MMHG (ref 40–60)
PO2 BLDA: 35 MMHG (ref 40–60)
PO2 BLDA: 36 MMHG (ref 40–60)
PO2 BLDA: 37 MMHG (ref 40–60)
PO2 BLDA: 39 MMHG (ref 40–60)
PO2 BLDA: 39 MMHG (ref 40–60)
PO2 BLDA: 40 MMHG (ref 40–60)
PO2 BLDA: 41 MMHG (ref 40–60)
PO2 BLDA: 44 MMHG (ref 40–60)
POC BE: -1 MMOL/L
POC BE: -1 MMOL/L
POC BE: -2 MMOL/L
POC BE: -2 MMOL/L
POC BE: -3 MMOL/L
POC BE: -4 MMOL/L
POC BE: -4 MMOL/L
POC BE: -5 MMOL/L
POC BE: 0 MMOL/L
POC BE: 1 MMOL/L
POC BE: 2 MMOL/L
POC BE: 3 MMOL/L
POC BE: 4 MMOL/L
POC BE: 5 MMOL/L
POC BE: 6 MMOL/L
POC BE: 6 MMOL/L
POC BE: 7 MMOL/L
POC BE: 7 MMOL/L
POC BE: 8 MMOL/L
POC IONIZED CALCIUM: 1.19 MMOL/L (ref 1.06–1.42)
POC METHB: 0.4 %
POC METHB: 0.9 % (ref 0–3)
POC METHB: 1 % (ref 0–3)
POC PERFORMED BY: NORMAL
POC SATURATED O2: 34 % (ref 95–100)
POC SATURATED O2: 44 % (ref 95–100)
POC SATURATED O2: 45 % (ref 95–100)
POC SATURATED O2: 46 % (ref 95–100)
POC SATURATED O2: 49 % (ref 95–100)
POC SATURATED O2: 52 % (ref 95–100)
POC SATURATED O2: 53 % (ref 95–100)
POC SATURATED O2: 54 % (ref 95–100)
POC SATURATED O2: 56 % (ref 95–100)
POC SATURATED O2: 58 % (ref 95–100)
POC SATURATED O2: 60 % (ref 95–100)
POC SATURATED O2: 60 % (ref 95–100)
POC SATURATED O2: 62 % (ref 95–100)
POC SATURATED O2: 62 % (ref 95–100)
POC SATURATED O2: 64 % (ref 95–100)
POC SATURATED O2: 65 % (ref 95–100)
POC SATURATED O2: 67 % (ref 95–100)
POC SATURATED O2: 68 % (ref 95–100)
POC SATURATED O2: 69 % (ref 95–100)
POC SATURATED O2: 70 % (ref 95–100)
POC SATURATED O2: 71 % (ref 95–100)
POC SATURATED O2: 72 % (ref 95–100)
POC SATURATED O2: 76 % (ref 95–100)
POC SATURATED O2: 79 % (ref 95–100)
POC SATURATED O2: 81 % (ref 95–100)
POC TCO2: 20 MMOL/L (ref 24–29)
POC TCO2: 22 MMOL/L (ref 24–29)
POC TCO2: 24 MMOL/L (ref 24–29)
POC TCO2: 25 MMOL/L (ref 24–29)
POC TCO2: 25 MMOL/L (ref 24–29)
POC TCO2: 26 MMOL/L (ref 24–29)
POC TCO2: 27 MMOL/L (ref 24–29)
POC TCO2: 28 MMOL/L (ref 24–29)
POC TCO2: 29 MMOL/L (ref 24–29)
POC TCO2: 30 MMOL/L (ref 24–29)
POC TCO2: 31 MMOL/L (ref 24–29)
POC TCO2: 33 MMOL/L (ref 24–29)
POC TCO2: 33 MMOL/L (ref 24–29)
POC TEMPERATURE: 37 C
POCT GLUCOSE: 115 MG/DL (ref 70–110)
POCT GLUCOSE: 148 MG/DL (ref 70–110)
POCT GLUCOSE: 152 MG/DL (ref 70–110)
POCT GLUCOSE: 156 MG/DL (ref 70–110)
POCT GLUCOSE: 158 MG/DL (ref 70–110)
POCT GLUCOSE: 161 MG/DL (ref 70–110)
POCT GLUCOSE: 163 MG/DL (ref 70–110)
POCT GLUCOSE: 185 MG/DL (ref 70–110)
POCT GLUCOSE: 186 MG/DL (ref 70–110)
POCT GLUCOSE: 200 MG/DL (ref 70–110)
POCT GLUCOSE: 231 MG/DL (ref 70–110)
POCT GLUCOSE: 96 MG/DL (ref 70–110)
POTASSIUM BLD-SCNC: 5.6 MMOL/L (ref 3.5–5.1)
POTASSIUM SERPL-SCNC: 3.2 MMOL/L (ref 3.5–5.1)
POTASSIUM SERPL-SCNC: 3.3 MMOL/L (ref 3.5–5.1)
POTASSIUM SERPL-SCNC: 3.4 MMOL/L (ref 3.5–5.1)
POTASSIUM SERPL-SCNC: 3.5 MMOL/L (ref 3.5–5.1)
POTASSIUM SERPL-SCNC: 3.5 MMOL/L (ref 3.5–5.1)
POTASSIUM SERPL-SCNC: 3.7 MMOL/L (ref 3.5–5.1)
POTASSIUM SERPL-SCNC: 3.9 MMOL/L (ref 3.5–5.1)
POTASSIUM SERPL-SCNC: 3.9 MMOL/L (ref 3.5–5.1)
POTASSIUM SERPL-SCNC: 4 MMOL/L (ref 3.5–5.1)
POTASSIUM SERPL-SCNC: 4.2 MMOL/L (ref 3.5–5.1)
POTASSIUM SERPL-SCNC: 4.3 MMOL/L (ref 3.5–5.1)
POTASSIUM SERPL-SCNC: 4.4 MMOL/L (ref 3.5–5.1)
POTASSIUM SERPL-SCNC: 4.4 MMOL/L (ref 3.5–5.1)
POTASSIUM SERPL-SCNC: 4.9 MMOL/L (ref 3.5–5.1)
POTASSIUM SERPL-SCNC: 5.4 MMOL/L (ref 3.5–5.1)
PROT SERPL-MCNC: 5.8 G/DL (ref 6–8.4)
PROT SERPL-MCNC: 5.8 G/DL (ref 6–8.4)
PROT SERPL-MCNC: 5.9 G/DL (ref 6–8.4)
PROT SERPL-MCNC: 6.2 G/DL (ref 6–8.4)
PROT SERPL-MCNC: 6.3 G/DL (ref 6–8.4)
PROT SERPL-MCNC: 6.3 G/DL (ref 6–8.4)
PROT SERPL-MCNC: 6.4 G/DL (ref 6–8.4)
PROT SERPL-MCNC: 6.5 G/DL (ref 6–8.4)
PROT SERPL-MCNC: 6.6 G/DL (ref 6–8.4)
PROT SERPL-MCNC: 7.2 G/DL (ref 6–8.4)
PROT UR QL STRIP: ABNORMAL
PROTHROMBIN TIME: 12.1 SEC (ref 9–12.5)
PROTHROMBIN TIME: 12.4 SEC (ref 9–12.5)
QEF: 18 %
RA MAJOR: 5.86 CM
RA PRESSURE: 15 MMHG
RA WIDTH: 3.6 CM
RBC # BLD AUTO: 4.18 M/UL (ref 4–5.4)
RBC # BLD AUTO: 4.24 M/UL (ref 4–5.4)
RBC # BLD AUTO: 4.35 M/UL (ref 4–5.4)
RBC # BLD AUTO: 4.37 M/UL (ref 4–5.4)
RBC # BLD AUTO: 4.39 M/UL (ref 4–5.4)
RBC # BLD AUTO: 4.46 M/UL (ref 4–5.4)
RBC # BLD AUTO: 4.55 M/UL (ref 4–5.4)
RBC # BLD AUTO: 5.39 M/UL (ref 4–5.4)
RBC # BLD AUTO: 5.53 M/UL (ref 4–5.4)
RBC #/AREA URNS AUTO: 5 /HPF (ref 0–4)
RESPIRATORY INFECTION PANEL SOURCE: NORMAL
RIGHT ATRIAL AREA: 19 CM2
RIGHT VENTRICULAR END-DIASTOLIC DIMENSION: 2.77 CM
RSV RNA NPH QL NAA+NON-PROBE: NOT DETECTED
RV+EV RNA NPH QL NAA+NON-PROBE: NOT DETECTED
SAMPLE: ABNORMAL
SAMPLE: NORMAL
SARS-COV-2 RNA RESP QL NAA+PROBE: NOT DETECTED
SINUS: 3.17 CM
SITE: ABNORMAL
SITE: NORMAL
SODIUM BLD-SCNC: 152 MMOL/L (ref 136–145)
SODIUM SERPL-SCNC: 142 MMOL/L (ref 136–145)
SODIUM SERPL-SCNC: 145 MMOL/L (ref 136–145)
SODIUM SERPL-SCNC: 147 MMOL/L (ref 136–145)
SODIUM SERPL-SCNC: 148 MMOL/L (ref 136–145)
SODIUM SERPL-SCNC: 149 MMOL/L (ref 136–145)
SODIUM SERPL-SCNC: 151 MMOL/L (ref 136–145)
SODIUM SERPL-SCNC: 153 MMOL/L (ref 136–145)
SP GR UR STRIP: 1.02 (ref 1–1.03)
SP02: 100
SP02: 98
SPECIMEN SOURCE: NORMAL
SQUAMOUS #/AREA URNS AUTO: 1 /HPF
STJ: 2.83 CM
T4 FREE SERPL-MCNC: 1.39 NG/DL (ref 0.71–1.51)
TDI LATERAL: 0.03 M/S
TDI SEPTAL: 0.04 M/S
TDI: 0.04 M/S
TR MAX PG: 32 MMHG
TRICUSPID ANNULAR PLANE SYSTOLIC EXCURSION: 1.32 CM
TRIGL SERPL-MCNC: 97 MG/DL (ref 30–150)
TROPONIN I SERPL DL<=0.01 NG/ML-MCNC: 6.71 NG/ML (ref 0–0.03)
TROPONIN I SERPL DL<=0.01 NG/ML-MCNC: 7.24 NG/ML (ref 0–0.03)
TROPONIN I SERPL DL<=0.01 NG/ML-MCNC: 7.94 NG/ML (ref 0–0.03)
TSH SERPL DL<=0.005 MIU/L-ACNC: 0.21 UIU/ML (ref 0.4–4)
TV REST PULMONARY ARTERY PRESSURE: 47 MMHG
URN SPEC COLLECT METH UR: ABNORMAL
WBC # BLD AUTO: 11.69 K/UL (ref 3.9–12.7)
WBC # BLD AUTO: 11.8 K/UL (ref 3.9–12.7)
WBC # BLD AUTO: 5.02 K/UL (ref 3.9–12.7)
WBC # BLD AUTO: 5.21 K/UL (ref 3.9–12.7)
WBC # BLD AUTO: 5.67 K/UL (ref 3.9–12.7)
WBC # BLD AUTO: 6.69 K/UL (ref 3.9–12.7)
WBC # BLD AUTO: 7 K/UL (ref 3.9–12.7)
WBC # BLD AUTO: 7.98 K/UL (ref 3.9–12.7)
WBC # BLD AUTO: 8.06 K/UL (ref 3.9–12.7)
WBC #/AREA URNS AUTO: 2 /HPF (ref 0–5)

## 2023-01-01 PROCEDURE — 83605 ASSAY OF LACTIC ACID: CPT

## 2023-01-01 PROCEDURE — 92526 ORAL FUNCTION THERAPY: CPT

## 2023-01-01 PROCEDURE — 82803 BLOOD GASES ANY COMBINATION: CPT

## 2023-01-01 PROCEDURE — 84100 ASSAY OF PHOSPHORUS: CPT | Mod: 91 | Performed by: STUDENT IN AN ORGANIZED HEALTH CARE EDUCATION/TRAINING PROGRAM

## 2023-01-01 PROCEDURE — 20000000 HC ICU ROOM

## 2023-01-01 PROCEDURE — 99900035 HC TECH TIME PER 15 MIN (STAT)

## 2023-01-01 PROCEDURE — 63600175 PHARM REV CODE 636 W HCPCS: Performed by: INTERNAL MEDICINE

## 2023-01-01 PROCEDURE — 25000003 PHARM REV CODE 250: Performed by: INTERNAL MEDICINE

## 2023-01-01 PROCEDURE — 93005 ELECTROCARDIOGRAM TRACING: CPT

## 2023-01-01 PROCEDURE — 63600175 PHARM REV CODE 636 W HCPCS: Performed by: STUDENT IN AN ORGANIZED HEALTH CARE EDUCATION/TRAINING PROGRAM

## 2023-01-01 PROCEDURE — 85610 PROTHROMBIN TIME: CPT | Performed by: EMERGENCY MEDICINE

## 2023-01-01 PROCEDURE — 25000003 PHARM REV CODE 250: Performed by: EMERGENCY MEDICINE

## 2023-01-01 PROCEDURE — 99214 OFFICE O/P EST MOD 30 MIN: CPT | Mod: S$GLB,,, | Performed by: INTERNAL MEDICINE

## 2023-01-01 PROCEDURE — 84100 ASSAY OF PHOSPHORUS: CPT | Performed by: STUDENT IN AN ORGANIZED HEALTH CARE EDUCATION/TRAINING PROGRAM

## 2023-01-01 PROCEDURE — 51702 INSERT TEMP BLADDER CATH: CPT

## 2023-01-01 PROCEDURE — 83605 ASSAY OF LACTIC ACID: CPT | Performed by: INTERNAL MEDICINE

## 2023-01-01 PROCEDURE — 83735 ASSAY OF MAGNESIUM: CPT | Performed by: STUDENT IN AN ORGANIZED HEALTH CARE EDUCATION/TRAINING PROGRAM

## 2023-01-01 PROCEDURE — 1126F PR PAIN SEVERITY QUANTIFIED, NO PAIN PRESENT: ICD-10-PCS | Mod: CPTII,S$GLB,, | Performed by: INTERNAL MEDICINE

## 2023-01-01 PROCEDURE — 99223 1ST HOSP IP/OBS HIGH 75: CPT | Mod: ,,, | Performed by: INTERNAL MEDICINE

## 2023-01-01 PROCEDURE — 63600367 HC NITRIC OXIDE PER HOUR

## 2023-01-01 PROCEDURE — 25000003 PHARM REV CODE 250: Performed by: STUDENT IN AN ORGANIZED HEALTH CARE EDUCATION/TRAINING PROGRAM

## 2023-01-01 PROCEDURE — 84484 ASSAY OF TROPONIN QUANT: CPT | Mod: 91 | Performed by: EMERGENCY MEDICINE

## 2023-01-01 PROCEDURE — 99291 PR CRITICAL CARE, E/M 30-74 MINUTES: ICD-10-PCS | Mod: ,,, | Performed by: EMERGENCY MEDICINE

## 2023-01-01 PROCEDURE — 36415 COLL VENOUS BLD VENIPUNCTURE: CPT | Performed by: INTERNAL MEDICINE

## 2023-01-01 PROCEDURE — 80053 COMPREHEN METABOLIC PANEL: CPT | Performed by: STUDENT IN AN ORGANIZED HEALTH CARE EDUCATION/TRAINING PROGRAM

## 2023-01-01 PROCEDURE — 99233 PR SUBSEQUENT HOSPITAL CARE,LEVL III: ICD-10-PCS | Mod: ,,, | Performed by: INTERNAL MEDICINE

## 2023-01-01 PROCEDURE — 99233 PR SUBSEQUENT HOSPITAL CARE,LEVL III: ICD-10-PCS | Mod: ,,, | Performed by: EMERGENCY MEDICINE

## 2023-01-01 PROCEDURE — 87040 BLOOD CULTURE FOR BACTERIA: CPT | Performed by: STUDENT IN AN ORGANIZED HEALTH CARE EDUCATION/TRAINING PROGRAM

## 2023-01-01 PROCEDURE — 99233 SBSQ HOSP IP/OBS HIGH 50: CPT | Mod: ,,, | Performed by: INTERNAL MEDICINE

## 2023-01-01 PROCEDURE — 96367 TX/PROPH/DG ADDL SEQ IV INF: CPT

## 2023-01-01 PROCEDURE — 27000221 HC OXYGEN, UP TO 24 HOURS

## 2023-01-01 PROCEDURE — 97535 SELF CARE MNGMENT TRAINING: CPT

## 2023-01-01 PROCEDURE — 85025 COMPLETE CBC W/AUTO DIFF WBC: CPT | Performed by: STUDENT IN AN ORGANIZED HEALTH CARE EDUCATION/TRAINING PROGRAM

## 2023-01-01 PROCEDURE — 84295 ASSAY OF SERUM SODIUM: CPT

## 2023-01-01 PROCEDURE — 83735 ASSAY OF MAGNESIUM: CPT | Mod: 91 | Performed by: INTERNAL MEDICINE

## 2023-01-01 PROCEDURE — 82330 ASSAY OF CALCIUM: CPT

## 2023-01-01 PROCEDURE — 83735 ASSAY OF MAGNESIUM: CPT | Performed by: EMERGENCY MEDICINE

## 2023-01-01 PROCEDURE — 63600175 PHARM REV CODE 636 W HCPCS

## 2023-01-01 PROCEDURE — 97530 THERAPEUTIC ACTIVITIES: CPT

## 2023-01-01 PROCEDURE — 85730 THROMBOPLASTIN TIME PARTIAL: CPT | Mod: 91 | Performed by: INTERNAL MEDICINE

## 2023-01-01 PROCEDURE — 85025 COMPLETE CBC W/AUTO DIFF WBC: CPT | Mod: 91 | Performed by: EMERGENCY MEDICINE

## 2023-01-01 PROCEDURE — 85730 THROMBOPLASTIN TIME PARTIAL: CPT | Performed by: STUDENT IN AN ORGANIZED HEALTH CARE EDUCATION/TRAINING PROGRAM

## 2023-01-01 PROCEDURE — 85025 COMPLETE CBC W/AUTO DIFF WBC: CPT | Performed by: INTERNAL MEDICINE

## 2023-01-01 PROCEDURE — 85730 THROMBOPLASTIN TIME PARTIAL: CPT | Performed by: EMERGENCY MEDICINE

## 2023-01-01 PROCEDURE — 84132 ASSAY OF SERUM POTASSIUM: CPT

## 2023-01-01 PROCEDURE — 99291 PR CRITICAL CARE, E/M 30-74 MINUTES: ICD-10-PCS | Mod: ,,, | Performed by: INTERNAL MEDICINE

## 2023-01-01 PROCEDURE — 99214 PR OFFICE/OUTPT VISIT, EST, LEVL IV, 30-39 MIN: ICD-10-PCS | Mod: S$GLB,,, | Performed by: INTERNAL MEDICINE

## 2023-01-01 PROCEDURE — 25000003 PHARM REV CODE 250

## 2023-01-01 PROCEDURE — 1101F PT FALLS ASSESS-DOCD LE1/YR: CPT | Mod: CPTII,S$GLB,, | Performed by: INTERNAL MEDICINE

## 2023-01-01 PROCEDURE — 80053 COMPREHEN METABOLIC PANEL: CPT | Mod: 91 | Performed by: STUDENT IN AN ORGANIZED HEALTH CARE EDUCATION/TRAINING PROGRAM

## 2023-01-01 PROCEDURE — 83880 ASSAY OF NATRIURETIC PEPTIDE: CPT | Performed by: EMERGENCY MEDICINE

## 2023-01-01 PROCEDURE — 96361 HYDRATE IV INFUSION ADD-ON: CPT

## 2023-01-01 PROCEDURE — 25500020 PHARM REV CODE 255: Performed by: INTERNAL MEDICINE

## 2023-01-01 PROCEDURE — 94761 N-INVAS EAR/PLS OXIMETRY MLT: CPT

## 2023-01-01 PROCEDURE — 83605 ASSAY OF LACTIC ACID: CPT | Performed by: EMERGENCY MEDICINE

## 2023-01-01 PROCEDURE — 85610 PROTHROMBIN TIME: CPT | Performed by: STUDENT IN AN ORGANIZED HEALTH CARE EDUCATION/TRAINING PROGRAM

## 2023-01-01 PROCEDURE — 1159F PR MEDICATION LIST DOCUMENTED IN MEDICAL RECORD: ICD-10-PCS | Mod: CPTII,S$GLB,, | Performed by: INTERNAL MEDICINE

## 2023-01-01 PROCEDURE — 87798 DETECT AGENT NOS DNA AMP: CPT | Performed by: STUDENT IN AN ORGANIZED HEALTH CARE EDUCATION/TRAINING PROGRAM

## 2023-01-01 PROCEDURE — 3288F PR FALLS RISK ASSESSMENT DOCUMENTED: ICD-10-PCS | Mod: CPTII,S$GLB,, | Performed by: INTERNAL MEDICINE

## 2023-01-01 PROCEDURE — 80048 BASIC METABOLIC PNL TOTAL CA: CPT | Mod: 91,XB | Performed by: STUDENT IN AN ORGANIZED HEALTH CARE EDUCATION/TRAINING PROGRAM

## 2023-01-01 PROCEDURE — 99223 1ST HOSP IP/OBS HIGH 75: CPT | Mod: ,,, | Performed by: NURSE PRACTITIONER

## 2023-01-01 PROCEDURE — 1159F MED LIST DOCD IN RCRD: CPT | Mod: CPTII,S$GLB,, | Performed by: INTERNAL MEDICINE

## 2023-01-01 PROCEDURE — 85025 COMPLETE CBC W/AUTO DIFF WBC: CPT | Performed by: EMERGENCY MEDICINE

## 2023-01-01 PROCEDURE — 84484 ASSAY OF TROPONIN QUANT: CPT | Performed by: STUDENT IN AN ORGANIZED HEALTH CARE EDUCATION/TRAINING PROGRAM

## 2023-01-01 PROCEDURE — 93010 EKG 12-LEAD: ICD-10-PCS | Mod: 76,,, | Performed by: INTERNAL MEDICINE

## 2023-01-01 PROCEDURE — 97161 PT EVAL LOW COMPLEX 20 MIN: CPT

## 2023-01-01 PROCEDURE — 93010 ELECTROCARDIOGRAM REPORT: CPT | Mod: ,,, | Performed by: INTERNAL MEDICINE

## 2023-01-01 PROCEDURE — 87389 HIV-1 AG W/HIV-1&-2 AB AG IA: CPT | Performed by: PHYSICIAN ASSISTANT

## 2023-01-01 PROCEDURE — 3079F DIAST BP 80-89 MM HG: CPT | Mod: CPTII,S$GLB,, | Performed by: INTERNAL MEDICINE

## 2023-01-01 PROCEDURE — 83050 HGB METHEMOGLOBIN QUAN: CPT

## 2023-01-01 PROCEDURE — 82800 BLOOD PH: CPT

## 2023-01-01 PROCEDURE — 84439 ASSAY OF FREE THYROXINE: CPT | Performed by: EMERGENCY MEDICINE

## 2023-01-01 PROCEDURE — 27100171 HC OXYGEN HIGH FLOW UP TO 24 HOURS

## 2023-01-01 PROCEDURE — 63600175 PHARM REV CODE 636 W HCPCS: Performed by: EMERGENCY MEDICINE

## 2023-01-01 PROCEDURE — 84484 ASSAY OF TROPONIN QUANT: CPT | Performed by: EMERGENCY MEDICINE

## 2023-01-01 PROCEDURE — 80048 BASIC METABOLIC PNL TOTAL CA: CPT | Mod: XB | Performed by: STUDENT IN AN ORGANIZED HEALTH CARE EDUCATION/TRAINING PROGRAM

## 2023-01-01 PROCEDURE — 84100 ASSAY OF PHOSPHORUS: CPT | Performed by: EMERGENCY MEDICINE

## 2023-01-01 PROCEDURE — 99900031 HC PATIENT EDUCATION (STAT)

## 2023-01-01 PROCEDURE — 99223 PR INITIAL HOSPITAL CARE,LEVL III: ICD-10-PCS | Mod: ,,, | Performed by: NURSE PRACTITIONER

## 2023-01-01 PROCEDURE — 99233 SBSQ HOSP IP/OBS HIGH 50: CPT | Mod: ,,, | Performed by: EMERGENCY MEDICINE

## 2023-01-01 PROCEDURE — 84100 ASSAY OF PHOSPHORUS: CPT | Mod: 91 | Performed by: INTERNAL MEDICINE

## 2023-01-01 PROCEDURE — 83735 ASSAY OF MAGNESIUM: CPT | Mod: 91 | Performed by: STUDENT IN AN ORGANIZED HEALTH CARE EDUCATION/TRAINING PROGRAM

## 2023-01-01 PROCEDURE — 83605 ASSAY OF LACTIC ACID: CPT | Performed by: STUDENT IN AN ORGANIZED HEALTH CARE EDUCATION/TRAINING PROGRAM

## 2023-01-01 PROCEDURE — 96365 THER/PROPH/DIAG IV INF INIT: CPT

## 2023-01-01 PROCEDURE — 3079F PR MOST RECENT DIASTOLIC BLOOD PRESSURE 80-89 MM HG: ICD-10-PCS | Mod: CPTII,S$GLB,, | Performed by: INTERNAL MEDICINE

## 2023-01-01 PROCEDURE — 82330 ASSAY OF CALCIUM: CPT | Performed by: STUDENT IN AN ORGANIZED HEALTH CARE EDUCATION/TRAINING PROGRAM

## 2023-01-01 PROCEDURE — 84443 ASSAY THYROID STIM HORMONE: CPT | Performed by: EMERGENCY MEDICINE

## 2023-01-01 PROCEDURE — 96368 THER/DIAG CONCURRENT INF: CPT

## 2023-01-01 PROCEDURE — 81001 URINALYSIS AUTO W/SCOPE: CPT | Performed by: STUDENT IN AN ORGANIZED HEALTH CARE EDUCATION/TRAINING PROGRAM

## 2023-01-01 PROCEDURE — 82565 ASSAY OF CREATININE: CPT

## 2023-01-01 PROCEDURE — 99999 PR PBB SHADOW E&M-EST. PATIENT-LVL IV: ICD-10-PCS | Mod: PBBFAC,,, | Performed by: INTERNAL MEDICINE

## 2023-01-01 PROCEDURE — S0166 INJ OLANZAPINE 2.5MG: HCPCS | Performed by: STUDENT IN AN ORGANIZED HEALTH CARE EDUCATION/TRAINING PROGRAM

## 2023-01-01 PROCEDURE — 85347 COAGULATION TIME ACTIVATED: CPT

## 2023-01-01 PROCEDURE — 1126F AMNT PAIN NOTED NONE PRSNT: CPT | Mod: CPTII,S$GLB,, | Performed by: INTERNAL MEDICINE

## 2023-01-01 PROCEDURE — 80061 LIPID PANEL: CPT | Performed by: INTERNAL MEDICINE

## 2023-01-01 PROCEDURE — 86803 HEPATITIS C AB TEST: CPT | Performed by: PHYSICIAN ASSISTANT

## 2023-01-01 PROCEDURE — 93010 ELECTROCARDIOGRAM REPORT: CPT | Mod: 76,,, | Performed by: INTERNAL MEDICINE

## 2023-01-01 PROCEDURE — 1160F RVW MEDS BY RX/DR IN RCRD: CPT | Mod: CPTII,S$GLB,, | Performed by: INTERNAL MEDICINE

## 2023-01-01 PROCEDURE — 93010 EKG 12-LEAD: ICD-10-PCS | Mod: ,,, | Performed by: INTERNAL MEDICINE

## 2023-01-01 PROCEDURE — 80053 COMPREHEN METABOLIC PANEL: CPT | Performed by: EMERGENCY MEDICINE

## 2023-01-01 PROCEDURE — 99232 SBSQ HOSP IP/OBS MODERATE 35: CPT | Mod: ,,, | Performed by: INTERNAL MEDICINE

## 2023-01-01 PROCEDURE — 85730 THROMBOPLASTIN TIME PARTIAL: CPT | Mod: 91 | Performed by: STUDENT IN AN ORGANIZED HEALTH CARE EDUCATION/TRAINING PROGRAM

## 2023-01-01 PROCEDURE — 83036 HEMOGLOBIN GLYCOSYLATED A1C: CPT | Performed by: INTERNAL MEDICINE

## 2023-01-01 PROCEDURE — 80053 COMPREHEN METABOLIC PANEL: CPT | Mod: 91 | Performed by: INTERNAL MEDICINE

## 2023-01-01 PROCEDURE — 96375 TX/PRO/DX INJ NEW DRUG ADDON: CPT

## 2023-01-01 PROCEDURE — 36556 INSERT NON-TUNNEL CV CATH: CPT

## 2023-01-01 PROCEDURE — 83605 ASSAY OF LACTIC ACID: CPT | Mod: 91 | Performed by: INTERNAL MEDICINE

## 2023-01-01 PROCEDURE — 99291 CRITICAL CARE FIRST HOUR: CPT | Mod: ,,, | Performed by: INTERNAL MEDICINE

## 2023-01-01 PROCEDURE — 99291 CRITICAL CARE FIRST HOUR: CPT

## 2023-01-01 PROCEDURE — 99999 PR PBB SHADOW E&M-EST. PATIENT-LVL IV: CPT | Mod: PBBFAC,,, | Performed by: INTERNAL MEDICINE

## 2023-01-01 PROCEDURE — 1160F PR REVIEW ALL MEDS BY PRESCRIBER/CLIN PHARMACIST DOCUMENTED: ICD-10-PCS | Mod: CPTII,S$GLB,, | Performed by: INTERNAL MEDICINE

## 2023-01-01 PROCEDURE — 80053 COMPREHEN METABOLIC PANEL: CPT | Performed by: INTERNAL MEDICINE

## 2023-01-01 PROCEDURE — 25000242 PHARM REV CODE 250 ALT 637 W/ HCPCS: Performed by: EMERGENCY MEDICINE

## 2023-01-01 PROCEDURE — 99223 PR INITIAL HOSPITAL CARE,LEVL III: ICD-10-PCS | Mod: ,,, | Performed by: INTERNAL MEDICINE

## 2023-01-01 PROCEDURE — 99291 CRITICAL CARE FIRST HOUR: CPT | Mod: ,,, | Performed by: EMERGENCY MEDICINE

## 2023-01-01 PROCEDURE — 3288F FALL RISK ASSESSMENT DOCD: CPT | Mod: CPTII,S$GLB,, | Performed by: INTERNAL MEDICINE

## 2023-01-01 PROCEDURE — 3075F SYST BP GE 130 - 139MM HG: CPT | Mod: CPTII,S$GLB,, | Performed by: INTERNAL MEDICINE

## 2023-01-01 PROCEDURE — 97165 OT EVAL LOW COMPLEX 30 MIN: CPT

## 2023-01-01 PROCEDURE — 85025 COMPLETE CBC W/AUTO DIFF WBC: CPT | Mod: 91 | Performed by: STUDENT IN AN ORGANIZED HEALTH CARE EDUCATION/TRAINING PROGRAM

## 2023-01-01 PROCEDURE — 1101F PR PT FALLS ASSESS DOC 0-1 FALLS W/OUT INJ PAST YR: ICD-10-PCS | Mod: CPTII,S$GLB,, | Performed by: INTERNAL MEDICINE

## 2023-01-01 PROCEDURE — 3075F PR MOST RECENT SYSTOLIC BLOOD PRESS GE 130-139MM HG: ICD-10-PCS | Mod: CPTII,S$GLB,, | Performed by: INTERNAL MEDICINE

## 2023-01-01 PROCEDURE — 99232 PR SUBSEQUENT HOSPITAL CARE,LEVL II: ICD-10-PCS | Mod: ,,, | Performed by: INTERNAL MEDICINE

## 2023-01-01 RX ORDER — ALUMINUM HYDROXIDE, MAGNESIUM HYDROXIDE, AND SIMETHICONE 2400; 240; 2400 MG/30ML; MG/30ML; MG/30ML
30 SUSPENSION ORAL EVERY 6 HOURS PRN
Status: DISCONTINUED | OUTPATIENT
Start: 2023-01-01 | End: 2023-01-01 | Stop reason: HOSPADM

## 2023-01-01 RX ORDER — OLANZAPINE 10 MG/2ML
2.5 INJECTION, POWDER, FOR SOLUTION INTRAMUSCULAR ONCE
Status: COMPLETED | OUTPATIENT
Start: 2023-01-01 | End: 2023-01-01

## 2023-01-01 RX ORDER — SCOLOPAMINE TRANSDERMAL SYSTEM 1 MG/1
1 PATCH, EXTENDED RELEASE TRANSDERMAL
Status: DISCONTINUED | OUTPATIENT
Start: 2023-01-01 | End: 2023-01-01 | Stop reason: HOSPADM

## 2023-01-01 RX ORDER — POTASSIUM CHLORIDE 29.8 MG/ML
40 INJECTION INTRAVENOUS EVERY 4 HOURS
Status: COMPLETED | OUTPATIENT
Start: 2023-01-01 | End: 2023-01-01

## 2023-01-01 RX ORDER — METOPROLOL TARTRATE 1 MG/ML
5 INJECTION, SOLUTION INTRAVENOUS
Status: COMPLETED | OUTPATIENT
Start: 2023-01-01 | End: 2023-01-01

## 2023-01-01 RX ORDER — SODIUM CHLORIDE 9 MG/ML
INJECTION, SOLUTION INTRAVENOUS CONTINUOUS
Status: ACTIVE | OUTPATIENT
Start: 2023-01-01 | End: 2023-01-01

## 2023-01-01 RX ORDER — MAGNESIUM SULFATE HEPTAHYDRATE 40 MG/ML
2 INJECTION, SOLUTION INTRAVENOUS ONCE
Status: COMPLETED | OUTPATIENT
Start: 2023-01-01 | End: 2023-01-01

## 2023-01-01 RX ORDER — HYDRALAZINE HYDROCHLORIDE 50 MG/1
50 TABLET, FILM COATED ORAL 3 TIMES DAILY
Status: DISCONTINUED | OUTPATIENT
Start: 2023-01-01 | End: 2023-01-01

## 2023-01-01 RX ORDER — DOPAMINE HCL IN DEXTROSE 5 % 400MG/.25L
2 INFUSION BOTTLE (ML) INTRAVENOUS CONTINUOUS
Status: DISCONTINUED | OUTPATIENT
Start: 2023-01-01 | End: 2023-01-01

## 2023-01-01 RX ORDER — METOPROLOL SUCCINATE 100 MG/1
100 TABLET, EXTENDED RELEASE ORAL DAILY
Status: DISCONTINUED | OUTPATIENT
Start: 2023-01-01 | End: 2023-01-01

## 2023-01-01 RX ORDER — DOBUTAMINE HYDROCHLORIDE 400 MG/100ML
5 INJECTION INTRAVENOUS CONTINUOUS
Status: DISCONTINUED | OUTPATIENT
Start: 2023-01-01 | End: 2023-01-01 | Stop reason: HOSPADM

## 2023-01-01 RX ORDER — GLYCOPYRROLATE 0.2 MG/ML
0.1 INJECTION INTRAMUSCULAR; INTRAVENOUS ONCE
Status: COMPLETED | OUTPATIENT
Start: 2023-01-01 | End: 2023-01-01

## 2023-01-01 RX ORDER — PROCHLORPERAZINE EDISYLATE 5 MG/ML
5 INJECTION INTRAMUSCULAR; INTRAVENOUS EVERY 6 HOURS PRN
Status: DISCONTINUED | OUTPATIENT
Start: 2023-01-01 | End: 2023-01-01 | Stop reason: HOSPADM

## 2023-01-01 RX ORDER — ASPIRIN 325 MG
325 TABLET ORAL
Status: COMPLETED | OUTPATIENT
Start: 2023-01-01 | End: 2023-01-01

## 2023-01-01 RX ORDER — AMIODARONE HYDROCHLORIDE 200 MG/1
400 TABLET ORAL 2 TIMES DAILY
Status: DISCONTINUED | OUTPATIENT
Start: 2023-01-01 | End: 2023-01-01

## 2023-01-01 RX ORDER — METOPROLOL SUCCINATE 100 MG/1
100 TABLET, EXTENDED RELEASE ORAL DAILY
Qty: 90 TABLET | Refills: 3 | Status: ON HOLD | OUTPATIENT
Start: 2023-01-01 | End: 2023-01-01

## 2023-01-01 RX ORDER — HALOPERIDOL 5 MG/ML
INJECTION INTRAMUSCULAR
Status: COMPLETED
Start: 2023-01-01 | End: 2023-01-01

## 2023-01-01 RX ORDER — SODIUM CHLORIDE, SODIUM LACTATE, POTASSIUM CHLORIDE, CALCIUM CHLORIDE 600; 310; 30; 20 MG/100ML; MG/100ML; MG/100ML; MG/100ML
INJECTION, SOLUTION INTRAVENOUS CONTINUOUS
Status: ACTIVE | OUTPATIENT
Start: 2023-01-01 | End: 2023-01-01

## 2023-01-01 RX ORDER — SODIUM CHLORIDE 9 MG/ML
INJECTION, SOLUTION INTRAVENOUS
Status: DISCONTINUED | OUTPATIENT
Start: 2023-01-01 | End: 2023-01-01 | Stop reason: HOSPADM

## 2023-01-01 RX ORDER — NITROGLYCERIN 0.4 MG/1
0.4 TABLET SUBLINGUAL
Status: COMPLETED | OUTPATIENT
Start: 2023-01-01 | End: 2023-01-01

## 2023-01-01 RX ORDER — ATORVASTATIN CALCIUM 20 MG/1
80 TABLET, FILM COATED ORAL DAILY
Status: DISCONTINUED | OUTPATIENT
Start: 2023-01-01 | End: 2023-01-01 | Stop reason: HOSPADM

## 2023-01-01 RX ORDER — ISOSORBIDE DINITRATE 10 MG/1
10 TABLET ORAL 3 TIMES DAILY
Status: DISCONTINUED | OUTPATIENT
Start: 2023-01-01 | End: 2023-01-01

## 2023-01-01 RX ORDER — NOREPINEPHRINE BITARTRATE/D5W 4MG/250ML
PLASTIC BAG, INJECTION (ML) INTRAVENOUS
Status: COMPLETED
Start: 2023-01-01 | End: 2023-01-01

## 2023-01-01 RX ORDER — DEXMEDETOMIDINE HYDROCHLORIDE 4 UG/ML
0-1.4 INJECTION, SOLUTION INTRAVENOUS CONTINUOUS
Status: DISCONTINUED | OUTPATIENT
Start: 2023-01-01 | End: 2023-01-01

## 2023-01-01 RX ORDER — HYDRALAZINE HYDROCHLORIDE 10 MG/1
10 TABLET, FILM COATED ORAL 3 TIMES DAILY
Status: DISCONTINUED | OUTPATIENT
Start: 2023-01-01 | End: 2023-01-01

## 2023-01-01 RX ORDER — DOBUTAMINE HYDROCHLORIDE 400 MG/100ML
5 INJECTION INTRAVENOUS CONTINUOUS
Status: DISCONTINUED | OUTPATIENT
Start: 2023-01-01 | End: 2023-01-01

## 2023-01-01 RX ORDER — FUROSEMIDE 10 MG/ML
80 INJECTION INTRAMUSCULAR; INTRAVENOUS
Status: DISCONTINUED | OUTPATIENT
Start: 2023-01-01 | End: 2023-01-01

## 2023-01-01 RX ORDER — SACUBITRIL AND VALSARTAN 24; 26 MG/1; MG/1
1 TABLET, FILM COATED ORAL 2 TIMES DAILY
Qty: 60 TABLET | Refills: 3 | OUTPATIENT
Start: 2023-01-01 | End: 2023-01-01

## 2023-01-01 RX ORDER — HALOPERIDOL 5 MG/ML
5 INJECTION INTRAMUSCULAR ONCE
Status: COMPLETED | OUTPATIENT
Start: 2023-01-01 | End: 2023-01-01

## 2023-01-01 RX ORDER — CALCIUM CARBONATE 200(500)MG
500 TABLET,CHEWABLE ORAL 2 TIMES DAILY PRN
Status: DISCONTINUED | OUTPATIENT
Start: 2023-01-01 | End: 2023-01-01 | Stop reason: HOSPADM

## 2023-01-01 RX ORDER — NITROGLYCERIN 20 MG/100ML
0-400 INJECTION INTRAVENOUS CONTINUOUS
Status: DISCONTINUED | OUTPATIENT
Start: 2023-01-01 | End: 2023-01-01

## 2023-01-01 RX ORDER — POTASSIUM CHLORIDE 29.8 MG/ML
40 INJECTION INTRAVENOUS EVERY 4 HOURS
Status: DISCONTINUED | OUTPATIENT
Start: 2023-01-01 | End: 2023-01-01

## 2023-01-01 RX ORDER — NAPROXEN SODIUM 220 MG/1
81 TABLET, FILM COATED ORAL DAILY
Status: DISCONTINUED | OUTPATIENT
Start: 2023-01-01 | End: 2023-01-01 | Stop reason: HOSPADM

## 2023-01-01 RX ORDER — OLANZAPINE 10 MG/2ML
2.5 INJECTION, POWDER, FOR SOLUTION INTRAMUSCULAR ONCE AS NEEDED
Status: DISCONTINUED | OUTPATIENT
Start: 2023-01-01 | End: 2023-01-01

## 2023-01-01 RX ORDER — DOBUTAMINE HYDROCHLORIDE 400 MG/100ML
2.5 INJECTION INTRAVENOUS CONTINUOUS
Status: DISCONTINUED | OUTPATIENT
Start: 2023-01-01 | End: 2023-01-01

## 2023-01-01 RX ORDER — POTASSIUM CHLORIDE 29.8 MG/ML
40 INJECTION INTRAVENOUS ONCE
Status: COMPLETED | OUTPATIENT
Start: 2023-01-01 | End: 2023-01-01

## 2023-01-01 RX ORDER — SODIUM CHLORIDE 0.9 % (FLUSH) 0.9 %
10 SYRINGE (ML) INJECTION
Status: DISCONTINUED | OUTPATIENT
Start: 2023-01-01 | End: 2023-01-01 | Stop reason: HOSPADM

## 2023-01-01 RX ORDER — HALOPERIDOL 5 MG/ML
INJECTION INTRAMUSCULAR
Status: DISPENSED
Start: 2023-01-01 | End: 2023-01-01

## 2023-01-01 RX ORDER — LORAZEPAM 2 MG/ML
0.2 INJECTION INTRAMUSCULAR ONCE
Status: COMPLETED | OUTPATIENT
Start: 2023-01-01 | End: 2023-01-01

## 2023-01-01 RX ORDER — HEPARIN SODIUM,PORCINE/D5W 25000/250
0-40 INTRAVENOUS SOLUTION INTRAVENOUS CONTINUOUS
Status: DISCONTINUED | OUTPATIENT
Start: 2023-01-01 | End: 2023-01-01

## 2023-01-01 RX ORDER — HYDRALAZINE HYDROCHLORIDE 20 MG/ML
10 INJECTION INTRAMUSCULAR; INTRAVENOUS ONCE
Status: DISCONTINUED | OUTPATIENT
Start: 2023-01-01 | End: 2023-01-01

## 2023-01-01 RX ORDER — ATORVASTATIN CALCIUM 80 MG/1
80 TABLET, FILM COATED ORAL DAILY
Qty: 90 TABLET | Refills: 3 | Status: ON HOLD | OUTPATIENT
Start: 2023-01-01 | End: 2023-01-01

## 2023-01-01 RX ORDER — HYDRALAZINE HYDROCHLORIDE 50 MG/1
50 TABLET, FILM COATED ORAL ONCE
Status: COMPLETED | OUTPATIENT
Start: 2023-01-01 | End: 2023-01-01

## 2023-01-01 RX ORDER — NOREPINEPHRINE BITARTRATE/D5W 4MG/250ML
0-3 PLASTIC BAG, INJECTION (ML) INTRAVENOUS CONTINUOUS
Status: DISCONTINUED | OUTPATIENT
Start: 2023-01-01 | End: 2023-01-01 | Stop reason: HOSPADM

## 2023-01-01 RX ORDER — HYDRALAZINE HYDROCHLORIDE 20 MG/ML
10 INJECTION INTRAMUSCULAR; INTRAVENOUS ONCE
Status: COMPLETED | OUTPATIENT
Start: 2023-01-01 | End: 2023-01-01

## 2023-01-01 RX ORDER — ONDANSETRON 2 MG/ML
4 INJECTION INTRAMUSCULAR; INTRAVENOUS ONCE
Status: COMPLETED | OUTPATIENT
Start: 2023-01-01 | End: 2023-01-01

## 2023-01-01 RX ORDER — MORPHINE SULFATE 2 MG/ML
1 INJECTION, SOLUTION INTRAMUSCULAR; INTRAVENOUS EVERY 4 HOURS PRN
Status: DISCONTINUED | OUTPATIENT
Start: 2023-01-01 | End: 2023-01-01 | Stop reason: HOSPADM

## 2023-01-01 RX ORDER — POTASSIUM CHLORIDE 29.8 MG/ML
40 INJECTION INTRAVENOUS ONCE
Status: DISCONTINUED | OUTPATIENT
Start: 2023-01-01 | End: 2023-01-01

## 2023-01-01 RX ORDER — ISOSORBIDE DINITRATE 20 MG/1
20 TABLET ORAL 3 TIMES DAILY
Status: DISCONTINUED | OUTPATIENT
Start: 2023-01-01 | End: 2023-01-01

## 2023-01-01 RX ORDER — HYDRALAZINE HYDROCHLORIDE 25 MG/1
25 TABLET, FILM COATED ORAL 3 TIMES DAILY
Status: DISCONTINUED | OUTPATIENT
Start: 2023-01-01 | End: 2023-01-01

## 2023-01-01 RX ORDER — AMIODARONE HYDROCHLORIDE 200 MG/1
200 TABLET ORAL DAILY
Status: DISCONTINUED | OUTPATIENT
Start: 2023-06-05 | End: 2023-01-01

## 2023-01-01 RX ORDER — LORAZEPAM 2 MG/ML
0.5 INJECTION INTRAMUSCULAR ONCE
Status: COMPLETED | OUTPATIENT
Start: 2023-01-01 | End: 2023-01-01

## 2023-01-01 RX ORDER — DOPAMINE HYDROCHLORIDE 160 MG/100ML
0-20 INJECTION, SOLUTION INTRAVENOUS CONTINUOUS
Status: DISCONTINUED | OUTPATIENT
Start: 2023-01-01 | End: 2023-01-01 | Stop reason: HOSPADM

## 2023-01-01 RX ORDER — EPINEPHRINE 0.1 MG/ML
INJECTION INTRAVENOUS CODE/TRAUMA/SEDATION MEDICATION
Status: COMPLETED | OUTPATIENT
Start: 2023-01-01 | End: 2023-01-01

## 2023-01-01 RX ORDER — FUROSEMIDE 10 MG/ML
80 INJECTION INTRAMUSCULAR; INTRAVENOUS ONCE
Status: COMPLETED | OUTPATIENT
Start: 2023-01-01 | End: 2023-01-01

## 2023-01-01 RX ORDER — MUPIROCIN 20 MG/G
OINTMENT TOPICAL 2 TIMES DAILY
Status: DISPENSED | OUTPATIENT
Start: 2023-01-01 | End: 2023-01-01

## 2023-01-01 RX ORDER — SODIUM CHLORIDE 450 MG/100ML
INJECTION, SOLUTION INTRAVENOUS CONTINUOUS
Status: ACTIVE | OUTPATIENT
Start: 2023-01-01 | End: 2023-01-01

## 2023-01-01 RX ORDER — CLOPIDOGREL BISULFATE 75 MG/1
75 TABLET ORAL DAILY
Status: DISCONTINUED | OUTPATIENT
Start: 2023-01-01 | End: 2023-01-01 | Stop reason: HOSPADM

## 2023-01-01 RX ORDER — CLOPIDOGREL BISULFATE 75 MG/1
75 TABLET ORAL DAILY
Qty: 90 TABLET | Refills: 3 | Status: ON HOLD | OUTPATIENT
Start: 2023-01-01 | End: 2023-01-01

## 2023-01-01 RX ORDER — GLYCOPYRROLATE 0.2 MG/ML
0.1 INJECTION INTRAMUSCULAR; INTRAVENOUS ONCE
Status: DISCONTINUED | OUTPATIENT
Start: 2023-01-01 | End: 2023-01-01 | Stop reason: HOSPADM

## 2023-01-01 RX ORDER — LORAZEPAM 2 MG/ML
2 INJECTION INTRAMUSCULAR ONCE
Status: DISCONTINUED | OUTPATIENT
Start: 2023-01-01 | End: 2023-01-01

## 2023-01-01 RX ORDER — NITROGLYCERIN 20 MG/100ML
5 INJECTION INTRAVENOUS CONTINUOUS
Status: DISCONTINUED | OUTPATIENT
Start: 2023-01-01 | End: 2023-01-01

## 2023-01-01 RX ORDER — DEXMEDETOMIDINE HYDROCHLORIDE 4 UG/ML
INJECTION, SOLUTION INTRAVENOUS
Status: DISPENSED
Start: 2023-01-01 | End: 2023-01-01

## 2023-01-01 RX ORDER — FUROSEMIDE 10 MG/ML
80 INJECTION INTRAMUSCULAR; INTRAVENOUS EVERY 8 HOURS
Status: DISCONTINUED | OUTPATIENT
Start: 2023-01-01 | End: 2023-01-01

## 2023-01-01 RX ORDER — WARFARIN 4 MG/1
4 TABLET ORAL DAILY
Qty: 30 TABLET | Refills: 2 | OUTPATIENT
Start: 2023-01-01 | End: 2024-01-10

## 2023-01-01 RX ADMIN — NITROGLYCERIN 0.4 MG: 0.4 TABLET, ORALLY DISINTEGRATING SUBLINGUAL at 07:05

## 2023-01-01 RX ADMIN — POTASSIUM CHLORIDE 40 MEQ: 29.8 INJECTION, SOLUTION INTRAVENOUS at 08:05

## 2023-01-01 RX ADMIN — EPINEPHRINE 1 MCG/KG/MIN: 1 INJECTION INTRAMUSCULAR; INTRAVENOUS; SUBCUTANEOUS at 10:05

## 2023-01-01 RX ADMIN — HYDRALAZINE HYDROCHLORIDE 50 MG: 50 TABLET ORAL at 08:05

## 2023-01-01 RX ADMIN — HYDRALAZINE HYDROCHLORIDE 10 MG: 10 TABLET, FILM COATED ORAL at 05:05

## 2023-01-01 RX ADMIN — ASPIRIN 81 MG 81 MG: 81 TABLET ORAL at 11:05

## 2023-01-01 RX ADMIN — ASPIRIN 81 MG 81 MG: 81 TABLET ORAL at 09:05

## 2023-01-01 RX ADMIN — APIXABAN 5 MG: 5 TABLET, FILM COATED ORAL at 09:05

## 2023-01-01 RX ADMIN — VANCOMYCIN HYDROCHLORIDE 1000 MG: 1 INJECTION, POWDER, LYOPHILIZED, FOR SOLUTION INTRAVENOUS at 08:05

## 2023-01-01 RX ADMIN — TICAGRELOR 180 MG: 90 TABLET ORAL at 07:05

## 2023-01-01 RX ADMIN — HYDRALAZINE HYDROCHLORIDE 25 MG: 25 TABLET, FILM COATED ORAL at 04:05

## 2023-01-01 RX ADMIN — NOREPINEPHRINE BITARTRATE 0.02 MCG/KG/MIN: 4 INJECTION, SOLUTION INTRAVENOUS at 05:05

## 2023-01-01 RX ADMIN — AMIODARONE HYDROCHLORIDE 400 MG: 200 TABLET ORAL at 08:05

## 2023-01-01 RX ADMIN — MUPIROCIN: 20 OINTMENT TOPICAL at 09:05

## 2023-01-01 RX ADMIN — NOREPINEPHRINE BITARTRATE 1.08 MCG/KG/MIN: 4 INJECTION, SOLUTION INTRAVENOUS at 11:05

## 2023-01-01 RX ADMIN — AMIODARONE HYDROCHLORIDE 150 MG: 1.5 INJECTION, SOLUTION INTRAVENOUS at 08:05

## 2023-01-01 RX ADMIN — AMIODARONE HYDROCHLORIDE 1 MG/MIN: 1.8 INJECTION, SOLUTION INTRAVENOUS at 08:05

## 2023-01-01 RX ADMIN — SODIUM CHLORIDE: 9 INJECTION, SOLUTION INTRAVENOUS at 01:05

## 2023-01-01 RX ADMIN — EPINEPHRINE 0.02 MCG/KG/MIN: 1 INJECTION INTRAMUSCULAR; INTRAVENOUS; SUBCUTANEOUS at 05:05

## 2023-01-01 RX ADMIN — AMIODARONE HYDROCHLORIDE 0.5 MG/MIN: 1.8 INJECTION, SOLUTION INTRAVENOUS at 04:05

## 2023-01-01 RX ADMIN — DOPAMINE HYDROCHLORIDE 12.5 MCG/KG/MIN: 160 INJECTION, SOLUTION INTRAVENOUS at 02:05

## 2023-01-01 RX ADMIN — FUROSEMIDE 80 MG: 10 INJECTION, SOLUTION INTRAMUSCULAR; INTRAVENOUS at 02:05

## 2023-01-01 RX ADMIN — EPINEPHRINE 1 MG: 0.1 INJECTION, SOLUTION INTRAVENOUS at 04:05

## 2023-01-01 RX ADMIN — PIPERACILLIN SODIUM AND TAZOBACTAM SODIUM 4.5 G: 4; .5 INJECTION, POWDER, FOR SOLUTION INTRAVENOUS at 08:05

## 2023-01-01 RX ADMIN — SODIUM CHLORIDE: 0.45 INJECTION, SOLUTION INTRAVENOUS at 10:05

## 2023-01-01 RX ADMIN — NOREPINEPHRINE BITARTRATE 1.1 MCG/KG/MIN: 4 INJECTION, SOLUTION INTRAVENOUS at 01:05

## 2023-01-01 RX ADMIN — METOROPROLOL TARTRATE 5 MG: 5 INJECTION, SOLUTION INTRAVENOUS at 05:05

## 2023-01-01 RX ADMIN — HEPARIN SODIUM AND DEXTROSE 16 UNITS/KG/HR: 10000; 5 INJECTION INTRAVENOUS at 05:05

## 2023-01-01 RX ADMIN — FUROSEMIDE 10 MG/HR: 10 INJECTION, SOLUTION INTRAMUSCULAR; INTRAVENOUS at 02:05

## 2023-01-01 RX ADMIN — CLOPIDOGREL BISULFATE 75 MG: 75 TABLET ORAL at 09:05

## 2023-01-01 RX ADMIN — PIPERACILLIN SODIUM AND TAZOBACTAM SODIUM 4.5 G: 4; .5 INJECTION, POWDER, FOR SOLUTION INTRAVENOUS at 09:05

## 2023-01-01 RX ADMIN — APIXABAN 5 MG: 5 TABLET, FILM COATED ORAL at 08:05

## 2023-01-01 RX ADMIN — PROCHLORPERAZINE EDISYLATE 5 MG: 5 INJECTION INTRAMUSCULAR; INTRAVENOUS at 05:05

## 2023-01-01 RX ADMIN — SODIUM CHLORIDE 500 ML: 9 INJECTION, SOLUTION INTRAVENOUS at 09:05

## 2023-01-01 RX ADMIN — HYDRALAZINE HYDROCHLORIDE 10 MG: 10 TABLET, FILM COATED ORAL at 11:05

## 2023-01-01 RX ADMIN — PIPERACILLIN AND TAZOBACTAM 4.5 G: 4; .5 INJECTION, POWDER, LYOPHILIZED, FOR SOLUTION INTRAVENOUS; PARENTERAL at 06:05

## 2023-01-01 RX ADMIN — HALOPERIDOL LACTATE 5 MG: 5 INJECTION, SOLUTION INTRAMUSCULAR at 11:05

## 2023-01-01 RX ADMIN — SODIUM CHLORIDE 250 ML: 9 INJECTION, SOLUTION INTRAVENOUS at 06:05

## 2023-01-01 RX ADMIN — LORAZEPAM 0.5 MG: 2 INJECTION INTRAMUSCULAR; INTRAVENOUS at 11:05

## 2023-01-01 RX ADMIN — AMIODARONE HYDROCHLORIDE 1 MG/MIN: 1.8 INJECTION, SOLUTION INTRAVENOUS at 01:05

## 2023-01-01 RX ADMIN — AMIODARONE HYDROCHLORIDE 400 MG: 200 TABLET ORAL at 09:05

## 2023-01-01 RX ADMIN — ATORVASTATIN CALCIUM 80 MG: 20 TABLET, FILM COATED ORAL at 09:05

## 2023-01-01 RX ADMIN — HYDRALAZINE HYDROCHLORIDE 10 MG: 10 TABLET, FILM COATED ORAL at 08:05

## 2023-01-01 RX ADMIN — SODIUM CHLORIDE 500 ML: 9 INJECTION, SOLUTION INTRAVENOUS at 04:05

## 2023-01-01 RX ADMIN — SODIUM CHLORIDE, POTASSIUM CHLORIDE, SODIUM LACTATE AND CALCIUM CHLORIDE: 600; 310; 30; 20 INJECTION, SOLUTION INTRAVENOUS at 09:05

## 2023-01-01 RX ADMIN — DOBUTAMINE HYDROCHLORIDE 5 MCG/KG/MIN: 400 INJECTION INTRAVENOUS at 04:05

## 2023-01-01 RX ADMIN — EPINEPHRINE 0.6 MCG/KG/MIN: 1 INJECTION INTRAMUSCULAR; INTRAVENOUS; SUBCUTANEOUS at 08:05

## 2023-01-01 RX ADMIN — HEPARIN SODIUM AND DEXTROSE 14 UNITS/KG/HR: 10000; 5 INJECTION INTRAVENOUS at 05:05

## 2023-01-01 RX ADMIN — HYDRALAZINE HYDROCHLORIDE 10 MG: 20 INJECTION, SOLUTION INTRAMUSCULAR; INTRAVENOUS at 02:05

## 2023-01-01 RX ADMIN — HALOPERIDOL 5 MG: 5 INJECTION INTRAMUSCULAR at 11:05

## 2023-01-01 RX ADMIN — PIPERACILLIN AND TAZOBACTAM 4.5 G: 4; .5 INJECTION, POWDER, LYOPHILIZED, FOR SOLUTION INTRAVENOUS; PARENTERAL at 08:05

## 2023-01-01 RX ADMIN — EPINEPHRINE 1.4 MCG/KG/MIN: 1 INJECTION INTRAMUSCULAR; INTRAVENOUS; SUBCUTANEOUS at 02:05

## 2023-01-01 RX ADMIN — ISOSORBIDE DINITRATE 20 MG: 20 TABLET ORAL at 08:05

## 2023-01-01 RX ADMIN — LORAZEPAM 0.2 MG: 2 INJECTION INTRAMUSCULAR; INTRAVENOUS at 11:05

## 2023-01-01 RX ADMIN — DOBUTAMINE HYDROCHLORIDE 2.5 MCG/KG/MIN: 400 INJECTION INTRAVENOUS at 03:05

## 2023-01-01 RX ADMIN — HUMAN ALBUMIN MICROSPHERES AND PERFLUTREN 0.11 MG: 10; .22 INJECTION, SOLUTION INTRAVENOUS at 11:05

## 2023-01-01 RX ADMIN — OLANZAPINE 2.5 MG: 10 INJECTION, POWDER, FOR SOLUTION INTRAMUSCULAR at 10:05

## 2023-01-01 RX ADMIN — EPINEPHRINE 1.4 MCG/KG/MIN: 1 INJECTION INTRAMUSCULAR; INTRAVENOUS; SUBCUTANEOUS at 01:05

## 2023-01-01 RX ADMIN — LORAZEPAM 0.2 MG: 2 INJECTION INTRAMUSCULAR; INTRAVENOUS at 09:05

## 2023-01-01 RX ADMIN — AMIODARONE HYDROCHLORIDE 0.5 MG/MIN: 1.8 INJECTION, SOLUTION INTRAVENOUS at 12:05

## 2023-01-01 RX ADMIN — ATORVASTATIN CALCIUM 80 MG: 20 TABLET, FILM COATED ORAL at 08:05

## 2023-01-01 RX ADMIN — POTASSIUM CHLORIDE 40 MEQ: 29.8 INJECTION, SOLUTION INTRAVENOUS at 07:05

## 2023-01-01 RX ADMIN — NOREPINEPHRINE BITARTRATE 1.1 MCG/KG/MIN: 4 INJECTION, SOLUTION INTRAVENOUS at 12:05

## 2023-01-01 RX ADMIN — ATORVASTATIN CALCIUM 80 MG: 20 TABLET, FILM COATED ORAL at 11:05

## 2023-01-01 RX ADMIN — MAGNESIUM SULFATE 2 G: 2 INJECTION INTRAVENOUS at 09:05

## 2023-01-01 RX ADMIN — APIXABAN 2.5 MG: 2.5 TABLET, FILM COATED ORAL at 06:05

## 2023-01-01 RX ADMIN — CLOPIDOGREL BISULFATE 75 MG: 75 TABLET ORAL at 08:05

## 2023-01-01 RX ADMIN — POTASSIUM CHLORIDE 40 MEQ: 29.8 INJECTION, SOLUTION INTRAVENOUS at 04:05

## 2023-01-01 RX ADMIN — AMIODARONE HYDROCHLORIDE 400 MG: 200 TABLET ORAL at 11:05

## 2023-01-01 RX ADMIN — FUROSEMIDE 80 MG: 10 INJECTION, SOLUTION INTRAMUSCULAR; INTRAVENOUS at 11:05

## 2023-01-01 RX ADMIN — SODIUM CHLORIDE 500 ML: 9 INJECTION, SOLUTION INTRAVENOUS at 12:05

## 2023-01-01 RX ADMIN — ISOSORBIDE DINITRATE 10 MG: 10 TABLET ORAL at 04:05

## 2023-01-01 RX ADMIN — HYDRALAZINE HYDROCHLORIDE 25 MG: 25 TABLET, FILM COATED ORAL at 06:05

## 2023-01-01 RX ADMIN — ISOSORBIDE DINITRATE 20 MG: 20 TABLET ORAL at 09:05

## 2023-01-01 RX ADMIN — OLANZAPINE 2.5 MG: 10 INJECTION, POWDER, FOR SOLUTION INTRAMUSCULAR at 08:05

## 2023-01-01 RX ADMIN — DOBUTAMINE HYDROCHLORIDE 5 MCG/KG/MIN: 400 INJECTION INTRAVENOUS at 09:05

## 2023-01-01 RX ADMIN — DEXMEDETOMIDINE HYDROCHLORIDE 0.2 MCG/KG/HR: 4 INJECTION INTRAVENOUS at 08:05

## 2023-01-01 RX ADMIN — PIPERACILLIN AND TAZOBACTAM 4.5 G: 4; .5 INJECTION, POWDER, LYOPHILIZED, FOR SOLUTION INTRAVENOUS; PARENTERAL at 01:05

## 2023-01-01 RX ADMIN — ISOSORBIDE DINITRATE 20 MG: 20 TABLET ORAL at 02:05

## 2023-01-01 RX ADMIN — DOPAMINE HYDROCHLORIDE 2 MCG/KG/MIN: 160 INJECTION, SOLUTION INTRAVENOUS at 03:05

## 2023-01-01 RX ADMIN — MUPIROCIN: 20 OINTMENT TOPICAL at 12:05

## 2023-01-01 RX ADMIN — FUROSEMIDE 10 MG/HR: 10 INJECTION, SOLUTION INTRAMUSCULAR; INTRAVENOUS at 08:05

## 2023-01-01 RX ADMIN — MUPIROCIN: 20 OINTMENT TOPICAL at 08:05

## 2023-01-01 RX ADMIN — SODIUM CHLORIDE, POTASSIUM CHLORIDE, SODIUM LACTATE AND CALCIUM CHLORIDE 500 ML: 600; 310; 30; 20 INJECTION, SOLUTION INTRAVENOUS at 06:05

## 2023-01-01 RX ADMIN — HALOPERIDOL LACTATE 5 MG: 5 INJECTION, SOLUTION INTRAMUSCULAR at 09:05

## 2023-01-01 RX ADMIN — AMIODARONE HYDROCHLORIDE 1 MG/MIN: 1.8 INJECTION, SOLUTION INTRAVENOUS at 02:05

## 2023-01-01 RX ADMIN — ASPIRIN 325 MG ORAL TABLET 325 MG: 325 PILL ORAL at 07:05

## 2023-01-01 RX ADMIN — HYDRALAZINE HYDROCHLORIDE 50 MG: 50 TABLET ORAL at 06:05

## 2023-01-01 RX ADMIN — PROCHLORPERAZINE EDISYLATE 5 MG: 5 INJECTION INTRAMUSCULAR; INTRAVENOUS at 10:05

## 2023-01-01 RX ADMIN — NOREPINEPHRINE BITARTRATE 1 MCG/KG/MIN: 4 INJECTION, SOLUTION INTRAVENOUS at 08:05

## 2023-01-01 RX ADMIN — ISOSORBIDE DINITRATE 10 MG: 10 TABLET ORAL at 06:05

## 2023-01-01 RX ADMIN — EPINEPHRINE 1.5 MCG/KG/MIN: 1 INJECTION INTRAMUSCULAR; INTRAVENOUS; SUBCUTANEOUS at 03:05

## 2023-01-01 RX ADMIN — EPINEPHRINE 1.4 MCG/KG/MIN: 1 INJECTION INTRAMUSCULAR; INTRAVENOUS; SUBCUTANEOUS at 12:05

## 2023-01-01 RX ADMIN — ONDANSETRON 4 MG: 2 INJECTION INTRAMUSCULAR; INTRAVENOUS at 04:05

## 2023-01-01 RX ADMIN — CLOPIDOGREL BISULFATE 75 MG: 75 TABLET ORAL at 11:05

## 2023-01-01 RX ADMIN — NOREPINEPHRINE BITARTRATE 1.4 MCG/KG/MIN: 4 INJECTION, SOLUTION INTRAVENOUS at 02:05

## 2023-01-01 RX ADMIN — DOPAMINE HYDROCHLORIDE 2 MCG/KG/MIN: 160 INJECTION, SOLUTION INTRAVENOUS at 11:05

## 2023-01-01 RX ADMIN — HYDRALAZINE HYDROCHLORIDE 50 MG: 50 TABLET ORAL at 02:05

## 2023-01-01 RX ADMIN — ASPIRIN 81 MG 81 MG: 81 TABLET ORAL at 08:05

## 2023-01-01 RX ADMIN — HEPARIN SODIUM AND DEXTROSE 12 UNITS/KG/HR: 10000; 5 INJECTION INTRAVENOUS at 08:05

## 2023-01-01 RX ADMIN — GLYCOPYRROLATE 0.1 MG: 0.2 INJECTION INTRAMUSCULAR; INTRAVENOUS at 05:05

## 2023-01-01 RX ADMIN — MORPHINE SULFATE 1 MG: 2 INJECTION, SOLUTION INTRAMUSCULAR; INTRAVENOUS at 06:05

## 2023-01-01 RX ADMIN — NITROGLYCERIN 5 MCG/MIN: 20 INJECTION INTRAVENOUS at 08:05

## 2023-01-30 NOTE — PROGRESS NOTES
Called pt's nieceLinda to reschedule missed lab. Pt's niece said pt is unable to go to lab at this time because she has difficulty ambulating since her fall. Linda will call back to reschedule.

## 2023-03-10 NOTE — PROGRESS NOTES
Internal Medicine Clinic Note  03/10/2023    Subjective   Patient Name: Temitope Suero  : 1940    Chief Complaint:   Chief Complaint   Patient presents with    Follow-up       History of Present Illness:  Ms. Temitope Suero is a 83 y.o. female with a PMHx of hyperlipidemia, systolic heart failure, a fib on warfarin, CKD 3a, history of CVA with residual left-sided deficits presenting to clinic for her 6 month follow up. Today, she reports feeling well and in her usual state of health. She is accompanied by her daughter, who confirms patient has been doing well since last appointment. Had a previous fall with head trauma for which she was evaluated in the hospital. Daughter confirms that patient has been inconsistent with medications, and has currently stopped all medications.  Daughter with concerns about pursuing sitter care, discussed at great length various options.    Review of Systems   Constitutional:  Negative for chills, diaphoresis, fever, malaise/fatigue and weight loss.   HENT:  Negative for congestion, ear pain, sinus pain and sore throat.    Eyes:  Negative for photophobia, pain and redness.   Respiratory:  Negative for cough, sputum production, shortness of breath and wheezing.    Cardiovascular:  Positive for leg swelling. Negative for chest pain, palpitations and claudication.   Gastrointestinal:  Negative for abdominal pain, constipation, diarrhea, heartburn, nausea and vomiting.   Genitourinary:  Negative for dysuria, frequency and hematuria.   Musculoskeletal:  Negative for back pain, falls and myalgias.   Skin:  Negative for itching and rash.   Neurological:  Negative for dizziness, tingling, weakness and headaches.   Endo/Heme/Allergies:  Does not bruise/bleed easily.   Psychiatric/Behavioral:  Negative for depression. The patient is not nervous/anxious and does not have insomnia.      PAST HISTORY:     Past Medical History:   Diagnosis Date    Cancer of left breast, stage 2 2015     Cataract     Cerebrovascular small vessel disease 04/11/2018    Bi-thalamic lacunar disease, mod/sev periventricular white matter disease, mixed pattern cerebral microbleeds    Cervicalgia 04/17/2018    Chronic anticoagulation - apixaban 04/17/2018    Previous on Xarelto but changed to apixaban 8-2018 due to recurrent embolic stroke.    Chronic systolic heart failure 04/17/2018    Echo 4-2018   1 - Moderately depressed left ventricular systolic function (EF 30-35%).    2 - Biatrial enlargement.    3 - Normal right ventricular systolic function .    4 - Mild mitral regurgitation.    5 - Mild tricuspid regurgitation.    6 - The estimated PA systolic pressure is 34 mmHg.    7 - Increased central venous pressure.    8 - Left pleural effusion.     CKD stage 3 due to type 2 diabetes mellitus 04/17/2018    Embolic stroke involving left cerebellar artery 08/13/2018    Embolic stroke involving right posterior cerebral artery 04/11/2018    Essential hypertension 10/28/2013    Glaucoma suspect of both eyes 12/09/2013    Hearing loss, sensorineural 12/16/2013    LV (left ventricular) mural thrombus 05/07/2022    Malignant hypertension 08/12/2018    Mixed hyperlipidemia 10/28/2013    Obesity 01/16/2014    Paroxysmal atrial fibrillation 07/10/2012    Stage 3 chronic kidney disease 04/17/2018    Toxic multinodular goiter 10/28/2013    Type 2 diabetes mellitus with stage 3 chronic kidney disease, without long-term current use of insulin 07/10/2012    Diet controlled.    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    Vitamin D deficiency disease 10/28/2013       Past Surgical History:   Procedure Laterality Date    BREAST BIOPSY      BREAST SURGERY      CHOLECYSTECTOMY         Family History   Problem Relation Age of Onset    Hypertension Mother     Hypertension Father     Glaucoma Father     Heart disease Father     Cancer Sister     Asthma Son     Diabetes Paternal Aunt      "Thyroid cancer Neg Hx     Amblyopia Neg Hx     Blindness Neg Hx     Cataracts Neg Hx     Macular degeneration Neg Hx     Retinal detachment Neg Hx     Strabismus Neg Hx          MEDICATIONS & ALLERGIES:       Current Outpatient Medications:     aspirin 81 MG Chew, Take 1 tablet (81 mg total) by mouth once daily., Disp: 90 tablet, Rfl: 3    atorvastatin (LIPITOR) 80 MG tablet, Take 1 tablet (80 mg total) by mouth once daily., Disp: 90 tablet, Rfl: 3    clopidogreL (PLAVIX) 75 mg tablet, Take 1 tablet (75 mg total) by mouth once daily., Disp: 90 tablet, Rfl: 3    donepeziL (ARICEPT) 5 MG tablet, Take 1 tablet (5 mg total) by mouth every evening., Disp: 90 tablet, Rfl: 3    furosemide (LASIX) 40 MG tablet, TAKE 1 TABLET BY MOUTH TWICE A DAY, Disp: 180 tablet, Rfl: 1    hydrALAZINE (APRESOLINE) 50 MG tablet, Take 1 tablet (50 mg total) by mouth every 8 (eight) hours., Disp: 270 tablet, Rfl: 2    latanoprost 0.005 % ophthalmic solution, Place 1 drop into both eyes every evening., Disp: 2.5 mL, Rfl: 12    metoprolol succinate (TOPROL-XL) 100 MG 24 hr tablet, Take 1 tablet (100 mg total) by mouth once daily., Disp: 90 tablet, Rfl: 3    traZODone (DESYREL) 100 MG tablet, Take 1 tablet (100 mg total) by mouth nightly as needed for Insomnia., Disp: 30 tablet, Rfl: 2    warfarin (COUMADIN) 4 MG tablet, Take 1 tablet (4 mg total) by mouth Daily. Or as directed by coumadin clinic, Disp: 30 tablet, Rfl: 2    Review of patient's allergies indicates:  No Known Allergies    OBJECTIVE:     Vital Signs:  Vitals:    03/10/23 1052   BP: 136/84   BP Location: Left arm   Patient Position: Sitting   BP Method: Medium (Manual)   Pulse: 109   SpO2: 96%   Weight: 45.9 kg (101 lb 3.1 oz)   Height: 5' 7" (1.702 m)       Body mass index is 15.85 kg/m².     Physical Exam  Vitals and nursing note reviewed.   Constitutional:       General: She is awake. She is not in acute distress.     Appearance: Normal appearance. She is underweight. She is not " ill-appearing, toxic-appearing or diaphoretic.      Comments: Elderly, pleasant   HENT:      Head: Normocephalic and atraumatic.      Ears:      Comments: Hard of hearing     Nose: Nose normal. No congestion or rhinorrhea.      Mouth/Throat:      Mouth: Mucous membranes are moist.      Pharynx: No oropharyngeal exudate or posterior oropharyngeal erythema.   Eyes:      General: No scleral icterus.        Right eye: No discharge.         Left eye: No discharge.      Extraocular Movements: Extraocular movements intact.      Pupils: Pupils are equal, round, and reactive to light.   Cardiovascular:      Rate and Rhythm: Tachycardia present. Rhythm irregular.      Pulses: Normal pulses.           Dorsalis pedis pulses are 2+ on the right side and 2+ on the left side.      Heart sounds: No murmur heard.    No friction rub.   Pulmonary:      Effort: Pulmonary effort is normal. No respiratory distress.      Breath sounds: No wheezing, rhonchi or rales.   Chest:      Chest wall: No tenderness.   Abdominal:      General: Bowel sounds are normal. There is no distension.      Palpations: Abdomen is soft.      Tenderness: There is no abdominal tenderness. There is no guarding or rebound.   Musculoskeletal:         General: No swelling or tenderness.      Cervical back: Normal range of motion. No rigidity.      Right lower leg: Edema (trace, dependent) present.      Left lower leg: Edema (trace, dependent) present.   Feet:      Comments: Foot Exam:  No excoriations, erythema, ulcers, open wounds, ingrown nails noted on visual inspection  Dorsalis Pedis 2+  Microfilament test: impaired sensation in LLE toes and heel, sensation intact on RLE  Skin:     General: Skin is warm and dry.      Capillary Refill: Capillary refill takes less than 2 seconds.      Findings: No erythema or rash.      Comments: Left upper extremity hand and forearm swelling   Neurological:      General: No focal deficit present.      Mental Status: She is alert  "and oriented to person, place, and time.      Sensory: No sensory deficit.      Motor: Weakness (Left-sided UE, LE 4/5) present.   Psychiatric:         Mood and Affect: Mood normal.         Behavior: Behavior normal. Behavior is cooperative.         Thought Content: Thought content normal.     Laboratory  No results found for this or any previous visit (from the past 336 hour(s)).     Health Maintenance Due   Topic Date Due    Diabetes Urine Screening  08/08/2017    Shingles Vaccine (2 of 2) 11/04/2019    Eye Exam  01/01/2020    DEXA Scan  08/08/2020    COVID-19 Vaccine (3 - Booster for Moderna series) 06/14/2021    Influenza Vaccine (1) 09/01/2022    Lipid Panel  03/07/2023    Hemoglobin A1c  03/08/2023       ASSESSMENT & PLAN:       Atrial flutter, unspecified type  Patient recently discontinued all medications, daughter states "she was declining the medication, and just didn't feel up to taking them." Reconciled medication list and further discussed importance of consistent use of selected medications. Will begin to shift towards palliative focused care        -     metoprolol succinate (TOPROL-XL) 100 MG 24 hr tablet; Take 1 tablet (100 mg total) by mouth once daily.  Dispense: 90 tablet; Refill: 3    Cerebrovascular small vessel disease  Patient with a history of CVA with residual left sided weakness of the upper and lower extremities. Reconciled medication list and further discussed importance of consistent use of selected medications. Will begin to shift towards palliative focused care  -     atorvastatin (LIPITOR) 80 MG tablet; Take 1 tablet (80 mg total) by mouth once daily.  Dispense: 90 tablet; Refill: 3  -     clopidogreL (PLAVIX) 75 mg tablet; Take 1 tablet (75 mg total) by mouth once daily.  Dispense: 90 tablet; Refill: 3    Embolic stroke involving right posterior cerebral artery  -     atorvastatin (LIPITOR) 80 MG tablet; Take 1 tablet (80 mg total) by mouth once daily.  Dispense: 90 tablet; Refill: " 3    LV (left ventricular) mural thrombus  See Atrial Flutter for additional information    Type 2 diabetes mellitus with stage 3b chronic kidney disease, without long-term current use of insulin  Patient with a history of diet-controlled diabetes without long term insulin use, not currently on medication. Most recent A1c was 5.8% in 9/2022.   - Will obtain updated labs: CMP, A1c, Lipid Panel  -     Comprehensive Metabolic Panel; Future; Expected date: 03/10/2023  -     Hemoglobin A1C; Future; Expected date: 03/10/2023  -     Lipid Panel; Future; Expected date: 3/10/2023  - Conducted foot examination during this visit  - Discussed maintaining healthy diet, avoiding sugary and fatty foods  - Encouraged dietary supplementation with Boost/Ensure  - Discussed adding additional medication for diabetes control pending lab results.       Preventative Health:   - Discussed Influenza, Covid, Shingles vaccines    - Influenza vaccine out of season   - Patient reports having had Covid vaccine recently  - Discussed annual eye examination - daughter reports patient is established    RTC in 6 months    Discussed with Dr. Kenya Stallworth,   Internal Medicine PGY-1  [facility]

## 2023-03-13 NOTE — PROGRESS NOTES
"I have personally taken the history and examined this patient and agree with the resident's note as stated above with the following thoughts:  /84 (BP Location: Left arm, Patient Position: Sitting, BP Method: Medium (Manual))   Pulse 109   Ht 5' 7" (1.702 m)   Wt 45.9 kg (101 lb 3.1 oz)   SpO2 96%   BMI 15.85 kg/m²     History of noncompliance with her medications and unfortunately that continues.  But now that she has significant cognitive defect in poor memory she is still refusing to take her medicines.  Today we discussed that she is under more palliative type care.  I do not want her on Coumadin or anticoagulation if she is going to be taking it irregularly and not follow-up with Coumadin Clinic which she has not been following up.  Her risk of stroke is going to go up if she continues to not take her medicine but this has been a long-term.  After discussion with both her and her daughter we are going to cut down her medicines and hopefully we get her to take her beta blocker metoprolol, or atorvastatin, and her clopidogrel.  If she only takes these 3 medications this will actually marked improvement in her medical compliance.  We also discussed sitters and help the daughter can get to take care of her mom.  I am going to see her back again in 4 months  "

## 2023-03-29 NOTE — PROGRESS NOTES
Called to reschedule missed follow up 3/28; Patients daughter confirmed Dr. Zmabrano discontinued warfarin in February.

## 2023-05-21 NOTE — ED TRIAGE NOTES
SOB, palpations since yesterday. Pt has aphasia and loss of memory. At baseline daughter states she can make full sentences, follow commands. Pt solemn, not talking, not following commands upon rooming in ED. Daughter at bedside. MD assessing at bedside.

## 2023-05-21 NOTE — ED PROVIDER NOTES
Encounter Date: 5/21/2023       History     Chief Complaint   Patient presents with    Palpitations     Pt arrives EMS from home with complaints of palpitations since last night, per EMS pt in a-fib, no history of a-fib.      83-year-old female with history of HTN, stroke, systolic heart failure, HLD, and CKD stage 3 who presents to the ED for chief complaint of palpitations that started last night around 8:00 p.m..  Daughter is at bedside.  Daughter states that her mom started having heart palpitations last night and some difficulty breathing.  Denied any nausea, chest pain, abdominal pain, and vomiting.  Daughter notes that patient did have some sweating on her forehead and back.  Daughter also states that patient became altered and stopped responding to questions and communicating.  Patient has been able to ambulate after her stroke a few years ago with a walker.  Patient has not taken her high blood pressure medication for months.    The history is provided by a relative. No  was used.   Review of patient's allergies indicates:  No Known Allergies  Past Medical History:   Diagnosis Date    Cancer of left breast, stage 2 11/24/2015    Cataract     Cerebrovascular small vessel disease 04/11/2018    Bi-thalamic lacunar disease, mod/sev periventricular white matter disease, mixed pattern cerebral microbleeds    Cervicalgia 04/17/2018    Chronic anticoagulation - apixaban 04/17/2018    Previous on Xarelto but changed to apixaban 8-2018 due to recurrent embolic stroke.    Chronic systolic heart failure 04/17/2018    Echo 4-2018   1 - Moderately depressed left ventricular systolic function (EF 30-35%).    2 - Biatrial enlargement.    3 - Normal right ventricular systolic function .    4 - Mild mitral regurgitation.    5 - Mild tricuspid regurgitation.    6 - The estimated PA systolic pressure is 34 mmHg.    7 - Increased central venous pressure.    8 - Left pleural effusion.     CKD stage 3 due to  type 2 diabetes mellitus 04/17/2018    Embolic stroke involving left cerebellar artery 08/13/2018    Embolic stroke involving right posterior cerebral artery 04/11/2018    Essential hypertension 10/28/2013    Glaucoma suspect of both eyes 12/09/2013    Hearing loss, sensorineural 12/16/2013    LV (left ventricular) mural thrombus 05/07/2022    Malignant hypertension 08/12/2018    Mixed hyperlipidemia 10/28/2013    Obesity 01/16/2014    Paroxysmal atrial fibrillation 07/10/2012    Stage 3 chronic kidney disease 04/17/2018    Toxic multinodular goiter 10/28/2013    Type 2 diabetes mellitus with stage 3 chronic kidney disease, without long-term current use of insulin 07/10/2012    Diet controlled.    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    Vitamin D deficiency disease 10/28/2013     Past Surgical History:   Procedure Laterality Date    BREAST BIOPSY      BREAST SURGERY      CHOLECYSTECTOMY       Family History   Problem Relation Age of Onset    Hypertension Mother     Hypertension Father     Glaucoma Father     Heart disease Father     Cancer Sister     Asthma Son     Diabetes Paternal Aunt     Thyroid cancer Neg Hx     Amblyopia Neg Hx     Blindness Neg Hx     Cataracts Neg Hx     Macular degeneration Neg Hx     Retinal detachment Neg Hx     Strabismus Neg Hx      Social History     Tobacco Use    Smoking status: Never    Smokeless tobacco: Never   Substance Use Topics    Alcohol use: No    Drug use: No     Review of Systems   Constitutional:  Negative for fever.   Respiratory:  Positive for shortness of breath.    Cardiovascular:  Positive for palpitations.   Gastrointestinal:  Negative for abdominal pain, nausea and vomiting.   Genitourinary:  Negative for difficulty urinating.   Neurological:  Negative for headaches.   Psychiatric/Behavioral:  Positive for confusion.      Physical Exam     Initial Vitals   BP Pulse Resp Temp SpO2   05/21/23 1631 05/21/23  1631 05/21/23 1631 05/21/23 1719 05/21/23 1631   (!) 170/100 75 20 98.1 °F (36.7 °C) 98 %      MAP       --                Physical Exam    Nursing note and vitals reviewed.  Constitutional: No distress.   Elderly patient laying in bed with minimal communication.   HENT:   Head: Normocephalic.   Oropharynx is clear, but dry.  Dry, cracked lips.   Eyes: Conjunctivae are normal. No scleral icterus.   Neck:   Normal range of motion.  Cardiovascular:  Regular rhythm and normal heart sounds.           Tachycardic.   Pulmonary/Chest: Breath sounds normal. No respiratory distress. She has no wheezes. She has no rhonchi. She has no rales.   Abdominal: Abdomen is soft. She exhibits no distension. There is no abdominal tenderness. There is no rebound and no guarding.   Musculoskeletal:         General: No edema. Normal range of motion.      Cervical back: Normal range of motion.     Neurological: She is alert.   Skin: Skin is warm. Capillary refill takes less than 2 seconds.   Psychiatric: She has a normal mood and affect.       ED Course   Critical Care    Date/Time: 5/21/2023 7:45 PM  Performed by: Rocky Auguste DO  Authorized by: Rocky Auguste DO   Direct patient critical care time: 15 minutes  Additional history critical care time: 15 minutes  Ordering / reviewing critical care time: 25 minutes  Documentation critical care time: 8 minutes  Consulting other physicians critical care time: 15 minutes  Consult with family critical care time: 10 minutes  Total critical care time (exclusive of procedural time) : 88 minutes  Critical care was necessary to treat or prevent imminent or life-threatening deterioration of the following conditions: cardiac failure, shock and renal failure.  Critical care was time spent personally by me on the following activities: blood draw for specimens, development of treatment plan with patient or surrogate, discussions with consultants, evaluation of patient's response to treatment,  examination of patient, obtaining history from patient or surrogate, ordering and performing treatments and interventions, ordering and review of laboratory studies, ordering and review of radiographic studies, pulse oximetry, review of old charts and re-evaluation of patient's condition.      Labs Reviewed   CBC W/ AUTO DIFFERENTIAL - Abnormal; Notable for the following components:       Result Value    MCH 23.7 (*)     MCHC 28.8 (*)     RDW 19.7 (*)     Gran % 77.2 (*)     Lymph % 15.0 (*)     All other components within normal limits   COMPREHENSIVE METABOLIC PANEL - Abnormal; Notable for the following components:    Sodium 153 (*)     Potassium 5.4 (*)     Chloride 120 (*)     CO2 17 (*)     Glucose 145 (*)     BUN 40 (*)     Creatinine 1.9 (*)     Albumin 3.4 (*)     eGFR 26.0 (*)     All other components within normal limits   TROPONIN I - Abnormal; Notable for the following components:    Troponin I 7.942 (*)     All other components within normal limits   B-TYPE NATRIURETIC PEPTIDE - Abnormal; Notable for the following components:    BNP 2,554 (*)     All other components within normal limits   TSH - Abnormal; Notable for the following components:    TSH 0.215 (*)     All other components within normal limits   LACTIC ACID, PLASMA - Abnormal; Notable for the following components:    Lactate (Lactic Acid) 3.4 (*)     All other components within normal limits   CBC W/ AUTO DIFFERENTIAL - Abnormal; Notable for the following components:    RBC 5.53 (*)     MCV 80 (*)     MCH 24.2 (*)     MCHC 30.3 (*)     RDW 19.6 (*)     Gran % 78.5 (*)     Lymph % 14.2 (*)     All other components within normal limits    Narrative:     Draw baseline aPTT prior to starting the heparin bolus or  infusion  (if patient is on warfarin prior to heparin therapy)   LACTIC ACID, PLASMA - Abnormal; Notable for the following components:    Lactate (Lactic Acid) 3.5 (*)     All other components within normal limits   TROPONIN I -  Abnormal; Notable for the following components:    Troponin I 7.236 (*)     All other components within normal limits   ISTAT LACTATE - Abnormal; Notable for the following components:    POC Lactate 4.00 (*)     All other components within normal limits   ISTAT PROCEDURE - Abnormal; Notable for the following components:    POC PH 7.469 (*)     POC PCO2 32.8 (*)     POC PO2 32 (*)     POC HCO3 23.8 (*)     POC SATURATED O2 67 (*)     POC Sodium 152 (*)     POC Potassium 5.6 (*)     POC Hematocrit 26 (*)     All other components within normal limits   ISTAT PROCEDURE - Abnormal; Notable for the following components:    POC PO2 26 (*)     POC HCO3 23.0 (*)     POC SATURATED O2 49 (*)     All other components within normal limits   ISTAT LACTATE - Abnormal; Notable for the following components:    POC Lactate 2.63 (*)     All other components within normal limits   HIV 1 / 2 ANTIBODY    Narrative:     Release to patient->Immediate   HEPATITIS C ANTIBODY    Narrative:     Release to patient->Immediate   MAGNESIUM   T4, FREE   APTT    Narrative:     Draw baseline aPTT prior to starting the heparin bolus or  infusion  (if patient is on warfarin prior to heparin therapy)   PROTIME-INR    Narrative:     Draw baseline aPTT prior to starting the heparin bolus or  infusion  (if patient is on warfarin prior to heparin therapy)     EKG Readings: (Independently Interpreted)   Initial Reading: No STEMI. Previous EKG: Compared with most recent EKG Rhythm: Atrial Flutter. Heart Rate: 148. ST Segments: Non-Specific ST Segment Depression.   EKG shows atrial fibrillation with RVR, rate of 148 beats per minute, and left axis deviation.   ECG Results              Repeat EKG 12-lead (Final result)  Result time 05/22/23 11:30:52      Final result by Interface, Lab In [facility] (05/22/23 11:30:52)                   Narrative:    Test Reason : I48.91,    Vent. Rate : 148 BPM     Atrial Rate : 170 BPM     P-R Int : 000 ms          QRS Dur : 076  ms      QT Int : 324 ms       P-R-T Axes : 000 -30 055 degrees     QTc Int : 508 ms    atrial flutter with variable block  Left axis deviation  Nonspecific T wave abnormality  Cannot exclude Anterior infarct  Cannot exclude Anterior infarct  Abnormal ECG  When compared with ECG of 30-MAY-2022 21:40,  Vent. rate has increased BY  58 BPM    Confirmed by Cee Mabry MD ([id number]) on 5/22/2023 11:30:41 AM    Referred By: TRIP   SELF           Confirmed By:Cee Mabry MD                                     EKG 12-lead (Final result)  Result time 05/22/23 11:28:15      Final result by Interface, Lab In [facility] (05/22/23 11:28:15)                   Narrative:    Test Reason : R00.2,    Vent. Rate : 153 BPM     Atrial Rate : 366 BPM     P-R Int : 000 ms          QRS Dur : 088 ms      QT Int : 310 ms       P-R-T Axes : 000 -41 067 degrees     QTc Int : 494 ms    Atrial flutter with variable A-V block  Left axis deviation  Cannot exclude Anterior infarct  Abnormal ECG  When compared with ECG of 30-MAY-2022 21:40,  Vent. rate has increased BY  63 BPM  ST no longer depressed in Anterior leads  Confirmed by Cee Mabry MD ([id number]) on 5/22/2023 11:28:07 AM    Referred By: TRIP   SELF           Confirmed By:Cee Mabry MD                                  Imaging Results              X-Ray Chest AP Portable (Final result)  Result time 05/21/23 21:00:03      Final result by Ramses Ng MD (05/21/23 21:00:03)                   Impression:      Interval right IJ CVC with tip overlying the SVC, without acute complication.      Electronically signed by: Ramses Ng MD  Date:    05/21/2023  Time:    21:00               Narrative:    EXAMINATION:  XR CHEST AP PORTABLE    CLINICAL HISTORY:  Encounter for adjustment and management of vascular access device    TECHNIQUE:  Single frontal view of the chest was performed.    COMPARISON:  Chest radiograph earlier same day at 17:52 hours    FINDINGS:  Monitoring leads  overlie the chest.  Patient is slightly rotated.    Interval placement of right IJ CVC with tip overlying the distal SVC.  Cardiomediastinal silhouette is midline and stable.  No pneumothorax or new focal opacity.  Otherwise grossly stable radiographic appearance of the chest.                                       X-Ray Chest AP Portable (Final result)  Result time 05/21/23 18:13:32      Final result by Mike Giang DO (05/21/23 18:13:32)                   Impression:      Interstitial pulmonary edema/CHF and small pleural effusions.      Electronically signed by: Mike Giang  Date:    05/21/2023  Time:    18:13               Narrative:    EXAMINATION:  XR CHEST AP PORTABLE    CLINICAL HISTORY:  afib;    TECHNIQUE:  Single frontal view of the chest was performed.    COMPARISON:  05/06/2022.    FINDINGS:  The lungs are well expanded.  There is mild interstitial prominence and pulmonary vascular congestion.  Small left greater right pleural effusions.  No pneumothorax.    The cardiac silhouette is enlarged unchanged.  There are calcifications of the aortic arch.    The visualized osseous structures demonstrate degenerative changes.                                    X-Rays:   Independently Interpreted Readings:   Chest X-Ray: Cardiomegaly present.  Increased vascular markings consistent with CHF are present. Pulmonary edema, no pneumothorax or free air   Medications   heparin 25,000 units in dextrose 5% 250 mL (100 units/mL) infusion LOW INTENSITY nomogram - [facility] (14 Units/kg/hr × 46.3 kg (Adjusted) Intravenous Verify Only 5/22/23 1400)   heparin 25,000 units in dextrose 5% (100 units/ml) IV bolus from bag - ADDITIONAL PRN BOLUS - 60 units/kg (max bolus 4000 units) (has no administration in time range)   heparin 25,000 units in dextrose 5% (100 units/ml) IV bolus from bag - ADDITIONAL PRN BOLUS - 30 units/kg (max bolus 4000 units) (1,390 Units Intravenous Bolus from Bag 5/22/23 0348)   nitroGLYCERIN in 5 %  dextrose 50 mg/250 mL (200 mcg/mL) infusion (0 mcg/min Intravenous Stopped 5/22/23 1147)   piperacillin-tazobactam (ZOSYN) 4.5 g in dextrose 5 % in water (D5W) 5 % 100 mL IVPB (MB+) (0 g Intravenous Stopped 5/22/23 1244)   sodium chloride 0.9% flush 10 mL (has no administration in time range)   mupirocin 2 % ointment ( Nasal Given 5/22/23 0812)   aspirin chewable tablet 81 mg (81 mg Oral Given 5/22/23 0816)   atorvastatin tablet 80 mg (80 mg Oral Given 5/22/23 0819)   clopidogreL tablet 75 mg (75 mg Oral Given 5/22/23 0816)   amiodarone 360 mg/200 mL (1.8 mg/mL) infusion (1 mg/min Intravenous New Bag 5/22/23 1410)   dexmedetomidine (PRECEDEX) 400mcg/100mL 0.9% NaCL infusion (0.1 mcg/kg/hr × 54 kg Intravenous Rate/Dose Change 5/22/23 1400)   0.45% NaCl infusion ( Intravenous Verify Only 5/22/23 1400)   furosemide (LASIX) 500 mg in 50 mL infusion (conc: 10 mg/mL) (has no administration in time range)   metoprolol injection 5 mg (5 mg Intravenous Given 5/21/23 1750)   apixaban tablet 2.5 mg (2.5 mg Oral Given 5/21/23 1825)   lactated ringers bolus 500 mL (0 mLs Intravenous Stopped 5/21/23 1926)   aspirin tablet 325 mg (325 mg Oral Given 5/21/23 1900)   ticagrelor tablet 180 mg (180 mg Oral Given 5/21/23 1915)   heparin 25,000 units in dextrose 5% (100 units/ml) IV bolus from bag INITIAL BOLUS (max bolus 4000 units) (2,780 Units Intravenous Bolus from Bag 5/21/23 2053)   nitroGLYCERIN SL tablet 0.4 mg (0.4 mg Sublingual Given 5/21/23 1915)   piperacillin-tazobactam (ZOSYN) 4.5 g in dextrose 5 % in water (D5W) 5 % 100 mL IVPB (MB+) (0 g Intravenous Stopped 5/21/23 2105)   vancomycin (VANCOCIN) 1,000 mg in dextrose 5 % (D5W) 250 mL IVPB (Vial-Mate) (0 mg Intravenous Stopped 5/21/23 2243)   perflutren protein-A microsphr 0.22 mg/mL IV susp (0.11 mg Intravenous Given 5/22/23 1115)   furosemide injection 80 mg (80 mg Intravenous Given 5/22/23 1413)     Medical Decision Making:   Initial Assessment:   Evaluation of  83-year-old female who presents with palpitations.  She is altered from baseline per daughter.  She is laying in the bed in no apparent distress.  She is tachycardic, afebrile, and saturating well on room air.  Differential Diagnosis:   Atrial fibrillation with RVR, ACS, CHF, hyperthyroidism, electrolyte abnormality, PNA  Clinical Tests:   Lab Tests: Ordered and Reviewed  Radiological Study: Ordered and Reviewed  Medical Tests: Ordered and Reviewed  ED Management:  Patient presents with palpitations and altered from baseline.  I obtained an VBG which does not show hypercapnia. EKG shows AFib with RVR ranging in the 140s to 150s.  Administered metoprolol and Eliquis.  The patient was worked up to evaluate for cardiac etiology and electrolyte abnormalities.  Patient reassessed and her HR decreased to the 110s to 120s.  CXR shows interstitial pulmonary edema and small pleural effusions.  Patient has an elevated BNP compared to her baseline and she has a significantly elevated troponin.  Administered aspirin and nitroglycerin.  Consulted and discussed patient with critical care team.    Patient will be admitted to the cardiac critical care unit in the setting of her NSTEMI.          Attending Attestation:   Physician Attestation Statement for Resident:  As the supervising MD   Physician Attestation Statement: I have personally seen and examined this patient.   I agree with the above history.  -:   As the supervising MD I agree with the above PE.     As the supervising MD I agree with the above treatment, course, plan, and disposition.                  I have reviewed and concur with the resident's history, physical, assessment, and plan.  I have personally interviewed and examined the patient at bedside.   I did supervise any and all procedures and was present for any critical portion, and was always immediately available for help and as a resource.     The above history physical, review of symptoms, HPI and physical  exam reflect my independent interpretation and evaluation.    Briefly, 82-year-old female with a history of hypertension, hyperlipidemia, heart failure with an EF of 25-30%, atrial fibrillation previously on Coumadin, history of CVA with left-sided residual deficits, medication noncompliance, dementia presenting with palpitations and confusion.  On arrival she is tachycardic with atrial fibrillation/flutter with rates in the 130s to 150s, hypertensive with systolic blood pressure greater than 170 without fever or hypoxemia.  She appears confused but no focal neurologic deficits.  ECG obtained shows atrial flutter with variable AV block with a rate of 148.  Placed on cardiac and telemetry monitoring including pulse oximetry.  Labs obtained show elevated creatinine with MAC, elevation in BNP, troponin of 7.9 with a lactate of 4.0.  Her exam consistent with volume overload as well as cardiogenic shock with cool extremities.  Given her significant hypertension, placed on nitro glycerin drip, with heparin ordered, ticagrelor as well as full-dose aspirin for concern for acute coronary syndrome and concern for cardiogenic shock.  Discussed case with Cardiology who evaluated the patient admitted to Cardiac ICU.  Please see critical care note for critical care time.    Complexity:  Critical care    Final diagnoses:  [R00.2] Palpitations  [I48.91] A-fib  [I21.4] NSTEMI (non-ST elevated myocardial infarction) (Primary)  [I24.9] ACS (acute coronary syndrome)  [Z45.2] Encounter for central line placement     Rocky Auguste DO, FAAEM  Emergency Staff Physician   Dept of Emergency Medicine   [facility]  Spectralink: [id number]        Disclaimer: This note has been generated using voice-recognition software. There may be typographical errors that have been missed during proof-reading.            ED Course as of 05/22/23 1447   Sun May 21, 2023   1802 POC Lactate(!!): 4.00 [MD]      ED Course User Index  [MD] Jaylen Tena,  MD                 Clinical Impression:   Final diagnoses:  [R00.2] Palpitations  [I48.91] A-fib  [I21.4] NSTEMI (non-ST elevated myocardial infarction) (Primary)  [I24.9] ACS (acute coronary syndrome)  [Z45.2] Encounter for central line placement        ED Disposition Condition    Admit Stable                Jaylen Tena MD  Resident  05/22/23 1127       Rocky Auguste DO  05/22/23 [id number]

## 2023-05-22 PROBLEM — Z51.5 PALLIATIVE CARE ENCOUNTER: Status: ACTIVE | Noted: 2023-01-01

## 2023-05-22 PROBLEM — R57.0 CARDIOGENIC SHOCK: Status: ACTIVE | Noted: 2023-01-01

## 2023-05-22 PROBLEM — E87.0 HYPERNATREMIA: Status: ACTIVE | Noted: 2023-01-01

## 2023-05-22 NOTE — CONSULTS
Discussed with Dr. Auguste. CCU to admit the patient. MICU available for questions and concerns. Patient demonstrated cheyne reno breathing at bedside.

## 2023-05-22 NOTE — PLAN OF CARE
Shift events: SvO2, lactic, and CVP trended. ECHO completed. BC x1 and respiratory cultures obtained. 250cc bolus administered over 3hrs. Lasix IVP and gtt initiated. Starting Dobutamine and nitric oxide. Precedex gtt initiated for agitation.  updated on patient's status and labs. Palliative consulted and came to assess patient.     HR 90s-120s Afib. SBP <160. SpO2 >95% on room air. CVP 7.   Neuro: Patient only oriented to self with frequent agitation, frequently reoriented.   Pt NPO with meds/sips. Able to tolerate crushed meds in apple sauce.   UOP 15-60cc/hr. X1 small BM this shift.     Gtts: Lasix, Heparin, Dobutamine, Amio, Precedex.     Skin: Skin breakdown noted upon admission on sacral area. Foam applied. Immerse bed ordered. Bilateral foam dressings in place to heels.

## 2023-05-22 NOTE — SUBJECTIVE & OBJECTIVE
Past Medical History:   Diagnosis Date    Cancer of left breast, stage 2 11/24/2015    Cataract     Cerebrovascular small vessel disease 04/11/2018    Bi-thalamic lacunar disease, mod/sev periventricular white matter disease, mixed pattern cerebral microbleeds    Cervicalgia 04/17/2018    Chronic anticoagulation - apixaban 04/17/2018    Previous on Xarelto but changed to apixaban 8-2018 due to recurrent embolic stroke.    Chronic systolic heart failure 04/17/2018    Echo 4-2018   1 - Moderately depressed left ventricular systolic function (EF 30-35%).    2 - Biatrial enlargement.    3 - Normal right ventricular systolic function .    4 - Mild mitral regurgitation.    5 - Mild tricuspid regurgitation.    6 - The estimated PA systolic pressure is 34 mmHg.    7 - Increased central venous pressure.    8 - Left pleural effusion.     CKD stage 3 due to type 2 diabetes mellitus 04/17/2018    Embolic stroke involving left cerebellar artery 08/13/2018    Embolic stroke involving right posterior cerebral artery 04/11/2018    Essential hypertension 10/28/2013    Glaucoma suspect of both eyes 12/09/2013    Hearing loss, sensorineural 12/16/2013    LV (left ventricular) mural thrombus 05/07/2022    Malignant hypertension 08/12/2018    Mixed hyperlipidemia 10/28/2013    Obesity 01/16/2014    Paroxysmal atrial fibrillation 07/10/2012    Stage 3 chronic kidney disease 04/17/2018    Toxic multinodular goiter 10/28/2013    Type 2 diabetes mellitus with stage 3 chronic kidney disease, without long-term current use of insulin 07/10/2012    Diet controlled.    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     Current Outpatient Medications on File Prior to Encounter   Medication Sig    aspirin 81 MG Chew Take 1 tablet (81 mg total) by mouth once daily.    atorvastatin (LIPITOR) 80 MG tablet Take 1 tablet (80 mg total) by mouth once daily.    clopidogreL (PLAVIX) 75 mg tablet Take 1 tablet (75 mg total) by mouth once daily.    latanoprost 0.005 % ophthalmic solution Place 1 drop into both eyes every evening.    metoprolol succinate (TOPROL-XL) 100 MG 24 hr tablet Take 1 tablet (100 mg total) by mouth once daily.    [DISCONTINUED] benazepriL (LOTENSIN) 20 MG tablet Take 1 tablet (20 mg total) by mouth once daily.    [DISCONTINUED] carvediloL (COREG) 25 MG tablet Take 1 tablet (25 mg total) by mouth 2 (two) times daily with meals.    [DISCONTINUED] travoprost, benzalkonium, (TRAVATAN) 0.004 % ophthalmic solution Place 1 drop into both eyes every evening.     Family History       Problem Relation (Age of Onset)    Asthma Son    Cancer Sister    Diabetes Paternal Aunt    Glaucoma Father    Heart disease Father    Hypertension Mother, Father          Tobacco Use    Smoking status: Never    Smokeless tobacco: Never   Substance and Sexual Activity    Alcohol use: No    Drug use: No    Sexual activity: Not Currently     Review of Systems   Unable to perform ROS: Mental status change   Objective:     Vital Signs (Most Recent):  Temp: 97.6 °F (36.4 °C) (05/21/23 2240)  Pulse: (!) 138 (05/22/23 0030)  Resp: (!) 39 (05/22/23 0030)  BP: (!) 161/124 (05/22/23 0030)  SpO2: (!) 90 % (05/22/23 0030) Vital Signs (24h Range):  Temp:  [97.6 °F (36.4 °C)-98.1 °F (36.7 °C)] 97.6 °F (36.4 °C)  Pulse:  [] 138  Resp:  [11-53] 39  SpO2:  [90 %-99 %] 90 %  BP: (121-187)/() 161/124     Weight: 54 kg (119 lb 0.8 oz)  Body mass index is 18.65 kg/m².    SpO2: (!) 90 %         Intake/Output Summary (Last 24 hours) at 5/22/2023 0231  Last data filed at 5/22/2023 0015  Gross per 24 hour   Intake 258 ml   Output 190 ml    Net 68 ml         Lines/Drains/Airways       Central Venous Catheter Line  Duration             Percutaneous Central Line Insertion/Assessment - Triple Lumen  05/21/23 2015 Internal Jugular Right <1 day              Drain  Duration                  Urethral Catheter 05/21/23 2249 <1 day              Peripheral Intravenous Line  Duration                  Peripheral IV - Single Lumen 05/21/23 1749 20 G Anterior;Proximal;Right Forearm <1 day         Peripheral IV - Single Lumen 05/21/23 2213 18 G Anterior;Right Upper Arm <1 day                     Physical Exam  Vitals and nursing note reviewed.   Constitutional:       Appearance: She is ill-appearing.   HENT:      Head: Normocephalic.      Mouth/Throat:      Mouth: Mucous membranes are moist.   Eyes:      Conjunctiva/sclera: Conjunctivae normal.   Cardiovascular:      Rate and Rhythm: Tachycardia present. Rhythm irregular.      Heart sounds: Murmur heard.   Abdominal:      General: Abdomen is flat. Bowel sounds are normal. There is no distension.   Musculoskeletal:      Right lower leg: No edema.      Left lower leg: No edema.   Neurological:      Mental Status: She is disoriented.        Significant Labs: CMP   Recent Labs   Lab 05/21/23  1745   *   K 5.4*   *   CO2 17*   *   BUN 40*   CREATININE 1.9*   CALCIUM 9.7   PROT 7.2   ALBUMIN 3.4*   BILITOT 1.0   ALKPHOS 129   AST 36   ALT 18   ANIONGAP 16     , CBC   Recent Labs   Lab 05/21/23  1745 05/21/23  1757 05/21/23  1946   WBC 8.06  --  7.98   HGB 12.8  --  13.4   HCT 44.5   < > 44.2     --  213    < > = values in this interval not displayed.     , and Troponin   Recent Labs   Lab 05/21/23 1745 05/21/23 2126   TROPONINI 7.942* 7.236*         Significant Imaging: Echocardiogram: Transthoracic echo (TTE) complete ([facility]upid Only):   Results for orders placed or performed during the hospital encounter of 05/05/22   Echo   Result Value Ref Range    Ascending aorta 3.63 cm    STJ 2.40 cm    AV  mean gradient 2 mmHg    Ao peak matty 0.88 m/s    Ao VTI 17.82 cm    IVRT 68.51 msec    IVS 0.78 0.6 - 1.1 cm    LA size 4.42 cm    Left Atrium Major Axis 6.45 cm    Left Atrium Minor Axis 7.00 cm    LVIDd 5.05 3.5 - 6.0 cm    LVIDs 4.26 (A) 2.1 - 4.0 cm    LVOT diameter 1.96 cm    LVOT peak VTI 9.24 cm    Posterior Wall 0.86 0.6 - 1.1 cm    MV Peak A Matty 0.29 m/s    E wave deceleration time 183.72 msec    MV Peak E Matty 0.92 m/s    RA Major Axis 5.30 cm    RA Width 4.20 cm    RVDD 2.63 cm    Sinus 2.93 cm    TAPSE 1.95 cm    TR Max Matty 2.70 m/s    TDI LATERAL 0.04 m/s    TDI SEPTAL 0.03 m/s    LA WIDTH 4.45 cm    MV stenosis pressure 1/2 time 53.28 ms    LV Diastolic Volume 121.03 mL    LV Systolic Volume 81.09 mL    LVOT peak matty 0.62 m/s    LA volume (mod) 107.00 cm3    LV LATERAL E/E' RATIO 23.00 m/s    LV SEPTAL E/E' RATIO 30.67 m/s    FS 16 %    LA volume 112.24 cm3    LV mass 142.56 g    Left Ventricle Relative Wall Thickness 0.34 cm    AV valve area 1.56 cm2    AV Velocity Ratio 0.70     AV index (prosthetic) 0.52     MV valve area p 1/2 method 4.13 cm2    E/A ratio 3.17     Mean e' 0.04 m/s    LVOT area 3.0 cm2    LVOT stroke volume 27.86 cm3    AV peak gradient 3 mmHg    E/E' ratio 26.29 m/s    LV Systolic Volume Index 46.6 mL/m2    LV Diastolic Volume Index 69.56 mL/m2    LA Volume Index 64.5 mL/m2    LV Mass Index 82 g/m2    Triscuspid Valve Regurgitation Peak Gradient 29 mmHg    LA Volume Index (Mod) 61.5 mL/m2    BSA 1.74 m2    Right Atrial Pressure (from IVC) 3 mmHg    EF 25 %    RA area 20.20 cm2    TV rest pulmonary artery pressure 32 mmHg    Narrative    · The left ventricle is normal in size with severely decreased systolic   function.  · The estimated ejection fraction is 25-30%. There is left ventricular   global hypokinesis.  · Grade III left ventricular diastolic dysfunction.  · Mild mitral regurgitation.  · Normal right ventricular size with low normal right ventricular systolic   function.  ·  The estimated PA systolic pressure is 32 mmHg.  · Normal central venous pressure (3 mmHg).  · Severe left atrial enlargement.  · A large solid left ventricular thrombus is present. The thrombus is   fixed and located in the apex.     The significant findings were relayed to Dr Barahona at 12:55p on 5/7/22.

## 2023-05-22 NOTE — NURSING TRANSFER
Nursing Transfer Note      5/21/2023     Reason patient is being transferred: admit to SICU from ED    Transfer to SICU room 45395 from ED    Transfer via stretcher    Transfer with 4L O2, cardiac monitoring, IV pole (heparin and nitroglycerin gtts)    Transported by Radha Doll, RN (ED RN)    Telemetry: SICU in-room GE monitor    Medicines sent: none, heparin and nitroglycerin gtts infusing continuously    Chart send with patient: Yes    Notified: patient's niece, Linda Alfaro at bedside    Patient reassessed at: 5/21/23 @ 2240    Upon arrival to floor: cardiac monitor applied, 4L O2 via humidified nasal cannula applied, patient oriented to room, call bell in reach, and bed in lowest position; patient's confusion noted, started to speak a few words, but in no acute distress. Increased BP and HR noted, Dr. Melo aware.

## 2023-05-22 NOTE — PLAN OF CARE
Repeat hemodynamics post nitro gtt:    Svo2 46  Cvp 7  Lactate 3.1    CI 1.5  SVR 5402    While on nitro gtt at rate of 40 mcg/min  Discussed findings with oncall CCU staff.    Will cont afterload reduction  No utility for an inotropic agent given the reason for pt's low CI is significantly high SVR. In addition 's arrhythmogenic component will worsen pt's afib/flutter with rvr.  Will initiate amiodarone  Will cont to monitor very closely.  Palliative consult in AM for GOC ( bryon contacted several times and not answering)    Jason Melo MD  Cardiovascular medicine; PGY4  [facility]  [address] [place] [address]

## 2023-05-22 NOTE — NURSING
Nurses Note -- 4 Eyes      5/21/2023   11:00 PM      Skin assessed during: Admit to SICU from ED      [] No Altered Skin Integrity Present    []Prevention Measures Documented      [x] Yes- Altered Skin Integrity Present or Discovered   [x] LDA Added if Not in Epic (Describe Wound) small partial thickness wound on sacrum, cleaned with soap and water and foam applied   [] New Altered Skin Integrity was Present on Admit and Documented in LDA   [x] Wound Image Taken    Wound Care Consulted? Yes    Attending Nurse:  Katy Oglesby RN     Second RN/Staff Member:  Liz Robertson RN

## 2023-05-22 NOTE — ASSESSMENT & PLAN NOTE
May 2022- no surgical intervention was needed at this time as although studies showed evidence of PAD, patient  Recommendation at the time was for woundcare, compression/elevation for edema, and initiation of ASA/Plavix and high intensity statin- will cont

## 2023-05-22 NOTE — PLAN OF CARE
"Advance Care Planning   [facility] - [facility] Intensive Care  Palliative Care       Patient Name: Temitope Suero  MRN: [id number]  Admission Date: 5/21/2023  Hospital Length of Stay: 1 days  Code Status: Full Code   Attending Provider: Jaquelin Davis MD  Palliative Care Provider: Chary Boston MD  Primary Care Physician: Gm Zambrano MD  Principal Problem:Cardiogenic shock     This  called patient's niece Linda to inquire about family. Linda stated patient has a son and she "has not spoken to him in years." Linda stated she can try and reach out to his son, which would be patient's grandson, to try and find out how to reach him. Linda stated she has done this in the past and has not been successful. This  provided Linda with my contact number if any more information arises.  will follow.     Fredy Lou [facility]  Palliative Medicine      "

## 2023-05-22 NOTE — ASSESSMENT & PLAN NOTE
Ms Suero is an 82yo lady that was admitted for acute AMS and worsening SOB in setting HFrEF.   Medicolegal  Decision-making:   -Pt does not have decision-making capacity  -Pt has not appointed a HCPOA.  -Marital status: Single  -Children: 1 son, Tej Suero in [facility]    Advance care planning/Advance directives:  -The patient has not previously engaged in advance care planning  -Pt has no scanned advance directives in [facility]    Psychosocial  Support system:  -Spiritual: yes;  Patient is part of a particular susan background: Confucianist.  The palliative care  will not be consulted to assess the patient.  -Family: Pt's niece Linda Alfaro is primary care take and verbally appointed POA. Pt's great niece Giacomo Stern (Linda's daughter) also involved in care.    Health status prior to this hospitalization  -Functional status: poor.  Pt was not able to manage ADLs  -Cognitive status: complicated by prior CVA and delerium    -QOL: poor    Prognosis:  -Time and potential for recovery: MADDY    [facility] Discussion with Niece:  Advance Care Planning I engaged the patient in a conversation about advance care planning and we specifically addressed what the goals of care would be moving forward, in light of the patients change in clinical status, specifically worsening HFrEF and AMS.  We did specifically address the patients likely prognosis, which is poor.  We explored the patients values and preferences for future care.  The patient endorses that what is most important right now is to focus on     ___Spending time at home  ___Avoiding the hospital  ___Remaining as independent as possible  ___Symptom/pain control  ___Quality of life, even if it means sacrificing a little time  _X_Extending life as long as possible, even it it means sacrificing quality  _X_Other, providing home care    Accordingly, the best plan at this time is work with family who desires for Pt to return home under their care.    I did not explain the  role for hospice care at this stage of the patients illness, including its ability to help the patient live with the best quality of life possible.  We will not be making a hospice referral from the clinic today.    I spent a total of 35 minutes engaging the patient in this advance care planning discussion.Advance Care Planning In light of the patients advanced and terminal illness, we reviewed what the patients preferences for care would be at the very end of life.  The patient wishes to have a natural, peaceful death.  Along those lines, the patient does not wish to have CPR or other invasive treatments performed when her heart and/or breathing stops.  I communicated to the patient that a DNR order would be placed in her medical record to reflect this preference.  A DNR form was completed and will be scanned into EPIC.  In addition, a [place] form was not completed to reflect other EOL preferences of the patient. I spent a total of 35 minutes engaging the patient in this advance care planning discussion.     Active symptoms  -Dyspnea Continuous oxymetry with supplemental oxygen PRN  -Anxiety/Agitation: Precedex    Summary/Recommendation:  -Most important goals at this time: currently curative/life-prolongation (regardless of treatment burdens) but more discussions are needed for determination  -Code status: Full  -Most appropriate disposition: NA

## 2023-05-22 NOTE — ASSESSMENT & PLAN NOTE
82 y.o female with h/o  HFrEF with EF 25-30%, afib previously on coumadin ( had to be changed by pcp given lack of compliance ), cva with L ti residual deficits, medication noncompliance, dementia (with signficant cognitive deficit) who presents to the hospital for AMS. on arrival to the hospital he was found to be in atrial flutter with rate 130s. hypertensive with /100. lactate 4 which later downtrended to 3.5   bedside echo revealed depressed EF with global hypokinesis. mild-mod MR. IVC 1.2 cm and non-collapsible. Pleural effusion noted.  RIJ triple lumen central line was placed. hemodynamics were obtained and were as follow:  svo2 49 CVP 8 CO 1.8 CI 1.2 SVR 4077  subsequently placed on nitro gtt and admitted to ccu      unfortunatley given age, significnat debility post cva, dementia with significant cognitive impairment, and medication noncompliance pt is not a candidate for any advanced options.    will admit her to CCU  nitro gtt for bp reduction  repeat lactate q4h  svo2 and cvp in 2 hrs  will add home antihypertensive agents  empiric abx added and bcx and resp cx obtained- if cont to be negative can de-escalate  Palliative consult in AM given very poor prognosis

## 2023-05-22 NOTE — H&P
[facility] - Surgical Intensive Care  Cardiology  History and Physical     Patient Name: Temitope Suero  MRN: [id number]  Admission Date: 5/21/2023  Code Status: Full Code   Attending Provider: Jaquelin Davis MD   Primary Care Physician: Gm Zambrano MD  Principal Problem:Cardiogenic shock    Patient information was obtained from relative(s), past medical records and ER records.     Subjective:     Chief Complaint:    HPI:  patient is a 82 y.o female with h/o htn, hld, HFrEF with EF 25-30%, afib previously on coumadin ( had to be changed by pcp given lack of compliance ), cva with L ti residual deficits, medication noncompliance, dementia (with signficant cognitive deficit) who presents to the hospital for AMS. She is accompanied by her niece who is the primary caregiver. history is obtained soley from the niece and medical records given mental status. Per niece, yesterday evening pt was complaining of palpitations and was noted to be more confused than her baseline. sxs worsened until the following AM where she was brought to the ED. Upon arrival to the ED she was found to be afebrile, in atrial flutter with rate of 130s ( recieved a dose of 5 mg lopressor at the time at ED), hypertensive with /100, on 4L NC. labs notable for cr 1.9 ( bl 1.2), BNP 2554, Trop 7.2, lactate 4 which later trended to 3.5    cardiology was initially consulted given concern for shock. evaluated at bedside. she appeared altered. bedside echo revealed depressed EF with global hypokinesis. mild-mod MR. IVC 1.2 cm and non-collapsible.  RIJ triple lumen central line was placed. hemodynamics were obtained and wre as follow:  svo2 49 CVP 8 CO 1.8 CI 1.2 SVR 4077  subsequently placed on nitro gtt and admitted to ccu       since her stroke she has had L sided deficits. she is completely dependent on her niece for her ADLs and IADLs. per her last PCP note ( Dr. Zambrano on 3/13) given worsening of her cognitive abilities and level of  compliance she is now under more palliative type care.        Past Medical History:   Diagnosis Date    Cancer of left breast, stage 2 11/24/2015    Cataract     Cerebrovascular small vessel disease 04/11/2018    Bi-thalamic lacunar disease, mod/sev periventricular white matter disease, mixed pattern cerebral microbleeds    Cervicalgia 04/17/2018    Chronic anticoagulation - apixaban 04/17/2018    Previous on Xarelto but changed to apixaban 8-2018 due to recurrent embolic stroke.    Chronic systolic heart failure 04/17/2018    Echo 4-2018   1 - Moderately depressed left ventricular systolic function (EF 30-35%).    2 - Biatrial enlargement.    3 - Normal right ventricular systolic function .    4 - Mild mitral regurgitation.    5 - Mild tricuspid regurgitation.    6 - The estimated PA systolic pressure is 34 mmHg.    7 - Increased central venous pressure.    8 - Left pleural effusion.     CKD stage 3 due to type 2 diabetes mellitus 04/17/2018    Embolic stroke involving left cerebellar artery 08/13/2018    Embolic stroke involving right posterior cerebral artery 04/11/2018    Essential hypertension 10/28/2013    Glaucoma suspect of both eyes 12/09/2013    Hearing loss, sensorineural 12/16/2013    LV (left ventricular) mural thrombus 05/07/2022    Malignant hypertension 08/12/2018    Mixed hyperlipidemia 10/28/2013    Obesity 01/16/2014    Paroxysmal atrial fibrillation 07/10/2012    Stage 3 chronic kidney disease 04/17/2018    Toxic multinodular goiter 10/28/2013    Type 2 diabetes mellitus with stage 3 chronic kidney disease, without long-term current use of insulin 07/10/2012    Diet controlled.    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     Current Outpatient Medications on File Prior to Encounter   Medication Sig    aspirin 81 MG Chew Take 1 tablet (81 mg total) by mouth once daily.    atorvastatin (LIPITOR) 80 MG tablet Take 1 tablet (80 mg total) by mouth once daily.    clopidogreL (PLAVIX) 75 mg tablet Take 1 tablet (75 mg total) by mouth once daily.    latanoprost 0.005 % ophthalmic solution Place 1 drop into both eyes every evening.    metoprolol succinate (TOPROL-XL) 100 MG 24 hr tablet Take 1 tablet (100 mg total) by mouth once daily.    [DISCONTINUED] benazepriL (LOTENSIN) 20 MG tablet Take 1 tablet (20 mg total) by mouth once daily.    [DISCONTINUED] carvediloL (COREG) 25 MG tablet Take 1 tablet (25 mg total) by mouth 2 (two) times daily with meals.    [DISCONTINUED] travoprost, benzalkonium, (TRAVATAN) 0.004 % ophthalmic solution Place 1 drop into both eyes every evening.     Family History       Problem Relation (Age of Onset)    Asthma Son    Cancer Sister    Diabetes Paternal Aunt    Glaucoma Father    Heart disease Father    Hypertension Mother, Father          Tobacco Use    Smoking status: Never    Smokeless tobacco: Never   Substance and Sexual Activity    Alcohol use: No    Drug use: No    Sexual activity: Not Currently     Review of Systems   Unable to perform ROS: Mental status change   Objective:     Vital Signs (Most Recent):  Temp: 97.6 °F (36.4 °C) (05/21/23 2240)  Pulse: (!) 138 (05/22/23 0030)  Resp: (!) 39 (05/22/23 0030)  BP: (!) 161/124 (05/22/23 0030)  SpO2: (!) 90 % (05/22/23 0030) Vital Signs (24h Range):  Temp:  [97.6 °F (36.4 °C)-98.1 °F (36.7 °C)] 97.6 °F (36.4 °C)  Pulse:  [] 138  Resp:  [11-53] 39  SpO2:  [90 %-99 %] 90 %  BP: (121-187)/() 161/124     Weight: 54 kg (119 lb 0.8 oz)  Body mass index is 18.65 kg/m².    SpO2: (!) 90 %         Intake/Output Summary (Last 24 hours) at 5/22/2023 2342  Last data filed at  5/22/2023 0015  Gross per 24 hour   Intake 258 ml   Output 190 ml   Net 68 ml         Lines/Drains/Airways       Central Venous Catheter Line  Duration             Percutaneous Central Line Insertion/Assessment - Triple Lumen  05/21/23 2015 Internal Jugular Right <1 day              Drain  Duration                  Urethral Catheter 05/21/23 2249 <1 day              Peripheral Intravenous Line  Duration                  Peripheral IV - Single Lumen 05/21/23 1749 20 G Anterior;Proximal;Right Forearm <1 day         Peripheral IV - Single Lumen 05/21/23 2213 18 G Anterior;Right Upper Arm <1 day                     Physical Exam  Vitals and nursing note reviewed.   Constitutional:       Appearance: She is ill-appearing.   HENT:      Head: Normocephalic.      Mouth/Throat:      Mouth: Mucous membranes are moist.   Eyes:      Conjunctiva/sclera: Conjunctivae normal.   Cardiovascular:      Rate and Rhythm: Tachycardia present. Rhythm irregular.      Heart sounds: Murmur heard.   Abdominal:      General: Abdomen is flat. Bowel sounds are normal. There is no distension.   Musculoskeletal:      Right lower leg: No edema.      Left lower leg: No edema.   Neurological:      Mental Status: She is disoriented.        Significant Labs: CMP   Recent Labs   Lab 05/21/23  1745   *   K 5.4*   *   CO2 17*   *   BUN 40*   CREATININE 1.9*   CALCIUM 9.7   PROT 7.2   ALBUMIN 3.4*   BILITOT 1.0   ALKPHOS 129   AST 36   ALT 18   ANIONGAP 16     , CBC   Recent Labs   Lab 05/21/23  1745 05/21/23  1757 05/21/23  1946   WBC 8.06  --  7.98   HGB 12.8  --  13.4   HCT 44.5   < > 44.2     --  213    < > = values in this interval not displayed.     , and Troponin   Recent Labs   Lab 05/21/23  1745 05/21/23  2126   TROPONINI 7.942* 7.236*         Significant Imaging: Echocardiogram: Transthoracic echo (TTE) complete ([facility] Only):   Results for orders placed or performed during the hospital encounter of 05/05/22   Echo    Result Value Ref Range    Ascending aorta 3.63 cm    STJ 2.40 cm    AV mean gradient 2 mmHg    Ao peak matty 0.88 m/s    Ao VTI 17.82 cm    IVRT 68.51 msec    IVS 0.78 0.6 - 1.1 cm    LA size 4.42 cm    Left Atrium Major Axis 6.45 cm    Left Atrium Minor Axis 7.00 cm    LVIDd 5.05 3.5 - 6.0 cm    LVIDs 4.26 (A) 2.1 - 4.0 cm    LVOT diameter 1.96 cm    LVOT peak VTI 9.24 cm    Posterior Wall 0.86 0.6 - 1.1 cm    MV Peak A Matty 0.29 m/s    E wave deceleration time 183.72 msec    MV Peak E Matty 0.92 m/s    RA Major Axis 5.30 cm    RA Width 4.20 cm    RVDD 2.63 cm    Sinus 2.93 cm    TAPSE 1.95 cm    TR Max Matty 2.70 m/s    TDI LATERAL 0.04 m/s    TDI SEPTAL 0.03 m/s    LA WIDTH 4.45 cm    MV stenosis pressure 1/2 time 53.28 ms    LV Diastolic Volume 121.03 mL    LV Systolic Volume 81.09 mL    LVOT peak matty 0.62 m/s    LA volume (mod) 107.00 cm3    LV LATERAL E/E' RATIO 23.00 m/s    LV SEPTAL E/E' RATIO 30.67 m/s    FS 16 %    LA volume 112.24 cm3    LV mass 142.56 g    Left Ventricle Relative Wall Thickness 0.34 cm    AV valve area 1.56 cm2    AV Velocity Ratio 0.70     AV index (prosthetic) 0.52     MV valve area p 1/2 method 4.13 cm2    E/A ratio 3.17     Mean e' 0.04 m/s    LVOT area 3.0 cm2    LVOT stroke volume 27.86 cm3    AV peak gradient 3 mmHg    E/E' ratio 26.29 m/s    LV Systolic Volume Index 46.6 mL/m2    LV Diastolic Volume Index 69.56 mL/m2    LA Volume Index 64.5 mL/m2    LV Mass Index 82 g/m2    Triscuspid Valve Regurgitation Peak Gradient 29 mmHg    LA Volume Index (Mod) 61.5 mL/m2    BSA 1.74 m2    Right Atrial Pressure (from IVC) 3 mmHg    EF 25 %    RA area 20.20 cm2    TV rest pulmonary artery pressure 32 mmHg    Narrative    · The left ventricle is normal in size with severely decreased systolic   function.  · The estimated ejection fraction is 25-30%. There is left ventricular   global hypokinesis.  · Grade III left ventricular diastolic dysfunction.  · Mild mitral regurgitation.  · Normal right  ventricular size with low normal right ventricular systolic   function.  · The estimated PA systolic pressure is 32 mmHg.  · Normal central venous pressure (3 mmHg).  · Severe left atrial enlargement.  · A large solid left ventricular thrombus is present. The thrombus is   fixed and located in the apex.     The significant findings were relayed to Dr Barahona at 12:55p on 5/7/22.       Assessment and Plan:     * Cardiogenic shock  82 y.o female with h/o  HFrEF with EF 25-30%, afib previously on coumadin ( had to be changed by pcp given lack of compliance ), cva with L ti residual deficits, medication noncompliance, dementia (with signficant cognitive deficit) who presents to the hospital for AMS. on arrival to the hospital he was found to be in atrial flutter with rate 130s. hypertensive with /100. lactate 4 which later downtrended to 3.5   bedside echo revealed depressed EF with global hypokinesis. mild-mod MR. IVC 1.2 cm and non-collapsible. Pleural effusion noted.  RIJ triple lumen central line was placed. hemodynamics were obtained and were as follow:  svo2 49 CVP 8 CO 1.8 CI 1.2 SVR 4077  subsequently placed on nitro gtt and admitted to ccu      unfortunatley given age, significnat debility post cva, dementia with significant cognitive impairment, and medication noncompliance pt is not a candidate for any advanced options.    will admit her to CCU  nitro gtt for bp reduction  repeat lactate q4h  svo2 and cvp in 2 hrs  will add home antihypertensive agents  empiric abx added and bcx and resp cx obtained- if cont to be negative can de-escalate  Palliative consult in AM given very poor prognosis      History of critical lower limb ischemia  May 2022- no surgical intervention was needed at this time as although studies showed evidence of PAD, patient  Recommendation at the time was for woundcare, compression/elevation for edema, and initiation of ASA/Plavix and high intensity statin- will cont    NSTEMI  Likely  type 2 nstemi in setting of shock/flutter with rvr however type 1 NSTEMI cannot be r/o.  Trop 7 on arrival (remained flat); ECG with atrial flutter with rate 148 and no significant st-t changes. Bedside TTE with global hypokinesis.   Pt currently altered however per niece, never c/o cp/chest discomfort  Currently on DAPT ( for PAD) and heparin ( for AF)- will cont    Atrial flutter  History of paroxysmal atrial fibrillation/flutter  Previously on warfarin however it was stopped givenToprol for rate control at home    Plan:  -- currently on heparin-will cont  -- will hold bb in setting of shock and will utilize amiodarone        VTE Risk Mitigation (From admission, onward)           Ordered     heparin 25,000 units in dextrose 5% (100 units/ml) IV bolus from bag - ADDITIONAL PRN BOLUS - 60 units/kg (max bolus 4000 units)  As needed (PRN)        Question:  Heparin Infusion Adjustment (DO NOT MODIFY ANSWER)  Answer:  [email or web address]\epic\Images\Pharmacy\HeparinInfusions\heparin LOW INTENSITY nomogram for [facility] [id number]    05/21/23 1900     heparin 25,000 units in dextrose 5% (100 units/ml) IV bolus from bag - ADDITIONAL PRN BOLUS - 30 units/kg (max bolus 4000 units)  As needed (PRN)        Question:  Heparin Infusion Adjustment (DO NOT MODIFY ANSWER)  Answer:  [email or web address]\Images\Pharmacy\HeparinInfusions\heparin LOW INTENSITY nomogram for [facility] [id number]    05/21/23 1900     IP VTE HIGH RISK PATIENT  Once         05/21/23 2156     Place sequential compression device  Until discontinued         05/21/23 2156     heparin 25,000 units in dextrose 5% 250 mL (100 units/mL) infusion LOW INTENSITY nomogram - [facility]  Continuous        Question Answer Comment   Heparin Infusion Adjustment (DO NOT MODIFY ANSWER) \\[email or web address]\epic\Images\Pharmacy\HeparinInfusions\heparin LOW INTENSITY nomogram for [facility] [id number]    Begin at (in units/kg/hr) 12        05/21/23 1900                    Jason Melo MD  Cardiology   [facility]  - Surgical Intensive Care

## 2023-05-22 NOTE — HPI
Ms. Temitope Suero is a 83yo lady with history of HTN, HLD, HFrEF with EF 25-30%, Afib on eliquis, CVA with L hemiparesis, NIDDM2, CKD3 (Cr baseline at 1.3), vit D deficiency, medication noncompliance, dementia (with signficant cognitive deficit) who presents to the hospital for AMS. Pt is accompanied by her niece who is the primary caregiver and POA according to PCP notes (Dr Zambrano on 3/20/23. No ACP on file. History is obtained soley from the niece and medical records given mental status. Per niece, yesterday evening pt was complaining of palpitations and was noted to be more confused than her baseline. Symptoms continued to worsen until the morning of 5/21/23 when she was brought to the ED. Upon arrival to the ED she was found to be afebrile, in atrial flutter with rate of 130s. She was given a dose of 5 mg lopressor. Hypertensive at 170/100, on 4L NC to maintain SpO2. Labs notable for Cr 1.9, BNP 2554, Trop 7.2, lactate 4 which later trended down to 3.5. Cardiogenic shock suspected. Palliative Care consulted for GOC discussion with family.

## 2023-05-22 NOTE — PROCEDURES
"Temitope Suero is a 83 y.o. female patient.    Temp: 97.6 °F (36.4 °C) (05/21/23 2240)  Pulse: (!) 138 (05/22/23 0030)  Resp: (!) 39 (05/22/23 0030)  BP: (!) 161/124 (05/22/23 0030)  SpO2: (!) 90 % (05/22/23 0030)  Weight: 54 kg (119 lb 0.8 oz) (05/21/23 2300)  Height: 5' 7" (170.2 cm) (05/21/23 1631)       Central Line    Date/Time: 5/22/2023 2:33 AM  Performed by: Jason Melo MD  Authorized by: Jason Melo MD     Location procedure was performed:  [facility]  Indications:  Hemodynamic monitoring  Preparation:  Skin prepped with ChloraPrep  Skin prep agent dried: Skin prep agent completely dried prior to procedure    Location:  Right internal jugular  Catheter type:  Triple lumen  Catheter size:  7 Fr  Ultrasound guidance: Yes    Vessel Caliber:  Medium  Comprressibility:  Normal  Manometry: Yes    Number of attempts:  1  Securement:  Line sutured  Complications: No      5/22/2023    "

## 2023-05-22 NOTE — NURSING
Patient admitted to SICU room 49727 from ED. Connected to bedside monitor and wall O2 via nasal cannula 4L. Patient tachypnic, tachycardic and hypertensive on arrival to SICU. Heparin and nitroglycerin gtts infusing. Notified Dr. Jason Melo of patient's admission to SICU, MD placed orders. Patient confused and unable to verbalize concerns/answer simple questions. Rodriguez catheter placed. Patient's nieceLinda at bedside attempting to soothe and reassure patient. SICU visiting policy reviewed with patient's niece.

## 2023-05-22 NOTE — CONSULTS
Palliative Medicine Acknowledgement of Consult     Consult completed today. Please see full consult note by Francisco Naik MD, attested by Palliative Medicine provider Chary Boston MD.     Thank you for allowing our team to be a part of the care of this patient.    MARKY Smith, FNP-C  Palliative Medicine ext. [phone]

## 2023-05-22 NOTE — ACP (ADVANCE CARE PLANNING)
Advance Care Planning     Date: 05/22/2023      [facility]  I engaged the family, Linda, in a conversation about advance care planning and we specifically addressed what the goals of care would be moving forward, in light of the patient's change in clinical status, specifically worsening cardiogenic shock.  We did specifically address the patient's likely prognosis, which is poor given worsening heart failure and underlying dementia.  We explored the patient's values and preferences for future care.  The family endorses that what is most important right now is to focus on curative/life-prolongation (regardless of treatment burdens). Patient to remain full code at this time.     Accordingly, we have decided that the best plan to meet the patient's goals includes continuing with treatment    I spent a total of 25 minutes engaging the patient in this advance care planning discussion.

## 2023-05-22 NOTE — CONSULTS
[facility] - Surgical Intensive Care  Palliative Medicine  Consult Note    Patient Name: Temitope Suero  MRN: [id number]  Admission Date: 5/21/2023  Hospital Length of Stay: 1 days  Code Status: Full Code   Attending Provider: Jaquelin Davis MD  Consulting Provider: Francisco Naik MD  Primary Care Physician: Gm Zambrano MD  Principal Problem:Cardiogenic shock    Patient information was obtained from relative(s) and primary team.      Consults  Assessment/Plan:     Palliative Care  Palliative care encounter  Ms Suero is an 82yo lady that was admitted for acute AMS and worsening SOB in setting HFrEF.   Medicolegal  Decision-making:   -Pt does not have decision-making capacity  -Pt has not appointed a HCPOA.  -Marital status: Single  -Children: 1 son, Tej Suero in [facility]    Advance care planning/Advance directives:  -The patient has not previously engaged in advance care planning  -Pt has no scanned advance directives in [facility]    Psychosocial  Support system:  -Spiritual: yes;  Patient is part of a particular susan background: Sikh.  The palliative care  will not be consulted to assess the patient.  -Family: Pt's niece Linda Alfaro is primary care take and verbally appointed POA. Pt's great niece Giacomo Stern (Linda's daughter) also involved in care.    Health status prior to this hospitalization  -Functional status: poor.  Pt was not able to manage ADLs  -Cognitive status: complicated by prior CVA and delerium    -QOL: poor    Prognosis:  -Time and potential for recovery: NA    [facility] Discussion with Niece:  Advance Care Planning I engaged the patient in a conversation about advance care planning and we specifically addressed what the goals of care would be moving forward, in light of the patients change in clinical status, specifically worsening HFrEF and AMS.  We did specifically address the patients likely prognosis, which is poor.  We explored the patients values and preferences for future  care.  The patient endorses that what is most important right now is to focus on     ___Spending time at home  ___Avoiding the hospital  ___Remaining as independent as possible  ___Symptom/pain control  ___Quality of life, even if it means sacrificing a little time  _X_Extending life as long as possible, even it it means sacrificing quality  _X_Other, providing home care    Accordingly, the best plan at this time is work with family who desires for Pt to return home under their care.    I did not explain the role for hospice care at this stage of the patients illness, including its ability to help the patient live with the best quality of life possible.  We will not be making a hospice referral from the clinic today.    I spent a total of 35 minutes engaging the patient in this advance care planning discussion.Advance Care Planning In light of the patients advanced and terminal illness, we reviewed what the patients preferences for care would be at the very end of life.  The patient wishes to have a natural, peaceful death.  Along those lines, the patient does not wish to have CPR or other invasive treatments performed when her heart and/or breathing stops.  I communicated to the patient that a DNR order would be placed in her medical record to reflect this preference.  A DNR form was completed and will be scanned into EPIC.  In addition, a LaPOST form was not completed to reflect other EOL preferences of the patient. I spent a total of 35 minutes engaging the patient in this advance care planning discussion.    Active symptoms  -Dyspnea Continuous oxymetry with supplemental oxygen PRN  -Anxiety/Agitation: Precedex    Summary/Recommendation:  -Most important goals at this time: currently curative/life-prolongation (regardless of treatment burdens) but more discussions are needed for determination  -Code status: Full  -Most appropriate disposition: NA                 Thank you for your consult. I will follow-up  with patient. Please contact us if you have any additional questions.    Subjective:     HPI:   MsRuma Suero is a 81yo lady with history of HTN, HLD, HFrEF with EF 25-30%, Afib on eliquis, CVA with L hemiparesis, NIDDM2, CKD3 (Cr baseline at 1.3), vit D deficiency, medication noncompliance, dementia (with signficant cognitive deficit) who presents to the hospital for AMS. Pt is accompanied by her niece who is the primary caregiver and POA according to PCP notes (Dr Zambrano on 3/20/23. No ACP on file. History is obtained soley from the niece and medical records given mental status. Per niece, yesterday evening pt was complaining of palpitations and was noted to be more confused than her baseline. Symptoms continued to worsen until the morning of 5/21/23 when she was brought to the ED. Upon arrival to the ED she was found to be afebrile, in atrial flutter with rate of 130s. She was given a dose of 5 mg lopressor. Hypertensive at 170/100, on 4L NC to maintain SpO2. Labs notable for Cr 1.9, BNP 2554, Trop 7.2, lactate 4 which later trended down to 3.5. Cardiogenic shock suspected. Palliative Care consulted for GOC discussion with family.      Hospital Course:  No notes on file    I  Past Medical History:   Diagnosis Date    Cancer of left breast, stage 2 11/24/2015    Cataract     Cerebrovascular small vessel disease 04/11/2018    Bi-thalamic lacunar disease, mod/sev periventricular white matter disease, mixed pattern cerebral microbleeds    Cervicalgia 04/17/2018    Chronic anticoagulation - apixaban 04/17/2018    Previous on Xarelto but changed to apixaban 8-2018 due to recurrent embolic stroke.    Chronic systolic heart failure 04/17/2018    Echo 4-2018   1 - Moderately depressed left ventricular systolic function (EF 30-35%).    2 - Biatrial enlargement.    3 - Normal right ventricular systolic function .    4 - Mild mitral regurgitation.    5 - Mild tricuspid regurgitation.    6 - The estimated PA systolic  pressure is 34 mmHg.    7 - Increased central venous pressure.    8 - Left pleural effusion.     CKD stage 3 due to type 2 diabetes mellitus 04/17/2018    Embolic stroke involving left cerebellar artery 08/13/2018    Embolic stroke involving right posterior cerebral artery 04/11/2018    Essential hypertension 10/28/2013    Glaucoma suspect of both eyes 12/09/2013    Hearing loss, sensorineural 12/16/2013    LV (left ventricular) mural thrombus 05/07/2022    Malignant hypertension 08/12/2018    Mixed hyperlipidemia 10/28/2013    Obesity 01/16/2014    Paroxysmal atrial fibrillation 07/10/2012    Stage 3 chronic kidney disease 04/17/2018    Toxic multinodular goiter 10/28/2013    Type 2 diabetes mellitus with stage 3 chronic kidney disease, without long-term current use of insulin 07/10/2012    Diet controlled.   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    Vitamin D deficiency disease 10/28/2013       Past Surgical History:   Procedure Laterality Date    BREAST BIOPSY      BREAST SURGERY      CHOLECYSTECTOMY         Review of patient's allergies indicates:  No Known Allergies    Medications:  Continuous Infusions:   amiodarone in dextrose 5% 1 mg/min (05/22/23 1410)    dexmedeTOMIDine (Precedex) infusion (titrating) 0.1 mcg/kg/hr (05/22/23 1400)    furosemide (LASIX) 10 mg/mL infusion (non-titrating) 10 mg/hr (05/22/23 1449)    heparin (porcine) in D5W 14 Units/kg/hr (05/22/23 1400)    nitroGLYCERIN Stopped (05/22/23 1147)     Scheduled Meds:   aspirin  81 mg Oral Daily    atorvastatin  80 mg Oral Daily    clopidogreL  75 mg Oral Daily    mupirocin   Nasal BID    piperacillin-tazobactam (ZOSYN) IVPB  4.5 g Intravenous Q12H     PRN Meds:heparin (PORCINE), heparin (PORCINE), sodium chloride 0.9%    Family History       Problem Relation (Age of Onset)    Asthma Son    Cancer Sister    Diabetes Paternal Aunt    Glaucoma Father    Heart  disease Father    Hypertension Mother, Father          Tobacco Use    Smoking status: Never    Smokeless tobacco: Never   Substance and Sexual Activity    Alcohol use: No    Drug use: No    Sexual activity: Not Currently       Review of Systems   Unable to perform ROS: Acuity of condition   Objective:     Vital Signs (Most Recent):  Temp: 97 °F (36.1 °C) (warming blanket applied) (05/22/23 1115)  Pulse: (Abnormal) 116 (05/22/23 1445)  Resp: (Abnormal) 38 (05/22/23 1445)  BP: (Abnormal) 149/89 (05/22/23 1445)  SpO2: 100 % (05/22/23 1445) Vital Signs (24h Range):  Temp:  [97 °F (36.1 °C)-98.1 °F (36.7 °C)] 97 °F (36.1 °C)  Pulse:  [] 116  Resp:  [7-53] 38  SpO2:  [90 %-100 %] 100 %  BP: (113-187)/() 149/89     Weight: 54 kg (119 lb)  Body mass index is 18.64 kg/m².       Physical Exam  Vitals and nursing note reviewed.   Constitutional:       General: She is not in acute distress.     Appearance: Normal appearance. She is ill-appearing.      Comments: sleeping   HENT:      Head: Normocephalic and atraumatic.      Nose: Nose normal. No congestion.      Mouth/Throat:      Mouth: Mucous membranes are dry.      Pharynx: Oropharynx is clear.   Eyes:      General: No scleral icterus.        Right eye: No discharge.         Left eye: No discharge.      Extraocular Movements: Extraocular movements intact.   Cardiovascular:      Rate and Rhythm: Normal rate.      Pulses: Normal pulses.           Radial pulses are 2+ on the right side and 2+ on the left side.        Dorsalis pedis pulses are 2+ on the right side and 2+ on the left side.      Heart sounds: Normal heart sounds. No murmur heard.    No gallop.   Pulmonary:      Effort: Pulmonary effort is normal. No respiratory distress.      Breath sounds: Normal breath sounds. No stridor. No wheezing.   Abdominal:      General: Bowel sounds are normal. There is no distension.      Palpations: Abdomen is soft.      Tenderness: There is no guarding.    Musculoskeletal:         General: No swelling, tenderness or deformity.      Right lower leg: No edema.      Left lower leg: No edema.   Skin:     General: Skin is warm.      Capillary Refill: Capillary refill takes less than 2 seconds.      Coloration: Skin is not jaundiced or pale.   Neurological:      Mental Status: She is alert.   Psychiatric:         Mood and Affect: Mood is anxious.         Speech: She is noncommunicative.         Behavior: Behavior is slowed.          Review of Symptoms      Symptom Assessment (ESAS 0-10 Scale)  Pain:  0  Dyspnea:  0  Anxiety:  0  Nausea:  0  Depression:  0  Anorexia:  0  Fatigue:  0  Insomnia:  0  Restlessness:  6  Agitation:  6         Psychosocial/Cultural:   See Palliative Psychosocial Note: No  **Primary  to Follow**  Palliative Care  Consult: Yes    Spiritual:  F - Binta and Belief:  Yazdanism     Time-Based Charting:  Yes  Chart Review: 15 minutes  Symptom Assessment: 10 minutes  Coordination of Care: 5 minutes  Goals of Care: 5 minutes    Total Time Spent: 35 minutes    Advance Care Planning   Advance Directives:   Living Will: No    LaPOST: No    Do Not Resuscitate Status: No    Medical Power of : No        Oral Declaration: Yes   Witnesses:  Dr Zambrano   Agent's Name:  Linda Alfaro   Agent's Contact Number:  [phone]    Decision Making:  Family answered questions  Goals of Care: What is most important right now is to focus on curative/life-prolongation (regardless of treatment burdens). Accordingly, we have decided that the best plan to meet the patient's goals includes continuing with treatment.       Significant Labs: All pertinent labs within the past 24 hours have been reviewed.  CBC:   Recent Labs   Lab 05/22/23  0330   WBC 6.69   HGB 10.7*   HCT 34.5*   MCV 79*        BMP:  Recent Labs   Lab 05/22/23  0330   *   *   K 4.2   *   CO2 20*   BUN 46*   CREATININE 1.8*   CALCIUM 9.0   MG 2.0      LFT:  Lab Results   Component Value Date    AST 40 05/22/2023    ALKPHOS 134 05/22/2023    BILITOT 1.3 (H) 05/22/2023     Albumin:   Albumin   Date Value Ref Range Status   05/22/2023 2.9 (L) 3.5 - 5.2 g/dL Final     Protein:   Total Protein   Date Value Ref Range Status   05/22/2023 5.9 (L) 6.0 - 8.4 g/dL Final     Lactic acid:   Lab Results   Component Value Date    LACTATE 2.8 (H) 05/22/2023    LACTATE 2.9 (H) 05/22/2023       Significant Imaging: I have reviewed all pertinent imaging results/findings within the past 24 hours.        > 50% of 35 min visit spent in chart review, face to face discussion of goals of care,  symptom assessment, coordination of care and emotional support.    Francisco Naik MD  Palliative Medicine  [facility] - Surgical Intensive Care

## 2023-05-22 NOTE — SUBJECTIVE & OBJECTIVE
Past Medical History:   Diagnosis Date    Cancer of left breast, stage 2 11/24/2015    Cataract     Cerebrovascular small vessel disease 04/11/2018    Bi-thalamic lacunar disease, mod/sev periventricular white matter disease, mixed pattern cerebral microbleeds    Cervicalgia 04/17/2018    Chronic anticoagulation - apixaban 04/17/2018    Previous on Xarelto but changed to apixaban 8-2018 due to recurrent embolic stroke.    Chronic systolic heart failure 04/17/2018    Echo 4-2018   1 - Moderately depressed left ventricular systolic function (EF 30-35%).    2 - Biatrial enlargement.    3 - Normal right ventricular systolic function .    4 - Mild mitral regurgitation.    5 - Mild tricuspid regurgitation.    6 - The estimated PA systolic pressure is 34 mmHg.    7 - Increased central venous pressure.    8 - Left pleural effusion.     CKD stage 3 due to type 2 diabetes mellitus 04/17/2018    Embolic stroke involving left cerebellar artery 08/13/2018    Embolic stroke involving right posterior cerebral artery 04/11/2018    Essential hypertension 10/28/2013    Glaucoma suspect of both eyes 12/09/2013    Hearing loss, sensorineural 12/16/2013    LV (left ventricular) mural thrombus 05/07/2022    Malignant hypertension 08/12/2018    Mixed hyperlipidemia 10/28/2013    Obesity 01/16/2014    Paroxysmal atrial fibrillation 07/10/2012    Stage 3 chronic kidney disease 04/17/2018    Toxic multinodular goiter 10/28/2013    Type 2 diabetes mellitus with stage 3 chronic kidney disease, without long-term current use of insulin 07/10/2012    Diet controlled.    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     Current Outpatient Medications on File Prior to Encounter   Medication Sig    aspirin 81 MG Chew Take 1 tablet (81 mg total) by mouth once daily.    atorvastatin (LIPITOR) 80 MG tablet Take 1 tablet (80 mg total) by mouth once daily.    clopidogreL (PLAVIX) 75 mg tablet Take 1 tablet (75 mg total) by mouth once daily.    latanoprost 0.005 % ophthalmic solution Place 1 drop into both eyes every evening.    metoprolol succinate (TOPROL-XL) 100 MG 24 hr tablet Take 1 tablet (100 mg total) by mouth once daily.    [DISCONTINUED] benazepriL (LOTENSIN) 20 MG tablet Take 1 tablet (20 mg total) by mouth once daily.    [DISCONTINUED] carvediloL (COREG) 25 MG tablet Take 1 tablet (25 mg total) by mouth 2 (two) times daily with meals.    [DISCONTINUED] travoprost, benzalkonium, (TRAVATAN) 0.004 % ophthalmic solution Place 1 drop into both eyes every evening.     Family History       Problem Relation (Age of Onset)    Asthma Son    Cancer Sister    Diabetes Paternal Aunt    Glaucoma Father    Heart disease Father    Hypertension Mother, Father          Tobacco Use    Smoking status: Never    Smokeless tobacco: Never   Substance and Sexual Activity    Alcohol use: No    Drug use: No    Sexual activity: Not Currently     Review of Systems   Unable to perform ROS: Mental status change   Objective:     Vital Signs (Most Recent):  Temp: 97.6 °F (36.4 °C) (05/21/23 2240)  Pulse: (!) 138 (05/22/23 0030)  Resp: (!) 39 (05/22/23 0030)  BP: (!) 161/124 (05/22/23 0030)  SpO2: (!) 90 % (05/22/23 0030) Vital Signs (24h Range):  Temp:  [97.6 °F (36.4 °C)-98.1 °F (36.7 °C)] 97.6 °F (36.4 °C)  Pulse:  [] 138  Resp:  [11-53] 39  SpO2:  [90 %-99 %] 90 %  BP: (121-187)/() 161/124     Weight: 54 kg (119 lb 0.8 oz)  Body mass index is 18.65 kg/m².    SpO2: (!) 90 %         Intake/Output Summary (Last 24 hours) at 5/22/2023 0210  Last data filed at 5/22/2023 0015  Gross per 24 hour   Intake 258 ml   Output 190 ml    Net 68 ml       Lines/Drains/Airways       Central Venous Catheter Line  Duration             Percutaneous Central Line Insertion/Assessment - Triple Lumen  05/21/23 2015 Internal Jugular Right <1 day              Drain  Duration                  Urethral Catheter 05/21/23 2249 <1 day              Peripheral Intravenous Line  Duration                  Peripheral IV - Single Lumen 05/21/23 1749 20 G Anterior;Proximal;Right Forearm <1 day         Peripheral IV - Single Lumen 05/21/23 2213 18 G Anterior;Right Upper Arm <1 day                     Physical Exam  Vitals and nursing note reviewed.   Constitutional:       Appearance: She is ill-appearing.   HENT:      Head: Normocephalic.      Mouth/Throat:      Mouth: Mucous membranes are moist.   Eyes:      Conjunctiva/sclera: Conjunctivae normal.   Cardiovascular:      Rate and Rhythm: Tachycardia present. Rhythm irregular.      Heart sounds: Murmur heard.   Abdominal:      General: Abdomen is flat. Bowel sounds are normal. There is no distension.   Musculoskeletal:      Right lower leg: No edema.      Left lower leg: No edema.   Neurological:      Mental Status: She is disoriented.        Significant Labs: CMP   Recent Labs   Lab 05/21/23  1745   *   K 5.4*   *   CO2 17*   *   BUN 40*   CREATININE 1.9*   CALCIUM 9.7   PROT 7.2   ALBUMIN 3.4*   BILITOT 1.0   ALKPHOS 129   AST 36   ALT 18   ANIONGAP 16   , CBC   Recent Labs   Lab 05/21/23  1745 05/21/23  1757 05/21/23  1946   WBC 8.06  --  7.98   HGB 12.8  --  13.4   HCT 44.5   < > 44.2     --  213    < > = values in this interval not displayed.   , and Troponin   Recent Labs   Lab 05/21/23 1745 05/21/23 2126   TROPONINI 7.942* 7.236*       Significant Imaging: Echocardiogram: Transthoracic echo (TTE) complete (Cupid Only):   Results for orders placed or performed during the hospital encounter of 05/05/22   Echo   Result Value Ref Range    Ascending aorta 3.63 cm    STJ 2.40 cm    AV mean  gradient 2 mmHg    Ao peak matty 0.88 m/s    Ao VTI 17.82 cm    IVRT 68.51 msec    IVS 0.78 0.6 - 1.1 cm    LA size 4.42 cm    Left Atrium Major Axis 6.45 cm    Left Atrium Minor Axis 7.00 cm    LVIDd 5.05 3.5 - 6.0 cm    LVIDs 4.26 (A) 2.1 - 4.0 cm    LVOT diameter 1.96 cm    LVOT peak VTI 9.24 cm    Posterior Wall 0.86 0.6 - 1.1 cm    MV Peak A Matty 0.29 m/s    E wave deceleration time 183.72 msec    MV Peak E Matty 0.92 m/s    RA Major Axis 5.30 cm    RA Width 4.20 cm    RVDD 2.63 cm    Sinus 2.93 cm    TAPSE 1.95 cm    TR Max Matty 2.70 m/s    TDI LATERAL 0.04 m/s    TDI SEPTAL 0.03 m/s    LA WIDTH 4.45 cm    MV stenosis pressure 1/2 time 53.28 ms    LV Diastolic Volume 121.03 mL    LV Systolic Volume 81.09 mL    LVOT peak matty 0.62 m/s    LA volume (mod) 107.00 cm3    LV LATERAL E/E' RATIO 23.00 m/s    LV SEPTAL E/E' RATIO 30.67 m/s    FS 16 %    LA volume 112.24 cm3    LV mass 142.56 g    Left Ventricle Relative Wall Thickness 0.34 cm    AV valve area 1.56 cm2    AV Velocity Ratio 0.70     AV index (prosthetic) 0.52     MV valve area p 1/2 method 4.13 cm2    E/A ratio 3.17     Mean e' 0.04 m/s    LVOT area 3.0 cm2    LVOT stroke volume 27.86 cm3    AV peak gradient 3 mmHg    E/E' ratio 26.29 m/s    LV Systolic Volume Index 46.6 mL/m2    LV Diastolic Volume Index 69.56 mL/m2    LA Volume Index 64.5 mL/m2    LV Mass Index 82 g/m2    Triscuspid Valve Regurgitation Peak Gradient 29 mmHg    LA Volume Index (Mod) 61.5 mL/m2    BSA 1.74 m2    Right Atrial Pressure (from IVC) 3 mmHg    EF 25 %    RA area 20.20 cm2    TV rest pulmonary artery pressure 32 mmHg    Narrative    · The left ventricle is normal in size with severely decreased systolic   function.  · The estimated ejection fraction is 25-30%. There is left ventricular   global hypokinesis.  · Grade III left ventricular diastolic dysfunction.  · Mild mitral regurgitation.  · Normal right ventricular size with low normal right ventricular systolic   function.  · The  estimated PA systolic pressure is 32 mmHg.  · Normal central venous pressure (3 mmHg).  · Severe left atrial enlargement.  · A large solid left ventricular thrombus is present. The thrombus is   fixed and located in the apex.     The significant findings were relayed to Dr Barahona at 12:55p on 5/7/22.

## 2023-05-22 NOTE — HPI
patient is a 82 y.o female with h/o htn, hld, HFrEF with EF 25-30%, afib previously on coumadin ( had to be changed by pcp given lack of compliance ), cva with L ti residual deficits, medication noncompliance, dementia (with signficant cognitive deficit) who presents to the hospital for AMS. She is accompanied by her niece who is the primary caregiver. history is obtained soley from the niece and medical records given mental status. Per niece, yesterday evening pt was complaining of palpitations and was noted to be more confused than her baseline. sxs worsened until the following AM where she was brought to the ED. Upon arrival to the ED she was found to be afebrile, in atrial flutter with rate of 130s ( recieved a dose of 5 mg lopressor at the time at ED), hypertensive with /100, on 4L NC. labs notable for cr 1.9 ( bl 1.2), BNP 2554, Trop 7.2, lactate 4 which later trended to 3.5    cardiology was initially consulted given concern for shock. evaluated at bedside. she appeared altered. bedside echo revealed depressed EF with global hypokinesis. mild-mod MR. IVC 1.2 cm and non-collapsible.  RIJ triple lumen central line was placed. hemodynamics were obtained and wre as follow:  svo2 49 CVP 8 CO 1.8 CI 1.2 SVR 4077  subsequently placed on nitro gtt and admitted to ccu       since her stroke she has had L sided deficits. she is completely dependent on her niece for her ADLs and IADLs. per her last PCP note ( Dr. Zambrano on 3/13) given worsening of her cognitive abilities and level of compliance she is now under more palliative type care.

## 2023-05-22 NOTE — PROGRESS NOTES
Pharmacist Renal Dose Adjustment Note    Temitope Suero is a 83 y.o. female being treated with piperacillin / tazobactam for sepsis of unknown source.    Patient Data:    Vital Signs (Most Recent):  Temp: 97.8 °F (36.6 °C) (05/21/23 2109)  Pulse: (!) 142 (05/21/23 2145)  Resp: 20 (05/21/23 2145)  BP: (!) 160/117 (05/21/23 2145)  SpO2: 98 % (05/21/23 2145) Vital Signs (72h Range):  Temp:  [97.8 °F (36.6 °C)-98.1 °F (36.7 °C)]   Pulse:  []   Resp:  [11-38]   BP: (121-187)/()   SpO2:  [94 %-99 %]      Recent Labs   Lab 05/21/23 1745   CREATININE 1.9*     Serum creatinine:  1.9 mg/dL (H) 05/21/23 1745  Estimated creatinine clearance:  16.7 mL/min (A)    Piperacillin / tazobactam 4.5 grams IVPB every 8 hours will be changed to piperacillin / tazobactam 4.5 grams IVPB every 12 hours extended infusion.     Pharmacist's Name:  Marilee Mcclain  Pharmacist's Extension:  [phone]

## 2023-05-22 NOTE — PLAN OF CARE
[facility] - [facility] Intensive Care  Initial Discharge Assessment       Primary Care Provider: Gm Zambrano MD    Admission Diagnosis: Palpitations [R00.2]  A-fib [I48.91]  ACS (acute coronary syndrome) [I24.9]  Encounter for central line placement [Z45.2]  NSTEMI (non-ST elevated myocardial infarction) [I21.4]    Admission Date: 5/21/2023  Expected Discharge Date:          Payor: BLUE CROSS BLUE SHIELD / Plan: BCBS [facility] LOCAL PLUS / Product Type: Commercial /     Extended Emergency Contact Information  Primary Emergency Contact: DominicLinda  Address: [address] [place] [address] .           [place] [address] [facility]  Home Phone: [phone]  Relation: Relative    Discharge Plan A: Home with family  Discharge Plan B: Home Health      [id number] - [place], [place] - [address]  [address]  [place] [address]  Phone: [phone] Fax: [phone]      Initial Assessment (most recent)       Adult Discharge Assessment - 05/22/23 0912          Discharge Assessment    Assessment Type Discharge Planning Assessment     Confirmed/corrected address, phone number and insurance Yes     Confirmed Demographics Correct on Facesheet     Source of Information family     Communicated WALDEMAR with patient/caregiver Date not available/Unable to determine     Reason For Admission Cardiogenic shock     People in Home other relative(s)     Do you expect to return to your current living situation? Yes     Do you have help at home or someone to help you manage your care at home? Yes     Who are your caregiver(s) and their phone number(s)? Linda Alfaro [phone]     Home Layout Able to live on 1st floor     Equipment Currently Used at Home wheelchair     Patient currently being followed by outpatient case management? No     Do you currently have service(s) that help you manage your care at home? No     Do you take prescription medications? No     Do you have prescription coverage?  Yes     Coverage [facility] SHIELD - [facility]     Is the patient taking medications as prescribed? no     If no, which medications is patient not taking? The patient niece reported that the patient does not take her medications as prescribed.     Who is going to help you get home at discharge? Linda Alfaro     Are you on dialysis? No     Do you take coumadin? No     Discharge Plan A Home with family     Discharge Plan B Home Health     DME Needed Upon Discharge  other (see comments)   TBD    Discharge Plan discussed with: Caregiver     Name(s) and Number(s) Linda Alfaro [phone]        Physical Activity    On average, how many days per week do you engage in moderate to strenuous exercise (like a brisk walk)? Patient refused     On average, how many minutes do you engage in exercise at this level? Patient refused        Financial Resource Strain    How hard is it for you to pay for the very basics like food, housing, medical care, and heating? Patient refused        Housing Stability    In the last 12 months, was there a time when you were not able to pay the mortgage or rent on time? Patient refused     In the last 12 months, was there a time when you did not have a steady place to sleep or slept in a shelter (including now)? Patient refused        Transportation Needs    In the past 12 months, has lack of transportation kept you from medical appointments or from getting medications? Patient refused     In the past 12 months, has lack of transportation kept you from meetings, work, or from getting things needed for daily living? Patient refused        Food Insecurity    Within the past 12 months, you worried that your food would run out before you got the money to buy more. Patient refused     Within the past 12 months, the food you bought just didn't last and you didn't have money to get more. Patient refused        Stress    Do you feel stress - tense, restless, nervous, or  anxious, or unable to sleep at night because your mind is troubled all the time - these days? Patient refused        Social Connections    In a typical week, how many times do you talk on the phone with family, friends, or neighbors? Patient refused     How often do you get together with friends or relatives? Patient refused     How often do you attend Rastafarian or Religion services? Patient refused     Do you belong to any clubs or organizations such as Rastafarian groups, unions, fraternal or athletic groups, or school groups? Patient refused     How often do you attend meetings of the clubs or organizations you belong to? Patient refused     Are you , , , , never , or living with a partner? Patient refused        Alcohol Use    Q1: How often do you have a drink containing alcohol? Patient refused     Q2: How many drinks containing alcohol do you have on a typical day when you are drinking? Patient refused     Q3: How often do you have six or more drinks on one occasion? Patient refused                   This SW contacted the patient's niece Linda Alfaro to complete DPA. Questions answered / contact numbers provided.  Use PREFERRED PHARMACY / BEDSIDE DELIVERY for any necessary medications at time of discharge. The patient's niece reported that the patient has a wheelchair,  is not on HD, BTs or home oxygen.The patient's niece will be assisting with help upon discharge. The patient's niece will be providing transportation home. Hospital follow up will be scheduled with PCP. Will continue to follow for course of hospitalization.     Adria Anna LMSW  Case Management [facility]  Ext: [phone]

## 2023-05-22 NOTE — ASSESSMENT & PLAN NOTE
History of paroxysmal atrial fibrillation/flutter  Previously on warfarin however it was stopped givenToprol for rate control at home    Plan:  -- currently on heparin-will cont  -- will hold bb in setting of shock and will utilize amiodarone

## 2023-05-22 NOTE — PLAN OF CARE
Increased confusion and agitation noted since admit from ED.  aware. Amiodarone gtt initiated. Heart rate remains 130s in a-fib. Heparin and nitroglycerin gtts continued. Pertinent labs available to primary team to review. Patient too restless and agitated to allow for collection of blood cultures. SpO2 > 95% on room air. SBP maintained < 160. DBP has not decreased to below 100. Obtained a CVP of 7 when patient was calm enough to lay flat. Rodriguez catheter placed with  since placement, MD aware of oliguria. Patient's niece Linda was present at bedside, then returned home. She stated she will return this morning. Plan of care reviewed with patient's niece. Plan to consult palliative service today for a goals of care discussion. Bed in low position and brake set. Bed alarm set as an additional safety precaution. See flowsheets for detailed assessment.

## 2023-05-22 NOTE — CONSULTS
[facility] - [facility]ensive [facility]  Wound Care    Patient Name:  Temitope Suero   MRN:  [id number]  Date: 5/22/2023  Diagnosis: Cardiogenic shock    History:     Past Medical History:   Diagnosis Date    Cancer of left breast, stage 2 11/24/2015    Cataract     Cerebrovascular small vessel disease 04/11/2018    Bi-thalamic lacunar disease, mod/sev periventricular white matter disease, mixed pattern cerebral microbleeds    Cervicalgia 04/17/2018    Chronic anticoagulation - apixaban 04/17/2018    Previous on Xarelto but changed to apixaban 8-2018 due to recurrent embolic stroke.    Chronic systolic heart failure 04/17/2018    Echo 4-2018   1 - Moderately depressed left ventricular systolic function (EF 30-35%).    2 - Biatrial enlargement.    3 - Normal right ventricular systolic function .    4 - Mild mitral regurgitation.    5 - Mild tricuspid regurgitation.    6 - The estimated PA systolic pressure is 34 mmHg.    7 - Increased central venous pressure.    8 - Left pleural effusion.     CKD stage 3 due to type 2 diabetes mellitus 04/17/2018    Embolic stroke involving left cerebellar artery 08/13/2018    Embolic stroke involving right posterior cerebral artery 04/11/2018    Essential hypertension 10/28/2013    Glaucoma suspect of both eyes 12/09/2013    Hearing loss, sensorineural 12/16/2013    LV (left ventricular) mural thrombus 05/07/2022    Malignant hypertension 08/12/2018    Mixed hyperlipidemia 10/28/2013    Obesity 01/16/2014    Paroxysmal atrial fibrillation 07/10/2012    Stage 3 chronic kidney disease 04/17/2018    Toxic multinodular goiter 10/28/2013    Type 2 diabetes mellitus with stage 3 chronic kidney disease, without long-term current use of insulin 07/10/2012    Diet controlled.    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    Vitamin D deficiency disease 10/28/2013       Social History     Socioeconomic History    Marital status: Single    Number of  children: 1   Occupational History     Employer: [employer]   Tobacco Use    Smoking status: Never    Smokeless tobacco: Never   Substance and Sexual Activity    Alcohol use: No    Drug use: No    Sexual activity: Not Currently     Social Determinants of Health     Financial Resource Strain: Unknown    Difficulty of Paying Living Expenses: Patient refused   Food Insecurity: Unknown    Worried About Running Out of Food in the Last Year: Patient refused    Ran Out of Food in the Last Year: Patient refused   Transportation Needs: Unknown    Lack of Transportation (Medical): Patient refused    Lack of Transportation (Non-Medical): Patient refused   Physical Activity: Unknown    Days of Exercise per Week: Patient refused    Minutes of Exercise per Session: Patient refused   Stress: Unknown    Feeling of Stress : Patient refused   Social Connections: Unknown    Frequency of Communication with Friends and Family: Patient refused    Frequency of Social Gatherings with Friends and Family: Patient refused    Attends Cheondoism Services: Patient refused    Active Member of Clubs or Organizations: Patient refused    Attends Club or Organization Meetings: Patient refused    Marital Status: Patient refused   Housing Stability: Unknown    Unable to Pay for Housing in the Last Year: Patient refused    Unstable Housing in the Last Year: Patient refused       Precautions:     Allergies as of 05/21/2023    (No Known Allergies)       [facility] Assessment Details/Treatment   Patient seen for wound care consultation. - Primary RN with request not to turn pt for assmt due to current pt condition. MD also present.    Reviewed chart for this encounter.   See Flow Sheet for findings.    Per chart review, pt with present on admission altered skin integrity to B buttocks. Appears as incontinence associated dermatitis v. moisture injury etiology. Primary nurse with report that the border foam dressings have had to be changed due to stool  incontinence. Will recommend for Triad hydrophilic barrier for added protection from incontinence and to support continued healing along with continued pressure injury prevention interventions. Pending specialty bed order in place- Immerse surface which will assist with microclimate management and assist with decreased turn surfaces present - with low air loss immersion.     RECOMMENDATIONS:  B buttocks- altered skin integrity: 1)clean area with cleansing wipes.  2)liberally apply Triad barrier to buttock. Perform care twice daily and after each incontinent episode.    Skin Integrity ZHOU Millie Milligan NP updated.  Discussed POC with primary RN.     Nursing to continue care.  Nursing to maintain pressure injury prevention interventions.  Current Eduardo score is 14 with a nutrition sub-scale score of 1. Will ensure RD is consulted for recommendations.      B buttocks- photo obtained from chart review; taken 5/22/23.       05/22/23 1415   WOCN Assessment   WOCN Total Time (mins) 15   Visit Date 05/22/23   Visit Time 1415   Consult Type New   WOCN Speciality Wound   Intervention coordination of care;orders     Will continue to follow as needed and/ or directed.    Sarah ABDIN, RN, CWOCN  05/22/2023

## 2023-05-23 PROBLEM — Z91.89 AT HIGH RISK FOR SKIN BREAKDOWN: Status: ACTIVE | Noted: 2023-01-01

## 2023-05-23 NOTE — SUBJECTIVE & OBJECTIVE
Scheduled Meds:   aspirin  81 mg Oral Daily    atorvastatin  80 mg Oral Daily    clopidogreL  75 mg Oral Daily    mupirocin   Nasal BID    piperacillin-tazobactam (ZOSYN) IVPB  4.5 g Intravenous Q12H     Continuous Infusions:   amiodarone in dextrose 5% 1 mg/min (05/23/23 1100)    DOBUTamine IV infusion (non-titrating) 5 mcg/kg/min (05/23/23 1100)    DOPamine 2 mcg/kg/min (05/23/23 1119)    furosemide (LASIX) 10 mg/mL infusion (non-titrating) 10 mg/hr (05/23/23 1100)    heparin (porcine) in D5W 14 Units/kg/hr (05/23/23 1100)    nitric oxide gas      nitroGLYCERIN Stopped (05/22/23 1147)     PRN Meds:heparin (PORCINE), heparin (PORCINE), sodium chloride 0.9%    Review of patient's allergies indicates:  No Known Allergies     Past Medical History:   Diagnosis Date    Cancer of left breast, stage 2 11/24/2015    Cataract     Cerebrovascular small vessel disease 04/11/2018    Bi-thalamic lacunar disease, mod/sev periventricular white matter disease, mixed pattern cerebral microbleeds    Cervicalgia 04/17/2018    Chronic anticoagulation - apixaban 04/17/2018    Previous on Xarelto but changed to apixaban 8-2018 due to recurrent embolic stroke.    Chronic systolic heart failure 04/17/2018    Echo 4-2018   1 - Moderately depressed left ventricular systolic function (EF 30-35%).    2 - Biatrial enlargement.    3 - Normal right ventricular systolic function .    4 - Mild mitral regurgitation.    5 - Mild tricuspid regurgitation.    6 - The estimated PA systolic pressure is 34 mmHg.    7 - Increased central venous pressure.    8 - Left pleural effusion.     CKD stage 3 due to type 2 diabetes mellitus 04/17/2018    Embolic stroke involving left cerebellar artery 08/13/2018    Embolic stroke involving right posterior cerebral artery 04/11/2018    Essential hypertension 10/28/2013    Glaucoma suspect of both eyes 12/09/2013    Hearing loss, sensorineural 12/16/2013    LV (left ventricular) mural thrombus 05/07/2022    Malignant  hypertension 08/12/2018    Mixed hyperlipidemia 10/28/2013    Obesity 01/16/2014    Paroxysmal atrial fibrillation 07/10/2012    Stage 3 chronic kidney disease 04/17/2018    Toxic multinodular goiter 10/28/2013    Type 2 diabetes mellitus with stage 3 chronic kidney disease, without long-term current use of insulin 07/10/2012    Diet controlled.    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     Objective:     Vital Signs (Most Recent):  Temp: 97.7 °F (36.5 °C) (05/23/23 1101)  Pulse: 82 (05/23/23 1115)  Resp: (!) 29 (05/23/23 1115)  BP: 122/71 (05/23/23 1115)  SpO2: 100 % (05/23/23 1115) Vital Signs (24h Range):  Temp:  [97.6 °F (36.4 °C)-97.7 °F (36.5 °C)] 97.7 °F (36.5 °C)  Pulse:  [] 82  Resp:  [4-43] 29  SpO2:  [97 %-100 %] 100 %  BP: (101-178)/() 122/71     Weight: 54 kg (119 lb)  Body mass index is 18.64 kg/m².     Physical Exam  Constitutional:       Appearance: Normal appearance.   Skin:     General: Skin is warm and dry.      Findings: Lesion present.   Neurological:      Mental Status: She is alert.        Laboratory:  All pertinent labs reviewed within the last 24 hours.    Diagnostic Results:  None

## 2023-05-23 NOTE — ASSESSMENT & PLAN NOTE
- consult received for evaluation of skin injury.  - pt known to wound care dept and seen in the past for a moisture related injury.  - sacral region with pale pink scar tissue from a previous injury likely moisture related.  - pt at increased risk of new skin breakdown due to recent injury, immobility, and current illness.  - currently on Immerse surface.  - continue Triad bid/prn.  - pillows/wedge for offloading.  - foam dressing in place.  - ramos catheter.  - RN at bedside and assisted with turning.  - encourage turning q2h.  - nursing to maintain pressure injury prevention measures and continue wound care per orders.

## 2023-05-23 NOTE — CARE UPDATE
Hemodynamics Care Update Note     8 AM -  5, Mango 10 PPM, Lasix 10mg/hr  SVO2: 62  CVP: 15  CO: 3.4  CI: 2.1  SVR: 2025    2 PM -  5, Mango 10 PPM, Lasix 10mg/hr - Trial of dopamine for 4 hours at 2.5 mg/hr  SVO2: 60  CVP: 15  CO 3.2  CI: 2.0  SVR: 1500     Given improvement in Svr, will continue dopamine

## 2023-05-23 NOTE — PROGRESS NOTES
[facility] - [facility]  Cardiology  Progress Note    Patient Name: Temitope Suero  MRN: [id number]  Admission Date: 5/21/2023  Hospital Length of Stay: 2 days  Code Status: Full Code   Attending Physician: Jaquelin Davis MD   Primary Care Physician: Gm Zambrano MD  Expected Discharge Date: 5/25/2023  Principal Problem:Cardiogenic shock    Subjective:     Hospital Course:   Patient admitted to CCU for cardiogenic shock. Patient initially started on nitroglycerin for afterload reduction. She was additionally started on lasix and dobutamine infusion 5/22. Patient started on inhaled nitric oxide and weaned off NG infusion. Trial of dopamine infusion for further preload reduction. Extensive goals of care conversation with patient's family and assistance from palliative care. Plan for full code and life-saving/curative measures per discussion.      Interval History:  increased overnight. Discontinued NG gtt. Mentation stable. Renal function worsening. Started trial of dopamine.    Review of Systems   Unable to perform ROS: Mental status change   Objective:     Vital Signs (Most Recent):  Temp: 97.7 °F (36.5 °C) (05/23/23 1101)  Pulse: 76 (05/23/23 1300)  Resp: (!) 29 (05/23/23 1300)  BP: 96/60 (05/23/23 1300)  SpO2: 100 % (05/23/23 1300) Vital Signs (24h Range):  Temp:  [97.6 °F (36.4 °C)-97.7 °F (36.5 °C)] 97.7 °F (36.5 °C)  Pulse:  [] 76  Resp:  [4-38] 29  SpO2:  [98 %-100 %] 100 %  BP: ()/() 96/60     Weight: 54 kg (119 lb)  Body mass index is 18.64 kg/m².     SpO2: 100 %         Intake/Output Summary (Last 24 hours) at 5/23/2023 1332  Last data filed at 5/23/2023 1200  Gross per 24 hour   Intake 1457.19 ml   Output 3350 ml   Net -1892.81 ml       Lines/Drains/Airways       Central Venous Catheter Line  Duration             Percutaneous Central Line Insertion/Assessment - Triple Lumen  05/21/23 2015 Internal Jugular Right 1 day              Drain  Duration                  Urethral  Catheter 05/21/23 2249 1 day              Peripheral Intravenous Line  Duration                  Peripheral IV - Single Lumen 05/21/23 1500 20 G Posterior;Right Hand 1 day         Peripheral IV - Single Lumen 05/21/23 1749 20 G Anterior;Proximal;Right Forearm 1 day         Peripheral IV - Single Lumen 05/21/23 2213 18 G Anterior;Right Upper Arm 1 day                       Physical Exam  Vitals and nursing note reviewed.   Constitutional:       Appearance: She is ill-appearing.   HENT:      Head: Normocephalic.      Mouth/Throat:      Mouth: Mucous membranes are moist.   Eyes:      Conjunctiva/sclera: Conjunctivae normal.   Cardiovascular:      Rate and Rhythm: Tachycardia present. Rhythm irregular.      Heart sounds: Murmur heard.   Abdominal:      General: Abdomen is flat. Bowel sounds are normal. There is no distension.   Musculoskeletal:      Right lower leg: No edema.      Left lower leg: No edema.   Neurological:      Mental Status: She is disoriented.          Significant Labs: All pertinent lab results from the last 24 hours have been reviewed.    Significant Imaging:  Reviewed    Assessment and Plan:     * Cardiogenic shock  82 y.o female with h/o  HFrEF with EF 25-30%, afib previously on coumadin ( had to be changed by pcp given lack of compliance ), cva with L ti residual deficits, medication noncompliance, dementia (with signficant cognitive deficit) who presents to the hospital for AMS. on arrival to the hospital he was found to be in atrial flutter with rate 130s. hypertensive with /100. lactate 4 which later downtrended to 3.5   bedside echo revealed depressed EF with global hypokinesis. mild-mod MR. IVC 1.2 cm and non-collapsible. Pleural effusion noted.    RIJ triple lumen central line was placed on admit. Admission hemodynamics were obtained and were as follows:  svo2 49 CVP 8 CO 1.8 CI 1.2 SVR 4077    Patient subsequently placed on nitro gtt and admitted to ccu. Unfortunatley given age,  significnat debility post cva, dementia with significant cognitive impairment, and medication noncompliance pt is not a candidate for any advanced options.     Extensive discussed with patient's niece, Linda. She was explained patient's poor prognosis and limited baseline but would like to maintain full code with continued treatment/life-saving measures.    Hemodynamics 5/23 on  5 and inhaled NO  SvO2: 69  CO: 2.8  CI: 2.6  CVP: 7  SVR: 3171    - continue  5  - continue inhaled NO  - trial of dopamine infusion for further preload reduction  - continue lasix infusion  - q4 hour SvO2 and lactate  - discontinue zosyn given low suspicion for infection  - palliative care following      At high risk for skin breakdown  Wound care following    Hypernatremia  Given 1/2 NS on admission with some improvement    History of critical lower limb ischemia  May 2022- no surgical intervention was needed at this time as although studies showed evidence of PAD, patient  Recommendation at the time was for woundcare, compression/elevation for edema, and initiation of ASA/Plavix and high intensity statin- will cont    Atrial flutter  History of paroxysmal atrial fibrillation/flutter  Previously on warfarin however it was stopped given Toprol for rate control at home    Plan:  -- currently on heparin-will cont  -- will hold bb in setting of shock and continue amiodarone    NSTEMI (non-ST elevated myocardial infarction)  Will treat as type 2 NSTEMI but she continues on DAPT for PAD as well as heparin infusion for afib    Acute renal failure  Baseline creatinine approximately 1.2, worsening to 2.0 on 5/23 likely in the setting of cardiogenic shock. Will continue to monitor with resolution of cardiogenic shock    Acute on chronic systolic heart failure  Echo 5/22:   The estimated ejection fraction is 15-20%. No LV apical thrombus is present   The quantitatively derived ejection fraction is 18%.   The left ventricle is normal in  size with eccentric hypertrophy and severely decreased systolic function.   There is severe left ventricular global hypokinesis.   Indeterminate left ventricular diastolic function.   Normal right ventricular size with mildly reduced right ventricular systolic function.   Severe left atrial enlargement.   Mild right atrial enlargement.   Mild-to-moderate mitral regurgitation.   There is pulmonary hypertension.   The estimated PA systolic pressure is 47 mmHg.   Elevated central venous pressure (15 mmHg).    - see cardiogenic shock    History of stroke  Previous CVA with residual weakness and severe dementia        VTE Risk Mitigation (From admission, onward)         Ordered     heparin 25,000 units in dextrose 5% (100 units/ml) IV bolus from bag - ADDITIONAL PRN BOLUS - 60 units/kg (max bolus 4000 units)  As needed (PRN)        Question:  Heparin Infusion Adjustment (DO NOT MODIFY ANSWER)  Answer:  \[email or web address]\epic\Images\Pharmacy\HeparinInfusions\heparin LOW INTENSITY nomogram for [facility] [id number]    05/21/23 1900     heparin 25,000 units in dextrose 5% (100 units/ml) IV bolus from bag - ADDITIONAL PRN BOLUS - 30 units/kg (max bolus 4000 units)  As needed (PRN)        Question:  Heparin Infusion Adjustment (DO NOT MODIFY ANSWER)  Answer:  \[email or web address]\epic\Images\Pharmacy\HeparinInfusions\heparin LOW INTENSITY nomogram for [facility] [id number]    05/21/23 1900     IP VTE HIGH RISK PATIENT  Once         05/21/23 2156     Place sequential compression device  Until discontinued         05/21/23 2156     heparin 25,000 units in dextrose 5% 250 mL (100 units/mL) infusion LOW INTENSITY nomogram - [facility]  Continuous        Question Answer Comment   Heparin Infusion Adjustment (DO NOT MODIFY ANSWER) \\[email or web address]\epic\Images\Pharmacy\HeparinInfusions\heparin LOW INTENSITY nomogram for [facility] [id number]    Begin at (in units/kg/hr) 12        05/21/23 1900                Tal Rajput MD  Cardiology  [facility] - Surgical  Intensive Care

## 2023-05-23 NOTE — CONSULTS
[facility]  Skin Integrity [facility]  Consult Note    Patient Name: Temitope Suero  MRN: [id number]  Admission Date: 5/21/2023  Hospital Length of Stay: 2 days  Attending Physician: Jaquelin Davis MD  Primary Care Provider: Gm Zambrano MD     Consults  Subjective:     History of Present Illness:  Temitope Suero is an 82 year old female with h/o htn, hld, HFrEF with EF 25-30%, afib previously on coumadin ( had to be changed by pcp given lack of compliance ), cva with L ti residual deficits, medication noncompliance, dementia (with signficant cognitive deficit) who presents to the hospital for AMS. She is accompanied by her niece who is the primary caregiver. History is obtained soley from the niece and medical records given mental status. Per niece, yesterday evening pt was complaining of palpitations and was noted to be more confused than her baseline. Sxs worsened until the following AM where she was brought to the ED. Upon arrival to the ED she was found to be afebrile, in atrial flutter with rate of 130s ( recieved a dose of 5 mg lopressor at the time at ED), hypertensive with /100, on 4L NC. labs notable for cr 1.9 ( bl 1.2), BNP 2554, Trop 7.2, lactate 4 which later trended to 3.5. Pt was admitted to cardiology service for further workup and management. Skin integrity ZHOU consulted for evaluation of skin injury.       Scheduled Meds:   aspirin  81 mg Oral Daily    atorvastatin  80 mg Oral Daily    clopidogreL  75 mg Oral Daily    mupirocin   Nasal BID    piperacillin-tazobactam (ZOSYN) IVPB  4.5 g Intravenous Q12H     Continuous Infusions:   amiodarone in dextrose 5% 1 mg/min (05/23/23 1100)    DOBUTamine IV infusion (non-titrating) 5 mcg/kg/min (05/23/23 1100)    DOPamine 2 mcg/kg/min (05/23/23 1119)    furosemide (LASIX) 10 mg/mL infusion (non-titrating) 10 mg/hr (05/23/23 1100)    heparin (porcine) in D5W 14 Units/kg/hr (05/23/23 1100)    nitric oxide gas       nitroGLYCERIN Stopped (05/22/23 1147)     PRN Meds:heparin (PORCINE), heparin (PORCINE), sodium chloride 0.9%    Review of patient's allergies indicates:  No Known Allergies     Past Medical History:   Diagnosis Date    Cancer of left breast, stage 2 11/24/2015    Cataract     Cerebrovascular small vessel disease 04/11/2018    Bi-thalamic lacunar disease, mod/sev periventricular white matter disease, mixed pattern cerebral microbleeds    Cervicalgia 04/17/2018    Chronic anticoagulation - apixaban 04/17/2018    Previous on Xarelto but changed to apixaban 8-2018 due to recurrent embolic stroke.    Chronic systolic heart failure 04/17/2018    Echo 4-2018   1 - Moderately depressed left ventricular systolic function (EF 30-35%).    2 - Biatrial enlargement.    3 - Normal right ventricular systolic function .    4 - Mild mitral regurgitation.    5 - Mild tricuspid regurgitation.    6 - The estimated PA systolic pressure is 34 mmHg.    7 - Increased central venous pressure.    8 - Left pleural effusion.     CKD stage 3 due to type 2 diabetes mellitus 04/17/2018    Embolic stroke involving left cerebellar artery 08/13/2018    Embolic stroke involving right posterior cerebral artery 04/11/2018    Essential hypertension 10/28/2013    Glaucoma suspect of both eyes 12/09/2013    Hearing loss, sensorineural 12/16/2013    LV (left ventricular) mural thrombus 05/07/2022    Malignant hypertension 08/12/2018    Mixed hyperlipidemia 10/28/2013    Obesity 01/16/2014    Paroxysmal atrial fibrillation 07/10/2012    Stage 3 chronic kidney disease 04/17/2018    Toxic multinodular goiter 10/28/2013    Type 2 diabetes mellitus with stage 3 chronic kidney disease, without long-term current use of insulin 07/10/2012    Diet controlled.   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     Objective:     Vital Signs (Most Recent):  Temp: 97.7 °F (36.5 °C) (05/23/23 1101)  Pulse: 82 (05/23/23 1115)  Resp: (!) 29 (05/23/23 1115)  BP: 122/71 (05/23/23 1115)  SpO2: 100 % (05/23/23 1115) Vital Signs (24h Range):  Temp:  [97.6 °F (36.4 °C)-97.7 °F (36.5 °C)] 97.7 °F (36.5 °C)  Pulse:  [] 82  Resp:  [4-43] 29  SpO2:  [97 %-100 %] 100 %  BP: (101-178)/() 122/71     Weight: 54 kg (119 lb)  Body mass index is 18.64 kg/m².     Physical Exam  Constitutional:       Appearance: Normal appearance.   Skin:     General: Skin is warm and dry.      Findings: Lesion present.   Neurological:      Mental Status: She is alert.        Laboratory:  All pertinent labs reviewed within the last 24 hours.    Diagnostic Results:  None        Assessment/Plan:         [facility] Skin Integrity Evaluation    Skin Integrity [facility] evaluation of patient as part of the comprehensive skin care team.   She has been admitted for 2 days. Skin injury was noted on 5/21/23. POA yes.    Sacrum            Orthopedic  At high risk for skin breakdown  - consult received for evaluation of skin injury.  - pt known to wound care dept and seen in the past for a moisture related injury.  - sacral region with pale pink scar tissue from a previous injury likely moisture related.  - pt at increased risk of new skin breakdown due to recent injury, immobility, and current illness.  - currently on Immerse surface.  - continue Triad bid/prn.  - pillows/wedge for  offloading.  - foam dressing in place.  - ramos catheter.  - RN at bedside and assisted with turning.  - encourage turning q2h.  - nursing to maintain pressure injury prevention measures and continue wound care per orders.         Thank you for your consult. I will follow-up with patient. Please contact us if you have any additional questions.       Millie Mililgan NP  Skin Integrity [facility]  [facility]

## 2023-05-23 NOTE — HOSPITAL COURSE
Patient admitted to CCU for cardiogenic shock. Patient initially started on nitroglycerin for afterload reduction. She was additionally started on lasix 10 mg/hr and dobutamine infusion 5mg/hr . Extensive goals of care conversation with patient's family and assistance from palliative care. Plan for full code and life-saving/curative measures per discussion. Patient started on inhaled nitric oxide (10 PPM) and weaned off NG infusion. Trial of dopamine infusion for further preload reduction. Lasix infusion transitioned to IV pushes and dobutamine weaned to 2.5 mg/hr  given improving hemodynamics. Lasix subsequently stopped and patient given small IVF boluses due to low CVP. Dobutamine weaned off, inhaled nitric oxide weaned to 3 PPM, heparin transitioned to eliquis, and started on bidil . Hospitalization has been complicated by severe delirium, so patient scheduled on valproic acid. Palliative Care continuing to follow with patient and recommending consideration of outpatient hospice.  Pt went into PEA arrest 0400 on 23  Code blue called, did CPR x 10-11 min, remained asystole  Talked to family at bedside, stopped code after 10-11 min     tele monitoring showed asystole     Exam:  Patient is unresponsive to verbal and tactile stimuli  No breath sounds are heart  No heart sounds are heard  Patient is absent pulses in the carotid and femoral areas  Pupils fixed without response to bright light  Absent corneal reflex  Absent gag reflex     I pronounced the patient  at 0411 on 23. Cause of death was Cardiogenic Shock.      SIENNA email: [email or web address]

## 2023-05-23 NOTE — SUBJECTIVE & OBJECTIVE
Interval History:   No acute events overnight. Pt was continued on lasix drip along with amiodarone and dobutamine. Renal output adequate at 3L. Agitation less severe, precedex discontinued. Mentation now stable/baseline. Nitro and dopamine infusion started for reduction in afterload.     Medications:  Continuous Infusions:   amiodarone in dextrose 5% 1 mg/min (05/23/23 0800)    DOBUTamine IV infusion (non-titrating) 5 mcg/kg/min (05/23/23 0800)    furosemide (LASIX) 10 mg/mL infusion (non-titrating) 10 mg/hr (05/23/23 0800)    heparin (porcine) in D5W 14 Units/kg/hr (05/23/23 0800)    nitric oxide gas      nitroGLYCERIN Stopped (05/22/23 1147)     Scheduled Meds:   aspirin  81 mg Oral Daily    atorvastatin  80 mg Oral Daily    clopidogreL  75 mg Oral Daily    magnesium sulfate IVPB  2 g Intravenous Once    mupirocin   Nasal BID    piperacillin-tazobactam (ZOSYN) IVPB  4.5 g Intravenous Q12H     PRN Meds:heparin (PORCINE), heparin (PORCINE), sodium chloride 0.9%    Objective:     Vital Signs (Most Recent):  Temp: 97.7 °F (36.5 °C) (05/23/23 0702)  Pulse: 73 (05/23/23 0813)  Resp: (Abnormal) 29 (05/23/23 0813)  BP: (Abnormal) 136/97 (05/23/23 0800)  SpO2: 100 % (05/23/23 0813) Vital Signs (24h Range):  Temp:  [97 °F (36.1 °C)-97.7 °F (36.5 °C)] 97.7 °F (36.5 °C)  Pulse:  [] 73  Resp:  [4-45] 29  SpO2:  [94 %-100 %] 100 %  BP: (101-178)/() 136/97     Weight: 54 kg (119 lb)  Body mass index is 18.64 kg/m².       Physical Exam  Vitals and nursing note reviewed.   Constitutional:       General: She is not in acute distress.     Appearance: Normal appearance. She is ill-appearing.      Comments: sleeping   HENT:      Head: Normocephalic and atraumatic.      Nose: Nose normal. No congestion.      Mouth/Throat:      Mouth: Mucous membranes are dry.      Pharynx: Oropharynx is clear.   Eyes:      General: No scleral icterus.        Right eye: No discharge.         Left eye: No discharge.      Extraocular  Movements: Extraocular movements intact.   Cardiovascular:      Rate and Rhythm: Normal rate. Rhythm irregular.      Pulses: Normal pulses.           Radial pulses are 2+ on the right side and 2+ on the left side.        Dorsalis pedis pulses are 2+ on the right side and 2+ on the left side.      Heart sounds: Murmur heard.     No gallop.   Pulmonary:      Effort: Pulmonary effort is normal. No respiratory distress.      Breath sounds: Normal breath sounds. No stridor. No wheezing.   Abdominal:      General: Bowel sounds are normal. There is no distension.      Palpations: Abdomen is soft.      Tenderness: There is no guarding.   Musculoskeletal:         General: No swelling, tenderness or deformity.      Right lower leg: No edema.      Left lower leg: No edema.   Skin:     General: Skin is warm.      Capillary Refill: Capillary refill takes less than 2 seconds.      Coloration: Skin is not jaundiced or pale.   Neurological:      Mental Status: She is alert. She is disoriented.   Psychiatric:      Comments: Mentation appears to be at baseline. Pt demented with disorientation.           Review of Symptoms      Symptom Assessment (ESAS 0-10 Scale)  Pain:  0  Dyspnea:  0  Anxiety:  0  Nausea:  0  Depression:  0  Anorexia:  0  Fatigue:  0  Insomnia:  0  Restlessness:  0  Agitation:  0  Unable to complete assessment due to Dementia         Pain Assessment in Advanced Demential Scale (PAINAD)   Breathing - Independent of vocalization:  0  Negative vocalization:  0  Facial expression:  0  Body language:  0  Consolability:  0  Total:  0    Living Arrangements:  Lives with family    Psychosocial/Cultural:   See Palliative Psychosocial Note: No  Caregiver Needs Discussed. Caregiver Distress: No    **Primary  to Follow**  Palliative Care  Consult: Yes    Spiritual:  F - Binta and Belief:  Pt is Jew  Yamilethece is a Jew      Time-Based Charting:  Yes  Chart Review: 15 minutes  Symptom  Assessment: 10 minutes  Coordination of Care: 5 minutes  Goals of Care: 5 minutes    Total Time Spent: 35 minutes      Advance Care Planning   Advance Directives:   Living Will: No    LaPOST: No    Do Not Resuscitate Status: No    Medical Power of : No        Oral Declaration: Yes   Witnesses:  Dr Zambrano   Agent's Name:  Linda Alfaro   Agent's Contact Number:  [phone]    Decision Making:  Family answered questions  Goals of Care: What is most important right now is to focus on curative/life-prolongation (regardless of treatment burdens). Accordingly, we have decided that the best plan to meet the patient's goals includes continuing with treatment.        Significant Labs: All pertinent labs within the past 24 hours have been reviewed.  CBC:   Recent Labs   Lab 05/23/23 0357   WBC 7.00   HGB 10.1*   HCT 32.6*   MCV 77*        BMP:  Recent Labs   Lab 05/23/23 0357   *   *   K 3.5   *   CO2 22*   BUN 47*   CREATININE 2.0*   CALCIUM 8.6*   MG 1.9     LFT:  Lab Results   Component Value Date    AST 17 05/23/2023    ALKPHOS 113 05/23/2023    BILITOT 0.9 05/23/2023     Albumin:   Albumin   Date Value Ref Range Status   05/23/2023 2.9 (L) 3.5 - 5.2 g/dL Final     Protein:   Total Protein   Date Value Ref Range Status   05/23/2023 5.8 (L) 6.0 - 8.4 g/dL Final     Lactic acid:   Lab Results   Component Value Date    LACTATE 2.3 (H) 05/22/2023    LACTATE 2.7 (H) 05/22/2023       Significant Imaging: I have reviewed all pertinent imaging results/findings within the past 24 hours.

## 2023-05-23 NOTE — ASSESSMENT & PLAN NOTE
Echo 5/22:   The estimated ejection fraction is 15-20%. No LV apical thrombus is present   The quantitatively derived ejection fraction is 18%.   The left ventricle is normal in size with eccentric hypertrophy and severely decreased systolic function.   There is severe left ventricular global hypokinesis.   Indeterminate left ventricular diastolic function.   Normal right ventricular size with mildly reduced right ventricular systolic function.   Severe left atrial enlargement.   Mild right atrial enlargement.   Mild-to-moderate mitral regurgitation.   There is pulmonary hypertension.   The estimated PA systolic pressure is 47 mmHg.   Elevated central venous pressure (15 mmHg).    - see cardiogenic shock

## 2023-05-23 NOTE — ASSESSMENT & PLAN NOTE
Will treat as type 2 NSTEMI but she continues on DAPT for PAD as well as heparin infusion for afib

## 2023-05-23 NOTE — PLAN OF CARE
Hemodynamics Care Update Note       SVO2: 52  CVP: 5  CO: 2.9  CI: 1.8  SVR: 3393.58  lactate 2.3     UOP  100-200 cc/hr  (calculated using oxygen consumption constant of 3.5)       Plan:  Although CI remains low, lactate is downtrending and urine output is improving. Therefore will not make any changes to her current regimen. Will repeat hemodynamics in 4 hrs.      Jason Melo, PGY4  Cardiovascular Disease  [facility]      Midnight Hemodynamics:    SVO2: 45  CVP: 7  CO 2.58  CI:1.61  SVR: 2938.8     increased to 5.  Other gtts include: Mango 10 ppm, lasix gtt at 10mg/hr    AM hemodynamics after inc :    SVO2: 4.6  Cvp: 7  Co : 4.6  CI 2.9  SVR 1632    No further changes to be made at this time.  Will cont to monitor closely    Jason Melo MD  Cardiovascular medicine; PGY4  [facility]  [address] [place] [address]

## 2023-05-23 NOTE — SUBJECTIVE & OBJECTIVE
Interval History:  increased overnight. Discontinued NG gtt. Mentation stable. Renal function worsening. Started trial of dopamine.    Review of Systems   Unable to perform ROS: Mental status change   Objective:     Vital Signs (Most Recent):  Temp: 97.7 °F (36.5 °C) (05/23/23 1101)  Pulse: 76 (05/23/23 1300)  Resp: (!) 29 (05/23/23 1300)  BP: 96/60 (05/23/23 1300)  SpO2: 100 % (05/23/23 1300) Vital Signs (24h Range):  Temp:  [97.6 °F (36.4 °C)-97.7 °F (36.5 °C)] 97.7 °F (36.5 °C)  Pulse:  [] 76  Resp:  [4-38] 29  SpO2:  [98 %-100 %] 100 %  BP: ()/() 96/60     Weight: 54 kg (119 lb)  Body mass index is 18.64 kg/m².     SpO2: 100 %         Intake/Output Summary (Last 24 hours) at 5/23/2023 1332  Last data filed at 5/23/2023 1200  Gross per 24 hour   Intake 1457.19 ml   Output 3350 ml   Net -1892.81 ml       Lines/Drains/Airways       Central Venous Catheter Line  Duration             Percutaneous Central Line Insertion/Assessment - Triple Lumen  05/21/23 2015 Internal Jugular Right 1 day              Drain  Duration                  Urethral Catheter 05/21/23 2249 1 day              Peripheral Intravenous Line  Duration                  Peripheral IV - Single Lumen 05/21/23 1500 20 G Posterior;Right Hand 1 day         Peripheral IV - Single Lumen 05/21/23 1749 20 G Anterior;Proximal;Right Forearm 1 day         Peripheral IV - Single Lumen 05/21/23 2213 18 G Anterior;Right Upper Arm 1 day                       Physical Exam  Vitals and nursing note reviewed.   Constitutional:       Appearance: She is ill-appearing.   HENT:      Head: Normocephalic.      Mouth/Throat:      Mouth: Mucous membranes are moist.   Eyes:      Conjunctiva/sclera: Conjunctivae normal.   Cardiovascular:      Rate and Rhythm: Tachycardia present. Rhythm irregular.      Heart sounds: Murmur heard.   Abdominal:      General: Abdomen is flat. Bowel sounds are normal. There is no distension.   Musculoskeletal:      Right lower  leg: No edema.      Left lower leg: No edema.   Neurological:      Mental Status: She is disoriented.          Significant Labs: All pertinent lab results from the last 24 hours have been reviewed.    Significant Imaging:  Reviewed

## 2023-05-23 NOTE — ASSESSMENT & PLAN NOTE
82 y.o female with h/o  HFrEF with EF 25-30%, afib previously on coumadin ( had to be changed by pcp given lack of compliance ), cva with L ti residual deficits, medication noncompliance, dementia (with signficant cognitive deficit) who presents to the hospital for AMS. on arrival to the hospital he was found to be in atrial flutter with rate 130s. hypertensive with /100. lactate 4 which later downtrended to 3.5   bedside echo revealed depressed EF with global hypokinesis. mild-mod MR. IVC 1.2 cm and non-collapsible. Pleural effusion noted.    RIJ triple lumen central line was placed on admit. Admission hemodynamics were obtained and were as follows:  svo2 49 CVP 8 CO 1.8 CI 1.2 SVR 4077    Patient subsequently placed on nitro gtt and admitted to ccu. Unfortunatley given age, significnat debility post cva, dementia with significant cognitive impairment, and medication noncompliance pt is not a candidate for any advanced options.     Extensive discussed with patient's niece, Linda. She was explained patient's poor prognosis and limited baseline but would like to maintain full code with continued treatment/life-saving measures.    Hemodynamics 5/23 on  5 and inhaled NO  SvO2: 69  CO: 2.8  CI: 2.6  CVP: 7  SVR: 3171    - continue  5  - continue inhaled NO  - trial of dopamine infusion for further preload reduction  - continue lasix infusion  - q4 hour SvO2 and lactate  - discontinue zosyn given low suspicion for infection  - palliative care following

## 2023-05-23 NOTE — PROGRESS NOTES
[facility] - Surgical Intensive Care  Palliative Medicine  Progress Note    Patient Name: Temitope Suero  MRN: [id number]  Admission Date: 5/21/2023  Hospital Length of Stay: 2 days  Code Status: Full Code   Attending Provider: Jaquelin Davis MD  Consulting Provider: Francisco Naik MD  Primary Care Physician: Gm Zambrano MD  Principal Problem:Cardiogenic shock    Patient information was obtained from relative(s) and primary team.      Assessment/Plan:     Palliative Care  Palliative care encounter  Ms Suero is an 82yo lady that presented with acute AMS and worsening SOB in setting HFrEF and was admitted for treatment of T-II NSTEMI. She continues to need inotropic support with dobutamine infusion. She is on amiodarone infusion for p/Afib. Nitro and dopamine started for afterload reduction with worsening LV function.  Ms Linda Alfaro, Pt's niece, is the undocumented HCPOA. She was raised by Ms Suero and is her primary caretaker. Ms Alfaro acknowledged that taking home medications often caused Ms Suero to become agitated, therefore she holds the doses for sake of comfort. PCP ([place]) agreed that this was the palliative approach would help minimize additional irritation in her demented state. Ms Alfaro is a Denominational  with a strong [employer] susan. She wants everything to be done to resuscitate Ms Suero in the event of cardiac failure, including compressions and mechanical ventilation. She stated that she believes in miracles and that her Aunt's heart failure could be fixed. If Ms Suero becomes uncomfortable or agitated in her current state, Ms Alfaro said that her Aunt will give the team a sign. Care to focus on improving cardiac function while engaging the family in advanced care planning and exploring discharge possibilities. Pt remains Full Code.    Medicolegal  Decision-making:   -Pt does not have decision-making capacity  -Pt has not formally appointed a HCPOA.  -Linda Alfaro is her niece, care taker, and acting  POA   -Marital status: Single  -Children: 1 son, Tej Suero in [facility]    Advance care planning/Advance directives:  -The patient has not previously engaged in advance care planning  -Pt has no scanned advance directives in Epic    Psychosocial  Support system:  -Spiritual: yes;  Patient is part of a particular susan background: Latter day.  The palliative care  will not be consulted to assess the patient.  -Family: Pt's niece Linda Alfaro is primary care take and verbally appointed POA. Pt's great niece Giacomo Stern (Linda's daughter) also involved in care.    Health status prior to this hospitalization  -Functional status: poor.  Pt was not able to manage ADLs independently  -Cognitive status: complicated by prior CVA and delerium    -QOL: poor    Prognosis:  -Time and potential for recovery: MADDY    [facility] Discussion with Niece:  Advance Care Planning I engaged the patient in a conversation about advance care planning and we specifically addressed what the goals of care would be moving forward, in light of the patients change in clinical status, specifically worsening HFrEF and AMS.  We did specifically address the patients likely prognosis, which is poor.  We explored the patients values and preferences for future care.  The patient endorses that what is most important right now is to focus on     ___Spending time at home  ___Avoiding the hospital  ___Remaining as independent as possible  ___Symptom/pain control  ___Quality of life, even if it means sacrificing a little time  _X_Extending life as long as possible, even it it means sacrificing quality  _X_Other, providing home care    Accordingly, the best plan at this time is work with family who desires for Pt to return home under their care.    I did not explain the role for hospice care at this stage of the patients illness, including its ability to help the patient live with the best quality of life possible.  We will not be making a hospice  referral from the clinic today.    I spent a total of 35 minutes engaging the patient in this advance care planning discussion.Advance Care Planning In light of the patients advanced and terminal illness, we reviewed what the patients preferences for care would be at the very end of life.  The patient wishes to have a natural, peaceful death.  Along those lines, the patient does not wish to have CPR or other invasive treatments performed when her heart and/or breathing stops.  I communicated to the patient that a DNR order would be placed in her medical record to reflect this preference.  A DNR form was completed and will be scanned into EPIC.  In addition, a LaPOST form was not completed to reflect other EOL preferences of the patient. I spent a total of 35 minutes engaging the patient in this advance care planning discussion.    Active symptoms  -Dyspnea Continuous oxymetry with supplemental oxygen PRN  -Anxiety/Agitation: Precedex    Summary/Recommendation:  -Most important goals at this time: currently curative/life-prolongation (regardless of treatment burdens) but more discussions are needed for determination  -Code status: Full  -Most appropriate disposition: NA             I will follow-up with patient. Please contact us if you have any additional questions.    Subjective:     Chief Complaint:   Chief Complaint   Patient presents with    Palpitations     Pt arrives EMS from home with complaints of palpitations since last night, per EMS pt in a-fib, no history of a-fib.        HPI:   Ms. Temitope Suero is a 83yo lady with history of HTN, HLD, HFrEF with EF 25-30%, Afib on eliquis, CVA with L hemiparesis, NIDDM2, CKD3 (Cr baseline at 1.3), vit D deficiency, medication noncompliance, dementia (with signficant cognitive deficit) who presents to the hospital for AMS. Pt is accompanied by her niece who is the primary caregiver and POA according to PCP notes (Dr Zambrano on 3/20/23. No ACP on file. History is  obtained soley from the niece and medical records given mental status. Per niece, yesterday evening pt was complaining of palpitations and was noted to be more confused than her baseline. Symptoms continued to worsen until the morning of 5/21/23 when she was brought to the ED. Upon arrival to the ED she was found to be afebrile, in atrial flutter with rate of 130s. She was given a dose of 5 mg lopressor. Hypertensive at 170/100, on 4L NC to maintain SpO2. Labs notable for Cr 1.9, BNP 2554, Trop 7.2, lactate 4 which later trended down to 3.5. Cardiogenic shock suspected. Palliative Care consulted for GOC discussion with family.      Hospital Course:  No notes on file    Interval History:   No acute events overnight. Pt was continued on lasix drip along with amiodarone and dobutamine. Renal output adequate at 3L. Agitation less severe, precedex discontinued. Mentation now stable/baseline. Nitro and dopamine infusion started for reduction in afterload.     Medications:  Continuous Infusions:   amiodarone in dextrose 5% 1 mg/min (05/23/23 0800)    DOBUTamine IV infusion (non-titrating) 5 mcg/kg/min (05/23/23 0800)    furosemide (LASIX) 10 mg/mL infusion (non-titrating) 10 mg/hr (05/23/23 0800)    heparin (porcine) in D5W 14 Units/kg/hr (05/23/23 0800)    nitric oxide gas      nitroGLYCERIN Stopped (05/22/23 1147)     Scheduled Meds:   aspirin  81 mg Oral Daily    atorvastatin  80 mg Oral Daily    clopidogreL  75 mg Oral Daily    magnesium sulfate IVPB  2 g Intravenous Once    mupirocin   Nasal BID    piperacillin-tazobactam (ZOSYN) IVPB  4.5 g Intravenous Q12H     PRN Meds:heparin (PORCINE), heparin (PORCINE), sodium chloride 0.9%    Objective:     Vital Signs (Most Recent):  Temp: 97.7 °F (36.5 °C) (05/23/23 0702)  Pulse: 73 (05/23/23 0813)  Resp: (Abnormal) 29 (05/23/23 0813)  BP: (Abnormal) 136/97 (05/23/23 0800)  SpO2: 100 % (05/23/23 0813) Vital Signs (24h Range):  Temp:  [97 °F (36.1 °C)-97.7 °F (36.5  °C)] 97.7 °F (36.5 °C)  Pulse:  [] 73  Resp:  [4-45] 29  SpO2:  [94 %-100 %] 100 %  BP: (101-178)/() 136/97     Weight: 54 kg (119 lb)  Body mass index is 18.64 kg/m².       Physical Exam  Vitals and nursing note reviewed.   Constitutional:       General: She is not in acute distress.     Appearance: Normal appearance. She is ill-appearing.      Comments: sleeping   HENT:      Head: Normocephalic and atraumatic.      Nose: Nose normal. No congestion.      Mouth/Throat:      Mouth: Mucous membranes are dry.      Pharynx: Oropharynx is clear.   Eyes:      General: No scleral icterus.        Right eye: No discharge.         Left eye: No discharge.      Extraocular Movements: Extraocular movements intact.   Cardiovascular:      Rate and Rhythm: Normal rate. Rhythm irregular.      Pulses: Normal pulses.           Radial pulses are 2+ on the right side and 2+ on the left side.        Dorsalis pedis pulses are 2+ on the right side and 2+ on the left side.      Heart sounds: Murmur heard.     No gallop.   Pulmonary:      Effort: Pulmonary effort is normal. No respiratory distress.      Breath sounds: Normal breath sounds. No stridor. No wheezing.   Abdominal:      General: Bowel sounds are normal. There is no distension.      Palpations: Abdomen is soft.      Tenderness: There is no guarding.   Musculoskeletal:         General: No swelling, tenderness or deformity.      Right lower leg: No edema.      Left lower leg: No edema.   Skin:     General: Skin is warm.      Capillary Refill: Capillary refill takes less than 2 seconds.      Coloration: Skin is not jaundiced or pale.   Neurological:      Mental Status: She is alert. She is disoriented.   Psychiatric:      Comments: Mentation appears to be at baseline. Pt demented with disorientation.           Review of Symptoms      Symptom Assessment (ESAS 0-10 Scale)  Pain:  0  Dyspnea:  0  Anxiety:  0  Nausea:  0  Depression:  0  Anorexia:  0  Fatigue:  0  Insomnia:   0  Restlessness:  0  Agitation:  0  Unable to complete assessment due to Dementia         Pain Assessment in Advanced Demential Scale (PAINAD)   Breathing - Independent of vocalization:  0  Negative vocalization:  0  Facial expression:  0  Body language:  0  Consolability:  0  Total:  0    Living Arrangements:  Lives with family    Psychosocial/Cultural:   See Palliative Psychosocial Note: No  Caregiver Needs Discussed. Caregiver Distress: No    **Primary  to Follow**  Palliative Care  Consult: Yes    Spiritual:  F - Binta and Belief:  Pt is Sikh  Niece is a Sikh      Time-Based Charting:  Yes  Chart Review: 15 minutes  Symptom Assessment: 10 minutes  Coordination of Care: 5 minutes  Goals of Care: 5 minutes    Total Time Spent: 35 minutes      Advance Care Planning   Advance Directives:   Living Will: No    LaPOST: No    Do Not Resuscitate Status: No    Medical Power of : No        Oral Declaration: Yes   Witnesses:  Dr Zambrano   Agent's Name:  Linda Alfaro   Agent's Contact Number:  [phone]    Decision Making:  Family answered questions  Goals of Care: What is most important right now is to focus on curative/life-prolongation (regardless of treatment burdens). Accordingly, we have decided that the best plan to meet the patient's goals includes continuing with treatment.        Significant Labs: All pertinent labs within the past 24 hours have been reviewed.  CBC:   Recent Labs   Lab 05/23/23 0357   WBC 7.00   HGB 10.1*   HCT 32.6*   MCV 77*        BMP:  Recent Labs   Lab 05/23/23 0357   *   *   K 3.5   *   CO2 22*   BUN 47*   CREATININE 2.0*   CALCIUM 8.6*   MG 1.9     LFT:  Lab Results   Component Value Date    AST 17 05/23/2023    ALKPHOS 113 05/23/2023    BILITOT 0.9 05/23/2023     Albumin:   Albumin   Date Value Ref Range Status   05/23/2023 2.9 (L) 3.5 - 5.2 g/dL Final     Protein:   Total Protein   Date Value Ref Range  Status   05/23/2023 5.8 (L) 6.0 - 8.4 g/dL Final     Lactic acid:   Lab Results   Component Value Date    LACTATE 2.3 (H) 05/22/2023    LACTATE 2.7 (H) 05/22/2023       Significant Imaging: I have reviewed all pertinent imaging results/findings within the past 24 hours.      Francisco Naik MD  Palliative Medicine  [facility] - Surgical Intensive Care

## 2023-05-23 NOTE — ASSESSMENT & PLAN NOTE
Ms Suero is an 82yo lady that presented with acute AMS and worsening SOB in setting HFrEF and was admitted for treatment of T-II NSTEMI. She continues to need inotropic support with dobutamine infusion. She is on amiodarone infusion for p/Afib. Nitro and dopamine started for afterload reduction with worsening LV function.  Ms Linda Alfaro, Pt's niece, is the undocumented HCPOA. She was raised by Ms Suero and is her primary caretaker. Ms Alfaro acknowledged that taking home medications often caused Ms Suero to become agitated, therefore she holds the doses for sake of comfort. PCP ([place]) agreed that this was the palliative approach would help minimize additional irritation in her demented state. Ms Alfaro is a Sabianist  with a strong Druze susan. She wants everything to be done to resuscitate Ms Suero in the event of cardiac failure, including compressions and mechanical ventilation. She stated that she believes in miracles and that her Aunt's heart failure could be fixed. If Ms Suero becomes uncomfortable or agitated in her current state, Ms Alfaro said that her Aunt will give the team a sign. Care to focus on improving cardiac function while engaging the family in advanced care planning and exploring discharge possibilities. Pt remains Full Code.    Medicolegal  Decision-making:   -Pt does not have decision-making capacity  -Pt has not formally appointed a HCPOA.  -Linda Alfaro is her niece, care taker, and acting POA   -Marital status: Single  -Children: 1 son, Tej Suero in [facility]    Advance care planning/Advance directives:  -The patient has not previously engaged in advance care planning  -Pt has no scanned advance directives in [facility]    Psychosocial  Support system:  -Spiritual: yes;  Patient is part of a particular susan background: Moravian.  The palliative care  will not be consulted to assess the patient.  -Family: Pt's niece Linda Alfaro is primary care take and verbally appointed  POA. Pt's great niece Giacomo Stern (Linda's daughter) also involved in care.    Health status prior to this hospitalization  -Functional status: poor.  Pt was not able to manage ADLs independently  -Cognitive status: complicated by prior CVA and delerium    -QOL: poor    Prognosis:  -Time and potential for recovery: MADDY    [facility] Discussion with Niece:  Advance Care Planning I engaged the patient in a conversation about advance care planning and we specifically addressed what the goals of care would be moving forward, in light of the patients change in clinical status, specifically worsening HFrEF and AMS.  We did specifically address the patients likely prognosis, which is poor.  We explored the patients values and preferences for future care.  The patient endorses that what is most important right now is to focus on     ___Spending time at home  ___Avoiding the hospital  ___Remaining as independent as possible  ___Symptom/pain control  ___Quality of life, even if it means sacrificing a little time  _X_Extending life as long as possible, even it it means sacrificing quality  _X_Other, providing home care    Accordingly, the best plan at this time is work with family who desires for Pt to return home under their care.    I did not explain the role for hospice care at this stage of the patients illness, including its ability to help the patient live with the best quality of life possible.  We will not be making a hospice referral from the clinic today.    I spent a total of 35 minutes engaging the patient in this advance care planning discussion.Advance Care Planning In light of the patients advanced and terminal illness, we reviewed what the patients preferences for care would be at the very end of life.  The patient wishes to have a natural, peaceful death.  Along those lines, the patient does not wish to have CPR or other invasive treatments performed when her heart and/or breathing stops.  I communicated to  the patient that a DNR order would be placed in her medical record to reflect this preference.  A DNR form was completed and will be scanned into EPIC.  In addition, a LaPOST form was not completed to reflect other EOL preferences of the patient. I spent a total of 35 minutes engaging the patient in this advance care planning discussion.     Active symptoms  -Dyspnea Continuous oxymetry with supplemental oxygen PRN  -Anxiety/Agitation: Precedex    Summary/Recommendation:  -Most important goals at this time: currently curative/life-prolongation (regardless of treatment burdens) but more discussions are needed for determination  -Code status: Full  -Most appropriate disposition: NA

## 2023-05-23 NOTE — ASSESSMENT & PLAN NOTE
Baseline creatinine approximately 1.2, worsening to 2.0 on 5/23 likely in the setting of cardiogenic shock. Will continue to monitor with resolution of cardiogenic shock

## 2023-05-23 NOTE — ASSESSMENT & PLAN NOTE
History of paroxysmal atrial fibrillation/flutter  Previously on warfarin however it was stopped given Toprol for rate control at home    Plan:  -- currently on heparin-will cont  -- will hold bb in setting of shock and continue amiodarone

## 2023-05-23 NOTE — CONSULTS
"  [facility]  Adult Nutrition  Consult Note    SUMMARY     Recommendations    1.) If and when medically feasible, recommend to ADAT with goal of Regular diet, texture as per SLP.     2,) If and when medically feasible, recommend Boost BID to help meet needs.     3.) If EN is medically warranted, recommend to initiate TF of [employer] Peptide 1.5 with goal of 45ml/hr to provide 1661 kcal, 80g PRO, and 756ml FF.     4.) RD to monitor wt, skin, labs, nutritional status.      Goals: to meet % of EEN/EPN by next RD f/u  Nutrition Goal Status: new  Communication of RD Recs:  (POC)    Assessment and Plan    Nutrition Problem  Increased PRO/energy needs    Related to (etiology):   Increased physiological needs    Signs and Symptoms (as evidenced by):   Cardiogenic shock; low Eduardo score     Interventions/Recommendations (treatment strategy):  Collaboration of nutritional care with other providers.  ADAT vs EN    Nutrition Diagnosis Status:   New    Reason for Assessment    Reason For Assessment: consult  Diagnosis: cardiac disease (Cardiogenic shock)  Relevant Medical History: CHF, NSTEMI (5/5), DM2  Interdisciplinary Rounds: did not attend  General Information Comments: RD consult: Pt seen by RD. Pt appears pleasantly confused. NFPE not appropriate at this time. RD tried to obtain nutritional hx with no meaningful answers. Wound care reports Eduardo at 14, with nutrition sub-scale of 1. Skin wounds noted on buttocks. RD suspects malnutrition but cannot confirm.  Nutrition Discharge Planning: as per medical course    Nutrition Risk Screen    Nutrition Risk Screen: difficulty chewing/swallowing    Anthropometrics    Temp: 97.7 °F (36.5 °C)  Height Method: Stated  Height: 5' 7" (170.2 cm)  Height (inches): 67 in  Weight Method: Bed Scale  Weight: 54 kg (119 lb)  Weight (lb): 119 lb  Ideal Body Weight (IBW), Female: 135 lb  % Ideal Body Weight, Female (lb): 88.15 %  BMI (Calculated): 18.6  BMI " Grade: 18.5-24.9 - normal     Lab/Procedures/Meds    Pertinent Labs Reviewed: reviewed  Pertinent Labs Comments: Na: 148, BUN: 47, cr: 2.0, GFR: 24.5, gluc: 147, Ca: 8.6, PRO: 5.8, alb: 2.9  Pertinent Medications Reviewed: reviewed  Pertinent Medications Comments: atorvastatin, Mg Sulfate, ABX, amiodarone, dobutamine, lasix, heparin    Estimated/Assessed Needs    Weight Used For Calorie Calculations: 54 kg (119 lb 0.8 oz)  Energy Calorie Requirements (kcal): 1620 kcal  Energy Need Method: Kcal/kg (30 kcal/kg)  Protein Requirements: 65- 76g (1.2-1.4g/kg)  Weight Used For Protein Calculations: 54 kg (119 lb 0.8 oz)  Fluid Requirements (mL): 1ml/1kcal or per MD  Estimated Fluid Requirement Method: RDA Method  RDA Method (mL): 1620  CHO Requirement: 203g    Nutrition Prescription Ordered    Current Diet Order: NPO    Evaluation of Received Nutrient/Fluid Intake    I/O: +402ml since admit  Energy Calories Required: not meeting needs  Protein Required: not meeting needs  Fluid Required:  (as per MD)  Comments: LBM 5/22  % Intake of Estimated Energy Needs: 0 - 25 %  % Meal Intake: NPO    Nutrition Risk    Level of Risk/Frequency of Follow-up:  (RD to f/u x1/week)     Monitor and Evaluation    Food and Nutrient Intake: energy intake  Food and Nutrient Adminstration: diet order  Physical Activity and Function: nutrition-related ADLs and IADLs  Anthropometric Measurements: weight, weight change, body mass index  Biochemical Data, Medical Tests and Procedures: electrolyte and renal panel, gastrointestinal profile, glucose/endocrine profile, inflammatory profile, lipid profile  Nutrition-Focused Physical Findings: overall appearance, skin     Nutrition Follow-Up    RD Follow-up?: Yes

## 2023-05-23 NOTE — PLAN OF CARE
Recommendations     1.) If and when medically feasible, recommend to ADAT with goal of Regular diet, texture as per SLP.      2,) If and when medically feasible, recommend Boost BID to help meet needs.      3.) If EN is medically warranted, recommend to initiate TF of [employer] Peptide 1.5 with goal of 45ml/hr to provide 1661 kcal, 80g PRO, and 756ml FF.      4.) RD to monitor wt, skin, labs, nutritional status.        Goals: to meet % of EEN/EPN by next RD f/u  Nutrition Goal Status: new  Communication of RD Recs:  (POC)

## 2023-05-24 NOTE — SUBJECTIVE & OBJECTIVE
Interval History: No acute events overnight. Discontinued amiodarone and lasix infusion and started on PO amiodarone and IV lasix. Renal output adequate at 3.3L. Pt agitated overnight and refusing meds. Started on olanzipine INJ qhs. Mentation now stable/baseline. Dobutamine continued for inotropic support and nitro and dopamine infusion continued for reduction in afterload. GOC discussed with POA (Lindanazanin Jacksonmarlon) who currently wants to focus on curative treatment and discharge home. Will continue to titrate down on infused medications, as tolerated.    Medications:  Continuous Infusions:   DOBUTamine IV infusion (non-titrating) 2.5 mcg/kg/min (05/24/23 1200)    DOPamine 2 mcg/kg/min (05/24/23 1200)    heparin (porcine) in D5W 16 Units/kg/hr (05/24/23 1200)    nitric oxide gas       Scheduled Meds:   amiodarone  400 mg Oral BID    Followed by    [START ON 6/5/2023] amiodarone  200 mg Oral Daily    aspirin  81 mg Oral Daily    atorvastatin  80 mg Oral Daily    clopidogreL  75 mg Oral Daily    furosemide (LASIX) injection  80 mg Intravenous Q8H    hydrALAZINE  10 mg Oral TID    mupirocin   Nasal BID     PRN Meds:heparin (PORCINE), heparin (PORCINE), sodium chloride 0.9%    Review of Systems   Unable to perform ROS: Dementia      Objective:     Vital Signs (Most Recent):  Temp: 97.8 °F (36.6 °C) (05/24/23 1115)  Pulse: 73 (05/24/23 1521)  Resp: (Abnormal) 38 (05/24/23 1521)  BP: 115/85 (05/24/23 1230)  SpO2: 100 % (05/24/23 1521) Vital Signs (24h Range):  Temp:  [97 °F (36.1 °C)-97.8 °F (36.6 °C)] 97.8 °F (36.6 °C)  Pulse:  [63-92] 73  Resp:  [17-47] 38  SpO2:  [99 %-100 %] 100 %  BP: ()/(61-92) 115/85     Weight: 54 kg (119 lb)  Body mass index is 18.64 kg/m².       Physical Exam  Vitals and nursing note reviewed.   Constitutional:       General: She is not in acute distress.     Appearance: Normal appearance. She is ill-appearing.      Comments: sleeping   HENT:      Head: Normocephalic and atraumatic.       Nose: Nose normal. No congestion.      Mouth/Throat:      Mouth: Mucous membranes are dry.      Pharynx: Oropharynx is clear.   Eyes:      General: No scleral icterus.        Right eye: No discharge.         Left eye: No discharge.      Extraocular Movements: Extraocular movements intact.   Cardiovascular:      Rate and Rhythm: Normal rate and regular rhythm.      Pulses: Normal pulses.           Radial pulses are 2+ on the right side and 2+ on the left side.        Dorsalis pedis pulses are 2+ on the right side and 2+ on the left side.      Heart sounds: Murmur heard.     No gallop.   Pulmonary:      Effort: Pulmonary effort is normal. No respiratory distress.      Breath sounds: Normal breath sounds. No stridor. No wheezing.   Abdominal:      General: Bowel sounds are normal. There is no distension.      Palpations: Abdomen is soft.      Tenderness: There is no abdominal tenderness. There is no guarding.   Musculoskeletal:         General: No swelling, tenderness or deformity.      Right lower leg: No edema.      Left lower leg: No edema.   Skin:     General: Skin is warm.      Capillary Refill: Capillary refill takes less than 2 seconds.      Coloration: Skin is not jaundiced or pale.   Neurological:      Mental Status: She is alert. She is disoriented.   Psychiatric:      Comments: Mentation appears to be at baseline. Pt demented with disorientation.           Review of Symptoms      Symptom Assessment (ESAS 0-10 Scale)  Pain:  0  Dyspnea:  0  Anxiety:  0  Nausea:  0  Depression:  0  Anorexia:  0  Fatigue:  0  Insomnia:  0  Restlessness:  4  Agitation:  4     CAM / Delirium:  Positive      Living Arrangements:  Lives with family and Lives in home    Psychosocial/Cultural:   See Palliative Psychosocial Note: No  Caregiver Needs Discussed. Caregiver Distress: No:   **Primary  to Follow**  Palliative Care  Consult: No    Spiritual:  F - Binta and Belief:  Yarsanism  I - Importance:   High  C - Community:  Family     Time-Based Charting:  No      Advance Care Planning   Advance Directives:   Goals of Care: What is most important right now is to focus on curative/life-prolongation (regardless of treatment burdens). Accordingly, we have decided that the best plan to meet the patient's goals includes continuing with treatment.       Significant Labs: All pertinent labs within the past 24 hours have been reviewed.  CBC:   Recent Labs   Lab 05/24/23 0308   WBC 5.67   HGB 10.3*   HCT 33.9*   MCV 78*        BMP:  Recent Labs   Lab 05/24/23 0308   *   *   K 3.3*      CO2 28   BUN 42*   CREATININE 2.1*   CALCIUM 8.9   MG 2.4     LFT:  Lab Results   Component Value Date    AST 17 05/24/2023    ALKPHOS 121 05/24/2023    BILITOT 0.7 05/24/2023     Albumin:   Albumin   Date Value Ref Range Status   05/24/2023 3.1 (L) 3.5 - 5.2 g/dL Final     Protein:   Total Protein   Date Value Ref Range Status   05/24/2023 6.3 6.0 - 8.4 g/dL Final     Lactic acid:   Lab Results   Component Value Date    LACTATE 2.3 (H) 05/22/2023    LACTATE 2.7 (H) 05/22/2023       Significant Imaging: I have reviewed all pertinent imaging results/findings within the past 24 hours.

## 2023-05-24 NOTE — PROGRESS NOTES
[facility]  Palliative Medicine  Progress Note    Patient Name: Temitope Suero  MRN: [id number]  Admission Date: 5/21/2023  Hospital Length of Stay: 3 days  Code Status: Full Code   Attending Provider: Jaquelin Davis MD  Consulting Provider: Francisco Naik MD  Primary Care Physician: Gm Zambrano MD  Principal Problem:Cardiogenic shock    Patient information was obtained from patient, relative(s) and primary team.      Assessment/Plan:     Palliative Care  Palliative care encounter  Ms Suero is an 84yo lady that presented with acute AMS and worsening SOB in setting HFrEF and was admitted for treatment of T-II NSTEMI. She continues to need inotropic support with dobutamine infusion. She is on amiodarone infusion for p/Afib. Nitro and dopamine started for afterload reduction with worsening LV function.  Ms Linda Alfaro, Pt's niece, is the undocumented HCPOA. She was raised by Ms Suero and is her primary caretaker. Ms Alfaro acknowledged that taking home medications often caused Ms Suero to become agitated, therefore she holds the doses for sake of comfort. PCP ([place]) agreed that this was the palliative approach would help minimize additional irritation in her demented state. Ms Alfaro is a [employer]  with a strong Quaker susan. She wants everything to be done to resuscitate Ms Suero in the event of cardiac failure, including compressions and mechanical ventilation. She stated that she believes in miracles and that her Aunt's heart failure could be fixed. If Ms Suero becomes uncomfortable or agitated in her current state, Ms Alfaro said that her Aunt will give the team a sign. Care to focus on improving cardiac function while engaging the family in advanced care planning and exploring discharge possibilities. Pt remains Full Code.    Overnight agitation and combativeness noted on 5/23/23, treated with scheduled Olanzipine. Niece was informed and noted that these episodes have not occurred  with Pt at home. Dobutamine titrated from 5-> 2.5mcg/kg/min with Pt tolerating adequately. Reviewed current medications with Ms Alfaro and discussed outpatient dobutamine options with hospice care. She stated that she is currently looking into care through [facility] and was told that was approved for home health nurse. This would likely require discontinuing treatment with her PCP (Dr Zambrano) if this options was pursued.    Medicolegal  Decision-making:   -Pt does not have decision-making capacity  -Pt has not formally appointed a HCPOA.  -Linda Alfaro is her niece, care taker, and acting POA   -Marital status: Single  -Children: 1 son, Tej Suero in [facility]    Advance care planning/Advance directives:  -The patient has not previously engaged in advance care planning  -Pt has no scanned advance directives in [facility]    Psychosocial  Support system:  -Spiritual: yes;  Patient is part of a particular susan background: Confucianist.  The palliative care  will not be consulted to assess the patient.  -Family: Pt's niece Linda Alfaro is primary care take and verbally appointed POA. Pt's great niece Giacomo Stern (Linda's daughter) also involved in care.    Health status prior to this hospitalization  -Functional status: poor.  Pt was not able to manage ADLs independently  -Cognitive status: complicated by prior CVA and delerium    -QOL: poor    Prognosis:  -Time and potential for recovery: NA    [facility] Discussion with Niece:  Advance Care Planning I engaged the patient in a conversation about advance care planning and we specifically addressed what the goals of care would be moving forward, in light of the patients change in clinical status, specifically worsening HFrEF and AMS.  We did specifically address the patients likely prognosis, which is poor.  We explored the patients values and preferences for future care.  The patient endorses that what is most important right now is to focus on     ___Spending  time at home  ___Avoiding the hospital  ___Remaining as independent as possible  ___Symptom/pain control  ___Quality of life, even if it means sacrificing a little time  _X_Extending life as long as possible, even it it means sacrificing quality  _X_Other, providing home care    Accordingly, the best plan at this time is work with family who desires for Pt to return home under their care.    I did not explain the role for hospice care at this stage of the patients illness, including its ability to help the patient live with the best quality of life possible.  We will not be making a hospice referral from the clinic today.    I spent a total of 35 minutes engaging the patient in this advance care planning discussion.Advance Care Planning In light of the patients advanced and terminal illness, we reviewed what the patients preferences for care would be at the very end of life.  The patient wishes to have a natural, peaceful death.  Along those lines, the patient does not wish to have CPR or other invasive treatments performed when her heart and/or breathing stops.  I communicated to the patient that a DNR order would be placed in her medical record to reflect this preference.  A DNR form was completed and will be scanned into EPIC.  In addition, a LaPOST form was not completed to reflect other EOL preferences of the patient. I spent a total of 35 minutes engaging the patient in this advance care planning discussion.    Active symptoms  -Dyspnea Continuous oxymetry with supplemental oxygen PRN  -Anxiety/Agitation: Olanzapine     Summary/Recommendation:  -Most important goals at this time: currently curative/life-prolongation (regardless of treatment burdens) but more discussions are needed for determination. Continue to wean off medication and assess home care needs.  -Code status: Full  -Most appropriate disposition: NA      I will follow-up with patient. Please contact us if you have any additional  questions.    Subjective:     Chief Complaint:   Chief Complaint   Patient presents with    Palpitations     Pt arrives EMS from home with complaints of palpitations since last night, per EMS pt in a-fib, no history of a-fib.        HPI:   Ms. Temitope Suero is a 83yo lady with history of HTN, HLD, HFrEF with EF 25-30%, Afib on eliquis, CVA with L hemiparesis, NIDDM2, CKD3 (Cr baseline at 1.3), vit D deficiency, medication noncompliance, dementia (with signficant cognitive deficit) who presents to the hospital for AMS. Pt is accompanied by her niece who is the primary caregiver and POA according to PCP notes (Dr Zambrano on 3/20/23. No ACP on file. History is obtained soley from the niece and medical records given mental status. Per niece, yesterday evening pt was complaining of palpitations and was noted to be more confused than her baseline. Symptoms continued to worsen until the morning of 5/21/23 when she was brought to the ED. Upon arrival to the ED she was found to be afebrile, in atrial flutter with rate of 130s. She was given a dose of 5 mg lopressor. Hypertensive at 170/100, on 4L NC to maintain SpO2. Labs notable for Cr 1.9, BNP 2554, Trop 7.2, lactate 4 which later trended down to 3.5. Cardiogenic shock suspected. Palliative Care consulted for GOC discussion with family.      Hospital Course:  No notes on file    Interval History: No acute events overnight. Discontinued amiodarone and lasix infusion and started on PO amiodarone and IV lasix. Renal output adequate at 3.3L. Pt agitated overnight and refusing meds. Started on olanzipine INJ qhs. Mentation now stable/baseline. Dobutamine continued for inotropic support and nitro and dopamine infusion continued for reduction in afterload. GOC discussed with POA (Linda Alfaro-Niece) who currently wants to focus on curative treatment and discharge home. Will continue to titrate down on infused medications, as tolerated.    Medications:  Continuous Infusions:    DOBUTamine IV infusion (non-titrating) 2.5 mcg/kg/min (05/24/23 1200)    DOPamine 2 mcg/kg/min (05/24/23 1200)    heparin (porcine) in D5W 16 Units/kg/hr (05/24/23 1200)    nitric oxide gas       Scheduled Meds:   amiodarone  400 mg Oral BID    Followed by    [START ON 6/5/2023] amiodarone  200 mg Oral Daily    aspirin  81 mg Oral Daily    atorvastatin  80 mg Oral Daily    clopidogreL  75 mg Oral Daily    furosemide (LASIX) injection  80 mg Intravenous Q8H    hydrALAZINE  10 mg Oral TID    mupirocin   Nasal BID     PRN Meds:heparin (PORCINE), heparin (PORCINE), sodium chloride 0.9%    Review of Systems   Unable to perform ROS: Dementia      Objective:     Vital Signs (Most Recent):  Temp: 97.8 °F (36.6 °C) (05/24/23 1115)  Pulse: 73 (05/24/23 1521)  Resp: (Abnormal) 38 (05/24/23 1521)  BP: 115/85 (05/24/23 1230)  SpO2: 100 % (05/24/23 1521) Vital Signs (24h Range):  Temp:  [97 °F (36.1 °C)-97.8 °F (36.6 °C)] 97.8 °F (36.6 °C)  Pulse:  [63-92] 73  Resp:  [17-47] 38  SpO2:  [99 %-100 %] 100 %  BP: ()/(61-92) 115/85     Weight: 54 kg (119 lb)  Body mass index is 18.64 kg/m².       Physical Exam  Vitals and nursing note reviewed.   Constitutional:       General: She is not in acute distress.     Appearance: Normal appearance. She is ill-appearing.      Comments: sleeping   HENT:      Head: Normocephalic and atraumatic.      Nose: Nose normal. No congestion.      Mouth/Throat:      Mouth: Mucous membranes are dry.      Pharynx: Oropharynx is clear.   Eyes:      General: No scleral icterus.        Right eye: No discharge.         Left eye: No discharge.      Extraocular Movements: Extraocular movements intact.   Cardiovascular:      Rate and Rhythm: Normal rate and regular rhythm.      Pulses: Normal pulses.           Radial pulses are 2+ on the right side and 2+ on the left side.        Dorsalis pedis pulses are 2+ on the right side and 2+ on the left side.      Heart sounds: Murmur heard.     No gallop.    Pulmonary:      Effort: Pulmonary effort is normal. No respiratory distress.      Breath sounds: Normal breath sounds. No stridor. No wheezing.   Abdominal:      General: Bowel sounds are normal. There is no distension.      Palpations: Abdomen is soft.      Tenderness: There is no abdominal tenderness. There is no guarding.   Musculoskeletal:         General: No swelling, tenderness or deformity.      Right lower leg: No edema.      Left lower leg: No edema.   Skin:     General: Skin is warm.      Capillary Refill: Capillary refill takes less than 2 seconds.      Coloration: Skin is not jaundiced or pale.   Neurological:      Mental Status: She is alert. She is disoriented.   Psychiatric:      Comments: Mentation appears to be at baseline. Pt demented with disorientation.           Review of Symptoms      Symptom Assessment (ESAS 0-10 Scale)  Pain:  0  Dyspnea:  0  Anxiety:  0  Nausea:  0  Depression:  0  Anorexia:  0  Fatigue:  0  Insomnia:  0  Restlessness:  4  Agitation:  4     CAM / Delirium:  Positive      Living Arrangements:  Lives with family and Lives in home    Psychosocial/Cultural:   See Palliative Psychosocial Note: No  Caregiver Needs Discussed. Caregiver Distress: No:   **Primary  to Follow**  Palliative Care  Consult: No    Spiritual:  F - Binta and Belief:  Druze  I - Importance:  High  C - Community:  Family     Time-Based Charting:  No      Advance Care Planning   Advance Directives:   Goals of Care: What is most important right now is to focus on curative/life-prolongation (regardless of treatment burdens). Accordingly, we have decided that the best plan to meet the patient's goals includes continuing with treatment.       Significant Labs: All pertinent labs within the past 24 hours have been reviewed.  CBC:   Recent Labs   Lab 05/24/23  0308   WBC 5.67   HGB 10.3*   HCT 33.9*   MCV 78*        BMP:  Recent Labs   Lab 05/24/23  0308   *   *   K  3.3*      CO2 28   BUN 42*   CREATININE 2.1*   CALCIUM 8.9   MG 2.4     LFT:  Lab Results   Component Value Date    AST 17 05/24/2023    ALKPHOS 121 05/24/2023    BILITOT 0.7 05/24/2023     Albumin:   Albumin   Date Value Ref Range Status   05/24/2023 3.1 (L) 3.5 - 5.2 g/dL Final     Protein:   Total Protein   Date Value Ref Range Status   05/24/2023 6.3 6.0 - 8.4 g/dL Final     Lactic acid:   Lab Results   Component Value Date    LACTATE 2.3 (H) 05/22/2023    LACTATE 2.7 (H) 05/22/2023       Significant Imaging: I have reviewed all pertinent imaging results/findings within the past 24 hours.      Francisco Naik MD  Palliative Medicine  [facility] - Surgical Intensive Care

## 2023-05-24 NOTE — PLAN OF CARE
"      SICU PLAN OF CARE NOTE    Dx: Cardiogenic shock    Shift Events: NAEON, VSS. Electrolytes replaced as needed. Reorienting patient frequently in the night. 2.5mg Zyprexa given for pt agitation, pt responded well.     Goals of Care: SBP < 160    Neuro: Follows Commands, Moves All Extremities, and Confused    Vital Signs: /86 (BP Location: Left arm, Patient Position: Lying)   Pulse 69   Temp 97.5 °F (36.4 °C) (Axillary)   Resp (!) 22   Ht 5' 7" (1.702 m)   Wt 54 kg (119 lb)   SpO2 100%   BMI 18.64 kg/m²     Respiratory: Nasal Cannula w/ Mango    Diet: NPO    Gtts: Dopamine, Dobutamine, Amiodarone, Lasix, and Heparin    Urine Output: Urinary Catheter 1920 cc/shift     Labs/Accuchecks: Daily labs.    Skin: No new skin breakdown noted during shift. Preventative foams in place, mattress plugged in and functioning properly. Pt turned q 2 hrs.          "

## 2023-05-24 NOTE — PLAN OF CARE
No acute events this shift. Seen today by speech therapy, diet advanced to pureed. Lasix gtt stopped, 80mg IVP administered. CVP 1,0. UOP 1.3L this shift. Amio gtt stopped and PO amio started. Dobutamine decreased to 2.5mcg/kg/min.     Neuro: Patient only oriented to self, frequently reoriented. More interactive and less restless today.    VS: HR 60s NSR. MAP >65 <160. SpO2 100% on 4L nasal cannula & 10ppm of nitric oxide.   x1 BM 5/24.    Skin: No new skin breakdown noted. Foam applied to sacrum and heels. Immerse bed in use and set for patient weight. Repositioned with pillows. Bath completed.

## 2023-05-24 NOTE — ASSESSMENT & PLAN NOTE
82 y.o female with h/o  HFrEF with EF 25-30%, afib previously on coumadin ( had to be changed by pcp given lack of compliance ), cva with L ti residual deficits, medication noncompliance, dementia (with signficant cognitive deficit) who presents to the hospital for AMS. on arrival to the hospital he was found to be in atrial flutter with rate 130s. hypertensive with /100. lactate 4 which later downtrended to 3.5   bedside echo revealed depressed EF with global hypokinesis. mild-mod MR. IVC 1.2 cm and non-collapsible. Pleural effusion noted.    RIJ triple lumen central line was placed on admit. Admission hemodynamics were obtained and were as follows:  svo2 49 CVP 8 CO 1.8 CI 1.2 SVR 4077    Patient subsequently placed on nitro gtt and admitted to ccu. Unfortunatley given age, significnat debility post cva, dementia with significant cognitive impairment, and medication noncompliance pt is not a candidate for any advanced options. Extensive discussed with patient's niece, Linda. She was explained patient's poor prognosis and limited baseline but would like to maintain full code with continued treatment/life-saving measures.    Hemodynamics 5/24 on  5, dopamine 2, and inhaled NO  SvO2: 68  CO: 4  CI: 2.5  CVP: 5  SVR: 1900    - wean  to 2.5  - continue inhaled NO  - continue dopamine 2, plan to wean 5/25  - transition lasix infusion to lasix 80 mg IV BID  - started hydralazine 10 mg TID  - q4 hour SvO2 and lactate  - discontinued zosyn given low suspicion for infection  - palliative care following

## 2023-05-24 NOTE — ASSESSMENT & PLAN NOTE
History of paroxysmal atrial fibrillation/flutter  Previously on warfarin however it was stopped given Toprol for rate control at home    Plan:  -- currently on heparin-will cont  -- will hold bb in setting of shock  -- transitione amiodarone infusion to PO amiodarone 5/24

## 2023-05-24 NOTE — PT/OT/SLP EVAL
Speech Language Pathology Evaluation  Bedside Swallow    Patient Name:  Temitope Suero   MRN:  [id number]  Admitting Diagnosis: Cardiogenic shock    Recommendations:                 General Recommendations:  Dysphagia therapy  Diet recommendations:  Puree, Thin   Aspiration Precautions: Meds crushed in puree, Standard aspiration precautions, and Strict aspiration precautions   General Precautions: Standard,    Communication strategies:  none    Assessment:     Temitope Suero is a 83 y.o. female with an SLP diagnosis of Dysphagia.  She presents with  adequate ability to consume diet of thin liquids and purees.     History:     Past Medical History:   Diagnosis Date    Cancer of left breast, stage 2 11/24/2015    Cataract     Cerebrovascular small vessel disease 04/11/2018    Bi-thalamic lacunar disease, mod/sev periventricular white matter disease, mixed pattern cerebral microbleeds    Cervicalgia 04/17/2018    Chronic anticoagulation - apixaban 04/17/2018    Previous on Xarelto but changed to apixaban 8-2018 due to recurrent embolic stroke.    Chronic systolic heart failure 04/17/2018    Echo 4-2018   1 - Moderately depressed left ventricular systolic function (EF 30-35%).    2 - Biatrial enlargement.    3 - Normal right ventricular systolic function .    4 - Mild mitral regurgitation.    5 - Mild tricuspid regurgitation.    6 - The estimated PA systolic pressure is 34 mmHg.    7 - Increased central venous pressure.    8 - Left pleural effusion.     CKD stage 3 due to type 2 diabetes mellitus 04/17/2018    Embolic stroke involving left cerebellar artery 08/13/2018    Embolic stroke involving right posterior cerebral artery 04/11/2018    Essential hypertension 10/28/2013    Glaucoma suspect of both eyes 12/09/2013    Hearing loss, sensorineural 12/16/2013    LV (left ventricular) mural thrombus 05/07/2022    Malignant hypertension 08/12/2018    Mixed hyperlipidemia 10/28/2013    Obesity 01/16/2014    Paroxysmal atrial  fibrillation 07/10/2012    Stage 3 chronic kidney disease 04/17/2018    Toxic multinodular goiter 10/28/2013    Type 2 diabetes mellitus with stage 3 chronic kidney disease, without long-term current use of insulin 07/10/2012    Diet controlled.    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 Patient initially started on nitroglycerin for afterload reduction. She was additionally started on lasix and dobutamine infusion 5/22. Patient started on inhaled nitric oxide and weaned off NG infusion. Trial of dopamine infusion for further preload reduction. Extensive goals of care conversation with patient's family and assistance from palliative care. Plan for full code and life-saving/curative measures per discussion.    Prior Intubation HX:  none this admission     Modified Barium Swallow: none prior     Prior diet: Pt well known to speech service pervious evaluated   5/2022- regular solids and thin liquids  5/2022- mechanical soft and thin liquids   2/2022- puree and nectar thickened liquids   8/2018- regular and thin       Subjective     Pt awake and alert     Pain/Comfort:  Pain Rating 1: 0/10  Pain Rating Post-Intervention 1: 0/10    Respiratory Status: Nasal cannula, flow 4 L/min    Objective:     Oral Musculature Evaluation  Oral Musculature: WFL  Dentition: scattered dentition  Secretion Management: adequate  Oral Labial Strength and Mobility: WFL  Lingual Strength and Mobility: WFL  Volitional Cough: strong  Volitional Swallow: prompt; diminihsed rise to palpation  Voice Prior to PO Intake: strong and clear    Bedside Swallow Eval:   Consistencies Assessed:  Thin liquids single sips from open cup across 3oz   Puree full tsp x6  Solids  x1      Oral Phase:   Oral holding  Excess chewing  Prolonged mastication with regular solids   Pt warranting puree and liquid wash to clear would best benefit from smooth foods    Pharyngeal Phase:   no overt clinical signs/symptoms of aspiration  no overt clinical signs/symptoms of pharyngeal dysphagia    Compensatory Strategies  None    Treatment:  Education provided to Pt re: SLP role in acute care setting, overall impressions and therapeutic goals. Whiteboard updated.      Goals:   Multidisciplinary Problems       SLP Goals          Problem: SLP    Goal Priority Disciplines Outcome   SLP Goal     SLP    Description: Speech Language Pathology Goals  Goals expected to be met by    1. Pt will tolerate diet of puree and thin liquids without overt clinical signs of aspiration   2. Pt will participate in additional trials of soft solids to help determine least restrictive diet                        Plan:     Patient to be seen:  3 x/week   Plan of Care expires:     Plan of Care reviewed with:      SLP Follow-Up:  Yes       Discharge recommendations:  nursing facility, skilled   Barriers to Discharge:   TBD    Time Tracking:     SLP Treatment Date:   05/24/23  Speech Start Time:  0959  Speech Stop Time:  1012     Speech Total Time (min):  13 min    Billable Minutes: Eval Swallow and Oral Function 13    05/24/2023

## 2023-05-24 NOTE — ASSESSMENT & PLAN NOTE
Baseline creatinine approximately 1.2, worsening to 2.0 on 5/23 likely ATN in the setting of cardiogenic shock. Will continue to monitor with resolution of cardiogenic shock

## 2023-05-24 NOTE — CARE UPDATE
Hemodynamics on  5, dopamine 2, and inhaled NO 10 PPM  SvO2: 68  CO: 4  CI: 2.5  CVP: 5  SVR: 1900    Hemodynamics on  2.5, dopamine 2, and inhaled NO 10 PPM  SvO2: 70  CO: 4.2  CI: 2.7  CVP: 2  SVR: 1810

## 2023-05-24 NOTE — ASSESSMENT & PLAN NOTE
Ms Suero is an 84yo lady that presented with acute AMS and worsening SOB in setting HFrEF and was admitted for treatment of T-II NSTEMI. She continues to need inotropic support with dobutamine infusion. She is on amiodarone infusion for p/Afib. Nitro and dopamine started for afterload reduction with worsening LV function.  Ms Linda Alfaro, Pt's niece, is the undocumented HCPOA. She was raised by Ms Suero and is her primary caretaker. Ms Alfaro acknowledged that taking home medications often caused Ms Suero to become agitated, therefore she holds the doses for sake of comfort. PCP ([place]) agreed that this was the palliative approach would help minimize additional irritation in her demented state. Ms Alfaro is a Jainism  with a strong Rastafari susan. She wants everything to be done to resuscitate Ms Suero in the event of cardiac failure, including compressions and mechanical ventilation. She stated that she believes in miracles and that her Aunt's heart failure could be fixed. If Ms Suero becomes uncomfortable or agitated in her current state, Ms Alfaro said that her Aunt will give the team a sign. Care to focus on improving cardiac function while engaging the family in advanced care planning and exploring discharge possibilities. Pt remains Full Code.    Overnight agitation and combativeness noted on 5/23/23, treated with scheduled Olanzipine. Niece was informed and noted that these episodes have not occurred with Pt at home. Dobutamine titrated from 5-> 2.5mcg/kg/min with Pt tolerating adequately. Reviewed current medications with Ms Alfaro and discussed outpatient dobutamine options with hospice care. She stated that she is currently looking into care through [facility] and was told that was approved for home health nurse. This would likely require discontinuing treatment with her PCP (Dr Zambrano) if this options was pursued.    Medicolegal  Decision-making:   -Pt does not have decision-making capacity  -Pt  has not formally appointed a HCPOA.  -Linda Alfaro is her niece, care taker, and acting POA   -Marital status: Single  -Children: 1 son, Tej Suero in [facility]    Advance care planning/Advance directives:  -The patient has not previously engaged in advance care planning  -Pt has no scanned advance directives in [facility]    Psychosocial  Support system:  -Spiritual: yes;  Patient is part of a particular susan background: [employer].  The palliative care  will not be consulted to assess the patient.  -Family: Pt's niece Linda Alfaro is primary care take and verbally appointed POA. Pt's great niece Giacomo Stern (Linda's daughter) also involved in care.    Health status prior to this hospitalization  -Functional status: poor.  Pt was not able to manage ADLs independently  -Cognitive status: complicated by prior CVA and delerium    -QOL: poor    Prognosis:  -Time and potential for recovery: NA    [facility] Discussion with Niece:  Advance Care Planning I engaged the patient in a conversation about advance care planning and we specifically addressed what the goals of care would be moving forward, in light of the patients change in clinical status, specifically worsening HFrEF and AMS.  We did specifically address the patients likely prognosis, which is poor.  We explored the patients values and preferences for future care.  The patient endorses that what is most important right now is to focus on     ___Spending time at home  ___Avoiding the hospital  ___Remaining as independent as possible  ___Symptom/pain control  ___Quality of life, even if it means sacrificing a little time  _X_Extending life as long as possible, even it it means sacrificing quality  _X_Other, providing home care    Accordingly, the best plan at this time is work with family who desires for Pt to return home under their care.    I did not explain the role for hospice care at this stage of the patients illness, including its ability to help the  patient live with the best quality of life possible.  We will not be making a hospice referral from the clinic today.    I spent a total of 35 minutes engaging the patient in this advance care planning discussion.Advance Care Planning In light of the patients advanced and terminal illness, we reviewed what the patients preferences for care would be at the very end of life.  The patient wishes to have a natural, peaceful death.  Along those lines, the patient does not wish to have CPR or other invasive treatments performed when her heart and/or breathing stops.  I communicated to the patient that a DNR order would be placed in her medical record to reflect this preference.  A DNR form was completed and will be scanned into EPIC.  In addition, a [place]POST form was not completed to reflect other EOL preferences of the patient. I spent a total of 35 minutes engaging the patient in this advance care planning discussion.     Active symptoms  -Dyspnea Continuous oxymetry with supplemental oxygen PRN  -Anxiety/Agitation: Olanzapine     Summary/Recommendation:  -Most important goals at this time: currently curative/life-prolongation (regardless of treatment burdens) but more discussions are needed for determination. Continue to wean off medication and assess home care needs.  -Code status: Full  -Most appropriate disposition: NA

## 2023-05-24 NOTE — SUBJECTIVE & OBJECTIVE
Interval History: Increased agitation overnight, requiring zyprexa. Improvement in mental status. Weaning dobutamine to 2.5 mg/hr and transitioned lasix infusion to pushes.    Review of Systems   Unable to perform ROS: Mental status change   Objective:     Vital Signs (Most Recent):  Temp: 97.8 °F (36.6 °C) (05/24/23 1115)  Pulse: 73 (05/24/23 1200)  Resp: (!) 22 (05/24/23 1200)  BP: 111/70 (05/24/23 1200)  SpO2: 100 % (05/24/23 1200) Vital Signs (24h Range):  Temp:  [97 °F (36.1 °C)-97.8 °F (36.6 °C)] 97.8 °F (36.6 °C)  Pulse:  [63-92] 73  Resp:  [17-47] 22  SpO2:  [99 %-100 %] 100 %  BP: ()/(59-92) 111/70     Weight: 54 kg (119 lb)  Body mass index is 18.64 kg/m².     SpO2: 100 %         Intake/Output Summary (Last 24 hours) at 5/24/2023 1229  Last data filed at 5/24/2023 1200  Gross per 24 hour   Intake 1026.85 ml   Output 3365 ml   Net -2338.15 ml         Lines/Drains/Airways       Central Venous Catheter Line  Duration             Percutaneous Central Line Insertion/Assessment - Triple Lumen  05/21/23 2015 Internal Jugular Right 2 days              Drain  Duration                  Urethral Catheter 05/21/23 2249 2 days              Peripheral Intravenous Line  Duration                  Peripheral IV - Single Lumen 05/21/23 1500 20 G Posterior;Right Hand 2 days         Peripheral IV - Single Lumen 05/21/23 1749 20 G Anterior;Proximal;Right Forearm 2 days         Peripheral IV - Single Lumen 05/21/23 2213 18 G Anterior;Right Upper Arm 2 days                       Physical Exam  Vitals and nursing note reviewed.   Constitutional:       Appearance: She is ill-appearing.   HENT:      Head: Normocephalic.      Mouth/Throat:      Mouth: Mucous membranes are moist.   Eyes:      Conjunctiva/sclera: Conjunctivae normal.   Cardiovascular:      Rate and Rhythm: Tachycardia present. Rhythm irregular.      Heart sounds: Murmur heard.   Abdominal:      General: Abdomen is flat. Bowel sounds are normal. There is no  distension.   Musculoskeletal:      Right lower leg: No edema.      Left lower leg: No edema.   Neurological:      Mental Status: She is disoriented.          Significant Labs: All pertinent lab results from the last 24 hours have been reviewed.    Significant Imaging:  Reviewed

## 2023-05-24 NOTE — PROGRESS NOTES
[facility] - Surgical Intensive Care  Cardiology  Progress Note    Patient Name: Temitope Suero  MRN: [id number]  Admission Date: 5/21/2023  Hospital Length of Stay: 3 days  Code Status: Full Code   Attending Physician: Jaquelin Davis MD   Primary Care Physician: Gm Zambrano MD  Expected Discharge Date: 5/25/2023  Principal Problem:Cardiogenic shock    Subjective:     Hospital Course:   Patient admitted to CCU for cardiogenic shock. Patient initially started on nitroglycerin for afterload reduction. She was additionally started on lasix 10 mg/hr and dobutamine infusion 5mg/hr 5/22. Extensive goals of care conversation with patient's family and assistance from palliative care. Plan for full code and life-saving/curative measures per discussion. Patient started on inhaled nitric oxide (10 PPM) and weaned off NG infusion. Trial of dopamine infusion for further preload reduction. Lasix infusion transitioned to IV pushes and dobutamine weaned to 2.5 mg/hr 5/24 given improving hemodynamics.      Interval History: Increased agitation overnight, requiring zyprexa. Improvement in mental status. Weaning dobutamine to 2.5 mg/hr and transitioned lasix infusion to pushes.    Review of Systems   Unable to perform ROS: Mental status change   Objective:     Vital Signs (Most Recent):  Temp: 97.8 °F (36.6 °C) (05/24/23 1115)  Pulse: 73 (05/24/23 1200)  Resp: (!) 22 (05/24/23 1200)  BP: 111/70 (05/24/23 1200)  SpO2: 100 % (05/24/23 1200) Vital Signs (24h Range):  Temp:  [97 °F (36.1 °C)-97.8 °F (36.6 °C)] 97.8 °F (36.6 °C)  Pulse:  [63-92] 73  Resp:  [17-47] 22  SpO2:  [99 %-100 %] 100 %  BP: ()/(59-92) 111/70     Weight: 54 kg (119 lb)  Body mass index is 18.64 kg/m².     SpO2: 100 %         Intake/Output Summary (Last 24 hours) at 5/24/2023 1229  Last data filed at 5/24/2023 1200  Gross per 24 hour   Intake 1026.85 ml   Output 3365 ml   Net -2338.15 ml         Lines/Drains/Airways       Central Venous Catheter Line  Duration              Percutaneous Central Line Insertion/Assessment - Triple Lumen  05/21/23 2015 Internal Jugular Right 2 days              Drain  Duration                  Urethral Catheter 05/21/23 2249 2 days              Peripheral Intravenous Line  Duration                  Peripheral IV - Single Lumen 05/21/23 1500 20 G Posterior;Right Hand 2 days         Peripheral IV - Single Lumen 05/21/23 1749 20 G Anterior;Proximal;Right Forearm 2 days         Peripheral IV - Single Lumen 05/21/23 2213 18 G Anterior;Right Upper Arm 2 days                       Physical Exam  Vitals and nursing note reviewed.   Constitutional:       Appearance: She is ill-appearing.   HENT:      Head: Normocephalic.      Mouth/Throat:      Mouth: Mucous membranes are moist.   Eyes:      Conjunctiva/sclera: Conjunctivae normal.   Cardiovascular:      Rate and Rhythm: Tachycardia present. Rhythm irregular.      Heart sounds: Murmur heard.   Abdominal:      General: Abdomen is flat. Bowel sounds are normal. There is no distension.   Musculoskeletal:      Right lower leg: No edema.      Left lower leg: No edema.   Neurological:      Mental Status: She is disoriented.          Significant Labs: All pertinent lab results from the last 24 hours have been reviewed.    Significant Imaging:  Reviewed    Assessment and Plan:       * Cardiogenic shock  82 y.o female with h/o  HFrEF with EF 25-30%, afib previously on coumadin ( had to be changed by pcp given lack of compliance ), cva with L ti residual deficits, medication noncompliance, dementia (with signficant cognitive deficit) who presents to the hospital for AMS. on arrival to the hospital he was found to be in atrial flutter with rate 130s. hypertensive with /100. lactate 4 which later downtrended to 3.5   bedside echo revealed depressed EF with global hypokinesis. mild-mod MR. IVC 1.2 cm and non-collapsible. Pleural effusion noted.    RIJ triple lumen central line was placed on admit. Admission  hemodynamics were obtained and were as follows:  svo2 49 CVP 8 CO 1.8 CI 1.2 SVR 4077    Patient subsequently placed on nitro gtt and admitted to ccu. Unfortunatley given age, significnat debility post cva, dementia with significant cognitive impairment, and medication noncompliance pt is not a candidate for any advanced options. Extensive discussed with patient's niece, Linda. She was explained patient's poor prognosis and limited baseline but would like to maintain full code with continued treatment/life-saving measures.    Hemodynamics 5/24 on  5, dopamine 2, and inhaled NO  SvO2: 68  CO: 4  CI: 2.5  CVP: 5  SVR: 1900    - wean  to 2.5  - continue inhaled NO  - continue dopamine 2, plan to wean 5/25  - transition lasix infusion to lasix 80 mg IV BID  - started hydralazine 10 mg TID  - q4 hour SvO2 and lactate  - discontinued zosyn given low suspicion for infection  - palliative care following      At high risk for skin breakdown  Wound care following    Hypernatremia  Given 1/2 NS on admission with some improvement    History of critical lower limb ischemia  May 2022- no surgical intervention was needed at this time as although studies showed evidence of PAD, patient  Recommendation at the time was for woundcare, compression/elevation for edema, and initiation of ASA/Plavix and high intensity statin- will cont    Atrial flutter  History of paroxysmal atrial fibrillation/flutter  Previously on warfarin however it was stopped given Toprol for rate control at home    Plan:  -- currently on heparin-will cont  -- will hold bb in setting of shock  -- transitione amiodarone infusion to PO amiodarone 5/24    NSTEMI (non-ST elevated myocardial infarction)  Will treat as type 2 NSTEMI but she continues on DAPT for PAD as well as heparin infusion for afib    Acute renal failure  Baseline creatinine approximately 1.2, worsening to 2.0 on 5/23 likely ATN in the setting of cardiogenic shock. Will continue to monitor  with resolution of cardiogenic shock    Acute on chronic systolic heart failure  Echo 5/22:   The estimated ejection fraction is 15-20%. No LV apical thrombus is present   The quantitatively derived ejection fraction is 18%.   The left ventricle is normal in size with eccentric hypertrophy and severely decreased systolic function.   There is severe left ventricular global hypokinesis.   Indeterminate left ventricular diastolic function.   Normal right ventricular size with mildly reduced right ventricular systolic function.   Severe left atrial enlargement.   Mild right atrial enlargement.   Mild-to-moderate mitral regurgitation.   There is pulmonary hypertension.   The estimated PA systolic pressure is 47 mmHg.   Elevated central venous pressure (15 mmHg).    - see cardiogenic shock    History of stroke  Previous CVA with residual weakness and severe dementia        VTE Risk Mitigation (From admission, onward)         Ordered     heparin 25,000 units in dextrose 5% (100 units/ml) IV bolus from bag - ADDITIONAL PRN BOLUS - 60 units/kg (max bolus 4000 units)  As needed (PRN)        Question:  Heparin Infusion Adjustment (DO NOT MODIFY ANSWER)  Answer:  \\[email or web address]\epic\Images\Pharmacy\HeparinInfusions\heparin LOW INTENSITY nomogram for [facility] [id number]    05/21/23 1900     heparin 25,000 units in dextrose 5% (100 units/ml) IV bolus from bag - ADDITIONAL PRN BOLUS - 30 units/kg (max bolus 4000 units)  As needed (PRN)        Question:  Heparin Infusion Adjustment (DO NOT MODIFY ANSWER)  Answer:  \\[email or web address]\epic\Images\Pharmacy\HeparinInfusions\heparin LOW INTENSITY nomogram for [facility] [id number]    05/21/23 1900     IP VTE HIGH RISK PATIENT  Once         05/21/23 2156     Place sequential compression device  Until discontinued         05/21/23 2156     heparin 25,000 units in dextrose 5% 250 mL (100 units/mL) infusion LOW INTENSITY nomogram - [facility]  Continuous        Question Answer Comment   Heparin Infusion  Adjustment (DO NOT MODIFY ANSWER) [email or web address]\epic\Images\Pharmacy\HeparinInfusions\heparin LOW INTENSITY nomogram for [facility] [id number]    Begin at (in units/kg/hr) 12        05/21/23 1900                Tal Rajput MD  Cardiology  [facility] - Surgical Intensive Care

## 2023-05-24 NOTE — PLAN OF CARE
Hemodynamics Care Update Note       SVO2: 60  CVP: 5  CO: 3.8  CI: 2.3  SVR: 2234       UOP  150 cc/hr  (calculated using oxygen consumption constant of 3.5)       Plan:  No changes made. Will cont to monitor   Case discussed with on call HTS attending.    Jason Melo, PGY4  Cardiovascular Disease  [facility]

## 2023-05-25 NOTE — ASSESSMENT & PLAN NOTE
History of paroxysmal atrial fibrillation/flutter  Previously on warfarin however it was stopped given Toprol for rate control at home    Plan:  -- transitioned heparin to eliquis 5/25  -- will hold bb in setting of shock  -- transitione amiodarone infusion to PO amiodarone 5/24

## 2023-05-25 NOTE — ASSESSMENT & PLAN NOTE
Ms Suero is an 84yo lady that presented with acute AMS and worsening SOB in setting HFrEF and was admitted for treatment of T-II NSTEMI. She continues to need inotropic support with dobutamine infusion. She is on amiodarone infusion for p/Afib. Nitro and dopamine started for afterload reduction with worsening LV function.  Ms Linda Alfaro, Pt's niece, is the undocumented HCPOA. She was raised by Ms Suero and is her primary caretaker. Ms Alfaro acknowledged that taking home medications often caused Ms Suero to become agitated, therefore she holds the doses for sake of comfort. PCP ([place]) agreed that this was the palliative approach would help minimize additional irritation in her demented state. Ms Alfaro is a Protestant  with a strong Pentecostal susan. She wants everything to be done to resuscitate Ms Suero in the event of cardiac failure, including compressions and mechanical ventilation. She stated that she believes in miracles and that her Aunt's heart failure could be fixed. If Ms Suero becomes uncomfortable or agitated in her current state, Ms Alfaro said that her Aunt will give the team a sign. Care to focus on improving cardiac function while engaging the family in advanced care planning and exploring discharge possibilities. Pt remains Full Code.    Overnight agitation and combativeness noted on 5/23/23, treated with scheduled Olanzipine. Niece was informed and noted that these episodes have not occurred with Pt at home. Dobutamine titrated from 5-> 2.5mcg/kg/min with Pt tolerating adequately. Reviewed current medications with Ms Alfaro and discussed outpatient dobutamine options with hospice care. She stated that she is currently looking into care through [facility] and was told that was approved for home health nurse. This would likely require discontinuing treatment with her PCP (Dr Zambrano) if this options was pursued. Unfortunately, Ms Suero's care is too complex/severe for acceptance into [facility].  Worsening agitation and combativeness now complicating home care. PT/OT ordered for debility due to prolonged hospitalization. Home hospice vs outpatient hospice discussed with Ms Alfaro on 5/25/23. She acknowledged that her aunt's care has become more complicated and requested some time to consider hospice options. Olanzapine discontinued d/t breakthrough agitation. Will trial Depacon O/N.     Medicolegal  Decision-making:   -Pt does not have decision-making capacity  -Pt has not formally appointed a HCPOA.  -Linda Alfaro is her niece, care taker, and acting POA   -Marital status: Single  -Children: 1 son, Tej Suero in [facility]    Advance care planning/Advance directives:  -The patient has not previously engaged in advance care planning  -Pt has no scanned advance directives in [facility]    Psychosocial  Support system:  -Spiritual: yes;  Patient is part of a particular susan background: [employer].  The palliative care  will not be consulted to assess the patient.  -Family: Pt's niece Linda Alfaro is primary care take and verbally appointed POA. Pt's great niece Giacomo Stern (Linda's daughter) also involved in care.    Health status prior to this hospitalization  -Functional status: poor.  Pt was not able to manage ADLs independently  -Cognitive status: complicated by prior CVA and delerium    -QOL: poor    Prognosis:  -Time and potential for recovery: NA    [facility] Discussion with Niece:  Advance Care Planning I engaged the patient in a conversation about advance care planning and we specifically addressed what the goals of care would be moving forward, in light of the patients change in clinical status, specifically worsening HFrEF and AMS.  We did specifically address the patients likely prognosis, which is poor.  We explored the patients values and preferences for future care.  The patient endorses that what is most important right now is to focus on     ___Spending time at home  ___Avoiding the  hospital  ___Remaining as independent as possible  _X_Symptom/pain control  ___Quality of life, even if it means sacrificing a little time  __ Extending life as long as possible, even it it means sacrificing quality  _X_Other, providing home care    Accordingly, the best plan at this time is work with family who desires for Pt to return home under their care.    I did not explain the role for hospice care at this stage of the patients illness, including its ability to help the patient live with the best quality of life possible.  We will not be making a hospice referral from the clinic today.    I spent a total of 40 minutes engaging the patient in this advance care planning discussion.Advance Care Planning In light of the patients advanced and terminal illness, we reviewed what the patients preferences for care would be at the very end of life.  The patient wishes to have a natural, peaceful death.  Along those lines, the patient does not wish to have CPR or other invasive treatments performed when her heart and/or breathing stops.  I communicated to the patient that a DNR order would be placed in her medical record to reflect this preference.  A DNR form was completed and will be scanned into EPIC.  In addition, a LaPOST form was not completed to reflect other EOL preferences of the patient. I spent a total of 40 minutes engaging the patient in this advance care planning discussion.     Active symptoms  -Dyspnea Continuous oxymetry with supplemental oxygen PRN  -Anxiety/Agitation: Depacon     Summary/Recommendation:  -Most important goals at this time: shifting to more comfort focus but more discussions are needed for determination. Continue to wean off medication and assess home care needs.  -Code status: Full  -Most appropriate disposition: NA

## 2023-05-25 NOTE — SUBJECTIVE & OBJECTIVE
Interval History:   Pt experienced increased agitation for second night in a row despite scheduled olanzapine. Haloperidol and alprazolam were also for to reduce agitation. A bolus of 500ml NS was administered for low CVP and SVO2. Dobutamine discontinued with normal BP. Will continue to titrate down on infused medications, as tolerated. Mentation at baseline.     Medications:  Continuous Infusions:   DOPamine 2 mcg/kg/min (05/25/23 1100)    nitric oxide gas       Scheduled Meds:   amiodarone  400 mg Oral BID    Followed by    [START ON 6/5/2023] amiodarone  200 mg Oral Daily    apixaban  5 mg Oral BID    aspirin  81 mg Oral Daily    atorvastatin  80 mg Oral Daily    clopidogreL  75 mg Oral Daily    hydrALAZINE  25 mg Oral TID    isosorbide dinitrate  10 mg Oral TID    mupirocin   Nasal BID    sodium chloride 0.9%  500 mL Intravenous Once    valproate (DEPACON) IVPB  250 mg Intravenous Once    valproate sodium (DEPACON) IVPB  250 mg Intravenous Once     PRN Meds:sodium chloride 0.9%    Objective:     Vital Signs (Most Recent):  Temp: 97.9 °F (36.6 °C) (05/25/23 0803)  Pulse: 85 (05/25/23 1201)  Resp: (Abnormal) 34 (05/25/23 1201)  BP: 121/81 (05/25/23 1201)  SpO2: 100 % (05/25/23 1201) Vital Signs (24h Range):  Temp:  [97.5 °F (36.4 °C)-97.9 °F (36.6 °C)] 97.9 °F (36.6 °C)  Pulse:  [] 85  Resp:  [16-59] 34  SpO2:  [92 %-100 %] 100 %  BP: ()/() 121/81     Weight: 51 kg (112 lb 7 oz)  Body mass index is 17.61 kg/m².       Physical Exam  Vitals and nursing note reviewed.   Constitutional:       General: She is not in acute distress.     Appearance: Normal appearance. She is ill-appearing.      Comments: sleeping   HENT:      Head: Normocephalic and atraumatic.      Nose: Nose normal. No congestion.      Mouth/Throat:      Mouth: Mucous membranes are dry.      Pharynx: Oropharynx is clear.   Eyes:      General: No scleral icterus.        Right eye: No discharge.         Left eye: No discharge.       Extraocular Movements: Extraocular movements intact.   Cardiovascular:      Rate and Rhythm: Normal rate and regular rhythm.      Pulses: Normal pulses.           Radial pulses are 2+ on the right side and 2+ on the left side.        Dorsalis pedis pulses are 2+ on the right side and 2+ on the left side.      Heart sounds: Murmur heard.     No gallop.   Pulmonary:      Effort: Pulmonary effort is normal. No respiratory distress.      Breath sounds: Normal breath sounds. No stridor. No wheezing.   Abdominal:      General: Bowel sounds are normal. There is no distension.      Palpations: Abdomen is soft.      Tenderness: There is no abdominal tenderness. There is no guarding.   Musculoskeletal:         General: No swelling, tenderness or deformity.      Right lower leg: No edema.      Left lower leg: No edema.   Skin:     General: Skin is warm.      Capillary Refill: Capillary refill takes less than 2 seconds.      Coloration: Skin is not jaundiced or pale.   Neurological:      Mental Status: She is alert. She is disoriented.   Psychiatric:      Comments: Mentation appears to be at baseline. Pt demented with disorientation.           Review of Symptoms      Symptom Assessment (ESAS 0-10 Scale)  Unable to complete assessment due to Acuity of condition     CAM / Delirium:  Positive  Constipation:  Negative  Diarrhea:  Negative      Bowel Management Plan (BMP):  Yes      Pain Assessment in Advanced Demential Scale (PAINAD)   Breathing - Independent of vocalization:  0  Negative vocalization:  0  Facial expression:  0  Body language:  0  Consolability:  1  Total:  1    Living Arrangements:  Lives with family and Lives in home    Psychosocial/Cultural:   See Palliative Psychosocial Note: No  Caregiver Needs Discussed. Pt's Niece Linda Alfaro is primary caregiver. She is aware of her aunt's worsened cardiac function and new onset agitation/combativeness. She is open to the consideration of home hospice, provided  it is a  feasible option. However, complexity of care may require outpatient hospice.     Caregiver Distress: None currently  **Primary  to Follow**  Palliative Care  Consult: No    Spiritual:  F - Binta and Belief:  Adventism  I - Importance:  High  C - Community:  Family     Time-Based Charting:  No      Advance Care Planning   Advance Directives:   Living Will: No        Oral Declaration: Yes  LaPOST: No    Do Not Resuscitate Status: No    Medical Power of : No        Oral Declaration: Yes  Agent's Name:  Linda Alfaro   Agent's Contact Number:  [phone]    Decision Making:  Family answered questions  Goals of Care: What is most important right now is to focus on comfort and decreasing nightly agitation. Accordingly, we have decided that the best plan to meet the patient's goals includes consideration of home hospice vs outpatient.       Significant Labs: All pertinent labs within the past 24 hours have been reviewed.  CBC:   Recent Labs   Lab 05/25/23  0256   WBC 5.21   HGB 10.5*   HCT 34.6*   MCV 78*        BMP:  Recent Labs   Lab 05/25/23  0535         K 3.4*      CO2 29   BUN 38*   CREATININE 1.9*   CALCIUM 9.0   MG 2.3     LFT:  Lab Results   Component Value Date    AST 18 05/25/2023    ALKPHOS 130 05/25/2023    BILITOT 0.5 05/25/2023     Albumin:   Albumin   Date Value Ref Range Status   05/25/2023 3.2 (L) 3.5 - 5.2 g/dL Final     Protein:   Total Protein   Date Value Ref Range Status   05/25/2023 6.5 6.0 - 8.4 g/dL Final     Lactic acid:   Lab Results   Component Value Date    LACTATE 2.3 (H) 05/22/2023    LACTATE 2.7 (H) 05/22/2023       Significant Imaging: I have reviewed all pertinent imaging results/findings within the past 24 hours.

## 2023-05-25 NOTE — ASSESSMENT & PLAN NOTE
Baseline creatinine approximately 1.2, worsening to 2.0 on 5/23 likely ATN in the setting of cardiogenic shock. Improved with small IV fluid boluses and lasix held. Will titrate to CVP of 5.

## 2023-05-25 NOTE — ASSESSMENT & PLAN NOTE
82 y.o female with h/o  HFrEF with EF 25-30%, afib previously on coumadin ( had to be changed by pcp given lack of compliance ), cva with L ti residual deficits, medication noncompliance, dementia (with signficant cognitive deficit) who presents to the hospital for AMS. on arrival to the hospital he was found to be in atrial flutter with rate 130s. hypertensive with /100. lactate 4 which later downtrended to 3.5   bedside echo revealed depressed EF with global hypokinesis. mild-mod MR. IVC 1.2 cm and non-collapsible. Pleural effusion noted.    RIJ triple lumen central line was placed on admit. Admission hemodynamics were obtained and were as follows:  svo2 49 CVP 8 CO 1.8 CI 1.2 SVR 4077    Patient subsequently placed on nitro gtt and admitted to ccu. Unfortunatley given age, significnat debility post cva, dementia with significant cognitive impairment, and medication noncompliance pt is not a candidate for any advanced options. Extensive discussed with patient's niece, Linda. She was explained patient's poor prognosis and limited baseline but would like to maintain full code with continued treatment/life-saving measures.    Hemodynamics 5/25 on  2.5, dopamine 2, and inhaled NO  SvO2: 72  CO: 4.2  CI: 2.7  CVP: 1  SVR: 2009    Hemodynamics 5/25 on  2.5, dopamine 2, and inhaled NO  SvO2: 72  CO: 6.4  CI: 4.1  CVP: 1  SVR: 1300    -  discontinued  - weaned inhaled NO to 5 ppm  - continue dopamine 2  - hold lasix given low CVP  - continue hydralazine-isosorbide dinitrate 25-10 mg TID  - q4 hour SvO2 and lactate  - discontinued zosyn given low suspicion for infection  - palliative care following

## 2023-05-25 NOTE — PLAN OF CARE
[facility]  Discharge Reassessment    Primary Care Provider: Gm Zambrano MD    Expected Discharge Date: 5/29/2023    Reassessment (most recent)       Discharge Reassessment - 05/25/23 1034          Discharge Reassessment    Assessment Type Discharge Planning Reassessment     Communicated WALDEMAR with patient/caregiver Date not available/Unable to determine     Discharge Plan A --   TBD    Discharge Plan B Other   TBD    DME Needed Upon Discharge  other (see comments)   TBD    Why the patient remains in the hospital Requires continued medical care                     Adria Anna LMSW  Case Management [facility]  Ext: [phone]

## 2023-05-25 NOTE — NURSING
2030: Pt restless, agitated and pulling at lines. MD  notified and 2.5mg Zyprexa ordered and given.     2130: Pt restlessness and agitation not improved by Zyprexa administration, pt now attempting to get out of bed multiple times. Unable to redirect/reorient patient. MD  notified and 5mg Haldol ordered and given.     2345: Pt still extremely restless, pulling at line and trying to get out of bed. Redirection and reorientation not helping patient situation. RN unable to leave patient bedside for multiple hours. MD  notified and Lorazepam 0.5mg ordered and given.

## 2023-05-25 NOTE — PLAN OF CARE
No acute events this shift. Heparin and dobutamine gtt stopped. Nitric Oxide weaned as tolerated. 500cc Bolus of Normal Saline administered x1. Tolerated pureed diet.Delirium precautions maintained.   Plan to wean nitric off and start PO anticoagulation tonight. Work with PT/OT tomorrow.    Neuro: Patient awake and alert this AM and oriented to self. Intermittently drowsy. PERRLA. Follows commands and moves all extremities.     Vital Signs: HR 60s NSR. MAP >65 & <160. SpO2 100% on 4L nasal cannula 3ppm of nitric oxide with intermittent tachypnea noted. CVP 0-2. SvO2 71,81,69.     UOP ~400cc/shift per ramos catheter. X1 large BM this shift.     Skin: No new skin breakdown noted. Immerse mattress in use. Pt repositioned throughout the day. Foams applied to heels. Bath completed.

## 2023-05-25 NOTE — PT/OT/SLP PROGRESS
Speech Language Pathology      Temitope Suero  MRN: [id number]    Patient not seen today secondary to RN hold and pt essentially somnolent at time of attempt. RN report pt tolerated sips of water , tastes of pudding and meds crushed in applesauce previous date. SLP discussed continue with current diet of recs of puree and thin liquids as medically appropriate, pt awake alert and seated upright. Speech to continue to follow.     Dilma Moore MS, CCC-SLP  Speech Language Pathologist  Pager: [phone]  Date  5/25/2023

## 2023-05-25 NOTE — PROGRESS NOTES
[facility]  Palliative Medicine  Progress Note    Patient Name: Temitope Suero  MRN: [id number]  Admission Date: 5/21/2023  Hospital Length of Stay: 4 days  Code Status: Full Code   Attending Provider: Jaquelin Davis MD  Consulting Provider: Francisco Naik MD  Primary Care Physician: Gm Zambrano MD  Principal Problem:Cardiogenic shock    Patient information was obtained from relative(s) and primary team.      Assessment/Plan:     Palliative Care  Palliative care encounter  Ms Suero is an 82yo lady that presented with acute AMS and worsening SOB in setting HFrEF and was admitted for treatment of T-II NSTEMI. She continues to need inotropic support with dobutamine infusion. She is on amiodarone infusion for p/Afib. Nitro and dopamine started for afterload reduction with worsening LV function.  Ms Linda Alfaro, Pt's niece, is the undocumented HCPOA. She was raised by Ms Suero and is her primary caretaker. Ms Alfaro acknowledged that taking home medications often caused Ms Suero to become agitated, therefore she holds the doses for sake of comfort. PCP ([place]) agreed that this was the palliative approach would help minimize additional irritation in her demented state. Ms Alfaro is a Mandaeism  with a strong Anabaptism susan. She wants everything to be done to resuscitate Ms Suero in the event of cardiac failure, including compressions and mechanical ventilation. She stated that she believes in miracles and that her Aunt's heart failure could be fixed. If Ms Suero becomes uncomfortable or agitated in her current state, Ms Alfaro said that her Aunt will give the team a sign. Care to focus on improving cardiac function while engaging the family in advanced care planning and exploring discharge possibilities. Pt remains Full Code.    Overnight agitation and combativeness noted on 5/23/23, treated with scheduled Olanzipine. Niece was informed and noted that these episodes have not occurred with Pt at  home. Dobutamine titrated from 5-> 2.5mcg/kg/min with Pt tolerating adequately. Reviewed current medications with Ms Alfaro and discussed outpatient dobutamine options with hospice care. She stated that she is currently looking into care through [facility] and was told that was approved for home health nurse. This would likely require discontinuing treatment with her PCP (Dr Zambrano) if this options was pursued. Unfortunately, Ms Suero's care is too complex/severe for acceptance into [facility]. Worsening agitation and combativeness now complicating home care. PT/OT ordered for debility due to prolonged hospitalization. Home hospice vs outpatient hospice discussed with Ms Alfaro on 5/25/23. She acknowledged that her aunt's care has become more complicated and requested some time to consider hospice options. Olanzapine discontinued d/t breakthrough agitation. Will trial Depacon O/N.     Medicolegal  Decision-making:   -Pt does not have decision-making capacity  -Pt has not formally appointed a HCPOA.  -Linda Alfaro is her niece, care taker, and acting POA   -Marital status: Single  -Children: 1 son, Tej Suero in [facility]    Advance care planning/Advance directives:  -The patient has not previously engaged in advance care planning  -Pt has no scanned advance directives in [facility]    Psychosocial  Support system:  -Spiritual: yes;  Patient is part of a particular susan background: Orthodoxy.  The palliative care  will not be consulted to assess the patient.  -Family: Pt's niece Linda Alfaro is primary care take and verbally appointed POA. Pt's great niece Giacomo Stern (Linda's daughter) also involved in care.    Health status prior to this hospitalization  -Functional status: poor.  Pt was not able to manage ADLs independently  -Cognitive status: complicated by prior CVA and delerium    -QOL: poor    Prognosis:  -Time and potential for recovery: NA    [facility] Discussion with Niece:  Advance Care Planning I engaged  the patient in a conversation about advance care planning and we specifically addressed what the goals of care would be moving forward, in light of the patients change in clinical status, specifically worsening HFrEF and AMS.  We did specifically address the patients likely prognosis, which is poor.  We explored the patients values and preferences for future care.  The patient endorses that what is most important right now is to focus on     ___Spending time at home  ___Avoiding the hospital  ___Remaining as independent as possible  _X_Symptom/pain control  ___Quality of life, even if it means sacrificing a little time  __ Extending life as long as possible, even it it means sacrificing quality  _X_Other, providing home care    Accordingly, the best plan at this time is work with family who desires for Pt to return home under their care.    I did not explain the role for hospice care at this stage of the patients illness, including its ability to help the patient live with the best quality of life possible.  We will not be making a hospice referral from the clinic today.    I spent a total of 40 minutes engaging the patient in this advance care planning discussion.Advance Care Planning In light of the patients advanced and terminal illness, we reviewed what the patients preferences for care would be at the very end of life.  The patient wishes to have a natural, peaceful death.  Along those lines, the patient does not wish to have CPR or other invasive treatments performed when her heart and/or breathing stops.  I communicated to the patient that a DNR order would be placed in her medical record to reflect this preference.  A DNR form was completed and will be scanned into EPIC.  In addition, a LaPOST form was not completed to reflect other EOL preferences of the patient. I spent a total of 40 minutes engaging the patient in this advance care planning discussion.    Active symptoms  -Dyspnea Continuous  oxymetry with supplemental oxygen PRN  -Anxiety/Agitation: Depacon     Summary/Recommendation:  -Most important goals at this time: shifting to more comfort focus but more discussions are needed for determination. Continue to wean off medication and assess home care needs.  -Code status: Full  -Most appropriate disposition: NA        I will follow-up with patient. Please contact us if you have any additional questions.    Subjective:     Chief Complaint:   Chief Complaint   Patient presents with    Palpitations     Pt arrives EMS from home with complaints of palpitations since last night, per EMS pt in a-fib, no history of a-fib.        HPI:   Ms. Temitope Suero is a 81yo lady with history of HTN, HLD, HFrEF with EF 25-30%, Afib on eliquis, CVA with L hemiparesis, NIDDM2, CKD3 (Cr baseline at 1.3), vit D deficiency, medication noncompliance, dementia (with signficant cognitive deficit) who presents to the hospital for AMS. Pt is accompanied by her niece who is the primary caregiver and POA according to PCP notes (Dr Zambrano on 3/20/23. No ACP on file. History is obtained soley from the niece and medical records given mental status. Per niece, yesterday evening pt was complaining of palpitations and was noted to be more confused than her baseline. Symptoms continued to worsen until the morning of 5/21/23 when she was brought to the ED. Upon arrival to the ED she was found to be afebrile, in atrial flutter with rate of 130s. She was given a dose of 5 mg lopressor. Hypertensive at 170/100, on 4L NC to maintain SpO2. Labs notable for Cr 1.9, BNP 2554, Trop 7.2, lactate 4 which later trended down to 3.5. Cardiogenic shock suspected. Palliative Care consulted for GOC discussion with family.      Hospital Course:  No notes on file    Interval History:   Pt experienced increased agitation for second night in a row despite scheduled olanzapine. Haloperidol and alprazolam were also for to reduce agitation. A bolus of 500ml NS  was administered for low CVP and SVO2. Dobutamine discontinued with normal BP. Will continue to titrate down on infused medications, as tolerated. Mentation at baseline.     Medications:  Continuous Infusions:   DOPamine 2 mcg/kg/min (05/25/23 1100)    nitric oxide gas       Scheduled Meds:   amiodarone  400 mg Oral BID    Followed by    [START ON 6/5/2023] amiodarone  200 mg Oral Daily    apixaban  5 mg Oral BID    aspirin  81 mg Oral Daily    atorvastatin  80 mg Oral Daily    clopidogreL  75 mg Oral Daily    hydrALAZINE  25 mg Oral TID    isosorbide dinitrate  10 mg Oral TID    mupirocin   Nasal BID    sodium chloride 0.9%  500 mL Intravenous Once    valproate (DEPACON) IVPB  250 mg Intravenous Once    valproate sodium (DEPACON) IVPB  250 mg Intravenous Once     PRN Meds:sodium chloride 0.9%    Objective:     Vital Signs (Most Recent):  Temp: 97.9 °F (36.6 °C) (05/25/23 0803)  Pulse: 85 (05/25/23 1201)  Resp: (Abnormal) 34 (05/25/23 1201)  BP: 121/81 (05/25/23 1201)  SpO2: 100 % (05/25/23 1201) Vital Signs (24h Range):  Temp:  [97.5 °F (36.4 °C)-97.9 °F (36.6 °C)] 97.9 °F (36.6 °C)  Pulse:  [] 85  Resp:  [16-59] 34  SpO2:  [92 %-100 %] 100 %  BP: ()/() 121/81     Weight: 51 kg (112 lb 7 oz)  Body mass index is 17.61 kg/m².       Physical Exam  Vitals and nursing note reviewed.   Constitutional:       General: She is not in acute distress.     Appearance: Normal appearance. She is ill-appearing.      Comments: sleeping   HENT:      Head: Normocephalic and atraumatic.      Nose: Nose normal. No congestion.      Mouth/Throat:      Mouth: Mucous membranes are dry.      Pharynx: Oropharynx is clear.   Eyes:      General: No scleral icterus.        Right eye: No discharge.         Left eye: No discharge.      Extraocular Movements: Extraocular movements intact.   Cardiovascular:      Rate and Rhythm: Normal rate and regular rhythm.      Pulses: Normal pulses.           Radial pulses are  2+ on the right side and 2+ on the left side.        Dorsalis pedis pulses are 2+ on the right side and 2+ on the left side.      Heart sounds: Murmur heard.     No gallop.   Pulmonary:      Effort: Pulmonary effort is normal. No respiratory distress.      Breath sounds: Normal breath sounds. No stridor. No wheezing.   Abdominal:      General: Bowel sounds are normal. There is no distension.      Palpations: Abdomen is soft.      Tenderness: There is no abdominal tenderness. There is no guarding.   Musculoskeletal:         General: No swelling, tenderness or deformity.      Right lower leg: No edema.      Left lower leg: No edema.   Skin:     General: Skin is warm.      Capillary Refill: Capillary refill takes less than 2 seconds.      Coloration: Skin is not jaundiced or pale.   Neurological:      Mental Status: She is alert. She is disoriented.   Psychiatric:      Comments: Mentation appears to be at baseline. Pt demented with disorientation.           Review of Symptoms      Symptom Assessment (ESAS 0-10 Scale)  Unable to complete assessment due to Acuity of condition     CAM / Delirium:  Positive  Constipation:  Negative  Diarrhea:  Negative      Bowel Management Plan (BMP):  Yes      Pain Assessment in Advanced Demential Scale (PAINAD)   Breathing - Independent of vocalization:  0  Negative vocalization:  0  Facial expression:  0  Body language:  0  Consolability:  1  Total:  1    Living Arrangements:  Lives with family and Lives in home    Psychosocial/Cultural:   See Palliative Psychosocial Note: No  Caregiver Needs Discussed. Pt's Niece Linda Alfaro is primary caregiver. She is aware of her aunt's worsened cardiac function and new onset agitation/combativeness. She is open to the consideration of home hospice, provided  it is a feasible option. However, complexity of care may require outpatient hospice.     Caregiver Distress: None currently  **Primary  to Follow**  Palliative Care Social  Worker Consult: No    Spiritual:  F - Binta and Belief:  Latter day  I - Importance:  High  C - Community:  Family     Time-Based Charting:  No      Advance Care Planning   Advance Directives:   Living Will: No        Oral Declaration: Yes  LaPOST: No    Do Not Resuscitate Status: No    Medical Power of : No        Oral Declaration: Yes  Agent's Name:  Linda Alfaro   Agent's Contact Number:  [phone]    Decision Making:  Family answered questions  Goals of Care: What is most important right now is to focus on comfort and decreasing nightly agitation. Accordingly, we have decided that the best plan to meet the patient's goals includes consideration of home hospice vs outpatient.       Significant Labs: All pertinent labs within the past 24 hours have been reviewed.  CBC:   Recent Labs   Lab 05/25/23  0256   WBC 5.21   HGB 10.5*   HCT 34.6*   MCV 78*        BMP:  Recent Labs   Lab 05/25/23  0535         K 3.4*      CO2 29   BUN 38*   CREATININE 1.9*   CALCIUM 9.0   MG 2.3     LFT:  Lab Results   Component Value Date    AST 18 05/25/2023    ALKPHOS 130 05/25/2023    BILITOT 0.5 05/25/2023     Albumin:   Albumin   Date Value Ref Range Status   05/25/2023 3.2 (L) 3.5 - 5.2 g/dL Final     Protein:   Total Protein   Date Value Ref Range Status   05/25/2023 6.5 6.0 - 8.4 g/dL Final     Lactic acid:   Lab Results   Component Value Date    LACTATE 2.3 (H) 05/22/2023    LACTATE 2.7 (H) 05/22/2023       Significant Imaging: I have reviewed all pertinent imaging results/findings within the past 24 hours.      Francisco Naik MD  Palliative Medicine  [facility] - Surgical Intensive Care

## 2023-05-25 NOTE — CARE UPDATE
Hemodynamics     CVP: 0  SVO2:  44  Cardiac Output: 2.7  Cardiac Index: 1.7  SVR: 2831      Repeat after fluids  CVP: 1  SVO2:  76  Cardiac Output: 7.2  Cardiac Index: 4.5  SVR: 1043    Continuous Infusions:   DOBUTamine IV infusion (non-titrating) 2.5 mcg/kg/min (05/25/23 0100)    DOPamine 2 mcg/kg/min (05/25/23 0100)    heparin (porcine) in D5W 16 Units/kg/hr (05/25/23 0100)    nitric oxide gas         Intake/Output Summary (Last 24 hours) at 5/25/2023 0113  Last data filed at 5/25/2023 0100  Gross per 24 hour   Intake 1593.27 ml   Output 2525 ml   Net -931.73 ml         Plan: lasix dcd. 500 cc bolus given. Repeat hemodynamics much improved. Given doses of antipsychotics for agitation with no improvement, followed by ativan 0.5mg with improvement of agitation. Now resting comfortably   Case discussed with on call attending.    Matheus Townsendor, MD  Cardiology Fellow, PGY4

## 2023-05-25 NOTE — PROGRESS NOTES
[facility]  Cardiology  Progress Note    Patient Name: Temitope Suero  MRN: [id number]  Admission Date: 5/21/2023  Hospital Length of Stay: 4 days  Code Status: Full Code   Attending Physician: Jaquelin Davis MD   Primary Care Physician: Gm Zambrano MD  Expected Discharge Date: 5/29/2023  Principal Problem:Cardiogenic shock    Subjective:     Hospital Course:   Patient admitted to CCU for cardiogenic shock. Patient initially started on nitroglycerin for afterload reduction. She was additionally started on lasix 10 mg/hr and dobutamine infusion 5mg/hr 5/22. Extensive goals of care conversation with patient's family and assistance from palliative care. Plan for full code and life-saving/curative measures per discussion. Patient started on inhaled nitric oxide (10 PPM) and weaned off NG infusion. Trial of dopamine infusion for further preload reduction. Lasix infusion transitioned to IV pushes and dobutamine weaned to 2.5 mg/hr 5/24 given improving hemodynamics. Lasix subsequently stopped and patient given small IVF boluses due to low CVP. Dobutamine weaned off, inhaled nitric oxide weaned to 5 PPM, heparin transitioned to eliquis, and started on bidil 5/26. Hospitalization has been complicated by severe delirium, so patient scheduled on valproic acid.      Interval History: Persistent agitation overnight again - patient received zyprexa, haldol, and ativan overnight. Increased lethargy in AM which has since resolved. Dobutamine discontinued, inhaled nitric weaned, heparin transitioned to eliquis, and bidil started.    Review of Systems   Unable to perform ROS: Mental status change   Objective:     Vital Signs (Most Recent):  Temp: 97.9 °F (36.6 °C) (05/25/23 0803)  Pulse: 85 (05/25/23 1201)  Resp: (!) 34 (05/25/23 1201)  BP: 121/81 (05/25/23 1201)  SpO2: 100 % (05/25/23 1201) Vital Signs (24h Range):  Temp:  [97.5 °F (36.4 °C)-97.9 °F (36.6 °C)] 97.9 °F (36.6 °C)  Pulse:  [] 85  Resp:   [16-59] 34  SpO2:  [92 %-100 %] 100 %  BP: ()/() 121/81     Weight: 51 kg (112 lb 7 oz)  Body mass index is 17.61 kg/m².     SpO2: 100 %         Intake/Output Summary (Last 24 hours) at 5/25/2023 1356  Last data filed at 5/25/2023 1100  Gross per 24 hour   Intake 849.74 ml   Output 1735 ml   Net -885.26 ml         Lines/Drains/Airways       Central Venous Catheter Line  Duration             Percutaneous Central Line Insertion/Assessment - Triple Lumen  05/21/23 2015 Internal Jugular Right 3 days              Drain  Duration                  Urethral Catheter 05/21/23 2249 3 days              Peripheral Intravenous Line  Duration                  Peripheral IV - Single Lumen 05/21/23 2213 18 G Anterior;Right Upper Arm 3 days         Peripheral IV - Single Lumen 05/25/23 0836 22 G Posterior;Right Forearm <1 day                       Physical Exam  Vitals and nursing note reviewed.   Constitutional:       Appearance: She is ill-appearing.   HENT:      Head: Normocephalic.      Mouth/Throat:      Mouth: Mucous membranes are moist.   Eyes:      Conjunctiva/sclera: Conjunctivae normal.   Cardiovascular:      Rate and Rhythm: Normal rate. Rhythm irregular.      Heart sounds: Murmur heard.   Abdominal:      General: Abdomen is flat. Bowel sounds are normal. There is no distension.   Musculoskeletal:      Right lower leg: No edema.      Left lower leg: No edema.   Neurological:      Mental Status: She is disoriented.          Significant Labs: All pertinent lab results from the last 24 hours have been reviewed.    Significant Imaging:  Reviewed    Assessment and Plan:       * Cardiogenic shock  82 y.o female with h/o  HFrEF with EF 25-30%, afib previously on coumadin ( had to be changed by pcp given lack of compliance ), cva with L ti residual deficits, medication noncompliance, dementia (with signficant cognitive deficit) who presents to the hospital for AMS. on arrival to the hospital he was found to be in  atrial flutter with rate 130s. hypertensive with /100. lactate 4 which later downtrended to 3.5   bedside echo revealed depressed EF with global hypokinesis. mild-mod MR. IVC 1.2 cm and non-collapsible. Pleural effusion noted.    RIJ triple lumen central line was placed on admit. Admission hemodynamics were obtained and were as follows:  svo2 49 CVP 8 CO 1.8 CI 1.2 SVR 4077    Patient subsequently placed on nitro gtt and admitted to ccu. Unfortunatley given age, significnat debility post cva, dementia with significant cognitive impairment, and medication noncompliance pt is not a candidate for any advanced options. Extensive discussed with patient's niece, Linda. She was explained patient's poor prognosis and limited baseline but would like to maintain full code with continued treatment/life-saving measures.    Hemodynamics 5/25 on  2.5, dopamine 2, and inhaled NO 10  SvO2: 72  CO: 4.2  CI: 2.7  CVP: 1  SVR: 2009    Hemodynamics 5/25 on dopamine 2, and inhaled NO 5  SvO2: 81  CO: 6.4  CI: 4.1  CVP: 1  SVR: 1300    -  discontinued  - weaned inhaled NO to 5 ppm  - continue dopamine 2  - hold lasix given low CVP  - continue hydralazine-isosorbide dinitrate 25-10 mg TID  - q4 hour SvO2 and lactate  - discontinued zosyn given low suspicion for infection  - palliative care following      At high risk for skin breakdown  Wound care following    Hypernatremia  Given 1/2 NS on admission with some improvement    History of critical lower limb ischemia  May 2022- no surgical intervention was needed at this time as although studies showed evidence of PAD, patient  Recommendation at the time was for woundcare, compression/elevation for edema, and initiation of ASA/Plavix and high intensity statin- will cont    Atrial flutter  History of paroxysmal atrial fibrillation/flutter  Previously on warfarin however it was stopped given Toprol for rate control at home    Plan:  -- transitioned heparin to eliquis 5/25  --  will hold bb in setting of shock  -- transitione amiodarone infusion to PO amiodarone 5/24    NSTEMI (non-ST elevated myocardial infarction)  Will treat as type 2 NSTEMI but she continues on DAPT for PAD    Acute renal failure  Baseline creatinine approximately 1.2, worsening to 2.0 on 5/23 likely ATN in the setting of cardiogenic shock. Improved with small IV fluid boluses and lasix held. Will titrate to CVP of 5.    Acute on chronic systolic heart failure  Echo 5/22:  The estimated ejection fraction is 15-20%. No LV apical thrombus is present  The quantitatively derived ejection fraction is 18%.  The left ventricle is normal in size with eccentric hypertrophy and severely decreased systolic function.  There is severe left ventricular global hypokinesis.  Indeterminate left ventricular diastolic function.  Normal right ventricular size with mildly reduced right ventricular systolic function.  Severe left atrial enlargement.  Mild right atrial enlargement.  Mild-to-moderate mitral regurgitation.  There is pulmonary hypertension.  The estimated PA systolic pressure is 47 mmHg.  Elevated central venous pressure (15 mmHg).    - see cardiogenic shock    History of stroke  Previous CVA with residual weakness and severe dementia        VTE Risk Mitigation (From admission, onward)           Ordered     apixaban tablet 5 mg  2 times daily         05/25/23 1213     IP VTE HIGH RISK PATIENT  Once         05/21/23 2156     Place sequential compression device  Until discontinued         05/21/23 2156                    Tal Rajput MD  Cardiology  [facility]

## 2023-05-25 NOTE — CARE UPDATE
8:02 AM   Hemodynamics 5/25 on  2.5, dopamine 2, and inhaled NO 10  SvO2: 72  CO: 4.2  CI: 2.7  CVP: 1  SVR: 2009    Discontinued , started bidil, patient given 500 cc IVF    12:05 PM   Hemodynamics 5/25 on dopamine 2, and inhaled NO 5  SvO2: 81  CO: 6.4  CI: 4.1  CVP: 1  SVR: 1300    Continue to wean Mango    3:50 PM   Hemodynamics 5/25 on dopamine 2, and inhaled NO 3  SvO2: 69  CO: 3.9  CI: 2.5  CVP: 2  SVR: 2174    Will give additional 250 cc IVF bolus and will stop weaning Mango

## 2023-05-25 NOTE — SUBJECTIVE & OBJECTIVE
Interval History: Persistent agitation overnight again - patient received zyprexa, haldol, and ativan overnight. Increased lethargy in AM which has since resolved. Dobutamine discontinued, inhaled nitric weaned, heparin transitioned to eliquis, and bidil started.    Review of Systems   Unable to perform ROS: Mental status change   Objective:     Vital Signs (Most Recent):  Temp: 97.9 °F (36.6 °C) (05/25/23 0803)  Pulse: 85 (05/25/23 1201)  Resp: (!) 34 (05/25/23 1201)  BP: 121/81 (05/25/23 1201)  SpO2: 100 % (05/25/23 1201) Vital Signs (24h Range):  Temp:  [97.5 °F (36.4 °C)-97.9 °F (36.6 °C)] 97.9 °F (36.6 °C)  Pulse:  [] 85  Resp:  [16-59] 34  SpO2:  [92 %-100 %] 100 %  BP: ()/() 121/81     Weight: 51 kg (112 lb 7 oz)  Body mass index is 17.61 kg/m².     SpO2: 100 %         Intake/Output Summary (Last 24 hours) at 5/25/2023 1356  Last data filed at 5/25/2023 1100  Gross per 24 hour   Intake 849.74 ml   Output 1735 ml   Net -885.26 ml         Lines/Drains/Airways       Central Venous Catheter Line  Duration             Percutaneous Central Line Insertion/Assessment - Triple Lumen  05/21/23 2015 Internal Jugular Right 3 days              Drain  Duration                  Urethral Catheter 05/21/23 2249 3 days              Peripheral Intravenous Line  Duration                  Peripheral IV - Single Lumen 05/21/23 2213 18 G Anterior;Right Upper Arm 3 days         Peripheral IV - Single Lumen 05/25/23 0836 22 G Posterior;Right Forearm <1 day                       Physical Exam  Vitals and nursing note reviewed.   Constitutional:       Appearance: She is ill-appearing.   HENT:      Head: Normocephalic.      Mouth/Throat:      Mouth: Mucous membranes are moist.   Eyes:      Conjunctiva/sclera: Conjunctivae normal.   Cardiovascular:      Rate and Rhythm: Normal rate. Rhythm irregular.      Heart sounds: Murmur heard.   Abdominal:      General: Abdomen is flat. Bowel sounds are normal. There is no  distension.   Musculoskeletal:      Right lower leg: No edema.      Left lower leg: No edema.   Neurological:      Mental Status: She is disoriented.          Significant Labs: All pertinent lab results from the last 24 hours have been reviewed.    Significant Imaging:  Reviewed

## 2023-05-26 NOTE — ASSESSMENT & PLAN NOTE
Baseline creatinine approximately 1.2, worsening to 2.0 on 5/23 likely ATN in the setting of cardiogenic shock. Improved with small IV fluid boluses and lasix held. Will titrate to CVP of 5.    - Creatinine continuing to gradually improve, down to 1.8.

## 2023-05-26 NOTE — PLAN OF CARE
"      SICU PLAN OF CARE NOTE    Dx: Cardiogenic shock    Shift Events: BP monitored.  Pt asleep majority of shift.     Goals of Care: SBP < 160    Neuro: Follows Commands and Confused    Vital Signs: BP (!) 160/100 (BP Location: Left arm, Patient Position: Lying)   Pulse 72   Temp 97.5 °F (36.4 °C) (Oral)   Resp (!) 27   Ht 5' 7" (1.702 m)   Wt 51 kg (112 lb 7 oz)   SpO2 100%   BMI 17.61 kg/m²     Cardiac: NSR    Respiratory: Nasal Cannula w/ Mango    Diet: Pureed    Gtts: Dopamine and MIVF    Urine Output: Urinary Catheter  10-30 cc/hour    Labs/Accuchecks: Daily labs.    Skin: Pt skin is dry and intact.  No new skin breakdown during shift.  Pt turned q2 hours per protocol, pt also able to shift own weight.  Low air loss mattress in place.  Triad applied to wound on sacrum.      View flowsheet for full assessment details.      "

## 2023-05-26 NOTE — SUBJECTIVE & OBJECTIVE
Interval History: No acute events overnight. Patient still without much urine output. Hemodynamics relatively stable. Continuing discussions with palliative medicine and family. Pt more awake this morning and stable vital signs. Will attempt to wean off dopamine and Mango.    Review of Systems   Unable to perform ROS: Mental status change   Objective:     Vital Signs (Most Recent):  Temp: 97.8 °F (36.6 °C) (05/26/23 1100)  Pulse: (!) 117 (05/26/23 1106)  Resp: (!) 28 (05/26/23 1106)  BP: (!) 143/101 (05/26/23 1106)  SpO2: 100 % (05/26/23 1300) Vital Signs (24h Range):  Temp:  [97.5 °F (36.4 °C)-98 °F (36.7 °C)] 97.8 °F (36.6 °C)  Pulse:  [] 117  Resp:  [17-43] 28  SpO2:  [100 %] 100 %  BP: (123-163)/() 143/101     Weight: 51 kg (112 lb 7 oz)  Body mass index is 17.61 kg/m².     SpO2: 100 %         Intake/Output Summary (Last 24 hours) at 5/26/2023 1349  Last data filed at 5/26/2023 1100  Gross per 24 hour   Intake 1230.35 ml   Output 620 ml   Net 610.35 ml       Lines/Drains/Airways       Central Venous Catheter Line  Duration             Percutaneous Central Line Insertion/Assessment - Triple Lumen  05/21/23 2015 Internal Jugular Right 4 days              Drain  Duration                  Urethral Catheter 05/21/23 2249 4 days              Peripheral Intravenous Line  Duration                  Peripheral IV - Single Lumen 05/25/23 0836 22 G Posterior;Right Forearm 1 day                       Physical Exam  Vitals and nursing note reviewed.   Constitutional:       Appearance: She is ill-appearing.   HENT:      Head: Normocephalic.      Mouth/Throat:      Mouth: Mucous membranes are moist.   Eyes:      Conjunctiva/sclera: Conjunctivae normal.   Cardiovascular:      Rate and Rhythm: Normal rate. Rhythm irregular.      Heart sounds: Murmur heard.   Abdominal:      General: Abdomen is flat. Bowel sounds are normal. There is no distension.   Musculoskeletal:      Right lower leg: No edema.      Left lower leg:  No edema.   Neurological:      Mental Status: She is disoriented.          Significant Labs: All pertinent lab results from the last 24 hours have been reviewed.    Significant Imaging:  Relevant imaging reviewed

## 2023-05-26 NOTE — PT/OT/SLP PROGRESS
Speech Language Pathology Treatment/ Discharge summary     Patient Name:  Temitope Suero   MRN:  [id number]  Admitting Diagnosis: Cardiogenic shock    Recommendations:                 General Recommendations:  Follow-up not indicated  Diet recommendations:  Puree, Liquid Diet Level: Thin   Aspiration Precautions: Assistance with meals, Meds crushed in puree, and Standard aspiration precautions   General Precautions: Standard,    Communication strategies:  none    Assessment:     Temitope Suero is a 83 y.o. female with an SLP diagnosis of  baseline oral feeding and swallowing skills to continue pureed solids and thin liquids . She may be offered small pieces of soft solids for pleasure with supervision from family but for nutrition/hydration and mealtime efficiency purposes pureed solids appears to be optimal. No further skilled speech therapy services warranted.     Subjective     Pt awake and alert, pt restless     Pain/Comfort:  Pain Rating 1: 0/10  Pain Rating Post-Intervention 1: 0/10    Respiratory Status: Nasal cannula, flow 4 L/min    Objective:     Has the patient been evaluated by SLP for swallowing?   Yes  Keep patient NPO? No   Current Respiratory Status:        Pt awake alert and pleasantly confused. Pt restless this date attempting to leave ed, SLP offered education of fall safety. RN aware of pt attempts to get out of bed. RN report pt tolerated AM meal without difficulty or complaint. SLP observed pt to consume thin liquids x5 from open cup and 2 oz of applesauce without any overt oral feeding or airway protection concerns. Slp discussed Pt oral feeding and swallowing skills appearing at or near baseline. During hospital stay it is recommended she continue pureed solids and thin liquids with crushed meds. She may be offered small pieces of soft solids for pleasure with supervision from family but for nutrition/hydration and mealtime efficiency purposes pureed solids appears to be optimal. No further skilled  speech therapy services warranted.       Goals:   Multidisciplinary Problems       SLP Goals          Problem: SLP    Goal Priority Disciplines Outcome   SLP Goal     SLP    Description: Speech Language Pathology Goals  Goals expected to be met by    1. Pt will tolerate diet of puree and thin liquids without overt clinical signs of aspiration   2. Pt will participate in additional trials of soft solids to help determine least restrictive diet                        Plan:     Patient to be seen:  3 x/week   Plan of Care expires:     Plan of Care reviewed with:  patient   SLP Follow-Up:  No       Discharge recommendations:  nursing facility, skilled   Barriers to Discharge:  None    Time Tracking:     SLP Treatment Date:   05/26/23  Speech Start Time:  1036  Speech Stop Time:  1044     Speech Total Time (min):  8 min    Billable Minutes: Treatment Swallowing Dysfunction 8    05/26/2023

## 2023-05-26 NOTE — NURSING
Patients /110. Patient is vomiting and holding her stomach at this time. MD notified and awaiting orders.

## 2023-05-26 NOTE — SIGNIFICANT EVENT
Hemodynamics:   Parameter  5/25/23 9:17 pm   CVP 1   SvO2 65   CO 3.8   CI 2.4   SVR 2184       UOP:  UOP: 95 cc/hr over past 2-4 hours      Dopamine at 2  Mango 5    Assessment/Plan:  - Start LR 50 cc /h for 10 h     Discussed with the attending physician    Wellington Segovia MD    Cardiology Fellow PGY IV OM

## 2023-05-26 NOTE — ASSESSMENT & PLAN NOTE
82 y.o female with h/o  HFrEF with EF 25-30%, afib previously on coumadin ( had to be changed by pcp given lack of compliance ), cva with L ti residual deficits, medication noncompliance, dementia (with signficant cognitive deficit) who presents to the hospital for AMS. on arrival to the hospital he was found to be in atrial flutter with rate 130s. hypertensive with /100. lactate 4 which later downtrended to 3.5   bedside echo revealed depressed EF with global hypokinesis. mild-mod MR. IVC 1.2 cm and non-collapsible. Pleural effusion noted.    RIJ triple lumen central line was placed on admit. Admission hemodynamics were obtained and were as follows:  svo2 49 CVP 8 CO 1.8 CI 1.2 SVR 4077    Patient subsequently placed on nitro gtt and admitted to ccu. Unfortunatley given age, significnat debility post cva, dementia with significant cognitive impairment, and medication noncompliance pt is not a candidate for any advanced options. Extensive discussed with patient's niece, iLnda. She was explained patient's poor prognosis and limited baseline but would like to maintain full code with continued treatment/life-saving measures.    Hemodynamics 5/25 on  2.5, dopamine 2, and inhaled NO  SvO2: 72  CO: 4.2  CI: 2.7  CVP: 1  SVR: 2009    Hemodynamics 5/25 on  2.5, dopamine 2, and inhaled NO  SvO2: 72  CO: 6.4  CI: 4.1  CVP: 1  SVR: 1300    -  discontinued  - weaned inhaled NO to 3ppm today, will continue to try to wean off.  - continue dopamine 2  - hold lasix given low CVP  - increase hydralazine-isosorbide dinitrate 50-20 mg TID  - q4 hour SvO2 and lactate  - discontinued zosyn given low suspicion for infection  - palliative care following

## 2023-05-26 NOTE — PLAN OF CARE
Problem: Occupational Therapy  Goal: Occupational Therapy Goal  Description: Goals to be met by: 6/5/23     Patient will increase functional independence with ADLs by performing:    Feeding with Minimal Assistance.  Grooming while seated with Minimal Assistance.  Toileting from toilet with Minimal Assistance for hygiene and clothing management.   Toilet transfer to toilet with Minimal Assistance.    Outcome: Ongoing, Progressing

## 2023-05-26 NOTE — ASSESSMENT & PLAN NOTE
Ms Suero is an 82yo lady that presented with acute AMS and worsening SOB in setting HFrEF and was admitted for treatment of T-II NSTEMI. She continues to need inotropic support with dobutamine infusion. She is on amiodarone infusion for p/Afib. Nitro and dopamine started for afterload reduction with worsening LV function.  Ms Linda Alfaro, Pt's niece, is the undocumented HCPOA. She was raised by Ms Suero and is her primary caretaker. Ms Alfaro acknowledged that taking home medications often caused Ms Suero to become agitated, therefore she holds the doses for sake of comfort. PCP ([place]) agreed that this was the palliative approach would help minimize additional irritation in her demented state. Ms Alfaro is a Spiritism  with a strong Restoration susan. She wants everything to be done to resuscitate Ms Suero in the event of cardiac failure, including compressions and mechanical ventilation. She stated that she believes in miracles and that her Aunt's heart failure could be fixed. If Ms Suero becomes uncomfortable or agitated in her current state, Ms Alfaro said that her Aunt will give the team a sign. Care to focus on improving cardiac function while engaging the family in advanced care planning and exploring discharge possibilities. Pt remains Full Code.    Overnight agitation and combativeness noted on 5/23/23, treated with scheduled Olanzipine. Niece was informed and noted that these episodes have not occurred with Pt at home. Dobutamine titrated from 5-> 2.5mcg/kg/min with Pt tolerating adequately. Reviewed current medications with Ms Alfaro and discussed outpatient dobutamine options with hospice care. She stated that she is currently looking into care through [facility] and was told that was approved for home health nurse. This would likely require discontinuing treatment with her PCP (Dr Zambrano) if this options was pursued.  Worsening agitation and combativeness now complicating home care. PT/OT ordered for  debility due to prolonged hospitalization. Home hospice vs nursing facility hospice discussed with Ms Alfaro on 5/25/23. She acknowledged that her aunt's care has become more complicated and requested some time to consider hospice options. PRN medications continued for agitation. On 5/26/23, Pt now tolerating pureed diet and some PT/OT but still unable to perform ADLs independently. Dobutamine infusion discontinued with minimal dopamine infusion continued. SVR moderated with PO hydralazine and isosorbide dinitrate. Afib now controlled with PO amiodarone and anticoagulated with eliquis. DAPT continued in setting of NSTEMI. Niece reported on 5/26/23 that Ms Suero was accepted into [facility]. Recommend optimization of health care and disposition home with home health. Scheduled nightly antipsychotic likely to be beneficial in setting of chronic dementia with waxing/waning agitation. Ms Alfaro reported that if Ms Suero's health care becomes too complicated to manage at home or if [facility] is discontinued, she would reconsider nursing facility hospice    Medicolegal  Decision-making:   -Pt does not have decision-making capacity  -Pt has not formally appointed a HCPOA.  -Linda Alfaro is her niece, care taker, and acting POA   -Marital status: Single  -Children: 1 son, Tej Suero in [facility]. No in communication with his mother or Linda Alfaro. No known POC.    Advance care planning/Advance directives:  -The patient has not previously engaged in advance care planning  -Pt has no scanned advance directives [facility]    Psychosocial  Support system:  -Spiritual: yes;  Patient is part of a particular susan background: Anabaptism.  The palliative care  will not be consulted to assess the patient.  -Family: Pt's niece Linda Alfaro is primary care take and verbally appointed POA. Pt's great niece Giacomo Stern (Linda's daughter) also involved in care.    Health status prior to this hospitalization  -Functional status:  poor.  Pt was not able to manage ADLs independently  -Cognitive status: complicated by prior CVA and delerium    -QOL: poor    Prognosis:  -Time and potential for recovery: MADDY    [facility] Discussion with Niece:  Advance Care Planning I engaged the patient in a conversation about advance care planning and we specifically addressed what the goals of care would be moving forward, in light of the patients change in clinical status, specifically worsening HFrEF and AMS.  We did specifically address the patients likely prognosis, which is poor.  We explored the patients values and preferences for future care.  The patient endorses that what is most important right now is to focus on     ___Spending time at home  ___Avoiding the hospital  ___Remaining as independent as possible    X  Symptom/pain control  ___Quality of life, even if it means sacrificing a little time  _X Extending life as long as possible, even it it means sacrificing quality  _X_Other, providing home care    Accordingly, the best plan at this time is work with family who desires for Pt to return home under their care.    I did explain the role for hospice care at this stage of the patients illness, including its ability to help the patient live with the best quality of life possible.  We will not be making a hospice referral from the clinic today.    I spent a total of 40 minutes engaging the patient in this advance care planning discussion.Advance Care Planning In light of the patients advanced and terminal illness, we reviewed what the patients preferences for care would be at the very end of life.  The patient wishes to have a natural, peaceful death.  Along those lines, the patient does wish to have CPR or other invasive treatments performed when her heart and/or breathing stops. In addition, a [place] form was not completed to reflect other EOL preferences of the patient. I spent a total of 40 minutes engaging the patient in this advance care  planning discussion.     Active symptoms  -Dyspnea Continuous oxymetry with supplemental oxygen PRN  -Anxiety/Agitation: Consider antipsychotic on discharge for agitation     Summary/Recommendation:  -Most important goals at this time: optimization of care, extending life and discharging home. Continue to wean off medication and assess home care needs.  -Code status: Full  -Most appropriate disposition: Home with home health (Pt to start [facility] per Niece)

## 2023-05-26 NOTE — PROGRESS NOTES
[facility] - [facility] Intensive Care  Cardiology  Progress Note    Patient Name: Temitope Suero  MRN: [id number]  Admission Date: 5/21/2023  Hospital Length of Stay: 5 days  Code Status: Full Code   Attending Physician: Jaquelin Davis MD   Primary Care Physician: Gm Zambrano MD  Expected Discharge Date: 5/29/2023  Principal Problem:Cardiogenic shock    Subjective:     Hospital Course:   Patient admitted to CCU for cardiogenic shock. Patient initially started on nitroglycerin for afterload reduction. She was additionally started on lasix 10 mg/hr and dobutamine infusion 5mg/hr 5/22. Extensive goals of care conversation with patient's family and assistance from palliative care. Plan for full code and life-saving/curative measures per discussion. Patient started on inhaled nitric oxide (10 PPM) and weaned off NG infusion. Trial of dopamine infusion for further preload reduction. Lasix infusion transitioned to IV pushes and dobutamine weaned to 2.5 mg/hr 5/24 given improving hemodynamics. Lasix subsequently stopped and patient given small IVF boluses due to low CVP. Dobutamine weaned off, inhaled nitric oxide weaned to 5 PPM, heparin transitioned to eliquis, and started on bidil 5/26. Hospitalization has been complicated by severe delirium, so patient scheduled on valproic acid. Palliative Care continuing to follow with patient and recommending consideration of outpatient hospice. Will continue to discuss with pt's family.      Interval History: No acute events overnight. Patient still without much urine output. Hemodynamics relatively stable. Continuing discussions with palliative medicine and family. Pt more awake this morning and stable vital signs. Will attempt to wean off dopamine and Mango.    Review of Systems   Unable to perform ROS: Mental status change   Objective:     Vital Signs (Most Recent):  Temp: 97.8 °F (36.6 °C) (05/26/23 1100)  Pulse: (!) 117 (05/26/23 1106)  Resp: (!) 28 (05/26/23 1106)  BP: (!)  143/101 (05/26/23 1106)  SpO2: 100 % (05/26/23 1300) Vital Signs (24h Range):  Temp:  [97.5 °F (36.4 °C)-98 °F (36.7 °C)] 97.8 °F (36.6 °C)  Pulse:  [] 117  Resp:  [17-43] 28  SpO2:  [100 %] 100 %  BP: (123-163)/() 143/101     Weight: 51 kg (112 lb 7 oz)  Body mass index is 17.61 kg/m².     SpO2: 100 %         Intake/Output Summary (Last 24 hours) at 5/26/2023 1349  Last data filed at 5/26/2023 1100  Gross per 24 hour   Intake 1230.35 ml   Output 620 ml   Net 610.35 ml       Lines/Drains/Airways       Central Venous Catheter Line  Duration             Percutaneous Central Line Insertion/Assessment - Triple Lumen  05/21/23 2015 Internal Jugular Right 4 days              Drain  Duration                  Urethral Catheter 05/21/23 2249 4 days              Peripheral Intravenous Line  Duration                  Peripheral IV - Single Lumen 05/25/23 0836 22 G Posterior;Right Forearm 1 day                       Physical Exam  Vitals and nursing note reviewed.   Constitutional:       Appearance: She is ill-appearing.   HENT:      Head: Normocephalic.      Mouth/Throat:      Mouth: Mucous membranes are moist.   Eyes:      Conjunctiva/sclera: Conjunctivae normal.   Cardiovascular:      Rate and Rhythm: Normal rate. Rhythm irregular.      Heart sounds: Murmur heard.   Abdominal:      General: Abdomen is flat. Bowel sounds are normal. There is no distension.   Musculoskeletal:      Right lower leg: No edema.      Left lower leg: No edema.   Neurological:      Mental Status: She is disoriented.          Significant Labs: All pertinent lab results from the last 24 hours have been reviewed.    Significant Imaging:  Relevant imaging reviewed    Assessment and Plan:       * Cardiogenic shock  82 y.o female with h/o  HFrEF with EF 25-30%, afib previously on coumadin ( had to be changed by pcp given lack of compliance ), cva with L ti residual deficits, medication noncompliance, dementia (with signficant cognitive  deficit) who presents to the hospital for AMS. on arrival to the hospital he was found to be in atrial flutter with rate 130s. hypertensive with /100. lactate 4 which later downtrended to 3.5   bedside echo revealed depressed EF with global hypokinesis. mild-mod MR. IVC 1.2 cm and non-collapsible. Pleural effusion noted.    RIJ triple lumen central line was placed on admit. Admission hemodynamics were obtained and were as follows:  svo2 49 CVP 8 CO 1.8 CI 1.2 SVR 4077    Patient subsequently placed on nitro gtt and admitted to ccu. Unfortunatley given age, significnat debility post cva, dementia with significant cognitive impairment, and medication noncompliance pt is not a candidate for any advanced options. Extensive discussed with patient's niece, Linda. She was explained patient's poor prognosis and limited baseline but would like to maintain full code with continued treatment/life-saving measures.    Hemodynamics 5/25 on  2.5, dopamine 2, and inhaled NO  SvO2: 72  CO: 4.2  CI: 2.7  CVP: 1  SVR: 2009    Hemodynamics 5/25 on  2.5, dopamine 2, and inhaled NO  SvO2: 72  CO: 6.4  CI: 4.1  CVP: 1  SVR: 1300    -  discontinued  - weaned inhaled NO to 3ppm today, will continue to try to wean off.  - continue dopamine 2  - hold lasix given low CVP  - increase hydralazine-isosorbide dinitrate 50-20 mg TID  - q4 hour SvO2 and lactate  - discontinued zosyn given low suspicion for infection  - palliative care following    At high risk for skin breakdown  Wound care following    Hypernatremia  Given 1/2 NS on admission with some improvement    History of critical lower limb ischemia  May 2022- no surgical intervention was needed at this time as although studies showed evidence of PAD, patient  Recommendation at the time was for woundcare, compression/elevation for edema, and initiation of ASA/Plavix and high intensity statin- will cont    Atrial flutter  History of paroxysmal atrial  fibrillation/flutter  Previously on warfarin however it was stopped given Toprol for rate control at home    Plan:  -- transitioned heparin to eliquis 5/25  -- will hold bb in setting of shock  -- transitione amiodarone infusion to PO amiodarone 5/24    NSTEMI (non-ST elevated myocardial infarction)  Will treat as type 2 NSTEMI but she continues on DAPT for PAD    Acute renal failure  Baseline creatinine approximately 1.2, worsening to 2.0 on 5/23 likely ATN in the setting of cardiogenic shock. Improved with small IV fluid boluses and lasix held. Will titrate to CVP of 5.    - Creatinine continuing to gradually improve, down to 1.8.    Acute on chronic systolic heart failure  Echo 5/22:   The estimated ejection fraction is 15-20%. No LV apical thrombus is present   The quantitatively derived ejection fraction is 18%.   The left ventricle is normal in size with eccentric hypertrophy and severely decreased systolic function.   There is severe left ventricular global hypokinesis.   Indeterminate left ventricular diastolic function.   Normal right ventricular size with mildly reduced right ventricular systolic function.   Severe left atrial enlargement.   Mild right atrial enlargement.   Mild-to-moderate mitral regurgitation.   There is pulmonary hypertension.   The estimated PA systolic pressure is 47 mmHg.   Elevated central venous pressure (15 mmHg).    - see cardiogenic shock    History of stroke  Previous CVA with residual weakness and severe dementia        VTE Risk Mitigation (From admission, onward)         Ordered     apixaban tablet 5 mg  2 times daily         05/25/23 1213     IP VTE HIGH RISK PATIENT  Once         05/21/23 2156     Place sequential compression device  Until discontinued         05/21/23 2156                Dennis Decker MD  Cardiology  [facility] - Surgical Intensive Care

## 2023-05-26 NOTE — PLAN OF CARE
Problem: Physical Therapy  Goal: Physical Therapy Goal  Description: Goals to be met by: 23     Patient will increase functional independence with mobility by performin. Supine to sit with Stand-by Assistance  2. Sit to stand transfer with Contact Guard Assistance  3. Bed to chair transfer with Contact Guard Assistance   4. Gait  x 50 feet with Contact Guard Assistance using AD if needed. .     Outcome: Ongoing, Progressing   Evaluation completed and goals appropriate. 2023

## 2023-05-26 NOTE — SUBJECTIVE & OBJECTIVE
Interval History:   No acute events overnight. No episodes of agitation or combativeness reported and no antipsychotics were administered. Dobutamine discontinued and dopamine titrated down. Gentle IV fluid resuscitation started for decreased CI/CO + increased  SVR (CVP stable at 1). Mentation stable at baseline.     Medications:  Continuous Infusions:   DOPamine 2 mcg/kg/min (05/26/23 0600)    nitric oxide gas       Scheduled Meds:   amiodarone  400 mg Oral BID    Followed by    [START ON 6/5/2023] amiodarone  200 mg Oral Daily    apixaban  5 mg Oral BID    aspirin  81 mg Oral Daily    atorvastatin  80 mg Oral Daily    clopidogreL  75 mg Oral Daily    hydrALAZINE  25 mg Oral TID    isosorbide dinitrate  10 mg Oral TID    mupirocin   Nasal BID    valproate (DEPACON) IVPB  250 mg Intravenous Once    valproate sodium (DEPACON) IVPB  250 mg Intravenous Once     PRN Meds:sodium chloride 0.9%    Objective:     Vital Signs (Most Recent):  Temp: 97.6 °F (36.4 °C) (05/26/23 0700)  Pulse: 84 (05/26/23 0900)  Resp: (Abnormal) 27 (05/26/23 0900)  BP: (Abnormal) 163/99 (05/26/23 0900)  SpO2: 100 % (05/26/23 0900) Vital Signs (24h Range):  Temp:  [97.5 °F (36.4 °C)-98 °F (36.7 °C)] 97.6 °F (36.4 °C)  Pulse:  [64-86] 84  Resp:  [17-43] 27  SpO2:  [100 %] 100 %  BP: ()/() 163/99     Weight: 51 kg (112 lb 7 oz)  Body mass index is 17.61 kg/m².       Physical Exam       Review of Symptoms      Symptom Assessment (ESAS 0-10 Scale)  Unable to complete assessment due to Acuity of condition     CAM / Delirium:  Positive  Constipation:  Negative  Diarrhea:  Negative      Bowel Management Plan (BMP):  Yes      Pain Assessment in Advanced Demential Scale (PAINAD)   Breathing - Independent of vocalization:  0  Negative vocalization:  0  Facial expression:  0  Body language:  0  Consolability:  0  Total:  0    Living Arrangements:  Lives with family and Lives in home    Psychosocial/Cultural:   See Palliative Psychosocial Note:  No  Caregiver Needs Discussed. Pt's Niece Linda Alfaro is primary caregiver. She is aware of her aunt's worsened cardiac function and new-onset agitation/combativeness. She is open to the consideration of hospice, but would like to try PACE first.     Caregiver Distress: None currently  **Primary  to Follow**  Palliative Care  Consult: No    Spiritual:  F - Binta and Belief:  Rastafarian  I - Importance:  High  C - Community:  Family     Time-Based Charting:  No      Advance Care Planning   Advance Directives:   Living Will: No        Oral Declaration: Yes  LaPOST: No    Do Not Resuscitate Status: No    Medical Power of : No        Oral Declaration: Yes  Agent's Name:  Linda Alfaro   Agent's Contact Number:  [phone]    Decision Making:  Family answered questions  Goals of Care: What is most important right now is to focus on curative/life-prolongation (regardless of treatment burdens). Accordingly, we have decided that the best plan to meet the patient's goals includes continuing with treatment. Pt to be discharged home with home health. Care to be managed through [facility] and Ms Suero's niece, Linda Alfaro, will be the primary caretaker.        Significant Labs: All pertinent labs within the past 24 hours have been reviewed.  CBC:   Recent Labs   Lab 05/26/23 0215   WBC 5.02   HGB 10.6*   HCT 35.3*   MCV 78*        BMP:  Recent Labs   Lab 05/26/23 0215      *   K 4.3      CO2 25   BUN 39*   CREATININE 1.8*   CALCIUM 9.0   MG 2.2     LFT:  Lab Results   Component Value Date    AST 25 05/26/2023    ALKPHOS 123 05/26/2023    BILITOT 0.4 05/26/2023     Albumin:   Albumin   Date Value Ref Range Status   05/26/2023 3.1 (L) 3.5 - 5.2 g/dL Final     Protein:   Total Protein   Date Value Ref Range Status   05/26/2023 6.2 6.0 - 8.4 g/dL Final     Lactic acid:   Lab Results   Component Value Date    LACTATE 2.3 (H) 05/22/2023    LACTATE 2.7 (H) 05/22/2023        Significant Imaging: I have reviewed all pertinent imaging results/findings within the past 24 hours.

## 2023-05-26 NOTE — PT/OT/SLP EVAL
Physical Therapy  Co-Evaluation and treatment with OT    Patient Name:  Temitope Suero   MRN:  [id number]    Recommendations:     Discharge Recommendations: other (see comments)   Discharge Equipment Recommendations:  (will determine DME needs closer to discharge)   Barriers to discharge: Decreased caregiver support pt niece will be returning to work soon. Arrangements have not been made     Assessment:     Temitope Suero is a 83 y.o. female admitted with a medical diagnosis of Cardiogenic shock.  She presents with the following impairments/functional limitations: impaired endurance, impaired functional mobility, gait instability, impaired balance, decreased safety awareness pt tolerated treatment well and will benefit from skilled PT 3x/wk. Pt should be able to discharge home with further therapy needs when medically stable. Pt will need 24 hour supervision after discharge. Pt came to ED with c/o palpitations.     Rehab Prognosis: Good; patient would benefit from acute skilled PT services to address these deficits and reach maximum level of function.    Recent Surgery: * No surgery found *      Plan:     During this hospitalization, patient to be seen 3 x/week to address the identified rehab impairments via gait training, therapeutic activities and progress toward the following goals:    Plan of Care Expires:  06/24/23    Subjective     Chief Complaint: pt had no complaints during treatment.   Patient/Family Comments/goals: to get better and go home.   Pain/Comfort:  Pain Rating 1: 0/10  Pain Rating Post-Intervention 1: 0/10    Patients cultural, spiritual, Jehovah's witness conflicts given the current situation: no    Living Environment:  Pt lives with her niece who is her caregiver. Pt niece will be returning to work soon and arrangements have not been made for pt care.   Prior to admission, patients level of function was assist for activities. Pt ambulated in house with HHA. .  Equipment used at home: wheelchair.  DME owned  (not currently used): none.  Upon discharge, patient will have assistance from niece.    Objective:     Communicated with nurse prior to session.  Patient found supine with telemetry, pulse ox (continuous), blood pressure cuff, central line, oxygen, ramos catheter (CVP, nitric oxide,)  upon PT entry to room.    General Precautions: Standard, fall  Orthopedic Precautions:    Braces:    Respiratory Status: Nasal cannula, flow 5 L/min with nitric oxide    Exams:  Cognitive Exam:  Patient is oriented to Person and Place  RLE ROM: WFL  RLE Strength: WFL  LLE ROM: WFL  LLE Strength: WFL    Functional Mobility:  Bed Mobility: pt needed verbal cues for hand placement and sequencing for functional mobility.     Rolling Left:  minimum assistance  Rolling Right: minimum assistance  Supine to Sit: minimum assistance  Sit to Supine: moderate assistance    Transfers:     Sit to Stand:  minimum assistance with hand-held assist    Balance: pt sat on EOB with CGA to min assist for sitting balance. Pt was easily distracted and needed re-directions to tasks. Pt was CGA standing balance.     Due to pt complex medical condition, the skill of 2 licensed therapists is needed to maximize treatment session and progression towards goals  Pt white board updated with current therapists name and level of mobility assistance needed.         AM-PAC 6 CLICK MOBILITY  Total Score:13       Treatment & Education:  Pt received verbal instructions in role of PT and POC. Pt will need re-instruction in such.     Patient left supine with all lines intact, call button in reach, and RN  notified.    GOALS:   Multidisciplinary Problems       Physical Therapy Goals          Problem: Physical Therapy    Goal Priority Disciplines Outcome Goal Variances Interventions   Physical Therapy Goal     PT, PT/OT Ongoing, Progressing     Description: Goals to be met by: 23     Patient will increase functional independence with mobility by performin. Supine to  sit with Stand-by Assistance  2. Sit to stand transfer with Contact Guard Assistance  3. Bed to chair transfer with Contact Guard Assistance   4. Gait  x 50 feet with Contact Guard Assistance using AD if needed. .                          History:     Past Medical History:   Diagnosis Date    Cancer of left breast, stage 2 11/24/2015    Cataract     Cerebrovascular small vessel disease 04/11/2018    Bi-thalamic lacunar disease, mod/sev periventricular white matter disease, mixed pattern cerebral microbleeds    Cervicalgia 04/17/2018    Chronic anticoagulation - apixaban 04/17/2018    Previous on Xarelto but changed to apixaban 8-2018 due to recurrent embolic stroke.    Chronic systolic heart failure 04/17/2018    Echo 4-2018   1 - Moderately depressed left ventricular systolic function (EF 30-35%).    2 - Biatrial enlargement.    3 - Normal right ventricular systolic function .    4 - Mild mitral regurgitation.    5 - Mild tricuspid regurgitation.    6 - The estimated PA systolic pressure is 34 mmHg.    7 - Increased central venous pressure.    8 - Left pleural effusion.     CKD stage 3 due to type 2 diabetes mellitus 04/17/2018    Embolic stroke involving left cerebellar artery 08/13/2018    Embolic stroke involving right posterior cerebral artery 04/11/2018    Essential hypertension 10/28/2013    Glaucoma suspect of both eyes 12/09/2013    Hearing loss, sensorineural 12/16/2013    LV (left ventricular) mural thrombus 05/07/2022    Malignant hypertension 08/12/2018    Mixed hyperlipidemia 10/28/2013    Obesity 01/16/2014    Paroxysmal atrial fibrillation 07/10/2012    Stage 3 chronic kidney disease 04/17/2018    Toxic multinodular goiter 10/28/2013    Type 2 diabetes mellitus with stage 3 chronic kidney disease, without long-term current use of insulin 07/10/2012    Diet controlled.    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

## 2023-05-26 NOTE — CHAPLAIN
Palliative Care     Patient: Temitope Suero       MRN: [id number]  : 1940  Age: 83 y.o.  Hospital Length of Stay: 5 days  Code Status: Full Code   Attending Provider: Jaquelin Davis MD  Principal Problem: Cardiogenic shock    Present during Interview:  Visited pall med pt per pall med doc request earlier in the week. Pt was alone-- first 2x pt had been sleeping and no family present; this visit pt alone, but awake.    Non-clinical observations:  Pt seemed comfortable, said she's not in pain, had her glasses on.    Feelings (Emotions/Fears/Experiences/Coping):      Provided compassionate presence; asked pt a few simple questions and she answered them, but also said more yet it was difficult to understand. She seemed pleasantly confused.     Binta (Beliefs/Meaning/Philosophy):  Per Epic, pt is [employer] Christianity; offered prayer, pt declined. Told her I'd keep her in my prayers as I was leaving and pt thanked me.    Future (Spiritual Care Plan of Action):  Palliative  will continue to provide support as needed. Lord, in your mercy.    ** Spiritual Care Dept. Chaplains are available evenings, overnight and weekends **    In Peace,    Rev. Loly Ford MDiv, [facility]  Board Certified   Palliative Medicine Department  [phone]

## 2023-05-26 NOTE — PT/OT/SLP EVAL
"Occupational Therapy  Co- Evaluation/Discharge    Name: Temitope Suero  MRN: [id number]  Admitting Diagnosis: Cardiogenic shock  Recent Surgery: * No surgery found *      Recommendations:     Discharge Recommendations: other (see comments) (Home with 24/7 supervision)  Discharge Equipment Recommendations:  bath bench, grab bar, bedside commode  Barriers to discharge:  None    Assessment:     Temitope Suero is a 83 y.o. female with a medical diagnosis of Cardiogenic shock.  She presents with baseline dementia "with significant cognitive deficit."  Pt is currently functioning at or slightly below her baseline. Due to dementia, pt with limited ability for new learning, and is not appropriate for skilled OT services. Performance deficits affecting function: weakness, impaired functional mobility, impaired cognition, decreased safety awareness, impaired cardiopulmonary response to activity, gait instability, impaired endurance, impaired balance, impaired self care skills. Pt benefits from co-treatment with PT to accommodate pt's activity tolerance and progression with therapy.      Rehab Prognosis: Fair;    Plan:     Patient to be d/c'd from acute OT services  Plan of Care Expires: 06/25/23  Plan of Care Reviewed with: patient    Subjective     Chief Complaint: none reported  Patient/Family Comments/goals: none stated    Occupational Profile:  Living Environment:  Pt lives with her niece who is her caregiver. Pt niece will be returning to work soon and arrangements have not been made for pt care.   Prior to admission, patients level of function was assist for activities. Pt ambulated in house with HHA. .  Equipment used at home: wheelchair.  DME owned (not currently used): none.  Upon discharge, patient will have assistance from niece.    Pain/Comfort:  Pain Rating 1: 0/10  Pain Rating Post-Intervention 1: 0/10    Patients cultural, spiritual, Taoist conflicts given the current situation: no    Objective:     Communicated " with: RN prior to session.  Patient found supine with blood pressure cuff, pulse ox (continuous), telemetry, oxygen, central line, ramos catheter (Mango) upon OT entry to room.    General Precautions: Standard, fall  Orthopedic Precautions:    Braces:    Respiratory Status: Nasal cannula, flow 5 L/min    Occupational Performance:    Bed Mobility:    Patient completed Supine to Sit with minimum assistance  Patient completed Sit to Supine with moderate assistance    Functional Mobility/Transfers:  Patient completed Sit <> Stand Transfer with minimum assistance  with  hand-held assist   Functional Mobility: NT    Activities of Daily Living:  Feeding:  maximal assistance bringing spoon to mouth; pt unable to sequence task when handed spoon  Grooming: maximal assistance for washing face due to inability to sequence task  Upper Body Dressing: total assistance    Lower Body Dressing: total assistance    Toileting: total assistance      Cognitive/Visual Perceptual:  Pt is alert and oriented to person and place  Pt follows one step commands, but is easily distractable    Physical Exam:  Rounded shoulders  B UE ROM and strength WFL noted through observation    AMPAC 6 Click ADL:  AMPA Total Score: 10    Treatment & Education:  Pt ed on OT POC  Daily orientation provided  Pt ed on safety and use of call button; reinforcement needed    Patient left HOB elevated with all lines intact, call button in reach, and RN notified    GOALS:   Multidisciplinary Problems       Occupational Therapy Goals          Problem: Occupational Therapy    Goal Priority Disciplines Outcome Interventions   Occupational Therapy Goal     OT, PT/OT Ongoing, Progressing    Description: Goals to be met by: 6/5/23     Patient will increase functional independence with ADLs by performing:    Feeding with Minimal Assistance.  Grooming while seated with Minimal Assistance.  Toileting from toilet with Minimal Assistance for hygiene and clothing management.    Toilet transfer to toilet with Minimal Assistance.                         History:     Past Medical History:   Diagnosis Date    Cancer of left breast, stage 2 11/24/2015    Cataract     Cerebrovascular small vessel disease 04/11/2018    Bi-thalamic lacunar disease, mod/sev periventricular white matter disease, mixed pattern cerebral microbleeds    Cervicalgia 04/17/2018    Chronic anticoagulation - apixaban 04/17/2018    Previous on Xarelto but changed to apixaban 8-2018 due to recurrent embolic stroke.    Chronic systolic heart failure 04/17/2018    Echo 4-2018   1 - Moderately depressed left ventricular systolic function (EF 30-35%).    2 - Biatrial enlargement.    3 - Normal right ventricular systolic function .    4 - Mild mitral regurgitation.    5 - Mild tricuspid regurgitation.    6 - The estimated PA systolic pressure is 34 mmHg.    7 - Increased central venous pressure.    8 - Left pleural effusion.     CKD stage 3 due to type 2 diabetes mellitus 04/17/2018    Embolic stroke involving left cerebellar artery 08/13/2018    Embolic stroke involving right posterior cerebral artery 04/11/2018    Essential hypertension 10/28/2013    Glaucoma suspect of both eyes 12/09/2013    Hearing loss, sensorineural 12/16/2013    LV (left ventricular) mural thrombus 05/07/2022    Malignant hypertension 08/12/2018    Mixed hyperlipidemia 10/28/2013    Obesity 01/16/2014    Paroxysmal atrial fibrillation 07/10/2012    Stage 3 chronic kidney disease 04/17/2018    Toxic multinodular goiter 10/28/2013    Type 2 diabetes mellitus with stage 3 chronic kidney disease, without long-term current use of insulin 07/10/2012    Diet controlled.    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

## 2023-05-26 NOTE — PROGRESS NOTES
[facility] - Surgical Intensive Care  Palliative Medicine  Progress Note    Patient Name: Temitope Suero  MRN: [id number]  Admission Date: 5/21/2023  Hospital Length of Stay: 5 days  Code Status: Full Code   Attending Provider: Jaquelin Davis MD  Consulting Provider: Francisco Naik MD  Primary Care Physician: Gm Zambrano MD  Principal Problem:Cardiogenic shock    Patient information was obtained from primary team.      Assessment/Plan:     Palliative Care  Palliative care encounter  Ms Suero is an 82yo lady that presented with acute AMS and worsening SOB in setting HFrEF and was admitted for treatment of T-II NSTEMI. She continues to need inotropic support with dobutamine infusion. She is on amiodarone infusion for p/Afib. Nitro and dopamine started for afterload reduction with worsening LV function.  Ms Linda Alfaro, Pt's niece, is the undocumented HCPOA. She was raised by Ms Suero and is her primary caretaker. Ms Alfaro acknowledged that taking home medications often caused Ms Suero to become agitated, therefore she holds the doses for sake of comfort. PCP ([place]) agreed that this was the palliative approach would help minimize additional irritation in her demented state. Ms Alfaro is a Baptism  with a strong Rastafarian susan. She wants everything to be done to resuscitate Ms Suero in the event of cardiac failure, including compressions and mechanical ventilation. She stated that she believes in miracles and that her Aunt's heart failure could be fixed. If Ms Suero becomes uncomfortable or agitated in her current state, Ms Alfaro said that her Aunt will give the team a sign. Care to focus on improving cardiac function while engaging the family in advanced care planning and exploring discharge possibilities. Pt remains Full Code.    Overnight agitation and combativeness noted on 5/23/23, treated with scheduled Olanzipine. Niece was informed and noted that these episodes have not occurred with Pt at home. Dobutamine  titrated from 5-> 2.5mcg/kg/min with Pt tolerating adequately. Reviewed current medications with Ms Alfaro and discussed outpatient dobutamine options with hospice care. She stated that she is currently looking into care through [facility] and was told that was approved for home health nurse. This would likely require discontinuing treatment with her PCP (Dr Zambrano) if this options was pursued.  Worsening agitation and combativeness now complicating home care. PT/OT ordered for debility due to prolonged hospitalization. Home hospice vs nursing facility hospice discussed with Ms Alfaro on 5/25/23. She acknowledged that her aunt's care has become more complicated and requested some time to consider hospice options. PRN medications continued for agitation. On 5/26/23, Pt now tolerating pureed diet and some PT/OT but still unable to perform ADLs independently. Dobutamine infusion discontinued with minimal dopamine infusion continued. SVR moderated with PO hydralazine and isosorbide dinitrate. Afib now controlled with PO amiodarone and anticoagulated with eliquis. DAPT continued in setting of NSTEMI. Niece reported on 5/26/23 that Ms Suero was accepted into [facility]. Recommend optimization of health care and disposition home with home health. Scheduled nightly antipsychotic likely to be beneficial in setting of chronic dementia with waxing/waning agitation. Ms Alfaro reported that if Ms Osheas health care becomes too complicated to manage at home or if [facility] is discontinued, she would reconsider nursing facility hospice    Medicolegal  Decision-making:   -Pt does not have decision-making capacity  -Pt has not formally appointed a HCPOA.  -Linda Alfaro is her niece, care taker, and acting POA   -Marital status: Single  -Children: 1 son, Tej Suero in [facility]. No in communication with his mother or Linda Dominic. No known POC.    Advance care planning/Advance directives:  -The patient has not previously engaged in  advance care planning  -Pt has no scanned advance directives in [facility]    Psychosocial  Support system:  -Spiritual: yes;  Patient is part of a particular susan background: [place].  The palliative care  will not be consulted to assess the patient.  -Family: Pt's niece Linda Alfaro is primary care take and verbally appointed POA. Pt's great niece Giacomo Stern (Linda's daughter) also involved in care.    Health status prior to this hospitalization  -Functional status: poor.  Pt was not able to manage ADLs independently  -Cognitive status: complicated by prior CVA and delerium    -QOL: poor    Prognosis:  -Time and potential for recovery: NA    [facility] Discussion with Niece:  Advance Care Planning I engaged the patient in a conversation about advance care planning and we specifically addressed what the goals of care would be moving forward, in light of the patients change in clinical status, specifically worsening HFrEF and AMS.  We did specifically address the patients likely prognosis, which is poor.  We explored the patients values and preferences for future care.  The patient endorses that what is most important right now is to focus on     ___Spending time at home  ___Avoiding the hospital  ___Remaining as independent as possible    X  Symptom/pain control  ___Quality of life, even if it means sacrificing a little time  _X Extending life as long as possible, even it it means sacrificing quality  _X_Other, providing home care    Accordingly, the best plan at this time is work with family who desires for Pt to return home under their care.    I did explain the role for hospice care at this stage of the patients illness, including its ability to help the patient live with the best quality of life possible.  We will not be making a hospice referral from the clinic today.    I spent a total of 40 minutes engaging the patient in this advance care planning discussion.Advance Care Planning In light of the  patients advanced and terminal illness, we reviewed what the patients preferences for care would be at the very end of life.  The patient wishes to have a natural, peaceful death.  Along those lines, the patient does wish to have CPR or other invasive treatments performed when her heart and/or breathing stops. In addition, a LaPOST form was not completed to reflect other EOL preferences of the patient. I spent a total of 40 minutes engaging the patient in this advance care planning discussion.    Active symptoms  -Dyspnea Continuous oxymetry with supplemental oxygen PRN  -Anxiety/Agitation: Consider antipsychotic on discharge for agitation     Summary/Recommendation:  -Most important goals at this time: optimization of care, extending life and discharging home. Continue to wean off medication and assess home care needs.  -Code status: Full  -Most appropriate disposition: Home with home health (Pt to start [facility] per Niece)        I will sign off. Please contact us if you have any additional questions.    Subjective:     Chief Complaint:   Chief Complaint   Patient presents with    Palpitations     Pt arrives EMS from home with complaints of palpitations since last night, per EMS pt in a-fib, no history of a-fib.        HPI:   Ms. Temitope Suero is a 81yo lady with history of HTN, HLD, HFrEF with EF 25-30%, Afib on eliquis, CVA with L hemiparesis, NIDDM2, CKD3 (Cr baseline at 1.3), vit D deficiency, medication noncompliance, dementia (with signficant cognitive deficit) who presents to the hospital for AMS. Pt is accompanied by her niece who is the primary caregiver and POA according to PCP notes (Dr Zambrano on 3/20/23. No ACP on file. History is obtained soley from the niece and medical records given mental status. Per niece, yesterday evening pt was complaining of palpitations and was noted to be more confused than her baseline. Symptoms continued to worsen until the morning of 5/21/23 when she was brought to  the ED. Upon arrival to the ED she was found to be afebrile, in atrial flutter with rate of 130s. She was given a dose of 5 mg lopressor. Hypertensive at 170/100, on 4L NC to maintain SpO2. Labs notable for Cr 1.9, BNP 2554, Trop 7.2, lactate 4 which later trended down to 3.5. Cardiogenic shock suspected. Palliative Care consulted for GOC discussion with family.      Hospital Course:  No notes on file    Interval History:   No acute events overnight. No episodes of agitation or combativeness reported and no antipsychotics were administered. Dobutamine discontinued and dopamine titrated down. Gentle IV fluid resuscitation started for decreased CI/CO + increased  SVR (CVP stable at 1). Mentation stable at baseline.     Medications:  Continuous Infusions:   DOPamine 2 mcg/kg/min (05/26/23 0600)    nitric oxide gas       Scheduled Meds:   amiodarone  400 mg Oral BID    Followed by    [START ON 6/5/2023] amiodarone  200 mg Oral Daily    apixaban  5 mg Oral BID    aspirin  81 mg Oral Daily    atorvastatin  80 mg Oral Daily    clopidogreL  75 mg Oral Daily    hydrALAZINE  25 mg Oral TID    isosorbide dinitrate  10 mg Oral TID    mupirocin   Nasal BID    valproate (DEPACON) IVPB  250 mg Intravenous Once    valproate sodium (DEPACON) IVPB  250 mg Intravenous Once     PRN Meds:sodium chloride 0.9%    Objective:     Vital Signs (Most Recent):  Temp: 97.6 °F (36.4 °C) (05/26/23 0700)  Pulse: 84 (05/26/23 0900)  Resp: (Abnormal) 27 (05/26/23 0900)  BP: (Abnormal) 163/99 (05/26/23 0900)  SpO2: 100 % (05/26/23 0900) Vital Signs (24h Range):  Temp:  [97.5 °F (36.4 °C)-98 °F (36.7 °C)] 97.6 °F (36.4 °C)  Pulse:  [64-86] 84  Resp:  [17-43] 27  SpO2:  [100 %] 100 %  BP: ()/() 163/99     Weight: 51 kg (112 lb 7 oz)  Body mass index is 17.61 kg/m².       Physical Exam       Review of Symptoms      Symptom Assessment (ESAS 0-10 Scale)  Unable to complete assessment due to Acuity of condition     CAM / Delirium:   Positive  Constipation:  Negative  Diarrhea:  Negative      Bowel Management Plan (BMP):  Yes      Pain Assessment in Advanced Demential Scale (PAINAD)   Breathing - Independent of vocalization:  0  Negative vocalization:  0  Facial expression:  0  Body language:  0  Consolability:  0  Total:  0    Living Arrangements:  Lives with family and Lives in home    Psychosocial/Cultural:   See Palliative Psychosocial Note: No  Caregiver Needs Discussed. Pt's Niece Linda Alfaro is primary caregiver. She is aware of her aunt's worsened cardiac function and new-onset agitation/combativeness. She is open to the consideration of hospice, but would like to try [facility] first.     Caregiver Distress: None currently  **Primary  to Follow**  Palliative Care  Consult: No    Spiritual:  F - Binta and Belief:  Anglican  I - Importance:  High  C - Community:  Family     Time-Based Charting:  No      Advance Care Planning   Advance Directives:   Living Will: No        Oral Declaration: Yes  LaPOST: No    Do Not Resuscitate Status: No    Medical Power of : No        Oral Declaration: Yes  Agent's Name:  Linda Alfaro   Agent's Contact Number:  [phone]    Decision Making:  Family answered questions  Goals of Care: What is most important right now is to focus on curative/life-prolongation (regardless of treatment burdens). Accordingly, we have decided that the best plan to meet the patient's goals includes continuing with treatment. Pt to be discharged home with home health. Care to be managed through [facility] and Ms Suero's niece, Linda Alfaro, will be the primary caretaker.        Significant Labs: All pertinent labs within the past 24 hours have been reviewed.  CBC:   Recent Labs   Lab 05/26/23 0215   WBC 5.02   HGB 10.6*   HCT 35.3*   MCV 78*        BMP:  Recent Labs   Lab 05/26/23 0215      *   K 4.3      CO2 25   BUN 39*   CREATININE 1.8*   CALCIUM 9.0   MG 2.2      LFT:  Lab Results   Component Value Date    AST 25 05/26/2023    ALKPHOS 123 05/26/2023    BILITOT 0.4 05/26/2023     Albumin:   Albumin   Date Value Ref Range Status   05/26/2023 3.1 (L) 3.5 - 5.2 g/dL Final     Protein:   Total Protein   Date Value Ref Range Status   05/26/2023 6.2 6.0 - 8.4 g/dL Final     Lactic acid:   Lab Results   Component Value Date    LACTATE 2.3 (H) 05/22/2023    LACTATE 2.7 (H) 05/22/2023       Significant Imaging: I have reviewed all pertinent imaging results/findings within the past 24 hours.      Francisco Naik MD  Palliative Medicine  [facility] - Surgical Intensive Care

## 2023-05-27 NOTE — ASSESSMENT & PLAN NOTE
82 y.o female with h/o  HFrEF with EF 25-30%, afib previously on coumadin ( had to be changed by pcp given lack of compliance ), cva with L ti residual deficits, medication noncompliance, dementia (with signficant cognitive deficit) who presents to the hospital for AMS. on arrival to the hospital he was found to be in atrial flutter with rate 130s. hypertensive with /100. lactate 4 which later downtrended to 3.5   bedside echo revealed depressed EF with global hypokinesis. mild-mod MR. IVC 1.2 cm and non-collapsible. Pleural effusion noted.    RIJ triple lumen central line was placed on admit. Admission hemodynamics were obtained and were as follows:  svo2 49 CVP 8 CO 1.8 CI 1.2 SVR 4077    Patient subsequently placed on nitro gtt and admitted to ccu. Unfortunatley given age, significnat debility post cva, dementia with significant cognitive impairment, and medication noncompliance pt is not a candidate for any advanced options. Extensive discussed with patient's niece, Linda. She was explained patient's poor prognosis and limited baseline but would like to maintain full code with continued treatment/life-saving measures.    Hemodynamics 5/27 on dopamine 2, and inhaled NO 3 ppm  SvO2: 79  CO: 6.0  CI: 3.8  CVP: 1  SVR: 1667    - continue to wean inhaled NO, currently 3   - continue dopamine 2, will discuss weaning further  - hold lasix given low CVP  - continue hydralazine-isosorbide dinitrate 50-20 mg TID  - q4 hour SvO2 and lactate  - discontinued zosyn given low suspicion for infection  - palliative care following

## 2023-05-27 NOTE — CARE UPDATE
Talked to bryon Hickman, updated on poor prognosis  Patient's sister Linda is on the way and will be here in a few minutes  Updated family of patient's condition going downhill quickly

## 2023-05-27 NOTE — CARE UPDATE
Patient is struggling with breathing   Sats dropping, appears uncomfortable  PRN IV morphine ordered  Pressors going up quickly, family updated

## 2023-05-27 NOTE — PROGRESS NOTES
Pharmacist Renal Dose Adjustment Note    Temitope Suero is a 83 y.o. female being treated with the medication piperacillin-tazobactam    Patient Data:    Vital Signs (Most Recent):  Temp: 97.8 °F (36.6 °C) (05/27/23 1200)  Pulse: (!) 123 (05/27/23 1400)  Resp: (!) 35 (05/27/23 1400)  BP: 116/63 (05/27/23 1400)  SpO2: 98 % (05/27/23 1400) Vital Signs (72h Range):  Temp:  [97.4 °F (36.3 °C)-98 °F (36.7 °C)]   Pulse:  []   Resp:  [14-59]   BP: ()/()   SpO2:  [87 %-100 %]      Recent Labs   Lab 05/25/23  1551 05/26/23  0215 05/27/23  0230   CREATININE 1.9* 1.8* 1.6*     Serum creatinine: 1.6 mg/dL (H) 05/27/23 0230  Estimated creatinine clearance: 21.8 mL/min (A)    Piperacillin-tazobactam 4.5 g q12h will be changed to piperacillin-tazobactam 4.5 g q8h    Pharmacist's Name: Laura Sheffield  Pharmacist's Extension: [phone]

## 2023-05-27 NOTE — PROGRESS NOTES
Notified Dr. Rajput of patient's VS, output, gtt, and assessment. CVP 1, SVO2 65, and 's. Provider to place 500 mL 0.9% normal saline bolus.

## 2023-05-27 NOTE — SUBJECTIVE & OBJECTIVE
Interval History: Persistent agitation overnight requiring antipsychotics. Patient lethargic this morning. Hemodynamics stable. Will attempt to wean off dopamine and Mango. Afib RVR this AM, reloaded with amiodarone.     Review of Systems   Unable to perform ROS: Mental status change   Objective:     Vital Signs (Most Recent):  Temp: 97.9 °F (36.6 °C) (05/26/23 1500)  Pulse: (!) 133 (05/27/23 0751)  Resp: (!) 34 (05/27/23 0751)  BP: (!) 190/116 (05/27/23 0751)  SpO2: 100 % (05/27/23 0751) Vital Signs (24h Range):  Temp:  [97.8 °F (36.6 °C)-97.9 °F (36.6 °C)] 97.9 °F (36.6 °C)  Pulse:  [] 133  Resp:  [14-48] 34  SpO2:  [87 %-100 %] 100 %  BP: (112-192)/() 190/116     Weight: 51 kg (112 lb 7 oz)  Body mass index is 17.61 kg/m².     SpO2: 100 %         Intake/Output Summary (Last 24 hours) at 5/27/2023 0927  Last data filed at 5/27/2023 0600  Gross per 24 hour   Intake 640.01 ml   Output 770 ml   Net -129.99 ml         Lines/Drains/Airways       Central Venous Catheter Line  Duration             Percutaneous Central Line Insertion/Assessment - Triple Lumen  05/21/23 2015 Internal Jugular Right 5 days              Drain  Duration                  Urethral Catheter 05/21/23 2249 5 days                       Physical Exam  Vitals and nursing note reviewed.   Constitutional:       Appearance: She is ill-appearing.   HENT:      Head: Normocephalic.      Mouth/Throat:      Mouth: Mucous membranes are moist.   Eyes:      Conjunctiva/sclera: Conjunctivae normal.   Cardiovascular:      Rate and Rhythm: Normal rate. Rhythm irregular.      Heart sounds: Murmur heard.   Abdominal:      General: Abdomen is flat. Bowel sounds are normal. There is no distension.   Musculoskeletal:      Right lower leg: No edema.      Left lower leg: No edema.   Neurological:      Mental Status: She is disoriented.          Significant Labs: All pertinent lab results from the last 24 hours have been reviewed.    Significant Imaging:   Relevant imaging reviewed

## 2023-05-27 NOTE — CARE UPDATE
Hemodynamics     CVP: 2  SVO2:  53  Cardiac Output: 3.0  Cardiac Index: 1.9  SVR: 2460    Continuous Infusions:   DOPamine 2 mcg/kg/min (05/26/23 2300)    nitric oxide gas         Intake/Output Summary (Last 24 hours) at 5/26/2023 2355  Last data filed at 5/26/2023 2300  Gross per 24 hour   Intake 905.48 ml   Output 735 ml   Net 170.48 ml         Plan: 500cc bolus started by day team. Some n/v today. Given antiemetics with improvement as well as ativan/haldol for agitation. KUB with no evidence of obstruction per radiology, last BM was them AM, reported as large, none acute abd exam   Case discussed with on call attending.    [place] , MD  Cardiology Fellow, PGY4

## 2023-05-27 NOTE — PROGRESS NOTES
Pt vomiting moderate amount of yellow/green emesis, suction provided. VSS. PRN antiemetic administered. Notified Dr. Beckford, will notify is it occurs again and reassess need for NG tube. Dr. Beckford also in touch with radiology as KUB has not been read by radiologist yet.

## 2023-05-27 NOTE — ASSESSMENT & PLAN NOTE
Atrial fibrillation  History of paroxysmal atrial fibrillation/flutter  Previously on warfarin however it was stopped given Toprol for rate control at home    Plan:  -- transitioned heparin to eliquis 5/25  -- will hold bb in setting of shock  -- transitione amiodarone infusion to PO amiodarone 5/24, reloaded with amiodarone 5/27 for afib rvr

## 2023-05-27 NOTE — CARE UPDATE
Patient with poor mentation and worsening hypotension (MAP 65). Recultured patient and started zosyn.     Repeat hemodynamics 5/27 4:40 PM on dopamine 2:    Svo2: 46  CO: 2.49  CI: 1.60  CVP:1  SVR: 1898  Istat Lactate: 4    Will restart dobutamine 5 mg/hr

## 2023-05-27 NOTE — PROGRESS NOTES
Notified Dr. Rajput of patient's VS, output, gtt, and assessment. CVP 1, SVO2 79, and 's, 's. Provider to place order for EKG

## 2023-05-27 NOTE — PLAN OF CARE
Got sign out from IM resident around 5 pm  BP are dropping, lactic acid up to 4, SVO2 down to 45 from 60s earlier, CVP 1  RR 40 bpm, CXR with no new opacities, started zosyn and new blood cultures  She is 83, frail with no advanced options, EF 15-20%  Currenlty on levo 0.4, epi 0.4,  5, Dopamine 2 which was started last few days ago  Continue supportive measures  Called family and updated  Prognosis guarded  D/w staff Dr Alexy Tracey MD

## 2023-05-28 NOTE — DISCHARGE SUMMARY
[facility] - Surgical Intensive Care  Cardiology  Discharge Summary      Patient Name: Temitope Suero  MRN: [id number]  Admission Date: 5/21/2023  Hospital Length of Stay: 7 days  Discharge Date and Time:  05/28/2023 4:29 AM  Attending Physician: Gm Tracey MD    Discharging Provider: Manjinder Ramey MD  Primary Care Physician: Gm Zambrano MD    HPI:   patient is a 82 y.o female with h/o htn, hld, HFrEF with EF 25-30%, afib previously on coumadin ( had to be changed by pcp given lack of compliance ), cva with L ti residual deficits, medication noncompliance, dementia (with signficant cognitive deficit) who presents to the hospital for AMS. She is accompanied by her niece who is the primary caregiver. history is obtained soley from the niece and medical records given mental status. Per niece, yesterday evening pt was complaining of palpitations and was noted to be more confused than her baseline. sxs worsened until the following AM where she was brought to the ED. Upon arrival to the ED she was found to be afebrile, in atrial flutter with rate of 130s ( recieved a dose of 5 mg lopressor at the time at ED), hypertensive with /100, on 4L NC. labs notable for cr 1.9 ( bl 1.2), BNP 2554, Trop 7.2, lactate 4 which later trended to 3.5    cardiology was initially consulted given concern for shock. evaluated at bedside. she appeared altered. bedside echo revealed depressed EF with global hypokinesis. mild-mod MR. IVC 1.2 cm and non-collapsible.  RIJ triple lumen central line was placed. hemodynamics were obtained and wre as follow:  svo2 49 CVP 8 CO 1.8 CI 1.2 SVR 4077  subsequently placed on nitro gtt and admitted to ccu       since her stroke she has had L sided deficits. she is completely dependent on her niece for her ADLs and IADLs. per her last PCP note ( Dr. Zambrano on 3/13) given worsening of her cognitive abilities and level of compliance she is now under more palliative type  care.          Indwelling Lines/Drains at time of discharge:  Lines/Drains/Airways     Central Venous Catheter Line  Duration           Percutaneous Central Line Insertion/Assessment - Triple Lumen  23 Internal Jugular Right 6 days          Drain  Duration                Urethral Catheter 239 6 days                Hospital Course:  Patient admitted to CCU for cardiogenic shock. Patient initially started on nitroglycerin for afterload reduction. She was additionally started on lasix 10 mg/hr and dobutamine infusion 5mg/hr . Extensive goals of care conversation with patient's family and assistance from palliative care. Plan for full code and life-saving/curative measures per discussion. Patient started on inhaled nitric oxide (10 PPM) and weaned off NG infusion. Trial of dopamine infusion for further preload reduction. Lasix infusion transitioned to IV pushes and dobutamine weaned to 2.5 mg/hr  given improving hemodynamics. Lasix subsequently stopped and patient given small IVF boluses due to low CVP. Dobutamine weaned off, inhaled nitric oxide weaned to 3 PPM, heparin transitioned to eliquis, and started on bidil . Hospitalization has been complicated by severe delirium, so patient scheduled on valproic acid. Palliative Care continuing to follow with patient and recommending consideration of outpatient hospice.  Pt went into PEA arrest 0400 on 23  Code blue called, did CPR x 10-11 min, remained asystole  Talked to family at bedside, stopped code after 10-11 min     tele monitoring showed asystole     Exam:  Patient is unresponsive to verbal and tactile stimuli  No breath sounds are heart  No heart sounds are heard  Patient is absent pulses in the carotid and femoral areas  Pupils fixed without response to bright light  Absent corneal reflex  Absent gag reflex     I pronounced the patient  at 0411 on 23. Cause of death was Cardiogenic Shock.      SIENNA email:  [email or web address]      Goals of Care Treatment Preferences:  Code Status: Full Code    Health care agent: Linda Alfaro  Health care agent number: [phone]           Consults:   Consults (From admission, onward)        Status Ordering Provider     Inpatient consult to Registered Dietitian/Nutritionist  Once        Provider:  (Not yet assigned)    Completed SUSI SEGURA     Inpatient consult to Palliative Care  Once        Provider:  (Not yet assigned)    Completed CONRAD MENDEZ     Inpatient consult to Critical Care Medicine  Once        Provider:  (Not yet assigned)    Completed SUNSHINE VIDAL            Final Active Diagnoses:    Diagnosis Date Noted POA    PRINCIPAL PROBLEM:  Cardiogenic shock [R57.0] 2023 Yes    At high risk for skin breakdown [Z91.89] 2023 Yes    Palliative care encounter [Z51.5] 2023 Not Applicable    Hypernatremia [E87.0] 2023 Yes    History of critical lower limb ischemia [I70.229] 2022 Yes    Atrial flutter [I48.92] 2022 Yes    NSTEMI (non-ST elevated myocardial infarction) [I21.4] 2022 Yes    Acute renal failure [N17.9] 2022 Yes    Acute on chronic systolic heart failure [I50.23] 2018 Yes    History of stroke [Z86.73] 2018 Not Applicable    Atrial fibrillation [I48.91] 07/10/2012 Yes      Problems Resolved During this Admission:     No new Assessment & Plan notes have been filed under this hospital service since the last note was generated.  Service: Cardiology      Discharged Condition:       Time spent on the discharge of patient: 45 minutes    Manjinder Ramey MD  Cardiology  [facility] - Surgical Intensive Care

## 2023-05-28 NOTE — CARE UPDATE
"      SICU PLAN OF CARE NOTE    Dx: Cardiogenic shock    Shift Events: Amiodarone rebolused this AM for Afib RVR and continuous infusion continued per orders. Minimal UOP throughout the shift-- 500 cc NS bolus given with no response. Patient with emesis episode this afternoon-- CXR, blood cultures x 1, and labs completed per orders. Patient with worsening hemodynamics and increasing pressor requirements towards the end of the shift. Family present at bedside and patient's family updated on the plan of care. All questions answered at this time.    Goals of Care: MAP > 65    Neuro: Arouses to Voice, Moves All Extremities, and Confused    Vital Signs: BP (!) 89/52   Pulse 96   Temp 97.6 °F (36.4 °C) (Axillary)   Resp (!) 36   Ht 5' 7" (1.702 m)   Wt 51 kg (112 lb 7 oz)   SpO2 95%   BMI 17.61 kg/m²     Respiratory: 15L Nonrebreather    Diet: NPO    Gtts: Norepinephrine, Epinephrine, Dopamine, Dobutamine, and Amiodarone    Urine Output: Urinary Catheter 145 cc/shift    Drains: None     Labs/Accuchecks: Daily Labs / No accuchecks    Skin: Patient repositioned Q2H. Wedge and pillows in place for weight shifting assistance. Wounds present as LDAs in chart and wound care completed per orders. Immerse specialty bed plugged in and working.      "

## 2023-05-28 NOTE — PROGRESS NOTES
Notified Dr. Rajput of upon entering room patient with emesis on gown and gurgling sound in the back of her throat. Suctioning provided. VSS on 4L NC. Patient with worsening mentation (would not respond to verbal commands but would withdraw to painful stimuli). Provider at bedside to assess the patient. Orders placed for STAT CXR, blood cultures x 2, labs, and hemodynamics.

## 2023-05-28 NOTE — CARE UPDATE
Death Note    Pt went into PEA arrest  Code blue called, did CPR x 10-11 min, remained asystole  Talked to family at bedside, stopped code after 10-11 min    tele monitoring shows asystole    Exam:  Patient is unresponsive to verbal and tactile stimuli  No breath sounds are heart  No heart sounds are heard  Patient is absent pulses in the carotid and femoral areas  Pupils fixed without response to bright light  Absent corneal reflex  Absent gag reflex    I pronounced the patient  at 0411 on 23. Cause of death was Cardiogenic Shock.     SIENNA email: [email or web address]    Manjinder Ramey M.D.  Cardiology Fellow  [facility]

## 2023-05-28 NOTE — EICU
Intervention Initiated From:  COR / EICU    Sneha intervened regarding:  Documentation    Called into room w/ code in progress, w/ at bedside stating that pt went into PEA at 0400 and had already been given one dose of epinephrine IV, code was run for 11 minutes before MD called it per family request, see code documentation for full details

## 2023-05-28 NOTE — NURSING
Vasopressors increasing throughout night, Dr. Dunbar came to bedside multiple times throughout night, at approximately 0400 patient suffered bradycardia to asystole, BLS and ACLS measures initiated, nursing resources immediately called to bedside including charge and float nurses and Dr. Dunbar. Dr. Dunbar led code event for approximately 10 minutes, during which time patient remained pulseless, asystole in two leads, and apneic. Family at bedside through code event. After speaking with the family, Dr Dunbar terminated resuscitation. Patient pronounced  at 0412         My final assessment reveals no carotid pulse, no heart tones upon ascultation. Reaffirmed asystole in two lead. Patient unresponsive to voice and pain. Patient remains apneic. Pupils are fixed and dilated.

## 2023-06-01 LAB — BACTERIA BLD CULT: NORMAL

## 2023-06-01 NOTE — PHYSICIAN QUERY
PT Name: Temitope Suero  MR #: [id number]     DOCUMENTATION CLARIFICATION      CDS/: Margie WattsRN               Contact information: [email or web address]  This form is a permanent document in the medical record.    Query Date: June 1, 2023    By submitting this query, we are merely seeking further clarification of documentation to reflect the severity of illness of your patient. Please utilize your independent clinical judgment when addressing the question(s) below.     The Medical Record contains the following:   Indicators   Supporting Clinical Findings Location in Medical Record   x Chest Pain, Angina Denied any nausea, chest pain, abdominal pain, and vomiting ED Prov Note 5/21    Coronary Artery Disease     x EKG EKG:  Atrial flutter with variable A-V block   Left axis deviation   Cannot exclude Anterior infarct   Abnormal ECG     atrial flutter with variable block   Left axis deviation   Nonspecific T wave abnormality   Cannot exclude Anterior infarct   Cannot exclude Anterior infarct   Abnormal ECG     Atrial fibrillation with rapid ventricular response   Left axis deviation   Possible Carlos A- Lateral infarct ,age undetermined vs due to IVCD   Abnormal ECG   EKG 5/21            EKG 5/21              EKG 5/27   x Troponin  05/21/23 17:45 05/21/23 21:26 05/22/23 03:30   Troponin I 7.942 (H) 7.236 (H) 6.713 (H)    Labs 5/21-5/22           x Echo Results Echo:  The estimated ejection fraction is 15-20%. No LV apical thrombus is present  The quantitatively derived ejection fraction is 18%.  The left ventricle is normal in size with eccentric hypertrophy and severely decreased systolic function.  There is severe left ventricular global hypokinesis.  Indeterminate left ventricular diastolic function.  Normal right ventricular size with mildly reduced right ventricular systolic function.  Severe left atrial enlargement.  Mild right atrial enlargement.  Mild-to-moderate mitral regurgitation.  There is  pulmonary hypertension.  The estimated PA systolic pressure is 47 mmHg.  Elevated central venous pressure (15 mmHg).    Echo 5/22    Angiography     x Documentation of acute cardiac condition Patient will be admitted to the cardiac critical care unit in the setting of her NSTEMI.    NSTEMI  Likely type 2 nstemi in setting of shock/flutter with rvr however type 1 NSTEMI cannot be r/o.  Trop 7 on arrival (remained flat); ECG with atrial flutter with rate 148 and no significant st-t changes. Bedside TTE with global hypokinesis.  Pt currently altered however per niece, never c/o cp/chest discomfort  Currently on DAPT ( for PAD) and heparin ( for AF)- will cont    NSTEMI (non-ST elevated myocardial infarction)  Will treat as type 2 NSTEMI but she continues on DAPT for PAD as well as heparin infusion for afib    Ms Suero is an 82yo lady that presented with acute AMS and worsening SOB in setting HFrEF and was admitted for treatment of T-II NSTEMI.    NSTEMI (non-ST elevated myocardial infarction)  Will treat as type 2 NSTEMI but she continues on DAPT for PAD    NSTEMI (non-ST elevated myocardial infarction ED Prov Note 5/21      H&P 5/22                    Cards PN 5/23        Palliative Med PN 5/23        Cards PN 5/26        DCS 5/28       x Medication/Treatment Heparin 100 units/ml IV  Apixaban 2.5 mg PO x 1 dose  Aspirin 81 mg PO QD  Plavix 75 mg PO QD  Ticagrelor 180 mg PO x 1 dose  Nitroglycerin 50 mg/250 ml IV  MAR 5/21-5/25  MAR 5/21  MAR 5/22-5/28  MAR 5/22-5/28  MAR 5/21  MAR 5/21-5/23      Other        Provider, due to conflicting documentation and clinical picture,please clarify the cardiac diagnosis related to the above documentation:    [  x ] NSTEMI     [   ] NSTEMI/Myocardial Infarction Type 2 due to (please specify): _________________     [   ] Other Cardiac Diagnosis (please specify): ___________________           Please document in your progress notes daily for the duration of treatment until resolved,  and include in your discharge summary.    Form No. [id number]

## 2023-11-29 NOTE — ASSESSMENT & PLAN NOTE
Nodular goiter on exam  No reported symptoms of thyrotoxicosis  Eval with TSH, T4     True.     If no pressing issues, fine to schedule him sometime in 2024.

## 2024-02-13 NOTE — Clinical Note
Mammogram in November. RTC to see me in one year.  normal gait and station , no tenderness or deformities present , normal gait and station , no tenderness or deformities present

## 2024-03-13 NOTE — PROGRESS NOTES
"I have personally taken the history and examined this patient and agree with the resident's note as stated above with the following thoughts:BP (!) 154/88 (BP Location: Right arm, Patient Position: Sitting, BP Method: Medium (Manual))   Pulse 64   Ht 5' 7" (1.702 m)   Wt 71.1 kg (156 lb 12 oz)   SpO2 97%   BMI 24.55 kg/m²     Discussed getting vaccines up to date.  Discussed importance of low fat diet and exercise we adjusted her blood pressure medicine and I will see her back again in 4-6 weeks to recheck the blood pressure.      " "Nurse Triage SBAR    Is this a 2nd Level Triage? YES, LICENSED PRACTITIONER REVIEW IS REQUIRED    Situation: Pt has been vomiting off and on since Saturday night. Fever on Sunday. Is keeping some fluids down    Background: Vomited Saturday night woke up on Sunday had a fever of 101. Was fine Monday and Tuesday. Woke up today vomited twice today. No fever    Assessment: Pt is able to keep down a smoothie, pedialyte and a smoothie. No fever. Pt urinated a large amount this am and then a small amount early afternoon.  Pt is his normal active self. Playing and singing    Protocol Recommended Disposition:   Home Care. Home care measures provided regarding increased fluids and red flags to look for and when to seek help    Recommendation: Need to know how often should pt be urinating    Also this has been off an on since Saturday- should pt be seen in clinic?    Routed to provider    Does the patient meet one of the following criteria for ADS visit consideration? No    Thank you  Brooke Davis RN on 3/13/2024 at 4:30 PM               Reason for Disposition   Mild-moderate vomiting (probable viral gastritis)    Answer Assessment - Initial Assessment Questions  1. SEVERITY: \"How many times has he vomited today?\" \"Over how many hours?\"      - MILD:1-2 times/day      - MODERATE: 3-7 times/day      - SEVERE: 8 or more times/day, vomits everything or repeated \"dry heaves\" on an empty stomach      2  2. ONSET: \"When did the vomiting begin?\"       Off and on Saturday night  3. FLUIDS: \"What fluids has he kept down today?\" \"What fluids or food has he vomited up today?\"       Sipping fluids. Pedilyte with juice and smoothies with fruit  4. HYDRATION STATUS: \"Any signs of dehydration?\" (e.g., dry mouth [not only dry lips], no tears, sunken soft spot) \"When did he last urinate?\"      Went a lot this am at 0900.   5. CHILD'S APPEARANCE: \"How sick is your child acting?\" \" What is he doing right now?\" If asleep, ask: \"How was he " "acting before he went to sleep?\"       Activity level at his norm. Just took a nap. Was low energy this am   6. CONTACTS: \"Is there anyone else in the family with the same symptoms?\"       no  7. CAUSE: \"What do you think is causing your child's vomiting?\"      Not sure    Protocols used: Vomiting Without Diarrhea-P-OH    "

## 2024-03-29 NOTE — SUBJECTIVE & OBJECTIVE
I  Past Medical History:   Diagnosis Date    Cancer of left breast, stage 2 11/24/2015    Cataract     Cerebrovascular small vessel disease 04/11/2018    Bi-thalamic lacunar disease, mod/sev periventricular white matter disease, mixed pattern cerebral microbleeds    Cervicalgia 04/17/2018    Chronic anticoagulation - apixaban 04/17/2018    Previous on Xarelto but changed to apixaban 8-2018 due to recurrent embolic stroke.    Chronic systolic heart failure 04/17/2018    Echo 4-2018   1 - Moderately depressed left ventricular systolic function (EF 30-35%).    2 - Biatrial enlargement.    3 - Normal right ventricular systolic function .    4 - Mild mitral regurgitation.    5 - Mild tricuspid regurgitation.    6 - The estimated PA systolic pressure is 34 mmHg.    7 - Increased central venous pressure.    8 - Left pleural effusion.     CKD stage 3 due to type 2 diabetes mellitus 04/17/2018    Embolic stroke involving left cerebellar artery 08/13/2018    Embolic stroke involving right posterior cerebral artery 04/11/2018    Essential hypertension 10/28/2013    Glaucoma suspect of both eyes 12/09/2013    Hearing loss, sensorineural 12/16/2013    LV (left ventricular) mural thrombus 05/07/2022    Malignant hypertension 08/12/2018    Mixed hyperlipidemia 10/28/2013    Obesity 01/16/2014    Paroxysmal atrial fibrillation 07/10/2012    Stage 3 chronic kidney disease 04/17/2018    Toxic multinodular goiter 10/28/2013    Type 2 diabetes mellitus with stage 3 chronic kidney disease, without long-term current use of insulin 07/10/2012    Diet controlled.    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    Vitamin D deficiency disease 10/28/2013       Past Surgical History:   Procedure Laterality Date    BREAST BIOPSY      BREAST SURGERY      CHOLECYSTECTOMY         Review of patient's allergies indicates:  No Known Allergies    Medications:  Continuous Infusions:   amiodarone in  dextrose 5% 1 mg/min (05/22/23 1410)    dexmedeTOMIDine (Precedex) infusion (titrating) 0.1 mcg/kg/hr (05/22/23 1400)    furosemide (LASIX) 10 mg/mL infusion (non-titrating) 10 mg/hr (05/22/23 1449)    heparin (porcine) in D5W 14 Units/kg/hr (05/22/23 1400)    nitroGLYCERIN Stopped (05/22/23 1147)     Scheduled Meds:   aspirin  81 mg Oral Daily    atorvastatin  80 mg Oral Daily    clopidogreL  75 mg Oral Daily    mupirocin   Nasal BID    piperacillin-tazobactam (ZOSYN) IVPB  4.5 g Intravenous Q12H     PRN Meds:heparin (PORCINE), heparin (PORCINE), sodium chloride 0.9%    Family History       Problem Relation (Age of Onset)    Asthma Son    Cancer Sister    Diabetes Paternal Aunt    Glaucoma Father    Heart disease Father    Hypertension Mother, Father          Tobacco Use    Smoking status: Never    Smokeless tobacco: Never   Substance and Sexual Activity    Alcohol use: No    Drug use: No    Sexual activity: Not Currently       Review of Systems   Unable to perform ROS: Acuity of condition   Objective:     Vital Signs (Most Recent):  Temp: 97 °F (36.1 °C) (warming blanket applied) (05/22/23 1115)  Pulse: (Abnormal) 116 (05/22/23 1445)  Resp: (Abnormal) 38 (05/22/23 1445)  BP: (Abnormal) 149/89 (05/22/23 1445)  SpO2: 100 % (05/22/23 1445) Vital Signs (24h Range):  Temp:  [97 °F (36.1 °C)-98.1 °F (36.7 °C)] 97 °F (36.1 °C)  Pulse:  [] 116  Resp:  [7-53] 38  SpO2:  [90 %-100 %] 100 %  BP: (113-187)/() 149/89     Weight: 54 kg (119 lb)  Body mass index is 18.64 kg/m².       Physical Exam  Vitals and nursing note reviewed.   Constitutional:       General: She is not in acute distress.     Appearance: Normal appearance. She is ill-appearing.      Comments: sleeping   HENT:      Head: Normocephalic and atraumatic.      Nose: Nose normal. No congestion.      Mouth/Throat:      Mouth: Mucous membranes are dry.      Pharynx: Oropharynx is clear.   Eyes:      General: No scleral icterus.        Right eye: No  discharge.         Left eye: No discharge.      Extraocular Movements: Extraocular movements intact.   Cardiovascular:      Rate and Rhythm: Normal rate.      Pulses: Normal pulses.           Radial pulses are 2+ on the right side and 2+ on the left side.        Dorsalis pedis pulses are 2+ on the right side and 2+ on the left side.      Heart sounds: Normal heart sounds. No murmur heard.    No gallop.   Pulmonary:      Effort: Pulmonary effort is normal. No respiratory distress.      Breath sounds: Normal breath sounds. No stridor. No wheezing.   Abdominal:      General: Bowel sounds are normal. There is no distension.      Palpations: Abdomen is soft.      Tenderness: There is no guarding.   Musculoskeletal:         General: No swelling, tenderness or deformity.      Right lower leg: No edema.      Left lower leg: No edema.   Skin:     General: Skin is warm.      Capillary Refill: Capillary refill takes less than 2 seconds.      Coloration: Skin is not jaundiced or pale.   Neurological:      Mental Status: She is alert.   Psychiatric:         Mood and Affect: Mood is anxious.         Speech: She is noncommunicative.         Behavior: Behavior is slowed.          Review of Symptoms      Symptom Assessment (ESAS 0-10 Scale)  Pain:  0  Dyspnea:  0  Anxiety:  0  Nausea:  0  Depression:  0  Anorexia:  0  Fatigue:  0  Insomnia:  0  Restlessness:  6  Agitation:  6         Psychosocial/Cultural:   See Palliative Psychosocial Note: No  **Primary  to Follow**  Palliative Care  Consult: Yes    Spiritual:  F - Binta and Belief:  Catholic     Time-Based Charting:  Yes  Chart Review: 15 minutes  Symptom Assessment: 10 minutes  Coordination of Care: 5 minutes  Goals of Care: 5 minutes    Total Time Spent: 35 minutes    Advance Care Planning   Advance Directives:   Living Will: No    LaPOST: No    Do Not Resuscitate Status: No    Medical Power of : No        Oral Declaration: Yes   Witnesses:   Dr Zambrano   Agent's Name:  Linda Alfaro   Agent's Contact Number:  [phone]    Decision Making:  Family answered questions  Goals of Care: What is most important right now is to focus on curative/life-prolongation (regardless of treatment burdens). Accordingly, we have decided that the best plan to meet the patient's goals includes continuing with treatment.       Significant Labs: All pertinent labs within the past 24 hours have been reviewed.  CBC:   Recent Labs   Lab 05/22/23  0330   WBC 6.69   HGB 10.7*   HCT 34.5*   MCV 79*        BMP:  Recent Labs   Lab 05/22/23 0330   *   *   K 4.2   *   CO2 20*   BUN 46*   CREATININE 1.8*   CALCIUM 9.0   MG 2.0     LFT:  Lab Results   Component Value Date    AST 40 05/22/2023    ALKPHOS 134 05/22/2023    BILITOT 1.3 (H) 05/22/2023     Albumin:   Albumin   Date Value Ref Range Status   05/22/2023 2.9 (L) 3.5 - 5.2 g/dL Final     Protein:   Total Protein   Date Value Ref Range Status   05/22/2023 5.9 (L) 6.0 - 8.4 g/dL Final     Lactic acid:   Lab Results   Component Value Date    LACTATE 2.8 (H) 05/22/2023    LACTATE 2.9 (H) 05/22/2023       Significant Imaging: I have reviewed all pertinent imaging results/findings within the past 24 hours.     done

## 2024-05-23 NOTE — PROGRESS NOTES
[facility] - Surgical Intensive Care  Cardiology  Progress Note    Patient Name: Temitope Suero  MRN: [id number]  Admission Date: 5/21/2023  Hospital Length of Stay: 6 days  Code Status: Full Code   Attending Physician: Gm Tracey MD   Primary Care Physician: Gm Zambrano MD  Expected Discharge Date: 5/29/2023  Principal Problem:Cardiogenic shock    Subjective:     Hospital Course:   Patient admitted to CCU for cardiogenic shock. Patient initially started on nitroglycerin for afterload reduction. She was additionally started on lasix 10 mg/hr and dobutamine infusion 5mg/hr 5/22. Extensive goals of care conversation with patient's family and assistance from palliative care. Plan for full code and life-saving/curative measures per discussion. Patient started on inhaled nitric oxide (10 PPM) and weaned off NG infusion. Trial of dopamine infusion for further preload reduction. Lasix infusion transitioned to IV pushes and dobutamine weaned to 2.5 mg/hr 5/24 given improving hemodynamics. Lasix subsequently stopped and patient given small IVF boluses due to low CVP. Dobutamine weaned off, inhaled nitric oxide weaned to 3 PPM, heparin transitioned to eliquis, and started on bidil 5/26. Hospitalization has been complicated by severe delirium, so patient scheduled on valproic acid. Palliative Care continuing to follow with patient and recommending consideration of outpatient hospice.      Interval History: Persistent agitation overnight requiring antipsychotics. Patient lethargic this morning. Hemodynamics stable. Will attempt to wean off dopamine and Mango. Afib RVR this AM, reloaded with amiodarone.     Review of Systems   Unable to perform ROS: Mental status change   Objective:     Vital Signs (Most Recent):  Temp: 97.9 °F (36.6 °C) (05/26/23 1500)  Pulse: (!) 133 (05/27/23 0751)  Resp: (!) 34 (05/27/23 0751)  BP: (!) 190/116 (05/27/23 0751)  SpO2: 100 % (05/27/23 0751) Vital Signs (24h Range):  Temp:  [97.8 °F (36.6  °C)-97.9 °F (36.6 °C)] 97.9 °F (36.6 °C)  Pulse:  [] 133  Resp:  [14-48] 34  SpO2:  [87 %-100 %] 100 %  BP: (112-192)/() 190/116     Weight: 51 kg (112 lb 7 oz)  Body mass index is 17.61 kg/m².     SpO2: 100 %         Intake/Output Summary (Last 24 hours) at 5/27/2023 0927  Last data filed at 5/27/2023 0600  Gross per 24 hour   Intake 640.01 ml   Output 770 ml   Net -129.99 ml         Lines/Drains/Airways       Central Venous Catheter Line  Duration             Percutaneous Central Line Insertion/Assessment - Triple Lumen  05/21/23 2015 Internal Jugular Right 5 days              Drain  Duration                  Urethral Catheter 05/21/23 2249 5 days                       Physical Exam  Vitals and nursing note reviewed.   Constitutional:       Appearance: She is ill-appearing.   HENT:      Head: Normocephalic.      Mouth/Throat:      Mouth: Mucous membranes are moist.   Eyes:      Conjunctiva/sclera: Conjunctivae normal.   Cardiovascular:      Rate and Rhythm: Normal rate. Rhythm irregular.      Heart sounds: Murmur heard.   Abdominal:      General: Abdomen is flat. Bowel sounds are normal. There is no distension.   Musculoskeletal:      Right lower leg: No edema.      Left lower leg: No edema.   Neurological:      Mental Status: She is disoriented.          Significant Labs: All pertinent lab results from the last 24 hours have been reviewed.    Significant Imaging:  Relevant imaging reviewed    Assessment and Plan:       * Cardiogenic shock  82 y.o female with h/o  HFrEF with EF 25-30%, afib previously on coumadin ( had to be changed by pcp given lack of compliance ), cva with L ti residual deficits, medication noncompliance, dementia (with signficant cognitive deficit) who presents to the hospital for AMS. on arrival to the hospital he was found to be in atrial flutter with rate 130s. hypertensive with /100. lactate 4 which later downtrended to 3.5   bedside echo revealed depressed EF with  global hypokinesis. mild-mod MR. IVC 1.2 cm and non-collapsible. Pleural effusion noted.    RIJ triple lumen central line was placed on admit. Admission hemodynamics were obtained and were as follows:  svo2 49 CVP 8 CO 1.8 CI 1.2 SVR 4077    Patient subsequently placed on nitro gtt and admitted to ccu. Unfortunatley given age, significnat debility post cva, dementia with significant cognitive impairment, and medication noncompliance pt is not a candidate for any advanced options. Extensive discussed with patient's niece, Linda. She was explained patient's poor prognosis and limited baseline but would like to maintain full code with continued treatment/life-saving measures.    Hemodynamics 5/27 on dopamine 2, and inhaled NO 3 ppm  SvO2: 79  CO: 6.0  CI: 3.8  CVP: 1  SVR: 1667    - continue to wean inhaled NO, currently 3   - continue dopamine 2, will discuss weaning further  - hold lasix given low CVP  - continue hydralazine-isosorbide dinitrate 50-20 mg TID  - q4 hour SvO2 and lactate  - discontinued zosyn given low suspicion for infection  - palliative care following    At high risk for skin breakdown  Wound care following    Hypernatremia  Given 1/2 NS on admission with some improvement    History of critical lower limb ischemia  May 2022- no surgical intervention was needed at this time as although studies showed evidence of PAD, patient  Recommendation at the time was for woundcare, compression/elevation for edema, and initiation of ASA/Plavix and high intensity statin- will cont    Atrial flutter  Atrial fibrillation  History of paroxysmal atrial fibrillation/flutter  Previously on warfarin however it was stopped given Toprol for rate control at home    Plan:  -- transitioned heparin to eliquis 5/25  -- will hold bb in setting of shock  -- transitione amiodarone infusion to PO amiodarone 5/24, reloaded with amiodarone 5/27 for afib rvr    NSTEMI (non-ST elevated myocardial infarction)  Will treat as type 2  NSTEMI but she continues on DAPT for PAD    Acute renal failure  Baseline creatinine approximately 1.2, worsening to 2.0 on 5/23 likely ATN in the setting of cardiogenic shock. Improved with small IV fluid boluses and lasix held. Will titrate to CVP of 5.    - Creatinine continuing to gradually improve, down to 1.8.    Acute on chronic systolic heart failure  Echo 5/22:  The estimated ejection fraction is 15-20%. No LV apical thrombus is present  The quantitatively derived ejection fraction is 18%.  The left ventricle is normal in size with eccentric hypertrophy and severely decreased systolic function.  There is severe left ventricular global hypokinesis.  Indeterminate left ventricular diastolic function.  Normal right ventricular size with mildly reduced right ventricular systolic function.  Severe left atrial enlargement.  Mild right atrial enlargement.  Mild-to-moderate mitral regurgitation.  There is pulmonary hypertension.  The estimated PA systolic pressure is 47 mmHg.  Elevated central venous pressure (15 mmHg).    - see cardiogenic shock    History of stroke  Previous CVA with residual weakness and severe dementia        VTE Risk Mitigation (From admission, onward)           Ordered     apixaban tablet 5 mg  2 times daily         05/25/23 1213     IP VTE HIGH RISK PATIENT  Once         05/21/23 2156     Place sequential compression device  Until discontinued         05/21/23 2156                    Tal Rajput MD  Cardiology  [facility] - Surgical Intensive Care    I have seen the patient, reviewed the Fellow's history and physical, assessment and plan. I have personally interviewed and examined the patient and agree with the findings.     GAGANDEEP Tracey MD       No
